# Patient Record
Sex: FEMALE | Race: WHITE | NOT HISPANIC OR LATINO | Employment: FULL TIME | URBAN - METROPOLITAN AREA
[De-identification: names, ages, dates, MRNs, and addresses within clinical notes are randomized per-mention and may not be internally consistent; named-entity substitution may affect disease eponyms.]

---

## 2021-03-22 ENCOUNTER — BULK ORDERING (OUTPATIENT)
Dept: CASE MANAGEMENT | Facility: OTHER | Age: 61
End: 2021-03-22

## 2021-03-22 DIAGNOSIS — Z23 IMMUNIZATION DUE: ICD-10-CM

## 2024-08-19 ENCOUNTER — APPOINTMENT (OUTPATIENT)
Dept: MRI IMAGING | Facility: HOSPITAL | Age: 64
DRG: 025 | End: 2024-08-19
Payer: COMMERCIAL

## 2024-08-19 ENCOUNTER — APPOINTMENT (OUTPATIENT)
Dept: GENERAL RADIOLOGY | Facility: HOSPITAL | Age: 64
DRG: 025 | End: 2024-08-19
Payer: COMMERCIAL

## 2024-08-19 ENCOUNTER — HOSPITAL ENCOUNTER (INPATIENT)
Facility: HOSPITAL | Age: 64
LOS: 11 days | Discharge: HOME | DRG: 025 | End: 2024-08-30
Attending: EMERGENCY MEDICINE | Admitting: INTERNAL MEDICINE
Payer: COMMERCIAL

## 2024-08-19 ENCOUNTER — APPOINTMENT (OUTPATIENT)
Dept: CT IMAGING | Facility: HOSPITAL | Age: 64
DRG: 025 | End: 2024-08-19
Payer: COMMERCIAL

## 2024-08-19 DIAGNOSIS — C79.31 METASTASIS TO BRAIN OF UNKNOWN ORIGIN: Primary | ICD-10-CM

## 2024-08-19 DIAGNOSIS — R51.9 ACUTE INTRACTABLE HEADACHE, UNSPECIFIED HEADACHE TYPE: ICD-10-CM

## 2024-08-19 DIAGNOSIS — R03.0 ELEVATED BP WITHOUT DIAGNOSIS OF HYPERTENSION: ICD-10-CM

## 2024-08-19 DIAGNOSIS — C80.1 METASTASIS TO BRAIN OF UNKNOWN ORIGIN: Primary | ICD-10-CM

## 2024-08-19 DIAGNOSIS — K63.89 COLONIC MASS: ICD-10-CM

## 2024-08-19 LAB
ALBUMIN SERPL-MCNC: 4.4 G/DL (ref 3.5–5.2)
ALBUMIN/GLOB SERPL: 1.5 G/DL
ALP SERPL-CCNC: 83 U/L (ref 39–117)
ALT SERPL W P-5'-P-CCNC: 13 U/L (ref 1–33)
ANION GAP SERPL CALCULATED.3IONS-SCNC: 9 MMOL/L (ref 5–15)
AST SERPL-CCNC: 15 U/L (ref 1–32)
BACTERIA UR QL AUTO: ABNORMAL /HPF
BASOPHILS # BLD AUTO: 0.03 10*3/MM3 (ref 0–0.2)
BASOPHILS # BLD AUTO: 0.03 10*3/MM3 (ref 0–0.2)
BASOPHILS NFR BLD AUTO: 0.5 % (ref 0–1.5)
BASOPHILS NFR BLD AUTO: 0.6 % (ref 0–1.5)
BILIRUB SERPL-MCNC: 0.3 MG/DL (ref 0–1.2)
BILIRUB UR QL STRIP: NEGATIVE
BUN SERPL-MCNC: 13 MG/DL (ref 8–23)
BUN/CREAT SERPL: 18.8 (ref 7–25)
CALCIUM SPEC-SCNC: 9.6 MG/DL (ref 8.6–10.5)
CHLORIDE SERPL-SCNC: 102 MMOL/L (ref 98–107)
CLARITY UR: ABNORMAL
CO2 SERPL-SCNC: 26 MMOL/L (ref 22–29)
COLOR UR: YELLOW
CREAT SERPL-MCNC: 0.69 MG/DL (ref 0.57–1)
DEPRECATED RDW RBC AUTO: 44 FL (ref 37–54)
DEPRECATED RDW RBC AUTO: 45.8 FL (ref 37–54)
EGFRCR SERPLBLD CKD-EPI 2021: 97.1 ML/MIN/1.73
EOSINOPHIL # BLD AUTO: 0.02 10*3/MM3 (ref 0–0.4)
EOSINOPHIL # BLD AUTO: 0.02 10*3/MM3 (ref 0–0.4)
EOSINOPHIL NFR BLD AUTO: 0.3 % (ref 0.3–6.2)
EOSINOPHIL NFR BLD AUTO: 0.4 % (ref 0.3–6.2)
ERYTHROCYTE [DISTWIDTH] IN BLOOD BY AUTOMATED COUNT: 12.3 % (ref 12.3–15.4)
ERYTHROCYTE [DISTWIDTH] IN BLOOD BY AUTOMATED COUNT: 12.4 % (ref 12.3–15.4)
FLUAV RNA RESP QL NAA+PROBE: NOT DETECTED
FLUBV RNA RESP QL NAA+PROBE: NOT DETECTED
GLOBULIN UR ELPH-MCNC: 3 GM/DL
GLUCOSE BLDC GLUCOMTR-MCNC: 91 MG/DL (ref 70–130)
GLUCOSE SERPL-MCNC: 98 MG/DL (ref 65–99)
GLUCOSE UR STRIP-MCNC: NEGATIVE MG/DL
HCT VFR BLD AUTO: 40.5 % (ref 34–46.6)
HCT VFR BLD AUTO: 42.5 % (ref 34–46.6)
HGB BLD-MCNC: 13.7 G/DL (ref 12–15.9)
HGB BLD-MCNC: 14.4 G/DL (ref 12–15.9)
HGB UR QL STRIP.AUTO: NEGATIVE
HOLD SPECIMEN: NORMAL
HOLD SPECIMEN: NORMAL
HYALINE CASTS UR QL AUTO: ABNORMAL /LPF
IMM GRANULOCYTES # BLD AUTO: 0.01 10*3/MM3 (ref 0–0.05)
IMM GRANULOCYTES # BLD AUTO: 0.02 10*3/MM3 (ref 0–0.05)
IMM GRANULOCYTES NFR BLD AUTO: 0.2 % (ref 0–0.5)
IMM GRANULOCYTES NFR BLD AUTO: 0.3 % (ref 0–0.5)
KETONES UR QL STRIP: ABNORMAL
LEUKOCYTE ESTERASE UR QL STRIP.AUTO: ABNORMAL
LYMPHOCYTES # BLD AUTO: 0.64 10*3/MM3 (ref 0.7–3.1)
LYMPHOCYTES # BLD AUTO: 1.18 10*3/MM3 (ref 0.7–3.1)
LYMPHOCYTES NFR BLD AUTO: 11 % (ref 19.6–45.3)
LYMPHOCYTES NFR BLD AUTO: 22.1 % (ref 19.6–45.3)
MAGNESIUM SERPL-MCNC: 1.9 MG/DL (ref 1.6–2.4)
MCH RBC QN AUTO: 33.1 PG (ref 26.6–33)
MCH RBC QN AUTO: 33.7 PG (ref 26.6–33)
MCHC RBC AUTO-ENTMCNC: 33.8 G/DL (ref 31.5–35.7)
MCHC RBC AUTO-ENTMCNC: 33.9 G/DL (ref 31.5–35.7)
MCV RBC AUTO: 97.7 FL (ref 79–97)
MCV RBC AUTO: 99.8 FL (ref 79–97)
MONOCYTES # BLD AUTO: 0.24 10*3/MM3 (ref 0.1–0.9)
MONOCYTES # BLD AUTO: 0.46 10*3/MM3 (ref 0.1–0.9)
MONOCYTES NFR BLD AUTO: 4.1 % (ref 5–12)
MONOCYTES NFR BLD AUTO: 8.6 % (ref 5–12)
MUCOUS THREADS URNS QL MICRO: ABNORMAL /HPF
NEUTROPHILS NFR BLD AUTO: 3.64 10*3/MM3 (ref 1.7–7)
NEUTROPHILS NFR BLD AUTO: 4.88 10*3/MM3 (ref 1.7–7)
NEUTROPHILS NFR BLD AUTO: 68.1 % (ref 42.7–76)
NEUTROPHILS NFR BLD AUTO: 83.8 % (ref 42.7–76)
NITRITE UR QL STRIP: NEGATIVE
NRBC BLD AUTO-RTO: 0 /100 WBC (ref 0–0.2)
NRBC BLD AUTO-RTO: 0 /100 WBC (ref 0–0.2)
PH UR STRIP.AUTO: 6 [PH] (ref 5–8)
PLATELET # BLD AUTO: 275 10*3/MM3 (ref 140–450)
PLATELET # BLD AUTO: 355 10*3/MM3 (ref 140–450)
PMV BLD AUTO: 9.2 FL (ref 6–12)
PMV BLD AUTO: 9.5 FL (ref 6–12)
POTASSIUM SERPL-SCNC: 3.6 MMOL/L (ref 3.5–5.2)
PROT SERPL-MCNC: 7.4 G/DL (ref 6–8.5)
PROT UR QL STRIP: ABNORMAL
QT INTERVAL: 442 MS
QTC INTERVAL: 467 MS
RBC # BLD AUTO: 4.06 10*6/MM3 (ref 3.77–5.28)
RBC # BLD AUTO: 4.35 10*6/MM3 (ref 3.77–5.28)
RBC # UR STRIP: ABNORMAL /HPF
REF LAB TEST METHOD: ABNORMAL
RSV RNA RESP QL NAA+PROBE: NOT DETECTED
SARS-COV-2 RNA RESP QL NAA+PROBE: NOT DETECTED
SODIUM SERPL-SCNC: 137 MMOL/L (ref 136–145)
SP GR UR STRIP: 1.02 (ref 1–1.03)
SQUAMOUS #/AREA URNS HPF: ABNORMAL /HPF
TROPONIN T SERPL HS-MCNC: <6 NG/L
UROBILINOGEN UR QL STRIP: ABNORMAL
WBC # UR STRIP: ABNORMAL /HPF
WBC NRBC COR # BLD AUTO: 5.34 10*3/MM3 (ref 3.4–10.8)
WBC NRBC COR # BLD AUTO: 5.83 10*3/MM3 (ref 3.4–10.8)
WHOLE BLOOD HOLD COAG: NORMAL
WHOLE BLOOD HOLD SPECIMEN: NORMAL

## 2024-08-19 PROCEDURE — 25010000002 NICARDIPINE 2.5 MG/ML SOLUTION

## 2024-08-19 PROCEDURE — 25810000003 LACTATED RINGERS PER 1000 ML

## 2024-08-19 PROCEDURE — 84484 ASSAY OF TROPONIN QUANT: CPT

## 2024-08-19 PROCEDURE — 25810000003 LACTATED RINGERS SOLUTION: Performed by: PHYSICIAN ASSISTANT

## 2024-08-19 PROCEDURE — 70553 MRI BRAIN STEM W/O & W/DYE: CPT

## 2024-08-19 PROCEDURE — 99291 CRITICAL CARE FIRST HOUR: CPT

## 2024-08-19 PROCEDURE — 25010000002 HYDRALAZINE PER 20 MG

## 2024-08-19 PROCEDURE — 70450 CT HEAD/BRAIN W/O DYE: CPT

## 2024-08-19 PROCEDURE — 85025 COMPLETE CBC W/AUTO DIFF WBC: CPT

## 2024-08-19 PROCEDURE — 36415 COLL VENOUS BLD VENIPUNCTURE: CPT

## 2024-08-19 PROCEDURE — 25810000003 SODIUM CHLORIDE 0.9 % SOLUTION

## 2024-08-19 PROCEDURE — 80053 COMPREHEN METABOLIC PANEL: CPT

## 2024-08-19 PROCEDURE — 82948 REAGENT STRIP/BLOOD GLUCOSE: CPT

## 2024-08-19 PROCEDURE — 25010000002 HYDROCORTISONE SOD SUC (PF) 250 MG RECONSTITUTED SOLUTION: Performed by: NEUROLOGICAL SURGERY

## 2024-08-19 PROCEDURE — 71045 X-RAY EXAM CHEST 1 VIEW: CPT

## 2024-08-19 PROCEDURE — 81001 URINALYSIS AUTO W/SCOPE: CPT | Performed by: EMERGENCY MEDICINE

## 2024-08-19 PROCEDURE — 93010 ELECTROCARDIOGRAM REPORT: CPT | Performed by: INTERNAL MEDICINE

## 2024-08-19 PROCEDURE — 83735 ASSAY OF MAGNESIUM: CPT

## 2024-08-19 PROCEDURE — 25010000002 LEVETRIRACETAM PER 10 MG: Performed by: INTERNAL MEDICINE

## 2024-08-19 PROCEDURE — 87637 SARSCOV2&INF A&B&RSV AMP PRB: CPT | Performed by: PHYSICIAN ASSISTANT

## 2024-08-19 PROCEDURE — 93005 ELECTROCARDIOGRAM TRACING: CPT | Performed by: EMERGENCY MEDICINE

## 2024-08-19 RX ORDER — BISACODYL 5 MG/1
5 TABLET, DELAYED RELEASE ORAL DAILY PRN
Status: DISCONTINUED | OUTPATIENT
Start: 2024-08-19 | End: 2024-08-21 | Stop reason: SDUPTHER

## 2024-08-19 RX ORDER — AMOXICILLIN 250 MG
2 CAPSULE ORAL 2 TIMES DAILY
Status: DISCONTINUED | OUTPATIENT
Start: 2024-08-19 | End: 2024-08-21 | Stop reason: SDUPTHER

## 2024-08-19 RX ORDER — BUPROPION HYDROCHLORIDE 150 MG/1
2 TABLET ORAL EVERY MORNING
COMMUNITY
Start: 2024-08-19

## 2024-08-19 RX ORDER — NITROGLYCERIN 0.4 MG/1
0.4 TABLET SUBLINGUAL
Status: DISCONTINUED | OUTPATIENT
Start: 2024-08-19 | End: 2024-08-30 | Stop reason: HOSPADM

## 2024-08-19 RX ORDER — HYDRALAZINE HYDROCHLORIDE 20 MG/ML
10 INJECTION INTRAMUSCULAR; INTRAVENOUS ONCE
Status: COMPLETED | OUTPATIENT
Start: 2024-08-19 | End: 2024-08-19

## 2024-08-19 RX ORDER — SODIUM CHLORIDE 0.9 % (FLUSH) 0.9 %
10 SYRINGE (ML) INJECTION AS NEEDED
Status: DISCONTINUED | OUTPATIENT
Start: 2024-08-19 | End: 2024-08-30 | Stop reason: HOSPADM

## 2024-08-19 RX ORDER — ACETAMINOPHEN 650 MG/1
650 SUPPOSITORY RECTAL EVERY 4 HOURS PRN
Status: DISCONTINUED | OUTPATIENT
Start: 2024-08-19 | End: 2024-08-24

## 2024-08-19 RX ORDER — SODIUM CHLORIDE 0.9 % (FLUSH) 0.9 %
10 SYRINGE (ML) INJECTION AS NEEDED
Status: DISCONTINUED | OUTPATIENT
Start: 2024-08-19 | End: 2024-08-24

## 2024-08-19 RX ORDER — SODIUM CHLORIDE, SODIUM LACTATE, POTASSIUM CHLORIDE, CALCIUM CHLORIDE 600; 310; 30; 20 MG/100ML; MG/100ML; MG/100ML; MG/100ML
100 INJECTION, SOLUTION INTRAVENOUS CONTINUOUS
Status: DISCONTINUED | OUTPATIENT
Start: 2024-08-19 | End: 2024-08-20

## 2024-08-19 RX ORDER — ATORVASTATIN CALCIUM 10 MG/1
1 TABLET, FILM COATED ORAL DAILY
COMMUNITY
Start: 2024-08-19

## 2024-08-19 RX ORDER — ACETAMINOPHEN 325 MG/1
650 TABLET ORAL EVERY 4 HOURS PRN
Status: DISCONTINUED | OUTPATIENT
Start: 2024-08-19 | End: 2024-08-24

## 2024-08-19 RX ORDER — LEVETIRACETAM 500 MG/5ML
500 INJECTION, SOLUTION, CONCENTRATE INTRAVENOUS EVERY 12 HOURS SCHEDULED
Status: DISCONTINUED | OUTPATIENT
Start: 2024-08-19 | End: 2024-08-23

## 2024-08-19 RX ORDER — POLYETHYLENE GLYCOL 3350 17 G/17G
17 POWDER, FOR SOLUTION ORAL DAILY PRN
Status: DISCONTINUED | OUTPATIENT
Start: 2024-08-19 | End: 2024-08-21 | Stop reason: SDUPTHER

## 2024-08-19 RX ORDER — FAMOTIDINE 10 MG/ML
20 INJECTION, SOLUTION INTRAVENOUS EVERY 12 HOURS SCHEDULED
Status: DISCONTINUED | OUTPATIENT
Start: 2024-08-19 | End: 2024-08-23

## 2024-08-19 RX ORDER — ONDANSETRON 2 MG/ML
4 INJECTION INTRAMUSCULAR; INTRAVENOUS EVERY 6 HOURS PRN
Status: DISCONTINUED | OUTPATIENT
Start: 2024-08-19 | End: 2024-08-21 | Stop reason: SDUPTHER

## 2024-08-19 RX ORDER — BISACODYL 10 MG
10 SUPPOSITORY, RECTAL RECTAL DAILY PRN
Status: DISCONTINUED | OUTPATIENT
Start: 2024-08-19 | End: 2024-08-21 | Stop reason: SDUPTHER

## 2024-08-19 RX ADMIN — NICARDIPINE HYDROCHLORIDE 5 MG/HR: 25 INJECTION, SOLUTION INTRAVENOUS at 22:28

## 2024-08-19 RX ADMIN — HYDRALAZINE HYDROCHLORIDE 10 MG: 20 INJECTION, SOLUTION INTRAMUSCULAR; INTRAVENOUS at 23:58

## 2024-08-19 RX ADMIN — SODIUM CHLORIDE, POTASSIUM CHLORIDE, SODIUM LACTATE AND CALCIUM CHLORIDE 100 ML/HR: 600; 310; 30; 20 INJECTION, SOLUTION INTRAVENOUS at 22:04

## 2024-08-19 RX ADMIN — HYDROCORTISONE SODIUM SUCCINATE 250 MG: 250 INJECTION, POWDER, FOR SOLUTION INTRAMUSCULAR; INTRAVENOUS at 19:20

## 2024-08-19 RX ADMIN — LEVETIRACETAM 500 MG: 500 INJECTION, SOLUTION INTRAVENOUS at 21:48

## 2024-08-19 RX ADMIN — FAMOTIDINE 20 MG: 10 INJECTION INTRAVENOUS at 20:13

## 2024-08-19 RX ADMIN — SODIUM CHLORIDE, POTASSIUM CHLORIDE, SODIUM LACTATE AND CALCIUM CHLORIDE 1000 ML: 600; 310; 30; 20 INJECTION, SOLUTION INTRAVENOUS at 17:14

## 2024-08-19 NOTE — ED PROVIDER NOTES
EMERGENCY DEPARTMENT ENCOUNTER  Room Number:  05/05  PCP: Provider, No Known  Independent Historians: Patient      HPI:  Chief Complaint: had concerns including Dizziness, Headache, and Hypertension.     A complete HPI/ROS/PMH/PSH/SH/FH are unobtainable due to: None    Chronic or social conditions impacting patient care (Social Determinants of Health): None      Context: Ely Sims is a 64 y.o. female with a medical history of breast mass, anxiety, ADHD who presents to the ED c/o acute headache.  Patient reports Wednesday she had gradual onset of frontal headache that she describes as aching.  She reports associated nausea, vomiting, diarrhea since that time.  Reports the headache has waxed and waned over the last 5 days.  Reports her sister who is an RN has been checking her blood pressure and it has been elevated.  She has been unable to keep down sips of water.  She decided to go to urgent care for evaluation today.  When she sat on the exam table at the [facility] she developed room spinning dizziness that only lasted for a few seconds.  Given her constellation of symptoms, she was referred to the emergency department for further workup and evaluation.  Patient has no history of hypertension.  No known sick contacts.  Patient denies photophobia, vision loss, chest pain, dyspnea, cough, abdominal pain, dysuria, or any other systemic complaints.      Review of prior external notes (non-ED) -and- Review of prior external test results outside of this encounter:  Patient seen at urgent care today for intractable headache.  Reviewed assessment and plan.  Patient referred to emergency department.  Reviewed imaging performed on 11/9/2017.  X-ray of left wrist showed arthritis.    Prescription drug monitoring program review:     N/A    PAST MEDICAL HISTORY  Active Ambulatory Problems     Diagnosis Date Noted    No Active Ambulatory Problems     Resolved Ambulatory Problems     Diagnosis Date Noted    No Resolved Ambulatory  Problems     No Additional Past Medical History         PAST SURGICAL HISTORY  No past surgical history on file.      FAMILY HISTORY  No family history on file.      SOCIAL HISTORY  Social History     Socioeconomic History    Marital status: Single   Tobacco Use    Smoking status: Some Days     Types: Cigarettes    Smokeless tobacco: Never   Vaping Use    Vaping status: Never Used   Substance and Sexual Activity    Alcohol use: Yes    Drug use: Defer    Sexual activity: Defer         ALLERGIES  Patient has no known allergies.      REVIEW OF SYSTEMS  Review of Systems   Constitutional:  Negative for chills and fever.   HENT:  Negative for ear pain and sore throat.    Respiratory:  Negative for cough and shortness of breath.    Cardiovascular:  Negative for chest pain and palpitations.   Gastrointestinal:  Positive for diarrhea, nausea and vomiting. Negative for abdominal pain.   Genitourinary:  Negative for dysuria and hematuria.   Musculoskeletal:  Negative for arthralgias and joint swelling.   Skin:  Negative for pallor and rash.   Neurological:  Positive for headaches. Negative for numbness.   Psychiatric/Behavioral:  Negative for confusion and hallucinations.      Included in HPI  All systems reviewed and negative except for those discussed in HPI.      PHYSICAL EXAM    I have reviewed the triage vital signs and nursing notes.    ED Triage Vitals   Temp Heart Rate Resp BP SpO2   08/19/24 1523 08/19/24 1523 08/19/24 1523 08/19/24 1526 08/19/24 1523   98.2 °F (36.8 °C) 85 18 158/96 99 %      Temp src Heart Rate Source Patient Position BP Location FiO2 (%)   08/19/24 1523 -- -- -- --   Tympanic           Physical Exam  Constitutional:       General: She is not in acute distress.     Appearance: She is well-developed.   HENT:      Head: Normocephalic and atraumatic.   Eyes:      Extraocular Movements: Extraocular movements intact.      Conjunctiva/sclera: Conjunctivae normal.      Pupils: Pupils are equal, round, and  reactive to light.   Cardiovascular:      Rate and Rhythm: Normal rate and regular rhythm.      Heart sounds: Normal heart sounds.   Pulmonary:      Effort: Pulmonary effort is normal.      Breath sounds: Normal breath sounds.   Abdominal:      General: There is no distension.   Skin:     General: Skin is warm.   Neurological:      General: No focal deficit present.      Mental Status: She is alert and oriented to person, place, and time.   Psychiatric:         Mood and Affect: Mood normal.           LAB RESULTS  Recent Results (from the past 24 hour(s))   Comprehensive Metabolic Panel    Collection Time: 08/19/24  3:45 PM    Specimen: Arm, Right; Blood   Result Value Ref Range    Glucose 98 65 - 99 mg/dL    BUN 13 8 - 23 mg/dL    Creatinine 0.69 0.57 - 1.00 mg/dL    Sodium 137 136 - 145 mmol/L    Potassium 3.6 3.5 - 5.2 mmol/L    Chloride 102 98 - 107 mmol/L    CO2 26.0 22.0 - 29.0 mmol/L    Calcium 9.6 8.6 - 10.5 mg/dL    Total Protein 7.4 6.0 - 8.5 g/dL    Albumin 4.4 3.5 - 5.2 g/dL    ALT (SGPT) 13 1 - 33 U/L    AST (SGOT) 15 1 - 32 U/L    Alkaline Phosphatase 83 39 - 117 U/L    Total Bilirubin 0.3 0.0 - 1.2 mg/dL    Globulin 3.0 gm/dL    A/G Ratio 1.5 g/dL    BUN/Creatinine Ratio 18.8 7.0 - 25.0    Anion Gap 9.0 5.0 - 15.0 mmol/L    eGFR 97.1 >60.0 mL/min/1.73   Single High Sensitivity Troponin T    Collection Time: 08/19/24  3:45 PM    Specimen: Arm, Right; Blood   Result Value Ref Range    HS Troponin T <6 <14 ng/L   Magnesium    Collection Time: 08/19/24  3:45 PM    Specimen: Arm, Right; Blood   Result Value Ref Range    Magnesium 1.9 1.6 - 2.4 mg/dL   Green Top (Gel)    Collection Time: 08/19/24  3:45 PM   Result Value Ref Range    Extra Tube Hold for add-ons.    Lavender Top    Collection Time: 08/19/24  3:45 PM   Result Value Ref Range    Extra Tube hold for add-on    Gold Top - SST    Collection Time: 08/19/24  3:45 PM   Result Value Ref Range    Extra Tube Hold for add-ons.    Light Blue Top     Collection Time: 08/19/24  3:45 PM   Result Value Ref Range    Extra Tube Hold for add-ons.    CBC Auto Differential    Collection Time: 08/19/24  3:45 PM    Specimen: Arm, Right; Blood   Result Value Ref Range    WBC 5.34 3.40 - 10.80 10*3/mm3    RBC 4.35 3.77 - 5.28 10*6/mm3    Hemoglobin 14.4 12.0 - 15.9 g/dL    Hematocrit 42.5 34.0 - 46.6 %    MCV 97.7 (H) 79.0 - 97.0 fL    MCH 33.1 (H) 26.6 - 33.0 pg    MCHC 33.9 31.5 - 35.7 g/dL    RDW 12.3 12.3 - 15.4 %    RDW-SD 44.0 37.0 - 54.0 fl    MPV 9.2 6.0 - 12.0 fL    Platelets 355 140 - 450 10*3/mm3    Neutrophil % 68.1 42.7 - 76.0 %    Lymphocyte % 22.1 19.6 - 45.3 %    Monocyte % 8.6 5.0 - 12.0 %    Eosinophil % 0.4 0.3 - 6.2 %    Basophil % 0.6 0.0 - 1.5 %    Immature Grans % 0.2 0.0 - 0.5 %    Neutrophils, Absolute 3.64 1.70 - 7.00 10*3/mm3    Lymphocytes, Absolute 1.18 0.70 - 3.10 10*3/mm3    Monocytes, Absolute 0.46 0.10 - 0.90 10*3/mm3    Eosinophils, Absolute 0.02 0.00 - 0.40 10*3/mm3    Basophils, Absolute 0.03 0.00 - 0.20 10*3/mm3    Immature Grans, Absolute 0.01 0.00 - 0.05 10*3/mm3    nRBC 0.0 0.0 - 0.2 /100 WBC   ECG 12 Lead ED Triage Standing Order; Weak / Dizzy / AMS    Collection Time: 08/19/24  4:38 PM   Result Value Ref Range    QT Interval 442 ms    QTC Interval 467 ms         RADIOLOGY  CT Head Without Contrast    Result Date: 8/19/2024  CT HEAD WO CONTRAST-  HISTORY:  headache,vomiting  COMPARISON: None  FINDINGS: There is an area of increased attenuation involving the right cerebellar hemisphere posteriorly measuring approximately 2.1 cm in size. Decreased attenuation consistent with vasogenic edema is appreciated involving the right cerebellar hemisphere. Beam hardening artifact limits evaluation of the posterior fossa but there may be decreased attenuation also involving the shravan laterally to the right. Decreased attenuation involving the inferior aspect the right temporal lobe posteriorly is appreciated measuring approximately 6 mm in size  with a small area of adjacent edema. There is mild prominence of the lateral and third ventricles secondary to mass effect upon the inferior aspect of the fourth ventricle by the right cerebellar mass and associated edema. There is no evidence of uncal herniation or midline shift.      Areas of increased attenuation are appreciated over the right cerebral hemisphere posteriorly (2.1 cm) and left temporal lobe inferiorly and posteriorly (6 mm) most consistent with that of metastatic disease with associated edema. Edema is most prominent involving the right cerebellar hemisphere. There is mass effect upon the fourth ventricle and mild prominence of the lateral and third ventricles. Further evaluation with MRI examination of the brain with and without contrast is recommended.  The above information was called to and discussed with Dr. Epps.     Radiation dose reduction techniques were utilized, including automated exposure modulation based on body size.  This report was finalized on 8/19/2024 6:40 PM by Dr. Cesar Levine M.D on Workstation: [id number]      XR Chest 1 View    Result Date: 8/19/2024  XR CHEST 1 VW-8/19/2024  HISTORY: Weakness, dizziness.  Heart size is within normal limits. Lungs appear free of acute infiltrates. There is mild thoracic scoliosis. Bones and soft tissues are otherwise unremarkable.      1. No acute process   This report was finalized on 8/19/2024 4:32 PM by Dr. Veto Escobar M.D on Workstation: [id number]         MEDICATIONS GIVEN IN ER  Medications   sodium chloride 0.9 % flush 10 mL (has no administration in time range)   sodium chloride 0.9 % flush 10 mL (has no administration in time range)   lactated ringers bolus 1,000 mL (has no administration in time range)         ORDERS PLACED DURING THIS VISIT:  Orders Placed This Encounter   Procedures    COVID-19, FLU A/B, RSV PCR 1 HR TAT - Swab, Nasopharynx    XR Chest 1 View    [place] Draw    Comprehensive Metabolic Panel     Single High Sensitivity Troponin T    Magnesium    Urinalysis With Microscopic If Indicated (No Culture) - Urine, Clean Catch    CBC Auto Differential    NPO Diet NPO Type: Strict NPO    Undress & Gown    Continuous Pulse Oximetry    Vital Signs    Orthostatic Blood Pressure    Oxygen Therapy- Nasal Cannula; Titrate 1-6 LPM Per SpO2; 90 - 95%    ECG 12 Lead ED Triage Standing Order; Weak / Dizzy / AMS    Insert Peripheral IV    Fall Precautions    CBC & Differential    Green Top (Gel)    Lavender Top    Gold Top - SST    Light Blue Top         OUTPATIENT MEDICATION MANAGEMENT:  Current Facility-Administered Medications Ordered in Epic   Medication Dose Route Frequency Provider Last Rate Last Admin    lactated ringers bolus 1,000 mL  1,000 mL Intravenous Once Nohemy Cano PA-C        sodium chloride 0.9 % flush 10 mL  10 mL Intravenous PRN Kendrick Epps II, MD        sodium chloride 0.9 % flush 10 mL  10 mL Intravenous PRN Nohemy Cano PA-C         Current Outpatient Medications Ordered in Epic   Medication Sig Dispense Refill    amphetamine-dextroamphetamine (ADDERALL) 20 MG tablet       LORazepam (ATIVAN) 0.5 MG tablet            35 minutes of critical care provided. This time excludes other billable procedures. Time does include preparation of documents, medical consultations, review of old records, and direct bedside care. Patient is at high risk for life-threatening deterioration due to metastatic brain lesions causing edema and mass effect requiring neurosurgery consultation, close neurologic assessment, admission to ICU.           PROGRESS, DATA ANALYSIS, CONSULTS, AND MEDICAL DECISION MAKING  All labs have been independently interpreted by me.  All radiology studies have been reviewed by me. All EKG's have been independently viewed and interpreted by me.  Discussion below represents my analysis of pertinent findings related to patient's condition, differential diagnosis, treatment plan and  final disposition.    Differential diagnosis includes but is not limited to COVID, influenza, new onset hypertension, vertigo.        ED Course as of 08/19/24 2133   Mon Aug 19, 2024   1655 EKG independently interpreted by myself.  Time 4:38 PM.  Sinus rhythm.  Heart rate 67.  Normal intervals and axis.  No acute ST abnormality.  RSR prime in lead V1 and V2 [TD]   1727 Chest x-ray independently interpreted by me as no pneumothorax [MP]   1839 I discussed the CT results with Dr. Levine, radiology.  Patient has evidence of metastatic disease to the brain more prominent to the right cerebellum with associated vasogenic edema. [TD]   1928 I discussed the case with Dr. Doty, neurosurgery.  We will order MRI.  He states that he will add steroids for the patient after reviewing the imaging.  He recommends admission to the ICU. [TD]   1942 I spoke with GULSHAN Almeida with pulmonology.  Discussed patient presentation and ED findings.  She agrees to admit patient to an ICU bed to the service of Dr. Louis. [MP]      ED Course User Index  [MP] Nohemy Cano PA-C  [TD] Kendrick Epps II, MD             AS OF 16:58 EDT VITALS:    BP - 165/90  HR - 68  TEMP - 98.2 °F (36.8 °C) (Tympanic)  O2 SATS - 97%    COMPLEXITY OF CARE  The patient requires admission.      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    Note Disclaimer: At [facility], we believe that sharing information builds trust and better relationships. You are receiving this note because you recently visited [facility]. It is possible you will see health information before a provider has talked with you about it. This kind of information can be easy to misunderstand. To help you fully understand what it means for your health, we urge you to discuss this note with your provider.         Nohemy Cano PA-C  08/19/24 8517

## 2024-08-19 NOTE — ED TRIAGE NOTES
Patient to ER via car from [facility] for hypertension  Patient reports headache and dizziness x Wednesday

## 2024-08-19 NOTE — ED NOTES
Nursing report ED to floor  Ely Sims  64 y.o.  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 Diagnoses of Acute intractable headache, unspecified headache type and Elevated BP without diagnosis of hypertension were also pertinent to this visit.    Code status:   Current Code Status       Date Active Code Status Order ID Comments User Context       Not on file            Allergies:   Patient has no known allergies.    Isolation:   No active isolations    Intake and Output    Intake/Output Summary (Last 24 hours) at 8/19/2024 1953  Last data filed at 8/19/2024 1920  Gross per 24 hour   Intake 1000 ml   Output --   Net 1000 ml       Weight:   There were no vitals filed for this visit.    Most recent vitals:   Vitals:    08/19/24 1711 08/19/24 1741 08/19/24 1911 08/19/24 1921   BP: 162/89 173/97 (!) 178/103    Pulse: 66 68 75 80   Resp:       Temp:       TempSrc:       SpO2: 97% 99% 98% 98%       Active LDAs/IV Access:   Lines, Drains & Airways       Active LDAs       Name Placement date Placement time Site Days    Peripheral IV 08/19/24 1706 Anterior;Distal;Right Forearm 08/19/24  1706  Forearm  less than 1                    Labs (abnormal labs have a star):   Labs Reviewed   URINALYSIS W/ MICROSCOPIC IF INDICATED (NO CULTURE) - Abnormal; Notable for the following components:       Result Value    Appearance, UA Cloudy (*)     Ketones, UA 40 mg/dL (2+) (*)     Protein, UA 30 mg/dL (1+) (*)     Leuk Esterase, UA Trace (*)     All other components within normal limits   CBC WITH AUTO DIFFERENTIAL - Abnormal; Notable for the following components:    MCV 97.7 (*)     MCH 33.1 (*)      All other components within normal limits   URINALYSIS, MICROSCOPIC ONLY - Abnormal; Notable for the following components:    WBC, UA 3-5 (*)     Bacteria, UA 1+ (*)     Squamous Epithelial Cells, UA 3-6 (*)     Mucus, UA Moderate/2+ (*)     All other components within normal limits   COVID-19/FLUA&B/RSV, NP SWAB IN TRANSPORT MEDIA 1 HR TAT - Normal    Narrative:     Fact sheet for providers: https://www.fda.gov/media/328785/download    Fact sheet for patients: https://www.fda.gov/media/410260/download    Test performed by PCR.   SINGLE HS TROPONIN T - Normal    Narrative:     High Sensitive Troponin T Reference Range:  <14.0 ng/L- Negative Female for AMI  <22.0 ng/L- Negative Male for AMI  >=14 - Abnormal Female indicating possible myocardial injury.  >=22 - Abnormal Male indicating possible myocardial injury.   Clinicians would have to utilize clinical acumen, EKG, Troponin, and serial changes to determine if it is an Acute Myocardial Infarction or myocardial injury due to an underlying chronic condition.        MAGNESIUM - Normal   RAINBOW DRAW    Narrative:     The following orders were created for panel order [place] Draw.  Procedure                               Abnormality         Status                     ---------                               -----------         ------                     Green Top (Gel)[[id number]]                                  Final result               Lavender Top[[id number]]                                     Final result               Gold Top - SST[[id number]]                                   Final result               Light Blue Top[[id number]]                                   Final result                 Please view results for these tests on the individual orders.   COMPREHENSIVE METABOLIC PANEL    Narrative:     GFR Normal >60  Chronic Kidney Disease <60  Kidney Failure <15     CBC AND DIFFERENTIAL    Narrative:     The following orders were created for panel order CBC &  Differential.  Procedure                               Abnormality         Status                     ---------                               -----------         ------                     CBC Auto Differential[[id number]]        Abnormal            Final result                 Please view results for these tests on the individual orders.   GREEN TOP   LAVENDER TOP   GOLD TOP - SST   LIGHT BLUE TOP       EKG:   ECG 12 Lead ED Triage Standing Order; Weak / Dizzy / AMS   Preliminary Result   HEART RATE=67  bpm   RR Tahxkduq=516  ms   NV Zfzqjkvr=527  ms   P Horizontal Axis=-3  deg   P Front Axis=37  deg   QRSD Interval=98  ms   QT Tvqnqnni=361  ms   DWjF=547  ms   QRS Axis=19  deg   T Wave Axis=70  deg   - BORDERLINE ECG -   Sinus rhythm   Probable left atrial enlargement   RSR' in V1 or V2, right VCD or RVH   Date and Time of Study:2024-08-19 16:38:54          Meds given in ED:   Medications   sodium chloride 0.9 % flush 10 mL (has no administration in time range)   sodium chloride 0.9 % flush 10 mL (has no administration in time range)   Hydrocortisone Sod Suc (PF) (Solu-CORTEF) injection 250 mg (250 mg Intravenous Given 8/19/24 1920)   famotidine (PEPCID) injection 20 mg (has no administration in time range)   lactated ringers bolus 1,000 mL (0 mL Intravenous Stopped 8/19/24 1920)       Imaging results:  CT Head Without Contrast    Result Date: 8/19/2024  Areas of increased attenuation are appreciated over the right cerebral hemisphere posteriorly (2.1 cm) and left temporal lobe inferiorly and posteriorly (6 mm) most consistent with that of metastatic disease with associated edema. Edema is most prominent involving the right cerebellar hemisphere. There is mass effect upon the fourth ventricle and mild prominence of the lateral and third ventricles. Further evaluation with MRI examination of the brain with and without contrast is recommended.  The above information was called to and discussed with Dr. Epps.      Radiation dose reduction techniques were utilized, including automated exposure modulation based on body size.  This report was finalized on 8/19/2024 6:40 PM by Dr. Cesar Levine M.D on Workstation: [id number]      XR Chest 1 View    Result Date: 8/19/2024  1. No acute process   This report was finalized on 8/19/2024 4:32 PM by Dr. Veto Escobar M.D on Workstation: [id number]       Ambulatory status:   - ad kacy     Social issues:   Social History     Socioeconomic History    Marital status: Single   Tobacco Use    Smoking status: Some Days     Types: Cigarettes    Smokeless tobacco: Never   Vaping Use    Vaping status: Never Used   Substance and Sexual Activity    Alcohol use: Yes    Drug use: Defer    Sexual activity: Defer       Peripheral Neurovascular  Peripheral Neurovascular (Adult)  Peripheral Neurovascular WDL: WDL    Neuro Cognitive  Neuro Cognitive (Adult)  Cognitive/Neuro/Behavioral WDL: .WDL except  Additional Documentation: Headache Assessment (Group), Dizziness Assessment (Group)  Headache Assessment  Headache Location: generalized  Severity Rating (0-10): 8  Description/Character: sharp, worst ever  Associated Signs/Symptoms: weakness, dizziness, motor changes, nausea  Dizziness Assessment  Dizziness Reported Symptoms: environment spinning, intermittent, unsteady    Learning  Learning Assessment (Adult)  Learning Readiness and Ability: no barriers identified    Respiratory  Respiratory WDL  Respiratory WDL: WDL    Abdominal Pain       Pain Assessments  Pain (Adult)  Preferred Pain Scale: FACES (Orozco-Baker FACES Pain Rating Scale)  FACES Pain Rating: Rest: 4-->hurts little more    NIH Stroke Scale       Gillian Salgado RN  08/19/24 19:53 EDT

## 2024-08-19 NOTE — ED PROVIDER NOTES
MD ATTESTATION NOTE    SHARED VISIT: This visit was performed by BOTH a physician and an APC. The substantive portion of the medical decision making was performed by this attesting physician who made or approved the management plan and takes responsibility for patient management. All studies documented in the APC note (if performed) were independently interpreted by me.    The GULSHAN and I have discussed this patient's history, physical exam, MDM, and treatment plan.  I have reviewed the documentation and personally had a face to face interaction with the patient. The attached note describes my personal findings.      Ely Sims is a 64 y.o. female who presents to the ED c/o acute headache and vomiting with high blood pressure.  She developed a headache on Wednesday that is frontal.  No history of headaches since she was in college.  She developed nausea and vomiting on Friday.    On exam:  GENERAL: not distressed  HENT: nares patent  EYES: no scleral icterus  CV: regular rhythm, regular rate  RESPIRATORY: normal effort  ABDOMEN: soft  MUSCULOSKELETAL: no deformity  NEURO:   Recent and remote memory functions are normal. The patient is attentive with normal concentration. Language is fluent. Speech is clear. The speech is non-dysarthric. Fund of knowledge is normal.   Symmetric smile with no facial droop.  Eyes close shut strongly bilaterally.  Symmetric eyebrow raise bilaterally.  EOMI, PERRL  CN II-XII grossly normal otherwise.  5/5 strength to extremities.  No pronator drift.  Subtle difficulty with right finger-nose-finger testing but otherwise normal  No meningismus.  SKIN: warm, dry    Labs  Recent Results (from the past 24 hour(s))   Comprehensive Metabolic Panel    Collection Time: 08/19/24  3:45 PM    Specimen: Arm, Right; Blood   Result Value Ref Range    Glucose 98 65 - 99 mg/dL    BUN 13 8 - 23 mg/dL    Creatinine 0.69 0.57 - 1.00 mg/dL    Sodium 137 136 - 145 mmol/L    Potassium 3.6 3.5 - 5.2 mmol/L     Chloride 102 98 - 107 mmol/L    CO2 26.0 22.0 - 29.0 mmol/L    Calcium 9.6 8.6 - 10.5 mg/dL    Total Protein 7.4 6.0 - 8.5 g/dL    Albumin 4.4 3.5 - 5.2 g/dL    ALT (SGPT) 13 1 - 33 U/L    AST (SGOT) 15 1 - 32 U/L    Alkaline Phosphatase 83 39 - 117 U/L    Total Bilirubin 0.3 0.0 - 1.2 mg/dL    Globulin 3.0 gm/dL    A/G Ratio 1.5 g/dL    BUN/Creatinine Ratio 18.8 7.0 - 25.0    Anion Gap 9.0 5.0 - 15.0 mmol/L    eGFR 97.1 >60.0 mL/min/1.73   Single High Sensitivity Troponin T    Collection Time: 08/19/24  3:45 PM    Specimen: Arm, Right; Blood   Result Value Ref Range    HS Troponin T <6 <14 ng/L   Magnesium    Collection Time: 08/19/24  3:45 PM    Specimen: Arm, Right; Blood   Result Value Ref Range    Magnesium 1.9 1.6 - 2.4 mg/dL   Green Top (Gel)    Collection Time: 08/19/24  3:45 PM   Result Value Ref Range    Extra Tube Hold for add-ons.    Lavender Top    Collection Time: 08/19/24  3:45 PM   Result Value Ref Range    Extra Tube hold for add-on    Gold Top - SST    Collection Time: 08/19/24  3:45 PM   Result Value Ref Range    Extra Tube Hold for add-ons.    Light Blue Top    Collection Time: 08/19/24  3:45 PM   Result Value Ref Range    Extra Tube Hold for add-ons.    CBC Auto Differential    Collection Time: 08/19/24  3:45 PM    Specimen: Arm, Right; Blood   Result Value Ref Range    WBC 5.34 3.40 - 10.80 10*3/mm3    RBC 4.35 3.77 - 5.28 10*6/mm3    Hemoglobin 14.4 12.0 - 15.9 g/dL    Hematocrit 42.5 34.0 - 46.6 %    MCV 97.7 (H) 79.0 - 97.0 fL    MCH 33.1 (H) 26.6 - 33.0 pg    MCHC 33.9 31.5 - 35.7 g/dL    RDW 12.3 12.3 - 15.4 %    RDW-SD 44.0 37.0 - 54.0 fl    MPV 9.2 6.0 - 12.0 fL    Platelets 355 140 - 450 10*3/mm3    Neutrophil % 68.1 42.7 - 76.0 %    Lymphocyte % 22.1 19.6 - 45.3 %    Monocyte % 8.6 5.0 - 12.0 %    Eosinophil % 0.4 0.3 - 6.2 %    Basophil % 0.6 0.0 - 1.5 %    Immature Grans % 0.2 0.0 - 0.5 %    Neutrophils, Absolute 3.64 1.70 - 7.00 10*3/mm3    Lymphocytes, Absolute 1.18 0.70 - 3.10  10*3/mm3    Monocytes, Absolute 0.46 0.10 - 0.90 10*3/mm3    Eosinophils, Absolute 0.02 0.00 - 0.40 10*3/mm3    Basophils, Absolute 0.03 0.00 - 0.20 10*3/mm3    Immature Grans, Absolute 0.01 0.00 - 0.05 10*3/mm3    nRBC 0.0 0.0 - 0.2 /100 WBC   ECG 12 Lead ED Triage Standing Order; Weak / Dizzy / AMS    Collection Time: 08/19/24  4:38 PM   Result Value Ref Range    QT Interval 442 ms    QTC Interval 467 ms   Urinalysis With Microscopic If Indicated (No Culture) - Urine, Clean Catch    Collection Time: 08/19/24  5:08 PM    Specimen: Urine, Clean Catch   Result Value Ref Range    Color, UA Yellow Yellow, Straw    Appearance, UA Cloudy (A) Clear    pH, UA 6.0 5.0 - 8.0    Specific Gravity, UA 1.018 1.005 - 1.030    Glucose, UA Negative Negative    Ketones, UA 40 mg/dL (2+) (A) Negative    Bilirubin, UA Negative Negative    Blood, UA Negative Negative    Protein, UA 30 mg/dL (1+) (A) Negative    Leuk Esterase, UA Trace (A) Negative    Nitrite, UA Negative Negative    Urobilinogen, UA 1.0 E.U./dL 0.2 - 1.0 E.U./dL   COVID-19, FLU A/B, RSV PCR 1 HR TAT - Swab, Nasopharynx    Collection Time: 08/19/24  5:08 PM    Specimen: Nasopharynx; Swab   Result Value Ref Range    COVID19 Not Detected Not Detected - Ref. Range    Influenza A PCR Not Detected Not Detected    Influenza B PCR Not Detected Not Detected    RSV, PCR Not Detected Not Detected   Urinalysis, Microscopic Only - Urine, Clean Catch    Collection Time: 08/19/24  5:08 PM    Specimen: Urine, Clean Catch   Result Value Ref Range    RBC, UA None Seen None Seen, 0-2 /HPF    WBC, UA 3-5 (A) None Seen, 0-2 /HPF    Bacteria, UA 1+ (A) None Seen /HPF    Squamous Epithelial Cells, UA 3-6 (A) None Seen, 0-2 /HPF    Hyaline Casts, UA None Seen None Seen /LPF    Mucus, UA Moderate/2+ (A) None Seen, Trace /HPF    Methodology Manual Light Microscopy        Radiology  CT Head Without Contrast    Result Date: 8/19/2024  CT HEAD WO CONTRAST-  HISTORY:  headache,vomiting  COMPARISON:  None  FINDINGS: There is an area of increased attenuation involving the right cerebellar hemisphere posteriorly measuring approximately 2.1 cm in size. Decreased attenuation consistent with vasogenic edema is appreciated involving the right cerebellar hemisphere. Beam hardening artifact limits evaluation of the posterior fossa but there may be decreased attenuation also involving the shravan laterally to the right. Decreased attenuation involving the inferior aspect the right temporal lobe posteriorly is appreciated measuring approximately 6 mm in size with a small area of adjacent edema. There is mild prominence of the lateral and third ventricles secondary to mass effect upon the inferior aspect of the fourth ventricle by the right cerebellar mass and associated edema. There is no evidence of uncal herniation or midline shift.      Areas of increased attenuation are appreciated over the right cerebral hemisphere posteriorly (2.1 cm) and left temporal lobe inferiorly and posteriorly (6 mm) most consistent with that of metastatic disease with associated edema. Edema is most prominent involving the right cerebellar hemisphere. There is mass effect upon the fourth ventricle and mild prominence of the lateral and third ventricles. Further evaluation with MRI examination of the brain with and without contrast is recommended.  The above information was called to and discussed with Dr. Epps.     Radiation dose reduction techniques were utilized, including automated exposure modulation based on body size.  This report was finalized on 8/19/2024 6:40 PM by Dr. Cesar Levine M.D on Workstation: [id number]      XR Chest 1 View    Result Date: 8/19/2024  XR CHEST 1 VW-8/19/2024  HISTORY: Weakness, dizziness.  Heart size is within normal limits. Lungs appear free of acute infiltrates. There is mild thoracic scoliosis. Bones and soft tissues are otherwise unremarkable.      1. No acute process   This report was finalized on  8/19/2024 4:32 PM by Dr. Veto Escobar M.D on Workstation: [id number]       Medications given in the ED:  Medications   sodium chloride 0.9 % flush 10 mL (has no administration in time range)   sodium chloride 0.9 % flush 10 mL (has no administration in time range)   Hydrocortisone Sod Suc (PF) (Solu-CORTEF) injection 250 mg (250 mg Intravenous Given 8/19/24 1920)   famotidine (PEPCID) injection 20 mg (has no administration in time range)   lactated ringers bolus 1,000 mL (0 mL Intravenous Stopped 8/19/24 1920)       Orders placed during this visit:  Orders Placed This Encounter   Procedures    COVID-19, FLU A/B, RSV PCR 1 HR TAT - Swab, Nasopharynx    XR Chest 1 View    CT Head Without Contrast    MRI Brain With & Without Contrast    [facility]man Draw    Comprehensive Metabolic Panel    Single High Sensitivity Troponin T    Magnesium    Urinalysis With Microscopic If Indicated (No Culture) - Urine, Clean Catch    CBC Auto Differential    Urinalysis, Microscopic Only - Urine, Clean Catch    NPO Diet NPO Type: Strict NPO    Undress & Gown    Continuous Pulse Oximetry    Vital Signs    Orthostatic Blood Pressure    Neurosurgery (on-call MD unless specified)    Oxygen Therapy- Nasal Cannula; Titrate 1-6 LPM Per SpO2; 90 - 95%    ECG 12 Lead ED Triage Standing Order; Weak / Dizzy / AMS    Insert Peripheral IV    Fall Precautions    CBC & Differential    Green Top (Gel)    Lavender Top    Gold Top - SST    Light Blue Top       Medical Decision Making:  ED Course as of 08/21/24 2015   Mon Aug 19, 2024   1655 EKG independently interpreted by myself.  Time 4:38 PM.  Sinus rhythm.  Heart rate 67.  Normal intervals and axis.  No acute ST abnormality.  RSR prime in lead V1 and V2 [TD]   1727 Chest x-ray independently interpreted by me as no pneumothorax [MP]   1839 I discussed the CT results with Dr. Levine, radiology.  Patient has evidence of metastatic disease to the brain more prominent to the right cerebellum with  associated vasogenic edema. [TD]   1928 I discussed the case with Dr. Doty, neurosurgery.  We will order MRI.  He states that he will add steroids for the patient after reviewing the imaging.  He recommends admission to the ICU. [TD]   1942 I spoke with GULSAHN Almeida with pulmonology.  Discussed patient presentation and ED findings.  She agrees to admit patient to an ICU bed to the service of Dr. Mathew. 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

## 2024-08-20 ENCOUNTER — APPOINTMENT (OUTPATIENT)
Dept: CT IMAGING | Facility: HOSPITAL | Age: 64
DRG: 025 | End: 2024-08-20
Payer: COMMERCIAL

## 2024-08-20 LAB
ANION GAP SERPL CALCULATED.3IONS-SCNC: 11.8 MMOL/L (ref 5–15)
ANION GAP SERPL CALCULATED.3IONS-SCNC: 13.6 MMOL/L (ref 5–15)
APTT PPP: 28.4 SECONDS (ref 22.7–35.4)
BASOPHILS # BLD AUTO: 0.01 10*3/MM3 (ref 0–0.2)
BASOPHILS NFR BLD AUTO: 0.1 % (ref 0–1.5)
BUN SERPL-MCNC: 10 MG/DL (ref 8–23)
BUN SERPL-MCNC: 10 MG/DL (ref 8–23)
BUN/CREAT SERPL: 18.9 (ref 7–25)
BUN/CREAT SERPL: 19.6 (ref 7–25)
CALCIUM SPEC-SCNC: 8.7 MG/DL (ref 8.6–10.5)
CALCIUM SPEC-SCNC: 9.2 MG/DL (ref 8.6–10.5)
CHLORIDE SERPL-SCNC: 103 MMOL/L (ref 98–107)
CHLORIDE SERPL-SCNC: 104 MMOL/L (ref 98–107)
CO2 SERPL-SCNC: 20.2 MMOL/L (ref 22–29)
CO2 SERPL-SCNC: 21.4 MMOL/L (ref 22–29)
CREAT SERPL-MCNC: 0.51 MG/DL (ref 0.57–1)
CREAT SERPL-MCNC: 0.53 MG/DL (ref 0.57–1)
DEPRECATED RDW RBC AUTO: 43.9 FL (ref 37–54)
EGFRCR SERPLBLD CKD-EPI 2021: 103.4 ML/MIN/1.73
EGFRCR SERPLBLD CKD-EPI 2021: 104.4 ML/MIN/1.73
EOSINOPHIL # BLD AUTO: 0 10*3/MM3 (ref 0–0.4)
EOSINOPHIL NFR BLD AUTO: 0 % (ref 0.3–6.2)
ERYTHROCYTE [DISTWIDTH] IN BLOOD BY AUTOMATED COUNT: 12.2 % (ref 12.3–15.4)
GLUCOSE BLDC GLUCOMTR-MCNC: 154 MG/DL (ref 70–130)
GLUCOSE BLDC GLUCOMTR-MCNC: 171 MG/DL (ref 70–130)
GLUCOSE BLDC GLUCOMTR-MCNC: 220 MG/DL (ref 70–130)
GLUCOSE SERPL-MCNC: 103 MG/DL (ref 65–99)
GLUCOSE SERPL-MCNC: 117 MG/DL (ref 65–99)
HCT VFR BLD AUTO: 40.2 % (ref 34–46.6)
HGB BLD-MCNC: 13.7 G/DL (ref 12–15.9)
IMM GRANULOCYTES # BLD AUTO: 0.02 10*3/MM3 (ref 0–0.05)
IMM GRANULOCYTES NFR BLD AUTO: 0.3 % (ref 0–0.5)
INR PPP: 1.09 (ref 0.9–1.1)
LYMPHOCYTES # BLD AUTO: 0.55 10*3/MM3 (ref 0.7–3.1)
LYMPHOCYTES NFR BLD AUTO: 7.9 % (ref 19.6–45.3)
MCH RBC QN AUTO: 33.3 PG (ref 26.6–33)
MCHC RBC AUTO-ENTMCNC: 34.1 G/DL (ref 31.5–35.7)
MCV RBC AUTO: 97.8 FL (ref 79–97)
MONOCYTES # BLD AUTO: 0.14 10*3/MM3 (ref 0.1–0.9)
MONOCYTES NFR BLD AUTO: 2 % (ref 5–12)
NEUTROPHILS NFR BLD AUTO: 6.25 10*3/MM3 (ref 1.7–7)
NEUTROPHILS NFR BLD AUTO: 89.7 % (ref 42.7–76)
NRBC BLD AUTO-RTO: 0 /100 WBC (ref 0–0.2)
PLATELET # BLD AUTO: 330 10*3/MM3 (ref 140–450)
PMV BLD AUTO: 10 FL (ref 6–12)
POTASSIUM SERPL-SCNC: 3.5 MMOL/L (ref 3.5–5.2)
POTASSIUM SERPL-SCNC: 3.7 MMOL/L (ref 3.5–5.2)
PROTHROMBIN TIME: 14.3 SECONDS (ref 11.7–14.2)
RBC # BLD AUTO: 4.11 10*6/MM3 (ref 3.77–5.28)
SODIUM SERPL-SCNC: 136 MMOL/L (ref 136–145)
SODIUM SERPL-SCNC: 138 MMOL/L (ref 136–145)
WBC NRBC COR # BLD AUTO: 6.97 10*3/MM3 (ref 3.4–10.8)

## 2024-08-20 PROCEDURE — A9577 INJ MULTIHANCE: HCPCS | Performed by: INTERNAL MEDICINE

## 2024-08-20 PROCEDURE — 25510000001 IOPAMIDOL 61 % SOLUTION: Performed by: INTERNAL MEDICINE

## 2024-08-20 PROCEDURE — 25010000002 LEVETRIRACETAM PER 10 MG: Performed by: INTERNAL MEDICINE

## 2024-08-20 PROCEDURE — 25010000002 HYDROCORTISONE SOD SUC (PF) 250 MG RECONSTITUTED SOLUTION: Performed by: NEUROLOGICAL SURGERY

## 2024-08-20 PROCEDURE — 85025 COMPLETE CBC W/AUTO DIFF WBC: CPT

## 2024-08-20 PROCEDURE — 63710000001 INSULIN LISPRO (HUMAN) PER 5 UNITS: Performed by: INTERNAL MEDICINE

## 2024-08-20 PROCEDURE — 85730 THROMBOPLASTIN TIME PARTIAL: CPT

## 2024-08-20 PROCEDURE — 74176 CT ABD & PELVIS W/O CONTRAST: CPT

## 2024-08-20 PROCEDURE — 0 GADOBENATE DIMEGLUMINE 529 MG/ML SOLUTION: Performed by: INTERNAL MEDICINE

## 2024-08-20 PROCEDURE — 99223 1ST HOSP IP/OBS HIGH 75: CPT | Performed by: NURSE PRACTITIONER

## 2024-08-20 PROCEDURE — 85610 PROTHROMBIN TIME: CPT

## 2024-08-20 PROCEDURE — 70470 CT HEAD/BRAIN W/O & W/DYE: CPT

## 2024-08-20 PROCEDURE — 82948 REAGENT STRIP/BLOOD GLUCOSE: CPT

## 2024-08-20 PROCEDURE — 80048 BASIC METABOLIC PNL TOTAL CA: CPT

## 2024-08-20 PROCEDURE — 71250 CT THORAX DX C-: CPT

## 2024-08-20 RX ORDER — DEXTROSE MONOHYDRATE 25 G/50ML
25 INJECTION, SOLUTION INTRAVENOUS
Status: DISCONTINUED | OUTPATIENT
Start: 2024-08-20 | End: 2024-08-25

## 2024-08-20 RX ORDER — IBUPROFEN 600 MG/1
1 TABLET ORAL
Status: DISCONTINUED | OUTPATIENT
Start: 2024-08-20 | End: 2024-08-25

## 2024-08-20 RX ORDER — NICOTINE POLACRILEX 4 MG
15 LOZENGE BUCCAL
Status: DISCONTINUED | OUTPATIENT
Start: 2024-08-20 | End: 2024-08-25

## 2024-08-20 RX ORDER — ATORVASTATIN CALCIUM 20 MG/1
10 TABLET, FILM COATED ORAL DAILY
Status: DISCONTINUED | OUTPATIENT
Start: 2024-08-20 | End: 2024-08-30 | Stop reason: HOSPADM

## 2024-08-20 RX ORDER — INSULIN LISPRO 100 [IU]/ML
2-9 INJECTION, SOLUTION INTRAVENOUS; SUBCUTANEOUS
Status: DISCONTINUED | OUTPATIENT
Start: 2024-08-20 | End: 2024-08-25

## 2024-08-20 RX ORDER — HYDRALAZINE HYDROCHLORIDE 20 MG/ML
10 INJECTION INTRAMUSCULAR; INTRAVENOUS EVERY 4 HOURS PRN
Status: DISCONTINUED | OUTPATIENT
Start: 2024-08-20 | End: 2024-08-24

## 2024-08-20 RX ORDER — IOPAMIDOL 612 MG/ML
100 INJECTION, SOLUTION INTRAVASCULAR
Status: COMPLETED | OUTPATIENT
Start: 2024-08-20 | End: 2024-08-20

## 2024-08-20 RX ORDER — LABETALOL HYDROCHLORIDE 5 MG/ML
20 INJECTION, SOLUTION INTRAVENOUS
Status: DISCONTINUED | OUTPATIENT
Start: 2024-08-20 | End: 2024-08-30 | Stop reason: HOSPADM

## 2024-08-20 RX ADMIN — HYDROCORTISONE SODIUM SUCCINATE 250 MG: 250 INJECTION, POWDER, FOR SOLUTION INTRAMUSCULAR; INTRAVENOUS at 15:19

## 2024-08-20 RX ADMIN — LEVETIRACETAM 500 MG: 500 INJECTION, SOLUTION INTRAVENOUS at 10:01

## 2024-08-20 RX ADMIN — HYDROCORTISONE SODIUM SUCCINATE 250 MG: 250 INJECTION, POWDER, FOR SOLUTION INTRAMUSCULAR; INTRAVENOUS at 02:39

## 2024-08-20 RX ADMIN — LEVETIRACETAM 500 MG: 500 INJECTION, SOLUTION INTRAVENOUS at 21:10

## 2024-08-20 RX ADMIN — INSULIN LISPRO 4 UNITS: 100 INJECTION, SOLUTION INTRAVENOUS; SUBCUTANEOUS at 18:28

## 2024-08-20 RX ADMIN — ATORVASTATIN CALCIUM 10 MG: 20 TABLET, FILM COATED ORAL at 10:01

## 2024-08-20 RX ADMIN — IOPAMIDOL 95 ML: 612 INJECTION, SOLUTION INTRAVENOUS at 18:57

## 2024-08-20 RX ADMIN — SENNOSIDES AND DOCUSATE SODIUM 2 TABLET: 50; 8.6 TABLET ORAL at 10:14

## 2024-08-20 RX ADMIN — GADOBENATE DIMEGLUMINE 14 ML: 529 INJECTION, SOLUTION INTRAVENOUS at 01:06

## 2024-08-20 RX ADMIN — FAMOTIDINE 20 MG: 10 INJECTION INTRAVENOUS at 10:01

## 2024-08-20 RX ADMIN — INSULIN LISPRO 2 UNITS: 100 INJECTION, SOLUTION INTRAVENOUS; SUBCUTANEOUS at 23:20

## 2024-08-20 RX ADMIN — ACETAMINOPHEN 325MG 650 MG: 325 TABLET ORAL at 04:08

## 2024-08-20 RX ADMIN — SENNOSIDES AND DOCUSATE SODIUM 2 TABLET: 50; 8.6 TABLET ORAL at 21:10

## 2024-08-20 RX ADMIN — HYDROCORTISONE SODIUM SUCCINATE 250 MG: 250 INJECTION, POWDER, FOR SOLUTION INTRAMUSCULAR; INTRAVENOUS at 10:02

## 2024-08-20 RX ADMIN — FAMOTIDINE 20 MG: 10 INJECTION INTRAVENOUS at 21:10

## 2024-08-20 RX ADMIN — HYDROCORTISONE SODIUM SUCCINATE 250 MG: 250 INJECTION, POWDER, FOR SOLUTION INTRAMUSCULAR; INTRAVENOUS at 18:29

## 2024-08-20 NOTE — PROGRESS NOTES
I have reviewed the history, physical exam and plan as obtained by APRN and confirm the findings except as noted below. I have independently examined the patient and I personally performed >50% of the management in this split shared patient. My separate and additional contributions to history, exam, physical findings and the plan are as noted below.    64-year-old female who presents with nausea and vomiting as well as frontal headache for approximately 1 week.  No prior history of same.  CT head suggestive of suspicious masses.  Neurosurgery consulted and ordered IV hydrocortisone every 6 hours.  Stat MRI is pending.  After pain meds patient feels better and denies further headache.  Now in ICU for monitoring.  Fairly benign medical history and on meds for ADD, hyperlipidemia, anxiety.  She is a smoker on and off since age 18.  1-2 alcoholic beverages a day.  Colonoscopy at age 50.  Mammogram recently was [facility].  Denies weight loss.  No seizure-like activities.  Denies shortness of breath wheezing or cough.  Recent diarrhea for the last week only.  No blood in the stool.  BP (!) 178/103   Pulse 80   Temp 98.2 °F (36.8 °C) (Tympanic)   Resp 18   Wt 65.1 kg (143 lb 8.3 oz)   SpO2 98%   BMI 22.48 kg/m²    Lungs reveal bilateral air entry clear to auscultation.  Extremities no edema.  Heart S1-S2 present rhythm regular.  Neuroexam is Nad for me.  Labs and imaging data reviewed.    Suspicious brain masses, pending MRI  Headache, nausea, vomiting on presentation  Current light smoker  Uncontrolled hypertension    Appreciate neurosurgery input.  Continue IV hydrocortisone as ordered by them.  MRI pending and will review.  I added Keppra for seizure prophylaxis for now.  Patient be counseled to quit smoking completely.  CT chest abdomen pelvis pending for cancer surveillance.  SCDs for now for DVT prophylaxis.    Electronically signed by Isai Mathew MD, 08/19/24, 10:26 PM EDT.

## 2024-08-20 NOTE — PLAN OF CARE
Pt admitted to ICU for high blood pressue, headache and nausea. Started Cardene to maintain SBP less than 150. Stat MRI completed. CT of abdomen and pelvis done. Cardene weaned off at 6am. Managed headache with Tylenol. A&Ox4. No acute neuro changes within this shift.

## 2024-08-20 NOTE — CONSULTS
[facility] NEUROSURGERY CONSULT NOTE    Patient name: Ely Sims  Referring Provider: Nohemy Cano PA-C  Reason for Consultation: Brain Mets    Patient Care Team:  Provider, No Known as PCP - General    Chief complaint: Headache    Subjective .     History of present illness:    Patient is a 64 y.o.  female with a past medical history of hyperlipidemia, ADHD, anxiety, tobacco use.  Patient reports that on Wednesday of last week she had what she describes as a splitting headache to the top of her head that came out of nowhere.  She states typically does not get headaches.  On Friday of last week she started having nausea, vomiting and diarrhea.  She presented to the urgent care and was found to be hypertensive and became dizzy, therefore sent to the emergency room.  She denies recent weight loss, fever, chills, cough, weakness, balance issues.  She is a smoker and reports social alcohol use.  She has no personal diagnosis of cancer.  She has a sister that had colon cancer and her father passed away from bone cancer.  Her last colonoscopy was at age 50 and was unremarkable at that time.      History  PAST MEDICAL HISTORY  History reviewed. No pertinent past medical history.    PAST SURGICAL HISTORY  History reviewed. No pertinent surgical history.    FAMILY HISTORY  History reviewed. No pertinent family history.    SOCIAL HISTORY  Social History     Tobacco Use    Smoking status: Some Days     Types: Cigarettes    Smokeless tobacco: Never   Vaping Use    Vaping status: Never Used   Substance Use Topics    Alcohol use: Yes    Drug use: Defer       full time job     Allergies:  Patient has no known allergies.    MEDICATIONS:  Medications Prior to Admission   Medication Sig Dispense Refill Last Dose    amphetamine-dextroamphetamine (ADDERALL) 20 MG tablet Take 1 tablet by mouth 2 (Two) Times a Day.   8/18/2024    atorvastatin (LIPITOR) 10 MG tablet Take 1 tablet by mouth Daily.   8/18/2024    buPROPion XL  (WELLBUTRIN XL) 150 MG 24 hr tablet Take 2 tablets by mouth Every Morning.   8/18/2024    LORazepam (ATIVAN) 0.5 MG tablet Take 1 tablet by mouth Every 6 (Six) Hours As Needed for Anxiety.   8/18/2024       Current Facility-Administered Medications:     acetaminophen (TYLENOL) tablet 650 mg, 650 mg, Oral, Q4H PRN, 650 mg at 08/20/24 0408 **OR** acetaminophen (TYLENOL) suppository 650 mg, 650 mg, Rectal, Q4H PRN, Lexie Walls APRN    atorvastatin (LIPITOR) tablet 10 mg, 10 mg, Oral, Daily, Lexie Walls APRN, 10 mg at 08/20/24 1001    sennosides-docusate (PERICOLACE) 8.6-50 MG per tablet 2 tablet, 2 tablet, Oral, BID **AND** polyethylene glycol (MIRALAX) packet 17 g, 17 g, Oral, Daily PRN **AND** bisacodyl (DULCOLAX) EC tablet 5 mg, 5 mg, Oral, Daily PRN **AND** bisacodyl (DULCOLAX) suppository 10 mg, 10 mg, Rectal, Daily PRN, Lexie Walls APRN    famotidine (PEPCID) injection 20 mg, 20 mg, Intravenous, Q12H, Bull Doty MD, 20 mg at 08/20/24 1001    Hydrocortisone Sod Suc (PF) (Solu-CORTEF) injection 250 mg, 250 mg, Intravenous, Q6H, Bull Doty MD, 250 mg at 08/20/24 1002    lactated ringers infusion, 100 mL/hr, Intravenous, Continuous, Lexie Walls APRN, Last Rate: 100 mL/hr at 08/19/24 2204, 100 mL/hr at 08/19/24 2204    levETIRAcetam (KEPPRA) injection 500 mg, 500 mg, Intravenous, Q12H, Isai Mathew MD, 500 mg at 08/20/24 1001    niCARdipine (CARDENE) 25 mg in 250 mL NS infusion kit, 5-15 mg/hr, Intravenous, Titrated, Lexie Walls APRN, Stopped at 08/20/24 0558    nitroglycerin (NITROSTAT) SL tablet 0.4 mg, 0.4 mg, Sublingual, Q5 Min PRN, Lexie Walls APRN    ondansetron (ZOFRAN) injection 4 mg, 4 mg, Intravenous, Q6H PRN, Lexie Walls APRN    sodium chloride 0.9 % flush 10 mL, 10 mL, Intravenous, PRN, Kendrick Epps II, MD    [CANCELED] Insert Peripheral IV, , , Once **AND** sodium chloride 0.9 % flush 10 mL, 10 mL, Intravenous, PRN, Nohemy Cano,  PA-C    COMORBID CONDITIONS:  Hyperlipidemia      Review of Systems  Review of Systems   Constitutional:  Negative for chills, fever and unexpected weight change.   Eyes:  Negative for visual disturbance.   Gastrointestinal:  Positive for diarrhea, nausea and vomiting.   Neurological:  Positive for dizziness (resolved) and headaches. Negative for weakness.   Psychiatric/Behavioral:  The patient is not nervous/anxious.      Objective     Physical Exam  Physical Exam  Vitals reviewed.   Constitutional:       Appearance: Normal appearance.   Pulmonary:      Effort: Pulmonary effort is normal.   Skin:     General: Skin is warm and dry.   Neurological:      Mental Status: She is alert and oriented to person, place, and time.      Cranial Nerves: Cranial nerves 2-12 are intact.      Motor: Motor strength is normal.     Coordination: Finger-Nose-Finger Test and Heel to Shin Test normal.   Psychiatric:         Speech: Speech normal.       Neurologic Exam     Mental Status   Oriented to person, place, and time.   Speech: speech is normal   Level of consciousness: alert  Knowledge: good.     Cranial Nerves   Cranial nerves II through XII intact.     Motor Exam   Muscle bulk: normal  Overall muscle tone: normal    Strength   Strength 5/5 throughout.     Sensory Exam   Light touch normal.     Gait, Coordination, and Reflexes     Coordination   Finger to nose coordination: normal  Heel to shin coordination: normal        Results Review:    LABS:    Admission on 08/19/2024   Component Date Value Ref Range Status    QT Interval 08/19/2024 442  ms Final    QTC Interval 08/19/2024 467  ms Final    Glucose 08/19/2024 98  65 - 99 mg/dL Final    BUN 08/19/2024 13  8 - 23 mg/dL Final    Creatinine 08/19/2024 0.69  0.57 - 1.00 mg/dL Final    Sodium 08/19/2024 137  136 - 145 mmol/L Final    Potassium 08/19/2024 3.6  3.5 - 5.2 mmol/L Final    Chloride 08/19/2024 102  98 - 107 mmol/L Final    CO2 08/19/2024 26.0  22.0 - 29.0 mmol/L Final     Calcium 08/19/2024 9.6  8.6 - 10.5 mg/dL Final    Total Protein 08/19/2024 7.4  6.0 - 8.5 g/dL Final    Albumin 08/19/2024 4.4  3.5 - 5.2 g/dL Final    ALT (SGPT) 08/19/2024 13  1 - 33 U/L Final    AST (SGOT) 08/19/2024 15  1 - 32 U/L Final    Alkaline Phosphatase 08/19/2024 83  39 - 117 U/L Final    Total Bilirubin 08/19/2024 0.3  0.0 - 1.2 mg/dL Final    Globulin 08/19/2024 3.0  gm/dL Final    A/G Ratio 08/19/2024 1.5  g/dL Final    BUN/Creatinine Ratio 08/19/2024 18.8  7.0 - 25.0 Final    Anion Gap 08/19/2024 9.0  5.0 - 15.0 mmol/L Final    eGFR 08/19/2024 97.1  >60.0 mL/min/1.73 Final    HS Troponin T 08/19/2024 <6  <14 ng/L Final    Magnesium 08/19/2024 1.9  1.6 - 2.4 mg/dL Final    Color, UA 08/19/2024 Yellow  Yellow, Straw Final    Appearance, UA 08/19/2024 Cloudy (A)  Clear Final    pH, UA 08/19/2024 6.0  5.0 - 8.0 Final    Specific Gravity, UA 08/19/2024 1.018  1.005 - 1.030 Final    Glucose, UA 08/19/2024 Negative  Negative Final    Ketones, UA 08/19/2024 40 mg/dL (2+) (A)  Negative Final    Bilirubin, UA 08/19/2024 Negative  Negative Final    Blood, UA 08/19/2024 Negative  Negative Final    Protein, UA 08/19/2024 30 mg/dL (1+) (A)  Negative Final    Leuk Esterase, UA 08/19/2024 Trace (A)  Negative Final    Nitrite, UA 08/19/2024 Negative  Negative Final    Urobilinogen, UA 08/19/2024 1.0 E.U./dL  0.2 - 1.0 E.U./dL Final    Extra Tube 08/19/2024 Hold for add-ons.   Final    Auto resulted.    Extra Tube 08/19/2024 hold for add-on   Final    Auto resulted    Extra Tube 08/19/2024 Hold for add-ons.   Final    Auto resulted.    Extra Tube 08/19/2024 Hold for add-ons.   Final    Auto resulted    WBC 08/19/2024 5.34  3.40 - 10.80 10*3/mm3 Final    RBC 08/19/2024 4.35  3.77 - 5.28 10*6/mm3 Final    Hemoglobin 08/19/2024 14.4  12.0 - 15.9 g/dL Final    Hematocrit 08/19/2024 42.5  34.0 - 46.6 % Final    MCV 08/19/2024 97.7 (H)  79.0 - 97.0 fL Final    MCH 08/19/2024 33.1 (H)  26.6 - 33.0 pg Final    MCHC 08/19/2024  33.9  31.5 - 35.7 g/dL Final    RDW 08/19/2024 12.3  12.3 - 15.4 % Final    RDW-SD 08/19/2024 44.0  37.0 - 54.0 fl Final    MPV 08/19/2024 9.2  6.0 - 12.0 fL Final    Platelets 08/19/2024 355  140 - 450 10*3/mm3 Final    Neutrophil % 08/19/2024 68.1  42.7 - 76.0 % Final    Lymphocyte % 08/19/2024 22.1  19.6 - 45.3 % Final    Monocyte % 08/19/2024 8.6  5.0 - 12.0 % Final    Eosinophil % 08/19/2024 0.4  0.3 - 6.2 % Final    Basophil % 08/19/2024 0.6  0.0 - 1.5 % Final    Immature Grans % 08/19/2024 0.2  0.0 - 0.5 % Final    Neutrophils, Absolute 08/19/2024 3.64  1.70 - 7.00 10*3/mm3 Final    Lymphocytes, Absolute 08/19/2024 1.18  0.70 - 3.10 10*3/mm3 Final    Monocytes, Absolute 08/19/2024 0.46  0.10 - 0.90 10*3/mm3 Final    Eosinophils, Absolute 08/19/2024 0.02  0.00 - 0.40 10*3/mm3 Final    Basophils, Absolute 08/19/2024 0.03  0.00 - 0.20 10*3/mm3 Final    Immature Grans, Absolute 08/19/2024 0.01  0.00 - 0.05 10*3/mm3 Final    nRBC 08/19/2024 0.0  0.0 - 0.2 /100 WBC Final    COVID19 08/19/2024 Not Detected  Not Detected - Ref. Range Final    Influenza A PCR 08/19/2024 Not Detected  Not Detected Final    Influenza B PCR 08/19/2024 Not Detected  Not Detected Final    RSV, PCR 08/19/2024 Not Detected  Not Detected Final    RBC, UA 08/19/2024 None Seen  None Seen, 0-2 /HPF Final    WBC, UA 08/19/2024 3-5 (A)  None Seen, 0-2 /HPF Final    Bacteria, UA 08/19/2024 1+ (A)  None Seen /HPF Final    Squamous Epithelial Cells, UA 08/19/2024 3-6 (A)  None Seen, 0-2 /HPF Final    Hyaline Casts, UA 08/19/2024 None Seen  None Seen /LPF Final    Mucus, UA 08/19/2024 Moderate/2+ (A)  None Seen, Trace /HPF Final    Methodology 08/19/2024 Manual Light Microscopy   Final    Glucose 08/19/2024 103 (H)  65 - 99 mg/dL Final    BUN 08/19/2024 10  8 - 23 mg/dL Final    Creatinine 08/19/2024 0.51 (L)  0.57 - 1.00 mg/dL Final    Sodium 08/19/2024 138  136 - 145 mmol/L Final    Potassium 08/19/2024 3.5  3.5 - 5.2 mmol/L Final    Chloride  08/19/2024 103  98 - 107 mmol/L Final    CO2 08/19/2024 21.4 (L)  22.0 - 29.0 mmol/L Final    Calcium 08/19/2024 9.2  8.6 - 10.5 mg/dL Final    BUN/Creatinine Ratio 08/19/2024 19.6  7.0 - 25.0 Final    Anion Gap 08/19/2024 13.6  5.0 - 15.0 mmol/L Final    eGFR 08/19/2024 104.4  >60.0 mL/min/1.73 Final    WBC 08/19/2024 5.83  3.40 - 10.80 10*3/mm3 Final    RBC 08/19/2024 4.06  3.77 - 5.28 10*6/mm3 Final    Hemoglobin 08/19/2024 13.7  12.0 - 15.9 g/dL Final    Hematocrit 08/19/2024 40.5  34.0 - 46.6 % Final    MCV 08/19/2024 99.8 (H)  79.0 - 97.0 fL Final    MCH 08/19/2024 33.7 (H)  26.6 - 33.0 pg Final    MCHC 08/19/2024 33.8  31.5 - 35.7 g/dL Final    RDW 08/19/2024 12.4  12.3 - 15.4 % Final    RDW-SD 08/19/2024 45.8  37.0 - 54.0 fl Final    MPV 08/19/2024 9.5  6.0 - 12.0 fL Final    Platelets 08/19/2024 275  140 - 450 10*3/mm3 Final    Neutrophil % 08/19/2024 83.8 (H)  42.7 - 76.0 % Final    Lymphocyte % 08/19/2024 11.0 (L)  19.6 - 45.3 % Final    Monocyte % 08/19/2024 4.1 (L)  5.0 - 12.0 % Final    Eosinophil % 08/19/2024 0.3  0.3 - 6.2 % Final    Basophil % 08/19/2024 0.5  0.0 - 1.5 % Final    Immature Grans % 08/19/2024 0.3  0.0 - 0.5 % Final    Neutrophils, Absolute 08/19/2024 4.88  1.70 - 7.00 10*3/mm3 Final    Lymphocytes, Absolute 08/19/2024 0.64 (L)  0.70 - 3.10 10*3/mm3 Final    Monocytes, Absolute 08/19/2024 0.24  0.10 - 0.90 10*3/mm3 Final    Eosinophils, Absolute 08/19/2024 0.02  0.00 - 0.40 10*3/mm3 Final    Basophils, Absolute 08/19/2024 0.03  0.00 - 0.20 10*3/mm3 Final    Immature Grans, Absolute 08/19/2024 0.02  0.00 - 0.05 10*3/mm3 Final    nRBC 08/19/2024 0.0  0.0 - 0.2 /100 WBC Final    Glucose 08/19/2024 91  70 - 130 mg/dL Final    Glucose 08/20/2024 117 (H)  65 - 99 mg/dL Final    BUN 08/20/2024 10  8 - 23 mg/dL Final    Creatinine 08/20/2024 0.53 (L)  0.57 - 1.00 mg/dL Final    Sodium 08/20/2024 136  136 - 145 mmol/L Final    Potassium 08/20/2024 3.7  3.5 - 5.2 mmol/L Final    Slight hemolysis  detected by analyzer. Result may be falsely elevated.    Chloride 08/20/2024 104  98 - 107 mmol/L Final    CO2 08/20/2024 20.2 (L)  22.0 - 29.0 mmol/L Final    Calcium 08/20/2024 8.7  8.6 - 10.5 mg/dL Final    BUN/Creatinine Ratio 08/20/2024 18.9  7.0 - 25.0 Final    Anion Gap 08/20/2024 11.8  5.0 - 15.0 mmol/L Final    eGFR 08/20/2024 103.4  >60.0 mL/min/1.73 Final    Protime 08/20/2024 14.3 (H)  11.7 - 14.2 Seconds Final    INR 08/20/2024 1.09  0.90 - 1.10 Final    PTT 08/20/2024 28.4  22.7 - 35.4 seconds Final    WBC 08/20/2024 6.97  3.40 - 10.80 10*3/mm3 Final    RBC 08/20/2024 4.11  3.77 - 5.28 10*6/mm3 Final    Hemoglobin 08/20/2024 13.7  12.0 - 15.9 g/dL Final    Hematocrit 08/20/2024 40.2  34.0 - 46.6 % Final    MCV 08/20/2024 97.8 (H)  79.0 - 97.0 fL Final    MCH 08/20/2024 33.3 (H)  26.6 - 33.0 pg Final    MCHC 08/20/2024 34.1  31.5 - 35.7 g/dL Final    RDW 08/20/2024 12.2 (L)  12.3 - 15.4 % Final    RDW-SD 08/20/2024 43.9  37.0 - 54.0 fl Final    MPV 08/20/2024 10.0  6.0 - 12.0 fL Final    Platelets 08/20/2024 330  140 - 450 10*3/mm3 Final    Neutrophil % 08/20/2024 89.7 (H)  42.7 - 76.0 % Final    Lymphocyte % 08/20/2024 7.9 (L)  19.6 - 45.3 % Final    Monocyte % 08/20/2024 2.0 (L)  5.0 - 12.0 % Final    Eosinophil % 08/20/2024 0.0 (L)  0.3 - 6.2 % Final    Basophil % 08/20/2024 0.1  0.0 - 1.5 % Final    Immature Grans % 08/20/2024 0.3  0.0 - 0.5 % Final    Neutrophils, Absolute 08/20/2024 6.25  1.70 - 7.00 10*3/mm3 Final    Lymphocytes, Absolute 08/20/2024 0.55 (L)  0.70 - 3.10 10*3/mm3 Final    Monocytes, Absolute 08/20/2024 0.14  0.10 - 0.90 10*3/mm3 Final    Eosinophils, Absolute 08/20/2024 0.00  0.00 - 0.40 10*3/mm3 Final    Basophils, Absolute 08/20/2024 0.01  0.00 - 0.20 10*3/mm3 Final    Immature Grans, Absolute 08/20/2024 0.02  0.00 - 0.05 10*3/mm3 Final    nRBC 08/20/2024 0.0  0.0 - 0.2 /100 WBC Final       DIAGNOSTICS:  MRI Brain w/wo: Lesions noted within the left temporal lobe, right  cerebellar hemisphere and cerebellar vermis.  There is mass effect upon the fourth ventricle.    CT chest/abdomen/pelvis:    IMPRESSION:     1. No evidence of metastatic disease within the thorax.  2. Asymmetric wall thickening involving the distal sigmoid colon, with  some surrounding mesenteric soft tissue nodularity. Appearance is very  worrisome for primary colonic malignancy. Correlation with colonoscopy  is recommended. MRI could allow for further assessment.  3. Sclerotic lesion within the spinous process of T1. I would favor that  this is a benign lesion such as a bone island. Consider short-term  follow-up or bone scan.       Results Review:   I reviewed the patient's new clinical results.  I personally viewed  the patient's MRI brain, it was also discussed with and reviewed by Dr. 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 Dr Doty has reviewed MRI brain and recommends removal of the mass on the posterior fossa d/t the mass effect on the 4th ventricle.  He will plan to speak to patient and family later today.    PLAN:   -Medical management per intensivist  -Continue IV steroids  -Dr Doty to speak with patient and family later today    I discussed the patient's findings and my recommendations with patient, family, nursing staff,  "primary care team, and Dr Doty    During patient visit, I utilized appropriate personal protective equipment including gloves and mask.  Mask used was standard procedure mask. Appropriate PPE was worn during the entire visit.  Hand hygiene was completed before and after.     This part is from Dr. Doty: I had a long discussion with the patient and her  about the situation.  I explained that these are almost certainly metastatic deposits although there is a small chance they are infectious.  I explained that the abnormality in the right cerebellar hemisphere is immediately life-threatening.  Consequently I see no option but to remove it.  This will also give us some idea of that source.  I explained that the biggest risk of surgery will be leaving her with some type of discoordination problem on the right side.  There is always a risk of infection, bleeding, CSF leak, anesthesia paralysis, coma and death but those risk are very low in this particular situation.  We discussed the postoperative hospital and home course.  She does wish to proceed.    Eide Sierra, APRN  08/20/24  09:32 EDT    \"Dictated utilizing Dragon dictation\".      "

## 2024-08-20 NOTE — PAYOR COMM NOTE
"Ely Sims (64 y.o. Female)                           ATTENTION; INITIAL CLINICALS AUTH PENDING [id number]                        REPLY TO UR DEPT  [phone] OR CALL   LAURO WEBBER LPLEATHA           Date of Birth   1960    Social Security Number       Address   [address] [address]    Home Phone   [phone]    MRN   [id number]       Zoroastrianism   Mandaeism    Marital Status   Single                            Admission Date   8/19/24    Admission Type   Emergency    Admitting Provider   Isai Mathew MD    Attending Provider   Isai Mathew MD    Department, Room/Bed   [facility] INTENSIVE CARE, I376/1       Discharge Date       Discharge Disposition       Discharge Destination                                 Attending Provider: Isai Mathew MD    Allergies: No Known Allergies    Isolation: None   Infection: None   Code Status: CPR    Ht: 170.2 cm (67.01\")   Wt: 65.1 kg (143 lb 8.3 oz)    Admission Cmt: None   Principal Problem: 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,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.1]                   Active Insurance as of 8/19/2024       Primary Coverage       Payor Plan Insurance Group Employer/Plan Group    [facility] [id number]       Payor Plan Address Payor Plan Phone Number Payor Plan Fax Number Effective Dates    [address] [address]   1/1/2024 - None Entered    [facility] [id number]         Subscriber Name Subscriber Birth Date Member ID       ELY SIMS 1960 [id number]                     Emergency Contacts        (Rel.) Home Phone Work Phone Mobile Phone    DON OBRIEN (Sister) -- -- [phone]    Elia CadenaA -- -- [phone]    SurinderTiffany (Daughter) -- -- [phone]    Ubaldo Case (Partner) -- -- [phone]                 History & Physical        Bills, GRETCHEN Caro at 08/19/24 2018       Attestation signed by Isai Mathew MD at 08/20/24 7700    I have reviewed the history, physical exam and plan " as obtained by APRN and confirm the findings except as noted below. I have independently examined the patient and I personally performed >50% of the management in this split shared patient. My separate and additional contributions to history, exam, physical findings and the plan are as noted below.     64-year-old female who presents with nausea and vomiting as well as frontal headache for approximately 1 week.  No prior history of same.  CT head suggestive of suspicious masses.  Neurosurgery consulted and ordered IV hydrocortisone every 6 hours.  Stat MRI is pending.  After pain meds patient feels better and denies further headache.  Now in ICU for monitoring.  Fairly benign medical history and on meds for ADD, hyperlipidemia, anxiety.  She is a smoker on and off since age 18.  1-2 alcoholic beverages a day.  Colonoscopy at age 50.  Mammogram recently was DAVIS.  Denies weight loss.  No seizure-like activities.  Denies shortness of breath wheezing or cough.  Recent diarrhea for the last week only.  No blood in the stool.  BP (!) 178/103   Pulse 80   Temp 98.2 °F (36.8 °C) (Tympanic)   Resp 18   Wt 65.1 kg (143 lb 8.3 oz)   SpO2 98%   BMI 22.48 kg/m²    Lungs reveal bilateral air entry clear to auscultation.  Extremities no edema.  Heart S1-S2 present rhythm regular.  Neuroexam is Nad for me.  Labs and imaging data reviewed.     Suspicious brain masses, pending MRI  Headache, nausea, vomiting on presentation  Current light smoker  Uncontrolled hypertension     Appreciate neurosurgery input.  Continue IV hydrocortisone as ordered by them.  MRI pending and will review.  I added Keppra for seizure prophylaxis for now.  Patient be counseled to quit smoking completely.  CT chest abdomen pelvis pending for cancer surveillance.  SCDs for now for DVT prophylaxis.     Electronically signed by Isai Mathew MD, 08/19/24, 10:26 PM EDT.                      Group: [facility]         History and  Physical    Patient Identification:  Ely Sims  64 y.o.  female  1960  [id number]            Requesting physician: Dr. Doty    Reason for Consultation: ICU admission     CC: Brain mass    History of Present Illness:  Charu Hernandez is a 64-year-old female with past medical history HLD, ADHD, anxiety and depression, tobacco use who presented to urgent care today with complaints of frontal headache since last Wednesday, nausea vomiting and diarrhea which started on Friday.  She has been unable to keep anything down since then.  She has had 2 loose stools today, vomited twice today.  She was found to be hypertensive and dizzy at urgent care.  She was advised per urgent care to go to the ED.  CT head without contrast revealed areas of increased attenuation over the right cerebral hemisphere (2.1 cm) and left temporal lobe inferiorly and posteriorly (6mm) most consistent with metastatic disease and associated edema.  There is mass effect upon the fourth ventricle and mild prominence of the lateral and third ventricles.  She has been hypertensive in the ED with SBP from 170-190 with no prior history of hypertension.  She was given 1L IVF bolus in the ED. chest x-ray was negative for acute process.  EKG NSR with probable left atrial enlargement.  Dr Doty was consulted from the ED who ordered stat MRI, Solu-Cortef and requested admission to ICU.    She and her significant other started taking Metamucil about 3 weeks ago, she developed loose stool and noticed some bright red blood for about 5 days.  She stopped the Metamucil and has not had any noticeable bleeding since.  She denies abdominal pain.  Has had 2 loose stools today, vomited twice today.  Denies recent unintended weight loss, night sweats.  She reports history of breast lump which was found to be benign.  She is a cigarette smoker, she states some days she smokes at least 1 pack but other days she does not smoke at all.  She has been smoking for the last 2  years.  She reports social alcohol use; 1 large bottle of Chardonnay per week.  She denies any vision changes, balance issues, or extremity weakness.  Denies any pulmonary or cardiac history.  She works in cyber security.  Her sister who is a retired nurse accompanied her at bedside as well as her daughter.  She states her headache is entirely gone at this time as well as her nausea.    Review of Systems  CONSTITUTIONAL: Positive for high blood pressure  EYE:  No new vision changes  EAR:  No change in hearing  CARDIAC:  No chest pain  PULMONARY:  No productive cough or shortness of breath  GI: Positive for nausea, vomiting, diarrhea, blood in stool  RENAL:  No dysuria or urinary frequency  MUSCULOSKELETAL:  No musculoskeletal complaints  ENDOCRINE:  No heat or cold intolerance  INTEGUMENTARY: No skin rashes  NEUROLOGICAL: Positive for persistent headache, dizziness  PSYCHIATRIC:  No new anxiety or depression  12 system review of systems performed and all else negative     Past Medical History:  No past medical history on file.    Past Surgical History:  No past surgical history on file.     Home Meds:  (Not in a hospital admission)      Allergies:  No Known Allergies    Social History:   Social History     Socioeconomic History    Marital status: Single   Tobacco Use    Smoking status: Some Days     Types: Cigarettes    Smokeless tobacco: Never   Vaping Use    Vaping status: Never Used   Substance and Sexual Activity    Alcohol use: Yes    Drug use: Defer    Sexual activity: Defer       Family History:  No family history on file.    Physical Exam:  BP (!) 178/103   Pulse 80   Temp 98.2 °F (36.8 °C) (Tympanic)   Resp 18   SpO2 98%  There is no height or weight on file to calculate BMI. 98%      Physical Exam  Constitutional: Middle-age female sitting up in bed, in no acute distress, on room air  Head: NCAT  Eyes: No pallor, Anicteric conjunctiva, EOMI. PERRLA.  ENMT:  No oral thrush.  Moist MM.   NECK: Trachea  "midline, no thyromegaly, no JVD  Heart: RRR, no pedal edema  Lungs: ANURAG +, clear to auscultation throughout, no wheezing or crackles   Abdomen: Soft, nondistended.  Denies abdominal pain.  No tenderness, guarding or rigidity. No palpable masses.  Extremities: Extremities warm and well perfused. No cyanosis/ clubbing.  All pulses palpable.  Neuro: Alert and oriented x 4, strong in all 4 extremities, denies vision changes.  No gross focal neuro deficits observed  Psych: Mood and affect appropriate    PPE recommended per [facility] infectious disease Isolation protocol for the current clinical scenario(as mentioned below) was followed.    LABS:  COVID19   Date Value Ref Range Status   08/19/2024 Not Detected Not Detected - Ref. Range Final       Lab Results   Component Value Date    CALCIUM 9.6 08/19/2024     Results from last 7 days   Lab Units 08/19/24  1545   MAGNESIUM mg/dL 1.9   SODIUM mmol/L 137   POTASSIUM mmol/L 3.6   CHLORIDE mmol/L 102   CO2 mmol/L 26.0   BUN mg/dL 13   CREATININE mg/dL 0.69   GLUCOSE mg/dL 98   CALCIUM mg/dL 9.6   WBC 10*3/mm3 5.34   HEMOGLOBIN g/dL 14.4   PLATELETS 10*3/mm3 355   ALT (SGPT) U/L 13   AST (SGOT) U/L 15     Lab Results   Component Value Date    TROPONINT <6 08/19/2024     Results from last 7 days   Lab Units 08/19/24  1545   HSTROP T ng/L <6                 Results from last 7 days   Lab Units 08/19/24  1708   INFLUENZA B PCR  Not Detected   RSV, PCR  Not Detected             No results found for: \"TSH\"  Estimated Creatinine Clearance: 85.6 mL/min (by C-G formula based on SCr of 0.69 mg/dL).  Results from last 7 days   Lab Units 08/19/24  1708   NITRITE UA  Negative   WBC UA /HPF 3-5*   BACTERIA UA /HPF 1+*   SQUAM EPITHEL UA /HPF 3-6*        Imaging: I personally visualized the images of scans/x-rays performed within last 3 days.      Assessment:  Brain mass  Headache  Nausea vomiting  Blood in stool  Hypertensive urgency  ADHD  Anxiety and " depression  HLD    Recommendations:  1.  Brain mass with vasogenic edema  -Neurosurgery managing.  Mass reportedly metastatic in nature  -Neurochecks hourly  -Patient denies headache currently, will add as needed Tylenol for now  -Stat MRI ordered per neurosurgery, started on IV Solu-Cortef  -Strict NPO    2.  Hypertensive urgency  -Neurosurgery requested SBP<150, will start nicardipine    3.  Nausea, vomiting, blood in stool  -Denies nausea currently.  Will add as needed Zofran  -Start continuous IVF as likely dehydrated  -Will add hemoccult stool.  Hemoglobin 14, likely hemoconcentrated.  Recheck a.m. labs  -Get CT chest abdomen and pelvis without contrast to further eval for any primary malignancy.  Consider GI consult    4.  ADHD  -Hold Adderall    5.  Anxiety and depression  -Hold Wellbutrin, restart when okay with neurosurgery    6.  HLD  -Continue atorvastatin    GI prophy: Pepcid  SCDs  Full code  Strict NPO    Lexieestela Walls, APRN  8/19/2024  20:19 EDT      Much of this encounter note is an electronic transcription/translation of spoken language to printed text using Dragon Software.      Electronically signed by Isai Mathew MD at 08/20/24 0503          Emergency Department Notes        Gillian Salgado, RN at 08/19/24 1953          Nursing report ED to floor  Ely Sims  64 y.o.  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 Diagnoses of Acute intractable headache, unspecified headache type and Elevated BP without diagnosis of hypertension were also pertinent to this visit.    Code status:   Current Code Status       Date Active Code Status Order ID Comments User  Context       Not on file            Allergies:   Patient has no known allergies.    Isolation:   No active isolations    Intake and Output    Intake/Output Summary (Last 24 hours) at 8/19/2024 1953  Last data filed at 8/19/2024 1920  Gross per 24 hour   Intake 1000 ml   Output --   Net 1000 ml       Weight:   There were no vitals filed for this visit.    Most recent vitals:   Vitals:    08/19/24 1711 08/19/24 1741 08/19/24 1911 08/19/24 1921   BP: 162/89 173/97 (!) 178/103    Pulse: 66 68 75 80   Resp:       Temp:       TempSrc:       SpO2: 97% 99% 98% 98%       Active LDAs/IV Access:   Lines, Drains & Airways       Active LDAs       Name Placement date Placement time Site Days    Peripheral IV 08/19/24 1706 Anterior;Distal;Right Forearm 08/19/24 1706  Forearm  less than 1                    Labs (abnormal labs have a star):   Labs Reviewed   URINALYSIS W/ MICROSCOPIC IF INDICATED (NO CULTURE) - Abnormal; Notable for the following components:       Result Value    Appearance, UA Cloudy (*)     Ketones, UA 40 mg/dL (2+) (*)     Protein, UA 30 mg/dL (1+) (*)     Leuk Esterase, UA Trace (*)     All other components within normal limits   CBC WITH AUTO DIFFERENTIAL - Abnormal; Notable for the following components:    MCV 97.7 (*)     MCH 33.1 (*)     All other components within normal limits   URINALYSIS, MICROSCOPIC ONLY - Abnormal; Notable for the following components:    WBC, UA 3-5 (*)     Bacteria, UA 1+ (*)     Squamous Epithelial Cells, UA 3-6 (*)     Mucus, UA Moderate/2+ (*)     All other components within normal limits   COVID-19/FLUA&B/RSV, NP SWAB IN TRANSPORT MEDIA 1 HR TAT - Normal    Narrative:     Fact sheet for providers: https://www.fda.gov/media/250238/download    Fact sheet for patients: https://www.fda.gov/media/137782/download    Test performed by PCR.   SINGLE HS TROPONIN T - Normal    Narrative:     High Sensitive Troponin T Reference Range:  <14.0 ng/L- Negative Female for AMI  <22.0 ng/L-  Negative Male for AMI  >=14 - Abnormal Female indicating possible myocardial injury.  >=22 - Abnormal Male indicating possible myocardial injury.   Clinicians would have to utilize clinical acumen, EKG, Troponin, and serial changes to determine if it is an Acute Myocardial Infarction or myocardial injury due to an underlying chronic condition.        MAGNESIUM - Normal   RAINBOW DRAW    Narrative:     The following orders were created for panel order [place] Draw.  Procedure                               Abnormality         Status                     ---------                               -----------         ------                     Green Top (Gel)[[id number]]                                  Final result               Lavender Top[[id number]]                                     Final result               Gold Top - SST[[id number]]                                   Final result               Light Blue Top[[id number]]                                   Final result                 Please view results for these tests on the individual orders.   COMPREHENSIVE METABOLIC PANEL    Narrative:     GFR Normal >60  Chronic Kidney Disease <60  Kidney Failure <15     CBC AND DIFFERENTIAL    Narrative:     The following orders were created for panel order CBC & Differential.  Procedure                               Abnormality         Status                     ---------                               -----------         ------                     CBC Auto Differential[[id number]]        Abnormal            Final result                 Please view results for these tests on the individual orders.   GREEN TOP   LAVENDER TOP   GOLD TOP - SST   LIGHT BLUE TOP       EKG:   ECG 12 Lead ED Triage Standing Order; Weak / Dizzy / AMS   Preliminary Result   HEART RATE=67  bpm   RR Skvahtbr=098  ms   IN Sddxsfzd=654  ms   P Horizontal Axis=-3  deg   P Front Axis=37  deg   QRSD Interval=98  ms   QT Cdwdloiu=693  ms   MXaI=284  ms   QRS Axis=19   deg   T Wave Axis=70  deg   - BORDERLINE ECG -   Sinus rhythm   Probable left atrial enlargement   RSR' in V1 or V2, right VCD or RVH   Date and Time of Study:2024-08-19 16:38:54          Meds given in ED:   Medications   sodium chloride 0.9 % flush 10 mL (has no administration in time range)   sodium chloride 0.9 % flush 10 mL (has no administration in time range)   Hydrocortisone Sod Suc (PF) (Solu-CORTEF) injection 250 mg (250 mg Intravenous Given 8/19/24 1920)   famotidine (PEPCID) injection 20 mg (has no administration in time range)   lactated ringers bolus 1,000 mL (0 mL Intravenous Stopped 8/19/24 1920)       Imaging results:  CT Head Without Contrast    Result Date: 8/19/2024  Areas of increased attenuation are appreciated over the right cerebral hemisphere posteriorly (2.1 cm) and left temporal lobe inferiorly and posteriorly (6 mm) most consistent with that of metastatic disease with associated edema. Edema is most prominent involving the right cerebellar hemisphere. There is mass effect upon the fourth ventricle and mild prominence of the lateral and third ventricles. Further evaluation with MRI examination of the brain with and without contrast is recommended.  The above information was called to and discussed with Dr. Epps.     Radiation dose reduction techniques were utilized, including automated exposure modulation based on body size.  This report was finalized on 8/19/2024 6:40 PM by Dr. Cesar Levine M.D on Workstation: [id number]      XR Chest 1 View    Result Date: 8/19/2024  1. No acute process   This report was finalized on 8/19/2024 4:32 PM by Dr. Veto Escobar M.D on Workstation: [id number]       Ambulatory status:   - ad kacy     Social issues:   Social History     Socioeconomic History    Marital status: Single   Tobacco Use    Smoking status: Some Days     Types: Cigarettes    Smokeless tobacco: Never   Vaping Use    Vaping status: Never Used   Substance and Sexual Activity     Alcohol use: Yes    Drug use: Defer    Sexual activity: Defer       Peripheral Neurovascular  Peripheral Neurovascular (Adult)  Peripheral Neurovascular WDL: WDL    Neuro Cognitive  Neuro Cognitive (Adult)  Cognitive/Neuro/Behavioral WDL: .WDL except  Additional Documentation: Headache Assessment (Group), Dizziness Assessment (Group)  Headache Assessment  Headache Location: generalized  Severity Rating (0-10): 8  Description/Character: sharp, worst ever  Associated Signs/Symptoms: weakness, dizziness, motor changes, nausea  Dizziness Assessment  Dizziness Reported Symptoms: environment spinning, intermittent, unsteady    Learning  Learning Assessment (Adult)  Learning Readiness and Ability: no barriers identified    Respiratory  Respiratory WDL  Respiratory WDL: WDL    Abdominal Pain       Pain Assessments  Pain (Adult)  Preferred Pain Scale: FACES (Orozco-Baker FACES Pain Rating Scale)  FACES Pain Rating: Rest: 4-->hurts little more    NIH Stroke Scale       Gillian Salgado RN  08/19/24 19:53 EDT      Electronically signed by Gillian Salgado RN at 08/19/24 1953       Nohemy Cano PA-C at 08/19/24 1642           EMERGENCY DEPARTMENT ENCOUNTER  Room Number:  05/05  PCP: Provider, No Known  Independent Historians: Patient      HPI:  Chief Complaint: had concerns including Dizziness, Headache, and Hypertension.     A complete HPI/ROS/PMH/PSH/SH/FH are unobtainable due to: None    Chronic or social conditions impacting patient care (Social Determinants of Health): None      Context: Ely Sims is a 64 y.o. female with a medical history of breast mass, anxiety, ADHD who presents to the ED c/o acute headache.  Patient reports Wednesday she had gradual onset of frontal headache that she describes as aching.  She reports associated nausea, vomiting, diarrhea since that time.  Reports the headache has waxed and waned over the last 5 days.  Reports her sister who is an RN has been checking her blood pressure and it  has been elevated.  She has been unable to keep down sips of water.  She decided to go to urgent care for evaluation today.  When she sat on the exam table at the [facility] she developed room spinning dizziness that only lasted for a few seconds.  Given her constellation of symptoms, she was referred to the emergency department for further workup and evaluation.  Patient has no history of hypertension.  No known sick contacts.  Patient denies photophobia, vision loss, chest pain, dyspnea, cough, abdominal pain, dysuria, or any other systemic complaints.      Review of prior external notes (non-ED) -and- Review of prior external test results outside of this encounter:  Patient seen at urgent care today for intractable headache.  Reviewed assessment and plan.  Patient referred to emergency department.  Reviewed imaging performed on 11/9/2017.  X-ray of left wrist showed arthritis.    Prescription drug monitoring program review:     N/A    PAST MEDICAL HISTORY  Active Ambulatory Problems     Diagnosis Date Noted    No Active Ambulatory Problems     Resolved Ambulatory Problems     Diagnosis Date Noted    No Resolved Ambulatory Problems     No Additional Past Medical History         PAST SURGICAL HISTORY  No past surgical history on file.      FAMILY HISTORY  No family history on file.      SOCIAL HISTORY  Social History     Socioeconomic History    Marital status: Single   Tobacco Use    Smoking status: Some Days     Types: Cigarettes    Smokeless tobacco: Never   Vaping Use    Vaping status: Never Used   Substance and Sexual Activity    Alcohol use: Yes    Drug use: Defer    Sexual activity: Defer         ALLERGIES  Patient has no known allergies.      REVIEW OF SYSTEMS  Review of Systems   Constitutional:  Negative for chills and fever.   HENT:  Negative for ear pain and sore throat.    Respiratory:  Negative for cough and shortness of breath.    Cardiovascular:  Negative for chest pain and palpitations.    Gastrointestinal:  Positive for diarrhea, nausea and vomiting. Negative for abdominal pain.   Genitourinary:  Negative for dysuria and hematuria.   Musculoskeletal:  Negative for arthralgias and joint swelling.   Skin:  Negative for pallor and rash.   Neurological:  Positive for headaches. Negative for numbness.   Psychiatric/Behavioral:  Negative for confusion and hallucinations.      Included in HPI  All systems reviewed and negative except for those discussed in HPI.      PHYSICAL EXAM    I have reviewed the triage vital signs and nursing notes.    ED Triage Vitals   Temp Heart Rate Resp BP SpO2   08/19/24 1523 08/19/24 1523 08/19/24 1523 08/19/24 1526 08/19/24 1523   98.2 °F (36.8 °C) 85 18 158/96 99 %      Temp src Heart Rate Source Patient Position BP Location FiO2 (%)   08/19/24 1523 -- -- -- --   Tympanic           Physical Exam  Constitutional:       General: She is not in acute distress.     Appearance: She is well-developed.   HENT:      Head: Normocephalic and atraumatic.   Eyes:      Extraocular Movements: Extraocular movements intact.      Conjunctiva/sclera: Conjunctivae normal.      Pupils: Pupils are equal, round, and reactive to light.   Cardiovascular:      Rate and Rhythm: Normal rate and regular rhythm.      Heart sounds: Normal heart sounds.   Pulmonary:      Effort: Pulmonary effort is normal.      Breath sounds: Normal breath sounds.   Abdominal:      General: There is no distension.   Skin:     General: Skin is warm.   Neurological:      General: No focal deficit present.      Mental Status: She is alert and oriented to person, place, and time.   Psychiatric:         Mood and Affect: Mood normal.           LAB RESULTS  Recent Results (from the past 24 hour(s))   Comprehensive Metabolic Panel    Collection Time: 08/19/24  3:45 PM    Specimen: Arm, Right; Blood   Result Value Ref Range    Glucose 98 65 - 99 mg/dL    BUN 13 8 - 23 mg/dL    Creatinine 0.69 0.57 - 1.00 mg/dL    Sodium 137 136 -  145 mmol/L    Potassium 3.6 3.5 - 5.2 mmol/L    Chloride 102 98 - 107 mmol/L    CO2 26.0 22.0 - 29.0 mmol/L    Calcium 9.6 8.6 - 10.5 mg/dL    Total Protein 7.4 6.0 - 8.5 g/dL    Albumin 4.4 3.5 - 5.2 g/dL    ALT (SGPT) 13 1 - 33 U/L    AST (SGOT) 15 1 - 32 U/L    Alkaline Phosphatase 83 39 - 117 U/L    Total Bilirubin 0.3 0.0 - 1.2 mg/dL    Globulin 3.0 gm/dL    A/G Ratio 1.5 g/dL    BUN/Creatinine Ratio 18.8 7.0 - 25.0    Anion Gap 9.0 5.0 - 15.0 mmol/L    eGFR 97.1 >60.0 mL/min/1.73   Single High Sensitivity Troponin T    Collection Time: 08/19/24  3:45 PM    Specimen: Arm, Right; Blood   Result Value Ref Range    HS Troponin T <6 <14 ng/L   Magnesium    Collection Time: 08/19/24  3:45 PM    Specimen: Arm, Right; Blood   Result Value Ref Range    Magnesium 1.9 1.6 - 2.4 mg/dL   Green Top (Gel)    Collection Time: 08/19/24  3:45 PM   Result Value Ref Range    Extra Tube Hold for add-ons.    Lavender Top    Collection Time: 08/19/24  3:45 PM   Result Value Ref Range    Extra Tube hold for add-on    Gold Top - SST    Collection Time: 08/19/24  3:45 PM   Result Value Ref Range    Extra Tube Hold for add-ons.    Light Blue Top    Collection Time: 08/19/24  3:45 PM   Result Value Ref Range    Extra Tube Hold for add-ons.    CBC Auto Differential    Collection Time: 08/19/24  3:45 PM    Specimen: Arm, Right; Blood   Result Value Ref Range    WBC 5.34 3.40 - 10.80 10*3/mm3    RBC 4.35 3.77 - 5.28 10*6/mm3    Hemoglobin 14.4 12.0 - 15.9 g/dL    Hematocrit 42.5 34.0 - 46.6 %    MCV 97.7 (H) 79.0 - 97.0 fL    MCH 33.1 (H) 26.6 - 33.0 pg    MCHC 33.9 31.5 - 35.7 g/dL    RDW 12.3 12.3 - 15.4 %    RDW-SD 44.0 37.0 - 54.0 fl    MPV 9.2 6.0 - 12.0 fL    Platelets 355 140 - 450 10*3/mm3    Neutrophil % 68.1 42.7 - 76.0 %    Lymphocyte % 22.1 19.6 - 45.3 %    Monocyte % 8.6 5.0 - 12.0 %    Eosinophil % 0.4 0.3 - 6.2 %    Basophil % 0.6 0.0 - 1.5 %    Immature Grans % 0.2 0.0 - 0.5 %    Neutrophils, Absolute 3.64 1.70 - 7.00  10*3/mm3    Lymphocytes, Absolute 1.18 0.70 - 3.10 10*3/mm3    Monocytes, Absolute 0.46 0.10 - 0.90 10*3/mm3    Eosinophils, Absolute 0.02 0.00 - 0.40 10*3/mm3    Basophils, Absolute 0.03 0.00 - 0.20 10*3/mm3    Immature Grans, Absolute 0.01 0.00 - 0.05 10*3/mm3    nRBC 0.0 0.0 - 0.2 /100 WBC   ECG 12 Lead ED Triage Standing Order; Weak / Dizzy / AMS    Collection Time: 08/19/24  4:38 PM   Result Value Ref Range    QT Interval 442 ms    QTC Interval 467 ms         RADIOLOGY  CT Head Without Contrast    Result Date: 8/19/2024  CT HEAD WO CONTRAST-  HISTORY:  headache,vomiting  COMPARISON: None  FINDINGS: There is an area of increased attenuation involving the right cerebellar hemisphere posteriorly measuring approximately 2.1 cm in size. Decreased attenuation consistent with vasogenic edema is appreciated involving the right cerebellar hemisphere. Beam hardening artifact limits evaluation of the posterior fossa but there may be decreased attenuation also involving the shravan laterally to the right. Decreased attenuation involving the inferior aspect the right temporal lobe posteriorly is appreciated measuring approximately 6 mm in size with a small area of adjacent edema. There is mild prominence of the lateral and third ventricles secondary to mass effect upon the inferior aspect of the fourth ventricle by the right cerebellar mass and associated edema. There is no evidence of uncal herniation or midline shift.      Areas of increased attenuation are appreciated over the right cerebral hemisphere posteriorly (2.1 cm) and left temporal lobe inferiorly and posteriorly (6 mm) most consistent with that of metastatic disease with associated edema. Edema is most prominent involving the right cerebellar hemisphere. There is mass effect upon the fourth ventricle and mild prominence of the lateral and third ventricles. Further evaluation with MRI examination of the brain with and without contrast is recommended.  The above  information was called to and discussed with Dr. Epps.     Radiation dose reduction techniques were utilized, including automated exposure modulation based on body size.  This report was finalized on 8/19/2024 6:40 PM by Dr. Cesar Levine M.D on Workstation: [id number]      XR Chest 1 View    Result Date: 8/19/2024  XR CHEST 1 VW-8/19/2024  HISTORY: Weakness, dizziness.  Heart size is within normal limits. Lungs appear free of acute infiltrates. There is mild thoracic scoliosis. Bones and soft tissues are otherwise unremarkable.      1. No acute process   This report was finalized on 8/19/2024 4:32 PM by Dr. Veto Escobar M.D on Workstation: [id number]         MEDICATIONS GIVEN IN ER  Medications   sodium chloride 0.9 % flush 10 mL (has no administration in time range)   sodium chloride 0.9 % flush 10 mL (has no administration in time range)   lactated ringers bolus 1,000 mL (has no administration in time range)         ORDERS PLACED DURING THIS VISIT:  Orders Placed This Encounter   Procedures    COVID-19, FLU A/B, RSV PCR 1 HR TAT - Swab, Nasopharynx    XR Chest 1 View    [place] Draw    Comprehensive Metabolic Panel    Single High Sensitivity Troponin T    Magnesium    Urinalysis With Microscopic If Indicated (No Culture) - Urine, Clean Catch    CBC Auto Differential    NPO Diet NPO Type: Strict NPO    Undress & Gown    Continuous Pulse Oximetry    Vital Signs    Orthostatic Blood Pressure    Oxygen Therapy- Nasal Cannula; Titrate 1-6 LPM Per SpO2; 90 - 95%    ECG 12 Lead ED Triage Standing Order; Weak / Dizzy / AMS    Insert Peripheral IV    Fall Precautions    CBC & Differential    Green Top (Gel)    Lavender Top    Gold Top - SST    Light Blue Top         OUTPATIENT MEDICATION MANAGEMENT:  Current Facility-Administered Medications Ordered in Epic   Medication Dose Route Frequency Provider Last Rate Last Admin    lactated ringers bolus 1,000 mL  1,000 mL Intravenous Once Nohemy Cano PA-C         sodium chloride 0.9 % flush 10 mL  10 mL Intravenous PRN Kendrick Epps II, MD        sodium chloride 0.9 % flush 10 mL  10 mL Intravenous PRN Nohemy Cano PA-C         Current Outpatient Medications Ordered in Epic   Medication Sig Dispense Refill    amphetamine-dextroamphetamine (ADDERALL) 20 MG tablet       LORazepam (ATIVAN) 0.5 MG tablet            35 minutes of critical care provided. This time excludes other billable procedures. Time does include preparation of documents, medical consultations, review of old records, and direct bedside care. Patient is at high risk for life-threatening deterioration due to metastatic brain lesions causing edema and mass effect requiring neurosurgery consultation, close neurologic assessment, admission to ICU.           PROGRESS, DATA ANALYSIS, CONSULTS, AND MEDICAL DECISION MAKING  All labs have been independently interpreted by me.  All radiology studies have been reviewed by me. All EKG's have been independently viewed and interpreted by me.  Discussion below represents my analysis of pertinent findings related to patient's condition, differential diagnosis, treatment plan and final disposition.    Differential diagnosis includes but is not limited to COVID, influenza, new onset hypertension, vertigo.        ED Course as of 08/19/24 2133   Mon Aug 19, 2024   1655 EKG independently interpreted by myself.  Time 4:38 PM.  Sinus rhythm.  Heart rate 67.  Normal intervals and axis.  No acute ST abnormality.  RSR prime in lead V1 and V2 [TD]   1727 Chest x-ray independently interpreted by me as no pneumothorax [MP]   1839 I discussed the CT results with Dr. Levine, radiology.  Patient has evidence of metastatic disease to the brain more prominent to the right cerebellum with associated vasogenic edema. [TD]   1928 I discussed the case with Dr. Doty, neurosurgery.  We will order MRI.  He states that he will add steroids for the patient after reviewing the imaging.   He recommends admission to the ICU. [TD]   1942 I spoke with GULSHAN Almeida with pulmonology.  Discussed patient presentation and ED findings.  She agrees to admit patient to an ICU bed to the service of Dr. Louis. [MP]      ED Course User Index  [MP] Nohemy Cano PA-C  [TD] Kendrick Epps II, MD             AS OF 16:58 EDT VITALS:    BP - 165/90  HR - 68  TEMP - 98.2 °F (36.8 °C) (Tympanic)  O2 SATS - 97%    COMPLEXITY OF CARE  The patient requires admission.      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    Note Disclaimer: At [facility], we believe that sharing information builds trust and better relationships. You are receiving this note because you recently visited [facility]. It is possible you will see health information before a provider has talked with you about it. This kind of information can be easy to misunderstand. To help you fully understand what it means for your health, we urge you to discuss this note with your provider.         Nohemy Cano PA-C  08/19/24 2134      Electronically signed by Nohemy Cano PA-C at 08/19/24 2134       Farrah Pemberton, RN at 08/19/24 1524          Patient to ER via car from [facility] for hypertension  Patient reports headache and dizziness x Wednesday      Electronically signed by Farrah Pemberton, RN at 08/19/24 3644        Vital Signs (last 2 days)       Date/Time Temp Temp src Pulse Resp BP Patient Position SpO2    08/20/24 1600 -- -- 86 18 126/78 Lying 96    08/20/24 1515 97.7 (36.5) Oral -- -- -- -- --    08/20/24 1500 -- -- 86 16 125/75 -- 95    08/20/24 1400 -- -- 82 18 119/77 Lying 95    08/20/24 1300 -- -- 82 16 119/77 -- --    08/20/24 1200 -- -- 97 18 125/86 Lying 96    08/20/24 1130 97.8 (36.6) Oral -- -- -- -- --    08/20/24 1100 -- -- 98 16 138/80 -- 95    08/20/24 1000 -- -- 85 18 126/75 Lying 95    08/20/24 0900 -- -- 113 16 137/88 -- 97    08/20/24 0800 -- -- 87 18 119/85 Lying 95    08/20/24 0720 97.4 (36.3) Oral -- -- -- -- --    08/20/24 0715 -- -- 82 -- 129/76 -- 96    08/20/24 0700 -- -- 84 -- 128/70 -- 96    08/20/24 0648 97.8 (36.6) Oral 74 16 180/96 -- 96    08/20/24 0645 -- -- 97 -- 121/77 -- 94    08/20/24 0630 -- -- 86 -- 112/70 -- 95    08/20/24 0615 -- -- 89 -- 113/74 -- 95    08/20/24 0600 -- -- 91 -- 119/72 -- 94    08/20/24 0545 -- -- 110 -- 107/77 -- 95    08/20/24 0530 -- -- 97 -- 122/69 -- 96    08/20/24 0515 -- -- 103 -- 127/69 -- 96    08/20/24 0500 -- -- 96 -- 119/69 -- 95    08/20/24 0445 -- -- 100 -- 125/68 -- 95    08/20/24 0430 -- -- 98 -- 129/76 -- 95    08/20/24 0415 -- -- 97 -- 131/75 -- 96    08/20/24 0400 -- -- 98 -- 122/76 -- 96    08/20/24 0330 -- -- 102 -- 138/78 -- 96    08/20/24 0318 -- -- 101 -- 113/64 -- --    08/20/24 0315 -- -- 101 -- 113/64 -- 95    08/20/24 0300 -- -- 97 -- -- -- 96    08/20/24 0245 97.8 (36.6) Oral 93 -- -- -- 97    08/20/24 0235 -- -- 91 -- 161/92 -- --    08/20/24 0230 -- -- 90 -- -- -- 97    08/20/24 0215 -- -- 92 -- -- -- 96    08/20/24 0200 -- -- 86 -- -- -- 97    08/20/24 0000 -- -- 88 -- 129/75 -- 94    08/19/24 2358 -- -- -- -- 156/83 -- --    08/19/24 2300 -- -- 86 -- 139/84 -- 95    08/19/24 2245 -- -- 81 -- -- -- 96    08/19/24 2230 -- -- 80 -- 166/91 -- 96    08/19/24 2228 -- -- 75 -- 175/118  -- --    BP: Cardene Started at 08/19/24 2228     08/19/24 2215 -- -- 75 -- 167/137 -- 97    08/19/24 2200 -- -- 74 -- 163/92 -- 96    08/19/24 2145 -- -- 74 -- -- -- 97    08/19/24 2130 -- -- 74 -- 180/96 -- 96    08/19/24 2115 -- -- 77 -- -- -- 97    08/19/24 2030 -- -- 77 -- -- -- 97    08/19/24 2015 -- -- 79 -- -- -- 99    08/19/24 2000 -- -- 91 -- -- -- 96    08/19/24 1921 -- -- 80 -- -- -- 98    08/19/24 1911 -- -- 75 -- 178/103 -- 98    08/19/24 1741 -- -- 68 -- 173/97 -- 99    08/19/24 1711 -- -- 66 -- 162/89 -- 97    08/19/24 1611 -- -- 68 -- 165/90 -- 97    08/19/24 1606 -- -- 76 -- 166/97 -- 97    08/19/24 1526 -- -- -- -- 158/96 -- --    08/19/24 1523 98.2 (36.8) Tympanic 85 18 -- -- 99          Oxygen Therapy (last 2 days)       Date/Time SpO2 Device (Oxygen Therapy) Flow (L/min) Oxygen Concentration (%) ETCO2 (mmHg)    08/20/24 1600 96 -- -- -- --    08/20/24 1500 95 -- -- -- --    08/20/24 1400 95 -- -- -- --    08/20/24 1200 96 -- -- -- --    08/20/24 1100 95 room air -- -- --    08/20/24 1000 95 -- -- -- --    08/20/24 0900 97 -- -- -- --    08/20/24 0800 95 room air -- -- --    08/20/24 0715 96 -- -- -- --    08/20/24 0700 96 -- -- -- --    08/20/24 0648 96 room air -- -- --    08/20/24 0645 94 -- -- -- --    08/20/24 0630 95 -- -- -- --    08/20/24 0615 95 -- -- -- --    08/20/24 0600 94 -- -- -- --    08/20/24 0545 95 -- -- -- --    08/20/24 0530 96 -- -- -- --    08/20/24 0515 96 -- -- -- --    08/20/24 0500 95 -- -- -- --    08/20/24 0445 95 -- -- -- --    08/20/24 0430 95 -- -- -- --    08/20/24 0415 96 -- -- -- --    08/20/24 0400 96 room air -- -- --    08/20/24 0330 96 -- -- -- --    08/20/24 0315 95 -- -- -- --    08/20/24 0300 96 -- -- -- --    08/20/24 0245 97 -- -- -- --    08/20/24 0230 97 -- -- -- --    08/20/24 0215 96 -- -- -- --    08/20/24 0200 97 -- -- -- --    08/20/24 0000 94 room air -- -- --    08/19/24 2300 95 -- -- -- --    08/19/24 2245 96 -- -- -- --    08/19/24 2230 96 -- -- -- --    08/19/24 2215 97 -- -- -- --     08/19/24 2200 96 -- -- -- --    08/19/24 2145 97 -- -- -- --    08/19/24 2130 96 -- -- -- --    08/19/24 2115 97 -- -- -- --    08/19/24 2030 97 -- -- -- --    08/19/24 2016 -- room air -- -- --    08/19/24 2015 99 -- -- -- --    08/19/24 2000 96 -- -- -- --    08/19/24 1921 98 -- -- -- --    08/19/24 1911 98 -- -- -- --    08/19/24 1741 99 -- -- -- --    08/19/24 1711 97 -- -- -- --    08/19/24 1611 97 -- -- -- --    08/19/24 1606 97 -- -- -- --    08/19/24 1523 99 room air -- -- --          Lines, Drains & Airways       Active LDAs       Name Placement date Placement time Site Days    Peripheral IV 08/19/24 1706 Anterior;Distal;Right Forearm 08/19/24  1706  Forearm  1              Inactive LDAs       None                  Facility-Administered Medications as of 8/20/2024   Medication Dose Route Frequency Provider Last Rate Last Admin    acetaminophen (TYLENOL) tablet 650 mg  650 mg Oral Q4H PRN Lexie Walls APRN   650 mg at 08/20/24 0408    Or    acetaminophen (TYLENOL) suppository 650 mg  650 mg Rectal Q4H PRN Lexie Walls APRN        atorvastatin (LIPITOR) tablet 10 mg  10 mg Oral Daily Lexie Walls APRN   10 mg at 08/20/24 1001    sennosides-docusate (PERICOLACE) 8.6-50 MG per tablet 2 tablet  2 tablet Oral BID Lexie Walls APRN   2 tablet at 08/20/24 1014    And    polyethylene glycol (MIRALAX) packet 17 g  17 g Oral Daily PRN Lexie Walls APRN        And    bisacodyl (DULCOLAX) EC tablet 5 mg  5 mg Oral Daily PRN Lexie Walls APRN        And    bisacodyl (DULCOLAX) suppository 10 mg  10 mg Rectal Daily PRN Lexie Walls APRN        dextrose (D50W) (25 g/50 mL) IV injection 25 g  25 g Intravenous Q15 Min PRN Sarika Wilburn MD        dextrose (GLUTOSE) oral gel 15 g  15 g Oral Q15 Min PRN Sarika Wilburn MD        famotidine (PEPCID) injection 20 mg  20 mg Intravenous Q12H Bull Doty MD   20 mg at 08/20/24 1001    [COMPLETED] gadobenate dimeglumine (MULTIHANCE) injection  14 mL  14 mL Intravenous Once in imaging Isai Mathew MD   14 mL at 08/20/24 0106    glucagon (GLUCAGEN) injection 1 mg  1 mg Intramuscular Q15 Min PRN Sarika Wilburn MD        [COMPLETED] hydrALAZINE (APRESOLINE) injection 10 mg  10 mg Intravenous Once Lexie Walls APRN   10 mg at 08/19/24 2358    hydrALAZINE (APRESOLINE) injection 10 mg  10 mg Intravenous Q4H PRN Sarika Wilburn MD        Hydrocortisone Sod Suc (PF) (Solu-CORTEF) injection 250 mg  250 mg Intravenous Q6H Bull Doty MD   250 mg at 08/20/24 1519    insulin lispro (HUMALOG/ADMELOG) injection 2-9 Units  2-9 Units Subcutaneous 4x Daily AC & at Bedtime Sarika Wilburn MD        labetalol (NORMODYNE,TRANDATE) injection 20 mg  20 mg Intravenous Q2H PRN Sarika Wilburn MD        [COMPLETED] lactated ringers bolus 1,000 mL  1,000 mL Intravenous Once Nohemy Cano PA-C   Stopped at 08/19/24 1920    levETIRAcetam (KEPPRA) injection 500 mg  500 mg Intravenous Q12H Isai Mathew MD   500 mg at 08/20/24 1001    niCARdipine (CARDENE) 25 mg in 250 mL NS infusion kit  5-15 mg/hr Intravenous Titrated Lexie Walls APRN   Stopped at 08/20/24 0558    nitroglycerin (NITROSTAT) SL tablet 0.4 mg  0.4 mg Sublingual Q5 Min PRN Lexie Walls APRN        ondansetron (ZOFRAN) injection 4 mg  4 mg Intravenous Q6H PRN Lexie Walls APRN        sodium chloride 0.9 % flush 10 mL  10 mL Intravenous PRN Kendrick Epps II, MD        sodium chloride 0.9 % flush 10 mL  10 mL Intravenous PRN Nohemy Cano PA-C         Orders (all)        Start     Ordered    08/21/24 0600  Protime-INR  Morning Draw         08/20/24 1504    08/21/24 0001  NPO Diet NPO Type: Sips with Meds  Diet Effective Midnight         08/20/24 1039    08/20/24 1730  insulin lispro (HUMALOG/ADMELOG) injection 2-9 Units  4 Times Daily Before Meals & Nightly         08/20/24 1502    08/20/24 1700  POC Glucose 4x Daily Before Meals & at Bedtime  4 Times Daily Before Meals & at  Bedtime      Comments: Complete no more than 45 minutes prior to patient eating      08/20/24 1502    08/20/24 1503  hydrALAZINE (APRESOLINE) injection 10 mg  Every 4 Hours PRN         08/20/24 1503    08/20/24 1503  labetalol (NORMODYNE,TRANDATE) injection 20 mg  Every 2 Hours PRN         08/20/24 1503    08/20/24 1502  dextrose (GLUTOSE) oral gel 15 g  Every 15 Minutes PRN         08/20/24 1502    08/20/24 1502  dextrose (D50W) (25 g/50 mL) IV injection 25 g  Every 15 Minutes PRN         08/20/24 1502    08/20/24 1502  glucagon (GLUCAGEN) injection 1 mg  Every 15 Minutes PRN         08/20/24 1502    08/20/24 1046  POC Glucose Once  PROCEDURE ONCE        Comments: Complete no more than 45 minutes prior to patient eating      08/20/24 1044    08/20/24 1016  Diet: Regular/House; Fluid Consistency: Thin (IDDSI 0)  Diet Effective Now         08/20/24 1015    08/20/24 1003  Case Request  Once         08/20/24 1002    08/20/24 0900  atorvastatin (LIPITOR) tablet 10 mg  Daily         08/20/24 0050    08/20/24 0600  Protime-INR  Morning Draw         08/19/24 2016 08/20/24 0600  aPTT  Morning Draw         08/19/24 2017 08/20/24 0600  CBC Auto Differential  PROCEDURE ONCE         08/19/24 2202 08/20/24 0348  NPO Diet NPO Type: Sips with Meds  Diet Effective Now,   Status:  Canceled         08/20/24 0347    08/20/24 0348  Code Status and Medical Interventions: CPR (Attempt to Resuscitate); Full Support  Continuous         08/20/24 0348    08/20/24 0200  gadobenate dimeglumine (MULTIHANCE) injection 14 mL  Once in Imaging         08/20/24 0106    08/19/24 2345  hydrALAZINE (APRESOLINE) injection 10 mg  Once         08/19/24 2257    08/19/24 2245  niCARdipine (CARDENE) 25 mg in 250 mL NS infusion kit  Titrated         08/19/24 2145    08/19/24 2200  Incentive Spirometry  Every 2 Hours While Awake       08/19/24 2016 08/19/24 2200  lactated ringers infusion  Continuous,   Status:  Discontinued         08/19/24 8625  "   08/19/24 2149  Occult Blood X 1, Stool - Stool, Per Rectum  Daily       08/19/24 2148 08/19/24 2100  famotidine (PEPCID) injection 20 mg  Every 12 Hours Scheduled         08/19/24 1852 08/19/24 2100  Vital Signs Every Hour and Per Hospital Policy Based on Patient Condition  Every Hour       08/19/24 2016 08/19/24 2100  Intake & Output  Every Hour       08/19/24 2016 08/19/24 2100  sennosides-docusate (PERICOLACE) 8.6-50 MG per tablet 2 tablet  2 Times Daily        Placed in \"And\" Linked Group    08/19/24 2016 08/19/24 2100  levETIRAcetam (KEPPRA) injection 500 mg  Every 12 Hours Scheduled         08/19/24 2028 08/19/24 2053  POC Glucose Once  PROCEDURE ONCE        Comments: Complete no more than 45 minutes prior to patient eating      08/19/24 2044 08/19/24 2022  CT Chest Without Contrast Diagnostic  1 Time Imaging         08/19/24 2021 08/19/24 2022  CT Abdomen Pelvis Without Contrast  1 Time Imaging         08/19/24 2021 08/19/24 2017  Daily Weights  Daily       08/19/24 2016 08/19/24 2017  Basic Metabolic Panel  Daily       08/19/24 2016 08/19/24 2017  CBC & Differential  Daily       08/19/24 2016 08/19/24 2017  CBC Auto Differential  PROCEDURE ONCE         08/19/24 2016 08/19/24 2016  Continuous Cardiac Monitoring  Continuous        Comments: Follow Standing Orders As Outlined in Process Instructions (Open Order Report to View Full Instructions)    08/19/24 2016 08/19/24 2016  Maintain IV Access  Continuous,   Status:  Canceled         08/19/24 2016 08/19/24 2016  Telemetry - Place Orders & Notify Provider of Results When Patient Experiences Acute Chest Pain, Dysrhythmia or Respiratory Distress  Continuous        Comments: Open Order Report to View Parameters Requiring Provider Notification    08/19/24 2016 08/19/24 2016  Continuous Pulse Oximetry  Continuous         08/19/24 2016 08/19/24 2016  Height & Weight  Once         08/19/24 2016 08/19/24 2016  " "Oral Care - Patient Not on NPPV & Not Intubated  Every Shift       08/19/24 2016 08/19/24 2016  Target Arousal Level RASS 0 to -2  Continuous         08/19/24 2016 08/19/24 2016  Use Mobility Guidelines for Advancement of Activity  Continuous         08/19/24 2016 08/19/24 2016  Insert Peripheral IV  Once         08/19/24 2016 08/19/24 2016  Saline Lock & Maintain IV Access  Continuous         08/19/24 2016 08/19/24 2016  Place Sequential Compression Device  Once         08/19/24 2016 08/19/24 2016  Maintain Sequential Compression Device  Continuous         08/19/24 2016 08/19/24 2016  POC Glucose Once  Once        Comments: On Arrival to Unit. If Greater Than 140, Call Admitting Provider for Orders      08/19/24 2016 08/19/24 2015  ondansetron (ZOFRAN) injection 4 mg  Every 6 Hours PRN         08/19/24 2016 08/19/24 2015  acetaminophen (TYLENOL) tablet 650 mg  Every 4 Hours PRN        Placed in \"Or\" Linked Group    08/19/24 2016 08/19/24 2015  acetaminophen (TYLENOL) suppository 650 mg  Every 4 Hours PRN        Placed in \"Or\" Linked Group    08/19/24 2016 08/19/24 2015  nitroglycerin (NITROSTAT) SL tablet 0.4 mg  Every 5 Minutes PRN         08/19/24 2016 08/19/24 2015  polyethylene glycol (MIRALAX) packet 17 g  Daily PRN        Placed in \"And\" Linked Group    08/19/24 2016 08/19/24 2015  bisacodyl (DULCOLAX) EC tablet 5 mg  Daily PRN        Placed in \"And\" Linked Group    08/19/24 2016 08/19/24 2015  bisacodyl (DULCOLAX) suppository 10 mg  Daily PRN        Placed in \"And\" Linked Group    08/19/24 2016 08/19/24 1945  Inpatient Admission  Once         08/19/24 1944 08/19/24 1929  Pulmonology (on-call MD unless specified)  Once        Specialty:  Pulmonary Disease  Provider:  (Not yet assigned)    08/19/24 1928 08/19/24 1927  MRI Brain With & Without Contrast  1 Time Imaging         08/19/24 1927 08/19/24 1908  Hydrocortisone Sod Suc (PF) (Solu-CORTEF) injection " "250 mg  Every 6 Hours         08/19/24 1852    08/19/24 1842  Neurosurgery (on-call MD unless specified)  Once        Specialty:  Neurosurgery  Provider:  Bull Doty MD    08/19/24 1841    08/19/24 1759  CT Head Without Contrast  1 Time Imaging         08/19/24 1759    08/19/24 1727  Urinalysis, Microscopic Only - Urine, Clean Catch  Once         08/19/24 1726    08/19/24 1711  lactated ringers bolus 1,000 mL  Once         08/19/24 1655    08/19/24 1656  Insert Peripheral IV  Once,   Status:  Canceled        Placed in \"And\" Linked Group    08/19/24 1655    08/19/24 1656  COVID-19, FLU A/B, RSV PCR 1 HR TAT - Swab, Nasopharynx  Once         08/19/24 1655    08/19/24 1655  sodium chloride 0.9 % flush 10 mL  As Needed        Placed in \"And\" Linked Group    08/19/24 1655    08/19/24 1527  NPO Diet NPO Type: Strict NPO  Diet Effective Now,   Status:  Canceled         08/19/24 1526    08/19/24 1527  Undress & Gown  Once         08/19/24 1526    08/19/24 1527  Cardiac Monitoring  Continuous,   Status:  Canceled        Comments: Follow Standing Orders As Outlined in Process Instructions (Open Order Report to View Full Instructions)    08/19/24 1526    08/19/24 1527  Continuous Pulse Oximetry  Continuous,   Status:  Canceled         08/19/24 1526    08/19/24 1527  Vital Signs  Per Hospital Policy         08/19/24 1526    08/19/24 1527  Orthostatic Blood Pressure  Once         08/19/24 1526    08/19/24 1527  Fall Precautions  Continuous         08/19/24 1526    08/19/24 1527  XR Chest 1 View  1 Time Imaging         08/19/24 1526    08/19/24 1527  ECG 12 Lead ED Triage Standing Order; Weak / Dizzy / AMS  Once         08/19/24 1526    08/19/24 1527  POC Glucose Once  Once,   Status:  Canceled        Comments: Complete no more than 45 minutes prior to patient eating      08/19/24 1526    08/19/24 1527  Insert Peripheral IV  Once         08/19/24 1526    08/19/24 1527  [place] Draw  Once         08/19/24 1526    08/19/24 " 1527  CBC & Differential  Once         24 1526    24 1527  Comprehensive Metabolic Panel  Once         24 1526    24 1527  Single High Sensitivity Troponin T  Once         24 1526    24 1527  Magnesium  Once         24 1526    24 1527  Urinalysis With Microscopic If Indicated (No Culture) - Urine, Clean Catch  Once         24 1526    24 1527  Green Top (Gel)  PROCEDURE ONCE         24 1526    24 1527  Lavender Top  PROCEDURE ONCE         24 1526    24 1527  Gold Top - SST  PROCEDURE ONCE         24 1526    24 1527  Light Blue Top  PROCEDURE ONCE         24 1526    24 1527  CBC Auto Differential  PROCEDURE ONCE         24 1526    24 1526  sodium chloride 0.9 % flush 10 mL  As Needed         24 1526    Unscheduled  Oxygen Therapy- Nasal Cannula; Titrate 1-6 LPM Per SpO2; 90 - 95%  Continuous PRN       24 1526    Unscheduled  Follow Hypoglycemia Standing Orders For Blood Glucose <70 & Notify Provider of Treatment  As Needed      Comments: Follow Hypoglycemia Orders As Outlined in Process Instructions (Open Order Report to View Full Instructions)  Notify Provider Any Time Hypoglycemia Treatment is Administered    24 1502    --  atorvastatin (LIPITOR) 10 MG tablet  Daily         24    --  buPROPion XL (WELLBUTRIN XL) 150 MG 24 hr tablet  Every Morning         24 215                  Operative/Procedure Notes (all)    No notes of this type exist for this encounter.          Physician Progress Notes (all)        Sarika Wilburn MD at 24 1459            PROGRESS NOTE  Patient Name: Ely Sims  Age/Sex: 64 y.o. female  : 1960  MRN: [id number]    Date of Admission: 2024  Date of Encounter Visit: 24   LOS: 1 day   Patient Care Team:  Provider, No Known as PCP - General    Chief Complaint: Came in with nausea vomiting and headache    Hospital course:  "Multiple brain lesion on the CT scan, with evidence of: Mass in the rectosigmoid area.  Patient is doing better she is on high-dose steroid  Neurosurgery on board      REVIEW OF SYSTEMS:   CONSTITUTIONAL: no fever or chills  CARDIOVASCULAR: No chest pain, chest pressure or chest discomfort. No palpitations or edema.   RESPIRATORY: No shortness of breath, cough or sputum.   GASTROINTESTINAL: No anorexia, nausea, vomiting or diarrhea. No abdominal pain or blood.   HEMATOLOGIC: No bleeding or bruising.     Ventilator/Non-Invasive Ventilation Settings (From admission, onward)      None              Vital Signs  Temp:  [97.4 °F (36.3 °C)-98.2 °F (36.8 °C)] 97.8 °F (36.6 °C)  Heart Rate:  [] 98  Resp:  [16-18] 16  BP: (107-180)/() 138/80  SpO2:  [94 %-99 %] 95 %  on    Device (Oxygen Therapy): room air    Intake/Output Summary (Last 24 hours) at 8/20/2024 1459  Last data filed at 8/20/2024 0000  Gross per 24 hour   Intake 1000 ml   Output 300 ml   Net 700 ml     Flowsheet Rows      Flowsheet Row First Filed Value   Admission Height 170.2 cm (67.01\") Documented at 08/20/2024 0648   Admission Weight 65.1 kg (143 lb 8.3 oz) Documented at 08/19/2024 2017          Body mass index is 22.47 kg/m².      08/19/24 2017 08/20/24  0505 08/20/24  0648   Weight: 65.1 kg (143 lb 8.3 oz) 65.1 kg (143 lb 8.3 oz) 65.1 kg (143 lb 8.3 oz)       Physical Exam:  GEN:  No acute distress, alert, cooperative, well developed   EYES:   Sclerae clear. No icterus. PERRL. Normal EOM  ENT:   External ears/nose normal, no oral lesions, no thrush, mucous membranes moist  NECK:  Supple, midline trachea, no JVD  LUNGS: Normal chest on inspection, CTAB, no wheezes. No rhonchi. No crackles. Respirations regular, even and unlabored.   CV:  Regular rhythm and rate. Normal S1/S2. No murmurs, gallops, or rubs noted.  ABD:  Soft, nontender and nondistended. Normal bowel sounds. No guarding  EXT:  Moves all extremities well. No cyanosis. No redness. " "No edema.   Skin: Dry, intact, no bleeding    Results Review:        Results from last 7 days   Lab Units 08/20/24  0504 08/19/24  2318 08/19/24  1545   SODIUM mmol/L 136 138 137   POTASSIUM mmol/L 3.7 3.5 3.6   CHLORIDE mmol/L 104 103 102   CO2 mmol/L 20.2* 21.4* 26.0   BUN mg/dL 10 10 13   CREATININE mg/dL 0.53* 0.51* 0.69   CALCIUM mg/dL 8.7 9.2 9.6   AST (SGOT) U/L  --   --  15   ALT (SGPT) U/L  --   --  13   ANION GAP mmol/L 11.8 13.6 9.0   ALBUMIN g/dL  --   --  4.4     Results from last 7 days   Lab Units 08/19/24  1545   HSTROP T ng/L <6             Results from last 7 days   Lab Units 08/20/24  0504 08/19/24  2132 08/19/24  1545   WBC 10*3/mm3 6.97 5.83 5.34   HEMOGLOBIN g/dL 13.7 13.7 14.4   HEMATOCRIT % 40.2 40.5 42.5   PLATELETS 10*3/mm3 330 275 355   MCV fL 97.8* 99.8* 97.7*   NEUTROPHIL % % 89.7* 83.8* 68.1   LYMPHOCYTE % % 7.9* 11.0* 22.1   MONOCYTES % % 2.0* 4.1* 8.6   EOSINOPHIL % % 0.0* 0.3 0.4   BASOPHIL % % 0.1 0.5 0.6   IMM GRAN % % 0.3 0.3 0.2     Results from last 7 days   Lab Units 08/20/24  0504   INR  1.09   APTT seconds 28.4     Results from last 7 days   Lab Units 08/19/24  1545   MAGNESIUM mg/dL 1.9           Invalid input(s): \"LDLCALC\"          Glucose   Date/Time Value Ref Range Status   08/20/2024 1044 171 (H) 70 - 130 mg/dL Final   08/19/2024 2044 91 70 - 130 mg/dL Final             Results from last 7 days   Lab Units 08/19/24  1708   NITRITE UA  Negative   WBC UA /HPF 3-5*   BACTERIA UA /HPF 1+*   SQUAM EPITHEL UA /HPF 3-6*     Results from last 7 days   Lab Units 08/19/24  1708   COVID19  Not Detected   INFLUENZA B PCR  Not Detected   RSV, PCR  Not Detected               Imaging:   Imaging Results (All)       Procedure Component Value Units Date/Time    CT Chest Without Contrast Diagnostic [[id number]] Collected: 08/20/24 0338     Updated: 08/20/24 0357    Narrative:      CT OF THE CHEST WITHOUT CONTRAST; CT OF THE ABDOMEN PELVIS WITHOUT  CONTRAST     HISTORY: Brain metastases   "   COMPARISON: None available.     TECHNIQUE: Axial CT imaging was obtained from the thoracic inlet through  the symphysis pubis. No IV contrast was administered.     FINDINGS:  CT OF THE CHEST: No suspicious pulmonary nodules or masses are seen.  There is minimal biapical scarring. There is some dependent atelectasis.  Thyroid gland, trachea, and esophagus appear unremarkable. No definite  coronary artery calcifications are seen. Mediastinal lymph nodes do not  appear pathologically enlarged. There are bilateral breast implants.  Thoracic aorta is normal in caliber. The patient has thoracolumbar  scoliosis. There is some associated endplate sclerosis noted at L3-L4  and and T8-T9. The patient is noted to have a sclerotic lesion within  the spinous process of T1. Exact etiology is uncertain. It may simply  reflect a bone island. It measures 6 mm in size. Short-term follow-up is  recommended.     CT OF THE ABDOMEN PELVIS: No suspicious hepatic lesions are seen. The  spleen, adrenal glands, stomach, duodenum, gallbladder, and pancreas  appear normal. The patient does have some excreted contrast material  from gadolinium from earlier MRI of the brain. No hydronephrosis is  seen. Further excreted gadolinium is noted within the urinary bladder.  Uterus appears normal. No adnexal masses are seen. The patient's distal  sigmoid colon appears thick walled. This raises the possibility of an  underlying mass lesion. Correlation with colonoscopy is recommended.  There is no evidence of bowel obstruction. The appendix is normal. The  patient does have some nodularity within the left pericolonic fat. The  largest area measures up to 2.7 x 1.5 cm. Neoplastic implant is not  excluded. There also appears to be some surrounding shotty perirectal  lymph nodes. MRI could allow for further staging.       Impression:         1. No evidence of metastatic disease within the thorax.  2. Asymmetric wall thickening involving the distal sigmoid  colon, with  some surrounding mesenteric soft tissue nodularity. Appearance is very  worrisome for primary colonic malignancy. Correlation with colonoscopy  is recommended. MRI could allow for further assessment.  3. Sclerotic lesion within the spinous process of T1. I would favor that  this is a benign lesion such as a bone island. Consider short-term  follow-up or bone scan.     Radiation dose reduction techniques were utilized, including automated  exposure control and exposure modulation based on body size.        This report was finalized on 8/20/2024 3:54 AM by Dr. Viridiana Altamirano M.D on Workstation: [id number]       CT Abdomen Pelvis Without Contrast [[id number]] Collected: 08/20/24 0338     Updated: 08/20/24 0357    Narrative:      CT OF THE CHEST WITHOUT CONTRAST; CT OF THE ABDOMEN PELVIS WITHOUT  CONTRAST     HISTORY: Brain metastases     COMPARISON: None available.     TECHNIQUE: Axial CT imaging was obtained from the thoracic inlet through  the symphysis pubis. No IV contrast was administered.     FINDINGS:  CT OF THE CHEST: No suspicious pulmonary nodules or masses are seen.  There is minimal biapical scarring. There is some dependent atelectasis.  Thyroid gland, trachea, and esophagus appear unremarkable. No definite  coronary artery calcifications are seen. Mediastinal lymph nodes do not  appear pathologically enlarged. There are bilateral breast implants.  Thoracic aorta is normal in caliber. The patient has thoracolumbar  scoliosis. There is some associated endplate sclerosis noted at L3-L4  and and T8-T9. The patient is noted to have a sclerotic lesion within  the spinous process of T1. Exact etiology is uncertain. It may simply  reflect a bone island. It measures 6 mm in size. Short-term follow-up is  recommended.     CT OF THE ABDOMEN PELVIS: No suspicious hepatic lesions are seen. The  spleen, adrenal glands, stomach, duodenum, gallbladder, and pancreas  appear normal. The patient does have  some excreted contrast material  from gadolinium from earlier MRI of the brain. No hydronephrosis is  seen. Further excreted gadolinium is noted within the urinary bladder.  Uterus appears normal. No adnexal masses are seen. The patient's distal  sigmoid colon appears thick walled. This raises the possibility of an  underlying mass lesion. Correlation with colonoscopy is recommended.  There is no evidence of bowel obstruction. The appendix is normal. The  patient does have some nodularity within the left pericolonic fat. The  largest area measures up to 2.7 x 1.5 cm. Neoplastic implant is not  excluded. There also appears to be some surrounding shotty perirectal  lymph nodes. MRI could allow for further staging.       Impression:         1. No evidence of metastatic disease within the thorax.  2. Asymmetric wall thickening involving the distal sigmoid colon, with  some surrounding mesenteric soft tissue nodularity. Appearance is very  worrisome for primary colonic malignancy. Correlation with colonoscopy  is recommended. MRI could allow for further assessment.  3. Sclerotic lesion within the spinous process of T1. I would favor that  this is a benign lesion such as a bone island. Consider short-term  follow-up or bone scan.     Radiation dose reduction techniques were utilized, including automated  exposure control and exposure modulation based on body size.        This report was finalized on 8/20/2024 3:54 AM by Dr. 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     FINDINGS:  Multiplanar  images of the head were obtained without and with  gadolinium. No areas of restricted diffusion are seen to suggest acute  infarct. Prior head CT demonstrated lesions within the left temporal  lobe and right cerebellar hemisphere. The current study confirms those  lesions. The lesion within the right cerebellar hemisphere measures up  to 2.1 x 2.2 cm. The lesion within the left temporal lobe measures 9 x 7  mm. There is a third lesion which is noted within the left side of the  cerebellar vermis. This measures 1.0 x 0.8 cm. No definite additional  lesions are seen. All of the lesions have associated surrounding edema.  There is some mass effect upon the fourth ventricle. This appears  similar to the prior study. There is mild prominence of the lateral and  third ventricles. There is periventricular and deep white matter  microangiopathic change. Tiny focus of susceptibility artifact is noted  within the lesion within the right cerebellar hemisphere. Otherwise,  there is no susceptibility artifact. Intracranial flow voids appear  intact. No large volume hemorrhage is seen. There is no evidence of  venous occlusion. There is some mucosal thickening within the ethmoid  and maxillary sinuses.       Impression:      1. Study confirms the presence of lesions within the left temporal lobe,  as well as the right cerebellar hemisphere. A third lesion is noted  within the cerebellar vermis. There is mass effect upon the fourth  ventricle. There is some mild dilatation of the lateral and third  ventricles. Continued follow-up is recommended.        This report was finalized on 8/20/2024 2:41 AM by Dr. Viridiana Altamirano M.D on Workstation: [id number]       CT Head Without Contrast [[id number]] Collected: 08/19/24 1836     Updated: 08/19/24 1843    Narrative:      CT HEAD WO CONTRAST-     HISTORY:  headache,vomiting     COMPARISON: None     FINDINGS: There is an area of increased attenuation involving the right  cerebellar  hemisphere posteriorly measuring approximately 2.1 cm in  size. Decreased attenuation consistent with vasogenic edema is  appreciated involving the right cerebellar hemisphere. Beam hardening  artifact limits evaluation of the posterior fossa but there may be  decreased attenuation also involving the shravan laterally to the right.  Decreased attenuation involving the inferior aspect the right temporal  lobe posteriorly is appreciated measuring approximately 6 mm in size  with a small area of adjacent edema. There is mild prominence of the  lateral and third ventricles secondary to mass effect upon the inferior  aspect of the fourth ventricle by the right cerebellar mass and  associated edema. There is no evidence of uncal herniation or midline  shift.       Impression:      Areas of increased attenuation are appreciated over the  right cerebral hemisphere posteriorly (2.1 cm) and left temporal lobe  inferiorly and posteriorly (6 mm) most consistent with that of  metastatic disease with associated edema. Edema is most prominent  involving the right cerebellar hemisphere. There is mass effect upon the  fourth ventricle and mild prominence of the lateral and third  ventricles. Further evaluation with MRI examination of the brain with  and without contrast is recommended.     The above information was called to and discussed with Dr. Epps.              Radiation dose reduction techniques were utilized, including automated  exposure modulation based on body size.     This report was finalized on 8/19/2024 6:40 PM by Dr. Cesar Levine M.D  on Workstation: [id number]       XR Chest 1 View [[id number]] Collected: 08/19/24 1631     Updated: 08/19/24 1635    Narrative:      XR CHEST 1 VW-8/19/2024     HISTORY: Weakness, dizziness.     Heart size is within normal limits. Lungs appear free of acute  infiltrates. There is mild thoracic scoliosis. Bones and soft tissues  are otherwise unremarkable.       Impression:      1. No  acute process        This report was finalized on 8/19/2024 4:32 PM by Dr. Veto Escobar M.D on Workstation: [id number]               I reviewed the patient's new clinical results.  I personally viewed and interpreted the patient's imaging results:        Medication Review:   atorvastatin, 10 mg, Oral, Daily  famotidine, 20 mg, Intravenous, Q12H  hydrocortisone sodium succinate, 250 mg, Intravenous, Q6H  levETIRAcetam, 500 mg, Intravenous, Q12H  senna-docusate sodium, 2 tablet, Oral, BID        lactated ringers, 100 mL/hr, Last Rate: 100 mL/hr (08/19/24 2206)  niCARdipine, 5-15 mg/hr, Last Rate: Stopped (08/20/24 0545)        ASSESSMENT:   Several brain lesions with edema and brain tissue compression  Headache and nausea and vomiting as a result of the above  Light smoker  Colon mass with family of colon cancer  Uncontrolled hypertension    PLAN:  Patient is doing better, headache is much better controlled  Systemic steroids for the intracranial edema due to underlying malignancy  Possible surgery tomorrow by neurosurgery  The colon mass need to be evaluated, will consult GI, to address after her brain mass edema is managed.  Control the blood pressure  Seizure prophylaxis with Keppra  Stress ulcer prophylaxis while on the high-dose steroids  Sliding scale of insulin with frequent Accu-Cheks while on the high-dose steroid for anticipated steroid-induced hyperglycemia    Discussed with the neurosurgery team  Discussed with patient and family  Discussed with the nursing staff and the ICU rounding team  Labs/Notes/films were independently reviewed and pertinent results are summarized above  The copied texts in this note were reviewed and they are accurate as of 08/20/24    Disposition: Continue to monitor in the ICU    Sarika Wilburn MD  08/20/24  14:59 EDT        Dictated utilizing Dragon dictation    Electronically signed by Sarika Wilburn MD at 08/20/24 7287       Isai Mathew MD at 08/19/24 7486          I  have reviewed the history, physical exam and plan as obtained by APRN and confirm the findings except as noted below. I have independently examined the patient and I personally performed >50% of the management in this split shared patient. My separate and additional contributions to history, exam, physical findings and the plan are as noted below.    64-year-old female who presents with nausea and vomiting as well as frontal headache for approximately 1 week.  No prior history of same.  CT head suggestive of suspicious masses.  Neurosurgery consulted and ordered IV hydrocortisone every 6 hours.  Stat MRI is pending.  After pain meds patient feels better and denies further headache.  Now in ICU for monitoring.  Fairly benign medical history and on meds for ADD, hyperlipidemia, anxiety.  She is a smoker on and off since age 18.  1-2 alcoholic beverages a day.  Colonoscopy at age 50.  Mammogram recently was [facility].  Denies weight loss.  No seizure-like activities.  Denies shortness of breath wheezing or cough.  Recent diarrhea for the last week only.  No blood in the stool.  BP (!) 178/103   Pulse 80   Temp 98.2 °F (36.8 °C) (Tympanic)   Resp 18   Wt 65.1 kg (143 lb 8.3 oz)   SpO2 98%   BMI 22.48 kg/m²    Lungs reveal bilateral air entry clear to auscultation.  Extremities no edema.  Heart S1-S2 present rhythm regular.  Neuroexam is Nad for me.  Labs and imaging data reviewed.    Suspicious brain masses, pending MRI  Headache, nausea, vomiting on presentation  Current light smoker  Uncontrolled hypertension    Appreciate neurosurgery input.  Continue IV hydrocortisone as ordered by them.  MRI pending and will review.  I added Keppra for seizure prophylaxis for now.  Patient be counseled to quit smoking completely.  CT chest abdomen pelvis pending for cancer surveillance.  SCDs for now for DVT prophylaxis.    Electronically signed by Isai Mathew MD, 08/19/24, 10:26 PM EDT.       Electronically signed by Isai Mathew,  MD at 08/19/24 2232          Consult Notes (all)        Edie Sierra APRN at 08/20/24 0932        Consult Orders    1. Neurosurgery (on-call MD unless specified) [[id number]] ordered by Kendrick Epps II, MD at 08/19/24 1841                 [facility] NEUROSURGERY CONSULT NOTE    Patient name: Ely Sims  Referring Provider: Nohemy Cano PA-C  Reason for Consultation: Brain Mets    Patient Care Team:  Provider, No Known as PCP - General    Chief complaint: Headache    Subjective .     History of present illness:    Patient is a 64 y.o.  female with a past medical history of hyperlipidemia, ADHD, anxiety, tobacco use.  Patient reports that on Wednesday of last week she had what she describes as a splitting headache to the top of her head that came out of nowhere.  She states typically does not get headaches.  On Friday of last week she started having nausea, vomiting and diarrhea.  She presented to the urgent care and was found to be hypertensive and became dizzy, therefore sent to the emergency room.  She denies recent weight loss, fever, chills, cough, weakness, balance issues.  She is a smoker and reports social alcohol use.  She has no personal diagnosis of cancer.  She has a sister that had colon cancer and her father passed away from bone cancer.  Her last colonoscopy was at age 50 and was unremarkable at that time.      History  PAST MEDICAL HISTORY  History reviewed. No pertinent past medical history.    PAST SURGICAL HISTORY  History reviewed. No pertinent surgical history.    FAMILY HISTORY  History reviewed. No pertinent family history.    SOCIAL HISTORY  Social History     Tobacco Use    Smoking status: Some Days     Types: Cigarettes    Smokeless tobacco: Never   Vaping Use    Vaping status: Never Used   Substance Use Topics    Alcohol use: Yes    Drug use: Defer       full time job     Allergies:  Patient has no known allergies.    MEDICATIONS:  Medications Prior to Admission    Medication Sig Dispense Refill Last Dose    amphetamine-dextroamphetamine (ADDERALL) 20 MG tablet Take 1 tablet by mouth 2 (Two) Times a Day.   8/18/2024    atorvastatin (LIPITOR) 10 MG tablet Take 1 tablet by mouth Daily.   8/18/2024    buPROPion XL (WELLBUTRIN XL) 150 MG 24 hr tablet Take 2 tablets by mouth Every Morning.   8/18/2024    LORazepam (ATIVAN) 0.5 MG tablet Take 1 tablet by mouth Every 6 (Six) Hours As Needed for Anxiety.   8/18/2024       Current Facility-Administered Medications:     acetaminophen (TYLENOL) tablet 650 mg, 650 mg, Oral, Q4H PRN, 650 mg at 08/20/24 0408 **OR** acetaminophen (TYLENOL) suppository 650 mg, 650 mg, Rectal, Q4H PRN, Lexie Walls APRN    atorvastatin (LIPITOR) tablet 10 mg, 10 mg, Oral, Daily, Lexie Walls APRN, 10 mg at 08/20/24 1001    sennosides-docusate (PERICOLACE) 8.6-50 MG per tablet 2 tablet, 2 tablet, Oral, BID **AND** polyethylene glycol (MIRALAX) packet 17 g, 17 g, Oral, Daily PRN **AND** bisacodyl (DULCOLAX) EC tablet 5 mg, 5 mg, Oral, Daily PRN **AND** bisacodyl (DULCOLAX) suppository 10 mg, 10 mg, Rectal, Daily PRN, Lexie Walls APRN    famotidine (PEPCID) injection 20 mg, 20 mg, Intravenous, Q12H, Bull Doty MD, 20 mg at 08/20/24 1001    Hydrocortisone Sod Suc (PF) (Solu-CORTEF) injection 250 mg, 250 mg, Intravenous, Q6H, Bull Doty MD, 250 mg at 08/20/24 1002    lactated ringers infusion, 100 mL/hr, Intravenous, Continuous, Lexie Walls APRN, Last Rate: 100 mL/hr at 08/19/24 2204, 100 mL/hr at 08/19/24 2204    levETIRAcetam (KEPPRA) injection 500 mg, 500 mg, Intravenous, Q12H, Isai Mathew MD, 500 mg at 08/20/24 1001    niCARdipine (CARDENE) 25 mg in 250 mL NS infusion kit, 5-15 mg/hr, Intravenous, Titrated, Lexie Walls APRN, Stopped at 08/20/24 0558    nitroglycerin (NITROSTAT) SL tablet 0.4 mg, 0.4 mg, Sublingual, Q5 Min PRN, Lexie Walls APRN    ondansetron (ZOFRAN) injection 4 mg, 4 mg, Intravenous, Q6H PRN,  Lexie Walls, APRN    sodium chloride 0.9 % flush 10 mL, 10 mL, Intravenous, PRN, Kendrick Epps II, MD    [CANCELED] Insert Peripheral IV, , , Once **AND** sodium chloride 0.9 % flush 10 mL, 10 mL, Intravenous, PRN, Nohemy Cano, PA-C    COMORBID CONDITIONS:  Hyperlipidemia      Review of Systems  Review of Systems   Constitutional:  Negative for chills, fever and unexpected weight change.   Eyes:  Negative for visual disturbance.   Gastrointestinal:  Positive for diarrhea, nausea and vomiting.   Neurological:  Positive for dizziness (resolved) and headaches. Negative for weakness.   Psychiatric/Behavioral:  The patient is not nervous/anxious.      Objective     Physical Exam  Physical Exam  Vitals reviewed.   Constitutional:       Appearance: Normal appearance.   Pulmonary:      Effort: Pulmonary effort is normal.   Skin:     General: Skin is warm and dry.   Neurological:      Mental Status: She is alert and oriented to person, place, and time.      Cranial Nerves: Cranial nerves 2-12 are intact.      Motor: Motor strength is normal.     Coordination: Finger-Nose-Finger Test and Heel to Shin Test normal.   Psychiatric:         Speech: Speech normal.       Neurologic Exam     Mental Status   Oriented to person, place, and time.   Speech: speech is normal   Level of consciousness: alert  Knowledge: good.     Cranial Nerves   Cranial nerves II through XII intact.     Motor Exam   Muscle bulk: normal  Overall muscle tone: normal    Strength   Strength 5/5 throughout.     Sensory Exam   Light touch normal.     Gait, Coordination, and Reflexes     Coordination   Finger to nose coordination: normal  Heel to shin coordination: normal        Results Review:    LABS:    Admission on 08/19/2024   Component Date Value Ref Range Status    QT Interval 08/19/2024 442  ms Final    QTC Interval 08/19/2024 467  ms Final    Glucose 08/19/2024 98  65 - 99 mg/dL Final    BUN 08/19/2024 13  8 - 23 mg/dL Final     Creatinine 08/19/2024 0.69  0.57 - 1.00 mg/dL Final    Sodium 08/19/2024 137  136 - 145 mmol/L Final    Potassium 08/19/2024 3.6  3.5 - 5.2 mmol/L Final    Chloride 08/19/2024 102  98 - 107 mmol/L Final    CO2 08/19/2024 26.0  22.0 - 29.0 mmol/L Final    Calcium 08/19/2024 9.6  8.6 - 10.5 mg/dL Final    Total Protein 08/19/2024 7.4  6.0 - 8.5 g/dL Final    Albumin 08/19/2024 4.4  3.5 - 5.2 g/dL Final    ALT (SGPT) 08/19/2024 13  1 - 33 U/L Final    AST (SGOT) 08/19/2024 15  1 - 32 U/L Final    Alkaline Phosphatase 08/19/2024 83  39 - 117 U/L Final    Total Bilirubin 08/19/2024 0.3  0.0 - 1.2 mg/dL Final    Globulin 08/19/2024 3.0  gm/dL Final    A/G Ratio 08/19/2024 1.5  g/dL Final    BUN/Creatinine Ratio 08/19/2024 18.8  7.0 - 25.0 Final    Anion Gap 08/19/2024 9.0  5.0 - 15.0 mmol/L Final    eGFR 08/19/2024 97.1  >60.0 mL/min/1.73 Final    HS Troponin T 08/19/2024 <6  <14 ng/L Final    Magnesium 08/19/2024 1.9  1.6 - 2.4 mg/dL Final    Color, UA 08/19/2024 Yellow  Yellow, Straw Final    Appearance, UA 08/19/2024 Cloudy (A)  Clear Final    pH, UA 08/19/2024 6.0  5.0 - 8.0 Final    Specific Gravity, UA 08/19/2024 1.018  1.005 - 1.030 Final    Glucose, UA 08/19/2024 Negative  Negative Final    Ketones, UA 08/19/2024 40 mg/dL (2+) (A)  Negative Final    Bilirubin, UA 08/19/2024 Negative  Negative Final    Blood, UA 08/19/2024 Negative  Negative Final    Protein, UA 08/19/2024 30 mg/dL (1+) (A)  Negative Final    Leuk Esterase, UA 08/19/2024 Trace (A)  Negative Final    Nitrite, UA 08/19/2024 Negative  Negative Final    Urobilinogen, UA 08/19/2024 1.0 E.U./dL  0.2 - 1.0 E.U./dL Final    Extra Tube 08/19/2024 Hold for add-ons.   Final    Auto resulted.    Extra Tube 08/19/2024 hold for add-on   Final    Auto resulted    Extra Tube 08/19/2024 Hold for add-ons.   Final    Auto resulted.    Extra Tube 08/19/2024 Hold for add-ons.   Final    Auto resulted    WBC 08/19/2024 5.34  3.40 - 10.80 10*3/mm3 Final    RBC 08/19/2024  4.35  3.77 - 5.28 10*6/mm3 Final    Hemoglobin 08/19/2024 14.4  12.0 - 15.9 g/dL Final    Hematocrit 08/19/2024 42.5  34.0 - 46.6 % Final    MCV 08/19/2024 97.7 (H)  79.0 - 97.0 fL Final    MCH 08/19/2024 33.1 (H)  26.6 - 33.0 pg Final    MCHC 08/19/2024 33.9  31.5 - 35.7 g/dL Final    RDW 08/19/2024 12.3  12.3 - 15.4 % Final    RDW-SD 08/19/2024 44.0  37.0 - 54.0 fl Final    MPV 08/19/2024 9.2  6.0 - 12.0 fL Final    Platelets 08/19/2024 355  140 - 450 10*3/mm3 Final    Neutrophil % 08/19/2024 68.1  42.7 - 76.0 % Final    Lymphocyte % 08/19/2024 22.1  19.6 - 45.3 % Final    Monocyte % 08/19/2024 8.6  5.0 - 12.0 % Final    Eosinophil % 08/19/2024 0.4  0.3 - 6.2 % Final    Basophil % 08/19/2024 0.6  0.0 - 1.5 % Final    Immature Grans % 08/19/2024 0.2  0.0 - 0.5 % Final    Neutrophils, Absolute 08/19/2024 3.64  1.70 - 7.00 10*3/mm3 Final    Lymphocytes, Absolute 08/19/2024 1.18  0.70 - 3.10 10*3/mm3 Final    Monocytes, Absolute 08/19/2024 0.46  0.10 - 0.90 10*3/mm3 Final    Eosinophils, Absolute 08/19/2024 0.02  0.00 - 0.40 10*3/mm3 Final    Basophils, Absolute 08/19/2024 0.03  0.00 - 0.20 10*3/mm3 Final    Immature Grans, Absolute 08/19/2024 0.01  0.00 - 0.05 10*3/mm3 Final    nRBC 08/19/2024 0.0  0.0 - 0.2 /100 WBC Final    COVID19 08/19/2024 Not Detected  Not Detected - Ref. Range Final    Influenza A PCR 08/19/2024 Not Detected  Not Detected Final    Influenza B PCR 08/19/2024 Not Detected  Not Detected Final    RSV, PCR 08/19/2024 Not Detected  Not Detected Final    RBC, UA 08/19/2024 None Seen  None Seen, 0-2 /HPF Final    WBC, UA 08/19/2024 3-5 (A)  None Seen, 0-2 /HPF Final    Bacteria, UA 08/19/2024 1+ (A)  None Seen /HPF Final    Squamous Epithelial Cells, UA 08/19/2024 3-6 (A)  None Seen, 0-2 /HPF Final    Hyaline Casts, UA 08/19/2024 None Seen  None Seen /LPF Final    Mucus, UA 08/19/2024 Moderate/2+ (A)  None Seen, Trace /HPF Final    Methodology 08/19/2024 Manual Light Microscopy   Final    Glucose  08/19/2024 103 (H)  65 - 99 mg/dL Final    BUN 08/19/2024 10  8 - 23 mg/dL Final    Creatinine 08/19/2024 0.51 (L)  0.57 - 1.00 mg/dL Final    Sodium 08/19/2024 138  136 - 145 mmol/L Final    Potassium 08/19/2024 3.5  3.5 - 5.2 mmol/L Final    Chloride 08/19/2024 103  98 - 107 mmol/L Final    CO2 08/19/2024 21.4 (L)  22.0 - 29.0 mmol/L Final    Calcium 08/19/2024 9.2  8.6 - 10.5 mg/dL Final    BUN/Creatinine Ratio 08/19/2024 19.6  7.0 - 25.0 Final    Anion Gap 08/19/2024 13.6  5.0 - 15.0 mmol/L Final    eGFR 08/19/2024 104.4  >60.0 mL/min/1.73 Final    WBC 08/19/2024 5.83  3.40 - 10.80 10*3/mm3 Final    RBC 08/19/2024 4.06  3.77 - 5.28 10*6/mm3 Final    Hemoglobin 08/19/2024 13.7  12.0 - 15.9 g/dL Final    Hematocrit 08/19/2024 40.5  34.0 - 46.6 % Final    MCV 08/19/2024 99.8 (H)  79.0 - 97.0 fL Final    MCH 08/19/2024 33.7 (H)  26.6 - 33.0 pg Final    MCHC 08/19/2024 33.8  31.5 - 35.7 g/dL Final    RDW 08/19/2024 12.4  12.3 - 15.4 % Final    RDW-SD 08/19/2024 45.8  37.0 - 54.0 fl Final    MPV 08/19/2024 9.5  6.0 - 12.0 fL Final    Platelets 08/19/2024 275  140 - 450 10*3/mm3 Final    Neutrophil % 08/19/2024 83.8 (H)  42.7 - 76.0 % Final    Lymphocyte % 08/19/2024 11.0 (L)  19.6 - 45.3 % Final    Monocyte % 08/19/2024 4.1 (L)  5.0 - 12.0 % Final    Eosinophil % 08/19/2024 0.3  0.3 - 6.2 % Final    Basophil % 08/19/2024 0.5  0.0 - 1.5 % Final    Immature Grans % 08/19/2024 0.3  0.0 - 0.5 % Final    Neutrophils, Absolute 08/19/2024 4.88  1.70 - 7.00 10*3/mm3 Final    Lymphocytes, Absolute 08/19/2024 0.64 (L)  0.70 - 3.10 10*3/mm3 Final    Monocytes, Absolute 08/19/2024 0.24  0.10 - 0.90 10*3/mm3 Final    Eosinophils, Absolute 08/19/2024 0.02  0.00 - 0.40 10*3/mm3 Final    Basophils, Absolute 08/19/2024 0.03  0.00 - 0.20 10*3/mm3 Final    Immature Grans, Absolute 08/19/2024 0.02  0.00 - 0.05 10*3/mm3 Final    nRBC 08/19/2024 0.0  0.0 - 0.2 /100 WBC Final    Glucose 08/19/2024 91  70 - 130 mg/dL Final    Glucose  08/20/2024 117 (H)  65 - 99 mg/dL Final    BUN 08/20/2024 10  8 - 23 mg/dL Final    Creatinine 08/20/2024 0.53 (L)  0.57 - 1.00 mg/dL Final    Sodium 08/20/2024 136  136 - 145 mmol/L Final    Potassium 08/20/2024 3.7  3.5 - 5.2 mmol/L Final    Slight hemolysis detected by analyzer. Result may be falsely elevated.    Chloride 08/20/2024 104  98 - 107 mmol/L Final    CO2 08/20/2024 20.2 (L)  22.0 - 29.0 mmol/L Final    Calcium 08/20/2024 8.7  8.6 - 10.5 mg/dL Final    BUN/Creatinine Ratio 08/20/2024 18.9  7.0 - 25.0 Final    Anion Gap 08/20/2024 11.8  5.0 - 15.0 mmol/L Final    eGFR 08/20/2024 103.4  >60.0 mL/min/1.73 Final    Protime 08/20/2024 14.3 (H)  11.7 - 14.2 Seconds Final    INR 08/20/2024 1.09  0.90 - 1.10 Final    PTT 08/20/2024 28.4  22.7 - 35.4 seconds Final    WBC 08/20/2024 6.97  3.40 - 10.80 10*3/mm3 Final    RBC 08/20/2024 4.11  3.77 - 5.28 10*6/mm3 Final    Hemoglobin 08/20/2024 13.7  12.0 - 15.9 g/dL Final    Hematocrit 08/20/2024 40.2  34.0 - 46.6 % Final    MCV 08/20/2024 97.8 (H)  79.0 - 97.0 fL Final    MCH 08/20/2024 33.3 (H)  26.6 - 33.0 pg Final    MCHC 08/20/2024 34.1  31.5 - 35.7 g/dL Final    RDW 08/20/2024 12.2 (L)  12.3 - 15.4 % Final    RDW-SD 08/20/2024 43.9  37.0 - 54.0 fl Final    MPV 08/20/2024 10.0  6.0 - 12.0 fL Final    Platelets 08/20/2024 330  140 - 450 10*3/mm3 Final    Neutrophil % 08/20/2024 89.7 (H)  42.7 - 76.0 % Final    Lymphocyte % 08/20/2024 7.9 (L)  19.6 - 45.3 % Final    Monocyte % 08/20/2024 2.0 (L)  5.0 - 12.0 % Final    Eosinophil % 08/20/2024 0.0 (L)  0.3 - 6.2 % Final    Basophil % 08/20/2024 0.1  0.0 - 1.5 % Final    Immature Grans % 08/20/2024 0.3  0.0 - 0.5 % Final    Neutrophils, Absolute 08/20/2024 6.25  1.70 - 7.00 10*3/mm3 Final    Lymphocytes, Absolute 08/20/2024 0.55 (L)  0.70 - 3.10 10*3/mm3 Final    Monocytes, Absolute 08/20/2024 0.14  0.10 - 0.90 10*3/mm3 Final    Eosinophils, Absolute 08/20/2024 0.00  0.00 - 0.40 10*3/mm3 Final    Basophils,  Absolute 08/20/2024 0.01  0.00 - 0.20 10*3/mm3 Final    Immature Grans, Absolute 08/20/2024 0.02  0.00 - 0.05 10*3/mm3 Final    nRBC 08/20/2024 0.0  0.0 - 0.2 /100 WBC Final       DIAGNOSTICS:  MRI Brain w/wo: Lesions noted within the left temporal lobe, right cerebellar hemisphere and cerebellar vermis.  There is mass effect upon the fourth ventricle.    CT chest/abdomen/pelvis:    IMPRESSION:     1. No evidence of metastatic disease within the thorax.  2. Asymmetric wall thickening involving the distal sigmoid colon, with  some surrounding mesenteric soft tissue nodularity. Appearance is very  worrisome for primary colonic malignancy. Correlation with colonoscopy  is recommended. MRI could allow for further assessment.  3. Sclerotic lesion within the spinous process of T1. I would favor that  this is a benign lesion such as a bone island. Consider short-term  follow-up or bone scan.       Results Review:   I reviewed the patient's new clinical results.  I personally viewed  the patient's MRI brain, it was also discussed with and reviewed by Dr. 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 Dr Doty has reviewed MRI brain and recommends removal of the mass on the posterior fossa d/t the mass effect on the 4th ventricle.  He will plan to  "speak to patient and family later today.    PLAN:   -Medical management per intensivist  -Continue IV steroids  -Dr Doty to speak with patient and family later today    I discussed the patient's findings and my recommendations with patient, family, nursing staff, primary care team, and Dr Doty    During patient visit, I utilized appropriate personal protective equipment including gloves and mask.  Mask used was standard procedure mask. Appropriate PPE was worn during the entire visit.  Hand hygiene was completed before and after.     GRETCHEN Crum  08/20/24  09:32 EDT    \"Dictated utilizing Dragon dictation\".        Electronically signed by Edie Sierra APRN at 08/20/24 1037       "

## 2024-08-20 NOTE — CASE MANAGEMENT/SOCIAL WORK
Discharge Planning Assessment  [facility]     Patient Name: Ely Sims  MRN: [id number]  Today's Date: 8/20/2024    Admit Date: 8/19/2024    Plan: Home with S.O.   Discharge Needs Assessment       Row Name 08/20/24 1026       Living Environment    People in Home significant other    Current Living Arrangements home    Primary Care Provided by self    Provides Primary Care For no one    Family Caregiver if Needed sibling(s);other (see comments)    Able to Return to Prior Arrangements yes       Transition Planning    Patient/Family Anticipates Transition to home with family    Patient/Family Anticipated Services at Transition none    Transportation Anticipated family or friend will provide       Discharge Needs Assessment    Readmission Within the Last 30 Days no previous admission in last 30 days    Equipment Currently Used at Home bp cuff    Concerns to be Addressed discharge planning                   Discharge Plan       Row Name 08/20/24 1027       Plan    Plan Home with S.O.    Patient/Family in Agreement with Plan yes    Plan Comments Met with patient and S.O. at bedside, introduced self and explained CCP role. Verified facesheet and PCP- Dr. An with [facility]. Patient lives at home with S.LUCRETIA. [place]. Home has 4-5 steps to enter. Patient was ind with ADLS and driving prior to admission. Patient denies h/o HH/SNF and has a BP cuff. Patient uses [email or web address] and reports no difficulty affording medications. Family will transport. CCP to follow. Bharat HUDSON RN                  Continued Care and Services - Admitted Since 8/19/2024    No active coordination exists for this encounter.          Demographic Summary       Row Name 08/20/24 1025       General Information    Admission Type inpatient    Referral Source admission list    Reason for Consult discharge planning    Preferred Language English                   Functional Status       Row Name 08/20/24 1026       Functional Status    Usual  Activity Tolerance good    Current Activity Tolerance good       Functional Status, IADL    Medications independent    Meal Preparation independent    Housekeeping independent    Laundry independent    Shopping independent       Mental Status    General Appearance WDL WDL                   Psychosocial    No documentation.                  Abuse/Neglect    No documentation.                  Legal    No documentation.                  Substance Abuse    No documentation.                  Patient Forms    No documentation.                     Bharat Velez RN

## 2024-08-20 NOTE — H&P
Group: [facility]         History and Physical    Patient Identification:  Ely Sims  64 y.o.  female  1960  [id number]            Requesting physician: Dr. Doty    Reason for Consultation: ICU admission     CC: Brain mass    History of Present Illness:  Charu Hernandez is a 64-year-old female with past medical history HLD, ADHD, anxiety and depression, tobacco use who presented to urgent care today with complaints of frontal headache since last Wednesday, nausea vomiting and diarrhea which started on Friday.  She has been unable to keep anything down since then.  She has had 2 loose stools today, vomited twice today.  She was found to be hypertensive and dizzy at urgent care.  She was advised per urgent care to go to the ED.  CT head without contrast revealed areas of increased attenuation over the right cerebral hemisphere (2.1 cm) and left temporal lobe inferiorly and posteriorly (6mm) most consistent with metastatic disease and associated edema.  There is mass effect upon the fourth ventricle and mild prominence of the lateral and third ventricles.  She has been hypertensive in the ED with SBP from 170-190 with no prior history of hypertension.  She was given 1L IVF bolus in the ED. chest x-ray was negative for acute process.  EKG NSR with probable left atrial enlargement.  Dr Doty was consulted from the ED who ordered stat MRI, Solu-Cortef and requested admission to ICU.    She and her significant other started taking Metamucil about 3 weeks ago, she developed loose stool and noticed some bright red blood for about 5 days.  She stopped the Metamucil and has not had any noticeable bleeding since.  She denies abdominal pain.  Has had 2 loose stools today, vomited twice today.  Denies recent unintended weight loss, night sweats.  She reports history of breast lump which was found to be benign.  She is a cigarette smoker, she states some days she smokes at least 1 pack but other days she does not  smoke at all.  She has been smoking for the last 2 years.  She reports social alcohol use; 1 large bottle of Chardonnay per week.  She denies any vision changes, balance issues, or extremity weakness.  Denies any pulmonary or cardiac history.  She works in cyber security.  Her sister who is a retired nurse accompanied her at bedside as well as her daughter.  She states her headache is entirely gone at this time as well as her nausea.    Review of Systems  CONSTITUTIONAL: Positive for high blood pressure  EYE:  No new vision changes  EAR:  No change in hearing  CARDIAC:  No chest pain  PULMONARY:  No productive cough or shortness of breath  GI: Positive for nausea, vomiting, diarrhea, blood in stool  RENAL:  No dysuria or urinary frequency  MUSCULOSKELETAL:  No musculoskeletal complaints  ENDOCRINE:  No heat or cold intolerance  INTEGUMENTARY: No skin rashes  NEUROLOGICAL: Positive for persistent headache, dizziness  PSYCHIATRIC:  No new anxiety or depression  12 system review of systems performed and all else negative     Past Medical History:  No past medical history on file.    Past Surgical History:  No past surgical history on file.     Home Meds:  (Not in a hospital admission)      Allergies:  No Known Allergies    Social History:   Social History     Socioeconomic History    Marital status: Single   Tobacco Use    Smoking status: Some Days     Types: Cigarettes    Smokeless tobacco: Never   Vaping Use    Vaping status: Never Used   Substance and Sexual Activity    Alcohol use: Yes    Drug use: Defer    Sexual activity: Defer       Family History:  No family history on file.    Physical Exam:  BP (!) 178/103   Pulse 80   Temp 98.2 °F (36.8 °C) (Tympanic)   Resp 18   SpO2 98%  There is no height or weight on file to calculate BMI. 98%      Physical Exam  Constitutional: Middle-age female sitting up in bed, in no acute distress, on room air  Head: NCAT  Eyes: No pallor, Anicteric conjunctiva, EOMI.  "PERRLA.  ENMT:  No oral thrush.  Moist MM.   NECK: Trachea midline, no thyromegaly, no JVD  Heart: RRR, no pedal edema  Lungs: ANURAG +, clear to auscultation throughout, no wheezing or crackles   Abdomen: Soft, nondistended.  Denies abdominal pain.  No tenderness, guarding or rigidity. No palpable masses.  Extremities: Extremities warm and well perfused. No cyanosis/ clubbing.  All pulses palpable.  Neuro: Alert and oriented x 4, strong in all 4 extremities, denies vision changes.  No gross focal neuro deficits observed  Psych: Mood and affect appropriate    PPE recommended per [facility] infectious disease Isolation protocol for the current clinical scenario(as mentioned below) was followed.    LABS:  COVID19   Date Value Ref Range Status   08/19/2024 Not Detected Not Detected - Ref. Range Final       Lab Results   Component Value Date    CALCIUM 9.6 08/19/2024     Results from last 7 days   Lab Units 08/19/24  1545   MAGNESIUM mg/dL 1.9   SODIUM mmol/L 137   POTASSIUM mmol/L 3.6   CHLORIDE mmol/L 102   CO2 mmol/L 26.0   BUN mg/dL 13   CREATININE mg/dL 0.69   GLUCOSE mg/dL 98   CALCIUM mg/dL 9.6   WBC 10*3/mm3 5.34   HEMOGLOBIN g/dL 14.4   PLATELETS 10*3/mm3 355   ALT (SGPT) U/L 13   AST (SGOT) U/L 15     Lab Results   Component Value Date    TROPONINT <6 08/19/2024     Results from last 7 days   Lab Units 08/19/24  1545   HSTROP T ng/L <6                 Results from last 7 days   Lab Units 08/19/24  1708   INFLUENZA B PCR  Not Detected   RSV, PCR  Not Detected             No results found for: \"TSH\"  Estimated Creatinine Clearance: 85.6 mL/min (by C-G formula based on SCr of 0.69 mg/dL).  Results from last 7 days   Lab Units 08/19/24  1708   NITRITE UA  Negative   WBC UA /HPF 3-5*   BACTERIA UA /HPF 1+*   SQUAM EPITHEL UA /HPF 3-6*        Imaging: I personally visualized the images of scans/x-rays performed within last 3 days.      Assessment:  Brain mass  Headache  Nausea vomiting  Blood in " stool  Hypertensive urgency  ADHD  Anxiety and depression  HLD    Recommendations:  1.  Brain mass with vasogenic edema  -Neurosurgery managing.  Mass reportedly metastatic in nature  -Neurochecks hourly  -Patient denies headache currently, will add as needed Tylenol for now  -Stat MRI ordered per neurosurgery, started on IV Solu-Cortef  -Strict NPO    2.  Hypertensive urgency  -Neurosurgery requested SBP<150, will start nicardipine    3.  Nausea, vomiting, blood in stool  -Denies nausea currently.  Will add as needed Zofran  -Start continuous IVF as likely dehydrated  -Will add hemoccult stool.  Hemoglobin 14, likely hemoconcentrated.  Recheck a.m. labs  -Get CT chest abdomen and pelvis without contrast to further eval for any primary malignancy.  Consider GI consult    4.  ADHD  -Hold Adderall    5.  Anxiety and depression  -Hold Wellbutrin, restart when okay with neurosurgery    6.  HLD  -Continue atorvastatin    GI prophy: Pepcid  SCDs  Full code  Strict NPO    Lexie Walls, APRN  8/19/2024  20:19 EDT      Much of this encounter note is an electronic transcription/translation of spoken language to printed text using Dragon Software.

## 2024-08-20 NOTE — PROGRESS NOTES
"  PROGRESS NOTE  Patient Name: Ely Sims  Age/Sex: 64 y.o. female  : 1960  MRN: [id number]    Date of Admission: 2024  Date of Encounter Visit: 24   LOS: 1 day   Patient Care Team:  Provider, No Known as PCP - General    Chief Complaint: Came in with nausea vomiting and headache    Hospital course: Multiple brain lesion on the CT scan, with evidence of: Mass in the rectosigmoid area.  Patient is doing better she is on high-dose steroid  Neurosurgery on board      REVIEW OF SYSTEMS:   CONSTITUTIONAL: no fever or chills  CARDIOVASCULAR: No chest pain, chest pressure or chest discomfort. No palpitations or edema.   RESPIRATORY: No shortness of breath, cough or sputum.   GASTROINTESTINAL: No anorexia, nausea, vomiting or diarrhea. No abdominal pain or blood.   HEMATOLOGIC: No bleeding or bruising.     Ventilator/Non-Invasive Ventilation Settings (From admission, onward)      None              Vital Signs  Temp:  [97.4 °F (36.3 °C)-98.2 °F (36.8 °C)] 97.8 °F (36.6 °C)  Heart Rate:  [] 98  Resp:  [16-18] 16  BP: (107-180)/() 138/80  SpO2:  [94 %-99 %] 95 %  on    Device (Oxygen Therapy): room air    Intake/Output Summary (Last 24 hours) at 2024 1459  Last data filed at 2024 0000  Gross per 24 hour   Intake 1000 ml   Output 300 ml   Net 700 ml     Flowsheet Rows      Flowsheet Row First Filed Value   Admission Height 170.2 cm (67.01\") Documented at 2024 0648   Admission Weight 65.1 kg (143 lb 8.3 oz) Documented at 2024          Body mass index is 22.47 kg/m².      24  0505 24  0648   Weight: 65.1 kg (143 lb 8.3 oz) 65.1 kg (143 lb 8.3 oz) 65.1 kg (143 lb 8.3 oz)       Physical Exam:  GEN:  No acute distress, alert, cooperative, well developed   EYES:   Sclerae clear. No icterus. PERRL. Normal EOM  ENT:   External ears/nose normal, no oral lesions, no thrush, mucous membranes moist  NECK:  Supple, midline trachea, no JVD  LUNGS: Normal " "chest on inspection, CTAB, no wheezes. No rhonchi. No crackles. Respirations regular, even and unlabored.   CV:  Regular rhythm and rate. Normal S1/S2. No murmurs, gallops, or rubs noted.  ABD:  Soft, nontender and nondistended. Normal bowel sounds. No guarding  EXT:  Moves all extremities well. No cyanosis. No redness. No edema.   Skin: Dry, intact, no bleeding    Results Review:        Results from last 7 days   Lab Units 08/20/24  0504 08/19/24  2318 08/19/24  1545   SODIUM mmol/L 136 138 137   POTASSIUM mmol/L 3.7 3.5 3.6   CHLORIDE mmol/L 104 103 102   CO2 mmol/L 20.2* 21.4* 26.0   BUN mg/dL 10 10 13   CREATININE mg/dL 0.53* 0.51* 0.69   CALCIUM mg/dL 8.7 9.2 9.6   AST (SGOT) U/L  --   --  15   ALT (SGPT) U/L  --   --  13   ANION GAP mmol/L 11.8 13.6 9.0   ALBUMIN g/dL  --   --  4.4     Results from last 7 days   Lab Units 08/19/24  1545   HSTROP T ng/L <6             Results from last 7 days   Lab Units 08/20/24  0504 08/19/24  2132 08/19/24  1545   WBC 10*3/mm3 6.97 5.83 5.34   HEMOGLOBIN g/dL 13.7 13.7 14.4   HEMATOCRIT % 40.2 40.5 42.5   PLATELETS 10*3/mm3 330 275 355   MCV fL 97.8* 99.8* 97.7*   NEUTROPHIL % % 89.7* 83.8* 68.1   LYMPHOCYTE % % 7.9* 11.0* 22.1   MONOCYTES % % 2.0* 4.1* 8.6   EOSINOPHIL % % 0.0* 0.3 0.4   BASOPHIL % % 0.1 0.5 0.6   IMM GRAN % % 0.3 0.3 0.2     Results from last 7 days   Lab Units 08/20/24  0504   INR  1.09   APTT seconds 28.4     Results from last 7 days   Lab Units 08/19/24  1545   MAGNESIUM mg/dL 1.9           Invalid input(s): \"LDLCALC\"          Glucose   Date/Time Value Ref Range Status   08/20/2024 1044 171 (H) 70 - 130 mg/dL Final   08/19/2024 2044 91 70 - 130 mg/dL Final             Results from last 7 days   Lab Units 08/19/24  1708   NITRITE UA  Negative   WBC UA /HPF 3-5*   BACTERIA UA /HPF 1+*   SQUAM EPITHEL UA /HPF 3-6*     Results from last 7 days   Lab Units 08/19/24  1708   COVID19  Not Detected   INFLUENZA B PCR  Not Detected   RSV, PCR  Not Detected       "         Imaging:   Imaging Results (All)       Procedure Component Value Units Date/Time    CT Chest Without Contrast Diagnostic [[id number]] Collected: 08/20/24 0338     Updated: 08/20/24 0357    Narrative:      CT OF THE CHEST WITHOUT CONTRAST; CT OF THE ABDOMEN PELVIS WITHOUT  CONTRAST     HISTORY: Brain metastases     COMPARISON: None available.     TECHNIQUE: Axial CT imaging was obtained from the thoracic inlet through  the symphysis pubis. No IV contrast was administered.     FINDINGS:  CT OF THE CHEST: No suspicious pulmonary nodules or masses are seen.  There is minimal biapical scarring. There is some dependent atelectasis.  Thyroid gland, trachea, and esophagus appear unremarkable. No definite  coronary artery calcifications are seen. Mediastinal lymph nodes do not  appear pathologically enlarged. There are bilateral breast implants.  Thoracic aorta is normal in caliber. The patient has thoracolumbar  scoliosis. There is some associated endplate sclerosis noted at L3-L4  and and T8-T9. The patient is noted to have a sclerotic lesion within  the spinous process of T1. Exact etiology is uncertain. It may simply  reflect a bone island. It measures 6 mm in size. Short-term follow-up is  recommended.     CT OF THE ABDOMEN PELVIS: No suspicious hepatic lesions are seen. The  spleen, adrenal glands, stomach, duodenum, gallbladder, and pancreas  appear normal. The patient does have some excreted contrast material  from gadolinium from earlier MRI of the brain. No hydronephrosis is  seen. Further excreted gadolinium is noted within the urinary bladder.  Uterus appears normal. No adnexal masses are seen. The patient's distal  sigmoid colon appears thick walled. This raises the possibility of an  underlying mass lesion. Correlation with colonoscopy is recommended.  There is no evidence of bowel obstruction. The appendix is normal. The  patient does have some nodularity within the left pericolonic fat. The  largest  area measures up to 2.7 x 1.5 cm. Neoplastic implant is not  excluded. There also appears to be some surrounding shotty perirectal  lymph nodes. MRI could allow for further staging.       Impression:         1. No evidence of metastatic disease within the thorax.  2. Asymmetric wall thickening involving the distal sigmoid colon, with  some surrounding mesenteric soft tissue nodularity. Appearance is very  worrisome for primary colonic malignancy. Correlation with colonoscopy  is recommended. MRI could allow for further assessment.  3. Sclerotic lesion within the spinous process of T1. I would favor that  this is a benign lesion such as a bone island. Consider short-term  follow-up or bone scan.     Radiation dose reduction techniques were utilized, including automated  exposure control and exposure modulation based on body size.        This report was finalized on 8/20/2024 3:54 AM by Dr. Viridiana Altamirano M.D on Workstation: [id number]       CT Abdomen Pelvis Without Contrast [[id number]] Collected: 08/20/24 0338     Updated: 08/20/24 0357    Narrative:      CT OF THE CHEST WITHOUT CONTRAST; CT OF THE ABDOMEN PELVIS WITHOUT  CONTRAST     HISTORY: Brain metastases     COMPARISON: None available.     TECHNIQUE: Axial CT imaging was obtained from the thoracic inlet through  the symphysis pubis. No IV contrast was administered.     FINDINGS:  CT OF THE CHEST: No suspicious pulmonary nodules or masses are seen.  There is minimal biapical scarring. There is some dependent atelectasis.  Thyroid gland, trachea, and esophagus appear unremarkable. No definite  coronary artery calcifications are seen. Mediastinal lymph nodes do not  appear pathologically enlarged. There are bilateral breast implants.  Thoracic aorta is normal in caliber. The patient has thoracolumbar  scoliosis. There is some associated endplate sclerosis noted at L3-L4  and and T8-T9. The patient is noted to have a sclerotic lesion within  the spinous  process of T1. Exact etiology is uncertain. It may simply  reflect a bone island. It measures 6 mm in size. Short-term follow-up is  recommended.     CT OF THE ABDOMEN PELVIS: No suspicious hepatic lesions are seen. The  spleen, adrenal glands, stomach, duodenum, gallbladder, and pancreas  appear normal. The patient does have some excreted contrast material  from gadolinium from earlier MRI of the brain. No hydronephrosis is  seen. Further excreted gadolinium is noted within the urinary bladder.  Uterus appears normal. No adnexal masses are seen. The patient's distal  sigmoid colon appears thick walled. This raises the possibility of an  underlying mass lesion. Correlation with colonoscopy is recommended.  There is no evidence of bowel obstruction. The appendix is normal. The  patient does have some nodularity within the left pericolonic fat. The  largest area measures up to 2.7 x 1.5 cm. Neoplastic implant is not  excluded. There also appears to be some surrounding shotty perirectal  lymph nodes. MRI could allow for further staging.       Impression:         1. No evidence of metastatic disease within the thorax.  2. Asymmetric wall thickening involving the distal sigmoid colon, with  some surrounding mesenteric soft tissue nodularity. Appearance is very  worrisome for primary colonic malignancy. Correlation with colonoscopy  is recommended. MRI could allow for further assessment.  3. Sclerotic lesion within the spinous process of T1. I would favor that  this is a benign lesion such as a bone island. Consider short-term  follow-up or bone scan.     Radiation dose reduction techniques were utilized, including automated  exposure control and exposure modulation based on body size.        This report was finalized on 8/20/2024 3:54 AM by Dr. Viridiana Altamirano M.D on Workstation: [id number]       MRI Brain With & Without Contrast [[id number]] Collected: 08/20/24 0225     Updated: 08/20/24 0244    Narrative:       BRAIN MRI WITH AND WITHOUT CONTRAST     HISTORY: abnormal head ct; C79.31-Secondary malignant neoplasm of brain;  C80.1-Malignant (primary) neoplasm, unspecified; R51.9-Headache,  unspecified; R03.0-Elevated blood-pressure reading, without diagnosis of  hypertension     COMPARISON: August 19, 2024.     FINDINGS:  Multiplanar images of the head were obtained without and with  gadolinium. No areas of restricted diffusion are seen to suggest acute  infarct. Prior head CT demonstrated lesions within the left temporal  lobe and right cerebellar hemisphere. The current study confirms those  lesions. The lesion within the right cerebellar hemisphere measures up  to 2.1 x 2.2 cm. The lesion within the left temporal lobe measures 9 x 7  mm. There is a third lesion which is noted within the left side of the  cerebellar vermis. This measures 1.0 x 0.8 cm. No definite additional  lesions are seen. All of the lesions have associated surrounding edema.  There is some mass effect upon the fourth ventricle. This appears  similar to the prior study. There is mild prominence of the lateral and  third ventricles. There is periventricular and deep white matter  microangiopathic change. Tiny focus of susceptibility artifact is noted  within the lesion within the right cerebellar hemisphere. Otherwise,  there is no susceptibility artifact. Intracranial flow voids appear  intact. No large volume hemorrhage is seen. There is no evidence of  venous occlusion. There is some mucosal thickening within the ethmoid  and maxillary sinuses.       Impression:      1. Study confirms the presence of lesions within the left temporal lobe,  as well as the right cerebellar hemisphere. A third lesion is noted  within the cerebellar vermis. There is mass effect upon the fourth  ventricle. There is some mild dilatation of the lateral and third  ventricles. Continued follow-up is recommended.        This report was finalized on 8/20/2024 2:41 AM by Dr. Kemp  GLENN Altamirano on Workstation: [id number]       CT Head Without Contrast [[id number]] Collected: 08/19/24 1836     Updated: 08/19/24 1843    Narrative:      CT HEAD WO CONTRAST-     HISTORY:  headache,vomiting     COMPARISON: None     FINDINGS: There is an area of increased attenuation involving the right  cerebellar hemisphere posteriorly measuring approximately 2.1 cm in  size. Decreased attenuation consistent with vasogenic edema is  appreciated involving the right cerebellar hemisphere. Beam hardening  artifact limits evaluation of the posterior fossa but there may be  decreased attenuation also involving the shravan laterally to the right.  Decreased attenuation involving the inferior aspect the right temporal  lobe posteriorly is appreciated measuring approximately 6 mm in size  with a small area of adjacent edema. There is mild prominence of the  lateral and third ventricles secondary to mass effect upon the inferior  aspect of the fourth ventricle by the right cerebellar mass and  associated edema. There is no evidence of uncal herniation or midline  shift.       Impression:      Areas of increased attenuation are appreciated over the  right cerebral hemisphere posteriorly (2.1 cm) and left temporal lobe  inferiorly and posteriorly (6 mm) most consistent with that of  metastatic disease with associated edema. Edema is most prominent  involving the right cerebellar hemisphere. There is mass effect upon the  fourth ventricle and mild prominence of the lateral and third  ventricles. Further evaluation with MRI examination of the brain with  and without contrast is recommended.     The above information was called to and discussed with Dr. Epps.              Radiation dose reduction techniques were utilized, including automated  exposure modulation based on body size.     This report was finalized on 8/19/2024 6:40 PM by Dr. Cesar Levine M.D  on Workstation: [id number]       XR Chest 1 View [[id number]]  Collected: 08/19/24 1631     Updated: 08/19/24 1635    Narrative:      XR CHEST 1 VW-8/19/2024     HISTORY: Weakness, dizziness.     Heart size is within normal limits. Lungs appear free of acute  infiltrates. There is mild thoracic scoliosis. Bones and soft tissues  are otherwise unremarkable.       Impression:      1. No acute process        This report was finalized on 8/19/2024 4:32 PM by Dr. Veto Escobar M.D on Workstation: [id number]               I reviewed the patient's new clinical results.  I personally viewed and interpreted the patient's imaging results:        Medication Review:   atorvastatin, 10 mg, Oral, Daily  famotidine, 20 mg, Intravenous, Q12H  hydrocortisone sodium succinate, 250 mg, Intravenous, Q6H  levETIRAcetam, 500 mg, Intravenous, Q12H  senna-docusate sodium, 2 tablet, Oral, BID        lactated ringers, 100 mL/hr, Last Rate: 100 mL/hr (08/19/24 2204)  niCARdipine, 5-15 mg/hr, Last Rate: Stopped (08/20/24 0558)        ASSESSMENT:   Several brain lesions with edema and brain tissue compression  Headache and nausea and vomiting as a result of the above  Light smoker  Colon mass with family of colon cancer  Uncontrolled hypertension    PLAN:  Patient is doing better, headache is much better controlled  Systemic steroids for the intracranial edema due to underlying malignancy  Possible surgery tomorrow by neurosurgery  The colon mass need to be evaluated, will consult GI, to address after her brain mass edema is managed.  Control the blood pressure  Seizure prophylaxis with Keppra  Stress ulcer prophylaxis while on the high-dose steroids  Sliding scale of insulin with frequent Accu-Cheks while on the high-dose steroid for anticipated steroid-induced hyperglycemia    Discussed with the neurosurgery team  Discussed with patient and family  Discussed with the nursing staff and the ICU rounding team  Labs/Notes/films were independently reviewed and pertinent results are summarized  above  The copied texts in this note were reviewed and they are accurate as of 08/20/24    Disposition: Continue to monitor in the ICU    Sarika Wilburn MD  08/20/24  14:59 EDT        Dictated utilizing Dragon dictation

## 2024-08-21 ENCOUNTER — ANESTHESIA EVENT (OUTPATIENT)
Dept: PERIOP | Facility: HOSPITAL | Age: 64
End: 2024-08-21
Payer: COMMERCIAL

## 2024-08-21 ENCOUNTER — APPOINTMENT (OUTPATIENT)
Dept: CT IMAGING | Facility: HOSPITAL | Age: 64
DRG: 025 | End: 2024-08-21
Payer: COMMERCIAL

## 2024-08-21 ENCOUNTER — ANESTHESIA (OUTPATIENT)
Dept: PERIOP | Facility: HOSPITAL | Age: 64
End: 2024-08-21
Payer: COMMERCIAL

## 2024-08-21 LAB
ABO GROUP BLD: NORMAL
ANION GAP SERPL CALCULATED.3IONS-SCNC: 10.5 MMOL/L (ref 5–15)
ANION GAP SERPL CALCULATED.3IONS-SCNC: 8.4 MMOL/L (ref 5–15)
BASOPHILS # BLD AUTO: 0.01 10*3/MM3 (ref 0–0.2)
BASOPHILS # BLD AUTO: 0.02 10*3/MM3 (ref 0–0.2)
BASOPHILS NFR BLD AUTO: 0.1 % (ref 0–1.5)
BASOPHILS NFR BLD AUTO: 0.1 % (ref 0–1.5)
BLD GP AB SCN SERPL QL: NEGATIVE
BUN SERPL-MCNC: 13 MG/DL (ref 8–23)
BUN SERPL-MCNC: 16 MG/DL (ref 8–23)
BUN/CREAT SERPL: 20.3 (ref 7–25)
BUN/CREAT SERPL: 20.8 (ref 7–25)
CALCIUM SPEC-SCNC: 8.7 MG/DL (ref 8.6–10.5)
CALCIUM SPEC-SCNC: 9.2 MG/DL (ref 8.6–10.5)
CHLORIDE SERPL-SCNC: 107 MMOL/L (ref 98–107)
CHLORIDE SERPL-SCNC: 109 MMOL/L (ref 98–107)
CO2 SERPL-SCNC: 24.5 MMOL/L (ref 22–29)
CO2 SERPL-SCNC: 24.6 MMOL/L (ref 22–29)
CREAT SERPL-MCNC: 0.64 MG/DL (ref 0.57–1)
CREAT SERPL-MCNC: 0.77 MG/DL (ref 0.57–1)
DEPRECATED RDW RBC AUTO: 41.5 FL (ref 37–54)
DEPRECATED RDW RBC AUTO: 42.7 FL (ref 37–54)
EGFRCR SERPLBLD CKD-EPI 2021: 86.3 ML/MIN/1.73
EGFRCR SERPLBLD CKD-EPI 2021: 98.8 ML/MIN/1.73
EOSINOPHIL # BLD AUTO: 0 10*3/MM3 (ref 0–0.4)
EOSINOPHIL # BLD AUTO: 0 10*3/MM3 (ref 0–0.4)
EOSINOPHIL NFR BLD AUTO: 0 % (ref 0.3–6.2)
EOSINOPHIL NFR BLD AUTO: 0 % (ref 0.3–6.2)
ERYTHROCYTE [DISTWIDTH] IN BLOOD BY AUTOMATED COUNT: 12 % (ref 12.3–15.4)
ERYTHROCYTE [DISTWIDTH] IN BLOOD BY AUTOMATED COUNT: 12.3 % (ref 12.3–15.4)
GLUCOSE BLDC GLUCOMTR-MCNC: 151 MG/DL (ref 70–130)
GLUCOSE BLDC GLUCOMTR-MCNC: 161 MG/DL (ref 70–130)
GLUCOSE BLDC GLUCOMTR-MCNC: 192 MG/DL (ref 70–130)
GLUCOSE SERPL-MCNC: 147 MG/DL (ref 65–99)
GLUCOSE SERPL-MCNC: 157 MG/DL (ref 65–99)
HCT VFR BLD AUTO: 35.5 % (ref 34–46.6)
HCT VFR BLD AUTO: 37 % (ref 34–46.6)
HGB BLD-MCNC: 12.4 G/DL (ref 12–15.9)
HGB BLD-MCNC: 13 G/DL (ref 12–15.9)
IMM GRANULOCYTES # BLD AUTO: 0.04 10*3/MM3 (ref 0–0.05)
IMM GRANULOCYTES # BLD AUTO: 0.07 10*3/MM3 (ref 0–0.05)
IMM GRANULOCYTES NFR BLD AUTO: 0.3 % (ref 0–0.5)
IMM GRANULOCYTES NFR BLD AUTO: 0.4 % (ref 0–0.5)
INR PPP: 1.06 (ref 0.9–1.1)
LYMPHOCYTES # BLD AUTO: 0.67 10*3/MM3 (ref 0.7–3.1)
LYMPHOCYTES # BLD AUTO: 1.13 10*3/MM3 (ref 0.7–3.1)
LYMPHOCYTES NFR BLD AUTO: 5.2 % (ref 19.6–45.3)
LYMPHOCYTES NFR BLD AUTO: 6.9 % (ref 19.6–45.3)
MCH RBC QN AUTO: 33.5 PG (ref 26.6–33)
MCH RBC QN AUTO: 33.8 PG (ref 26.6–33)
MCHC RBC AUTO-ENTMCNC: 34.9 G/DL (ref 31.5–35.7)
MCHC RBC AUTO-ENTMCNC: 35.1 G/DL (ref 31.5–35.7)
MCV RBC AUTO: 95.9 FL (ref 79–97)
MCV RBC AUTO: 96.1 FL (ref 79–97)
MONOCYTES # BLD AUTO: 0.46 10*3/MM3 (ref 0.1–0.9)
MONOCYTES # BLD AUTO: 0.68 10*3/MM3 (ref 0.1–0.9)
MONOCYTES NFR BLD AUTO: 3.6 % (ref 5–12)
MONOCYTES NFR BLD AUTO: 4.2 % (ref 5–12)
NEUTROPHILS NFR BLD AUTO: 11.63 10*3/MM3 (ref 1.7–7)
NEUTROPHILS NFR BLD AUTO: 14.46 10*3/MM3 (ref 1.7–7)
NEUTROPHILS NFR BLD AUTO: 88.4 % (ref 42.7–76)
NEUTROPHILS NFR BLD AUTO: 90.8 % (ref 42.7–76)
NRBC BLD AUTO-RTO: 0 /100 WBC (ref 0–0.2)
NRBC BLD AUTO-RTO: 0 /100 WBC (ref 0–0.2)
PLATELET # BLD AUTO: 311 10*3/MM3 (ref 140–450)
PLATELET # BLD AUTO: 350 10*3/MM3 (ref 140–450)
PMV BLD AUTO: 9.3 FL (ref 6–12)
PMV BLD AUTO: 9.6 FL (ref 6–12)
POTASSIUM SERPL-SCNC: 3.3 MMOL/L (ref 3.5–5.2)
POTASSIUM SERPL-SCNC: 3.3 MMOL/L (ref 3.5–5.2)
PROTHROMBIN TIME: 14.1 SECONDS (ref 11.7–14.2)
RBC # BLD AUTO: 3.7 10*6/MM3 (ref 3.77–5.28)
RBC # BLD AUTO: 3.85 10*6/MM3 (ref 3.77–5.28)
RH BLD: NEGATIVE
SODIUM SERPL-SCNC: 140 MMOL/L (ref 136–145)
SODIUM SERPL-SCNC: 144 MMOL/L (ref 136–145)
T&S EXPIRATION DATE: NORMAL
WBC NRBC COR # BLD AUTO: 12.81 10*3/MM3 (ref 3.4–10.8)
WBC NRBC COR # BLD AUTO: 16.36 10*3/MM3 (ref 3.4–10.8)

## 2024-08-21 PROCEDURE — 25010000002 LABETALOL 5 MG/ML SOLUTION: Performed by: NEUROLOGICAL SURGERY

## 2024-08-21 PROCEDURE — 25010000002 NICARDIPINE 2.5 MG/ML SOLUTION 10 ML VIAL: Performed by: NEUROLOGICAL SURGERY

## 2024-08-21 PROCEDURE — C1889 IMPLANT/INSERT DEVICE, NOC: HCPCS | Performed by: NEUROLOGICAL SURGERY

## 2024-08-21 PROCEDURE — 25010000002 HYDROMORPHONE PER 4 MG

## 2024-08-21 PROCEDURE — 85025 COMPLETE CBC W/AUTO DIFF WBC: CPT | Performed by: NEUROLOGICAL SURGERY

## 2024-08-21 PROCEDURE — 25010000002 ONDANSETRON PER 1 MG

## 2024-08-21 PROCEDURE — 25010000002 HYDRALAZINE PER 20 MG: Performed by: NEUROLOGICAL SURGERY

## 2024-08-21 PROCEDURE — 25810000003 SODIUM CHLORIDE 0.9 % SOLUTION 250 ML FLEX CONT: Performed by: NEUROLOGICAL SURGERY

## 2024-08-21 PROCEDURE — 8E09XBZ COMPUTER ASSISTED PROCEDURE OF HEAD AND NECK REGION: ICD-10-PCS | Performed by: NEUROLOGICAL SURGERY

## 2024-08-21 PROCEDURE — 25010000002 CEFAZOLIN PER 500 MG: Performed by: NEUROLOGICAL SURGERY

## 2024-08-21 PROCEDURE — 88331 PATH CONSLTJ SURG 1 BLK 1SPC: CPT | Performed by: NEUROLOGICAL SURGERY

## 2024-08-21 PROCEDURE — C1713 ANCHOR/SCREW BN/BN,TIS/BN: HCPCS | Performed by: NEUROLOGICAL SURGERY

## 2024-08-21 PROCEDURE — 25010000002 BUPIVACAINE (PF) 0.25 % SOLUTION 30 ML VIAL: Performed by: NEUROLOGICAL SURGERY

## 2024-08-21 PROCEDURE — 25010000002 DEXAMETHASONE SODIUM PHOSPHATE 20 MG/5ML SOLUTION

## 2024-08-21 PROCEDURE — 82948 REAGENT STRIP/BLOOD GLUCOSE: CPT

## 2024-08-21 PROCEDURE — 61781 SCAN PROC CRANIAL INTRA: CPT | Performed by: NEUROLOGICAL SURGERY

## 2024-08-21 PROCEDURE — 25010000002 PROPOFOL 200 MG/20ML EMULSION

## 2024-08-21 PROCEDURE — 25010000002 LEVETRIRACETAM PER 10 MG: Performed by: NEUROLOGICAL SURGERY

## 2024-08-21 PROCEDURE — 25010000002 MIDAZOLAM PER 1 MG: Performed by: ANESTHESIOLOGY

## 2024-08-21 PROCEDURE — 25010000002 MORPHINE PER 10 MG: Performed by: NEUROLOGICAL SURGERY

## 2024-08-21 PROCEDURE — 88307 TISSUE EXAM BY PATHOLOGIST: CPT | Performed by: NEUROLOGICAL SURGERY

## 2024-08-21 PROCEDURE — C9248 INJ, CLEVIDIPINE BUTYRATE: HCPCS | Performed by: INTERNAL MEDICINE

## 2024-08-21 PROCEDURE — 25010000002 MAGNESIUM SULFATE PER 500 MG OF MAGNESIUM

## 2024-08-21 PROCEDURE — 25010000002 NICARDIPINE 2.5 MG/ML SOLUTION 10 ML VIAL

## 2024-08-21 PROCEDURE — 70450 CT HEAD/BRAIN W/O DYE: CPT

## 2024-08-21 PROCEDURE — 25010000002 LEVETRIRACETAM PER 10 MG: Performed by: INTERNAL MEDICINE

## 2024-08-21 PROCEDURE — 00DC0ZZ EXTRACTION OF CEREBELLUM, OPEN APPROACH: ICD-10-PCS | Performed by: NEUROLOGICAL SURGERY

## 2024-08-21 PROCEDURE — 63710000001 INSULIN LISPRO (HUMAN) PER 5 UNITS: Performed by: NEUROLOGICAL SURGERY

## 2024-08-21 PROCEDURE — 80048 BASIC METABOLIC PNL TOTAL CA: CPT | Performed by: NEUROLOGICAL SURGERY

## 2024-08-21 PROCEDURE — 25010000002 FENTANYL CITRATE (PF) 50 MCG/ML SOLUTION: Performed by: ANESTHESIOLOGY

## 2024-08-21 PROCEDURE — 61518 REMOVAL OF BRAIN LESION: CPT | Performed by: NEUROLOGICAL SURGERY

## 2024-08-21 PROCEDURE — 25010000002 SUGAMMADEX 200 MG/2ML SOLUTION

## 2024-08-21 PROCEDURE — 61518 REMOVAL OF BRAIN LESION: CPT | Performed by: SPECIALIST/TECHNOLOGIST, OTHER

## 2024-08-21 PROCEDURE — 88342 IMHCHEM/IMCYTCHM 1ST ANTB: CPT | Performed by: NEUROLOGICAL SURGERY

## 2024-08-21 PROCEDURE — 25010000002 HYDROMORPHONE 1 MG/ML SOLUTION

## 2024-08-21 PROCEDURE — 25810000003 LACTATED RINGERS PER 1000 ML

## 2024-08-21 PROCEDURE — 86850 RBC ANTIBODY SCREEN: CPT | Performed by: ANESTHESIOLOGY

## 2024-08-21 PROCEDURE — 85610 PROTHROMBIN TIME: CPT | Performed by: INTERNAL MEDICINE

## 2024-08-21 PROCEDURE — 25010000002 SODIUM CHLORIDE 0.9 % WITH KCL 20 MEQ 20-0.9 MEQ/L-% SOLUTION: Performed by: NEUROLOGICAL SURGERY

## 2024-08-21 PROCEDURE — 25010000002 PHENYLEPHRINE 10 MG/ML SOLUTION 5 ML VIAL

## 2024-08-21 PROCEDURE — 86900 BLOOD TYPING SEROLOGIC ABO: CPT | Performed by: ANESTHESIOLOGY

## 2024-08-21 PROCEDURE — 25810000003 SODIUM CHLORIDE 0.9 % SOLUTION 250 ML FLEX CONT

## 2024-08-21 PROCEDURE — 88341 IMHCHEM/IMCYTCHM EA ADD ANTB: CPT | Performed by: NEUROLOGICAL SURGERY

## 2024-08-21 PROCEDURE — 25010000002 FENTANYL CITRATE (PF) 50 MCG/ML SOLUTION

## 2024-08-21 PROCEDURE — 25010000002 HYDROCORTISONE SOD SUC (PF) 250 MG RECONSTITUTED SOLUTION: Performed by: NEUROLOGICAL SURGERY

## 2024-08-21 PROCEDURE — 85025 COMPLETE CBC W/AUTO DIFF WBC: CPT

## 2024-08-21 PROCEDURE — 80048 BASIC METABOLIC PNL TOTAL CA: CPT

## 2024-08-21 PROCEDURE — 88360 TUMOR IMMUNOHISTOCHEM/MANUAL: CPT | Performed by: NEUROLOGICAL SURGERY

## 2024-08-21 PROCEDURE — 86901 BLOOD TYPING SEROLOGIC RH(D): CPT | Performed by: ANESTHESIOLOGY

## 2024-08-21 PROCEDURE — 25010000002 VANCOMYCIN 1 G RECONSTITUTED SOLUTION 1 EACH VIAL: Performed by: NEUROLOGICAL SURGERY

## 2024-08-21 PROCEDURE — 25810000003 SODIUM CHLORIDE PER 500 ML: Performed by: NEUROLOGICAL SURGERY

## 2024-08-21 PROCEDURE — 25010000002 CLEVIDIPINE BUTYRATE PER 1 MG: Performed by: INTERNAL MEDICINE

## 2024-08-21 DEVICE — DURAGEN® PLUS DURAL REGENERATION MATRIX, 2 IN X 2 IN (5 CM X 5 CM)
Type: IMPLANTABLE DEVICE | Site: CRANIAL | Status: FUNCTIONAL
Brand: DURAGEN® PLUS

## 2024-08-21 DEVICE — HEMOST ABS SURGIFOAM SZ100 8X12 10MM: Type: IMPLANTABLE DEVICE | Site: CRANIAL | Status: FUNCTIONAL

## 2024-08-21 DEVICE — FLOSEAL WITH RECOTHROM - 5ML
Type: IMPLANTABLE DEVICE | Site: CRANIAL | Status: FUNCTIONAL
Brand: FLOSEAL HEMOSTATIC MATRIX

## 2024-08-21 DEVICE — ADHERUS AUTOSPRAY DURAL SEALANT IS A STERILE, SINGLE-USE, ELECTROMECHANICAL, BATTERY OPERATED, DEVICE WITH INTERNAL SYSTEM COMPONENTS THAT PROVIDE AIR FLOW TO AID IN THE DELIVERY OF A SYNTHETIC, ABSORBABLE, TWO-COMPONENT HYDROGEL SEALANT SYSTEM AND ALLOW DELIVERY TO BE INTERRUPTED WITHOUT CLOGGING.
Type: IMPLANTABLE DEVICE | Site: CRANIAL | Status: FUNCTIONAL
Brand: ADHERUS AUTOSPRAY DURAL SEALANT

## 2024-08-21 DEVICE — ABSORBABLE HEMOSTAT (OXIDIZED REGENERATED CELLULOSE, U.S.P.)
Type: IMPLANTABLE DEVICE | Site: CRANIAL | Status: FUNCTIONAL
Brand: SURGICEL FIBRILLAR

## 2024-08-21 DEVICE — SSC BONE WAX
Type: IMPLANTABLE DEVICE | Site: CRANIAL | Status: FUNCTIONAL
Brand: SSC BONE WAX

## 2024-08-21 RX ORDER — POTASSIUM CHLORIDE 7.45 MG/ML
10 INJECTION INTRAVENOUS
Status: DISCONTINUED | OUTPATIENT
Start: 2024-08-21 | End: 2024-08-21

## 2024-08-21 RX ORDER — HYDROMORPHONE HYDROCHLORIDE 1 MG/ML
0.5 INJECTION, SOLUTION INTRAMUSCULAR; INTRAVENOUS; SUBCUTANEOUS
Status: DISCONTINUED | OUTPATIENT
Start: 2024-08-21 | End: 2024-08-21 | Stop reason: HOSPADM

## 2024-08-21 RX ORDER — MORPHINE SULFATE 2 MG/ML
1 INJECTION, SOLUTION INTRAMUSCULAR; INTRAVENOUS
Status: DISCONTINUED | OUTPATIENT
Start: 2024-08-21 | End: 2024-08-24

## 2024-08-21 RX ORDER — EPHEDRINE SULFATE 50 MG/ML
5 INJECTION, SOLUTION INTRAVENOUS ONCE AS NEEDED
Status: DISCONTINUED | OUTPATIENT
Start: 2024-08-21 | End: 2024-08-21 | Stop reason: HOSPADM

## 2024-08-21 RX ORDER — MAGNESIUM SULFATE HEPTAHYDRATE 500 MG/ML
INJECTION, SOLUTION INTRAMUSCULAR; INTRAVENOUS AS NEEDED
Status: DISCONTINUED | OUTPATIENT
Start: 2024-08-21 | End: 2024-08-21 | Stop reason: SURG

## 2024-08-21 RX ORDER — PROPOFOL 10 MG/ML
INJECTION, EMULSION INTRAVENOUS AS NEEDED
Status: DISCONTINUED | OUTPATIENT
Start: 2024-08-21 | End: 2024-08-21 | Stop reason: SURG

## 2024-08-21 RX ORDER — FENTANYL CITRATE 50 UG/ML
INJECTION, SOLUTION INTRAMUSCULAR; INTRAVENOUS AS NEEDED
Status: DISCONTINUED | OUTPATIENT
Start: 2024-08-21 | End: 2024-08-21 | Stop reason: SURG

## 2024-08-21 RX ORDER — BISACODYL 10 MG
10 SUPPOSITORY, RECTAL RECTAL DAILY PRN
Status: DISCONTINUED | OUTPATIENT
Start: 2024-08-21 | End: 2024-08-23

## 2024-08-21 RX ORDER — IPRATROPIUM BROMIDE AND ALBUTEROL SULFATE 2.5; .5 MG/3ML; MG/3ML
3 SOLUTION RESPIRATORY (INHALATION) ONCE AS NEEDED
Status: DISCONTINUED | OUTPATIENT
Start: 2024-08-21 | End: 2024-08-21 | Stop reason: HOSPADM

## 2024-08-21 RX ORDER — POLYETHYLENE GLYCOL 3350 17 G/17G
17 POWDER, FOR SOLUTION ORAL DAILY PRN
Status: DISCONTINUED | OUTPATIENT
Start: 2024-08-21 | End: 2024-08-23

## 2024-08-21 RX ORDER — DROPERIDOL 2.5 MG/ML
0.62 INJECTION, SOLUTION INTRAMUSCULAR; INTRAVENOUS
Status: DISCONTINUED | OUTPATIENT
Start: 2024-08-21 | End: 2024-08-21 | Stop reason: HOSPADM

## 2024-08-21 RX ORDER — NALOXONE HCL 0.4 MG/ML
0.4 VIAL (ML) INJECTION
Status: DISCONTINUED | OUTPATIENT
Start: 2024-08-21 | End: 2024-08-24

## 2024-08-21 RX ORDER — NALOXONE HCL 0.4 MG/ML
0.2 VIAL (ML) INJECTION AS NEEDED
Status: DISCONTINUED | OUTPATIENT
Start: 2024-08-21 | End: 2024-08-21 | Stop reason: HOSPADM

## 2024-08-21 RX ORDER — ROCURONIUM BROMIDE 10 MG/ML
INJECTION, SOLUTION INTRAVENOUS AS NEEDED
Status: DISCONTINUED | OUTPATIENT
Start: 2024-08-21 | End: 2024-08-21 | Stop reason: SURG

## 2024-08-21 RX ORDER — FLUMAZENIL 0.1 MG/ML
0.2 INJECTION INTRAVENOUS AS NEEDED
Status: DISCONTINUED | OUTPATIENT
Start: 2024-08-21 | End: 2024-08-21 | Stop reason: HOSPADM

## 2024-08-21 RX ORDER — FENTANYL CITRATE 50 UG/ML
INJECTION, SOLUTION INTRAMUSCULAR; INTRAVENOUS
Status: DISCONTINUED | OUTPATIENT
Start: 2024-08-21 | End: 2024-08-30 | Stop reason: HOSPADM

## 2024-08-21 RX ORDER — ACETAMINOPHEN 160 MG/5ML
650 SOLUTION ORAL EVERY 4 HOURS PRN
Status: DISCONTINUED | OUTPATIENT
Start: 2024-08-21 | End: 2024-08-30 | Stop reason: HOSPADM

## 2024-08-21 RX ORDER — OXYCODONE AND ACETAMINOPHEN 7.5; 325 MG/1; MG/1
1 TABLET ORAL EVERY 4 HOURS PRN
Status: DISCONTINUED | OUTPATIENT
Start: 2024-08-21 | End: 2024-08-21 | Stop reason: HOSPADM

## 2024-08-21 RX ORDER — DIPHENHYDRAMINE HYDROCHLORIDE 50 MG/ML
12.5 INJECTION INTRAMUSCULAR; INTRAVENOUS
Status: DISCONTINUED | OUTPATIENT
Start: 2024-08-21 | End: 2024-08-21 | Stop reason: HOSPADM

## 2024-08-21 RX ORDER — ACETAMINOPHEN 650 MG/1
650 SUPPOSITORY RECTAL EVERY 4 HOURS PRN
Status: DISCONTINUED | OUTPATIENT
Start: 2024-08-21 | End: 2024-08-24

## 2024-08-21 RX ORDER — SODIUM CHLORIDE AND POTASSIUM CHLORIDE 150; 900 MG/100ML; MG/100ML
100 INJECTION, SOLUTION INTRAVENOUS CONTINUOUS
Status: DISCONTINUED | OUTPATIENT
Start: 2024-08-21 | End: 2024-08-23

## 2024-08-21 RX ORDER — PROMETHAZINE HYDROCHLORIDE 25 MG/1
25 SUPPOSITORY RECTAL ONCE AS NEEDED
Status: DISCONTINUED | OUTPATIENT
Start: 2024-08-21 | End: 2024-08-21 | Stop reason: HOSPADM

## 2024-08-21 RX ORDER — LIDOCAINE HYDROCHLORIDE 20 MG/ML
INJECTION, SOLUTION INFILTRATION; PERINEURAL AS NEEDED
Status: DISCONTINUED | OUTPATIENT
Start: 2024-08-21 | End: 2024-08-21 | Stop reason: SURG

## 2024-08-21 RX ORDER — HYDROCODONE BITARTRATE AND ACETAMINOPHEN 5; 325 MG/1; MG/1
1 TABLET ORAL ONCE AS NEEDED
Status: DISCONTINUED | OUTPATIENT
Start: 2024-08-21 | End: 2024-08-21 | Stop reason: HOSPADM

## 2024-08-21 RX ORDER — MAGNESIUM HYDROXIDE 1200 MG/15ML
LIQUID ORAL AS NEEDED
Status: DISCONTINUED | OUTPATIENT
Start: 2024-08-21 | End: 2024-08-21 | Stop reason: HOSPADM

## 2024-08-21 RX ORDER — ACETAMINOPHEN 325 MG/1
650 TABLET ORAL EVERY 4 HOURS PRN
Status: DISCONTINUED | OUTPATIENT
Start: 2024-08-21 | End: 2024-08-24

## 2024-08-21 RX ORDER — LABETALOL HYDROCHLORIDE 5 MG/ML
5 INJECTION, SOLUTION INTRAVENOUS
Status: DISCONTINUED | OUTPATIENT
Start: 2024-08-21 | End: 2024-08-21 | Stop reason: HOSPADM

## 2024-08-21 RX ORDER — HYDROCODONE BITARTRATE AND ACETAMINOPHEN 5; 325 MG/1; MG/1
1 TABLET ORAL EVERY 4 HOURS PRN
Status: DISPENSED | OUTPATIENT
Start: 2024-08-21 | End: 2024-08-26

## 2024-08-21 RX ORDER — ONDANSETRON 2 MG/ML
4 INJECTION INTRAMUSCULAR; INTRAVENOUS ONCE AS NEEDED
Status: DISCONTINUED | OUTPATIENT
Start: 2024-08-21 | End: 2024-08-21 | Stop reason: HOSPADM

## 2024-08-21 RX ORDER — MIDAZOLAM HYDROCHLORIDE 1 MG/ML
INJECTION INTRAMUSCULAR; INTRAVENOUS
Status: DISCONTINUED | OUTPATIENT
Start: 2024-08-21 | End: 2024-08-30 | Stop reason: HOSPADM

## 2024-08-21 RX ORDER — FENTANYL CITRATE 50 UG/ML
50 INJECTION, SOLUTION INTRAMUSCULAR; INTRAVENOUS
Status: DISCONTINUED | OUTPATIENT
Start: 2024-08-21 | End: 2024-08-21 | Stop reason: HOSPADM

## 2024-08-21 RX ORDER — ONDANSETRON 2 MG/ML
INJECTION INTRAMUSCULAR; INTRAVENOUS AS NEEDED
Status: DISCONTINUED | OUTPATIENT
Start: 2024-08-21 | End: 2024-08-21 | Stop reason: SURG

## 2024-08-21 RX ORDER — SODIUM CHLORIDE, SODIUM ACETATE ANHYDROUS, SODIUM GLUCONATE, POTASSIUM CHLORIDE, AND MAGNESIUM CHLORIDE 526; 222; 502; 37; 30 MG/100ML; MG/100ML; MG/100ML; MG/100ML; MG/100ML
IRRIGANT IRRIGATION AS NEEDED
Status: DISCONTINUED | OUTPATIENT
Start: 2024-08-21 | End: 2024-08-21 | Stop reason: HOSPADM

## 2024-08-21 RX ORDER — SODIUM CHLORIDE, SODIUM LACTATE, POTASSIUM CHLORIDE, CALCIUM CHLORIDE 600; 310; 30; 20 MG/100ML; MG/100ML; MG/100ML; MG/100ML
INJECTION, SOLUTION INTRAVENOUS CONTINUOUS PRN
Status: DISCONTINUED | OUTPATIENT
Start: 2024-08-21 | End: 2024-08-21 | Stop reason: SURG

## 2024-08-21 RX ORDER — ONDANSETRON 4 MG/1
4 TABLET, ORALLY DISINTEGRATING ORAL EVERY 6 HOURS PRN
Status: DISCONTINUED | OUTPATIENT
Start: 2024-08-21 | End: 2024-08-30 | Stop reason: HOSPADM

## 2024-08-21 RX ORDER — ALBUTEROL SULFATE 90 UG/1
AEROSOL, METERED RESPIRATORY (INHALATION) AS NEEDED
Status: DISCONTINUED | OUTPATIENT
Start: 2024-08-21 | End: 2024-08-21 | Stop reason: SURG

## 2024-08-21 RX ORDER — ONDANSETRON 2 MG/ML
4 INJECTION INTRAMUSCULAR; INTRAVENOUS EVERY 6 HOURS PRN
Status: DISCONTINUED | OUTPATIENT
Start: 2024-08-21 | End: 2024-08-30 | Stop reason: HOSPADM

## 2024-08-21 RX ORDER — POTASSIUM CHLORIDE 750 MG/1
40 TABLET, FILM COATED, EXTENDED RELEASE ORAL EVERY 4 HOURS
Status: DISPENSED | OUTPATIENT
Start: 2024-08-21 | End: 2024-08-21

## 2024-08-21 RX ORDER — SODIUM CHLORIDE 9 MG/ML
INJECTION, SOLUTION INTRAVENOUS AS NEEDED
Status: DISCONTINUED | OUTPATIENT
Start: 2024-08-21 | End: 2024-08-21 | Stop reason: HOSPADM

## 2024-08-21 RX ORDER — PROMETHAZINE HYDROCHLORIDE 25 MG/1
25 TABLET ORAL ONCE AS NEEDED
Status: DISCONTINUED | OUTPATIENT
Start: 2024-08-21 | End: 2024-08-21 | Stop reason: HOSPADM

## 2024-08-21 RX ORDER — BISACODYL 5 MG/1
5 TABLET, DELAYED RELEASE ORAL DAILY PRN
Status: DISCONTINUED | OUTPATIENT
Start: 2024-08-21 | End: 2024-08-23

## 2024-08-21 RX ORDER — HYDRALAZINE HYDROCHLORIDE 20 MG/ML
5 INJECTION INTRAMUSCULAR; INTRAVENOUS
Status: DISCONTINUED | OUTPATIENT
Start: 2024-08-21 | End: 2024-08-21 | Stop reason: HOSPADM

## 2024-08-21 RX ORDER — DEXAMETHASONE SODIUM PHOSPHATE 4 MG/ML
INJECTION, SOLUTION INTRA-ARTICULAR; INTRALESIONAL; INTRAMUSCULAR; INTRAVENOUS; SOFT TISSUE AS NEEDED
Status: DISCONTINUED | OUTPATIENT
Start: 2024-08-21 | End: 2024-08-21 | Stop reason: SURG

## 2024-08-21 RX ORDER — AMOXICILLIN 250 MG
2 CAPSULE ORAL 2 TIMES DAILY PRN
Status: DISCONTINUED | OUTPATIENT
Start: 2024-08-21 | End: 2024-08-23

## 2024-08-21 RX ORDER — LIDOCAINE HYDROCHLORIDE 40 MG/ML
SOLUTION TOPICAL AS NEEDED
Status: DISCONTINUED | OUTPATIENT
Start: 2024-08-21 | End: 2024-08-21 | Stop reason: SURG

## 2024-08-21 RX ADMIN — LIDOCAINE HYDROCHLORIDE 15 MG: 20 INJECTION, SOLUTION INFILTRATION; PERINEURAL at 15:38

## 2024-08-21 RX ADMIN — FENTANYL CITRATE 50 MCG: 50 INJECTION, SOLUTION INTRAMUSCULAR; INTRAVENOUS at 13:07

## 2024-08-21 RX ADMIN — ROCURONIUM BROMIDE 50 MG: 10 INJECTION, SOLUTION INTRAVENOUS at 13:31

## 2024-08-21 RX ADMIN — PROPOFOL 50 MG: 10 INJECTION, EMULSION INTRAVENOUS at 13:31

## 2024-08-21 RX ADMIN — FAMOTIDINE 20 MG: 10 INJECTION INTRAVENOUS at 09:08

## 2024-08-21 RX ADMIN — SENNOSIDES AND DOCUSATE SODIUM 2 TABLET: 50; 8.6 TABLET ORAL at 09:08

## 2024-08-21 RX ADMIN — INSULIN LISPRO 2 UNITS: 100 INJECTION, SOLUTION INTRAVENOUS; SUBCUTANEOUS at 21:26

## 2024-08-21 RX ADMIN — ROCURONIUM BROMIDE 10 MG: 10 INJECTION, SOLUTION INTRAVENOUS at 13:52

## 2024-08-21 RX ADMIN — LABETALOL HYDROCHLORIDE 20 MG: 5 INJECTION, SOLUTION INTRAVENOUS at 22:37

## 2024-08-21 RX ADMIN — LEVETIRACETAM 500 MG: 500 INJECTION, SOLUTION INTRAVENOUS at 21:27

## 2024-08-21 RX ADMIN — HYDROMORPHONE HYDROCHLORIDE 1 MG: 1 INJECTION, SOLUTION INTRAMUSCULAR; INTRAVENOUS; SUBCUTANEOUS at 13:48

## 2024-08-21 RX ADMIN — LIDOCAINE HYDROCHLORIDE 40 MG: 20 INJECTION, SOLUTION INFILTRATION; PERINEURAL at 13:10

## 2024-08-21 RX ADMIN — HYDRALAZINE HYDROCHLORIDE 10 MG: 20 INJECTION INTRAMUSCULAR; INTRAVENOUS at 21:32

## 2024-08-21 RX ADMIN — CLEVIPIDINE 2 MG/HR: 0.5 EMULSION INTRAVENOUS at 17:15

## 2024-08-21 RX ADMIN — SODIUM CHLORIDE 15 MG/HR: 9 INJECTION, SOLUTION INTRAVENOUS at 16:30

## 2024-08-21 RX ADMIN — CLEVIPIDINE 11 MG/HR: 0.5 EMULSION INTRAVENOUS at 21:31

## 2024-08-21 RX ADMIN — PHENYLEPHRINE HYDROCHLORIDE 0.5 MCG/KG/MIN: 10 INJECTION, SOLUTION INTRAVENOUS at 14:48

## 2024-08-21 RX ADMIN — POTASSIUM CHLORIDE 40 MEQ: 750 TABLET, EXTENDED RELEASE ORAL at 09:08

## 2024-08-21 RX ADMIN — LEVETIRACETAM 500 MG: 500 INJECTION, SOLUTION INTRAVENOUS at 09:08

## 2024-08-21 RX ADMIN — SODIUM CHLORIDE 2000 MG: 900 INJECTION INTRAVENOUS at 12:58

## 2024-08-21 RX ADMIN — FAMOTIDINE 20 MG: 10 INJECTION INTRAVENOUS at 21:27

## 2024-08-21 RX ADMIN — SODIUM CHLORIDE, POTASSIUM CHLORIDE, SODIUM LACTATE AND CALCIUM CHLORIDE: 600; 310; 30; 20 INJECTION, SOLUTION INTRAVENOUS at 13:10

## 2024-08-21 RX ADMIN — ATORVASTATIN CALCIUM 10 MG: 20 TABLET, FILM COATED ORAL at 09:08

## 2024-08-21 RX ADMIN — HYDROCORTISONE SODIUM SUCCINATE 250 MG: 250 INJECTION, POWDER, FOR SOLUTION INTRAMUSCULAR; INTRAVENOUS at 01:22

## 2024-08-21 RX ADMIN — SUGAMMADEX 200 MG: 100 INJECTION, SOLUTION INTRAVENOUS at 15:44

## 2024-08-21 RX ADMIN — MIDAZOLAM 1 MG: 1 INJECTION INTRAMUSCULAR; INTRAVENOUS at 11:57

## 2024-08-21 RX ADMIN — LIDOCAINE HYDROCHLORIDE 1 EACH: 40 SOLUTION TOPICAL at 13:13

## 2024-08-21 RX ADMIN — POTASSIUM CHLORIDE AND SODIUM CHLORIDE 100 ML/HR: 900; 150 INJECTION, SOLUTION INTRAVENOUS at 23:03

## 2024-08-21 RX ADMIN — ROCURONIUM BROMIDE 10 MG: 10 INJECTION, SOLUTION INTRAVENOUS at 14:33

## 2024-08-21 RX ADMIN — ALBUTEROL SULFATE 4 PUFF: 90 AEROSOL, METERED RESPIRATORY (INHALATION) at 15:46

## 2024-08-21 RX ADMIN — PROPOFOL 150 MG: 10 INJECTION, EMULSION INTRAVENOUS at 13:10

## 2024-08-21 RX ADMIN — NICARDIPINE HYDROCHLORIDE 5 MG/HR: 25 INJECTION, SOLUTION INTRAVENOUS at 15:12

## 2024-08-21 RX ADMIN — DEXAMETHASONE SODIUM PHOSPHATE 8 MG: 4 INJECTION, SOLUTION INTRAMUSCULAR; INTRAVENOUS at 13:37

## 2024-08-21 RX ADMIN — MORPHINE SULFATE 1 MG: 2 INJECTION, SOLUTION INTRAMUSCULAR; INTRAVENOUS at 22:37

## 2024-08-21 RX ADMIN — SODIUM CHLORIDE 2000 MG: 900 INJECTION INTRAVENOUS at 22:01

## 2024-08-21 RX ADMIN — HYDROCORTISONE SODIUM SUCCINATE 250 MG: 250 INJECTION, POWDER, FOR SOLUTION INTRAMUSCULAR; INTRAVENOUS at 10:43

## 2024-08-21 RX ADMIN — CLEVIPIDINE 11 MG/HR: 0.5 EMULSION INTRAVENOUS at 18:53

## 2024-08-21 RX ADMIN — HYDROMORPHONE HYDROCHLORIDE 0.5 MG: 1 INJECTION, SOLUTION INTRAMUSCULAR; INTRAVENOUS; SUBCUTANEOUS at 16:23

## 2024-08-21 RX ADMIN — ACETAMINOPHEN 325MG 650 MG: 325 TABLET ORAL at 01:24

## 2024-08-21 RX ADMIN — HYDROCORTISONE SODIUM SUCCINATE 250 MG: 250 INJECTION, POWDER, FOR SOLUTION INTRAMUSCULAR; INTRAVENOUS at 22:02

## 2024-08-21 RX ADMIN — MAGNESIUM SULFATE HEPTAHYDRATE 2 G: 500 INJECTION, SOLUTION INTRAMUSCULAR; INTRAVENOUS at 13:58

## 2024-08-21 RX ADMIN — FENTANYL CITRATE 50 MCG: 50 INJECTION, SOLUTION INTRAMUSCULAR; INTRAVENOUS at 11:58

## 2024-08-21 RX ADMIN — ONDANSETRON 4 MG: 2 INJECTION INTRAMUSCULAR; INTRAVENOUS at 15:35

## 2024-08-21 RX ADMIN — HYDROMORPHONE HYDROCHLORIDE 0.5 MG: 1 INJECTION, SOLUTION INTRAMUSCULAR; INTRAVENOUS; SUBCUTANEOUS at 17:36

## 2024-08-21 NOTE — ANESTHESIA PROCEDURE NOTES
Arterial Line      Patient reassessed immediately prior to procedure    Patient location during procedure: holding area  Start time: 8/21/2024 11:55 AM  Stop Time:8/21/2024 12:10 PM       Line placed for hemodynamic monitoring, ABGs/Labs/ISTAT and MD/Surgeon request.  Performed By   Anesthesiologist: Malvin Merritt MD   Preanesthetic Checklist  Completed: patient identified, IV checked, site marked, risks and benefits discussed, surgical consent, monitors and equipment checked, pre-op evaluation and timeout performed  Arterial Line Prep    Sterile Tech: mask, cap and gloves  Prep: ChloraPrep  Patient monitoring: blood pressure monitoring, continuous pulse oximetry and EKG  Arterial Line Procedure   Laterality:right  Location:  radial artery  Catheter size: 20 G   Guidance: landmark technique  Number of attempts: 2  Successful placement: yes   Post Assessment   Dressing Type: occlusive dressing applied, secured with tape and wrist guard applied.   Complications no  Circ/Move/Sens Assessment: unchanged.   Patient Tolerance: patient tolerated the procedure well with no apparent complications

## 2024-08-21 NOTE — BRIEF OP NOTE
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    A Brain Tissue TISSUE PATHOLOGY EXAM   Bull Doty MD 8/21/24 1423 Yes    Description: Right Cerebelum Mass - results to 7599    B Brain Tissue TISSUE PATHOLOGY EXAM   Bull Doty MD 8/21/24 1440 No    Description: Right Cerebelum Mass                  Drains:   Urethral Catheter Non-latex;Silicone 16 Fr. (Active)       Findings: Tumor        Complications: None          Bull Doty MD     Date: 8/21/2024  Time: 15:14 EDT

## 2024-08-21 NOTE — ANESTHESIA PREPROCEDURE EVALUATION
Anesthesia Evaluation     Patient summary reviewed and Nursing notes reviewed   NPO Solid Status: > 8 hours  NPO Liquid Status: > 2 hours           Airway   Mallampati: II  Neck ROM: full  No difficulty expected  Dental      Comment: veneers    Pulmonary     breath sounds clear to auscultation  (+) a smoker Current,  Cardiovascular     ECG reviewed  Rhythm: regular        Neuro/Psych    ROS Comment: Brain mets of unknown origin  GI/Hepatic/Renal/Endo      Musculoskeletal     Abdominal    Substance History      OB/GYN          Other      history of cancer      Other Comment: Brain mets of unknown origin                    Anesthesia Plan    ASA 3     general     (Art line)  intravenous induction     Anesthetic plan, risks, benefits, and alternatives have been provided, discussed and informed consent has been obtained with: patient.      CODE STATUS:    Level Of Support Discussed With: Patient  Code Status (Patient has no pulse and is not breathing): CPR (Attempt to Resuscitate)  Medical Interventions (Patient has pulse or is breathing): Full Support

## 2024-08-21 NOTE — OP NOTE
Preoperative diagnosis: Metastatic tumor to the brain    Postoperative diagnosis: Same    Procedure performed: Posterior fossa craniectomy with gross total removal of a right cerebellar hemisphere metastasis using microsurgical technique microsurgical instrumentation    Surgeon: Bull Doty M.D.    Asst.: Ligia Lozada CFA who was instrumental in helping with hemostasis, visualization of neural structures and retraction of neural structures.  Her skilled assistance was necessary for the success of this case    Estimated blood loss, crystalloid, colloid, blood: Please see anesthesia record    Material to lab:   All resected tumor was sent for either permanent or frozen section    Drains: None    Complications: None    Indications for the procedure: This is a patient who presented with a scan showing masses in her brain.  The mass in the right cerebellar hemisphere was causing distortion of the fourth ventricle.  We are concerned about the development of hydrocephalus and so we elected to proceed with surgical resection.  She does not have a known primary at this point.    Operative summary:  The patient was brought into the operating room and placed under general endotracheal anesthesia using intravenous and inhalational agents.  The patient was then positioned on the operating table in the prone position.  All pressure points were padded including peripheral points of entrapment.  The patient was secured in the Lopez headrest and then reference to the Stealth navigation system which was used to plan out the incision.  An incision extending from the transverse sinus down to the posterior fossa was then outlined a little in the right paramedian location.  This was carefully prepped free of hair and then with ChloraPrep.  Once this was done it was allowed to dry and then she was draped with Ioban, towels, half sheets and a cranial drape.  An incision was made in the right paramedian region.  This carried down to  the outer table of the skull.  Using the Stealth navigation system we were able to determine exactly where to place the craniectomy site which was off to the right side of the midline.  This was carefully drilled out with the Plixas Vitaliy drill and then elevated and set aside.    The dura was opened in a cruciate fashion and then held back with self-retaining suture.  Following this the cortex of the cerebellum was coagulated and divided.  We are able to use the ultrasonic aspirator to dissect down to the tumor itself which was biopsied.  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  Following this the remainder of the tumor was completely evacuated from that region.  Once the tumor was out we are able to coagulate bleeding vessels and the entire bed of the tumor was completely dry at the conclusion of the procedure.  It was outlined with Surgicel fibrillar and then the systolic blood pressure was brought up to 260 to be sure there was no evidence of breakthrough bleeding.  There being none the dura was tacked back together.  Elected not to replace the bone flap.  This was overlaid with DuraGen and then sealed with Adherus.  Once this was done the scalp was closed in layers.  The patient was taken to the recovery room in stable condition.  There were no apparent complications and the sponge, instrument and needle counts were correct at the end of the procedure.

## 2024-08-21 NOTE — PROGRESS NOTES
"  PROGRESS NOTE  Patient Name: Ely Sims  Age/Sex: 64 y.o. female  : 1960  MRN: [id number]    Date of Admission: 2024  Date of Encounter Visit: 24   LOS: 2 days   Patient Care Team:  Provider, No Known as PCP - General    Chief Complaint: Came in with nausea vomiting and headache    Hospital course: Multiple brain lesion on the CT scan, with evidence of: Mass in the rectosigmoid area.  Patient is doing better she is on high-dose steroid  She is supposed to go for her surgery later this morning  She is afebrile  No GI or  complaints  Headache is much better        REVIEW OF SYSTEMS:   CONSTITUTIONAL: no fever or chills  CARDIOVASCULAR: No chest pain, chest pressure or chest discomfort. No palpitations or edema.   RESPIRATORY: No shortness of breath, cough or sputum.   GASTROINTESTINAL: No anorexia, nausea, vomiting or diarrhea. No abdominal pain or blood.   HEMATOLOGIC: No bleeding or bruising.     Ventilator/Non-Invasive Ventilation Settings (From admission, onward)      None              Vital Signs  Temp:  [97.4 °F (36.3 °C)-97.8 °F (36.6 °C)] 97.6 °F (36.4 °C)  Heart Rate:  [] 76  Resp:  [16-18] 18  BP: (117-163)/(69-96) 129/71  SpO2:  [94 %-97 %] 94 %  on    Device (Oxygen Therapy): room air  No intake or output data in the 24 hours ending 24 0706    Flowsheet Rows      Flowsheet Row First Filed Value   Admission Height 170.2 cm (67.01\") Documented at 2024 0648   Admission Weight 65.1 kg (143 lb 8.3 oz) Documented at 2024 2017          Body mass index is 22.54 kg/m².      24  0648 24  0200 24  0513   Weight: 65.1 kg (143 lb 8.3 oz) 65.3 kg (143 lb 15.4 oz) 65.3 kg (143 lb 15.4 oz)       Physical Exam:  GEN:  No acute distress, alert, cooperative, well developed   EYES:   Sclerae clear. No icterus. PERRL. Normal EOM  ENT:   External ears/nose normal, no oral lesions, no thrush, mucous membranes moist  NECK:  Supple, midline trachea, no JVD  LUNGS: " "Normal chest on inspection, CTAB, no wheezes. No rhonchi. No crackles. Respirations regular, even and unlabored.   CV:  Regular rhythm and rate. Normal S1/S2. No murmurs, gallops, or rubs noted.  ABD:  Soft, nontender and nondistended. Normal bowel sounds. No guarding  EXT:  Moves all extremities well. No cyanosis. No redness. No edema.   Skin: Dry, intact, no bleeding    Results Review:        Results from last 7 days   Lab Units 08/21/24  0512 08/20/24  0504 08/19/24  2318 08/19/24  1545   SODIUM mmol/L 140 136 138 137   POTASSIUM mmol/L 3.3* 3.7 3.5 3.6   CHLORIDE mmol/L 107 104 103 102   CO2 mmol/L 24.6 20.2* 21.4* 26.0   BUN mg/dL 13 10 10 13   CREATININE mg/dL 0.64 0.53* 0.51* 0.69   CALCIUM mg/dL 9.2 8.7 9.2 9.6   AST (SGOT) U/L  --   --   --  15   ALT (SGPT) U/L  --   --   --  13   ANION GAP mmol/L 8.4 11.8 13.6 9.0   ALBUMIN g/dL  --   --   --  4.4     Results from last 7 days   Lab Units 08/19/24  1545   HSTROP T ng/L <6             Results from last 7 days   Lab Units 08/21/24  0512 08/20/24  0504 08/19/24  2132 08/19/24  1545   WBC 10*3/mm3 12.81* 6.97 5.83 5.34   HEMOGLOBIN g/dL 12.4 13.7 13.7 14.4   HEMATOCRIT % 35.5 40.2 40.5 42.5   PLATELETS 10*3/mm3 311 330 275 355   MCV fL 95.9 97.8* 99.8* 97.7*   NEUTROPHIL % % 90.8* 89.7* 83.8* 68.1   LYMPHOCYTE % % 5.2* 7.9* 11.0* 22.1   MONOCYTES % % 3.6* 2.0* 4.1* 8.6   EOSINOPHIL % % 0.0* 0.0* 0.3 0.4   BASOPHIL % % 0.1 0.1 0.5 0.6   IMM GRAN % % 0.3 0.3 0.3 0.2     Results from last 7 days   Lab Units 08/21/24  0512 08/20/24  0504   INR  1.06 1.09   APTT seconds  --  28.4     Results from last 7 days   Lab Units 08/19/24  1545   MAGNESIUM mg/dL 1.9           Invalid input(s): \"LDLCALC\"          Glucose   Date/Time Value Ref Range Status   08/21/2024 0615 151 (H) 70 - 130 mg/dL Final   08/20/2024 2233 154 (H) 70 - 130 mg/dL Final   08/20/2024 1757 220 (H) 70 - 130 mg/dL Final   08/20/2024 1044 171 (H) 70 - 130 mg/dL Final   08/19/2024 2044 91 70 - 130 mg/dL " Final             Results from last 7 days   Lab Units 08/19/24  1708   NITRITE UA  Negative   WBC UA /HPF 3-5*   BACTERIA UA /HPF 1+*   SQUAM EPITHEL UA /HPF 3-6*     Results from last 7 days   Lab Units 08/19/24  1708   COVID19  Not Detected   INFLUENZA B PCR  Not Detected   RSV, PCR  Not Detected               Imaging:   Imaging Results (All)       Procedure Component Value Units Date/Time     I reviewed the patient's new clinical results.  I personally viewed and interpreted the patient's imaging results:        Medication Review:   atorvastatin, 10 mg, Oral, Daily  famotidine, 20 mg, Intravenous, Q12H  hydrocortisone sodium succinate, 250 mg, Intravenous, Q6H  insulin lispro, 2-9 Units, Subcutaneous, 4x Daily AC & at Bedtime  levETIRAcetam, 500 mg, Intravenous, Q12H  senna-docusate sodium, 2 tablet, Oral, BID        niCARdipine, 5-15 mg/hr, Last Rate: Stopped (08/20/24 0558)        ASSESSMENT:   Several brain lesions with edema and brain tissue compression  Headache and nausea and vomiting as a result of the above  Light smoker  Colon mass with family of colon cancer  Uncontrolled hypertension    PLAN:  Patient is doing better, headache is much better controlled  Blood pressure is within acceptable range, she is off the Cardene drip currently on as needed medication  She is on Keppra for seizure prophylaxis  Patient is supposed to be evaluated by GI on this admission as well we will go ahead consult  Will follow-up postoperatively on any postcraniotomy complication and for postcraniotomy care      Discussed with patient and family  Discussed with the nursing staff and the ICU rounding team  Labs/Notes/films were independently reviewed and pertinent results are summarized above  The copied texts in this note were reviewed and they are accurate as of 08/21/24    Disposition: Continue to monitor in the ICU    Sarika Wilburn MD  08/21/24  07:06 EDT        Dictated utilizing Dragon dictation

## 2024-08-21 NOTE — ANESTHESIA PROCEDURE NOTES
Airway  Urgency: elective    Date/Time: 8/21/2024 1:13 PM  Airway not difficult    General Information and Staff    Patient location during procedure: OR  Anesthesiologist: Leonardo Moran DO  CRNA/CAA: Paradise Reddy CRNA    Indications and Patient Condition  Indications for airway management: airway protection    Preoxygenated: yes  Mask difficulty assessment: 1 - vent by mask    Final Airway Details  Final airway type: endotracheal airway      Successful airway: ETT  Cuffed: yes   Successful intubation technique: direct laryngoscopy  Facilitating devices/methods: intubating stylet and anterior pressure/BURP  Endotracheal tube insertion site: oral  Blade: Kayce  Blade size: 3  ETT size (mm): 7.0  Cormack-Lehane Classification: grade IIa - partial view of glottis  Placement verified by: chest auscultation and capnometry   Cuff volume (mL): 6  Measured from: lips  ETT/EBT  to lips (cm): 22  Number of attempts at approach: 1  Assessment: lips, teeth, and gum same as pre-op and atraumatic intubation

## 2024-08-21 NOTE — NURSING NOTE
Pt in OR majority of shift. See charting. Pt currently axo with minimal pain. Cleviprex to maintain -120.

## 2024-08-21 NOTE — ANESTHESIA POSTPROCEDURE EVALUATION
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        Post Anesthesia Care and Evaluation    Patient location during evaluation: bedside  Patient participation: complete - patient participated  Level of consciousness: awake  Pain management: adequate    Airway patency: patent  Anesthetic complications: No anesthetic complications    Cardiovascular status: acceptable  Respiratory status: acceptable  Hydration status: acceptable    Comments: /56   Pulse 82   Temp 36.6 °C (97.8 °F) (Oral)   Resp 16   Ht 170.2 cm (67.01\")   Wt 65.3 kg (143 lb 15.4 oz)   SpO2 100%   BMI 22.54 kg/m²     "

## 2024-08-22 ENCOUNTER — APPOINTMENT (OUTPATIENT)
Dept: MRI IMAGING | Facility: HOSPITAL | Age: 64
DRG: 025 | End: 2024-08-22
Payer: COMMERCIAL

## 2024-08-22 PROBLEM — G93.5 COMPRESSION OF BRAIN: Status: ACTIVE | Noted: 2024-08-22

## 2024-08-22 LAB
ANION GAP SERPL CALCULATED.3IONS-SCNC: 7.9 MMOL/L (ref 5–15)
APTT PPP: 24.5 SECONDS (ref 22.7–35.4)
BASOPHILS # BLD AUTO: 0.01 10*3/MM3 (ref 0–0.2)
BASOPHILS NFR BLD AUTO: 0.1 % (ref 0–1.5)
BUN SERPL-MCNC: 14 MG/DL (ref 8–23)
BUN/CREAT SERPL: 19.4 (ref 7–25)
CALCIUM SPEC-SCNC: 8.2 MG/DL (ref 8.6–10.5)
CHLORIDE SERPL-SCNC: 107 MMOL/L (ref 98–107)
CO2 SERPL-SCNC: 25.1 MMOL/L (ref 22–29)
CREAT SERPL-MCNC: 0.72 MG/DL (ref 0.57–1)
DEPRECATED RDW RBC AUTO: 42.1 FL (ref 37–54)
EGFRCR SERPLBLD CKD-EPI 2021: 93.5 ML/MIN/1.73
EOSINOPHIL # BLD AUTO: 0 10*3/MM3 (ref 0–0.4)
EOSINOPHIL NFR BLD AUTO: 0 % (ref 0.3–6.2)
ERYTHROCYTE [DISTWIDTH] IN BLOOD BY AUTOMATED COUNT: 12.2 % (ref 12.3–15.4)
GLUCOSE BLDC GLUCOMTR-MCNC: 126 MG/DL (ref 70–130)
GLUCOSE BLDC GLUCOMTR-MCNC: 136 MG/DL (ref 70–130)
GLUCOSE BLDC GLUCOMTR-MCNC: 141 MG/DL (ref 70–130)
GLUCOSE BLDC GLUCOMTR-MCNC: 179 MG/DL (ref 70–130)
GLUCOSE BLDC GLUCOMTR-MCNC: 186 MG/DL (ref 70–130)
GLUCOSE SERPL-MCNC: 131 MG/DL (ref 65–99)
HCT VFR BLD AUTO: 32.6 % (ref 34–46.6)
HGB BLD-MCNC: 11.3 G/DL (ref 12–15.9)
IMM GRANULOCYTES # BLD AUTO: 0.07 10*3/MM3 (ref 0–0.05)
IMM GRANULOCYTES NFR BLD AUTO: 0.5 % (ref 0–0.5)
INR PPP: 0.99 (ref 0.9–1.1)
LYMPHOCYTES # BLD AUTO: 0.49 10*3/MM3 (ref 0.7–3.1)
LYMPHOCYTES NFR BLD AUTO: 3.8 % (ref 19.6–45.3)
MAGNESIUM SERPL-MCNC: 2.2 MG/DL (ref 1.6–2.4)
MCH RBC QN AUTO: 33.1 PG (ref 26.6–33)
MCHC RBC AUTO-ENTMCNC: 34.7 G/DL (ref 31.5–35.7)
MCV RBC AUTO: 95.6 FL (ref 79–97)
MONOCYTES # BLD AUTO: 0.65 10*3/MM3 (ref 0.1–0.9)
MONOCYTES NFR BLD AUTO: 5 % (ref 5–12)
NEUTROPHILS NFR BLD AUTO: 11.7 10*3/MM3 (ref 1.7–7)
NEUTROPHILS NFR BLD AUTO: 90.6 % (ref 42.7–76)
NRBC BLD AUTO-RTO: 0 /100 WBC (ref 0–0.2)
PLATELET # BLD AUTO: 284 10*3/MM3 (ref 140–450)
PMV BLD AUTO: 9.3 FL (ref 6–12)
POTASSIUM SERPL-SCNC: 3.7 MMOL/L (ref 3.5–5.2)
PROTHROMBIN TIME: 13.3 SECONDS (ref 11.7–14.2)
RBC # BLD AUTO: 3.41 10*6/MM3 (ref 3.77–5.28)
SODIUM SERPL-SCNC: 140 MMOL/L (ref 136–145)
WBC NRBC COR # BLD AUTO: 12.92 10*3/MM3 (ref 3.4–10.8)

## 2024-08-22 PROCEDURE — 25010000002 HYDROCORTISONE SOD SUC (PF) 250 MG RECONSTITUTED SOLUTION: Performed by: NEUROLOGICAL SURGERY

## 2024-08-22 PROCEDURE — 25010000002 HYDRALAZINE PER 20 MG: Performed by: NEUROLOGICAL SURGERY

## 2024-08-22 PROCEDURE — 25010000002 CEFAZOLIN PER 500 MG: Performed by: NEUROLOGICAL SURGERY

## 2024-08-22 PROCEDURE — A9577 INJ MULTIHANCE: HCPCS | Performed by: INTERNAL MEDICINE

## 2024-08-22 PROCEDURE — 85610 PROTHROMBIN TIME: CPT | Performed by: NEUROLOGICAL SURGERY

## 2024-08-22 PROCEDURE — 80048 BASIC METABOLIC PNL TOTAL CA: CPT | Performed by: NEUROLOGICAL SURGERY

## 2024-08-22 PROCEDURE — 85025 COMPLETE CBC W/AUTO DIFF WBC: CPT | Performed by: NEUROLOGICAL SURGERY

## 2024-08-22 PROCEDURE — 25010000002 LEVETRIRACETAM PER 10 MG: Performed by: NEUROLOGICAL SURGERY

## 2024-08-22 PROCEDURE — 99024 POSTOP FOLLOW-UP VISIT: CPT | Performed by: NURSE PRACTITIONER

## 2024-08-22 PROCEDURE — 25010000002 CLEVIDIPINE BUTYRATE PER 1 MG: Performed by: INTERNAL MEDICINE

## 2024-08-22 PROCEDURE — 0 GADOBENATE DIMEGLUMINE 529 MG/ML SOLUTION: Performed by: INTERNAL MEDICINE

## 2024-08-22 PROCEDURE — 63710000001 INSULIN LISPRO (HUMAN) PER 5 UNITS: Performed by: NEUROLOGICAL SURGERY

## 2024-08-22 PROCEDURE — 92610 EVALUATE SWALLOWING FUNCTION: CPT | Performed by: SPEECH-LANGUAGE PATHOLOGIST

## 2024-08-22 PROCEDURE — 25010000002 LABETALOL 5 MG/ML SOLUTION: Performed by: NEUROLOGICAL SURGERY

## 2024-08-22 PROCEDURE — 25010000002 SODIUM CHLORIDE 0.9 % WITH KCL 20 MEQ 20-0.9 MEQ/L-% SOLUTION: Performed by: NEUROLOGICAL SURGERY

## 2024-08-22 PROCEDURE — 99222 1ST HOSP IP/OBS MODERATE 55: CPT | Performed by: INTERNAL MEDICINE

## 2024-08-22 PROCEDURE — 85730 THROMBOPLASTIN TIME PARTIAL: CPT | Performed by: NEUROLOGICAL SURGERY

## 2024-08-22 PROCEDURE — C9248 INJ, CLEVIDIPINE BUTYRATE: HCPCS | Performed by: INTERNAL MEDICINE

## 2024-08-22 PROCEDURE — 82948 REAGENT STRIP/BLOOD GLUCOSE: CPT

## 2024-08-22 PROCEDURE — 83735 ASSAY OF MAGNESIUM: CPT | Performed by: NEUROLOGICAL SURGERY

## 2024-08-22 PROCEDURE — 70553 MRI BRAIN STEM W/O & W/DYE: CPT

## 2024-08-22 RX ORDER — ENALAPRILAT 1.25 MG/ML
0.62 INJECTION INTRAVENOUS EVERY 6 HOURS PRN
Status: DISCONTINUED | OUTPATIENT
Start: 2024-08-22 | End: 2024-08-30 | Stop reason: HOSPADM

## 2024-08-22 RX ORDER — AMLODIPINE BESYLATE 5 MG/1
5 TABLET ORAL 2 TIMES DAILY
Status: DISCONTINUED | OUTPATIENT
Start: 2024-08-22 | End: 2024-08-30 | Stop reason: HOSPADM

## 2024-08-22 RX ADMIN — LABETALOL HYDROCHLORIDE 20 MG: 5 INJECTION, SOLUTION INTRAVENOUS at 14:21

## 2024-08-22 RX ADMIN — HYDROCODONE BITARTRATE AND ACETAMINOPHEN 1 TABLET: 5; 325 TABLET ORAL at 08:38

## 2024-08-22 RX ADMIN — CLEVIPIDINE 7 MG/HR: 0.5 EMULSION INTRAVENOUS at 00:11

## 2024-08-22 RX ADMIN — LEVETIRACETAM 500 MG: 500 INJECTION, SOLUTION INTRAVENOUS at 08:38

## 2024-08-22 RX ADMIN — GADOBENATE DIMEGLUMINE 15 ML: 529 INJECTION, SOLUTION INTRAVENOUS at 23:13

## 2024-08-22 RX ADMIN — SODIUM CHLORIDE 2000 MG: 900 INJECTION INTRAVENOUS at 16:47

## 2024-08-22 RX ADMIN — HYDROCODONE BITARTRATE AND ACETAMINOPHEN 1 TABLET: 5; 325 TABLET ORAL at 16:47

## 2024-08-22 RX ADMIN — FAMOTIDINE 20 MG: 10 INJECTION INTRAVENOUS at 08:38

## 2024-08-22 RX ADMIN — HYDROCODONE BITARTRATE AND ACETAMINOPHEN 1 TABLET: 5; 325 TABLET ORAL at 23:40

## 2024-08-22 RX ADMIN — HYDROCORTISONE SODIUM SUCCINATE 250 MG: 250 INJECTION, POWDER, FOR SOLUTION INTRAMUSCULAR; INTRAVENOUS at 22:18

## 2024-08-22 RX ADMIN — LABETALOL HYDROCHLORIDE 20 MG: 5 INJECTION, SOLUTION INTRAVENOUS at 03:43

## 2024-08-22 RX ADMIN — POTASSIUM CHLORIDE AND SODIUM CHLORIDE 100 ML/HR: 900; 150 INJECTION, SOLUTION INTRAVENOUS at 09:47

## 2024-08-22 RX ADMIN — SODIUM CHLORIDE 2000 MG: 900 INJECTION INTRAVENOUS at 23:52

## 2024-08-22 RX ADMIN — INSULIN LISPRO 2 UNITS: 100 INJECTION, SOLUTION INTRAVENOUS; SUBCUTANEOUS at 16:46

## 2024-08-22 RX ADMIN — CLEVIPIDINE 2 MG/HR: 0.5 EMULSION INTRAVENOUS at 09:16

## 2024-08-22 RX ADMIN — SODIUM CHLORIDE 2000 MG: 900 INJECTION INTRAVENOUS at 05:35

## 2024-08-22 RX ADMIN — HYDROCORTISONE SODIUM SUCCINATE 250 MG: 250 INJECTION, POWDER, FOR SOLUTION INTRAMUSCULAR; INTRAVENOUS at 11:41

## 2024-08-22 RX ADMIN — LABETALOL HYDROCHLORIDE 20 MG: 5 INJECTION, SOLUTION INTRAVENOUS at 06:36

## 2024-08-22 RX ADMIN — LEVETIRACETAM 500 MG: 500 INJECTION, SOLUTION INTRAVENOUS at 20:32

## 2024-08-22 RX ADMIN — AMLODIPINE BESYLATE 5 MG: 5 TABLET ORAL at 20:32

## 2024-08-22 RX ADMIN — LABETALOL HYDROCHLORIDE 20 MG: 5 INJECTION, SOLUTION INTRAVENOUS at 11:41

## 2024-08-22 RX ADMIN — LABETALOL HYDROCHLORIDE 20 MG: 5 INJECTION, SOLUTION INTRAVENOUS at 16:46

## 2024-08-22 RX ADMIN — HYDRALAZINE HYDROCHLORIDE 10 MG: 20 INJECTION INTRAMUSCULAR; INTRAVENOUS at 14:06

## 2024-08-22 RX ADMIN — HYDRALAZINE HYDROCHLORIDE 10 MG: 20 INJECTION INTRAMUSCULAR; INTRAVENOUS at 20:32

## 2024-08-22 RX ADMIN — INSULIN LISPRO 2 UNITS: 100 INJECTION, SOLUTION INTRAVENOUS; SUBCUTANEOUS at 12:38

## 2024-08-22 RX ADMIN — ATORVASTATIN CALCIUM 10 MG: 20 TABLET, FILM COATED ORAL at 08:37

## 2024-08-22 RX ADMIN — FAMOTIDINE 20 MG: 10 INJECTION INTRAVENOUS at 20:32

## 2024-08-22 RX ADMIN — HYDROCORTISONE SODIUM SUCCINATE 250 MG: 250 INJECTION, POWDER, FOR SOLUTION INTRAMUSCULAR; INTRAVENOUS at 16:51

## 2024-08-22 RX ADMIN — HYDROCORTISONE SODIUM SUCCINATE 250 MG: 250 INJECTION, POWDER, FOR SOLUTION INTRAMUSCULAR; INTRAVENOUS at 05:35

## 2024-08-22 NOTE — PLAN OF CARE
Goal Outcome Evaluation:  Plan of Care Reviewed With: patient           Outcome Evaluation: Orders received, chart reviewed.  Discussed with RN and ok'd for swallow eval.  Pt with inconsistent baseline throat clear which continued intermittently throughout eval, though not in direct relation to swallow.  Pt demonstrated no overt s/s aspiration with any consistency provided.  Adequate mastication and oral clearance. RN requested to hold upgrade at this time.  Recommend regular diet/thin liquids when appropriate for upgrade.

## 2024-08-22 NOTE — NURSING NOTE
Pt remained intact the entire shift. Uop adequate. X1 to bedside commode. Pain managed per MAR awaiting MRI . Screening sheet re-faxed.

## 2024-08-22 NOTE — PROGRESS NOTES
[facility] NEUROSURGERY INTRACRANIAL PROGRESS NOTE    PATIENT IDENTIFICATION:   Name:  Ely Sims      MRN:  [id number]     64 y.o.  female               Cc: POD # 1 s/p fossa craniotomy with gross total removal of right cerebellar hemisphere metastasis      Subjective     Interval History: No significant overnight events.  Patient has no complaints this morning other than a mild headache.  No nausea, vomiting.      Objective     Vital signs in last 24 hours:  Temp:  [97.6 °F (36.4 °C)-98.4 °F (36.9 °C)] 98.4 °F (36.9 °C)  Heart Rate:  [] 82  Resp:  [12-26] 16  BP: (104-144)/(46-92) 115/65  Arterial Line BP: (115-162)/(47-74) 137/54      Intake/Output this shift:  No intake/output data recorded.      Intake/Output last 3 shifts:  I/O last 3 completed shifts:  In: 1480 [P.O.:480; I.V.:1000]  Out: 1100 [Urine:1000; Blood:100]    LABS:  Results from last 7 days   Lab Units 08/22/24  0401 08/21/24  1615 08/21/24  0512   WBC 10*3/mm3 12.92* 16.36* 12.81*   HEMOGLOBIN g/dL 11.3* 13.0 12.4   HEMATOCRIT % 32.6* 37.0 35.5   PLATELETS 10*3/mm3 284 350 311     Results from last 7 days   Lab Units 08/22/24  0401 08/21/24  1615 08/21/24  0512 08/19/24  2318 08/19/24  1545   SODIUM mmol/L 140 144 140   < > 137   POTASSIUM mmol/L 3.7 3.3* 3.3*   < > 3.6   CHLORIDE mmol/L 107 109* 107   < > 102   CO2 mmol/L 25.1 24.5 24.6   < > 26.0   BUN mg/dL 14 16 13   < > 13   CREATININE mg/dL 0.72 0.77 0.64   < > 0.69   CALCIUM mg/dL 8.2* 8.7 9.2   < > 9.6   BILIRUBIN mg/dL  --   --   --   --  0.3   ALK PHOS U/L  --   --   --   --  83   ALT (SGPT) U/L  --   --   --   --  13   AST (SGOT) U/L  --   --   --   --  15   GLUCOSE mg/dL 131* 157* 147*   < > 98    < > = values in this interval not displayed.     8/21-Tissue Pathology pending     IMAGING STUDIES:  CT head 8/21-status post right suboccipital craniotomy for resection of right cerebellar enhancing mass.  Decreased attenuation involving the left temporal lobe posteriorly and inferiorly  consistent with vasogenic edema.  This corresponds to 9 mm enhancing lesion present on MRI from 8/20/2024. The enhancing lesion involving the superior cerebellar  vermis to the left is occult as is the area of dural enhancement  overlying the left frontal lobe superolaterally.    I personally viewed the patient's CT head, it was also reviewed by and discussed with Dr Soren Sherwood reviewed/changed: Yes  Norvasc 5 mg p.o. twice daily  Lipitor 10 mg p.o. daily  Cefazolin 2 g IV every 8 hours  Pepcid 20 mg IV every 12 hours  Solu-Cortef 250 mg IV every 6 hours  Humalog 2-9 units SQ 4 times daily  Keppra 500 mg IV every 12 hours  Hydralazine 10 mg IV every 4 hours x 2 doses   Hydrocodone 5 mg 1 p.o. every 4 hours as needed  Labetalol 20 mg IV every 2 hours as needed x 4 doses   Morphine 1 mg IV every 2 hours as needed      Physical Exam:    General:  Awake, alert & oriented x 3.  Speech clear with no aphasia  CN II:     Visual fileds full to confrontation  CN III, IV, VI:  EOM's intact,  PERRL.  CN VII:  Facial movements are symmetric, no weakness  Motor:  Normal muscle strength, bulk and tone in bilateral upper and lower extremities.  No fasciculations, rigidity, spasticity, or abnormal movements  Sensation:  Normal to light touch bilaterally   Station & Gait: Bedrest  Coordination:  Finger to nose intact bilaterally.  Heel to shin intact in bilateral lower extremities  Extremities:  Wearing SCD's  Skin: Posterior right sided craniotomy incision is well-appearing, well-approximated.  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  Patient is doing very well this morning, only complains of a mild headache.  Otherwise reports she is feeling great.  We will plan for MRI of the brain with and without contrast today.  Keep in ICU today.  "    PLAN:   -MRI brain w/wo today  -DC FC  -Up to chair   -PT/OT/Speech therapy to evaluate  -SBP<120 can increase parameter to <140 tomorrow  -Advance diet when cleared by speech therapy  -Medical management per intensivist    I discussed the patient's findings and my recommendations with patient, family, nursing staff, primary care team, and Dr Doty    During patient visit, I utilized appropriate personal protective equipment including  gloves.  Appropriate PPE was worn during the entire visit.  Hand hygiene was completed before and after.      LOS: 3 days       Edie Sierra, APRN  8/22/2024  10:56 EDT    \"Dictated utilizing Dragon dictation\".      "

## 2024-08-22 NOTE — PROGRESS NOTES
"  PROGRESS NOTE  Patient Name: Ely Sims  Age/Sex: 64 y.o. female  : 1960  MRN: [id number]    Date of Admission: 2024  Date of Encounter Visit: 24   LOS: 3 days   Patient Care Team:  Provider, No Known as PCP - General    Chief Complaint: Came in with nausea vomiting and headache    Hospital course: Successful craniotomy yesterday with good results, pain is well-controlled  She had problem with the blood pressure postop and she was maxed out on the Cardene and the as needed medication and he had could not keep within 1 to the range so she was switched to Cleviprex  She is down on minimal dose at this point and she is on scheduled medication and hopefully will be able to get her off the drip  She has no complaints, good appetite, pain is minimal, no visual changes, no personality changes, overall recovering pretty well        REVIEW OF SYSTEMS:   CONSTITUTIONAL: no fever or chills  CARDIOVASCULAR: No chest pain, chest pressure or chest discomfort. No palpitations or edema.   RESPIRATORY: No shortness of breath, cough or sputum.   GASTROINTESTINAL: No anorexia, nausea, vomiting or diarrhea. No abdominal pain or blood.   HEMATOLOGIC: No bleeding or bruising.     Ventilator/Non-Invasive Ventilation Settings (From admission, onward)      None              Vital Signs  Temp:  [97.6 °F (36.4 °C)-98.6 °F (37 °C)] 98.6 °F (37 °C)  Heart Rate:  [] 71  Resp:  [12-20] 16  BP: (104-139)/(46-92) 113/64  Arterial Line BP: (115-151)/(47-74) 137/54  SpO2:  [93 %-100 %] 95 %  on  Flow (L/min):  [2-4] 2 Device (Oxygen Therapy): nasal cannula    Intake/Output Summary (Last 24 hours) at 2024 1218  Last data filed at 2024 0535  Gross per 24 hour   Intake 1480 ml   Output 1100 ml   Net 380 ml       Flowsheet Rows      Flowsheet Row First Filed Value   Admission Height 170.2 cm (67.01\") Documented at 2024 0648   Admission Weight 65.1 kg (143 lb 8.3 oz) Documented at 2024 2017          Body " mass index is 22.54 kg/m².      08/20/24  0648 08/21/24  0200 08/21/24  0513   Weight: 65.1 kg (143 lb 8.3 oz) 65.3 kg (143 lb 15.4 oz) 65.3 kg (143 lb 15.4 oz)       Physical Exam:  GEN:  No acute distress, alert, cooperative, well developed   EYES:   Sclerae clear. No icterus. PERRL. Normal EOM  ENT:   External ears/nose normal, no oral lesions, no thrush, mucous membranes moist  NECK:  Supple, midline trachea, no JVD  LUNGS: Normal chest on inspection, CTAB, no wheezes. No rhonchi. No crackles. Respirations regular, even and unlabored.   CV:  Regular rhythm and rate. Normal S1/S2. No murmurs, gallops, or rubs noted.  ABD:  Soft, nontender and nondistended. Normal bowel sounds. No guarding  EXT:  Moves all extremities well. No cyanosis. No redness. No edema.   Skin: Dry, intact, no bleeding    Results Review:        Results from last 7 days   Lab Units 08/22/24  0401 08/21/24  1615 08/21/24  0512 08/20/24  0504 08/19/24  2318 08/19/24  1545   SODIUM mmol/L 140 144 140 136 138 137   POTASSIUM mmol/L 3.7 3.3* 3.3* 3.7 3.5 3.6   CHLORIDE mmol/L 107 109* 107 104 103 102   CO2 mmol/L 25.1 24.5 24.6 20.2* 21.4* 26.0   BUN mg/dL 14 16 13 10 10 13   CREATININE mg/dL 0.72 0.77 0.64 0.53* 0.51* 0.69   CALCIUM mg/dL 8.2* 8.7 9.2 8.7 9.2 9.6   AST (SGOT) U/L  --   --   --   --   --  15   ALT (SGPT) U/L  --   --   --   --   --  13   ANION GAP mmol/L 7.9 10.5 8.4 11.8 13.6 9.0   ALBUMIN g/dL  --   --   --   --   --  4.4     Results from last 7 days   Lab Units 08/19/24  1545   HSTROP T ng/L <6             Results from last 7 days   Lab Units 08/22/24  0401 08/21/24  1615 08/21/24  0512 08/20/24  0504 08/19/24  2132 08/19/24  1545   WBC 10*3/mm3 12.92* 16.36* 12.81* 6.97 5.83 5.34   HEMOGLOBIN g/dL 11.3* 13.0 12.4 13.7 13.7 14.4   HEMATOCRIT % 32.6* 37.0 35.5 40.2 40.5 42.5   PLATELETS 10*3/mm3 284 350 311 330 275 355   MCV fL 95.6 96.1 95.9 97.8* 99.8* 97.7*   NEUTROPHIL % % 90.6* 88.4* 90.8* 89.7* 83.8* 68.1   LYMPHOCYTE % %  "3.8* 6.9* 5.2* 7.9* 11.0* 22.1   MONOCYTES % % 5.0 4.2* 3.6* 2.0* 4.1* 8.6   EOSINOPHIL % % 0.0* 0.0* 0.0* 0.0* 0.3 0.4   BASOPHIL % % 0.1 0.1 0.1 0.1 0.5 0.6   IMM GRAN % % 0.5 0.4 0.3 0.3 0.3 0.2     Results from last 7 days   Lab Units 08/22/24  0401 08/21/24  0512 08/20/24  0504   INR  0.99 1.06 1.09   APTT seconds 24.5  --  28.4     Results from last 7 days   Lab Units 08/22/24  0401 08/19/24  1545   MAGNESIUM mg/dL 2.2 1.9           Invalid input(s): \"LDLCALC\"          Glucose   Date/Time Value Ref Range Status   08/22/2024 1132 179 (H) 70 - 130 mg/dL Final   08/22/2024 0719 141 (H) 70 - 130 mg/dL Final   08/22/2024 0521 136 (H) 70 - 130 mg/dL Final   08/21/2024 2344 161 (H) 70 - 130 mg/dL Final   08/21/2024 1952 192 (H) 70 - 130 mg/dL Final   08/21/2024 0615 151 (H) 70 - 130 mg/dL Final   08/20/2024 2233 154 (H) 70 - 130 mg/dL Final   08/20/2024 1757 220 (H) 70 - 130 mg/dL Final             Results from last 7 days   Lab Units 08/19/24  1708   NITRITE UA  Negative   WBC UA /HPF 3-5*   BACTERIA UA /HPF 1+*   SQUAM EPITHEL UA /HPF 3-6*     Results from last 7 days   Lab Units 08/19/24  1708   COVID19  Not Detected   INFLUENZA B PCR  Not Detected   RSV, PCR  Not Detected               Imaging:   Imaging Results (All)       Procedure Component Value Units Date/Time     I reviewed the patient's new clinical results.  I personally viewed and interpreted the patient's imaging results:        Medication Review:   atorvastatin, 10 mg, Oral, Daily  ceFAZolin, 2,000 mg, Intravenous, Q8H  famotidine, 20 mg, Intravenous, Q12H  hydrocortisone sodium succinate, 250 mg, Intravenous, Q6H  insulin lispro, 2-9 Units, Subcutaneous, 4x Daily AC & at Bedtime  levETIRAcetam, 500 mg, Intravenous, Q12H        clevidipine, 2-21 mg/hr, Last Rate: Stopped (08/22/24 1101)  sodium chloride 0.9 % with KCl 20 mEq, 100 mL/hr, Last Rate: 100 mL/hr (08/22/24 0947)        ASSESSMENT:   Several brain lesions with edema and brain tissue " compression  Headache and nausea and vomiting as a result of the above, now under control  Light smoker  Colon mass with family of colon cancer  Uncontrolled hypertension  Steroid-induced hyperglycemia expected    PLAN:  Patient is doing better, headache is much better controlled  Blood pressure is within acceptable range, she is on the Cleviprex drip which need to be transitioned to scheduled and as needed medication and off the continuous drip.  On Keppra for seizure prophylaxis  On the steroid with some steroid-induced hypertension and hyperglycemia which are expected currently on 1 g of hydrocortisone per day  Will defer weaning to the neurosurgical team  Colon mass to be evaluated by the GI later on this admission with possible colonoscopy  Will follow-up on the brain biopsy results  Discontinue Fonseca catheter  Okay to transfer out of the ICU once cleared by neurosurgery      Discussed with patient and family  Discussed with the nursing staff and the ICU rounding team  Labs/Notes/films were independently reviewed and pertinent results are summarized above  The copied texts in this note were reviewed and they are accurate as of 08/22/24    Disposition: Continue to monitor in the ICU    Sarika Wilburn MD  08/22/24  12:18 EDT        Dictated utilizing Dragon dictation

## 2024-08-22 NOTE — THERAPY EVALUATION
Acute Care - Speech Language Pathology   Swallow Initial Evaluation [facility]     Patient Name: Ely Sims  : 1960  MRN: [id number]  Today's Date: 2024               Admit Date: 2024    Visit Dx:     ICD-10-CM ICD-9-CM   1. 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 Acute intractable headache, unspecified headache type  R51.9 784.0   3. 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 No pertinent past medical history.  Past Surgical History:   Procedure Laterality Date    CRANIOTOMY N/A 2024    Procedure: Posterior fossa craniotomy for tumor using stereotactic guidance;  Surgeon: Bull Doty MD;  Location: [facility];  Service: Neurosurgery;  Laterality: N/A;       SLP Recommendation and Plan  SLP Swallowing Diagnosis: functional oral phase, functional pharyngeal phase (24)  SLP Diet Recommendation: regular textures, thin liquids (24)  Recommended Precautions and Strategies: upright posture during/after eating, small bites of food and sips of liquid (24)  SLP Rec. for Method of Medication Administration: as tolerated (24)     Monitor for Signs of Aspiration: notify SLP if any concerns (24)     Swallow Criteria for Skilled Therapeutic Interventions Met: demonstrates skilled criteria (24)     Rehab Potential/Prognosis, Swallowing: good, to achieve stated therapy goals (24)  Therapy Frequency (Swallow): evaluation only (24)  Predicted Duration Therapy Intervention (Days): until discharge (24)  Oral Care Recommendations: Oral Care BID/PRN (24)                                        Plan of Care Reviewed With: patient  Outcome Evaluation: Orders received, chart reviewed.  Discussed with RN and ok'd for swallow eval.  Pt with  inconsistent baseline throat clear which continued intermittently throughout eval, though not in direct relation to swallow.  Pt demonstrated no overt s/s aspiration with any consistency provided.  Adequate mastication and oral clearance. RN requested to hold upgrade at this time.  Recommend regular diet/thin liquids when appropriate for upgrade.      SWALLOW EVALUATION (Last 72 Hours)       SLP Adult Swallow Evaluation       Row Name 08/22/24 1400                   General Information    Current Method of Nutrition clear liquids  -SA        Prior Level of Function-Communication WFL  -SA        Prior Level of Function-Swallowing no diet consistency restrictions  -SA        Plans/Goals Discussed with patient;agreed upon  -        Barriers to Rehab none identified  -SA        Patient's Goals for Discharge return to regular diet  -SA           Pain    Additional Documentation Pain Scale: Numbers Pre/Post-Treatment (Group)  -SA           Pain Scale: Numbers Pre/Post-Treatment    Pretreatment Pain Rating 0/10 - no pain  -SA        Posttreatment Pain Rating 0/10 - no pain  -SA           Clinical Swallow Eval    Oral Prep Phase WFL  -SA        Oral Transit WFL  -SA        Oral Residue WFL  -SA        Pharyngeal Phase no overt signs/symptoms of pharyngeal impairment  -SA        Esophageal Phase unremarkable  -SA           SLP Evaluation Clinical Impression    SLP Swallowing Diagnosis functional oral phase;functional pharyngeal phase  -SA        Functional Impact risk of aspiration/pneumonia  -SA        Rehab Potential/Prognosis, Swallowing good, to achieve stated therapy goals  -SA        Swallow Criteria for Skilled Therapeutic Interventions Met demonstrates skilled criteria  -SA           Recommendations    Therapy Frequency (Swallow) evaluation only  -SA        Predicted Duration Therapy Intervention (Days) until discharge  -SA        SLP Diet Recommendation regular textures;thin liquids  -SA        Recommended  Precautions and Strategies upright posture during/after eating;small bites of food and sips of liquid  -        Oral Care Recommendations Oral Care BID/PRN  -SA        SLP Rec. for Method of Medication Administration as tolerated  -        Monitor for Signs of Aspiration notify SLP if any concerns  -                  User Key  (r) = Recorded By, (t) = Taken By, (c) = Cosigned By      Initials Name Effective Dates    Elda Baker SLP 01/11/24 -                     EDUCATION  The patient has been educated in the following areas:   Dysphagia (Swallowing Impairment) Oral Care/Hydration.                Time Calculation:    Time Calculation- SLP       Row Name 08/22/24 1442             Time Calculation- SLP    SLP Start Time 1345  -      SLP Received On 08/22/24  -                User Key  (r) = Recorded By, (t) = Taken By, (c) = Cosigned By      Initials Name Provider Type    Elda Baker SLP Speech and Language Pathologist                    Therapy Charges for Today       Code Description Service Date Service Provider Modifiers Qty    [id number] HC ST EVAL ORAL PHARYNG SWALLOW 4 8/22/2024 Elda Zambrano SLP GN 1                 HARRIETT Salcedo  8/22/2024

## 2024-08-22 NOTE — CONSULTS
[facility]  Initial Inpatient Consult Note    Referring Provider:  Dr Mathew    Reason for Consultation: Brain mets    Subjective     History of present illness:    64 y.o. female s/p craniotomy for brain lesions, felt to be secondary to metastatic disease.  CT A/P shows thick walled area in simgoid colon cannot r/o neoplastic process.  Last colonoscopy 14yrs ago.  Reports some bleeding a few weeks ago after taking metamucil but none since.      Past Medical History:  History reviewed. No pertinent past medical history.  Past Surgical History:  Past Surgical History:   Procedure Laterality Date    CRANIOTOMY N/A 8/21/2024    Procedure: Posterior fossa craniotomy for tumor using stereotactic guidance;  Surgeon: Bull Doty MD;  Location: [facility];  Service: Neurosurgery;  Laterality: N/A;      Social History:   Social History     Tobacco Use    Smoking status: Some Days     Types: Cigarettes    Smokeless tobacco: Never   Substance Use Topics    Alcohol use: Yes      Family History:  History reviewed. No pertinent family history.    Home Meds:  Medications Prior to Admission   Medication Sig Dispense Refill Last Dose    amphetamine-dextroamphetamine (ADDERALL) 20 MG tablet Take 1 tablet by mouth 2 (Two) Times a Day.   8/18/2024    atorvastatin (LIPITOR) 10 MG tablet Take 1 tablet by mouth Daily.   8/18/2024    buPROPion XL (WELLBUTRIN XL) 150 MG 24 hr tablet Take 2 tablets by mouth Every Morning.   8/18/2024    LORazepam (ATIVAN) 0.5 MG tablet Take 1 tablet by mouth Every 6 (Six) Hours As Needed for Anxiety.   8/18/2024     Current Meds:   amLODIPine, 5 mg, Oral, BID  atorvastatin, 10 mg, Oral, Daily  ceFAZolin, 2,000 mg, Intravenous, Q8H  famotidine, 20 mg, Intravenous, Q12H  hydrocortisone sodium succinate, 250 mg, Intravenous, Q6H  insulin lispro, 2-9 Units, Subcutaneous, 4x Daily AC & at Bedtime  levETIRAcetam, 500 mg, Intravenous, Q12H      Allergies:  No Known  "Allergies    Objective     Vital Signs  Temp:  [97.6 °F (36.4 °C)-98.6 °F (37 °C)] 98.6 °F (37 °C)  Heart Rate:  [] 85  Resp:  [12-20] 16  BP: (104-139)/(46-92) 122/68  Arterial Line BP: (115-137)/(47-60) 137/54  Physical Exam:  General Appearance:     Alert, cooperative, in no acute distress   Abdomen:     Normal bowel sounds, no masses, no organomegaly, soft     nontender, nondistended, no guarding, no rebound                 tenderness   Rectal:     Deferred       Results Review:   I reviewed the patient's new clinical results.  I reviewed the patient's new imaging results and agree with the interpretation.    Results from last 7 days   Lab Units 08/22/24 0401 08/21/24 1615 08/21/24  0512   WBC 10*3/mm3 12.92* 16.36* 12.81*   HEMOGLOBIN g/dL 11.3* 13.0 12.4   HEMATOCRIT % 32.6* 37.0 35.5   PLATELETS 10*3/mm3 284 350 311     Results from last 7 days   Lab Units 08/22/24  0401 08/21/24  1615 08/21/24  0512 08/19/24  2318 08/19/24  1545   SODIUM mmol/L 140 144 140   < > 137   POTASSIUM mmol/L 3.7 3.3* 3.3*   < > 3.6   CHLORIDE mmol/L 107 109* 107   < > 102   CO2 mmol/L 25.1 24.5 24.6   < > 26.0   BUN mg/dL 14 16 13   < > 13   CREATININE mg/dL 0.72 0.77 0.64   < > 0.69   CALCIUM mg/dL 8.2* 8.7 9.2   < > 9.6   BILIRUBIN mg/dL  --   --   --   --  0.3   ALK PHOS U/L  --   --   --   --  83   ALT (SGPT) U/L  --   --   --   --  13   AST (SGOT) U/L  --   --   --   --  15   GLUCOSE mg/dL 131* 157* 147*   < > 98    < > = values in this interval not displayed.     Results from last 7 days   Lab Units 08/22/24  0401 08/21/24  0512 08/20/24  0504   INR  0.99 1.06 1.09     No results found for: \"LIPASE\"    Radiology:  CT Head Without Contrast   Final Result   1.  The patient is status post resection of a right cerebellar avidly   enhancing mass. Expected postoperative changes are noted. No   complicating feature is identified.   2.  Decreased attenuation is appreciated involving the left temporal   lobe posteriorly and " inferiorly consistent with vasogenic edema. This   corresponds to the 9 mm enhancing lesion present on the MRI examination   of 8/20/2024. The enhancing lesion involving the superior cerebellar   vermis to the left is occult as is the area of dural enhancement   overlying the left frontal lobe superolaterally.       Radiation dose reduction techniques were utilized, including automated   exposure modulation based on body size.       This report was finalized on 8/21/2024 6:45 PM by Dr. Cesar Levine M.D   on Workstation: [id number]          CT Head With & Without Contrast   Final Result   1. There has been no change when compared to the MRI of the brain with   and without contrast earlier this morning on 08/20/2024 at 12:22 a.m.       2. This patient has 3 enhancing lesions in the brain and findings are   most consistent with metastatic disease to the brain. The enhancing   brain lesions which are most likely brain mets includes a 7 x 6 x 6 mm   enhancing lesion in the posterior inferior left temporal lobe that has   2.5 x 2.3 cm of surrounding vasogenic edema. There is a 10 x 9 x 10 mm   enhancing lesion in the superior midline of the vermis of the cerebellum   that has mild surrounding edema. The largest enhancing lesion is in the   posterior inferior right cerebellum that measures 2.4 x 2 x 2 cm in size   and has a large amount of surrounding vasogenic edema tracking up to 4.5   x 4.5 cm with mild mass effect on the fourth ventricle. There is mild   dilatation of the lateral and third ventricles compatible with mild   hydrocephalus, unchanged. This patient has abnormal circumferential wall   thickening within the distal sigmoid colon seen on chest, abdomen and   pelvic CT earlier this morning on 08/20/2024 at 1:42 a.m. suggesting   there could be a sigmoid colon cancer and correlation with colonoscopy   is suggested.       Radiation dose reduction techniques were utilized, including automated   exposure  control and exposure modulation based on body size.           This report was finalized on 8/21/2024 6:09 AM by Dr. Gilbert Hassan M.D   on Workstation: [id number]          CT Chest Without Contrast Diagnostic   Final Result       1. No evidence of metastatic disease within the thorax.   2. Asymmetric wall thickening involving the distal sigmoid colon, with   some surrounding mesenteric soft tissue nodularity. Appearance is very   worrisome for primary colonic malignancy. Correlation with colonoscopy   is recommended. MRI could allow for further assessment.   3. Sclerotic lesion within the spinous process of T1. I would favor that   this is a benign lesion such as a bone island. Consider short-term   follow-up or bone scan.       Radiation dose reduction techniques were utilized, including automated   exposure control and exposure modulation based on body size.           This report was finalized on 8/20/2024 3:54 AM by Dr. Viridiana Altamirano M.D on Workstation: [id number]          CT Abdomen Pelvis Without Contrast   Final Result       1. No evidence of metastatic disease within the thorax.   2. Asymmetric wall thickening involving the distal sigmoid colon, with   some surrounding mesenteric soft tissue nodularity. Appearance is very   worrisome for primary colonic malignancy. Correlation with colonoscopy   is recommended. MRI could allow for further assessment.   3. Sclerotic lesion within the spinous process of T1. I would favor that   this is a benign lesion such as a bone island. Consider short-term   follow-up or bone scan.       Radiation dose reduction techniques were utilized, including automated   exposure control and exposure modulation based on body size.           This report was finalized on 8/20/2024 3:54 AM by Dr. Viridiana Altamirano M.D on Workstation: [id number]          MRI Brain With & Without Contrast   Final Result   1. Study confirms the presence of lesions within the left temporal lobe,    as well as the right cerebellar hemisphere. A third lesion is noted   within the cerebellar vermis. There is mass effect upon the fourth   ventricle. There is some mild dilatation of the lateral and third   ventricles. Continued follow-up is recommended.           This report was finalized on 8/20/2024 2:41 AM by Dr. Viridiana Altamirano M.D on Workstation: [id number]          CT Head Without Contrast   Final Result   Areas of increased attenuation are appreciated over the   right cerebral hemisphere posteriorly (2.1 cm) and left temporal lobe   inferiorly and posteriorly (6 mm) most consistent with that of   metastatic disease with associated edema. Edema is most prominent   involving the right cerebellar hemisphere. There is mass effect upon the   fourth ventricle and mild prominence of the lateral and third   ventricles. Further evaluation with MRI examination of the brain with   and without contrast is recommended.       The above information was called to and discussed with Dr. Epps.                   Radiation dose reduction techniques were utilized, including automated   exposure modulation based on body size.       This report was finalized on 8/19/2024 6:40 PM by Dr. Cesar Levine M.D   on Workstation: [id number]          XR Chest 1 View   Final Result   1. No acute process           This report was finalized on 8/19/2024 4:32 PM by Dr. Veto Escboar M.D on Workstation: [id number]          MRI Brain With & Without Contrast    (Results Pending)       Assessment & Plan   Assessment:    Brain mets   Abnormal CT scan colon    Plan:   Await path from recent craniotomy.  If path c/w primary GI malignancy will likely need colonoscopy when able to tolerate    I discussed the patients findings and my recommendations with patient.         Shadi Loja M.D.  [facility]  [address] [place] [address]  Office: [phone]

## 2024-08-23 PROBLEM — R73.9 HYPERGLYCEMIA: Status: ACTIVE | Noted: 2024-08-23

## 2024-08-23 PROBLEM — I10 HYPERTENSION: Status: ACTIVE | Noted: 2024-08-23

## 2024-08-23 PROBLEM — G93.5 COMPRESSION OF BRAIN: Status: RESOLVED | Noted: 2024-08-22 | Resolved: 2024-08-23

## 2024-08-23 PROBLEM — D64.9 ANEMIA: Status: ACTIVE | Noted: 2024-08-23

## 2024-08-23 PROBLEM — K63.89 COLONIC MASS: Status: ACTIVE | Noted: 2024-08-23

## 2024-08-23 LAB
ANION GAP SERPL CALCULATED.3IONS-SCNC: 7 MMOL/L (ref 5–15)
BASOPHILS # BLD AUTO: 0 10*3/MM3 (ref 0–0.2)
BASOPHILS NFR BLD AUTO: 0 % (ref 0–1.5)
BUN SERPL-MCNC: 11 MG/DL (ref 8–23)
BUN/CREAT SERPL: 19 (ref 7–25)
CALCIUM SPEC-SCNC: 7.9 MG/DL (ref 8.6–10.5)
CHLORIDE SERPL-SCNC: 109 MMOL/L (ref 98–107)
CO2 SERPL-SCNC: 23 MMOL/L (ref 22–29)
CREAT SERPL-MCNC: 0.58 MG/DL (ref 0.57–1)
DEPRECATED RDW RBC AUTO: 42.2 FL (ref 37–54)
EGFRCR SERPLBLD CKD-EPI 2021: 101.2 ML/MIN/1.73
EOSINOPHIL # BLD AUTO: 0 10*3/MM3 (ref 0–0.4)
EOSINOPHIL NFR BLD AUTO: 0 % (ref 0.3–6.2)
ERYTHROCYTE [DISTWIDTH] IN BLOOD BY AUTOMATED COUNT: 12 % (ref 12.3–15.4)
GLUCOSE BLDC GLUCOMTR-MCNC: 140 MG/DL (ref 70–130)
GLUCOSE BLDC GLUCOMTR-MCNC: 154 MG/DL (ref 70–130)
GLUCOSE BLDC GLUCOMTR-MCNC: 171 MG/DL (ref 70–130)
GLUCOSE SERPL-MCNC: 145 MG/DL (ref 65–99)
HCT VFR BLD AUTO: 30.9 % (ref 34–46.6)
HGB BLD-MCNC: 10.5 G/DL (ref 12–15.9)
IMM GRANULOCYTES # BLD AUTO: 0.07 10*3/MM3 (ref 0–0.05)
IMM GRANULOCYTES NFR BLD AUTO: 0.8 % (ref 0–0.5)
LYMPHOCYTES # BLD AUTO: 0.45 10*3/MM3 (ref 0.7–3.1)
LYMPHOCYTES NFR BLD AUTO: 5.3 % (ref 19.6–45.3)
MAGNESIUM SERPL-MCNC: 2.5 MG/DL (ref 1.6–2.4)
MCH RBC QN AUTO: 32.9 PG (ref 26.6–33)
MCHC RBC AUTO-ENTMCNC: 34 G/DL (ref 31.5–35.7)
MCV RBC AUTO: 96.9 FL (ref 79–97)
MONOCYTES # BLD AUTO: 0.47 10*3/MM3 (ref 0.1–0.9)
MONOCYTES NFR BLD AUTO: 5.5 % (ref 5–12)
NEUTROPHILS NFR BLD AUTO: 7.58 10*3/MM3 (ref 1.7–7)
NEUTROPHILS NFR BLD AUTO: 88.4 % (ref 42.7–76)
NRBC BLD AUTO-RTO: 0 /100 WBC (ref 0–0.2)
PHOSPHATE SERPL-MCNC: 2.4 MG/DL (ref 2.5–4.5)
PLATELET # BLD AUTO: 269 10*3/MM3 (ref 140–450)
PMV BLD AUTO: 9.9 FL (ref 6–12)
POTASSIUM SERPL-SCNC: 3.5 MMOL/L (ref 3.5–5.2)
POTASSIUM SERPL-SCNC: 4 MMOL/L (ref 3.5–5.2)
RBC # BLD AUTO: 3.19 10*6/MM3 (ref 3.77–5.28)
SODIUM SERPL-SCNC: 139 MMOL/L (ref 136–145)
WBC NRBC COR # BLD AUTO: 8.57 10*3/MM3 (ref 3.4–10.8)

## 2024-08-23 PROCEDURE — 97530 THERAPEUTIC ACTIVITIES: CPT

## 2024-08-23 PROCEDURE — 84100 ASSAY OF PHOSPHORUS: CPT

## 2024-08-23 PROCEDURE — 85025 COMPLETE CBC W/AUTO DIFF WBC: CPT | Performed by: NEUROLOGICAL SURGERY

## 2024-08-23 PROCEDURE — 97166 OT EVAL MOD COMPLEX 45 MIN: CPT

## 2024-08-23 PROCEDURE — 84132 ASSAY OF SERUM POTASSIUM: CPT | Performed by: INTERNAL MEDICINE

## 2024-08-23 PROCEDURE — 97162 PT EVAL MOD COMPLEX 30 MIN: CPT

## 2024-08-23 PROCEDURE — 25010000002 HYDRALAZINE PER 20 MG: Performed by: NEUROLOGICAL SURGERY

## 2024-08-23 PROCEDURE — 99024 POSTOP FOLLOW-UP VISIT: CPT | Performed by: NURSE PRACTITIONER

## 2024-08-23 PROCEDURE — 83735 ASSAY OF MAGNESIUM: CPT

## 2024-08-23 PROCEDURE — 25010000002 CEFAZOLIN PER 500 MG: Performed by: INTERNAL MEDICINE

## 2024-08-23 PROCEDURE — 63710000001 INSULIN LISPRO (HUMAN) PER 5 UNITS: Performed by: INTERNAL MEDICINE

## 2024-08-23 PROCEDURE — 63710000001 INSULIN LISPRO (HUMAN) PER 5 UNITS: Performed by: NEUROLOGICAL SURGERY

## 2024-08-23 PROCEDURE — 25010000002 HYDROCORTISONE SOD SUC (PF) 250 MG RECONSTITUTED SOLUTION: Performed by: NEUROLOGICAL SURGERY

## 2024-08-23 PROCEDURE — 80048 BASIC METABOLIC PNL TOTAL CA: CPT | Performed by: NEUROLOGICAL SURGERY

## 2024-08-23 PROCEDURE — 82948 REAGENT STRIP/BLOOD GLUCOSE: CPT

## 2024-08-23 PROCEDURE — 25010000002 HYDRALAZINE PER 20 MG: Performed by: INTERNAL MEDICINE

## 2024-08-23 PROCEDURE — 63710000001 METHYLPREDNISOLONE 4 MG TABLET THERAPY PACK 21 EACH DISP PACK: Performed by: INTERNAL MEDICINE

## 2024-08-23 PROCEDURE — 25010000002 CEFAZOLIN PER 500 MG: Performed by: NEUROLOGICAL SURGERY

## 2024-08-23 PROCEDURE — 99232 SBSQ HOSP IP/OBS MODERATE 35: CPT | Performed by: INTERNAL MEDICINE

## 2024-08-23 PROCEDURE — 25010000002 LEVETRIRACETAM PER 10 MG: Performed by: NEUROLOGICAL SURGERY

## 2024-08-23 PROCEDURE — 97535 SELF CARE MNGMENT TRAINING: CPT

## 2024-08-23 PROCEDURE — 25010000002 LABETALOL 5 MG/ML SOLUTION: Performed by: NEUROLOGICAL SURGERY

## 2024-08-23 RX ORDER — METHYLPREDNISOLONE 4 MG
4 TABLET, DOSE PACK ORAL
Status: COMPLETED | OUTPATIENT
Start: 2024-08-23 | End: 2024-08-23

## 2024-08-23 RX ORDER — METHYLPREDNISOLONE 4 MG
4 TABLET, DOSE PACK ORAL
Status: COMPLETED | OUTPATIENT
Start: 2024-08-26 | End: 2024-08-26

## 2024-08-23 RX ORDER — METHYLPREDNISOLONE 4 MG
4 TABLET, DOSE PACK ORAL
Status: COMPLETED | OUTPATIENT
Start: 2024-08-27 | End: 2024-08-27

## 2024-08-23 RX ORDER — METHOCARBAMOL 500 MG/1
500 TABLET, FILM COATED ORAL EVERY 6 HOURS PRN
Status: DISCONTINUED | OUTPATIENT
Start: 2024-08-23 | End: 2024-08-24

## 2024-08-23 RX ORDER — AMOXICILLIN 250 MG
1 CAPSULE ORAL NIGHTLY
Status: DISCONTINUED | OUTPATIENT
Start: 2024-08-23 | End: 2024-08-30 | Stop reason: HOSPADM

## 2024-08-23 RX ORDER — BISACODYL 5 MG/1
5 TABLET, DELAYED RELEASE ORAL DAILY PRN
Status: DISCONTINUED | OUTPATIENT
Start: 2024-08-23 | End: 2024-08-30 | Stop reason: HOSPADM

## 2024-08-23 RX ORDER — FAMOTIDINE 20 MG/1
20 TABLET, FILM COATED ORAL
Status: DISCONTINUED | OUTPATIENT
Start: 2024-08-23 | End: 2024-08-30 | Stop reason: HOSPADM

## 2024-08-23 RX ORDER — METHYLPREDNISOLONE 4 MG
4 TABLET, DOSE PACK ORAL
Status: COMPLETED | OUTPATIENT
Start: 2024-08-24 | End: 2024-08-24

## 2024-08-23 RX ORDER — POLYETHYLENE GLYCOL 3350 17 G/17G
17 POWDER, FOR SOLUTION ORAL DAILY PRN
Status: DISCONTINUED | OUTPATIENT
Start: 2024-08-23 | End: 2024-08-30 | Stop reason: HOSPADM

## 2024-08-23 RX ORDER — LEVETIRACETAM 500 MG/1
500 TABLET ORAL 2 TIMES DAILY
Status: DISCONTINUED | OUTPATIENT
Start: 2024-08-23 | End: 2024-08-30 | Stop reason: HOSPADM

## 2024-08-23 RX ORDER — BISACODYL 10 MG
10 SUPPOSITORY, RECTAL RECTAL DAILY PRN
Status: DISCONTINUED | OUTPATIENT
Start: 2024-08-23 | End: 2024-08-30 | Stop reason: HOSPADM

## 2024-08-23 RX ORDER — METHYLPREDNISOLONE 4 MG
8 TABLET, DOSE PACK ORAL
Status: COMPLETED | OUTPATIENT
Start: 2024-08-24 | End: 2024-08-24

## 2024-08-23 RX ORDER — METHYLPREDNISOLONE 4 MG
8 TABLET, DOSE PACK ORAL
Status: COMPLETED | OUTPATIENT
Start: 2024-08-23 | End: 2024-08-23

## 2024-08-23 RX ORDER — POTASSIUM CHLORIDE 750 MG/1
40 TABLET, FILM COATED, EXTENDED RELEASE ORAL EVERY 4 HOURS
Status: COMPLETED | OUTPATIENT
Start: 2024-08-23 | End: 2024-08-23

## 2024-08-23 RX ORDER — DOCUSATE SODIUM 100 MG/1
100 CAPSULE, LIQUID FILLED ORAL DAILY
Status: DISCONTINUED | OUTPATIENT
Start: 2024-08-23 | End: 2024-08-30 | Stop reason: HOSPADM

## 2024-08-23 RX ORDER — METHYLPREDNISOLONE 4 MG
4 TABLET, DOSE PACK ORAL
Status: COMPLETED | OUTPATIENT
Start: 2024-08-28 | End: 2024-08-28

## 2024-08-23 RX ORDER — METHYLPREDNISOLONE 4 MG
4 TABLET, DOSE PACK ORAL
Status: COMPLETED | OUTPATIENT
Start: 2024-08-25 | End: 2024-08-25

## 2024-08-23 RX ADMIN — SODIUM CHLORIDE 2000 MG: 900 INJECTION INTRAVENOUS at 14:18

## 2024-08-23 RX ADMIN — INSULIN LISPRO 2 UNITS: 100 INJECTION, SOLUTION INTRAVENOUS; SUBCUTANEOUS at 08:44

## 2024-08-23 RX ADMIN — METHYLPREDNISOLONE 4 MG: 4 TABLET ORAL at 17:30

## 2024-08-23 RX ADMIN — ATORVASTATIN CALCIUM 10 MG: 20 TABLET, FILM COATED ORAL at 08:43

## 2024-08-23 RX ADMIN — LEVETIRACETAM 500 MG: 500 TABLET, FILM COATED ORAL at 20:49

## 2024-08-23 RX ADMIN — SENNOSIDES AND DOCUSATE SODIUM 1 TABLET: 50; 8.6 TABLET ORAL at 20:50

## 2024-08-23 RX ADMIN — HYDROCODONE BITARTRATE AND ACETAMINOPHEN 1 TABLET: 5; 325 TABLET ORAL at 19:36

## 2024-08-23 RX ADMIN — HYDRALAZINE HYDROCHLORIDE 10 MG: 20 INJECTION INTRAMUSCULAR; INTRAVENOUS at 19:36

## 2024-08-23 RX ADMIN — METHYLPREDNISOLONE 8 MG: 4 TABLET ORAL at 16:39

## 2024-08-23 RX ADMIN — HYDROCORTISONE SODIUM SUCCINATE 250 MG: 250 INJECTION, POWDER, FOR SOLUTION INTRAMUSCULAR; INTRAVENOUS at 05:16

## 2024-08-23 RX ADMIN — HYDROCODONE BITARTRATE AND ACETAMINOPHEN 1 TABLET: 5; 325 TABLET ORAL at 08:43

## 2024-08-23 RX ADMIN — HYDRALAZINE HYDROCHLORIDE 10 MG: 20 INJECTION INTRAMUSCULAR; INTRAVENOUS at 05:16

## 2024-08-23 RX ADMIN — POTASSIUM CHLORIDE 40 MEQ: 750 TABLET, EXTENDED RELEASE ORAL at 08:44

## 2024-08-23 RX ADMIN — AMLODIPINE BESYLATE 5 MG: 5 TABLET ORAL at 20:49

## 2024-08-23 RX ADMIN — HYDRALAZINE HYDROCHLORIDE 10 MG: 20 INJECTION INTRAMUSCULAR; INTRAVENOUS at 01:11

## 2024-08-23 RX ADMIN — AMLODIPINE BESYLATE 5 MG: 5 TABLET ORAL at 08:44

## 2024-08-23 RX ADMIN — FAMOTIDINE 20 MG: 20 TABLET, FILM COATED ORAL at 18:14

## 2024-08-23 RX ADMIN — SODIUM CHLORIDE 2000 MG: 900 INJECTION INTRAVENOUS at 05:17

## 2024-08-23 RX ADMIN — HYDROCODONE BITARTRATE AND ACETAMINOPHEN 1 TABLET: 5; 325 TABLET ORAL at 04:07

## 2024-08-23 RX ADMIN — FAMOTIDINE 20 MG: 10 INJECTION INTRAVENOUS at 08:44

## 2024-08-23 RX ADMIN — METHYLPREDNISOLONE 8 MG: 4 TABLET ORAL at 22:16

## 2024-08-23 RX ADMIN — LEVETIRACETAM 500 MG: 500 INJECTION, SOLUTION INTRAVENOUS at 08:44

## 2024-08-23 RX ADMIN — INSULIN LISPRO 2 UNITS: 100 INJECTION, SOLUTION INTRAVENOUS; SUBCUTANEOUS at 20:50

## 2024-08-23 RX ADMIN — POTASSIUM CHLORIDE 40 MEQ: 750 TABLET, EXTENDED RELEASE ORAL at 05:17

## 2024-08-23 RX ADMIN — LABETALOL HYDROCHLORIDE 20 MG: 5 INJECTION, SOLUTION INTRAVENOUS at 03:10

## 2024-08-23 RX ADMIN — METHYLPREDNISOLONE 4 MG: 4 TABLET ORAL at 22:16

## 2024-08-23 NOTE — PAYOR COMM NOTE
"Ely iSms (64 y.o. Female)                      ATTENTION CONTINUED STAY CLINICALS [id number] STARTING 8/21                    REPLY TO [facility] DEPT  [phone] OR CALL             Date of Birth   1960    Social Security Number       Address   [address] [place] [address]    Home Phone   [phone]    MRN   [id number]       Worship   [employer]    Marital Status   Single                            Admission Date   8/19/24    Admission Type   Emergency    Admitting Provider   Isai Mathew MD    Attending Provider   Isai Mathew MD    Department, Room/Bed   [address], P590/1       Discharge Date       Discharge Disposition       Discharge Destination                                 Attending Provider: Isai Mathew MD    Allergies: No Known Allergies    Isolation: None   Infection: None   Code Status: CPR    Ht: 170.2 cm (67.01\")   Wt: 65.3 kg (143 lb 15.4 oz)    Admission Cmt: None   Principal Problem: 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,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]                   Active Insurance as of 8/19/2024       Primary Coverage       Payor Plan Insurance Group Employer/Plan Group    [facility] [id number]       Payor Plan Address Payor Plan Phone Number Payor Plan Fax Number Effective Dates    [address] [address]   1/1/2024 - None Entered    [facility] [id number]         Subscriber Name Subscriber Birth Date Member ID       ELY SIMS 1960 [id number]                     Emergency Contacts        (Rel.) Home Phone Work Phone Mobile Phone    Elia Cadena -- -- [phone]    DON OBRIEN (Sister) -- -- [phone]    SurinderTiffany (Daughter) -- -- [phone]    Ubaldo Case (Partner) -- -- [phone]                Orders (last 72 hrs)        Start     Ordered    08/28/24 0730  methylPREDNISolone (MEDROL) dose pack 4 mg  Before Breakfast         08/23/24 1018    08/27/24 2100  methylPREDNISolone (MEDROL) dose pack 4 " "mg  Nightly - One Time         08/23/24 1018    08/27/24 0730  methylPREDNISolone (MEDROL) dose pack 4 mg  Before Breakfast         08/23/24 1018    08/26/24 0730  methylPREDNISolone (MEDROL) dose pack 4 mg  3 Times Daily Around Food         08/23/24 1018    08/25/24 0730  methylPREDNISolone (MEDROL) dose pack 4 mg  4 Times Daily Tapering         08/23/24 1018    08/24/24 2100  methylPREDNISolone (MEDROL) dose pack 8 mg  Nightly - One Time         08/23/24 1018    08/24/24 0730  methylPREDNISolone (MEDROL) dose pack 4 mg  3 Times Daily Around Food         08/23/24 1018    08/23/24 2100  methylPREDNISolone (MEDROL) dose pack 8 mg  Nightly - One Time         08/23/24 1018    08/23/24 2100  sennosides-docusate (PERICOLACE) 8.6-50 MG per tablet 1 tablet  Nightly        Placed in \"And\" Linked Group    08/23/24 1039    08/23/24 1900  methylPREDNISolone (MEDROL) dose pack 4 mg  After Dinner         08/23/24 1018    08/23/24 1730  famotidine (PEPCID) tablet 20 mg  2 Times Daily Before Meals         08/23/24 1038    08/23/24 1328  Potassium  Timed         08/23/24 0427    08/23/24 1300  methylPREDNISolone (MEDROL) dose pack 4 mg  After Lunch         08/23/24 1018    08/23/24 1300  Up In Chair  3 Times Daily        Comments: All meals    08/23/24 1038    08/23/24 1214  Inpatient Internal Medicine Consult  Once        Specialty:  Internal Medicine  Provider:  Jerry Valdez MD    08/23/24 1214    08/23/24 1200  Vital Signs and Neurochecks  Every 4 Hours       08/23/24 1038    08/23/24 1152  POC Glucose Once  PROCEDURE ONCE        Comments: Complete no more than 45 minutes prior to patient eating      08/23/24 1149    08/23/24 1130  levETIRAcetam (KEPPRA) tablet 500 mg  2 Times Daily        Note to Pharmacy: For tube route administration disperse crushed tablets in 10 mL of water, shake for 5 minutes to dissolve, and administer immediately via enteral feeding tube.    08/23/24 1018    08/23/24 1130  methocarbamol (ROBAXIN) " "tablet 500 mg  Every 6 Hours PRN         08/23/24 1018    08/23/24 1130  docusate sodium (COLACE) capsule 100 mg  Daily         08/23/24 1039    08/23/24 1115  methylPREDNISolone (MEDROL) dose pack 8 mg  Before Breakfast         08/23/24 1018    08/23/24 1103  Transfer Patient  Once         08/23/24 1103    08/23/24 1038  bisacodyl (DULCOLAX) suppository 10 mg  Daily PRN        Placed in \"And\" Linked Group    08/23/24 1039    08/23/24 1038  polyethylene glycol (MIRALAX) packet 17 g  Daily PRN        Placed in \"And\" Linked Group    08/23/24 1039    08/23/24 1038  bisacodyl (DULCOLAX) EC tablet 5 mg  Daily PRN        Placed in \"And\" Linked Group    08/23/24 1039    08/23/24 0706  POC Glucose Once  PROCEDURE ONCE        Comments: Complete no more than 45 minutes prior to patient eating      08/23/24 0703    08/23/24 0600  CBC Auto Differential  PROCEDURE ONCE         08/22/24 2201    08/23/24 0515  potassium chloride (K-DUR,KLOR-CON) ER tablet 40 mEq  Every 4 Hours         08/23/24 0427    08/23/24 0304  Magnesium  Once         08/23/24 0303    08/23/24 0304  Phosphorus  Once         08/23/24 0303    08/22/24 2345  gadobenate dimeglumine (MULTIHANCE) injection 15 mL  Once in Imaging         08/22/24 2256    08/22/24 2108  POC Glucose Once  PROCEDURE ONCE        Comments: Complete no more than 45 minutes prior to patient eating      08/22/24 2105    08/22/24 2100  amLODIPine (NORVASC) tablet 5 mg  2 Times Daily         08/22/24 1253    08/22/24 1531  POC Glucose Once  PROCEDURE ONCE        Comments: Complete no more than 45 minutes prior to patient eating      08/22/24 1527    08/22/24 1418  Discontinue Indwelling Urinary Catheter  Once         08/22/24 1417    08/22/24 1418  Notify Provider if Bladder Distention Continues  Continuous        Comments: Open Order Report to View Parameters Requiring Provider Notification    08/22/24 1417    08/22/24 1418  Consult Pharmacist For Review of Medications That May Cause Urinary " Retention - RN To Place Order for Consult it Needed  Continuous         08/22/24 1417    08/22/24 1253  enalaprilat (VASOTEC) injection 0.625 mg  Every 6 Hours PRN         08/22/24 1254    08/22/24 1147  POC Glucose Once  PROCEDURE ONCE        Comments: Complete no more than 45 minutes prior to patient eating      08/22/24 1132    08/22/24 1100  Ambulate Patient  Every Shift       08/22/24 1059    08/22/24 1059  Up In Chair  As Needed,   Status:  Canceled       08/22/24 1059    08/22/24 1059  PT Consult: Eval & Treat Post Surgery Care  Once         08/22/24 1059    08/22/24 1059  OT Consult: Eval & Treat Post-Surgery Care  Once         08/22/24 1059    08/22/24 1059  SLP Consult: Eval & Treat Other; post-op crani for met removal  Once         08/22/24 1059    08/22/24 1058  MRI Brain With & Without Contrast  1 Time Imaging         08/22/24 1059    08/22/24 0727  POC Glucose Once  PROCEDURE ONCE        Comments: Complete no more than 45 minutes prior to patient eating      08/22/24 0719    08/22/24 0600  Basic Metabolic Panel  Morning Draw,   Status:  Canceled         08/21/24 0709    08/22/24 0600  Magnesium  Morning Draw         08/21/24 0709    08/22/24 0600  Protime-INR  Morning Draw         08/21/24 2107 08/22/24 0600  aPTT  Morning Draw         08/21/24 2107 08/22/24 0600  CBC Auto Differential  PROCEDURE ONCE         08/21/24 2202 08/22/24 0523  POC Glucose Once  PROCEDURE ONCE        Comments: Complete no more than 45 minutes prior to patient eating      08/22/24 0521    08/21/24 2346  POC Glucose Once  PROCEDURE ONCE        Comments: Complete no more than 45 minutes prior to patient eating      08/21/24 2344    08/21/24 2200  Vital Signs and Neurochecks  Every Hour,   Status:  Canceled       08/21/24 2107 08/21/24 2200  Incentive Spirometry  Every 2 Hours While Awake       08/21/24 2107 08/21/24 2200  sodium chloride 0.9 % with KCl 20 mEq/L infusion  Continuous,   Status:  Discontinued          "08/21/24 2107 08/21/24 2200  ceFAZolin 2000 mg IVPB in 100 mL NS (MBP)  Every 8 Hours         08/21/24 2107 08/21/24 2108  Code Status and Medical Interventions: CPR (Attempt to Resuscitate); Full Support  Continuous         08/21/24 2107 08/21/24 2108  Arterial line monitoring for blood pressure management.  Every Shift,   Status:  Canceled       08/21/24 2107 08/21/24 2108  Head of bed  Until Discontinued         08/21/24 2107 08/21/24 2108  Oxygen Therapy- Nasal Cannula; Titrate 1-6 LPM Per SpO2; 90 - 95%  Continuous         08/21/24 2107 08/21/24 2108  Continuous Pulse Oximetry  Continuous         08/21/24 2107 08/21/24 2108  Diet: Liquid; Clear Liquid; Fluid Consistency: Thin (IDDSI 0)  Diet Effective Now         08/21/24 2107 08/21/24 2107  ondansetron ODT (ZOFRAN-ODT) disintegrating tablet 4 mg  Every 6 Hours PRN        Placed in \"Or\" Linked Group    08/21/24 2107 08/21/24 2107  ondansetron (ZOFRAN) injection 4 mg  Every 6 Hours PRN        Placed in \"Or\" Linked Group    08/21/24 2107 08/21/24 2107  sennosides-docusate (PERICOLACE) 8.6-50 MG per tablet 2 tablet  2 Times Daily PRN,   Status:  Discontinued        Placed in \"And\" Linked Group    08/21/24 2107 08/21/24 2107  polyethylene glycol (MIRALAX) packet 17 g  Daily PRN,   Status:  Discontinued        Placed in \"And\" Linked Group    08/21/24 2107 08/21/24 2107  bisacodyl (DULCOLAX) EC tablet 5 mg  Daily PRN,   Status:  Discontinued        Placed in \"And\" Linked Group    08/21/24 2107 08/21/24 2107  bisacodyl (DULCOLAX) suppository 10 mg  Daily PRN,   Status:  Discontinued        Placed in \"And\" Linked Group    08/21/24 2107 08/21/24 2107  acetaminophen (TYLENOL) tablet 650 mg  Every 4 Hours PRN        Placed in \"Or\" Linked Group    08/21/24 2107 08/21/24 2107  acetaminophen (TYLENOL) 160 MG/5ML oral solution 650 mg  Every 4 Hours PRN        Placed in \"Or\" Linked Group    08/21/24 2107 08/21/24 2107  " "acetaminophen (TYLENOL) suppository 650 mg  Every 4 Hours PRN        Placed in \"Or\" Linked Group    08/21/24 2107 08/21/24 2107  HYDROcodone-acetaminophen (NORCO) 5-325 MG per tablet 1 tablet  Every 4 Hours PRN         08/21/24 2107 08/21/24 2107  morphine injection 1 mg  Every 2 Hours PRN        Placed in \"And\" Linked Group    08/21/24 2107 08/21/24 2107  naloxone (NARCAN) injection 0.4 mg  Every 5 Minutes PRN        Placed in \"And\" Linked Group    08/21/24 2107 08/21/24 1954  POC Glucose Once  PROCEDURE ONCE        Comments: Complete no more than 45 minutes prior to patient eating      08/21/24 1952 08/21/24 1800  CT Head Without Contrast  1 Time Imaging         08/21/24 1546    08/21/24 1745  Potassium  Timed,   Status:  Canceled         08/21/24 0744    08/21/24 1743  Potassium  Timed,   Status:  Canceled         08/21/24 0842    08/21/24 1710  clevidipine (CLEVIPREX) infusion 0.5 mg/mL  Titrated,   Status:  Discontinued         08/21/24 1708    08/21/24 1606  Inpatient Gastroenterology Consult  Once        Specialty:  Gastroenterology  Provider:  Shadi Loja MD    08/21/24 1605    08/21/24 1557  POC Glucose Once  Once,   Status:  Canceled        Comments: Post op glucose check on all diabetic patients...Notify Anesthesia if blood sugar is less than 80 mg/dL or greater than 220 mg/dL.      08/21/24 1559    08/21/24 1557  Vital signs every 5 minutes for 15 minutes, every 15 minutes thereafter.  Once,   Status:  Canceled         08/21/24 1559    08/21/24 1557  Call Anesthesiologist for additional IV Fluid bolus for Hypotension/Tachycardia  Until Discontinued,   Status:  Canceled         08/21/24 1559    08/21/24 1557  Notify Anesthesia of Any Acute Changes in Patient Condition  Until Discontinued,   Status:  Canceled         08/21/24 1559    08/21/24 1557  Notify Anesthesia for Unrelieved Pain  Until Discontinued,   Status:  Canceled         08/21/24 1559    08/21/24 1557  Once DC " "criteria to floor met, follow surgeon's orders.  Until Discontinued,   Status:  Canceled         08/21/24 1559    08/21/24 1557  Discharge patient from PACU when discharge criteria is met.  Until Discontinued,   Status:  Canceled         08/21/24 1559    08/21/24 1556  HYDROcodone-acetaminophen (NORCO) 5-325 MG per tablet 1 tablet  Once As Needed,   Status:  Discontinued         08/21/24 1559    08/21/24 1556  HYDROmorphone (DILAUDID) injection 0.5 mg  Every 5 Minutes PRN,   Status:  Discontinued         08/21/24 1559    08/21/24 1556  oxyCODONE-acetaminophen (PERCOCET) 7.5-325 MG per tablet 1 tablet  Every 4 Hours PRN,   Status:  Discontinued         08/21/24 1559    08/21/24 1556  fentaNYL citrate (PF) (SUBLIMAZE) injection 50 mcg  Every 5 Minutes PRN,   Status:  Discontinued         08/21/24 1559    08/21/24 1556  naloxone (NARCAN) injection 0.2 mg  As Needed,   Status:  Discontinued         08/21/24 1559    08/21/24 1556  flumazenil (ROMAZICON) injection 0.2 mg  As Needed,   Status:  Discontinued         08/21/24 1559    08/21/24 1556  ondansetron (ZOFRAN) injection 4 mg  Once As Needed,   Status:  Discontinued         08/21/24 1559    08/21/24 1556  droperidol (INAPSINE) injection 0.625 mg  Every 20 Minutes PRN,   Status:  Discontinued        Placed in \"Or\" Linked Group    08/21/24 1559    08/21/24 1556  droperidol (INAPSINE) injection 0.625 mg  Every 20 Minutes PRN,   Status:  Discontinued        Placed in \"Or\" Linked Group    08/21/24 1559    08/21/24 1556  promethazine (PHENERGAN) suppository 25 mg  Once As Needed,   Status:  Discontinued        Placed in \"Or\" Linked Group    08/21/24 1559    08/21/24 1556  promethazine (PHENERGAN) tablet 25 mg  Once As Needed,   Status:  Discontinued        Placed in \"Or\" Linked Group    08/21/24 1559    08/21/24 1556  labetalol (NORMODYNE,TRANDATE) injection 5 mg  Every 5 Minutes PRN,   Status:  Discontinued         08/21/24 1559    08/21/24 1556  hydrALAZINE (APRESOLINE) " injection 5 mg  Every 10 Minutes PRN,   Status:  Discontinued         08/21/24 1559    08/21/24 1556  ePHEDrine injection 5 mg  Once As Needed,   Status:  Discontinued         08/21/24 1559    08/21/24 1556  diphenhydrAMINE (BENADRYL) injection 12.5 mg  Every 15 Minutes PRN,   Status:  Discontinued         08/21/24 1559    08/21/24 1556  ipratropium-albuterol (DUO-NEB) nebulizer solution 3 mL  Once As Needed,   Status:  Discontinued         08/21/24 1559    08/21/24 1548  niCARdipine (CARDENE) 50 mg in sodium chloride 0.9 % 250 mL IVPB  Titrated,   Status:  Discontinued         08/21/24 1546    08/21/24 1547  CBC & Differential  Daily,   Status:  Canceled       08/21/24 1546    08/21/24 1547  Basic Metabolic Panel  Daily       08/21/24 1546    08/21/24 1547  CBC Auto Differential  PROCEDURE ONCE         08/21/24 1546    08/21/24 1545  Assess Need for Indwelling Urinary Catheter - Follow Removal Protocol  Continuous,   Status:  Canceled        Comments: Follow Protocol As Outlined in Process Instructions (Open Order Report to View Full Instructions)    08/21/24 1544    08/21/24 1545  Urinary Catheter Care  Every Shift,   Status:  Canceled       08/21/24 1544    08/21/24 1545  Continuous Pulse Oximetry  Continuous,   Status:  Canceled         08/21/24 1544    08/21/24 1544  Oxygen Therapy- Nasal Cannula; Titrate 1-6 LPM Per SpO2; 90 - 95%  Continuous PRN,   Status:  Canceled       08/21/24 1544    08/21/24 1424  Tissue Pathology Exam  RELEASE UPON ORDERING         08/21/24 1424    08/21/24 1403  Insert Indwelling Urinary Catheter  Once,   Status:  Canceled        Comments: Follow Protocol As Outlined in Process Instructions (Open Order Report to View Full Instructions)    08/21/24 1403    08/21/24 1402  Physiosol Irrigation solution  As Needed,   Status:  Discontinued         08/21/24 1402    08/21/24 1256  ceFAZolin 2000 mg IVPB in 100 mL NS (MBP)  Once         08/21/24 1254    08/21/24 1214  Type & Screen  Once          08/21/24 1214    08/21/24 1158  fentaNYL citrate (PF) (SUBLIMAZE) injection  Code / Trauma / Sedation Medication         08/21/24 1158    08/21/24 1157  midazolam (VERSED) injection  Code / Trauma / Sedation Medication         08/21/24 1158    08/21/24 1135  sodium chloride 1,000 mL with vancomycin 1,000 mg irrigation  As Needed,   Status:  Discontinued         08/21/24 1135    08/21/24 1135  sodium chloride (NS) irrigation solution  As Needed,   Status:  Discontinued         08/21/24 1135    08/21/24 1135  thrombin (THROMBIN-JMI) spray kit  As Needed,   Status:  Discontinued         08/21/24 1136    08/21/24 1134  sodium chloride 30 mL, bupivacaine (PF) 0.25 % 30 mL mixture  As Needed,   Status:  Discontinued         08/21/24 1135    08/21/24 1134  sodium chloride 0.9 % solution  As Needed,   Status:  Discontinued         08/21/24 1135    08/21/24 0930  potassium chloride (K-DUR,KLOR-CON) ER tablet 40 mEq  Every 4 Hours         08/21/24 0842    08/21/24 0846  Obtain Informed Consent  Once         08/21/24 0846    08/21/24 0830  potassium chloride 10 mEq in 100 mL IVPB  Every 1 Hour,   Status:  Discontinued         08/21/24 0744    08/21/24 0708  Potassium Replacement - Follow Nurse / BPA Driven Protocol  As Needed         08/21/24 0709    08/21/24 0708  Magnesium Standard Dose Replacement - Follow Nurse / BPA Driven Protocol  As Needed         08/21/24 0709    08/21/24 0708  Phosphorus Replacement - Follow Nurse / BPA Driven Protocol  As Needed         08/21/24 0709    08/21/24 0708  Calcium Replacement - Follow Nurse / BPA Driven Protocol  As Needed         08/21/24 0709    08/21/24 0618  POC Glucose Once  PROCEDURE ONCE        Comments: Complete no more than 45 minutes prior to patient eating      08/21/24 0615    08/21/24 0600  Protime-INR  Morning Draw         08/20/24 1504    08/21/24 0600  CBC Auto Differential  PROCEDURE ONCE         08/20/24 2201    08/21/24 0001  NPO Diet NPO Type: Sips with Meds  Diet  Effective Midnight,   Status:  Canceled         08/20/24 1039    08/20/24 2238  POC Glucose Once  PROCEDURE ONCE        Comments: Complete no more than 45 minutes prior to patient eating      08/20/24 2233    08/20/24 1945  iopamidol (ISOVUE-300) 61 % injection 100 mL  Once in Imaging         08/20/24 1856    08/20/24 1759  POC Glucose Once  PROCEDURE ONCE        Comments: Complete no more than 45 minutes prior to patient eating      08/20/24 1757    08/20/24 1730  insulin lispro (HUMALOG/ADMELOG) injection 2-9 Units  4 Times Daily Before Meals & Nightly         08/20/24 1502    08/20/24 1728  CT Head With & Without Contrast  1 Time Imaging         08/20/24 1727    08/20/24 1700  POC Glucose 4x Daily Before Meals & at Bedtime  4 Times Daily Before Meals & at Bedtime      Comments: Complete no more than 45 minutes prior to patient eating      08/20/24 1502    08/20/24 1503  hydrALAZINE (APRESOLINE) injection 10 mg  Every 4 Hours PRN         08/20/24 1503    08/20/24 1503  labetalol (NORMODYNE,TRANDATE) injection 20 mg  Every 2 Hours PRN         08/20/24 1503    08/20/24 1502  dextrose (GLUTOSE) oral gel 15 g  Every 15 Minutes PRN         08/20/24 1502    08/20/24 1502  dextrose (D50W) (25 g/50 mL) IV injection 25 g  Every 15 Minutes PRN         08/20/24 1502    08/20/24 1502  glucagon (GLUCAGEN) injection 1 mg  Every 15 Minutes PRN         08/20/24 1502    08/20/24 0900  atorvastatin (LIPITOR) tablet 10 mg  Daily         08/20/24 0050    08/19/24 2245  niCARdipine (CARDENE) 25 mg in 250 mL NS infusion kit  Titrated,   Status:  Discontinued         08/19/24 2145 08/19/24 2200  Incentive Spirometry  Every 2 Hours While Awake       08/19/24 2016 08/19/24 2200  lactated ringers infusion  Continuous,   Status:  Discontinued         08/19/24 2108 08/19/24 2149  Occult Blood X 1, Stool - Stool, Per Rectum  Daily       08/19/24 2148 08/19/24 2100  famotidine (PEPCID) injection 20 mg  Every 12 Hours Scheduled,    "Status:  Discontinued         08/19/24 1852 08/19/24 2100  Vital Signs Every Hour and Per Hospital Policy Based on Patient Condition  Every Hour,   Status:  Canceled       08/19/24 2016 08/19/24 2100  Intake & Output  Every Hour       08/19/24 2016 08/19/24 2100  sennosides-docusate (PERICOLACE) 8.6-50 MG per tablet 2 tablet  2 Times Daily,   Status:  Discontinued        Placed in \"And\" Linked Group    08/19/24 2016 08/19/24 2100  levETIRAcetam (KEPPRA) injection 500 mg  Every 12 Hours Scheduled,   Status:  Discontinued         08/19/24 2028 08/19/24 2017  Daily Weights  Daily       08/19/24 2016 08/19/24 2017  Basic Metabolic Panel  Daily       08/19/24 2016 08/19/24 2017  CBC & Differential  Daily       08/19/24 2016 08/19/24 2016  Oral Care - Patient Not on NPPV & Not Intubated  Every Shift       08/19/24 2016 08/19/24 2015  ondansetron (ZOFRAN) injection 4 mg  Every 6 Hours PRN,   Status:  Discontinued         08/19/24 2016 08/19/24 2015  acetaminophen (TYLENOL) tablet 650 mg  Every 4 Hours PRN        Placed in \"Or\" Linked Group    08/19/24 2016 08/19/24 2015  acetaminophen (TYLENOL) suppository 650 mg  Every 4 Hours PRN        Placed in \"Or\" Linked Group    08/19/24 2016 08/19/24 2015  nitroglycerin (NITROSTAT) SL tablet 0.4 mg  Every 5 Minutes PRN         08/19/24 2016 08/19/24 2015  polyethylene glycol (MIRALAX) packet 17 g  Daily PRN,   Status:  Discontinued        Placed in \"And\" Linked Group    08/19/24 2016 08/19/24 2015  bisacodyl (DULCOLAX) EC tablet 5 mg  Daily PRN,   Status:  Discontinued        Placed in \"And\" Linked Group    08/19/24 2016 08/19/24 2015  bisacodyl (DULCOLAX) suppository 10 mg  Daily PRN,   Status:  Discontinued        Placed in \"And\" Linked Group    08/19/24 2016 08/19/24 1908  Hydrocortisone Sod Suc (PF) (Solu-CORTEF) injection 250 mg  Every 6 Hours,   Status:  Discontinued         08/19/24 1852    08/19/24 1655  sodium chloride 0.9 % " "flush 10 mL  As Needed        Placed in \"And\" Linked Group    08/19/24 1655    08/19/24 1526  Oxygen Therapy- Nasal Cannula; Titrate 1-6 LPM Per SpO2; 90 - 95%  Continuous PRN,   Status:  Canceled       08/19/24 1526    08/19/24 1526  sodium chloride 0.9 % flush 10 mL  As Needed         08/19/24 1526    Unscheduled  Follow Hypoglycemia Standing Orders For Blood Glucose <70 & Notify Provider of Treatment  As Needed      Comments: Follow Hypoglycemia Orders As Outlined in Process Instructions (Open Order Report to View Full Instructions)  Notify Provider Any Time Hypoglycemia Treatment is Administered    08/20/24 1502    Unscheduled  Bladder Scan if Patient Unable to Void 4-6 Hours After Catheter Removal  As Needed         08/22/24 1417    Unscheduled  If Bladder Scan Volume is Less Than 500mL & Patient is Without Symptoms of Bladder Discomfort / Distention Monitor Every 1-2 Hours for Spontaneous Void  As Needed       08/22/24 1417    Unscheduled  Straight Cath Every 4-6 Hours As Needed If Patient is Unable to Void After 4-6 Hours, Bladder Scan Volume is Greater Than 500mL & Patient Has Symptoms of Bladder Discomfort / Distention  As Needed       08/22/24 1417    Unscheduled  Schedule / Prompt Voiding For Patients With Urinary Incontinence  As Needed       08/22/24 1417    Unscheduled  Apply Ice To Affected Area  As Needed       08/23/24 1018    --  atorvastatin (LIPITOR) 10 MG tablet  Daily         08/19/24 2156    --  buPROPion XL (WELLBUTRIN XL) 150 MG 24 hr tablet  Every Morning         08/19/24 2156    Pending  Inpatient Neurosurgery Consult  Once        Specialty:  Neurosurgery  Provider:  (Not yet assigned)    Pending                     Operative/Procedure Notes (all)        Bull Doty MD at 08/21/24 1355  Version 1 of 1         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]       Post-Op Diagnosis Codes:     " * 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    A Brain Tissue TISSUE PATHOLOGY EXAM   Bull Doty MD 8/21/24 1423 Yes    Description: Right Cerebelum Mass - results to 7599    B Brain Tissue TISSUE PATHOLOGY EXAM   Bull oDty MD 8/21/24 1440 No    Description: Right Cerebelum Mass                  Drains:   Urethral Catheter Non-latex;Silicone 16 Fr. 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    Asst.: Ligia Lozada CFA who was instrumental in helping with hemostasis, visualization of neural structures and retraction of neural structures.  Her skilled assistance was necessary for the success of this case    Estimated blood loss, crystalloid, colloid, blood: Please see anesthesia record    Material to lab:   All resected tumor was sent for either permanent or frozen section    Drains: None    Complications: None    Indications for the procedure: This is a  patient who presented with a scan showing masses in her brain.  The mass in the right cerebellar hemisphere was causing distortion of the fourth ventricle.  We are concerned about the development of hydrocephalus and so we elected to proceed with surgical resection.  She does not have a known primary at this point.    Operative summary:  The patient was brought into the operating room and placed under general endotracheal anesthesia using intravenous and inhalational agents.  The patient was then positioned on the operating table in the prone position.  All pressure points were padded including peripheral points of entrapment.  The patient was secured in the Lopez headrest and then reference to the Stealth navigation system which was used to plan out the incision.  An incision extending from the transverse sinus down to the posterior fossa was then outlined a little in the right paramedian location.  This was carefully prepped free of hair and then with ChloraPrep.  Once this was done it was allowed to dry and then she was draped with Ioban, towels, half sheets and a cranial drape.  An incision was made in the right paramedian region.  This carried down to the outer table of the skull.  Using the Stealth navigation system we were able to determine exactly where to place the craniectomy site which was off to the right side of the midline.  This was carefully drilled out with the Midas Vitaliy drill and then elevated and set aside.    The dura was opened in a cruciate fashion and then held back with self-retaining suture.  Following this the cortex of the cerebellum was coagulated and divided.  We are able to use the ultrasonic aspirator to dissect down to the tumor itself which was biopsied.  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  Following this the remainder of the tumor was completely evacuated from that region.  Once the tumor was out we are able to coagulate bleeding vessels  and the entire bed of the tumor was completely dry at the conclusion of the procedure.  It was outlined with Surgicel fibrillar and then the systolic blood pressure was brought up to 260 to be sure there was no evidence of breakthrough bleeding.  There being none the dura was tacked back together.  Elected not to replace the bone flap.  This was overlaid with DuraGen and then sealed with Adherus.  Once this was done the scalp was closed in layers.  The patient was taken to the recovery room in stable condition.  There were no apparent complications and the sponge, instrument and needle counts were correct at the end of the procedure.    Electronically signed by Bull Doty MD at 24 1520          Physician Progress Notes (last 72 hours)        Sarika Wilburn MD at 24 1206            PROGRESS NOTE  Patient Name: Ely Sims  Age/Sex: 64 y.o. female  : 1960  MRN: [id number]    Date of Admission: 2024  Date of Encounter Visit: 24   LOS: 4 days   Patient Care Team:  Provider, No Known as PCP - General    Chief Complaint: Came in with nausea vomiting and headache    Hospital course: Successful craniotomy 2024 with good results, pain is well-controlled  Currently she is hemodynamically stable, ambulating with physical therapy with no ataxia  No respiratory issues  Blood pressure is doing better she is no longer on IV drips  Patient is supposed to have colonoscopy by GI probably by Monday  Patient has very good baseline status she is a runner and healthy and physically active      REVIEW OF SYSTEMS:   CONSTITUTIONAL: no fever or chills  CARDIOVASCULAR: No chest pain, chest pressure or chest discomfort. No palpitations or edema.   RESPIRATORY: No shortness of breath, cough or sputum.   GASTROINTESTINAL: No anorexia, nausea, vomiting or diarrhea. No abdominal pain or blood.   HEMATOLOGIC: No bleeding or bruising.     Ventilator/Non-Invasive Ventilation Settings (From admission, onward)  "     None              Vital Signs  Temp:  [97.5 °F (36.4 °C)-97.9 °F (36.6 °C)] 97.7 °F (36.5 °C)  Heart Rate:  [67-94] 89  Resp:  [14-16] 16  BP: (104-143)/() 143/79  SpO2:  [94 %-98 %] 97 %  on    Device (Oxygen Therapy): room air    Intake/Output Summary (Last 24 hours) at 8/23/2024 1206  Last data filed at 8/23/2024 0400  Gross per 24 hour   Intake 960 ml   Output 600 ml   Net 360 ml       Flowsheet Rows      Flowsheet Row First Filed Value   Admission Height 170.2 cm (67.01\") Documented at 08/20/2024 0648   Admission Weight 65.1 kg (143 lb 8.3 oz) Documented at 08/19/2024 2017          Body mass index is 22.54 kg/m².      08/20/24  0648 08/21/24  0200 08/21/24  0513   Weight: 65.1 kg (143 lb 8.3 oz) 65.3 kg (143 lb 15.4 oz) 65.3 kg (143 lb 15.4 oz)       Physical Exam:  GEN:  No acute distress, alert, cooperative, well developed   EYES:   Sclerae clear. No icterus. PERRL. Normal EOM  ENT:   External ears/nose normal, no oral lesions, no thrush, mucous membranes moist  NECK:  Supple, midline trachea, no JVD  LUNGS: Normal chest on inspection, CTAB, no wheezes. No rhonchi. No crackles. Respirations regular, even and unlabored.   CV:  Regular rhythm and rate. Normal S1/S2. No murmurs, gallops, or rubs noted.  ABD:  Soft, nontender and nondistended. Normal bowel sounds. No guarding  EXT:  Moves all extremities well. No cyanosis. No redness. No edema.   Skin: Dry, intact, no bleeding    Results Review:        Results from last 7 days   Lab Units 08/23/24  0318 08/22/24  0401 08/21/24  1615 08/21/24  0512 08/20/24  0504 08/19/24  2318 08/19/24  1545   SODIUM mmol/L 139 140 144 140 136 138 137   POTASSIUM mmol/L 3.5 3.7 3.3* 3.3* 3.7 3.5 3.6   CHLORIDE mmol/L 109* 107 109* 107 104 103 102   CO2 mmol/L 23.0 25.1 24.5 24.6 20.2* 21.4* 26.0   BUN mg/dL 11 14 16 13 10 10 13   CREATININE mg/dL 0.58 0.72 0.77 0.64 0.53* 0.51* 0.69   CALCIUM mg/dL 7.9* 8.2* 8.7 9.2 8.7 9.2 9.6   AST (SGOT) U/L  --   --   --   --   --  " " --  15   ALT (SGPT) U/L  --   --   --   --   --   --  13   ANION GAP mmol/L 7.0 7.9 10.5 8.4 11.8 13.6 9.0   ALBUMIN g/dL  --   --   --   --   --   --  4.4     Results from last 7 days   Lab Units 08/19/24  1545   HSTROP T ng/L <6             Results from last 7 days   Lab Units 08/23/24 0318 08/22/24  0401 08/21/24  1615 08/21/24  0512 08/20/24  0504 08/19/24  2132 08/19/24  1545   WBC 10*3/mm3 8.57 12.92* 16.36* 12.81* 6.97 5.83 5.34   HEMOGLOBIN g/dL 10.5* 11.3* 13.0 12.4 13.7 13.7 14.4   HEMATOCRIT % 30.9* 32.6* 37.0 35.5 40.2 40.5 42.5   PLATELETS 10*3/mm3 269 284 350 311 330 275 355   MCV fL 96.9 95.6 96.1 95.9 97.8* 99.8* 97.7*   NEUTROPHIL % % 88.4* 90.6* 88.4* 90.8* 89.7* 83.8* 68.1   LYMPHOCYTE % % 5.3* 3.8* 6.9* 5.2* 7.9* 11.0* 22.1   MONOCYTES % % 5.5 5.0 4.2* 3.6* 2.0* 4.1* 8.6   EOSINOPHIL % % 0.0* 0.0* 0.0* 0.0* 0.0* 0.3 0.4   BASOPHIL % % 0.0 0.1 0.1 0.1 0.1 0.5 0.6   IMM GRAN % % 0.8* 0.5 0.4 0.3 0.3 0.3 0.2     Results from last 7 days   Lab Units 08/22/24 0401 08/21/24  0512 08/20/24  0504   INR  0.99 1.06 1.09   APTT seconds 24.5  --  28.4     Results from last 7 days   Lab Units 08/23/24 0318 08/22/24  0401 08/19/24  1545   MAGNESIUM mg/dL 2.5* 2.2 1.9           Invalid input(s): \"LDLCALC\"          Glucose   Date/Time Value Ref Range Status   08/23/2024 1149 140 (H) 70 - 130 mg/dL Final   08/23/2024 0703 154 (H) 70 - 130 mg/dL Final   08/22/2024 2105 126 70 - 130 mg/dL Final   08/22/2024 1527 186 (H) 70 - 130 mg/dL Final   08/22/2024 1132 179 (H) 70 - 130 mg/dL Final   08/22/2024 0719 141 (H) 70 - 130 mg/dL Final   08/22/2024 0521 136 (H) 70 - 130 mg/dL Final   08/21/2024 2344 161 (H) 70 - 130 mg/dL Final             Results from last 7 days   Lab Units 08/19/24  1708   NITRITE UA  Negative   WBC UA /HPF 3-5*   BACTERIA UA /HPF 1+*   SQUAM EPITHEL UA /HPF 3-6*     Results from last 7 days   Lab Units 08/19/24  1708   COVID19  Not Detected   INFLUENZA B PCR  Not Detected   RSV, PCR  Not " Detected               Imaging:   Imaging Results (All)       Procedure Component Value Units Date/Time     I reviewed the patient's new clinical results.  I personally viewed and interpreted the patient's imaging results:        Medication Review:   amLODIPine, 5 mg, Oral, BID  atorvastatin, 10 mg, Oral, Daily  ceFAZolin, 2,000 mg, Intravenous, Q8H  docusate sodium, 100 mg, Oral, Daily  famotidine, 20 mg, Oral, BID AC  insulin lispro, 2-9 Units, Subcutaneous, 4x Daily AC & at Bedtime  levETIRAcetam, 500 mg, Oral, BID  methylPREDNISolone, 4 mg, Oral, After Lunch  methylPREDNISolone, 4 mg, Oral, After Dinner  [START ON 8/24/2024] methylPREDNISolone, 4 mg, Oral, TID Around Food  [START ON 8/25/2024] methylPREDNISolone, 4 mg, Oral, 4x Daily Taper  [START ON 8/26/2024] methylPREDNISolone, 4 mg, Oral, TID Around Food  [START ON 8/27/2024] methylPREDNISolone, 4 mg, Oral, Before Breakfast  [START ON 8/27/2024] methylPREDNISolone, 4 mg, Oral, Tonight  [START ON 8/28/2024] methylPREDNISolone, 4 mg, Oral, Before Breakfast  methylPREDNISolone, 8 mg, Oral, Before Breakfast  methylPREDNISolone, 8 mg, Oral, Tonight  [START ON 8/24/2024] methylPREDNISolone, 8 mg, Oral, Tonight  senna-docusate sodium, 1 tablet, Oral, Nightly        clevidipine, 2-21 mg/hr, Last Rate: Stopped (08/22/24 1101)        ASSESSMENT:   Several brain lesions with edema and brain tissue compression  Headache and nausea and vomiting as a result of the above, now under control  Light smoker  Colon mass with family of colon cancer  Uncontrolled hypertension  Steroid-induced hyperglycemia expected    PLAN:  Patient is on Keppra for seizure prophylaxis, patient is on steroids for the brain tumor related vasogenic edema with clinical improvement  She is off the Cleviprex and on oral regimen seems to be well-controlled she is on as needed medication as well  She was transition to oral steroid and is on the taper dose  She is cleared to transfer out of the intensive  care unit by neurosurgery  GI to follow and decide on the timing of the colonoscopy  Will defer to the internal medicine team for further evaluation and management of her underlying possible colon mass, and management of the steroid-induced hypertension and hyperglycemia  Discussed with patient and family  Discussed with the nursing staff and the ICU rounding team  Labs/Notes/films were independently reviewed and pertinent results are summarized above  The copied texts in this note were reviewed and they are accurate as of 08/23/24    Disposition: Stepdown unit    Sarika Wilburn MD  08/23/24  12:06 EDT        Dictated utilizing Dragon dictation    Electronically signed by Sarika Wilburn MD at 08/23/24 3037       Francesca Anaya MD at 08/23/24 3383          [facility]  Inpatient Progress Note    Reason for Follow Up:  brain mets, abnormal sigmoid colon imaging    Subjective     Interval History:   She has no complaints at all-specifically she denies abdominal pain nausea difficulty swallowing.  She has had bowel movements.  She is tolerating clears    Current Facility-Administered Medications:     acetaminophen (TYLENOL) tablet 650 mg, 650 mg, Oral, Q4H PRN **OR** acetaminophen (TYLENOL) 160 MG/5ML oral solution 650 mg, 650 mg, Oral, Q4H PRN **OR** acetaminophen (TYLENOL) suppository 650 mg, 650 mg, Rectal, Q4H PRN, Bull Doty MD    acetaminophen (TYLENOL) tablet 650 mg, 650 mg, Oral, Q4H PRN, 650 mg at 08/21/24 0124 **OR** acetaminophen (TYLENOL) suppository 650 mg, 650 mg, Rectal, Q4H PRN, Bull Doty MD    amLODIPine (NORVASC) tablet 5 mg, 5 mg, Oral, BID, Sarika Wilburn MD, 5 mg at 08/23/24 0844    atorvastatin (LIPITOR) tablet 10 mg, 10 mg, Oral, Daily, Bull Doty MD, 10 mg at 08/23/24 0843    sennosides-docusate (PERICOLACE) 8.6-50 MG per tablet 2 tablet, 2 tablet, Oral, BID PRN **AND** polyethylene glycol (MIRALAX) packet 17 g, 17 g, Oral, Daily PRN **AND**  bisacodyl (DULCOLAX) EC tablet 5 mg, 5 mg, Oral, Daily PRN **AND** bisacodyl (DULCOLAX) suppository 10 mg, 10 mg, Rectal, Daily PRN, Bull Doty MD    Calcium Replacement - Follow Nurse / BPA Driven Protocol, , Does not apply, PRN, Bull Doty MD    ceFAZolin 2000 mg IVPB in 100 mL NS (MBP), 2,000 mg, Intravenous, Q8H, Bull Doty MD, 2,000 mg at 08/23/24 0517    clevidipine (CLEVIPREX) infusion 0.5 mg/mL, 2-21 mg/hr, Intravenous, Titrated, Sarika Wilburn MD, Stopped at 08/22/24 1101    dextrose (D50W) (25 g/50 mL) IV injection 25 g, 25 g, Intravenous, Q15 Min PRN, Bull Doty MD    dextrose (GLUTOSE) oral gel 15 g, 15 g, Oral, Q15 Min PRN, Bull Doty MD    enalaprilat (VASOTEC) injection 0.625 mg, 0.625 mg, Intravenous, Q6H PRN, Sarika Wilburn MD    famotidine (PEPCID) injection 20 mg, 20 mg, Intravenous, Q12H, Bull Doty MD, 20 mg at 08/23/24 0844    fentaNYL citrate (PF) (SUBLIMAZE) injection, , Intravenous, Code / Trauma / Sedation Medication, Malvin Merritt MD, 50 mcg at 08/21/24 1158    glucagon (GLUCAGEN) injection 1 mg, 1 mg, Intramuscular, Q15 Min PRN, Bull Doty MD    hydrALAZINE (APRESOLINE) injection 10 mg, 10 mg, Intravenous, Q4H PRN, Bull Doty MD, 10 mg at 08/23/24 0516    HYDROcodone-acetaminophen (NORCO) 5-325 MG per tablet 1 tablet, 1 tablet, Oral, Q4H PRN, Bull Doty MD, 1 tablet at 08/23/24 0843    Hydrocortisone Sod Suc (PF) (Solu-CORTEF) injection 250 mg, 250 mg, Intravenous, Q6H, Bull Doty MD, 250 mg at 08/23/24 0516    insulin lispro (HUMALOG/ADMELOG) injection 2-9 Units, 2-9 Units, Subcutaneous, 4x Daily AC & at Bedtime, Bull Doty MD, 2 Units at 08/23/24 0844    labetalol (NORMODYNE,TRANDATE) injection 20 mg, 20 mg, Intravenous, Q2H PRN, Bull Doty MD, 20 mg at 08/23/24 0310    levETIRAcetam (KEPPRA) injection 500 mg, 500 mg, Intravenous, Q12H, Bull Doty MD, 500 mg at 08/23/24 0844    Magnesium Standard  Dose Replacement - Follow Nurse / BPA Driven Protocol, , Does not apply, PRNSoren Steven J, MD    midazolam (VERSED) injection, , Intravenous, Code / Trauma / Sedation Medication, Malvin Merritt MD, 1 mg at 08/21/24 1157    morphine injection 1 mg, 1 mg, Intravenous, Q2H PRN, 1 mg at 08/21/24 2237 **AND** naloxone (NARCAN) injection 0.4 mg, 0.4 mg, Intravenous, Q5 Min PRN, Bull Doty MD    nitroglycerin (NITROSTAT) SL tablet 0.4 mg, 0.4 mg, Sublingual, Q5 Min PRN, Bull Doty MD    ondansetron ODT (ZOFRAN-ODT) disintegrating tablet 4 mg, 4 mg, Oral, Q6H PRN **OR** ondansetron (ZOFRAN) injection 4 mg, 4 mg, Intravenous, Q6H PRN, Bull Doty MD    Phosphorus Replacement - Follow Nurse / BPA Driven Protocol, , Does not apply, Soren ROWE Steven J, MD    Potassium Replacement - Follow Nurse / BPA Driven Protocol, , Does not apply, Soren ROWE Steven J, MD    sodium chloride 0.9 % flush 10 mL, 10 mL, Intravenous, PRSoren ZHANG Steven J, MD    [CANCELED] Insert Peripheral IV, , , Once **AND** sodium chloride 0.9 % flush 10 mL, 10 mL, Intravenous, Soren ROWE Steven J, MD  Review of Systems:    The following systems were reviewed and negative;  constitution and gastrointestinal    Objective     Vital Signs  Temp:  [97.5 °F (36.4 °C)-98.6 °F (37 °C)] 97.7 °F (36.5 °C)  Heart Rate:  [67-94] 89  Resp:  [14-16] 16  BP: (104-143)/() 143/79  Body mass index is 22.54 kg/m².    Intake/Output Summary (Last 24 hours) at 8/23/2024 1014  Last data filed at 8/23/2024 0400  Gross per 24 hour   Intake 960 ml   Output 600 ml   Net 360 ml     No intake/output data recorded.     Physical Exam:   General: patient awake, alert and cooperative   Eyes: Normal lids and lashes, no scleral icterus   Neck: supple, normal ROM   Skin: warm and dry, not jaundiced   Pulm:   regular and unlabored   Abdomen: soft, nontender, nondistended    Psychiatric: Normal mood and behavior; memory intact     Results Review:     I  "reviewed the patient's new clinical results.    Results from last 7 days   Lab Units 08/23/24  0318 08/22/24  0401 08/21/24  1615   WBC 10*3/mm3 8.57 12.92* 16.36*   HEMOGLOBIN g/dL 10.5* 11.3* 13.0   HEMATOCRIT % 30.9* 32.6* 37.0   PLATELETS 10*3/mm3 269 284 350     Results from last 7 days   Lab Units 08/23/24  0318 08/22/24  0401 08/21/24  1615 08/19/24  2318 08/19/24  1545   SODIUM mmol/L 139 140 144   < > 137   POTASSIUM mmol/L 3.5 3.7 3.3*   < > 3.6   CHLORIDE mmol/L 109* 107 109*   < > 102   CO2 mmol/L 23.0 25.1 24.5   < > 26.0   BUN mg/dL 11 14 16   < > 13   CREATININE mg/dL 0.58 0.72 0.77   < > 0.69   CALCIUM mg/dL 7.9* 8.2* 8.7   < > 9.6   BILIRUBIN mg/dL  --   --   --   --  0.3   ALK PHOS U/L  --   --   --   --  83   ALT (SGPT) U/L  --   --   --   --  13   AST (SGOT) U/L  --   --   --   --  15   GLUCOSE mg/dL 145* 131* 157*   < > 98    < > = values in this interval not displayed.     Results from last 7 days   Lab Units 08/22/24  0401 08/21/24  0512 08/20/24  0504   INR  0.99 1.06 1.09     No results found for: \"LIPASE\"    Radiology:  MRI Brain With & Without Contrast   Final Result   1. Postoperative findings, as noted above.  Continued follow-up is   recommended.            This report was finalized on 8/23/2024 5:28 AM by Dr. Viridiana Altamirano M.D on Workstation: [id number]          CT Head Without Contrast   Final Result   1.  The patient is status post resection of a right cerebellar avidly   enhancing mass. Expected postoperative changes are noted. No   complicating feature is identified.   2.  Decreased attenuation is appreciated involving the left temporal   lobe posteriorly and inferiorly consistent with vasogenic edema. This   corresponds to the 9 mm enhancing lesion present on the MRI examination   of 8/20/2024. The enhancing lesion involving the superior cerebellar   vermis to the left is occult as is the area of dural enhancement   overlying the left frontal lobe superolaterally.     "   Radiation dose reduction techniques were utilized, including automated   exposure modulation based on body size.       This report was finalized on 8/21/2024 6:45 PM by Dr. Cesar Levine M.D   on Workstation: [id number]          CT Head With & Without Contrast   Final Result   1. There has been no change when compared to the MRI of the brain with   and without contrast earlier this morning on 08/20/2024 at 12:22 a.m.       2. This patient has 3 enhancing lesions in the brain and findings are   most consistent with metastatic disease to the brain. The enhancing   brain lesions which are most likely brain mets includes a 7 x 6 x 6 mm   enhancing lesion in the posterior inferior left temporal lobe that has   2.5 x 2.3 cm of surrounding vasogenic edema. There is a 10 x 9 x 10 mm   enhancing lesion in the superior midline of the vermis of the cerebellum   that has mild surrounding edema. The largest enhancing lesion is in the   posterior inferior right cerebellum that measures 2.4 x 2 x 2 cm in size   and has a large amount of surrounding vasogenic edema tracking up to 4.5   x 4.5 cm with mild mass effect on the fourth ventricle. There is mild   dilatation of the lateral and third ventricles compatible with mild   hydrocephalus, unchanged. This patient has abnormal circumferential wall   thickening within the distal sigmoid colon seen on chest, abdomen and   pelvic CT earlier this morning on 08/20/2024 at 1:42 a.m. suggesting   there could be a sigmoid colon cancer and correlation with colonoscopy   is suggested.       Radiation dose reduction techniques were utilized, including automated   exposure control and exposure modulation based on body size.           This report was finalized on 8/21/2024 6:09 AM by Dr. Gilbert Hassan M.D   on Workstation: [id number]          CT Chest Without Contrast Diagnostic   Final Result       1. No evidence of metastatic disease within the thorax.   2. Asymmetric wall thickening  involving the distal sigmoid colon, with   some surrounding mesenteric soft tissue nodularity. Appearance is very   worrisome for primary colonic malignancy. Correlation with colonoscopy   is recommended. MRI could allow for further assessment.   3. Sclerotic lesion within the spinous process of T1. I would favor that   this is a benign lesion such as a bone island. Consider short-term   follow-up or bone scan.       Radiation dose reduction techniques were utilized, including automated   exposure control and exposure modulation based on body size.           This report was finalized on 8/20/2024 3:54 AM by Dr. Viridiana Altamirano M.D on Workstation: [id number]          CT Abdomen Pelvis Without Contrast   Final Result       1. No evidence of metastatic disease within the thorax.   2. Asymmetric wall thickening involving the distal sigmoid colon, with   some surrounding mesenteric soft tissue nodularity. Appearance is very   worrisome for primary colonic malignancy. Correlation with colonoscopy   is recommended. MRI could allow for further assessment.   3. Sclerotic lesion within the spinous process of T1. I would favor that   this is a benign lesion such as a bone island. Consider short-term   follow-up or bone scan.       Radiation dose reduction techniques were utilized, including automated   exposure control and exposure modulation based on body size.           This report was finalized on 8/20/2024 3:54 AM by Dr. Viridiana Altamirano M.D on Workstation: [id number]          MRI Brain With & Without Contrast   Final Result   1. Study confirms the presence of lesions within the left temporal lobe,   as well as the right cerebellar hemisphere. A third lesion is noted   within the cerebellar vermis. There is mass effect upon the fourth   ventricle. There is some mild dilatation of the lateral and third   ventricles. Continued follow-up is recommended.           This report was finalized on 8/20/2024 2:41 AM by  Dr. Viridiana Altamirano M.D on Workstation: [id number]          CT Head Without Contrast   Final Result   Areas of increased attenuation are appreciated over the   right cerebral hemisphere posteriorly (2.1 cm) and left temporal lobe   inferiorly and posteriorly (6 mm) most consistent with that of   metastatic disease with associated edema. Edema is most prominent   involving the right cerebellar hemisphere. There is mass effect upon the   fourth ventricle and mild prominence of the lateral and third   ventricles. Further evaluation with MRI examination of the brain with   and without contrast is recommended.       The above information was called to and discussed with Dr. Epps.                   Radiation dose reduction techniques were utilized, including automated   exposure modulation based on body size.       This report was finalized on 2024 6:40 PM by Dr. Cesar Levine M.D   on Workstation: [id number]          XR Chest 1 View   Final Result   1. No acute process           This report was finalized on 2024 4:32 PM by Dr. Veto Escobar M.D on Workstation: [id number]              Assessment & Plan     Active Hospital Problems    Diagnosis     **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     Compression of brain      All problems are new to me  Assessment:  Brain mets  Abnormal CT scan sigmoid colon      Plan:  F/u path from recent craniotomy- if c/w GI primary, will need colonoscopy when able to tolerate    I discussed the patients findings and my recommendations with patient and family.            Francesca Anaya M.D.  [facility]  [phone]              Electronically signed by Francesca Anaya MD at 24 1014       Sarika Wilburn MD at 24 1218            PROGRESS NOTE  Patient Name: Ely Sims  Age/Sex: 64 y.o. female  : 1960  MRN: [id number]    Date of Admission: 2024  Date of Encounter Visit: 24   LOS: 3 days   Patient Care  "Team:  Provider, No Known as PCP - General    Chief Complaint: Came in with nausea vomiting and headache    Hospital course: Successful craniotomy yesterday with good results, pain is well-controlled  She had problem with the blood pressure postop and she was maxed out on the Cardene and the as needed medication and he had could not keep within 1 to the range so she was switched to Cleviprex  She is down on minimal dose at this point and she is on scheduled medication and hopefully will be able to get her off the drip  She has no complaints, good appetite, pain is minimal, no visual changes, no personality changes, overall recovering pretty well        REVIEW OF SYSTEMS:   CONSTITUTIONAL: no fever or chills  CARDIOVASCULAR: No chest pain, chest pressure or chest discomfort. No palpitations or edema.   RESPIRATORY: No shortness of breath, cough or sputum.   GASTROINTESTINAL: No anorexia, nausea, vomiting or diarrhea. No abdominal pain or blood.   HEMATOLOGIC: No bleeding or bruising.     Ventilator/Non-Invasive Ventilation Settings (From admission, onward)      None              Vital Signs  Temp:  [97.6 °F (36.4 °C)-98.6 °F (37 °C)] 98.6 °F (37 °C)  Heart Rate:  [] 71  Resp:  [12-20] 16  BP: (104-139)/(46-92) 113/64  Arterial Line BP: (115-151)/(47-74) 137/54  SpO2:  [93 %-100 %] 95 %  on  Flow (L/min):  [2-4] 2 Device (Oxygen Therapy): nasal cannula    Intake/Output Summary (Last 24 hours) at 8/22/2024 1218  Last data filed at 8/22/2024 0535  Gross per 24 hour   Intake 1480 ml   Output 1100 ml   Net 380 ml       Flowsheet Rows      Flowsheet Row First Filed Value   Admission Height 170.2 cm (67.01\") Documented at 08/20/2024 0648   Admission Weight 65.1 kg (143 lb 8.3 oz) Documented at 08/19/2024 2017          Body mass index is 22.54 kg/m².      08/20/24  0648 08/21/24  0200 08/21/24  0513   Weight: 65.1 kg (143 lb 8.3 oz) 65.3 kg (143 lb 15.4 oz) 65.3 kg (143 lb 15.4 oz)       Physical Exam:  GEN:  No acute " distress, alert, cooperative, well developed   EYES:   Sclerae clear. No icterus. PERRL. Normal EOM  ENT:   External ears/nose normal, no oral lesions, no thrush, mucous membranes moist  NECK:  Supple, midline trachea, no JVD  LUNGS: Normal chest on inspection, CTAB, no wheezes. No rhonchi. No crackles. Respirations regular, even and unlabored.   CV:  Regular rhythm and rate. Normal S1/S2. No murmurs, gallops, or rubs noted.  ABD:  Soft, nontender and nondistended. Normal bowel sounds. No guarding  EXT:  Moves all extremities well. No cyanosis. No redness. No edema.   Skin: Dry, intact, no bleeding    Results Review:        Results from last 7 days   Lab Units 08/22/24  0401 08/21/24  1615 08/21/24  0512 08/20/24  0504 08/19/24  2318 08/19/24  1545   SODIUM mmol/L 140 144 140 136 138 137   POTASSIUM mmol/L 3.7 3.3* 3.3* 3.7 3.5 3.6   CHLORIDE mmol/L 107 109* 107 104 103 102   CO2 mmol/L 25.1 24.5 24.6 20.2* 21.4* 26.0   BUN mg/dL 14 16 13 10 10 13   CREATININE mg/dL 0.72 0.77 0.64 0.53* 0.51* 0.69   CALCIUM mg/dL 8.2* 8.7 9.2 8.7 9.2 9.6   AST (SGOT) U/L  --   --   --   --   --  15   ALT (SGPT) U/L  --   --   --   --   --  13   ANION GAP mmol/L 7.9 10.5 8.4 11.8 13.6 9.0   ALBUMIN g/dL  --   --   --   --   --  4.4     Results from last 7 days   Lab Units 08/19/24  1545   HSTROP T ng/L <6             Results from last 7 days   Lab Units 08/22/24  0401 08/21/24  1615 08/21/24  0512 08/20/24  0504 08/19/24  2132 08/19/24  1545   WBC 10*3/mm3 12.92* 16.36* 12.81* 6.97 5.83 5.34   HEMOGLOBIN g/dL 11.3* 13.0 12.4 13.7 13.7 14.4   HEMATOCRIT % 32.6* 37.0 35.5 40.2 40.5 42.5   PLATELETS 10*3/mm3 284 350 311 330 275 355   MCV fL 95.6 96.1 95.9 97.8* 99.8* 97.7*   NEUTROPHIL % % 90.6* 88.4* 90.8* 89.7* 83.8* 68.1   LYMPHOCYTE % % 3.8* 6.9* 5.2* 7.9* 11.0* 22.1   MONOCYTES % % 5.0 4.2* 3.6* 2.0* 4.1* 8.6   EOSINOPHIL % % 0.0* 0.0* 0.0* 0.0* 0.3 0.4   BASOPHIL % % 0.1 0.1 0.1 0.1 0.5 0.6   IMM GRAN % % 0.5 0.4 0.3 0.3 0.3 0.2  "    Results from last 7 days   Lab Units 08/22/24  0401 08/21/24  0512 08/20/24  0504   INR  0.99 1.06 1.09   APTT seconds 24.5  --  28.4     Results from last 7 days   Lab Units 08/22/24  0401 08/19/24  1545   MAGNESIUM mg/dL 2.2 1.9           Invalid input(s): \"LDLCALC\"          Glucose   Date/Time Value Ref Range Status   08/22/2024 1132 179 (H) 70 - 130 mg/dL Final   08/22/2024 0719 141 (H) 70 - 130 mg/dL Final   08/22/2024 0521 136 (H) 70 - 130 mg/dL Final   08/21/2024 2344 161 (H) 70 - 130 mg/dL Final   08/21/2024 1952 192 (H) 70 - 130 mg/dL Final   08/21/2024 0615 151 (H) 70 - 130 mg/dL Final   08/20/2024 2233 154 (H) 70 - 130 mg/dL Final   08/20/2024 1757 220 (H) 70 - 130 mg/dL Final             Results from last 7 days   Lab Units 08/19/24  1708   NITRITE UA  Negative   WBC UA /HPF 3-5*   BACTERIA UA /HPF 1+*   SQUAM EPITHEL UA /HPF 3-6*     Results from last 7 days   Lab Units 08/19/24  1708   COVID19  Not Detected   INFLUENZA B PCR  Not Detected   RSV, PCR  Not Detected               Imaging:   Imaging Results (All)       Procedure Component Value Units Date/Time     I reviewed the patient's new clinical results.  I personally viewed and interpreted the patient's imaging results:        Medication Review:   atorvastatin, 10 mg, Oral, Daily  ceFAZolin, 2,000 mg, Intravenous, Q8H  famotidine, 20 mg, Intravenous, Q12H  hydrocortisone sodium succinate, 250 mg, Intravenous, Q6H  insulin lispro, 2-9 Units, Subcutaneous, 4x Daily AC & at Bedtime  levETIRAcetam, 500 mg, Intravenous, Q12H        clevidipine, 2-21 mg/hr, Last Rate: Stopped (08/22/24 1101)  sodium chloride 0.9 % with KCl 20 mEq, 100 mL/hr, Last Rate: 100 mL/hr (08/22/24 0947)        ASSESSMENT:   Several brain lesions with edema and brain tissue compression  Headache and nausea and vomiting as a result of the above, now under control  Light smoker  Colon mass with family of colon cancer  Uncontrolled hypertension  Steroid-induced hyperglycemia " expected    PLAN:  Patient is doing better, headache is much better controlled  Blood pressure is within acceptable range, she is on the Cleviprex drip which need to be transitioned to scheduled and as needed medication and off the continuous drip.  On Keppra for seizure prophylaxis  On the steroid with some steroid-induced hypertension and hyperglycemia which are expected currently on 1 g of hydrocortisone per day  Will defer weaning to the neurosurgical team  Colon mass to be evaluated by the GI later on this admission with possible colonoscopy  Will follow-up on the brain biopsy results  Discontinue Fonseca catheter  Okay to transfer out of the ICU once cleared by neurosurgery      Discussed with patient and family  Discussed with the nursing staff and the ICU rounding team  Labs/Notes/films were independently reviewed and pertinent results are summarized above  The copied texts in this note were reviewed and they are accurate as of 08/22/24    Disposition: Continue to monitor in the ICU    Sarika Wilburn MD  08/22/24  12:18 EDT        Dictated utilizing Dragon dictation    Electronically signed by Sarika Wilburn MD at 08/22/24 1252       Edie Sierra APRN at 08/22/24 1056       Attestation signed by Bull Doty MD at 08/22/24 1602    I have reviewed this documentation and agree.                    [facility] NEUROSURGERY INTRACRANIAL PROGRESS NOTE    PATIENT IDENTIFICATION:   Name:  Ely Sims      MRN:  [id number]     64 y.o.  female               Cc: POD # 1 s/p fossa craniotomy with gross total removal of right cerebellar hemisphere metastasis      Subjective     Interval History: No significant overnight events.  Patient has no complaints this morning other than a mild headache.  No nausea, vomiting.      Objective     Vital signs in last 24 hours:  Temp:  [97.6 °F (36.4 °C)-98.4 °F (36.9 °C)] 98.4 °F (36.9 °C)  Heart Rate:  [] 82  Resp:  [12-26] 16  BP: (104-144)/(46-92) 115/65  Arterial  Line BP: (115-162)/(47-74) 137/54      Intake/Output this shift:  No intake/output data recorded.      Intake/Output last 3 shifts:  I/O last 3 completed shifts:  In: 1480 [P.O.:480; I.V.:1000]  Out: 1100 [Urine:1000; Blood:100]    LABS:  Results from last 7 days   Lab Units 08/22/24  0401 08/21/24  1615 08/21/24  0512   WBC 10*3/mm3 12.92* 16.36* 12.81*   HEMOGLOBIN g/dL 11.3* 13.0 12.4   HEMATOCRIT % 32.6* 37.0 35.5   PLATELETS 10*3/mm3 284 350 311     Results from last 7 days   Lab Units 08/22/24  0401 08/21/24  1615 08/21/24  0512 08/19/24  2318 08/19/24  1545   SODIUM mmol/L 140 144 140   < > 137   POTASSIUM mmol/L 3.7 3.3* 3.3*   < > 3.6   CHLORIDE mmol/L 107 109* 107   < > 102   CO2 mmol/L 25.1 24.5 24.6   < > 26.0   BUN mg/dL 14 16 13   < > 13   CREATININE mg/dL 0.72 0.77 0.64   < > 0.69   CALCIUM mg/dL 8.2* 8.7 9.2   < > 9.6   BILIRUBIN mg/dL  --   --   --   --  0.3   ALK PHOS U/L  --   --   --   --  83   ALT (SGPT) U/L  --   --   --   --  13   AST (SGOT) U/L  --   --   --   --  15   GLUCOSE mg/dL 131* 157* 147*   < > 98    < > = values in this interval not displayed.     8/21-Tissue Pathology pending     IMAGING STUDIES:  CT head 8/21-status post right suboccipital craniotomy for resection of right cerebellar enhancing mass.  Decreased attenuation involving the left temporal lobe posteriorly and inferiorly consistent with vasogenic edema.  This corresponds to 9 mm enhancing lesion present on MRI from 8/20/2024. The enhancing lesion involving the superior cerebellar  vermis to the left is occult as is the area of dural enhancement  overlying the left frontal lobe superolaterally.    I personally viewed the patient's CT head, it was also reviewed by and discussed with Dr Doty    Meds reviewed/changed: Yes  Norvasc 5 mg p.o. twice daily  Lipitor 10 mg p.o. daily  Cefazolin 2 g IV every 8 hours  Pepcid 20 mg IV every 12 hours  Solu-Cortef 250 mg IV every 6 hours  Humalog 2-9 units SQ 4 times daily  Keppra 500  mg IV every 12 hours  Hydralazine 10 mg IV every 4 hours x 2 doses   Hydrocodone 5 mg 1 p.o. every 4 hours as needed  Labetalol 20 mg IV every 2 hours as needed x 4 doses   Morphine 1 mg IV every 2 hours as needed      Physical Exam:    General:  Awake, alert & oriented x 3.  Speech clear with no aphasia  CN II:     Visual fileds full to confrontation  CN III, IV, VI:  EOM's intact,  PERRL.  CN VII:  Facial movements are symmetric, no weakness  Motor:  Normal muscle strength, bulk and tone in bilateral upper and lower extremities.  No fasciculations, rigidity, spasticity, or abnormal movements  Sensation:  Normal to light touch bilaterally   Station & Gait: Bedrest  Coordination:  Finger to nose intact bilaterally.  Heel to shin intact in bilateral lower extremities  Extremities:  Wearing SCD's  Skin: Posterior right sided craniotomy incision is well-appearing, well-approximated.  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  Patient is doing very well this morning, only complains of a mild headache.  Otherwise reports she is feeling great.  We will plan for MRI of the brain with and without contrast today.  Keep in ICU today.     PLAN:   -MRI brain w/wo today  -DC FC  -Up to chair   -PT/OT/Speech therapy to evaluate  -SBP<120 can increase parameter to <140 tomorrow  -Advance diet when cleared by speech therapy  -Medical management per intensivist    I discussed the patient's findings and my recommendations with patient, family, nursing staff, primary care team, and Dr Doty    During patient visit, I utilized appropriate personal protective equipment including  gloves.  Appropriate PPE was worn during the entire visit.  Hand hygiene was completed before and after.      LOS: 3 days       Edie Sierra,  "APRN  2024  10:56 EDT    \"Dictated utilizing Dragon dictation\".        Electronically signed by Bull Doty MD at 24 1602       Sarika Wilburn MD at 24 0706            PROGRESS NOTE  Patient Name: Ely Sims  Age/Sex: 64 y.o. female  : 1960  MRN: [id number]    Date of Admission: 2024  Date of Encounter Visit: 24   LOS: 2 days   Patient Care Team:  Provider, No Known as PCP - General    Chief Complaint: Came in with nausea vomiting and headache    Hospital course: Multiple brain lesion on the CT scan, with evidence of: Mass in the rectosigmoid area.  Patient is doing better she is on high-dose steroid  She is supposed to go for her surgery later this morning  She is afebrile  No GI or  complaints  Headache is much better        REVIEW OF SYSTEMS:   CONSTITUTIONAL: no fever or chills  CARDIOVASCULAR: No chest pain, chest pressure or chest discomfort. No palpitations or edema.   RESPIRATORY: No shortness of breath, cough or sputum.   GASTROINTESTINAL: No anorexia, nausea, vomiting or diarrhea. No abdominal pain or blood.   HEMATOLOGIC: No bleeding or bruising.     Ventilator/Non-Invasive Ventilation Settings (From admission, onward)      None              Vital Signs  Temp:  [97.4 °F (36.3 °C)-97.8 °F (36.6 °C)] 97.6 °F (36.4 °C)  Heart Rate:  [] 76  Resp:  [16-18] 18  BP: (117-163)/(69-96) 129/71  SpO2:  [94 %-97 %] 94 %  on    Device (Oxygen Therapy): room air  No intake or output data in the 24 hours ending 24 0706    Flowsheet Rows      Flowsheet Row First Filed Value   Admission Height 170.2 cm (67.01\") Documented at 2024 0648   Admission Weight 65.1 kg (143 lb 8.3 oz) Documented at 2024 2017          Body mass index is 22.54 kg/m².      24  0648 24  0200 24  0513   Weight: 65.1 kg (143 lb 8.3 oz) 65.3 kg (143 lb 15.4 oz) 65.3 kg (143 lb 15.4 oz)       Physical Exam:  GEN:  No acute distress, alert, cooperative, well developed " "  EYES:   Sclerae clear. No icterus. PERRL. Normal EOM  ENT:   External ears/nose normal, no oral lesions, no thrush, mucous membranes moist  NECK:  Supple, midline trachea, no JVD  LUNGS: Normal chest on inspection, CTAB, no wheezes. No rhonchi. No crackles. Respirations regular, even and unlabored.   CV:  Regular rhythm and rate. Normal S1/S2. No murmurs, gallops, or rubs noted.  ABD:  Soft, nontender and nondistended. Normal bowel sounds. No guarding  EXT:  Moves all extremities well. No cyanosis. No redness. No edema.   Skin: Dry, intact, no bleeding    Results Review:        Results from last 7 days   Lab Units 08/21/24  0512 08/20/24  0504 08/19/24  2318 08/19/24  1545   SODIUM mmol/L 140 136 138 137   POTASSIUM mmol/L 3.3* 3.7 3.5 3.6   CHLORIDE mmol/L 107 104 103 102   CO2 mmol/L 24.6 20.2* 21.4* 26.0   BUN mg/dL 13 10 10 13   CREATININE mg/dL 0.64 0.53* 0.51* 0.69   CALCIUM mg/dL 9.2 8.7 9.2 9.6   AST (SGOT) U/L  --   --   --  15   ALT (SGPT) U/L  --   --   --  13   ANION GAP mmol/L 8.4 11.8 13.6 9.0   ALBUMIN g/dL  --   --   --  4.4     Results from last 7 days   Lab Units 08/19/24  1545   HSTROP T ng/L <6             Results from last 7 days   Lab Units 08/21/24  0512 08/20/24  0504 08/19/24  2132 08/19/24  1545   WBC 10*3/mm3 12.81* 6.97 5.83 5.34   HEMOGLOBIN g/dL 12.4 13.7 13.7 14.4   HEMATOCRIT % 35.5 40.2 40.5 42.5   PLATELETS 10*3/mm3 311 330 275 355   MCV fL 95.9 97.8* 99.8* 97.7*   NEUTROPHIL % % 90.8* 89.7* 83.8* 68.1   LYMPHOCYTE % % 5.2* 7.9* 11.0* 22.1   MONOCYTES % % 3.6* 2.0* 4.1* 8.6   EOSINOPHIL % % 0.0* 0.0* 0.3 0.4   BASOPHIL % % 0.1 0.1 0.5 0.6   IMM GRAN % % 0.3 0.3 0.3 0.2     Results from last 7 days   Lab Units 08/21/24  0512 08/20/24  0504   INR  1.06 1.09   APTT seconds  --  28.4     Results from last 7 days   Lab Units 08/19/24  1545   MAGNESIUM mg/dL 1.9           Invalid input(s): \"LDLCALC\"          Glucose   Date/Time Value Ref Range Status   08/21/2024 0615 151 (H) 70 - 130 " mg/dL Final   08/20/2024 2233 154 (H) 70 - 130 mg/dL Final   08/20/2024 1757 220 (H) 70 - 130 mg/dL Final   08/20/2024 1044 171 (H) 70 - 130 mg/dL Final   08/19/2024 2044 91 70 - 130 mg/dL Final             Results from last 7 days   Lab Units 08/19/24  1708   NITRITE UA  Negative   WBC UA /HPF 3-5*   BACTERIA UA /HPF 1+*   SQUAM EPITHEL UA /HPF 3-6*     Results from last 7 days   Lab Units 08/19/24  1708   COVID19  Not Detected   INFLUENZA B PCR  Not Detected   RSV, PCR  Not Detected               Imaging:   Imaging Results (All)       Procedure Component Value Units Date/Time     I reviewed the patient's new clinical results.  I personally viewed and interpreted the patient's imaging results:        Medication Review:   atorvastatin, 10 mg, Oral, Daily  famotidine, 20 mg, Intravenous, Q12H  hydrocortisone sodium succinate, 250 mg, Intravenous, Q6H  insulin lispro, 2-9 Units, Subcutaneous, 4x Daily AC & at Bedtime  levETIRAcetam, 500 mg, Intravenous, Q12H  senna-docusate sodium, 2 tablet, Oral, BID        niCARdipine, 5-15 mg/hr, Last Rate: Stopped (08/20/24 0558)        ASSESSMENT:   Several brain lesions with edema and brain tissue compression  Headache and nausea and vomiting as a result of the above  Light smoker  Colon mass with family of colon cancer  Uncontrolled hypertension    PLAN:  Patient is doing better, headache is much better controlled  Blood pressure is within acceptable range, she is off the Cardene drip currently on as needed medication  She is on Keppra for seizure prophylaxis  Patient is supposed to be evaluated by GI on this admission as well we will go ahead consult  Will follow-up postoperatively on any postcraniotomy complication and for postcraniotomy care      Discussed with patient and family  Discussed with the nursing staff and the ICU rounding team  Labs/Notes/films were independently reviewed and pertinent results are summarized above  The copied texts in this note were reviewed and  they are accurate as of 08/21/24    Disposition: Continue to monitor in the ICU    Sarika Wilburn MD  08/21/24  07:06 EDT        Dictated utilizing Dragon dictation    Electronically signed by Sarika Wilburn MD at 08/21/24 1606          Consult Notes (last 72 hours)        Shadi Loja MD at 08/22/24 1424        Consult Orders    1. Inpatient Gastroenterology Consult [[id number]] ordered by Sarika Wilburn MD at 08/21/24 1605                 [facility]  Initial Inpatient Consult Note    Referring Provider:  Dr Mathew    Reason for Consultation: Brain mets    Subjective     History of present illness:    64 y.o. female s/p craniotomy for brain lesions, felt to be secondary to metastatic disease.  CT A/P shows thick walled area in simgoid colon cannot r/o neoplastic process.  Last colonoscopy 14yrs ago.  Reports some bleeding a few weeks ago after taking metamucil but none since.      Past Medical History:  History reviewed. No pertinent past medical history.  Past Surgical History:  Past Surgical History:   Procedure Laterality Date    CRANIOTOMY N/A 8/21/2024    Procedure: Posterior fossa craniotomy for tumor using stereotactic guidance;  Surgeon: Bull Doty MD;  Location: [facility];  Service: Neurosurgery;  Laterality: N/A;      Social History:   Social History     Tobacco Use    Smoking status: Some Days     Types: Cigarettes    Smokeless tobacco: Never   Substance Use Topics    Alcohol use: Yes      Family History:  History reviewed. No pertinent family history.    Home Meds:  Medications Prior to Admission   Medication Sig Dispense Refill Last Dose    amphetamine-dextroamphetamine (ADDERALL) 20 MG tablet Take 1 tablet by mouth 2 (Two) Times a Day.   8/18/2024    atorvastatin (LIPITOR) 10 MG tablet Take 1 tablet by mouth Daily.   8/18/2024    buPROPion XL (WELLBUTRIN XL) 150 MG 24 hr tablet Take 2 tablets by mouth Every Morning.   8/18/2024    LORazepam (ATIVAN) 0.5 MG  tablet Take 1 tablet by mouth Every 6 (Six) Hours As Needed for Anxiety.   8/18/2024     Current Meds:   amLODIPine, 5 mg, Oral, BID  atorvastatin, 10 mg, Oral, Daily  ceFAZolin, 2,000 mg, Intravenous, Q8H  famotidine, 20 mg, Intravenous, Q12H  hydrocortisone sodium succinate, 250 mg, Intravenous, Q6H  insulin lispro, 2-9 Units, Subcutaneous, 4x Daily AC & at Bedtime  levETIRAcetam, 500 mg, Intravenous, Q12H      Allergies:  No Known Allergies    Objective     Vital Signs  Temp:  [97.6 °F (36.4 °C)-98.6 °F (37 °C)] 98.6 °F (37 °C)  Heart Rate:  [] 85  Resp:  [12-20] 16  BP: (104-139)/(46-92) 122/68  Arterial Line BP: (115-137)/(47-60) 137/54  Physical Exam:  General Appearance:     Alert, cooperative, in no acute distress   Abdomen:     Normal bowel sounds, no masses, no organomegaly, soft     nontender, nondistended, no guarding, no rebound                 tenderness   Rectal:     Deferred       Results Review:   I reviewed the patient's new clinical results.  I reviewed the patient's new imaging results and agree with the interpretation.    Results from last 7 days   Lab Units 08/22/24  0401 08/21/24  1615 08/21/24  0512   WBC 10*3/mm3 12.92* 16.36* 12.81*   HEMOGLOBIN g/dL 11.3* 13.0 12.4   HEMATOCRIT % 32.6* 37.0 35.5   PLATELETS 10*3/mm3 284 350 311     Results from last 7 days   Lab Units 08/22/24  0401 08/21/24  1615 08/21/24  0512 08/19/24  2318 08/19/24  1545   SODIUM mmol/L 140 144 140   < > 137   POTASSIUM mmol/L 3.7 3.3* 3.3*   < > 3.6   CHLORIDE mmol/L 107 109* 107   < > 102   CO2 mmol/L 25.1 24.5 24.6   < > 26.0   BUN mg/dL 14 16 13   < > 13   CREATININE mg/dL 0.72 0.77 0.64   < > 0.69   CALCIUM mg/dL 8.2* 8.7 9.2   < > 9.6   BILIRUBIN mg/dL  --   --   --   --  0.3   ALK PHOS U/L  --   --   --   --  83   ALT (SGPT) U/L  --   --   --   --  13   AST (SGOT) U/L  --   --   --   --  15   GLUCOSE mg/dL 131* 157* 147*   < > 98    < > = values in this interval not displayed.     Results from last 7 days  "  Lab Units 08/22/24  0401 08/21/24  0512 08/20/24  0504   INR  0.99 1.06 1.09     No results found for: \"LIPASE\"    Radiology:  CT Head Without Contrast   Final Result   1.  The patient is status post resection of a right cerebellar avidly   enhancing mass. Expected postoperative changes are noted. No   complicating feature is identified.   2.  Decreased attenuation is appreciated involving the left temporal   lobe posteriorly and inferiorly consistent with vasogenic edema. This   corresponds to the 9 mm enhancing lesion present on the MRI examination   of 8/20/2024. The enhancing lesion involving the superior cerebellar   vermis to the left is occult as is the area of dural enhancement   overlying the left frontal lobe superolaterally.       Radiation dose reduction techniques were utilized, including automated   exposure modulation based on body size.       This report was finalized on 8/21/2024 6:45 PM by Dr. Cesar Levine M.D   on Workstation: [id number]          CT Head With & Without Contrast   Final Result   1. There has been no change when compared to the MRI of the brain with   and without contrast earlier this morning on 08/20/2024 at 12:22 a.m.       2. This patient has 3 enhancing lesions in the brain and findings are   most consistent with metastatic disease to the brain. The enhancing   brain lesions which are most likely brain mets includes a 7 x 6 x 6 mm   enhancing lesion in the posterior inferior left temporal lobe that has   2.5 x 2.3 cm of surrounding vasogenic edema. There is a 10 x 9 x 10 mm   enhancing lesion in the superior midline of the vermis of the cerebellum   that has mild surrounding edema. The largest enhancing lesion is in the   posterior inferior right cerebellum that measures 2.4 x 2 x 2 cm in size   and has a large amount of surrounding vasogenic edema tracking up to 4.5   x 4.5 cm with mild mass effect on the fourth ventricle. There is mild   dilatation of the lateral and " third ventricles compatible with mild   hydrocephalus, unchanged. This patient has abnormal circumferential wall   thickening within the distal sigmoid colon seen on chest, abdomen and   pelvic CT earlier this morning on 08/20/2024 at 1:42 a.m. suggesting   there could be a sigmoid colon cancer and correlation with colonoscopy   is suggested.       Radiation dose reduction techniques were utilized, including automated   exposure control and exposure modulation based on body size.           This report was finalized on 8/21/2024 6:09 AM by Dr. Gilbert Hassan M.D   on Workstation: [id number]          CT Chest Without Contrast Diagnostic   Final Result       1. No evidence of metastatic disease within the thorax.   2. Asymmetric wall thickening involving the distal sigmoid colon, with   some surrounding mesenteric soft tissue nodularity. Appearance is very   worrisome for primary colonic malignancy. Correlation with colonoscopy   is recommended. MRI could allow for further assessment.   3. Sclerotic lesion within the spinous process of T1. I would favor that   this is a benign lesion such as a bone island. Consider short-term   follow-up or bone scan.       Radiation dose reduction techniques were utilized, including automated   exposure control and exposure modulation based on body size.           This report was finalized on 8/20/2024 3:54 AM by Dr. Viridiana Altamirano M.D on Workstation: [id number]          CT Abdomen Pelvis Without Contrast   Final Result       1. No evidence of metastatic disease within the thorax.   2. Asymmetric wall thickening involving the distal sigmoid colon, with   some surrounding mesenteric soft tissue nodularity. Appearance is very   worrisome for primary colonic malignancy. Correlation with colonoscopy   is recommended. MRI could allow for further assessment.   3. Sclerotic lesion within the spinous process of T1. I would favor that   this is a benign lesion such as a bone island.  Consider short-term   follow-up or bone scan.       Radiation dose reduction techniques were utilized, including automated   exposure control and exposure modulation based on body size.           This report was finalized on 8/20/2024 3:54 AM by Dr. Viridiana Altamirano M.D on Workstation: [id number]          MRI Brain With & Without Contrast   Final Result   1. Study confirms the presence of lesions within the left temporal lobe,   as well as the right cerebellar hemisphere. A third lesion is noted   within the cerebellar vermis. There is mass effect upon the fourth   ventricle. There is some mild dilatation of the lateral and third   ventricles. Continued follow-up is recommended.           This report was finalized on 8/20/2024 2:41 AM by Dr. Viridiana Altamirano M.D on Workstation: [id number]          CT Head Without Contrast   Final Result   Areas of increased attenuation are appreciated over the   right cerebral hemisphere posteriorly (2.1 cm) and left temporal lobe   inferiorly and posteriorly (6 mm) most consistent with that of   metastatic disease with associated edema. Edema is most prominent   involving the right cerebellar hemisphere. There is mass effect upon the   fourth ventricle and mild prominence of the lateral and third   ventricles. Further evaluation with MRI examination of the brain with   and without contrast is recommended.       The above information was called to and discussed with Dr. Epps.                   Radiation dose reduction techniques were utilized, including automated   exposure modulation based on body size.       This report was finalized on 8/19/2024 6:40 PM by Dr. Cesar Levine M.D   on Workstation: [id number]          XR Chest 1 View   Final Result   1. No acute process           This report was finalized on 8/19/2024 4:32 PM by Dr. Veto Escobar M.D on Workstation: [id number]          MRI Brain With & Without Contrast    (Results Pending)       Assessment &  Plan   Assessment:    Brain mets   Abnormal CT scan colon    Plan:   Await path from recent craniotomy.  If path c/w primary GI malignancy will likely need colonoscopy when able to tolerate    I discussed the patients findings and my recommendations with patient.         Shadi Loja M.D.  [facility]  [address]  [place] [address]  Office: [phone]       Electronically signed by Shadi Loja MD at 08/22/24 2811        All medication doses during the admission are shown, including meds that are no longer on order.  Scheduled Meds Sorted by Name  for Ely Sims as of 8/21/24 through 8/23/24    1 Day 3 Days 7 Days 10 Days < Today >   Legend:       Medications 08/21/24 08/22/24 08/23/24   amLODIPine (NORVASC) tablet 5 mg  Dose: 5 mg  Freq: 2 Times Daily Route: PO  Start: 08/22/24 2100   Admin Instructions:        2032 0844   2100         atorvastatin (LIPITOR) tablet 10 mg  Dose: 10 mg  Freq: Daily Route: PO  Start: 08/20/24 0900   Admin Instructions:       0908   1113   1942      0837        0843          ceFAZolin 2000 mg IVPB in 100 mL NS (MBP)  Dose: 2,000 mg  Freq: Every 8 Hours Route: IV  Indications of Use: PERIOPERATIVE PHARMACOPROPHYLAXIS  Start: 08/21/24 2200 End: 08/23/24 1448   Admin Instructions:       2201        0535   1647   2352      0517   1418         ceFAZolin 2000 mg IVPB in 100 mL NS (MBP)  Dose: 2,000 mg  Freq: Once Route: IV  Indications of Use: PERIOPERATIVE PHARMACOPROPHYLAXIS  Start: 08/21/24 1256 End: 08/21/24 1328   Admin Instructions:       1258            docusate sodium (COLACE) capsule 100 mg  Dose: 100 mg  Freq: Daily Route: PO  Start: 08/23/24 1130   Admin Instructions:         (1124)          famotidine (PEPCID) injection 20 mg  Dose: 20 mg  Freq: Every 12 Hours Scheduled Route: IV  Start: 08/19/24 2100 End: 08/23/24 1038   Admin Instructions:       0908   2127       0838   2032 0844   1038-D/C'd        famotidine (PEPCID) tablet 20 mg  Dose: 20 mg  Freq: 2 Times Daily Before Meals Route: PO  Start: 08/23/24 1730      1730          gadobenate dimeglumine (MULTIHANCE) injection 14 mL  Dose: 14 mL  Freq: Once in Imaging Route: IV  Start: 08/20/24 0200 End: 08/20/24 0106   Admin Instructions:            gadobenate dimeglumine (MULTIHANCE) injection 15 mL  Dose: 15 mL  Freq: Once in Imaging Route: IV  Start: 08/22/24 2345 End: 08/22/24 2313   Admin Instructions:        2313 [C]           hydrALAZINE (APRESOLINE) injection 10 mg  Dose: 10 mg  Freq: Once Route: IV  Start: 08/19/24 2345 End: 08/19/24 2358   Admin Instructions:            Hydrocortisone Sod Suc (PF) (Solu-CORTEF) injection 250 mg  Dose: 250 mg  Freq: Every 6 Hours Route: IV  Start: 08/19/24 1908 End: 08/23/24 1018   Admin Instructions:       0122   1043   1113     1308   (1908) [C]   1942     2202        0535   1141   1651     2218        0516   1018-D/C'd  (1037)         insulin lispro (HUMALOG/ADMELOG) injection 2-9 Units  Dose: 2-9 Units  Freq: 4 Times Daily Before Meals & Nightly Route: SC  Start: 08/20/24 1730   Admin Instructions:       (7628)   1115   1130     1730   1942   2126      (3720   1239   1646     2119) [C]        0844   (2810)   1730     2100          iopamidol (ISOVUE-300) 61 % injection 100 mL  Dose: 100 mL  Freq: Once in Imaging Route: IV  Start: 08/20/24 1945 End: 08/20/24 1857         lactated ringers bolus 1,000 mL  Dose: 1,000 mL  Freq: Once Route: IV  Last Dose: Stopped (08/19/24 1920)  Start: 08/19/24 1711 End: 08/19/24 1920         levETIRAcetam (KEPPRA) injection 500 mg  Dose: 500 mg  Freq: Every 12 Hours Scheduled Route: IV  Start: 08/19/24 2100 End: 08/23/24 1018   Admin Instructions:       0908   2127       0838   2032       0844   1018-D/C'd       levETIRAcetam (KEPPRA) tablet 500 mg  Dose: 500 mg  Freq: 2 Times Daily Route: PO  Start: 08/23/24 1130   Admin Instructions:         (1128) [C]   2100          methylPREDNISolone (MEDROL) dose pack 4 mg  Dose: 4 mg  Freq: Before Breakfast Route: PO  Start: 08/28/24 0730 End: 08/29/24 0729   Admin Instructions:            methylPREDNISolone (MEDROL) dose pack 4 mg  Dose: 4 mg  Freq: Nightly - One Time Route: PO  Start: 08/27/24 2100 End: 08/28/24 2059   Admin Instructions:            methylPREDNISolone (MEDROL) dose pack 4 mg  Dose: 4 mg  Freq: Before Breakfast Route: PO  Start: 08/27/24 0730 End: 08/28/24 0729   Admin Instructions:            methylPREDNISolone (MEDROL) dose pack 4 mg  Dose: 4 mg  Freq: 3 Times Daily Around Food Route: PO  Start: 08/26/24 0730 End: 08/27/24 0729   Admin Instructions:            methylPREDNISolone (MEDROL) dose pack 4 mg  Dose: 4 mg  Freq: 4 Times Daily Tapering Route: PO  Start: 08/25/24 0730 End: 08/26/24 0729   Admin Instructions:            methylPREDNISolone (MEDROL) dose pack 4 mg  Dose: 4 mg  Freq: 3 Times Daily Around Food Route: PO  Start: 08/24/24 0730 End: 08/25/24 0729   Admin Instructions:            methylPREDNISolone (MEDROL) dose pack 4 mg  Dose: 4 mg  Freq: After Dinner Route: PO  Start: 08/23/24 1900 End: 08/24/24 1859   Admin Instructions:         1900          methylPREDNISolone (MEDROL) dose pack 4 mg  Dose: 4 mg  Freq: After Lunch Route: PO  Start: 08/23/24 1300 End: 08/24/24 1259   Admin Instructions:         1300          methylPREDNISolone (MEDROL) dose pack 8 mg  Dose: 8 mg  Freq: Nightly - One Time Route: PO  Start: 08/24/24 2100 End: 08/25/24 2059   Admin Instructions:            methylPREDNISolone (MEDROL) dose pack 8 mg  Dose: 8 mg  Freq: Nightly - One Time Route: PO  Start: 08/23/24 2100 End: 08/24/24 2059   Admin Instructions:         2100          methylPREDNISolone (MEDROL) dose pack 8 mg  Dose: 8 mg  Freq: Before Breakfast Route: PO  Start: 08/23/24 1115 End: 08/24/24 0729   Admin Instructions:         1115          potassium chloride (K-DUR,KLOR-CON) ER tablet 40 mEq  Dose: 40 mEq  Freq: Every 4 Hours  Route: PO  Start: 08/23/24 0515 End: 08/23/24 0844   Admin Instructions:         0517   0844         potassium chloride (K-DUR,KLOR-CON) ER tablet 40 mEq  Dose: 40 mEq  Freq: Every 4 Hours Route: PO  Start: 08/21/24 0930 End: 08/21/24 1729   Admin Instructions:       0908   1330   1729-D/C'd        potassium chloride 10 mEq in 100 mL IVPB  Dose: 10 mEq  Freq: Every 1 Hour Route: IV  Start: 08/21/24 0830 End: 08/21/24 0842   Admin Instructions:       0842-D/C'd  (0843)             sennosides-docusate (PERICOLACE) 8.6-50 MG per tablet 1 tablet  Dose: 1 tablet  Freq: Nightly Route: PO  Start: 08/23/24 2100   Admin Instructions:         2100          And   polyethylene glycol (MIRALAX) packet 17 g  Dose: 17 g  Freq: Daily PRN Route: PO  PRN Reason: Constipation  PRN Comment: Use if senna-docusate is ineffective  Start: 08/23/24 1038   Admin Instructions:            And   bisacodyl (DULCOLAX) EC tablet 5 mg  Dose: 5 mg  Freq: Daily PRN Route: PO  PRN Reason: Constipation  PRN Comment: Use if polyethylene glycol is ineffective  Start: 08/23/24 1038   Admin Instructions:            And   bisacodyl (DULCOLAX) suppository 10 mg  Dose: 10 mg  Freq: Daily PRN Route: RE  PRN Reason: Constipation  PRN Comment: Use if bisacodyl oral is ineffective  Start: 08/23/24 1038   Admin Instructions:             sennosides-docusate (PERICOLACE) 8.6-50 MG per tablet 2 tablet  Dose: 2 tablet  Freq: 2 Times Daily Route: PO  Start: 08/19/24 2100 End: 08/21/24 2116   Admin Instructions:       0908   1113   1942     2116-D/C'd  (2120) [C]            And   polyethylene glycol (MIRALAX) packet 17 g  Dose: 17 g  Freq: Daily PRN Route: PO  PRN Reason: Constipation  PRN Comment: Use if senna-docusate is ineffective  Start: 08/19/24 2015 End: 08/21/24 2116   Admin Instructions:       1113   1942 2116-D/C'd        And   bisacodyl (DULCOLAX) EC tablet 5 mg  Dose: 5 mg  Freq: Daily PRN Route: PO  PRN Reason: Constipation  PRN Comment: Use if  polyethylene glycol is ineffective  Start: 08/19/24 2015 End: 08/21/24 2116   Admin Instructions:       1113   1942   2116-D/C'd        And   bisacodyl (DULCOLAX) suppository 10 mg  Dose: 10 mg  Freq: Daily PRN Route: RE  PRN Reason: Constipation  PRN Comment: Use if bisacodyl oral is ineffective  Start: 08/19/24 2015 End: 08/21/24 2116   Admin Instructions:       1113   1942   2116-D/C'd                    Continuous Meds Sorted by Name  for Ely Sims as of 8/21/24 through 8/23/24  Legend:       Medications 08/21/24 08/22/24 08/23/24   clevidipine (CLEVIPREX) infusion 0.5 mg/mL  Rate: 4-42 mL/hr Dose: 2-21 mg/hr  Freq: Titrated Route: IV  Last Dose: Stopped (08/22/24 1101)  Start: 08/21/24 1710 End: 08/23/24 1214   Admin Instructions:       1715   1720   1725     1730   1745   1805     1810   1850   1853     2131   2152   2221     2243   2257   2332      0011   0034   0126     0143   0157   0313     0916   1101       1214-D/C'd        lactated ringers infusion  Rate: 100 mL/hr Dose: 100 mL/hr  Freq: Continuous Route: IV  Last Dose: 100 mL/hr (08/19/24 2204)  Start: 08/19/24 2200 End: 08/20/24 1503         niCARdipine (CARDENE) 25 mg in 250 mL NS infusion kit  Rate:  mL/hr Dose: 5-15 mg/hr  Freq: Titrated Route: IV  Last Dose: Stopped (08/21/24 1626)  Start: 08/19/24 2245 End: 08/21/24 1942   Admin Instructions:       1557   1626 [C]   1942-D/C'd        niCARdipine (CARDENE) 50 mg in sodium chloride 0.9 % 250 mL IVPB  Rate: 25-75 mL/hr Dose: 5-15 mg/hr  Freq: Titrated Route: IV  Last Dose: Stopped (08/21/24 1714)  Start: 08/21/24 1548 End: 08/22/24 1155   Admin Instructions:       1630   1714       1155-D/C'd         sodium chloride 0.9 % with KCl 20 mEq/L infusion  Rate: 100 mL/hr Dose: 100 mL/hr  Freq: Continuous Route: IV  Last Dose: 100 mL/hr (08/22/24 0947)  Start: 08/21/24 2200 End: 08/23/24 0656    2303        0947        0656-D/C'd                    PRN Meds Sorted by Name  for Ely Sims as of  8/21/24 through 8/23/24  Legend:       Medications 08/21/24 08/22/24 08/23/24    acetaminophen (TYLENOL) tablet 650 mg  Dose: 650 mg  Freq: Every 4 Hours PRN Route: PO  PRN Reason: Mild Pain  Start: 08/21/24 2107   Admin Instructions:            Or   acetaminophen (TYLENOL) 160 MG/5ML oral solution 650 mg  Dose: 650 mg  Freq: Every 4 Hours PRN Route: PO  PRN Reason: Mild Pain  Start: 08/21/24 2107   Admin Instructions:            Or   acetaminophen (TYLENOL) suppository 650 mg  Dose: 650 mg  Freq: Every 4 Hours PRN Route: RE  PRN Reason: Mild Pain  Start: 08/21/24 2107   Admin Instructions:             acetaminophen (TYLENOL) tablet 650 mg  Dose: 650 mg  Freq: Every 4 Hours PRN Route: PO  PRN Reason: Fever  PRN Comment: fever greater than 100.4 °F or headache  Start: 08/19/24 2015   Admin Instructions:       0124   1113   1942          Or   acetaminophen (TYLENOL) suppository 650 mg  Dose: 650 mg  Freq: Every 4 Hours PRN Route: RE  PRN Reasons: Mild Pain,Fever  PRN Comment: temperature greater than 100.4 °F  Start: 08/19/24 2015   Admin Instructions:          1113   1942           sennosides-docusate (PERICOLACE) 8.6-50 MG per tablet 1 tablet  Dose: 1 tablet  Freq: Nightly Route: PO  Start: 08/23/24 2100   Admin Instructions:         2100          And   polyethylene glycol (MIRALAX) packet 17 g  Dose: 17 g  Freq: Daily PRN Route: PO  PRN Reason: Constipation  PRN Comment: Use if senna-docusate is ineffective  Start: 08/23/24 1038   Admin Instructions:            And   bisacodyl (DULCOLAX) EC tablet 5 mg  Dose: 5 mg  Freq: Daily PRN Route: PO  PRN Reason: Constipation  PRN Comment: Use if polyethylene glycol is ineffective  Start: 08/23/24 1038   Admin Instructions:            And   bisacodyl (DULCOLAX) suppository 10 mg  Dose: 10 mg  Freq: Daily PRN Route: RE  PRN Reason: Constipation  PRN Comment: Use if bisacodyl oral is ineffective  Start: 08/23/24 1038   Admin Instructions:             sennosides-docusate  (PERICOLACE) 8.6-50 MG per tablet 2 tablet  Dose: 2 tablet  Freq: 2 Times Daily PRN Route: PO  PRN Reason: Constipation  Start: 08/21/24 2107 End: 08/23/24 1039   Admin Instructions:         1039-D/C'd        And   polyethylene glycol (MIRALAX) packet 17 g  Dose: 17 g  Freq: Daily PRN Route: PO  PRN Reason: Constipation  PRN Comment: Use if senna-docusate is ineffective  Start: 08/21/24 2107 End: 08/23/24 1039   Admin Instructions:         1039-D/C'd        And   bisacodyl (DULCOLAX) EC tablet 5 mg  Dose: 5 mg  Freq: Daily PRN Route: PO  PRN Reason: Constipation  PRN Comment: Use if polyethylene glycol is ineffective  Start: 08/21/24 2107 End: 08/23/24 1039   Admin Instructions:         1039-D/C'd        And   bisacodyl (DULCOLAX) suppository 10 mg  Dose: 10 mg  Freq: Daily PRN Route: RE  PRN Reason: Constipation  PRN Comment: Use if bisacodyl oral is ineffective  Start: 08/21/24 2107 End: 08/23/24 1039   Admin Instructions:         1039-D/C'd         sennosides-docusate (PERICOLACE) 8.6-50 MG per tablet 2 tablet  Dose: 2 tablet  Freq: 2 Times Daily Route: PO  Start: 08/19/24 2100 End: 08/21/24 2116   Admin Instructions:       0908   1113   1942     2116-D/C'd  (2120) [C]            And   polyethylene glycol (MIRALAX) packet 17 g  Dose: 17 g  Freq: Daily PRN Route: PO  PRN Reason: Constipation  PRN Comment: Use if senna-docusate is ineffective  Start: 08/19/24 2015 End: 08/21/24 2116   Admin Instructions:       1113   1942   2116-D/C'd        And   bisacodyl (DULCOLAX) EC tablet 5 mg  Dose: 5 mg  Freq: Daily PRN Route: PO  PRN Reason: Constipation  PRN Comment: Use if polyethylene glycol is ineffective  Start: 08/19/24 2015 End: 08/21/24 2116   Admin Instructions:       1113   1942   2116-D/C'd        And   bisacodyl (DULCOLAX) suppository 10 mg  Dose: 10 mg  Freq: Daily PRN Route: RE  PRN Reason: Constipation  PRN Comment: Use if bisacodyl oral is ineffective  Start: 08/19/24 2015 End: 08/21/24 2116   Admin  Instructions:       1113   1942   2116-D/C'd        Calcium Replacement - Follow Nurse / BPA Driven Protocol  Freq: As Needed Route: XX  PRN Reason: Other  Start: 08/21/24 0708   Admin Instructions:       1113   1942           dextrose (D50W) (25 g/50 mL) IV injection 25 g  Dose: 25 g  Freq: Every 15 Minutes PRN Route: IV  PRN Reason: Low Blood Sugar  PRN Comment: Blood Sugar Less Than 70  Start: 08/20/24 1502   Admin Instructions:       1113   1942           dextrose (GLUTOSE) oral gel 15 g  Dose: 15 g  Freq: Every 15 Minutes PRN Route: PO  PRN Reason: Low Blood Sugar  PRN Comment: Blood sugar less than 70  Start: 08/20/24 1502   Admin Instructions:       1113   1942           diphenhydrAMINE (BENADRYL) injection 12.5 mg  Dose: 12.5 mg  Freq: Every 15 Minutes PRN Route: IV  PRN Reason: Itching  PRN Comment: May repeat x 1  Start: 08/21/24 1556 End: 08/21/24 1832   Admin Instructions:       1832-D/C'd           droperidol (INAPSINE) injection 0.625 mg  Dose: 0.625 mg  Freq: Every 20 Minutes PRN Route: IV  PRN Reasons: Nausea,Vomiting  Indications of Use: NAUSEA AND VOMITING  Start: 08/21/24 1556 End: 08/21/24 1832   Admin Instructions:       1832-D/C'd          Or   droperidol (INAPSINE) injection 0.625 mg  Dose: 0.625 mg  Freq: Every 20 Minutes PRN Route: IM  PRN Reasons: Nausea,Vomiting  Start: 08/21/24 1556 End: 08/21/24 1832   Admin Instructions:       1832-D/C'd          enalaprilat (VASOTEC) injection 0.625 mg  Dose: 0.625 mg  Freq: Every 6 Hours PRN Route: IV  PRN Reason: High Blood Pressure  PRN Comment: For systolic blood pressure above 130  Start: 08/22/24 1253   Admin Instructions:            ePHEDrine injection 5 mg  Dose: 5 mg  Freq: Once As Needed Route: IV  PRN Comment: symptomatic hypotension - Notify attending anesthesiologist if this needs to be given  Start: 08/21/24 1556 End: 08/21/24 1832   Admin Instructions:       1832-D/C'd          fentaNYL citrate (PF) (SUBLIMAZE) injection  Freq: Code /  Trauma / Sedation Medication Route: IV  Start: 08/21/24 1158    1158            fentaNYL citrate (PF) (SUBLIMAZE) injection 50 mcg  Dose: 50 mcg  Freq: Every 5 Minutes PRN Route: IV  PRN Reason: Severe Pain  Start: 08/21/24 1556 End: 08/21/24 1832   Admin Instructions:       1832-D/C'd          flumazenil (ROMAZICON) injection 0.2 mg  Dose: 0.2 mg  Freq: As Needed Route: IV  PRN Comment: for benzodiazepine induced unresponsiveness or sedation  Indications of Use: BENZODIAZEPINE-INDUCED SEDATION  Start: 08/21/24 1556 End: 08/21/24 1832   Admin Instructions:       1832-D/C'd          glucagon (GLUCAGEN) injection 1 mg  Dose: 1 mg  Freq: Every 15 Minutes PRN Route: IM  PRN Reason: Low Blood Sugar  PRN Comment: Blood Glucose Less Than 70  Start: 08/20/24 1502   Admin Instructions:       1113   1942           hydrALAZINE (APRESOLINE) injection 10 mg  Dose: 10 mg  Freq: Every 4 Hours PRN Route: IV  PRN Reason: High Blood Pressure  PRN Comment: Keep systolic blood pressure less than 150  Start: 08/20/24 1503   Admin Instructions:       2132        1406   2032       0111   0516         hydrALAZINE (APRESOLINE) injection 5 mg  Dose: 5 mg  Freq: Every 10 Minutes PRN Route: IV  PRN Reason: High Blood Pressure  PRN Comment: for systolic blood pressure greater than 180 mmHg or diastolic blood pressure greater than 105 mmHg  Start: 08/21/24 1556 End: 08/21/24 1832   Admin Instructions:       1832-D/C'd          HYDROcodone-acetaminophen (NORCO) 5-325 MG per tablet 1 tablet  Dose: 1 tablet  Freq: Every 4 Hours PRN Route: PO  PRN Reason: Moderate Pain  Start: 08/21/24 2107 End: 08/26/24 2106   Admin Instructions:        0838   1647   2340      0407   0843         HYDROcodone-acetaminophen (NORCO) 5-325 MG per tablet 1 tablet  Dose: 1 tablet  Freq: Once As Needed Route: PO  PRN Reason: Moderate Pain  Start: 08/21/24 1556 End: 08/21/24 1832   Admin Instructions:       1832-D/C'd          HYDROmorphone (DILAUDID) injection 0.5  mg  Dose: 0.5 mg  Freq: Every 5 Minutes PRN Route: IV  PRN Reason: Moderate Pain  Start: 08/21/24 1556 End: 08/21/24 1832   Admin Instructions:       1623   1736   1832-D/C'd        ipratropium-albuterol (DUO-NEB) nebulizer solution 3 mL  Dose: 3 mL  Freq: Once As Needed Route: NEBULIZATION  PRN Reasons: Wheezing,Shortness of Air  PRN Comment: bronchospasm  Start: 08/21/24 1556 End: 08/21/24 1832   Admin Instructions:       1832-D/C'd          labetalol (NORMODYNE,TRANDATE) injection 20 mg  Dose: 20 mg  Freq: Every 2 Hours PRN Route: IV  PRN Reason: High Blood Pressure  PRN Comment: For hypertension systolic above 150  Start: 08/20/24 1503   Admin Instructions:       2237        0343   0636   1141     1421   1646       0310          labetalol (NORMODYNE,TRANDATE) injection 5 mg  Dose: 5 mg  Freq: Every 5 Minutes PRN Route: IV  PRN Reason: High Blood Pressure  PRN Comment: for systolic blood pressure greater than 180 mmHg or diastolic blood pressure greater than 105 mmHg  Start: 08/21/24 1556 End: 08/21/24 1832   Admin Instructions:       1832-D/C'd          Magnesium Standard Dose Replacement - Follow Nurse / BPA Driven Protocol  Freq: As Needed Route: XX  PRN Reason: Other  Start: 08/21/24 0708   Admin Instructions:       1113   1942           methocarbamol (ROBAXIN) tablet 500 mg  Dose: 500 mg  Freq: Every 6 Hours PRN Route: PO  PRN Reason: Muscle Spasms  PRN Comment: neck pain  Start: 08/23/24 1130         midazolam (VERSED) injection  Freq: Code / Trauma / Sedation Medication Route: IV  Start: 08/21/24 1157    1157             morphine injection 1 mg  Dose: 1 mg  Freq: Every 2 Hours PRN Route: IV  PRN Reason: Severe Pain  Start: 08/21/24 2107 End: 08/26/24 2106   Admin Instructions:       2237            And   naloxone (NARCAN) injection 0.4 mg  Dose: 0.4 mg  Freq: Every 5 Minutes PRN Route: IV  PRN Reason: Respiratory Depression  Start: 08/21/24 2107   Admin Instructions:            naloxone (NARCAN) injection  0.2 mg  Dose: 0.2 mg  Freq: As Needed Route: IV  PRN Reasons: Opioid Reversal,Respiratory Depression  PRN Comment: unresponsiveness, decrease oxygen saturation  Indications of Use: ACUTE RESPIRATORY FAILURE,OPIOID-INDUCED RESPIRATORY DEPRESSION  Start: 08/21/24 1556 End: 08/21/24 1832   Admin Instructions:       1832-D/C'd          nitroglycerin (NITROSTAT) SL tablet 0.4 mg  Dose: 0.4 mg  Freq: Every 5 Minutes PRN Route: SL  PRN Reason: Chest Pain  PRN Comment: Only if SBP Greater Than 100  Start: 08/19/24 2015   Admin Instructions:       1113   1942            ondansetron ODT (ZOFRAN-ODT) disintegrating tablet 4 mg  Dose: 4 mg  Freq: Every 6 Hours PRN Route: PO  PRN Reasons: Nausea,Vomiting  Start: 08/21/24 2107   Admin Instructions:            Or   ondansetron (ZOFRAN) injection 4 mg  Dose: 4 mg  Freq: Every 6 Hours PRN Route: IV  PRN Reasons: Nausea,Vomiting  Start: 08/21/24 2107   Admin Instructions:            ondansetron (ZOFRAN) injection 4 mg  Dose: 4 mg  Freq: Once As Needed Route: IV  PRN Reasons: Nausea,Vomiting  Indications of Use: POSTOPERATIVE NAUSEA AND VOMITING  Start: 08/21/24 1556 End: 08/21/24 1832   Admin Instructions:       1832-D/C'd          ondansetron (ZOFRAN) injection 4 mg  Dose: 4 mg  Freq: Every 6 Hours PRN Route: IV  PRN Reasons: Nausea,Vomiting  Start: 08/19/24 2015 End: 08/21/24 2115   Admin Instructions:       1113   1942   2115-D/C'd        oxyCODONE-acetaminophen (PERCOCET) 7.5-325 MG per tablet 1 tablet  Dose: 1 tablet  Freq: Every 4 Hours PRN Route: PO  PRN Reason: Severe Pain  Start: 08/21/24 1556 End: 08/21/24 1832   Admin Instructions:       1832-D/C'd          Phosphorus Replacement - Follow Nurse / BPA Driven Protocol  Freq: As Needed Route: XX  PRN Reason: Other  Start: 08/21/24 0708   Admin Instructions:       1113   1942           Physiosol Irrigation solution  Freq: As Needed  Start: 08/21/24 1402 End: 08/21/24 1555    1402   1555-D/C'd         Potassium Replacement -  Follow Nurse / BPA Driven Protocol  Freq: As Needed Route: XX  PRN Reason: Other  Start: 08/21/24 0708   Admin Instructions:             promethazine (PHENERGAN) suppository 25 mg  Dose: 25 mg  Freq: Once As Needed Route: RE  PRN Reasons: Nausea,Vomiting  Start: 08/21/24 1556 End: 08/21/24 1832   Admin Instructions:       1832-D/C'd          Or   promethazine (PHENERGAN) tablet 25 mg  Dose: 25 mg  Freq: Once As Needed Route: PO  PRN Reasons: Nausea,Vomiting  Start: 08/21/24 1556 End: 08/21/24 1832   Admin Instructions:       1832-D/C'd          sodium chloride (NS) irrigation solution  Freq: As Needed  Start: 08/21/24 1135 End: 08/21/24 1555    1135 [C]   1555-D/C'd          sodium chloride 0.9 % flush 10 mL  Dose: 10 mL  Freq: As Needed Route: IV  PRN Reason: Line Care  Start: 08/19/24 1655    1113   1942           sodium chloride 0.9 % flush 10 mL  Dose: 10 mL  Freq: As Needed Route: IV  PRN Reason: Line Care  Start: 08/19/24 1526    1113   1942           sodium chloride 0.9 % solution  Freq: As Needed  Start: 08/21/24 1134 End: 08/21/24 1555    1134 [C]   1555-D/C'd         sodium chloride 1,000 mL with vancomycin 1,000 mg irrigation  Freq: As Needed  Start: 08/21/24 1135 End: 08/21/24 1555    1135 [C]   1555-D/C'd         sodium chloride 30 mL, bupivacaine (PF) 0.25 % 30 mL mixture  Freq: As Needed  Start: 08/21/24 1134 End: 08/21/24 1555    1134 [C]   1555-D/C'd         thrombin (THROMBIN-JMI) spray kit  Freq: As Needed  Start: 08/21/24 1135 End: 08/21/24 1555    1135 [C]   1555-D/C'd

## 2024-08-23 NOTE — PLAN OF CARE
Goal Outcome Evaluation:  Plan of Care Reviewed With: patient, son           Outcome Evaluation: Pt is a 65 yo F admitted from home with frontal HA, N/V/D. 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 Pt lives with her significant other and reports independence at BL with no AD - very active running mini marathons. Pt presents to PT with minimal functional deficits, appearing to be close to her BL. Pt transferred to EOB, stood, and ambulated 400ft independently with no AD. Pt mildly unsteady initially, but states she has been OOB the last couple days. Pt returned to room and left sitting EOB with OT present. Pt with no further acute PT needs, safe to be up ad kacy with her family throughout the day. Anticipate DC home with family assist, PT will sign-off.      Anticipated Discharge Disposition (PT): home with assist

## 2024-08-23 NOTE — THERAPY EVALUATION
Patient Name: Ely Sims  : 1960    MRN: [id number]                              Today's Date: 2024       Admit Date: 2024    Visit Dx:     ICD-10-CM ICD-9-CM   1. 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 Acute intractable headache, unspecified headache type  R51.9 784.0   3. 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 No pertinent past medical history.  Past Surgical History:   Procedure Laterality Date    CRANIOTOMY N/A 2024    Procedure: Posterior fossa craniotomy for tumor using stereotactic guidance;  Surgeon: Bull Doty MD;  Location: [facility];  Service: Neurosurgery;  Laterality: N/A;      General Information       Row Name 24 1326          OT Time and Intention    Document Type evaluation;therapy note (daily note)  -LE     Mode of Treatment individual therapy;occupational therapy  -       Row Name 24 1326          General Information    Patient Profile Reviewed yes  -LE     Prior Level of Function independent:;transfer;ADL's;driving;work  works from home in .  -LE     Existing Precautions/Restrictions fall  -LE     Barriers to Rehab none identified  -       Row Name 24 1326          Living Environment    People in Home significant other  -       Row Name 24 1326          Cognition    Orientation Status (Cognition) oriented x 4  mild mumbling discussed with pt and her son  -       Row Name 24 1326          Safety Issues, Functional Mobility    Safety Issues Affecting Function (Mobility) --  pt reports feeling foggy, increase time to process than baseline.  -LE     Impairments Affecting Function (Mobility) balance  -LE     Comment, Safety Issues/Impairments (Mobility) non skid socks and gait belt worn.  -LE               User Key  (r) = Recorded By, (t) =  Taken By, (c) = Cosigned By      Initials Name Provider Type    Ania David OTR Occupational Therapist                     Mobility/ADL's       Row Name 08/23/24 1328          Bed Mobility    Bed Mobility supine-sit;sit-supine;scooting/bridging  -LE     Sit-Supine [place] (Bed Mobility) independent  -LE     Assistive Device (Bed Mobility) head of bed elevated  -LE       Row Name 08/23/24 1328          Transfers    Transfers sit-stand transfer;stand-sit transfer;bed-chair transfer;toilet transfer  -LE       Row Name 08/23/24 1328          Sit-Stand Transfer    Sit-Stand [place] (Transfers) independent  -LE       Row Name 08/23/24 1328          Stand-Sit Transfer    Stand-Sit [place] (Transfers) independent  -LE       Row Name 08/23/24 1328          Toilet Transfer    Type (Toilet Transfer) stand pivot/stand step  -LE     [place] Level (Toilet Transfer) independent  -LE       Row Name 08/23/24 1328          Functional Mobility    Functional Mobility- Ind. Level independent;standby assist  -LE     Functional Mobility- Comment bed to bathroom and back to bed.  observed pt walking hallway with PT without assist prior to OT.  -LE       Row Name 08/23/24 1328          Activities of Daily Living    BADL Assessment/Intervention toileting;feeding;grooming;lower body dressing;upper body dressing;bathing  -LE       Row Name 08/23/24 1328          Lower Body Dressing Assessment/Training    [place] Level (Lower Body Dressing) don;doff;socks;independent  -LE     Position (Lower Body Dressing) edge of bed sitting  -LE       Row Name 08/23/24 1328          Self-Feeding Assessment/Training    [place] Level (Feeding) independent  -LE     Comment, (Feeding) denies difficulty.  -LE       Row Name 08/23/24 1328          Toileting Assessment/Training    Comment, (Toileting) did not need to void but do not anticipate needing assist.  -LE               User Key  (r) = Recorded By, (t) = Taken By, (c) =  Cosigned By      Initials Name Provider Type    Ania David OTR Occupational Therapist                   Obj/Interventions       [facility] Name 08/23/24 1334          Sensory Assessment (Somatosensory)    Sensory Assessment (Somatosensory) UE sensation intact  -[facility] Name 08/23/24 1334          Vision Assessment/Intervention    Vision Assessment Comment denies changes  -[facility] Name 08/23/24 1334          Range of Motion Comprehensive    General Range of Motion bilateral upper extremity ROM WFL  -[facility] Name 08/23/24 1334          Strength Comprehensive (MMT)    Comment, General Manual Muscle Testing (MMT) Assessment B UE 4+/5  -LE       [facility] Name 08/23/24 1334          Balance    Balance Assessment sitting static balance;standing static balance;standing dynamic balance  -LE     Static Sitting Balance independent  -LE     Dynamic Sitting Balance independent  -LE     Static Standing Balance standby assist  -LE     Comment, Balance one wobble with static standing when talking to OT in room.  pt reports has been in bed several days and hopes balance will improve as she gets up more.  -LE               User Key  (r) = Recorded By, (t) = Taken By, (c) = Cosigned By      Initials Name Provider Type    Ania David OTR Occupational Therapist                   Goals/Plan       Row Name 08/23/24 1338          Transfer Goal 1 (OT)    Activity/Assistive Device (Transfer Goal 1, OT) sit-to-stand/stand-to-sit;bed-to-chair/chair-to-bed;toilet  -LE     [place] Level/Cues Needed (Transfer Goal 1, OT) independent  consistently and without LOB  -LE     Time Frame (Transfer Goal 1, OT) 2 weeks  -LE     Progress/Outcome (Transfer Goal 1, OT) goal ongoing  -LE       Row Name 08/23/24 1338          Dressing Goal 1 (OT)    Activity/Device (Dressing Goal 1, OT) upper body dressing;lower body dressing;dressing skills, all  -LE     [place]/Cues Needed (Dressing Goal 1, OT) independent  consistently and without  "LOB  -LE     Time Frame (Dressing Goal 1, OT) 2 weeks  -LE     Progress/Outcome (Dressing Goal 1, OT) goal ongoing  -LE       Row Name 08/23/24 1003          Problem Specific Goal 1 (OT)    Problem Specific Goal 1 (OT) Ind. with  ambulation to/from bathroom consistently and without LOB.  -LE     Time Frame (Problem Specific Goal 1, OT) 2 weeks  -LE     Progress/Outcome (Problem Specific Goal 1, OT) goal ongoing  -LE       Row Name 08/23/24 7706          Therapy Assessment/Plan (OT)    Planned Therapy Interventions (OT) functional balance retraining;patient/caregiver education/training;transfer/mobility retraining  -LE               User Key  (r) = Recorded By, (t) = Taken By, (c) = Cosigned By      Initials Name Provider Type    Ania David OTR Occupational Therapist                   Clinical Impression       Row Name 08/23/24 2504          Pain Assessment    Pretreatment Pain Rating 0/10 - no pain  -LE       Row Name 08/23/24 2482          Plan of Care Review    Plan of Care Reviewed With patient;son  -LE     Outcome Evaluation Pt admit from urgent care with headache.  CT shows brain mets and pt s/p craniotomy on 8/21.  Pt lives at home with SO and is active and independent at baseline. OT observes pt walking hallway with PT prior to session. Pt demo functional B UE, denies pain, visual/coordination or sensation changes in UE.  Pt is alert and oriented but reports feeling mildly foggy stating \"I couldn't drive or do my work right now\".   Pt has one posterior wobble with static standing and OT ed on using caution when turning or with distractions while walking. Will follow up after weekend for OT for continued mobility and activity tolerance.  -LE       Row Name 08/23/24 1743          Therapy Assessment/Plan (OT)    Patient/Family Therapy Goal Statement (OT) Return to prior level of function.  -LE     Rehab Potential (OT) good, to achieve stated therapy goals  -LE     Criteria for Skilled Therapeutic " Interventions Met (OT) meets criteria;yes  -LE     Therapy Frequency (OT) 5 times/wk  -LE       Row Name 08/23/24 1335          Therapy Plan Review/Discharge Plan (OT)    Anticipated Discharge Disposition (OT) home  -ANMOL       Row Name 08/23/24 1335          Vital Signs    Intra Treatment Diastolic BP --  last BP taken 147/82.  PT reports RN stated OK with -150.  -LE     Post Systolic BP Rehab 161  -LE     Post Treatment Diastolic BP 85  BP taken once pt back to bed. notify RN.  -LE     O2 Delivery Pre Treatment room air  -LE     O2 Delivery Intra Treatment room air  -LE     O2 Delivery Post Treatment room air  -LE     Pre Patient Position Sitting  -LE     Intra Patient Position Standing  -LE     Post Patient Position Supine  -LE       Row Name 08/23/24 1335          Positioning and Restraints    Pre-Treatment Position --  sit EOB with PT. son present.  -LE     Post Treatment Position bed  -LE     In Bed notified nsg;fowlers;call light within reach;encouraged to call for assist;exit alarm on;with family/caregiver  -LE               User Key  (r) = Recorded By, (t) = Taken By, (c) = Cosigned By      Initials Name Provider Type    Ania David OTR Occupational Therapist                   Outcome Measures       Row Name 08/23/24 1339          How much help from another is currently needed...    Putting on and taking off regular lower body clothing? 4  -LE     Bathing (including washing, rinsing, and drying) 3  -LE     Toileting (which includes using toilet bed pan or urinal) 4  -LE     Putting on and taking off regular upper body clothing 4  -LE     Taking care of personal grooming (such as brushing teeth) 4  -LE     Eating meals 4  -LE     AM-PAC 6 Clicks Score (OT) 23  -LE       Row Name 08/23/24 1122          How much help from another person do you currently need...    Turning from your back to your side while in flat bed without using bedrails? 4  -BH     Moving from lying on back to sitting on the side  of a flat bed without bedrails? 4  -BH     Moving to and from a bed to a chair (including a wheelchair)? 4  -BH     Standing up from a chair using your arms (e.g., wheelchair, bedside chair)? 4  -BH     Climbing 3-5 steps with a railing? 4  -BH     To walk in hospital room? 4  -BH     AM-PAC 6 Clicks Score (PT) 24  -     Highest Level of Mobility Goal 8 --> Walked 250 feet or more  -       Row Name 08/23/24 1339          Modified [place] Scale    Modified [place] Scale 2 - Slight disability.  Unable to carry out all previous activities but able to look after own affairs without assistance.  -       Row Name 08/23/24 1339 08/23/24 1122       Functional Assessment    Outcome Measure Options AM-PAC 6 Clicks Daily Activity (OT);Modified [place]  -LE AM-PAC 6 Clicks Basic Mobility (PT)  -              User Key  (r) = Recorded By, (t) = Taken By, (c) = Cosigned By      Initials Name Provider Type    Ania David OTR Occupational Therapist     Kendra Saunders, PT Physical Therapist                    Occupational Therapy Education       Title: PT OT SLP Therapies (In Progress)       Topic: Occupational Therapy (In Progress)       Point: ADL training (Done)       Description:   Instruct learner(s) on proper safety adaptation and remediation techniques during self care or transfers.   Instruct in proper use of assistive devices.                  Learning Progress Summary             Patient Acceptance, E, Bed IU by ANMOL at 8/23/2024 1339    Comment: role of OT, plan of care, ed fall risk and to be aware, take time on turns or during task with distractions   Family Acceptance, E, Bed IU by ANMOL at 8/23/2024 1339    Comment: role of OT, plan of care, ed fall risk and to be aware, take time on turns or during task with distractions                         Point: Home exercise program (Not Started)       Description:   Instruct learner(s) on appropriate technique for monitoring, assisting and/or progressing therapeutic  "exercises/activities.                  Learner Progress:  Not documented in this visit.              Point: Precautions (Not Started)       Description:   Instruct learner(s) on prescribed precautions during self-care and functional transfers.                  Learner Progress:  Not documented in this visit.              Point: Body mechanics (Not Started)       Description:   Instruct learner(s) on proper positioning and spine alignment during self-care, functional mobility activities and/or exercises.                  Learner Progress:  Not documented in this visit.                              User Key       Initials Effective Dates Name Provider Type Discipline     06/16/21 -  Ania Alvarenga OTR Occupational Therapist OT                  OT Recommendation and Plan  Planned Therapy Interventions (OT): functional balance retraining, patient/caregiver education/training, transfer/mobility retraining  Therapy Frequency (OT): 5 times/wk  Plan of Care Review  Plan of Care Reviewed With: patient, son  Outcome Evaluation: Pt admit from urgent care with headache.  CT shows brain mets and pt s/p craniotomy on 8/21.  Pt lives at home with SO and is active and independent at baseline. OT observes pt walking hallway with PT prior to session. Pt demo functional B UE, denies pain, visual/coordination or sensation changes in UE.  Pt is alert and oriented but reports feeling mildly foggy stating \"I couldn't drive or do my work right now\".   Pt has one posterior wobble with static standing and OT ed on using caution when turning or with distractions while walking. Will follow up after weekend for OT for continued mobility and activity tolerance.     Time Calculation:   Evaluation Complexity (OT)  Review Occupational Profile/Medical/Therapy History Complexity: brief/low complexity  Assessment, Occupational Performance/Identification of Deficit Complexity: 1-3 performance deficits  Clinical Decision Making Complexity (OT): problem " focused assessment/low complexity  Overall Complexity of Evaluation (OT): low complexity     Time Calculation- OT       Row Name 08/23/24 1340 08/23/24 1122          Time Calculation- OT    OT Start Time 1107  -LE --     OT Stop Time 1123  -LE --     OT Time Calculation (min) 16 min  -LE --     Total Timed Code Minutes- OT 8 minute(s)  -LE --     OT Received On 08/23/24  -LE --     OT - Next Appointment 08/26/24  -LE --     OT Goal Re-Cert Due Date 09/06/24  -LE --        Timed Charges    07544 - Gait Training Minutes  -- 7  -BH     98911 - OT Self Care/Mgmt Minutes 8  -LE --        Total Minutes    Timed Charges Total Minutes 8  -LE 7  -BH      Total Minutes 8  -LE 7  -BH               User Key  (r) = Recorded By, (t) = Taken By, (c) = Cosigned By      Initials Name Provider Type    Ania David OTR Occupational Therapist     Kendra Saunders, PT Physical Therapist                  Therapy Charges for Today       Code Description Service Date Service Provider Modifiers Qty    94227993866 HC OT SELF CARE/MGMT/TRAIN EA 15 MIN 8/23/2024 Ania Alvarenga OTR GO 1    [id number] HC OT EVAL MOD COMPLEXITY 2 8/23/2024 Ania Alvarenga OTR GO 1                 YAIR Mata  8/23/2024

## 2024-08-23 NOTE — CASE MANAGEMENT/SOCIAL WORK
Continued Stay Note  [facility]     Patient Name: Ely Sims  MRN: [id number]  Today's Date: 8/23/2024    Admit Date: 8/19/2024    Plan: Plan discharge home with significant other.   Discharge Plan       Row Name 08/23/24 7174       Plan    Plan Plan discharge home with significant other.    Patient/Family in Agreement with Plan yes    Plan Comments Discharge plans unchanged. Plan discharge home with significant other. No needs noted at this time. Family will transport. Continue to follow.....[facility]                   Discharge Codes    No documentation.                 Expected Discharge Date and Time       Expected Discharge Date Expected Discharge Time    Aug 26, 2024               Vilma Perez RN

## 2024-08-23 NOTE — THERAPY EVALUATION
Patient Name: Ely Sims  : 1960    MRN: [id number]                              Today's Date: 2024       Admit Date: 2024    Visit Dx:     ICD-10-CM ICD-9-CM   1. 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 Acute intractable headache, unspecified headache type  R51.9 784.0   3. 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 No pertinent past medical history.  Past Surgical History:   Procedure Laterality Date    CRANIOTOMY N/A 2024    Procedure: Posterior fossa craniotomy for tumor using stereotactic guidance;  Surgeon: Bull Doty MD;  Location: [facility];  Service: Neurosurgery;  Laterality: N/A;      General Information       [facility] Name 24 [facility]          Physical Therapy Time and Intention    Document Type evaluation;discharge evaluation/summary  -     Mode of Treatment individual therapy;physical therapy  -       Row Name 24 [facility]          General Information    Patient Profile Reviewed yes  -     Prior Level of Function independent:;gait;transfer;bed mobility  -     Existing Precautions/Restrictions no known precautions/restrictions  -     Barriers to Rehab none identified  -       Row Name 24 1117          Living Environment    People in Home significant other  -[facility] Name 24 [facility]          Home Main Entrance    Number of Stairs, Main Entrance five  -[facility] Name 24 1117          Stairs Within Home, Primary    Stairs, Within Home, Primary Basement laundry  -[facility] Name 24 1117          Cognition    Orientation Status (Cognition) oriented x 4  -[facility] Name 24 1117          Safety Issues, Functional Mobility    Impairments Affecting Function (Mobility) balance  -               User Key  (r) = Recorded By, (t) = Taken By, (c) = Cosigned By       Initials Name Provider Type     Kendra Saunders, PT Physical Therapist                   Mobility       Row Name 08/23/24 1118          Bed Mobility    Bed Mobility supine-sit  -     Supine-Sit [place] (Bed Mobility) independent  -     Assistive Device (Bed Mobility) head of bed elevated  -BH       Row Name 08/23/24 1118          Sit-Stand Transfer    Sit-Stand [place] (Transfers) independent  -BH       Row Name 08/23/24 1118          Gait/Stairs (Locomotion)    [place] Level (Gait) independent  -     Distance in Feet (Gait) 400  -               User Key  (r) = Recorded By, (t) = Taken By, (c) = Cosigned By      Initials Name Provider Type     Kendra Saunders PT Physical Therapist                   Obj/Interventions       Row Name 08/23/24 1118          Range of Motion Comprehensive    General Range of Motion bilateral lower extremity ROM WFL  -BH       Row Name 08/23/24 1118          Strength Comprehensive (MMT)    General Manual Muscle Testing (MMT) Assessment no strength deficits identified  -     Comment, General Manual Muscle Testing (MMT) Assessment BLE WFL  -BH       Row Name 08/23/24 1118          Balance    Balance Assessment sitting static balance;sitting dynamic balance;standing static balance;standing dynamic balance  -     Static Sitting Balance independent  -     Dynamic Sitting Balance independent  -     Position, Sitting Balance unsupported  -     Static Standing Balance independent  -     Dynamic Standing Balance independent  -     Position/Device Used, Standing Balance unsupported  -     Balance Interventions sitting;standing;sit to stand;static;dynamic  -BH       Row Name 08/23/24 1118          Sensory Assessment (Somatosensory)    Sensory Assessment (Somatosensory) LE sensation intact  -               User Key  (r) = Recorded By, (t) = Taken By, (c) = Cosigned By      Initials Name Provider Type     Kendra Saunders PT Physical Therapist                    Goals/Plan    No documentation.                  Clinical Impression       Row Name 08/23/24 1118          Pain    Pretreatment Pain Rating 0/10 - no pain  -     Posttreatment Pain Rating 0/10 - no pain  -[facility] Name 08/23/24 1118          Plan of Care Review    Plan of Care Reviewed With patient;son  -     Outcome Evaluation Pt is a 63 yo F admitted from home with frontal HA, N/V/D. 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 Pt lives with her significant other and reports independence at BL with no AD - very active running mini marathons. Pt presents to PT with minimal functional deficits, appearing to be close to her BL. Pt transferred to EOB, stood, and ambulated 400ft independently with no AD. Pt mildly unsteady initially, but states she has been OOB the last couple days. Pt returned to room and left sitting EOB with OT present. Pt with no further acute PT needs, safe to be up ad kacy with her family throughout the day. Anticipate DC home with family assist, PT will sign-off.  -[facility] Name 08/23/24 1118          Therapy Assessment/Plan (PT)    Criteria for Skilled Interventions Met (PT) no;no problems identified which require skilled intervention  -     Therapy Frequency (PT) evaluation only  -[facility] Name 08/23/24 1118          Vital Signs    O2 Delivery Pre Treatment room air  -     O2 Delivery Intra Treatment room air  -     O2 Delivery Post Treatment room air  -[facility] 08/23/24 1118          Positioning and Restraints    Pre-Treatment Position in bed  -     Post Treatment Position bed  -     In Bed sitting EOB;with OT  -               User Key  (r) = Recorded By, (t) = Taken By, (c) = Cosigned By      Initials Name Provider Type     Kendra Saunders, PT Physical Therapist                   Outcome Measures       Row Name 08/23/24 1122          How much help from another person do you currently need...    Turning  from your back to your side while in flat bed without using bedrails? 4  -BH     Moving from lying on back to sitting on the side of a flat bed without bedrails? 4  -BH     Moving to and from a bed to a chair (including a wheelchair)? 4  -BH     Standing up from a chair using your arms (e.g., wheelchair, bedside chair)? 4  -BH     Climbing 3-5 steps with a railing? 4  -BH     To walk in hospital room? 4  -     AM-PAC 6 Clicks Score (PT) 24  -     Highest Level of Mobility Goal 8 --> Walked 250 feet or more  -       Row Name 08/23/24 1122          Functional Assessment    Outcome Measure Options AM-PAC 6 Clicks Basic Mobility (PT)  -               User Key  (r) = Recorded By, (t) = Taken By, (c) = Cosigned By      Initials Name Provider Type     Kendra Saunders PT Physical Therapist                                 Physical Therapy Education       Title: PT OT SLP Therapies (Done)       Topic: Physical Therapy (Done)       Point: Mobility training (Done)       Learning Progress Summary             Patient Acceptance, E,TB,D, VU by  at 8/23/2024 1122                         Point: Body mechanics (Done)       Learning Progress Summary             Patient Acceptance, E,TB,D, VU by  at 8/23/2024 1122                         Point: Precautions (Done)       Learning Progress Summary             Patient Acceptance, E,TB,D, VU by  at 8/23/2024 1122                                         User Key       Initials Effective Dates Name Provider Type Discipline     04/08/22 -  Kendra Saunders PT Physical Therapist PT                  PT Recommendation and Plan     Plan of Care Reviewed With: patient, son  Outcome Evaluation: Pt is a 65 yo F admitted from home with frontal HA, N/V/D. 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 Pt lives with her significant other and reports independence at BL with no AD - very active running mini marathons. Pt presents to PT with minimal  functional deficits, appearing to be close to her BL. Pt transferred to EOB, stood, and ambulated 400ft independently with no AD. Pt mildly unsteady initially, but states she has been OOB the last couple days. Pt returned to room and left sitting EOB with OT present. Pt with no further acute PT needs, safe to be up ad kacy with her family throughout the day. Anticipate DC home with family assist, PT will sign-off.     Time Calculation:   PT Evaluation Complexity  History, PT Evaluation Complexity: 1-2 personal factors and/or comorbidities  Examination of Body Systems (PT Eval Complexity): total of 3 or more elements  Clinical Presentation (PT Evaluation Complexity): evolving  Clinical Decision Making (PT Evaluation Complexity): moderate complexity  Overall Complexity (PT Evaluation Complexity): moderate complexity     PT Charges       Row Name 08/23/24 1122             Time Calculation    Start Time 1054  -      Stop Time 1112  -      Time Calculation (min) 18 min  -      PT Received On 08/23/24  -         Time Calculation- PT    Total Timed Code Minutes- PT 15 minute(s)  -         Timed Charges    99430 - Gait Training Minutes  7  -      60905 - PT Therapeutic Activity Minutes 8  -         Total Minutes    Timed Charges Total Minutes 15  -       Total Minutes 15  -                User Key  (r) = Recorded By, (t) = Taken By, (c) = Cosigned By      Initials Name Provider Type     Kendra Saunders, PT Physical Therapist                  Therapy Charges for Today       Code Description Service Date Service Provider Modifiers Qty    93929640947  PT THERAPEUTIC ACT EA 15 MIN 8/23/2024 Kendra Saunders, PT GP 1    [id number]  PT EVAL MOD COMPLEXITY 2 8/23/2024 Kendra Saunders, PT GP 1            PT G-Codes  Outcome Measure Options: AM-PAC 6 Clicks Basic Mobility (PT)  AM-PAC 6 Clicks Score (PT): 24  PT Discharge Summary  Anticipated Discharge Disposition (PT): home with assist    Kendra Saunders  PT  8/23/2024

## 2024-08-23 NOTE — PROGRESS NOTES
[facility] NEUROSURGERY INTRACRANIAL POSTOP note    PATIENT IDENTIFICATION:   Name:  Ely Sims      MRN:  [id number]     64 y.o.  female               CC:POD 2 right occipital crani for tumor      Subjective     Interval History: Reports neck discomfort and mild headache.  No visual changes or nausea.  Tolerating clear liquid diet.    Objective     Vital signs in last 24 hours:  Temp:  [97.5 °F (36.4 °C)-98.6 °F (37 °C)] 97.7 °F (36.5 °C)  Heart Rate:  [67-94] 89  Resp:  [14-16] 16  BP: (104-143)/() 143/79      Intake/Output this shift:  No intake/output data recorded.    Intake/Output last 3 shifts:  I/O last 3 completed shifts:  In: 1440 [P.O.:1440]  Out: 1250 [Urine:1250]    LABS:  Results from last 7 days   Lab Units 08/23/24  0318 08/22/24  0401 08/21/24  1615   WBC 10*3/mm3 8.57 12.92* 16.36*   HEMOGLOBIN g/dL 10.5* 11.3* 13.0   HEMATOCRIT % 30.9* 32.6* 37.0   PLATELETS 10*3/mm3 269 284 350     Results from last 7 days   Lab Units 08/23/24  0318 08/22/24  0401 08/21/24  1615 08/19/24  2318 08/19/24  1545   SODIUM mmol/L 139 140 144   < > 137   POTASSIUM mmol/L 3.5 3.7 3.3*   < > 3.6   CHLORIDE mmol/L 109* 107 109*   < > 102   CO2 mmol/L 23.0 25.1 24.5   < > 26.0   BUN mg/dL 11 14 16   < > 13   CREATININE mg/dL 0.58 0.72 0.77   < > 0.69   CALCIUM mg/dL 7.9* 8.2* 8.7   < > 9.6   BILIRUBIN mg/dL  --   --   --   --  0.3   ALK PHOS U/L  --   --   --   --  83   ALT (SGPT) U/L  --   --   --   --  13   AST (SGOT) U/L  --   --   --   --  15   GLUCOSE mg/dL 145* 131* 157*   < > 98    < > = values in this interval not displayed.     Pathology-pending     IMAGING STUDIES:  MRI brain-postoperative changes with no evidence of residual tumor in the right cerebellum.  There is residual edema in the right cerebellar hemisphere extending across midline to the left cerebral hemisphere.  Mass effect on fourth ventricle improved.  Stable known lesion in the left temporal lobe and left cerebellar vermis with surrounding  edema.    I personally viewed and interpreted the patient's brain.  Also reviewed by and discussed with Dr. Doty.    Meds reviewed/changed: Yes  Solu-Cortef to 50 mg IV every 6 hours Keppra 500 mg IV every 12 hours Cleviprex off  Norco 5 mg p.o. every 4 as needed-4 doses  Labetalol 20 mg IV every 2 hours as needed for SBP greater than 150-4 doses   hydralazine 10 mg IV every 4 as needed for SBP greater than 150-3 doses    Physical Exam:    General:   Awake, alert, oriented x3. Speech clear with no aphasia  HEENT:    Right occipital incision well-approximated with no redness drainage or swelling.  No significant muscle spasm or tenderness.  Neck:    supple  CN III IV VI:   Extraocular movements are full , PERRL 3 mm brisk.  No nystagmus  CN VII:   Facial movements are symmetric. No weakness.  Motor:    Moving all extremities.  No drift   Station and Gait:  Up to bedside commode with minimal assist per nursing staff.  Awaiting PT eval  Coordination:  Finger-to-nose test shows no dysmetria.    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  Pathology still pending.  She is feeling well and there are no obvious neurologic deficits on exam.  She has typical neck discomfort and some mild headache.  She is taking Norco which helps her headache but does not seem to relieve the neck discomfort.  I will order ice and muscle relaxant to see if this gives her relief.  She remains on a clear liquid diet at this point and has not been advanced by GI but they are awaiting pathology to determine whether she needs colonoscopy.  She is cleared for transfer to floor from neurosurgical standpoint as well as further therapies and out of bed activity.  She will remain on her Keppra but we will transition her to p.o. as well as to p.o. steroid taper.    PLAN:   Add  "muscle relaxant as needed  Add ice as needed  Out of bed activity and therapies  Okay for transfer to floor  Keep SBP<150  Continue Keppra  DC IV steroid, initiate MDP  Await pathology  Advance diet if okay with GI-nursing advised to ask    I discussed the patient's findings and my recommendations with patient, family, nursing staff, and Dr. Doty    During patient visit, I utilized appropriate personal protective equipment including gloves. Appropriate PPE was worn during the entire visit.  Hand hygiene was completed before and after.      LOS: 4 days       Waleska Alanis, APRN  8/23/2024  10:19 EDT    \"Dictated utilizing Dragon dictation\".      "

## 2024-08-23 NOTE — PROGRESS NOTES
[facility]  Inpatient Progress Note    Reason for Follow Up:  brain mets, abnormal sigmoid colon imaging    Subjective     Interval History:   She has no complaints at all-specifically she denies abdominal pain nausea difficulty swallowing.  She has had bowel movements.  She is tolerating clears    Current Facility-Administered Medications:     acetaminophen (TYLENOL) tablet 650 mg, 650 mg, Oral, Q4H PRN **OR** acetaminophen (TYLENOL) 160 MG/5ML oral solution 650 mg, 650 mg, Oral, Q4H PRN **OR** acetaminophen (TYLENOL) suppository 650 mg, 650 mg, Rectal, Q4H PRN, Bull Doty MD    acetaminophen (TYLENOL) tablet 650 mg, 650 mg, Oral, Q4H PRN, 650 mg at 08/21/24 0124 **OR** acetaminophen (TYLENOL) suppository 650 mg, 650 mg, Rectal, Q4H PRN, Bull Doty MD    amLODIPine (NORVASC) tablet 5 mg, 5 mg, Oral, BID, Sarika Wilburn MD, 5 mg at 08/23/24 0844    atorvastatin (LIPITOR) tablet 10 mg, 10 mg, Oral, Daily, Bull Doty MD, 10 mg at 08/23/24 0843    sennosides-docusate (PERICOLACE) 8.6-50 MG per tablet 2 tablet, 2 tablet, Oral, BID PRN **AND** polyethylene glycol (MIRALAX) packet 17 g, 17 g, Oral, Daily PRN **AND** bisacodyl (DULCOLAX) EC tablet 5 mg, 5 mg, Oral, Daily PRN **AND** bisacodyl (DULCOLAX) suppository 10 mg, 10 mg, Rectal, Daily PRN, Bull Doty MD    Calcium Replacement - Follow Nurse / BPA Driven Protocol, , Does not apply, PRN, Bull Doty MD    ceFAZolin 2000 mg IVPB in 100 mL NS (MBP), 2,000 mg, Intravenous, Q8H, Bull Doty MD, 2,000 mg at 08/23/24 0517    clevidipine (CLEVIPREX) infusion 0.5 mg/mL, 2-21 mg/hr, Intravenous, Titrated, Sarika Wiblurn MD, Stopped at 08/22/24 1101    dextrose (D50W) (25 g/50 mL) IV injection 25 g, 25 g, Intravenous, Q15 Min PRN, Bull Doty MD    dextrose (GLUTOSE) oral gel 15 g, 15 g, Oral, Q15 Min PRN, Bull Doty MD    enalaprilat (VASOTEC) injection 0.625 mg, 0.625 mg, Intravenous, Q6H PRN, Cosmo,  Sarika HUDSON MD    famotidine (PEPCID) injection 20 mg, 20 mg, Intravenous, Q12H, Bull Doty MD, 20 mg at 08/23/24 0844    fentaNYL citrate (PF) (SUBLIMAZE) injection, , Intravenous, Code / Trauma / Sedation Medication, Malvin Merritt MD, 50 mcg at 08/21/24 1158    glucagon (GLUCAGEN) injection 1 mg, 1 mg, Intramuscular, Q15 Min PRN, Bull Doty MD    hydrALAZINE (APRESOLINE) injection 10 mg, 10 mg, Intravenous, Q4H PRN, Bull Doty MD, 10 mg at 08/23/24 0516    HYDROcodone-acetaminophen (NORCO) 5-325 MG per tablet 1 tablet, 1 tablet, Oral, Q4H PRN, Bull Doty MD, 1 tablet at 08/23/24 0843    Hydrocortisone Sod Suc (PF) (Solu-CORTEF) injection 250 mg, 250 mg, Intravenous, Q6H, Bull Doty MD, 250 mg at 08/23/24 0516    insulin lispro (HUMALOG/ADMELOG) injection 2-9 Units, 2-9 Units, Subcutaneous, 4x Daily AC & at Bedtime, Bull Doty MD, 2 Units at 08/23/24 0844    labetalol (NORMODYNE,TRANDATE) injection 20 mg, 20 mg, Intravenous, Q2H PRN, Bull Doty MD, 20 mg at 08/23/24 0310    levETIRAcetam (KEPPRA) injection 500 mg, 500 mg, Intravenous, Q12H, Bull Doty MD, 500 mg at 08/23/24 0844    Magnesium Standard Dose Replacement - Follow Nurse / BPA Driven Protocol, , Does not apply, PRN, Bull Doty MD    midazolam (VERSED) injection, , Intravenous, Code / Trauma / Sedation Medication, Malvin Merritt MD, 1 mg at 08/21/24 1157    morphine injection 1 mg, 1 mg, Intravenous, Q2H PRN, 1 mg at 08/21/24 7287 **AND** naloxone (NARCAN) injection 0.4 mg, 0.4 mg, Intravenous, Q5 Min PRN, Bull Doty MD    nitroglycerin (NITROSTAT) SL tablet 0.4 mg, 0.4 mg, Sublingual, Q5 Min PRN, Bull Doty MD    ondansetron ODT (ZOFRAN-ODT) disintegrating tablet 4 mg, 4 mg, Oral, Q6H PRN **OR** ondansetron (ZOFRAN) injection 4 mg, 4 mg, Intravenous, Q6H PRN, Bull Doty MD    Phosphorus Replacement - Follow Nurse / BPA Driven Protocol, , Does not apply, PRN, Soren,  Bull BARBOSA MD    Potassium Replacement - Follow Nurse / BPA Driven Protocol, , Does not apply, Soren ROWE Steven J, MD    sodium chloride 0.9 % flush 10 mL, 10 mL, Intravenous, Soren ROWE Steven J, MD    [CANCELED] Insert Peripheral IV, , , Once **AND** sodium chloride 0.9 % flush 10 mL, 10 mL, Intravenous, Soren ROWE Steven J, MD  Review of Systems:    The following systems were reviewed and negative;  constitution and gastrointestinal    Objective     Vital Signs  Temp:  [97.5 °F (36.4 °C)-98.6 °F (37 °C)] 97.7 °F (36.5 °C)  Heart Rate:  [67-94] 89  Resp:  [14-16] 16  BP: (104-143)/() 143/79  Body mass index is 22.54 kg/m².    Intake/Output Summary (Last 24 hours) at 8/23/2024 1014  Last data filed at 8/23/2024 0400  Gross per 24 hour   Intake 960 ml   Output 600 ml   Net 360 ml     No intake/output data recorded.     Physical Exam:   General: patient awake, alert and cooperative   Eyes: Normal lids and lashes, no scleral icterus   Neck: supple, normal ROM   Skin: warm and dry, not jaundiced   Pulm:   regular and unlabored   Abdomen: soft, nontender, nondistended    Psychiatric: Normal mood and behavior; memory intact     Results Review:     I reviewed the patient's new clinical results.    Results from last 7 days   Lab Units 08/23/24 0318 08/22/24  0401 08/21/24  1615   WBC 10*3/mm3 8.57 12.92* 16.36*   HEMOGLOBIN g/dL 10.5* 11.3* 13.0   HEMATOCRIT % 30.9* 32.6* 37.0   PLATELETS 10*3/mm3 269 284 350     Results from last 7 days   Lab Units 08/23/24 0318 08/22/24  0401 08/21/24  1615 08/19/24  2318 08/19/24  1545   SODIUM mmol/L 139 140 144   < > 137   POTASSIUM mmol/L 3.5 3.7 3.3*   < > 3.6   CHLORIDE mmol/L 109* 107 109*   < > 102   CO2 mmol/L 23.0 25.1 24.5   < > 26.0   BUN mg/dL 11 14 16   < > 13   CREATININE mg/dL 0.58 0.72 0.77   < > 0.69   CALCIUM mg/dL 7.9* 8.2* 8.7   < > 9.6   BILIRUBIN mg/dL  --   --   --   --  0.3   ALK PHOS U/L  --   --   --   --  83   ALT (SGPT) U/L  --   --   --   --  13  "  AST (SGOT) U/L  --   --   --   --  15   GLUCOSE mg/dL 145* 131* 157*   < > 98    < > = values in this interval not displayed.     Results from last 7 days   Lab Units 08/22/24  0401 08/21/24  0512 08/20/24  0504   INR  0.99 1.06 1.09     No results found for: \"LIPASE\"    Radiology:  MRI Brain With & Without Contrast   Final Result   1. Postoperative findings, as noted above.  Continued follow-up is   recommended.            This report was finalized on 8/23/2024 5:28 AM by Dr. Viridiana Altamirano M.D on Workstation: [id number]          CT Head Without Contrast   Final Result   1.  The patient is status post resection of a right cerebellar avidly   enhancing mass. Expected postoperative changes are noted. No   complicating feature is identified.   2.  Decreased attenuation is appreciated involving the left temporal   lobe posteriorly and inferiorly consistent with vasogenic edema. This   corresponds to the 9 mm enhancing lesion present on the MRI examination   of 8/20/2024. The enhancing lesion involving the superior cerebellar   vermis to the left is occult as is the area of dural enhancement   overlying the left frontal lobe superolaterally.       Radiation dose reduction techniques were utilized, including automated   exposure modulation based on body size.       This report was finalized on 8/21/2024 6:45 PM by Dr. Cesar Levine M.D   on Workstation: [id number]          CT Head With & Without Contrast   Final Result   1. There has been no change when compared to the MRI of the brain with   and without contrast earlier this morning on 08/20/2024 at 12:22 a.m.       2. This patient has 3 enhancing lesions in the brain and findings are   most consistent with metastatic disease to the brain. The enhancing   brain lesions which are most likely brain mets includes a 7 x 6 x 6 mm   enhancing lesion in the posterior inferior left temporal lobe that has   2.5 x 2.3 cm of surrounding vasogenic edema. There is a 10 x " 9 x 10 mm   enhancing lesion in the superior midline of the vermis of the cerebellum   that has mild surrounding edema. The largest enhancing lesion is in the   posterior inferior right cerebellum that measures 2.4 x 2 x 2 cm in size   and has a large amount of surrounding vasogenic edema tracking up to 4.5   x 4.5 cm with mild mass effect on the fourth ventricle. There is mild   dilatation of the lateral and third ventricles compatible with mild   hydrocephalus, unchanged. This patient has abnormal circumferential wall   thickening within the distal sigmoid colon seen on chest, abdomen and   pelvic CT earlier this morning on 08/20/2024 at 1:42 a.m. suggesting   there could be a sigmoid colon cancer and correlation with colonoscopy   is suggested.       Radiation dose reduction techniques were utilized, including automated   exposure control and exposure modulation based on body size.           This report was finalized on 8/21/2024 6:09 AM by Dr. Gilbert Hassan M.D   on Workstation: [id number]          CT Chest Without Contrast Diagnostic   Final Result       1. No evidence of metastatic disease within the thorax.   2. Asymmetric wall thickening involving the distal sigmoid colon, with   some surrounding mesenteric soft tissue nodularity. Appearance is very   worrisome for primary colonic malignancy. Correlation with colonoscopy   is recommended. MRI could allow for further assessment.   3. Sclerotic lesion within the spinous process of T1. I would favor that   this is a benign lesion such as a bone island. Consider short-term   follow-up or bone scan.       Radiation dose reduction techniques were utilized, including automated   exposure control and exposure modulation based on body size.           This report was finalized on 8/20/2024 3:54 AM by Dr. Viridiana Altamirano M.D on Workstation: [id number]          CT Abdomen Pelvis Without Contrast   Final Result       1. No evidence of metastatic disease within  the thorax.   2. Asymmetric wall thickening involving the distal sigmoid colon, with   some surrounding mesenteric soft tissue nodularity. Appearance is very   worrisome for primary colonic malignancy. Correlation with colonoscopy   is recommended. MRI could allow for further assessment.   3. Sclerotic lesion within the spinous process of T1. I would favor that   this is a benign lesion such as a bone island. Consider short-term   follow-up or bone scan.       Radiation dose reduction techniques were utilized, including automated   exposure control and exposure modulation based on body size.           This report was finalized on 8/20/2024 3:54 AM by Dr. Viridiana Altamirano M.D on Workstation: [id number]          MRI Brain With & Without Contrast   Final Result   1. Study confirms the presence of lesions within the left temporal lobe,   as well as the right cerebellar hemisphere. A third lesion is noted   within the cerebellar vermis. There is mass effect upon the fourth   ventricle. There is some mild dilatation of the lateral and third   ventricles. Continued follow-up is recommended.           This report was finalized on 8/20/2024 2:41 AM by Dr. Viridiana Altamirano M.D on Workstation: [id number]          CT Head Without Contrast   Final Result   Areas of increased attenuation are appreciated over the   right cerebral hemisphere posteriorly (2.1 cm) and left temporal lobe   inferiorly and posteriorly (6 mm) most consistent with that of   metastatic disease with associated edema. Edema is most prominent   involving the right cerebellar hemisphere. There is mass effect upon the   fourth ventricle and mild prominence of the lateral and third   ventricles. Further evaluation with MRI examination of the brain with   and without contrast is recommended.       The above information was called to and discussed with Dr. Epps.                   Radiation dose reduction techniques were utilized, including automated    exposure modulation based on body size.       This report was finalized on 8/19/2024 6:40 PM by Dr. Cesar Levine M.D   on Workstation: [id number]          XR Chest 1 View   Final Result   1. No acute process           This report was finalized on 8/19/2024 4:32 PM by Dr. 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            Francesca Anaya M.D.  [facility]  [phone]

## 2024-08-23 NOTE — PROGRESS NOTES
"  PROGRESS NOTE  Patient Name: Ely Sims  Age/Sex: 64 y.o. female  : 1960  MRN: [id number]    Date of Admission: 2024  Date of Encounter Visit: 24   LOS: 4 days   Patient Care Team:  Provider, No Known as PCP - General    Chief Complaint: Came in with nausea vomiting and headache    Hospital course: Successful craniotomy 2024 with good results, pain is well-controlled  Currently she is hemodynamically stable, ambulating with physical therapy with no ataxia  No respiratory issues  Blood pressure is doing better she is no longer on IV drips  Patient is supposed to have colonoscopy by GI probably by Monday  Patient has very good baseline status she is a runner and healthy and physically active      REVIEW OF SYSTEMS:   CONSTITUTIONAL: no fever or chills  CARDIOVASCULAR: No chest pain, chest pressure or chest discomfort. No palpitations or edema.   RESPIRATORY: No shortness of breath, cough or sputum.   GASTROINTESTINAL: No anorexia, nausea, vomiting or diarrhea. No abdominal pain or blood.   HEMATOLOGIC: No bleeding or bruising.     Ventilator/Non-Invasive Ventilation Settings (From admission, onward)      None              Vital Signs  Temp:  [97.5 °F (36.4 °C)-97.9 °F (36.6 °C)] 97.7 °F (36.5 °C)  Heart Rate:  [67-94] 89  Resp:  [14-16] 16  BP: (104-143)/() 143/79  SpO2:  [94 %-98 %] 97 %  on    Device (Oxygen Therapy): room air    Intake/Output Summary (Last 24 hours) at 2024 1206  Last data filed at 2024 0400  Gross per 24 hour   Intake 960 ml   Output 600 ml   Net 360 ml       Flowsheet Rows      Flowsheet Row First Filed Value   Admission Height 170.2 cm (67.01\") Documented at 2024 0648   Admission Weight 65.1 kg (143 lb 8.3 oz) Documented at 2024 2017          Body mass index is 22.54 kg/m².      24  0648 24  0200 24  0513   Weight: 65.1 kg (143 lb 8.3 oz) 65.3 kg (143 lb 15.4 oz) 65.3 kg (143 lb 15.4 oz)       Physical Exam:  GEN:  No acute " distress, alert, cooperative, well developed   EYES:   Sclerae clear. No icterus. PERRL. Normal EOM  ENT:   External ears/nose normal, no oral lesions, no thrush, mucous membranes moist  NECK:  Supple, midline trachea, no JVD  LUNGS: Normal chest on inspection, CTAB, no wheezes. No rhonchi. No crackles. Respirations regular, even and unlabored.   CV:  Regular rhythm and rate. Normal S1/S2. No murmurs, gallops, or rubs noted.  ABD:  Soft, nontender and nondistended. Normal bowel sounds. No guarding  EXT:  Moves all extremities well. No cyanosis. No redness. No edema.   Skin: Dry, intact, no bleeding    Results Review:        Results from last 7 days   Lab Units 08/23/24 0318 08/22/24 0401 08/21/24  1615 08/21/24  0512 08/20/24  0504 08/19/24  2318 08/19/24  1545   SODIUM mmol/L 139 140 144 140 136 138 137   POTASSIUM mmol/L 3.5 3.7 3.3* 3.3* 3.7 3.5 3.6   CHLORIDE mmol/L 109* 107 109* 107 104 103 102   CO2 mmol/L 23.0 25.1 24.5 24.6 20.2* 21.4* 26.0   BUN mg/dL 11 14 16 13 10 10 13   CREATININE mg/dL 0.58 0.72 0.77 0.64 0.53* 0.51* 0.69   CALCIUM mg/dL 7.9* 8.2* 8.7 9.2 8.7 9.2 9.6   AST (SGOT) U/L  --   --   --   --   --   --  15   ALT (SGPT) U/L  --   --   --   --   --   --  13   ANION GAP mmol/L 7.0 7.9 10.5 8.4 11.8 13.6 9.0   ALBUMIN g/dL  --   --   --   --   --   --  4.4     Results from last 7 days   Lab Units 08/19/24  1545   HSTROP T ng/L <6             Results from last 7 days   Lab Units 08/23/24 0318 08/22/24 0401 08/21/24  1615 08/21/24  0512 08/20/24  0504 08/19/24  2132 08/19/24  1545   WBC 10*3/mm3 8.57 12.92* 16.36* 12.81* 6.97 5.83 5.34   HEMOGLOBIN g/dL 10.5* 11.3* 13.0 12.4 13.7 13.7 14.4   HEMATOCRIT % 30.9* 32.6* 37.0 35.5 40.2 40.5 42.5   PLATELETS 10*3/mm3 269 284 350 311 330 275 355   MCV fL 96.9 95.6 96.1 95.9 97.8* 99.8* 97.7*   NEUTROPHIL % % 88.4* 90.6* 88.4* 90.8* 89.7* 83.8* 68.1   LYMPHOCYTE % % 5.3* 3.8* 6.9* 5.2* 7.9* 11.0* 22.1   MONOCYTES % % 5.5 5.0 4.2* 3.6* 2.0* 4.1* 8.6  "  EOSINOPHIL % % 0.0* 0.0* 0.0* 0.0* 0.0* 0.3 0.4   BASOPHIL % % 0.0 0.1 0.1 0.1 0.1 0.5 0.6   IMM GRAN % % 0.8* 0.5 0.4 0.3 0.3 0.3 0.2     Results from last 7 days   Lab Units 08/22/24  0401 08/21/24  0512 08/20/24  0504   INR  0.99 1.06 1.09   APTT seconds 24.5  --  28.4     Results from last 7 days   Lab Units 08/23/24  0318 08/22/24  0401 08/19/24  1545   MAGNESIUM mg/dL 2.5* 2.2 1.9           Invalid input(s): \"LDLCALC\"          Glucose   Date/Time Value Ref Range Status   08/23/2024 1149 140 (H) 70 - 130 mg/dL Final   08/23/2024 0703 154 (H) 70 - 130 mg/dL Final   08/22/2024 2105 126 70 - 130 mg/dL Final   08/22/2024 1527 186 (H) 70 - 130 mg/dL Final   08/22/2024 1132 179 (H) 70 - 130 mg/dL Final   08/22/2024 0719 141 (H) 70 - 130 mg/dL Final   08/22/2024 0521 136 (H) 70 - 130 mg/dL Final   08/21/2024 2344 161 (H) 70 - 130 mg/dL Final             Results from last 7 days   Lab Units 08/19/24  1708   NITRITE UA  Negative   WBC UA /HPF 3-5*   BACTERIA UA /HPF 1+*   SQUAM EPITHEL UA /HPF 3-6*     Results from last 7 days   Lab Units 08/19/24  1708   COVID19  Not Detected   INFLUENZA B PCR  Not Detected   RSV, PCR  Not Detected               Imaging:   Imaging Results (All)       Procedure Component Value Units Date/Time     I reviewed the patient's new clinical results.  I personally viewed and interpreted the patient's imaging results:        Medication Review:   amLODIPine, 5 mg, Oral, BID  atorvastatin, 10 mg, Oral, Daily  ceFAZolin, 2,000 mg, Intravenous, Q8H  docusate sodium, 100 mg, Oral, Daily  famotidine, 20 mg, Oral, BID AC  insulin lispro, 2-9 Units, Subcutaneous, 4x Daily AC & at Bedtime  levETIRAcetam, 500 mg, Oral, BID  methylPREDNISolone, 4 mg, Oral, After Lunch  methylPREDNISolone, 4 mg, Oral, After Dinner  [START ON 8/24/2024] methylPREDNISolone, 4 mg, Oral, TID Around Food  [START ON 8/25/2024] methylPREDNISolone, 4 mg, Oral, 4x Daily Taper  [START ON 8/26/2024] methylPREDNISolone, 4 mg, Oral, " TID Around Food  [START ON 8/27/2024] methylPREDNISolone, 4 mg, Oral, Before Breakfast  [START ON 8/27/2024] methylPREDNISolone, 4 mg, Oral, Tonight  [START ON 8/28/2024] methylPREDNISolone, 4 mg, Oral, Before Breakfast  methylPREDNISolone, 8 mg, Oral, Before Breakfast  methylPREDNISolone, 8 mg, Oral, Tonight  [START ON 8/24/2024] methylPREDNISolone, 8 mg, Oral, Tonight  senna-docusate sodium, 1 tablet, Oral, Nightly        clevidipine, 2-21 mg/hr, Last Rate: Stopped (08/22/24 1101)        ASSESSMENT:   Several brain lesions with edema and brain tissue compression  Headache and nausea and vomiting as a result of the above, now under control  Light smoker  Colon mass with family of colon cancer  Uncontrolled hypertension  Steroid-induced hyperglycemia expected    PLAN:  Patient is on Keppra for seizure prophylaxis, patient is on steroids for the brain tumor related vasogenic edema with clinical improvement  She is off the Cleviprex and on oral regimen seems to be well-controlled she is on as needed medication as well  She was transition to oral steroid and is on the taper dose  She is cleared to transfer out of the intensive care unit by neurosurgery  GI to follow and decide on the timing of the colonoscopy  Will defer to the internal medicine team for further evaluation and management of her underlying possible colon mass, and management of the steroid-induced hypertension and hyperglycemia  Discussed with patient and family  Discussed with the nursing staff and the ICU rounding team  Labs/Notes/films were independently reviewed and pertinent results are summarized above  The copied texts in this note were reviewed and they are accurate as of 08/23/24    Disposition: Stepdown unit    Sarika Wilburn MD  08/23/24  12:06 EDT        Dictated utilizing Dragon dictation

## 2024-08-23 NOTE — CONSULTS
Name: Ely Sims ADMIT: 2024   : 1960  PCP: Provider, No Known    MRN: [id number] LOS: 4 days   AGE/SEX: 64 y.o. female  ROOM: [facility]     Subjective   Subjective   Up ad kacy. Two visitors at bedside (one is sister who is retired nurse). Patient denies any chest pain, dyspnea, cough, fever, chills. Mild HA at times and reporting some neck stiffness. No nausea, vomiting, abdominal pain.  She states that she has had a bowel movement and got to take a shower today so she is feeling much better.     Objective   Objective   Vital Signs  Temp:  [97.5 °F (36.4 °C)-97.9 °F (36.6 °C)] 97.5 °F (36.4 °C)  Heart Rate:  [67-94] 79  Resp:  [14-20] 20  BP: (104-149)/() 149/85  SpO2:  [94 %-98 %] 98 %  on   ;   Device (Oxygen Therapy): room air  Body mass index is 22.54 kg/m².    Physical Exam  Vitals and nursing note reviewed.   Constitutional:       Appearance: She is not toxic-appearing.   Cardiovascular:      Rate and Rhythm: Normal rate and regular rhythm.      Pulses: Normal pulses.   Pulmonary:      Effort: Pulmonary effort is normal. No respiratory distress.   Abdominal:      General: Bowel sounds are normal. There is no distension.      Palpations: Abdomen is soft.      Tenderness: There is no abdominal tenderness.   Musculoskeletal:         General: No swelling. Normal range of motion.      Cervical back: Normal range of motion and neck supple.   Skin:     General: Skin is warm and dry.      Findings: No bruising.   Neurological:      Mental Status: She is alert and oriented to person, place, and time.      Sensory: No sensory deficit.      Coordination: Coordination normal.   Psychiatric:         Mood and Affect: Mood normal.         Behavior: Behavior normal.       Results Review:       I reviewed the patient's new clinical results.  Results from last 7 days   Lab Units 24  0318 24  0401 24  1615 24  0512   WBC 10*3/mm3 8.57 12.92* 16.36* 12.81*   HEMOGLOBIN g/dL 10.5* 11.3*  13.0 12.4   PLATELETS 10*3/mm3 269 284 350 311     Results from last 7 days   Lab Units 08/23/24  1325 08/23/24  0318 08/22/24  0401 08/21/24  1615 08/21/24  0512   SODIUM mmol/L  --  139 140 144 140   POTASSIUM mmol/L 4.0 3.5 3.7 3.3* 3.3*   CHLORIDE mmol/L  --  109* 107 109* 107   CO2 mmol/L  --  23.0 25.1 24.5 24.6   BUN mg/dL  --  11 14 16 13   CREATININE mg/dL  --  0.58 0.72 0.77 0.64   GLUCOSE mg/dL  --  145* 131* 157* 147*   Estimated Creatinine Clearance: 101 mL/min (by C-G formula based on SCr of 0.58 mg/dL).  Results from last 7 days   Lab Units 08/19/24  1545   ALBUMIN g/dL 4.4   BILIRUBIN mg/dL 0.3   ALK PHOS U/L 83   AST (SGOT) U/L 15   ALT (SGPT) U/L 13     Results from last 7 days   Lab Units 08/23/24  0318 08/22/24  0401 08/21/24  1615 08/21/24  0512 08/19/24  2318 08/19/24  1545   CALCIUM mg/dL 7.9* 8.2* 8.7 9.2   < > 9.6   ALBUMIN g/dL  --   --   --   --   --  4.4   MAGNESIUM mg/dL 2.5* 2.2  --   --   --  1.9   PHOSPHORUS mg/dL 2.4*  --   --   --   --   --     < > = values in this interval not displayed.       Glucose   Date/Time Value Ref Range Status   08/23/2024 1149 140 (H) 70 - 130 mg/dL Final   08/23/2024 0703 154 (H) 70 - 130 mg/dL Final   08/22/2024 2105 126 70 - 130 mg/dL Final   08/22/2024 1527 186 (H) 70 - 130 mg/dL Final   08/22/2024 1132 179 (H) 70 - 130 mg/dL Final   08/22/2024 0719 141 (H) 70 - 130 mg/dL Final   08/22/2024 0521 136 (H) 70 - 130 mg/dL Final       amLODIPine, 5 mg, Oral, BID  atorvastatin, 10 mg, Oral, Daily  docusate sodium, 100 mg, Oral, Daily  famotidine, 20 mg, Oral, BID AC  insulin lispro, 2-9 Units, Subcutaneous, 4x Daily AC & at Bedtime  levETIRAcetam, 500 mg, Oral, BID  methylPREDNISolone, 4 mg, Oral, After Lunch  methylPREDNISolone, 4 mg, Oral, After Dinner  [START ON 8/24/2024] methylPREDNISolone, 4 mg, Oral, TID Around Food  [START ON 8/25/2024] methylPREDNISolone, 4 mg, Oral, 4x Daily Taper  [START ON 8/26/2024] methylPREDNISolone, 4 mg, Oral, TID Around  Food  [START ON 8/27/2024] methylPREDNISolone, 4 mg, Oral, Before Breakfast  [START ON 8/27/2024] methylPREDNISolone, 4 mg, Oral, Tonight  [START ON 8/28/2024] methylPREDNISolone, 4 mg, Oral, Before Breakfast  methylPREDNISolone, 8 mg, Oral, Before Breakfast  methylPREDNISolone, 8 mg, Oral, Tonight  [START ON 8/24/2024] methylPREDNISolone, 8 mg, Oral, Tonight  senna-docusate sodium, 1 tablet, Oral, Nightly       Diet: Regular/House; Fluid Consistency: Thin (IDDSI 0)       Assessment/Plan     Active Hospital Problems    Diagnosis  POA    **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, C80.1]  Yes    Colonic mass [K63.89]  Yes     Class: Acute    Anemia [D64.9]  Yes    Hyperglycemia [R73.9]  Yes    Hypertension [I10]  Yes      Resolved Hospital Problems    Diagnosis Date Resolved POA    Compression of brain [G93.5] 08/23/2024 Yes     Ms. Sims is a 64 y.o. female that initially presented to the hospital with a gradual onset of frontal headache with associated nausea and vomiting.  She initially presented to an [facility] where she had spinning sensation and dizziness.  She was referred to the emergency department where CT head revealed lesions in the left temporal lobe, right cerebellar hemisphere and cerebellar vermis with mass effect on the fourth ventricle.  She was admitted initially to the ICU for closer monitoring.  Neurosurgery was consulted and recommended removal of the mass on the posterior fossa due to the mass effect on the fourth ventricle.  She was placed on IV steroids and taken to the OR for craniotomy on 8/21 with Dr. Doty.  CT A/P/chest were obtained to rule out evidence of malignancy.  She was found to have asymmetric wall thickening involving the distal sigmoid colon with some surrounding mesenteric soft tissue nodularity with some concerns for primary colonic malignancy.  There was a sclerotic lesion within the spinous process of T1 felt to be likely benign, but short-term follow-up recommended.   Gastroenterology was consulted for the abnormal imaging.  She was cleared to move out of the ICU and [facility] was consulted 8/23 to assume care.    Metastasis to brain  -Status post right occipital craniotomy for suspected metastatic tumor on 8/21 with Dr. Doty.  -Pathology currently pending.  -Continue Norco for pain.  Muscle relaxant and ice added for neck discomfort per [facility]ORI.  -Neurosurgery managing.  Cleared to move out of the ICU 8/23.  -Currently on Medrol Dosepak for steroid taper.  -Plans to continue Keppra for seizure prophylaxis.  -Awaiting pathology.  -Monitor neurochecks.    Colonic mass  -Gastroenterology following.  -Awaiting pathology from recent craniotomy.  -If pathology consistent with GI primary, plans to proceed with colonoscopy.    Hypertension  -Now on Norvasc and tolerating.    Hyperglycemia  -Check A1c. Likely steroid induced.    Anemia  -Hemoglobin 14.4 on arrival and down to 10.5 today.  -Likely multifactorial d/t ABLA from surgery, possibly malignancy, etc.  -Will trend for now.    Discussed with patient, family and Dr. Wilson.    VTE Prophylaxis - SCDs  Code Status - Full code  Disposition - Anticipate discharge TBD.      GRETCHEN Perez  [facility]  08/23/24  15:31 EDT

## 2024-08-23 NOTE — PLAN OF CARE
"Goal Outcome Evaluation:  Plan of Care Reviewed With: patient, son           Outcome Evaluation: Pt admit from urgent care with headache.  CT shows brain mets and pt s/p craniotomy on 8/21.  Pt lives at home with SO and is active and independent at baseline. OT observes pt walking hallway with PT prior to session. Pt demo functional B UE, denies pain, visual/coordination or sensation changes in UE.  Pt is alert and oriented but reports feeling mildly foggy stating \"I couldn't drive or do my work right now\".   Pt has one posterior wobble with static standing and OT ed on using caution when turning or with distractions while walking. Will follow up after weekend for OT for continued mobility and activity tolerance.      Anticipated Discharge Disposition (OT): home                        "

## 2024-08-24 ENCOUNTER — ANESTHESIA EVENT (OUTPATIENT)
Dept: GASTROENTEROLOGY | Facility: HOSPITAL | Age: 64
End: 2024-08-24
Payer: COMMERCIAL

## 2024-08-24 LAB
ANION GAP SERPL CALCULATED.3IONS-SCNC: 11 MMOL/L (ref 5–15)
BUN SERPL-MCNC: 12 MG/DL (ref 8–23)
BUN/CREAT SERPL: 22.6 (ref 7–25)
CALCIUM SPEC-SCNC: 8.7 MG/DL (ref 8.6–10.5)
CHLORIDE SERPL-SCNC: 104 MMOL/L (ref 98–107)
CO2 SERPL-SCNC: 25 MMOL/L (ref 22–29)
CREAT SERPL-MCNC: 0.53 MG/DL (ref 0.57–1)
DEPRECATED RDW RBC AUTO: 43.6 FL (ref 37–54)
EGFRCR SERPLBLD CKD-EPI 2021: 103.4 ML/MIN/1.73
ERYTHROCYTE [DISTWIDTH] IN BLOOD BY AUTOMATED COUNT: 12.2 % (ref 12.3–15.4)
GLUCOSE BLDC GLUCOMTR-MCNC: 106 MG/DL (ref 70–130)
GLUCOSE BLDC GLUCOMTR-MCNC: 130 MG/DL (ref 70–130)
GLUCOSE BLDC GLUCOMTR-MCNC: 145 MG/DL (ref 70–130)
GLUCOSE BLDC GLUCOMTR-MCNC: 150 MG/DL (ref 70–130)
GLUCOSE SERPL-MCNC: 120 MG/DL (ref 65–99)
HBA1C MFR BLD: 5.5 % (ref 4.8–5.6)
HCT VFR BLD AUTO: 34.9 % (ref 34–46.6)
HGB BLD-MCNC: 11.9 G/DL (ref 12–15.9)
MCH RBC QN AUTO: 33.3 PG (ref 26.6–33)
MCHC RBC AUTO-ENTMCNC: 34.1 G/DL (ref 31.5–35.7)
MCV RBC AUTO: 97.8 FL (ref 79–97)
PLATELET # BLD AUTO: 312 10*3/MM3 (ref 140–450)
PMV BLD AUTO: 10.2 FL (ref 6–12)
POTASSIUM SERPL-SCNC: 4.1 MMOL/L (ref 3.5–5.2)
RBC # BLD AUTO: 3.57 10*6/MM3 (ref 3.77–5.28)
SODIUM SERPL-SCNC: 140 MMOL/L (ref 136–145)
WBC NRBC COR # BLD AUTO: 8.21 10*3/MM3 (ref 3.4–10.8)

## 2024-08-24 PROCEDURE — 99024 POSTOP FOLLOW-UP VISIT: CPT | Performed by: NEUROLOGICAL SURGERY

## 2024-08-24 PROCEDURE — 80048 BASIC METABOLIC PNL TOTAL CA: CPT | Performed by: INTERNAL MEDICINE

## 2024-08-24 PROCEDURE — 99232 SBSQ HOSP IP/OBS MODERATE 35: CPT | Performed by: PHYSICIAN ASSISTANT

## 2024-08-24 PROCEDURE — 85027 COMPLETE CBC AUTOMATED: CPT | Performed by: NURSE PRACTITIONER

## 2024-08-24 PROCEDURE — 25010000002 HYDRALAZINE PER 20 MG: Performed by: INTERNAL MEDICINE

## 2024-08-24 PROCEDURE — 63710000001 METHYLPREDNISOLONE 4 MG TABLET THERAPY PACK 21 EACH DISP PACK: Performed by: INTERNAL MEDICINE

## 2024-08-24 PROCEDURE — 83036 HEMOGLOBIN GLYCOSYLATED A1C: CPT | Performed by: NURSE PRACTITIONER

## 2024-08-24 PROCEDURE — 82948 REAGENT STRIP/BLOOD GLUCOSE: CPT

## 2024-08-24 PROCEDURE — 25010000002 LABETALOL 5 MG/ML SOLUTION: Performed by: INTERNAL MEDICINE

## 2024-08-24 RX ORDER — METHOCARBAMOL 500 MG/1
750 TABLET, FILM COATED ORAL EVERY 6 HOURS PRN
Status: DISCONTINUED | OUTPATIENT
Start: 2024-08-24 | End: 2024-08-30 | Stop reason: HOSPADM

## 2024-08-24 RX ORDER — POLYETHYLENE GLYCOL 3350 17 G/17G
0.5 POWDER, FOR SOLUTION ORAL EVERY 12 HOURS
Status: COMPLETED | OUTPATIENT
Start: 2024-08-24 | End: 2024-08-25

## 2024-08-24 RX ORDER — METHOCARBAMOL 500 MG/1
500 TABLET, FILM COATED ORAL ONCE
Status: COMPLETED | OUTPATIENT
Start: 2024-08-24 | End: 2024-08-24

## 2024-08-24 RX ADMIN — HYDROCODONE BITARTRATE AND ACETAMINOPHEN 1 TABLET: 5; 325 TABLET ORAL at 12:20

## 2024-08-24 RX ADMIN — AMLODIPINE BESYLATE 5 MG: 5 TABLET ORAL at 08:37

## 2024-08-24 RX ADMIN — HYDROCODONE BITARTRATE AND ACETAMINOPHEN 1 TABLET: 5; 325 TABLET ORAL at 16:33

## 2024-08-24 RX ADMIN — FAMOTIDINE 20 MG: 20 TABLET, FILM COATED ORAL at 16:33

## 2024-08-24 RX ADMIN — ACETAMINOPHEN 325MG 650 MG: 325 TABLET ORAL at 00:14

## 2024-08-24 RX ADMIN — DOCUSATE SODIUM 100 MG: 100 CAPSULE, LIQUID FILLED ORAL at 08:37

## 2024-08-24 RX ADMIN — HYDROCODONE BITARTRATE AND ACETAMINOPHEN 1 TABLET: 5; 325 TABLET ORAL at 08:37

## 2024-08-24 RX ADMIN — POLYETHYLENE GLYCOL 3350 0.5 BOTTLE: 17 POWDER, FOR SOLUTION ORAL at 16:34

## 2024-08-24 RX ADMIN — HYDROCODONE BITARTRATE AND ACETAMINOPHEN 1 TABLET: 5; 325 TABLET ORAL at 21:09

## 2024-08-24 RX ADMIN — SENNOSIDES AND DOCUSATE SODIUM 1 TABLET: 50; 8.6 TABLET ORAL at 21:10

## 2024-08-24 RX ADMIN — METHYLPREDNISOLONE 4 MG: 4 TABLET ORAL at 17:59

## 2024-08-24 RX ADMIN — AMLODIPINE BESYLATE 5 MG: 5 TABLET ORAL at 21:09

## 2024-08-24 RX ADMIN — METHYLPREDNISOLONE 4 MG: 4 TABLET ORAL at 12:23

## 2024-08-24 RX ADMIN — METHOCARBAMOL TABLETS 750 MG: 500 TABLET, COATED ORAL at 14:43

## 2024-08-24 RX ADMIN — LEVETIRACETAM 500 MG: 500 TABLET, FILM COATED ORAL at 21:09

## 2024-08-24 RX ADMIN — METHOCARBAMOL TABLETS 500 MG: 500 TABLET, COATED ORAL at 08:38

## 2024-08-24 RX ADMIN — METHYLPREDNISOLONE 4 MG: 4 TABLET ORAL at 08:38

## 2024-08-24 RX ADMIN — LEVETIRACETAM 500 MG: 500 TABLET, FILM COATED ORAL at 08:37

## 2024-08-24 RX ADMIN — LABETALOL HYDROCHLORIDE 20 MG: 5 INJECTION, SOLUTION INTRAVENOUS at 18:00

## 2024-08-24 RX ADMIN — HYDROCODONE BITARTRATE AND ACETAMINOPHEN 1 TABLET: 5; 325 TABLET ORAL at 04:33

## 2024-08-24 RX ADMIN — METHOCARBAMOL 500 MG: 500 TABLET ORAL at 13:00

## 2024-08-24 RX ADMIN — ATORVASTATIN CALCIUM 10 MG: 20 TABLET, FILM COATED ORAL at 08:37

## 2024-08-24 RX ADMIN — HYDRALAZINE HYDROCHLORIDE 10 MG: 20 INJECTION INTRAMUSCULAR; INTRAVENOUS at 00:10

## 2024-08-24 RX ADMIN — METHYLPREDNISOLONE 8 MG: 4 TABLET ORAL at 21:09

## 2024-08-24 NOTE — PLAN OF CARE
Goal Outcome Evaluation:         Pt A&Ox4, R/A, NSR, up ad kacy, h/a at shift change which pt received PRN medication for - improved.  B/P treated with PRN medication to try to remain within parameters.  Stable with ambulation.  Steroids given as ordered.  Keppra given preventative.    Problem: Adult Inpatient Plan of Care  Goal: Plan of Care Review  Outcome: Ongoing, Progressing  Goal: Optimal Comfort and Wellbeing  Outcome: Ongoing, Progressing  Intervention: Provide Person-Centered Care  Description: Use a family-focused approach to care.  Develop trust and rapport by proactively providing information, encouraging questions, addressing concerns and offering reassurance.  Acknowledge emotional response to hospitalization.  Recognize and utilize personal coping strategies.  Honor spiritual and cultural preferences.  Recent Flowsheet Documentation  Taken 8/23/2024 2006 by Marisela Sarmiento RN  Trust Relationship/Rapport:   care explained   choices provided   reassurance provided   thoughts/feelings acknowledged   questions encouraged   questions answered   emotional support provided  Goal: Readiness for Transition of Care  Outcome: Ongoing, Progressing     Problem: Skin Injury Risk Increased  Goal: Skin Health and Integrity  Outcome: Ongoing, Progressing  Intervention: Promote and Optimize Oral Intake  Description: Perform a nutrition assessment; include a nutrition-focused physical exam.  Determine calorie, protein, vitamin, mineral and fluid requirements.  Assess for micronutrient deficiencies; supplement if depleted.  Assess need and assist with meal set-up and feeding.  Adjust diet or meal schedule based on preferences and tolerance.  Offer oral supplemental food or drinks to enhance calorie and protein intake.  Establish bowel elimination program to increase comfort and appetite.  Minimize unnecessary dietary restrictions to increase oral intake.  Provide and encourage oral hygiene to enhance desire to  eat.  Consider enteral nutrition support if oral intake remains inadequate; provide parenteral nutrition if enteral is contraindicated.  Recent Flowsheet Documentation  Taken 8/23/2024 2006 by Marisela Sarmiento RN  Oral Nutrition Promotion:   rest periods promoted   safe use of adaptive equipment encouraged  Intervention: Optimize Skin Protection  Description: Perform a full pressure injury risk assessment, as indicated by screening, upon admission to care unit.  Reassess skin (injury risk, full inspection) frequently (e.g., scheduled interval, with change in condition) to provide optimal early detection and prevention.  Maintain adequate tissue perfusion (e.g., encourage fluid balance; avoid crossing legs, constrictive clothing or devices) to promote tissue oxygenation.  Maintain head of bed at lowest degree of elevation tolerated, considering medical condition and other restrictions.  Avoid positioning onto an area that remains reddened.  Minimize incontinence and moisture (e.g., toileting schedule; moisture-wicking pad, diaper or incontinence collection device; skin moisture barrier).  Cleanse skin promptly and gently when soiled utilizing a pH-balanced cleanser.  Relieve and redistribute pressure (e.g., scheduled position changes, weight shifts, use of support surface, medical device repositioning, protective dressing application, use of positioning device, microclimate control, use of pressure-injury-monitor  Encourage increased activity, such as sitting in a chair at the bedside or early mobilization, when able to tolerate.  Recent Flowsheet Documentation  Taken 8/24/2024 0200 by Marisela Sarmiento, RN  Head of Bed (HOB) Positioning: HOB elevated  Taken 8/24/2024 0000 by Marisela Sarmiento, RN  Head of Bed (HOB) Positioning: HOB elevated  Taken 8/23/2024 2200 by Marisela Sarmiento, RN  Head of Bed (HOB) Positioning: HOB elevated  Taken 8/23/2024 2006 by Marisela Sarmiento, RN  Pressure Reduction Techniques: frequent  weight shift encouraged  Head of Bed (HOB) Positioning: HOB elevated  Pressure Reduction Devices:   pressure-redistributing mattress utilized   positioning supports utilized  Skin Protection:   adhesive use limited   tubing/devices free from skin contact   transparent dressing maintained     Problem: Fall Injury Risk  Goal: Absence of Fall and Fall-Related Injury  Outcome: Ongoing, Progressing  Intervention: Identify and Manage Contributors  Description: Develop a fall prevention plan with the patient and caregiver/family.  Provide reorientation, appropriate sensory stimulation and routines with changes in mental status to decrease risk of fall.  Promote use of personal vision and auditory aids.  Assess assistance level required for safe and effective self-care; provide support as needed, such as toileting, mobilization. For age 65 and older, implement timed toileting with assistance.  Encourage physical activity, such as performance of mobility and self-care at highest level of patient ability, multicomponent exercise program and provision of appropriate assistive devices.  If fall occurs, assess the severity of injury; implement fall injury protocol. Determine the cause and revise fall injury prevention plan.  Regularly review medication contribution to fall risk; adjust medication administration times to minimize risk of falling.  Consider risk related to polypharmacy and age.  Balance adequate pain management with potential for oversedation.  Recent Flowsheet Documentation  Taken 8/24/2024 0200 by Marisela Sarmiento, RN  Medication Review/Management:   medications reviewed   high-risk medications identified  Taken 8/24/2024 0000 by Marisela Sarmiento, RN  Medication Review/Management:   medications reviewed   high-risk medications identified  Taken 8/23/2024 2200 by Marisela Sarmiento, RN  Medication Review/Management:   high-risk medications identified   medications reviewed  Taken 8/23/2024 2006 by Marisela Sarmiento  RN  Medication Review/Management:   medications reviewed   high-risk medications identified  Self-Care Promotion:   independence encouraged   BADL personal objects within reach   safe use of adaptive equipment encouraged  Intervention: Promote Injury-Free Environment  Description: Provide a safe, barrier-free environment that encourages independent activity.  Keep care area uncluttered and well-lighted.  Determine need for increased observation or monitoring.  Avoid use of devices that minimize mobility, such as restraints or indwelling urinary catheter.  Recent Flowsheet Documentation  Taken 8/24/2024 0200 by Marisela Sarmiento, RN  Safety Promotion/Fall Prevention: safety round/check completed  Taken 8/24/2024 0000 by Marisela Sarmiento, RN  Safety Promotion/Fall Prevention: safety round/check completed  Taken 8/23/2024 2200 by Marisela Sarmiento, RN  Safety Promotion/Fall Prevention: safety round/check completed  Taken 8/23/2024 2006 by Marisela Sarmiento, RN  Safety Promotion/Fall Prevention: safety round/check completed

## 2024-08-24 NOTE — PROGRESS NOTES
[facility]  Inpatient Progress Note    Reason for Followup: Abnormal CT of the sigmoid colon    Subjective     Interval History:   Patient doing well this morning.  No acute issues.  She is moving her bowels appropriately.  No nausea, vomiting, diarrhea, constipation.  Tolerating a diet.    Current Facility-Administered Medications:     acetaminophen (TYLENOL) tablet 650 mg, 650 mg, Oral, Q4H PRN **OR** acetaminophen (TYLENOL) 160 MG/5ML oral solution 650 mg, 650 mg, Oral, Q4H PRN **OR** acetaminophen (TYLENOL) suppository 650 mg, 650 mg, Rectal, Q4H PRN, Sarika Wilburn MD    acetaminophen (TYLENOL) tablet 650 mg, 650 mg, Oral, Q4H PRN, 650 mg at 08/24/24 0014 **OR** acetaminophen (TYLENOL) suppository 650 mg, 650 mg, Rectal, Q4H PRN, Sarika Wilburn MD    amLODIPine (NORVASC) tablet 5 mg, 5 mg, Oral, BID, Sarika Wilburn MD, 5 mg at 08/24/24 0837    atorvastatin (LIPITOR) tablet 10 mg, 10 mg, Oral, Daily, Sarika Wilburn MD, 10 mg at 08/24/24 0837    sennosides-docusate (PERICOLACE) 8.6-50 MG per tablet 1 tablet, 1 tablet, Oral, Nightly, 1 tablet at 08/23/24 2050 **AND** polyethylene glycol (MIRALAX) packet 17 g, 17 g, Oral, Daily PRN **AND** bisacodyl (DULCOLAX) EC tablet 5 mg, 5 mg, Oral, Daily PRN **AND** bisacodyl (DULCOLAX) suppository 10 mg, 10 mg, Rectal, Daily PRN, Sarika Wilburn MD    Calcium Replacement - Follow Nurse / BPA Driven Protocol, , Does not apply, Cosmo ROWE Lebnan S, MD    dextrose (D50W) (25 g/50 mL) IV injection 25 g, 25 g, Intravenous, Q15 Min PRNCosmo Lebnan S, MD    dextrose (GLUTOSE) oral gel 15 g, 15 g, Oral, Q15 Min PRN, Sarika Wilburn MD    docusate sodium (COLACE) capsule 100 mg, 100 mg, Oral, Daily, Sarika Wilburn MD, 100 mg at 08/24/24 0837    enalaprilat (VASOTEC) injection 0.625 mg, 0.625 mg, Intravenous, Q6H PRN, Sarika Wilburn MD    famotidine (PEPCID) tablet 20 mg, 20 mg, Oral, BID AC, Sarika Wilburn MD, 20 mg at 08/23/24 1814    fentaNYL  citrate (PF) (SUBLIMAZE) injection, , Intravenous, Code / Trauma / Sedation Medication, Malvin Merritt MD, 50 mcg at 08/21/24 1158    glucagon (GLUCAGEN) injection 1 mg, 1 mg, Intramuscular, Q15 Min PRN, Sarika Wilburn MD    hydrALAZINE (APRESOLINE) injection 10 mg, 10 mg, Intravenous, Q4H PRN, Sarika Wilburn MD, 10 mg at 08/24/24 0010    HYDROcodone-acetaminophen (NORCO) 5-325 MG per tablet 1 tablet, 1 tablet, Oral, Q4H PRN, Sarika Wilburn MD, 1 tablet at 08/24/24 0837    insulin lispro (HUMALOG/ADMELOG) injection 2-9 Units, 2-9 Units, Subcutaneous, 4x Daily AC & at Bedtime, Sarika Wilburn MD, 2 Units at 08/23/24 2050    labetalol (NORMODYNE,TRANDATE) injection 20 mg, 20 mg, Intravenous, Q2H PRN, Sarika Wilburn MD, 20 mg at 08/23/24 0310    levETIRAcetam (KEPPRA) tablet 500 mg, 500 mg, Oral, BID, Sarika Wilburn MD, 500 mg at 08/24/24 0837    Magnesium Standard Dose Replacement - Follow Nurse / BPA Driven Protocol, , Does not apply, PRN, Sarika Wilburn MD    methocarbamol (ROBAXIN) tablet 500 mg, 500 mg, Oral, Q6H PRN, Sarika Wilburn MD, 500 mg at 08/24/24 0838    methylPREDNISolone (MEDROL) dose pack 4 mg, 4 mg, Oral, TID Around Food, Sarika Wilburn MD, 4 mg at 08/24/24 0838    [START ON 8/25/2024] methylPREDNISolone (MEDROL) dose pack 4 mg, 4 mg, Oral, 4x Daily Taper, Sarika Wilburn MD    [START ON 8/26/2024] methylPREDNISolone (MEDROL) dose pack 4 mg, 4 mg, Oral, TID Around Food, Sarika Wilburn MD    [START ON 8/27/2024] methylPREDNISolone (MEDROL) dose pack 4 mg, 4 mg, Oral, Before Breakfast, Sarika Wilburn MD    [START ON 8/27/2024] methylPREDNISolone (MEDROL) dose pack 4 mg, 4 mg, Oral, Cosmo Mcknight Lebnan S, MD    [START ON 8/28/2024] methylPREDNISolone (MEDROL) dose pack 4 mg, 4 mg, Oral, Before Breakfast, Sarika Wilburn MD    methylPREDNISolone (MEDROL) dose pack 8 mg, 8 mg, Oral, Cosmo Mcknight Lebnan S, MD    midazolam (VERSED) injection, , Intravenous, Code / Trauma / Sedation  Medication, Malvin Merritt MD, 1 mg at 08/21/24 1157    morphine injection 1 mg, 1 mg, Intravenous, Q2H PRN, 1 mg at 08/21/24 2237 **AND** naloxone (NARCAN) injection 0.4 mg, 0.4 mg, Intravenous, Q5 Min PRN, Sarika Wilburn MD    nitroglycerin (NITROSTAT) SL tablet 0.4 mg, 0.4 mg, Sublingual, Q5 Min PRN, Sarika Wilburn MD    ondansetron ODT (ZOFRAN-ODT) disintegrating tablet 4 mg, 4 mg, Oral, Q6H PRN **OR** ondansetron (ZOFRAN) injection 4 mg, 4 mg, Intravenous, Q6H PRN, Sarika Wilburn MD    Phosphorus Replacement - Follow Nurse / BPA Driven Protocol, , Does not apply, Cosmo ROWE Lebnan S, MD    Potassium Replacement - Follow Nurse / BPA Driven Protocol, , Does not apply, Cosmo ROWE Lebnan S, MD    sodium chloride 0.9 % flush 10 mL, 10 mL, Intravenous, Cosmo ROWE Lebnan S, MD    [CANCELED] Insert Peripheral IV, , , Once **AND** sodium chloride 0.9 % flush 10 mL, 10 mL, Intravenous, Cosmo ROWE Lebnan S, MD    Objective     Vital Signs  Temp:  [97.5 °F (36.4 °C)-99 °F (37.2 °C)] 97.7 °F (36.5 °C)  Heart Rate:  [75-92] 76  Resp:  [18-20] 20  BP: (124-156)/(60-89) 142/86  Body mass index is 22.54 kg/m².    Intake/Output Summary (Last 24 hours) at 8/24/2024 0902  Last data filed at 8/23/2024 2006  Gross per 24 hour   Intake 800 ml   Output --   Net 800 ml     No intake/output data recorded.     Physical Exam:   General: patient awake, alert and cooperative   Abdomen: soft, nontender, nondistended; normal bowel sounds     Results Review:     I reviewed the patient's new clinical results.    Results from last 7 days   Lab Units 08/24/24  0528 08/23/24  0318 08/22/24  0401   WBC 10*3/mm3 8.21 8.57 12.92*   HEMOGLOBIN g/dL 11.9* 10.5* 11.3*   HEMATOCRIT % 34.9 30.9* 32.6*   PLATELETS 10*3/mm3 312 269 284     Results from last 7 days   Lab Units 08/24/24  0528 08/23/24  1325 08/23/24  0318 08/22/24  0401 08/19/24  2318 08/19/24  1545   SODIUM mmol/L 140  --  139 140   < > 137   POTASSIUM mmol/L 4.1 4.0 3.5 3.7   < >  "3.6   CHLORIDE mmol/L 104  --  109* 107   < > 102   CO2 mmol/L 25.0  --  23.0 25.1   < > 26.0   BUN mg/dL 12  --  11 14   < > 13   CREATININE mg/dL 0.53*  --  0.58 0.72   < > 0.69   CALCIUM mg/dL 8.7  --  7.9* 8.2*   < > 9.6   BILIRUBIN mg/dL  --   --   --   --   --  0.3   ALK PHOS U/L  --   --   --   --   --  83   ALT (SGPT) U/L  --   --   --   --   --  13   AST (SGOT) U/L  --   --   --   --   --  15   GLUCOSE mg/dL 120*  --  145* 131*   < > 98    < > = values in this interval not displayed.     Results from last 7 days   Lab Units 08/22/24  0401 08/21/24  0512 08/20/24  0504   INR  0.99 1.06 1.09     No results found for: \"LIPASE\"    Radiology:  MRI Brain With & Without Contrast   Final Result   1. Postoperative findings, as noted above.  Continued follow-up is   recommended.            This report was finalized on 8/23/2024 5:28 AM by Dr. Viridiana Altamirano M.D on Workstation: [id number]          CT Head Without Contrast   Final Result   1.  The patient is status post resection of a right cerebellar avidly   enhancing mass. Expected postoperative changes are noted. No   complicating feature is identified.   2.  Decreased attenuation is appreciated involving the left temporal   lobe posteriorly and inferiorly consistent with vasogenic edema. This   corresponds to the 9 mm enhancing lesion present on the MRI examination   of 8/20/2024. The enhancing lesion involving the superior cerebellar   vermis to the left is occult as is the area of dural enhancement   overlying the left frontal lobe superolaterally.       Radiation dose reduction techniques were utilized, including automated   exposure modulation based on body size.       This report was finalized on 8/21/2024 6:45 PM by Dr. Cesar Levine M.D   on Workstation: [id number]          CT Head With & Without Contrast   Final Result   1. There has been no change when compared to the MRI of the brain with   and without contrast earlier this morning on " 08/20/2024 at 12:22 a.m.       2. This patient has 3 enhancing lesions in the brain and findings are   most consistent with metastatic disease to the brain. The enhancing   brain lesions which are most likely brain mets includes a 7 x 6 x 6 mm   enhancing lesion in the posterior inferior left temporal lobe that has   2.5 x 2.3 cm of surrounding vasogenic edema. There is a 10 x 9 x 10 mm   enhancing lesion in the superior midline of the vermis of the cerebellum   that has mild surrounding edema. The largest enhancing lesion is in the   posterior inferior right cerebellum that measures 2.4 x 2 x 2 cm in size   and has a large amount of surrounding vasogenic edema tracking up to 4.5   x 4.5 cm with mild mass effect on the fourth ventricle. There is mild   dilatation of the lateral and third ventricles compatible with mild   hydrocephalus, unchanged. This patient has abnormal circumferential wall   thickening within the distal sigmoid colon seen on chest, abdomen and   pelvic CT earlier this morning on 08/20/2024 at 1:42 a.m. suggesting   there could be a sigmoid colon cancer and correlation with colonoscopy   is suggested.       Radiation dose reduction techniques were utilized, including automated   exposure control and exposure modulation based on body size.           This report was finalized on 8/21/2024 6:09 AM by Dr. Gilbert Hassan M.D   on Workstation: [id number]          CT Chest Without Contrast Diagnostic   Final Result       1. No evidence of metastatic disease within the thorax.   2. Asymmetric wall thickening involving the distal sigmoid colon, with   some surrounding mesenteric soft tissue nodularity. Appearance is very   worrisome for primary colonic malignancy. Correlation with colonoscopy   is recommended. MRI could allow for further assessment.   3. Sclerotic lesion within the spinous process of T1. I would favor that   this is a benign lesion such as a bone island. Consider short-term   follow-up or  bone scan.       Radiation dose reduction techniques were utilized, including automated   exposure control and exposure modulation based on body size.           This report was finalized on 8/20/2024 3:54 AM by Dr. Viridiana Altamirano M.D on Workstation: [id number]          CT Abdomen Pelvis Without Contrast   Final Result       1. No evidence of metastatic disease within the thorax.   2. Asymmetric wall thickening involving the distal sigmoid colon, with   some surrounding mesenteric soft tissue nodularity. Appearance is very   worrisome for primary colonic malignancy. Correlation with colonoscopy   is recommended. MRI could allow for further assessment.   3. Sclerotic lesion within the spinous process of T1. I would favor that   this is a benign lesion such as a bone island. Consider short-term   follow-up or bone scan.       Radiation dose reduction techniques were utilized, including automated   exposure control and exposure modulation based on body size.           This report was finalized on 8/20/2024 3:54 AM by Dr. Viridiana Altamirano M.D on Workstation: [id number]          MRI Brain With & Without Contrast   Final Result   1. Study confirms the presence of lesions within the left temporal lobe,   as well as the right cerebellar hemisphere. A third lesion is noted   within the cerebellar vermis. There is mass effect upon the fourth   ventricle. There is some mild dilatation of the lateral and third   ventricles. Continued follow-up is recommended.           This report was finalized on 8/20/2024 2:41 AM by Dr. Viridiana Altamirano M.D on Workstation: [id number]          CT Head Without Contrast   Final Result   Areas of increased attenuation are appreciated over the   right cerebral hemisphere posteriorly (2.1 cm) and left temporal lobe   inferiorly and posteriorly (6 mm) most consistent with that of   metastatic disease with associated edema. Edema is most prominent   involving the right cerebellar  hemisphere. There is mass effect upon the   fourth ventricle and mild prominence of the lateral and third   ventricles. Further evaluation with MRI examination of the brain with   and without contrast is recommended.       The above information was called to and discussed with Dr. Epps.                   Radiation dose reduction techniques were utilized, including automated   exposure modulation based on body size.       This report was finalized on 8/19/2024 6:40 PM by Dr. Cesar Levine M.D   on Workstation: [id number]          XR Chest 1 View   Final Result   1. No acute process           This report was finalized on 8/19/2024 4:32 PM by Dr. Veto Escobar M.D on Workstation: [id number]                Assessment & Plan   Assessment:   Brain mets s/p right occipital craniotomy  Abnormal CT scan sigmoid colon    Plan:   Path from brain mets is still pending  Patient is ultimately appropriate for discharge but is awaiting colonoscopy plans.  Will proceed with colonoscopy tomorrow with Dr. Kaminski.  Clear liquid diet today  N.p.o. at midnight    I discussed the patient's findings and my recommendations with patient and family.    Dictated utilizing Dragon dictation.        Stephanie Rees PA-C   [facility]  [address] [place] [address]  Office: [phone]

## 2024-08-24 NOTE — PROGRESS NOTES
LOS: 5 days   Patient Care Team:  Provider, No Known as PCP - General    Chief Complaint: Postop craniotomy    Subjective     The patient feels pretty good overall.  She is walking without any problem.  She has no discoordination no weakness.    Interval History:     History taken from: patient chart family    Objective      She has good movement in all 4 extremities.  Her incision looks great.    Vital Signs  Temp:  [97.5 °F (36.4 °C)-99 °F (37.2 °C)] 97.7 °F (36.5 °C)  Heart Rate:  [75-92] 76  Resp:  [18-20] 20  BP: (130-156)/(60-89) 142/86       Results Review:     I reviewed the patient's new clinical results.  She is afebrile with a white count of 8.2.  There is not yet a path report.  Her MRI shows complete resection of the tumor in the right cerebellar hemisphere.      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  They are planning a colonoscopy on Monday and she certainly should be able to go home after that.  She did have an abnormality in T1 on the CT of the chest and so we will check a total body bone scan today.      Bull Doty MD  08/24/24  11:07 EDT

## 2024-08-24 NOTE — PROGRESS NOTES
Name: Ely Sims ADMIT: 2024   : 1960  PCP: Provider, No Known    MRN: [id number] LOS: 5 days   AGE/SEX: 64 y.o. female  ROOM: [facility]     Subjective   Subjective   No new complaints.         Objective   Objective   Vital Signs  Temp:  [97.7 °F (36.5 °C)-99 °F (37.2 °C)] 97.7 °F (36.5 °C)  Heart Rate:  [76-92] 83  Resp:  [18-20] 20  BP: (129-156)/(60-89) 129/75  SpO2:  [96 %-99 %] 96 %  on   ;   Device (Oxygen Therapy): room air  Body mass index is 22.54 kg/m².  Physical Exam  Vitals and nursing note reviewed.   Constitutional:       General: She is not in acute distress.  Cardiovascular:      Rate and Rhythm: Normal rate and regular rhythm.   Pulmonary:      Effort: Pulmonary effort is normal.      Breath sounds: Normal breath sounds.   Abdominal:      General: Bowel sounds are normal.      Palpations: Abdomen is soft.      Tenderness: There is no abdominal tenderness.   Musculoskeletal:         General: No swelling.   Skin:     General: Skin is warm and dry.   Neurological:      Mental Status: She is alert. Mental status is at baseline.       Results Review     I reviewed the patient's new clinical results.  Results from last 7 days   Lab Units 24  0528 24  0318 24  0401 24  1615   WBC 10*3/mm3 8.21 8.57 12.92* 16.36*   HEMOGLOBIN g/dL 11.9* 10.5* 11.3* 13.0   PLATELETS 10*3/mm3 312 269 284 350     Results from last 7 days   Lab Units 24  0528 24  1325 24  0318 24  0401 24  1615   SODIUM mmol/L 140  --  139 140 144   POTASSIUM mmol/L 4.1 4.0 3.5 3.7 3.3*   CHLORIDE mmol/L 104  --  109* 107 109*   CO2 mmol/L 25.0  --  23.0 25.1 24.5   BUN mg/dL 12  --  11 14 16   CREATININE mg/dL 0.53*  --  0.58 0.72 0.77   GLUCOSE mg/dL 120*  --  145* 131* 157*   EGFR mL/min/1.73 103.4  --  101.2 93.5 86.3     Results from last 7 days   Lab Units 24  1545   ALBUMIN g/dL 4.4   BILIRUBIN mg/dL 0.3   ALK PHOS U/L 83   AST (SGOT) U/L 15   ALT (SGPT) U/L 13      Results from last 7 days   Lab Units 08/24/24  0528 08/23/24  0318 08/22/24  0401 08/21/24  1615 08/19/24  2318 08/19/24  1545   CALCIUM mg/dL 8.7 7.9* 8.2* 8.7   < > 9.6   ALBUMIN g/dL  --   --   --   --   --  4.4   MAGNESIUM mg/dL  --  2.5* 2.2  --   --  1.9   PHOSPHORUS mg/dL  --  2.4*  --   --   --   --     < > = values in this interval not displayed.       Hemoglobin A1C   Date/Time Value Ref Range Status   08/24/2024 0528 5.50 4.80 - 5.60 % Final     Glucose   Date/Time Value Ref Range Status   08/24/2024 1122 145 (H) 70 - 130 mg/dL Final   08/24/2024 0811 106 70 - 130 mg/dL Final   08/23/2024 1919 171 (H) 70 - 130 mg/dL Final   08/23/2024 1149 140 (H) 70 - 130 mg/dL Final   08/23/2024 0703 154 (H) 70 - 130 mg/dL Final   08/22/2024 2105 126 70 - 130 mg/dL Final   08/22/2024 1527 186 (H) 70 - 130 mg/dL Final       MRI Brain With & Without Contrast    Result Date: 8/23/2024  1. Postoperative findings, as noted above.  Continued follow-up is recommended.   This report was finalized on 8/23/2024 5:28 AM by Dr. Viridiana Altamirano M.D on Workstation: [id number]       I have personally reviewed all medications:  Scheduled Medications  amLODIPine, 5 mg, Oral, BID  atorvastatin, 10 mg, Oral, Daily  docusate sodium, 100 mg, Oral, Daily  famotidine, 20 mg, Oral, BID AC  insulin lispro, 2-9 Units, Subcutaneous, 4x Daily AC & at Bedtime  levETIRAcetam, 500 mg, Oral, BID  methocarbamol, 500 mg, Oral, Once  methylPREDNISolone, 4 mg, Oral, TID Around Food  [START ON 8/25/2024] methylPREDNISolone, 4 mg, Oral, 4x Daily Taper  [START ON 8/26/2024] methylPREDNISolone, 4 mg, Oral, TID Around Food  [START ON 8/27/2024] methylPREDNISolone, 4 mg, Oral, Before Breakfast  [START ON 8/27/2024] methylPREDNISolone, 4 mg, Oral, Tonight  [START ON 8/28/2024] methylPREDNISolone, 4 mg, Oral, Before Breakfast  methylPREDNISolone, 8 mg, Oral, Tonight  polyethylene glycol, 0.5 bottle, Oral, Q12H  senna-docusate sodium, 1 tablet, Oral,  Nightly    Infusions   Diet  Diet: Liquid; Clear Liquid; Fluid Consistency: Thin (IDDSI 0)  NPO Diet NPO Type: Strict NPO    I have personally reviewed:  [x]  Laboratory   [x]  Microbiology   [x]  Radiology   [x]  EKG/Telemetry  [x]  Cardiology/Vascular   []  Pathology    []  Records       Assessment/Plan     Active Hospital Problems    Diagnosis  POA    **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 Sims is a 64 y.o. female that initially presented with a gradual onset of frontal headache with associated nausea and vomiting.  CT head revealed lesions in the left temporal lobe, right cerebellar hemisphere and cerebellar vermis with mass effect on the fourth ventricle.  She was admitted initially to the ICU for closer monitoring.  Neurosurgery was consulted and recommended removal of the mass on the posterior fossa due to the mass effect on the fourth ventricle.  She was placed on IV steroids and taken to the OR for craniotomy on 8/21 with Dr. Doty.  CT A/P/chest were obtained to rule out evidence of malignancy.  She was found to have asymmetric wall thickening involving the distal sigmoid colon with some surrounding mesenteric soft tissue nodularity with some concerns for primary colonic malignancy.  There was a sclerotic lesion within the spinous process of T1 felt to be likely benign, but short-term follow-up recommended.  Gastroenterology was consulted for the abnormal imaging and planning colonoscopy 8/25.     Note plans for colonoscopy tomorrow.  Suspicion for colon primary with metastatic deposits of brain.  Postoperatively she is doing well and has been cleared for discharge by Dr. Doty.  Pathology still pending.  Would likely be best if patient  stayed until pathology reports were finalized which could be Tuesday or after.  Will see how she feels after the colonoscopy tomorrow.     Bone scan ordered      Metastasis to brain  -R occipital craniotomy for suspected metastatic tumor on 8/21 with Dr. Doty.  -Continue Norco for pain.  Muscle relaxant and ice added for neck discomfort per TORI.  -Neurosurgery managing.    -Currently on Medrol Dosepak for steroid taper.  -Plans to continue Keppra for seizure prophylaxis.  -Awaiting pathology.  -Monitor neurochecks.     Colonic mass  -Gastroenterology following.  -Plan for colonoscopy tomorrow     Hypertension  -Now on Norvasc and tolerating.     Hyperglycemia, steroid-induced  -A1C normal  -Continue SSI     Anemia  -Hemoglobin 14.4 on arrival and down to 10.5 today.  -Likely multifactorial d/t ABLA from surgery, possibly malignancy, etc.  -Will trend for now.       SCDs for DVT prophylaxis.  Full code.  Discussed with patient and family.  Anticipate discharge home with family in 2-3 days.  Expected Discharge Date: 8/26/2024; Expected Discharge Time:       Jose R Butler MD  [facility]  08/24/24  13:11 EDT

## 2024-08-25 ENCOUNTER — APPOINTMENT (OUTPATIENT)
Dept: NUCLEAR MEDICINE | Facility: HOSPITAL | Age: 64
DRG: 025 | End: 2024-08-25
Payer: COMMERCIAL

## 2024-08-25 ENCOUNTER — ANESTHESIA (OUTPATIENT)
Dept: GASTROENTEROLOGY | Facility: HOSPITAL | Age: 64
End: 2024-08-25
Payer: COMMERCIAL

## 2024-08-25 LAB
ANION GAP SERPL CALCULATED.3IONS-SCNC: 8 MMOL/L (ref 5–15)
BASOPHILS # BLD AUTO: 0.01 10*3/MM3 (ref 0–0.2)
BASOPHILS NFR BLD AUTO: 0.2 % (ref 0–1.5)
BUN SERPL-MCNC: 10 MG/DL (ref 8–23)
BUN/CREAT SERPL: 18.2 (ref 7–25)
CALCIUM SPEC-SCNC: 8.8 MG/DL (ref 8.6–10.5)
CEA SERPL-MCNC: 2.1 NG/ML
CHLORIDE SERPL-SCNC: 103 MMOL/L (ref 98–107)
CO2 SERPL-SCNC: 29 MMOL/L (ref 22–29)
CREAT SERPL-MCNC: 0.55 MG/DL (ref 0.57–1)
DEPRECATED RDW RBC AUTO: 42.5 FL (ref 37–54)
EGFRCR SERPLBLD CKD-EPI 2021: 102.5 ML/MIN/1.73
EOSINOPHIL # BLD AUTO: 0 10*3/MM3 (ref 0–0.4)
EOSINOPHIL NFR BLD AUTO: 0 % (ref 0.3–6.2)
ERYTHROCYTE [DISTWIDTH] IN BLOOD BY AUTOMATED COUNT: 12 % (ref 12.3–15.4)
GLUCOSE BLDC GLUCOMTR-MCNC: 161 MG/DL (ref 70–130)
GLUCOSE BLDC GLUCOMTR-MCNC: 86 MG/DL (ref 70–130)
GLUCOSE SERPL-MCNC: 103 MG/DL (ref 65–99)
HCT VFR BLD AUTO: 34 % (ref 34–46.6)
HGB BLD-MCNC: 11.6 G/DL (ref 12–15.9)
IMM GRANULOCYTES # BLD AUTO: 0.04 10*3/MM3 (ref 0–0.05)
IMM GRANULOCYTES NFR BLD AUTO: 0.7 % (ref 0–0.5)
LYMPHOCYTES # BLD AUTO: 0.96 10*3/MM3 (ref 0.7–3.1)
LYMPHOCYTES NFR BLD AUTO: 16.4 % (ref 19.6–45.3)
MCH RBC QN AUTO: 33 PG (ref 26.6–33)
MCHC RBC AUTO-ENTMCNC: 34.1 G/DL (ref 31.5–35.7)
MCV RBC AUTO: 96.9 FL (ref 79–97)
MONOCYTES # BLD AUTO: 0.62 10*3/MM3 (ref 0.1–0.9)
MONOCYTES NFR BLD AUTO: 10.6 % (ref 5–12)
NEUTROPHILS NFR BLD AUTO: 4.24 10*3/MM3 (ref 1.7–7)
NEUTROPHILS NFR BLD AUTO: 72.1 % (ref 42.7–76)
NRBC BLD AUTO-RTO: 0 /100 WBC (ref 0–0.2)
PLATELET # BLD AUTO: 300 10*3/MM3 (ref 140–450)
PMV BLD AUTO: 10.1 FL (ref 6–12)
POTASSIUM SERPL-SCNC: 4 MMOL/L (ref 3.5–5.2)
RBC # BLD AUTO: 3.51 10*6/MM3 (ref 3.77–5.28)
SODIUM SERPL-SCNC: 140 MMOL/L (ref 136–145)
WBC NRBC COR # BLD AUTO: 5.87 10*3/MM3 (ref 3.4–10.8)

## 2024-08-25 PROCEDURE — 0 TECHNETIUM MEDRONATE KIT: Performed by: HOSPITALIST

## 2024-08-25 PROCEDURE — 82948 REAGENT STRIP/BLOOD GLUCOSE: CPT

## 2024-08-25 PROCEDURE — 88342 IMHCHEM/IMCYTCHM 1ST ANTB: CPT | Performed by: INTERNAL MEDICINE

## 2024-08-25 PROCEDURE — 63710000001 METHYLPREDNISOLONE 4 MG TABLET THERAPY PACK 21 EACH DISP PACK: Performed by: INTERNAL MEDICINE

## 2024-08-25 PROCEDURE — A9503 TC99M MEDRONATE: HCPCS | Performed by: HOSPITALIST

## 2024-08-25 PROCEDURE — 88305 TISSUE EXAM BY PATHOLOGIST: CPT | Performed by: INTERNAL MEDICINE

## 2024-08-25 PROCEDURE — 25010000002 PROPOFOL 200 MG/20ML EMULSION: Performed by: ANESTHESIOLOGY

## 2024-08-25 PROCEDURE — 85025 COMPLETE CBC W/AUTO DIFF WBC: CPT | Performed by: NEUROLOGICAL SURGERY

## 2024-08-25 PROCEDURE — 80048 BASIC METABOLIC PNL TOTAL CA: CPT | Performed by: INTERNAL MEDICINE

## 2024-08-25 PROCEDURE — 88341 IMHCHEM/IMCYTCHM EA ADD ANTB: CPT | Performed by: INTERNAL MEDICINE

## 2024-08-25 PROCEDURE — 82378 CARCINOEMBRYONIC ANTIGEN: CPT | Performed by: HOSPITALIST

## 2024-08-25 PROCEDURE — 25810000003 SODIUM CHLORIDE 0.9 % SOLUTION: Performed by: INTERNAL MEDICINE

## 2024-08-25 PROCEDURE — 78306 BONE IMAGING WHOLE BODY: CPT

## 2024-08-25 PROCEDURE — 88313 SPECIAL STAINS GROUP 2: CPT | Performed by: INTERNAL MEDICINE

## 2024-08-25 PROCEDURE — 99024 POSTOP FOLLOW-UP VISIT: CPT | Performed by: NEUROLOGICAL SURGERY

## 2024-08-25 PROCEDURE — 45335 SIGMOIDOSCOPY W/SUBMUC INJ: CPT | Performed by: INTERNAL MEDICINE

## 2024-08-25 PROCEDURE — 0DBP8ZX EXCISION OF RECTUM, VIA NATURAL OR ARTIFICIAL OPENING ENDOSCOPIC, DIAGNOSTIC: ICD-10-PCS | Performed by: INTERNAL MEDICINE

## 2024-08-25 PROCEDURE — 45331 SIGMOIDOSCOPY AND BIOPSY: CPT | Performed by: INTERNAL MEDICINE

## 2024-08-25 PROCEDURE — 25810000003 LACTATED RINGERS PER 1000 ML: Performed by: ANESTHESIOLOGY

## 2024-08-25 PROCEDURE — 25010000002 PROPOFOL 1000 MG/100ML EMULSION: Performed by: ANESTHESIOLOGY

## 2024-08-25 RX ORDER — PROPOFOL 10 MG/ML
INJECTION, EMULSION INTRAVENOUS CONTINUOUS PRN
Status: DISCONTINUED | OUTPATIENT
Start: 2024-08-25 | End: 2024-08-25 | Stop reason: SURG

## 2024-08-25 RX ORDER — SODIUM CHLORIDE, SODIUM LACTATE, POTASSIUM CHLORIDE, CALCIUM CHLORIDE 600; 310; 30; 20 MG/100ML; MG/100ML; MG/100ML; MG/100ML
INJECTION, SOLUTION INTRAVENOUS CONTINUOUS PRN
Status: DISCONTINUED | OUTPATIENT
Start: 2024-08-25 | End: 2024-08-25 | Stop reason: SURG

## 2024-08-25 RX ORDER — TC 99M MEDRONATE 20 MG/10ML
22.6 INJECTION, POWDER, LYOPHILIZED, FOR SOLUTION INTRAVENOUS
Status: COMPLETED | OUTPATIENT
Start: 2024-08-25 | End: 2024-08-25

## 2024-08-25 RX ORDER — SODIUM CHLORIDE 9 MG/ML
1000 INJECTION, SOLUTION INTRAVENOUS CONTINUOUS
Status: DISCONTINUED | OUTPATIENT
Start: 2024-08-25 | End: 2024-08-27

## 2024-08-25 RX ORDER — LIDOCAINE HYDROCHLORIDE 20 MG/ML
INJECTION, SOLUTION INFILTRATION; PERINEURAL AS NEEDED
Status: DISCONTINUED | OUTPATIENT
Start: 2024-08-25 | End: 2024-08-25 | Stop reason: SURG

## 2024-08-25 RX ORDER — PROPOFOL 10 MG/ML
INJECTION, EMULSION INTRAVENOUS AS NEEDED
Status: DISCONTINUED | OUTPATIENT
Start: 2024-08-25 | End: 2024-08-25 | Stop reason: SURG

## 2024-08-25 RX ADMIN — Medication 22.6 MILLICURIE: at 06:50

## 2024-08-25 RX ADMIN — FAMOTIDINE 20 MG: 20 TABLET, FILM COATED ORAL at 17:58

## 2024-08-25 RX ADMIN — METHOCARBAMOL TABLETS 750 MG: 500 TABLET, COATED ORAL at 20:39

## 2024-08-25 RX ADMIN — HYDROCODONE BITARTRATE AND ACETAMINOPHEN 1 TABLET: 5; 325 TABLET ORAL at 23:21

## 2024-08-25 RX ADMIN — PROPOFOL 140 MCG/KG/MIN: 10 INJECTION, EMULSION INTRAVENOUS at 12:39

## 2024-08-25 RX ADMIN — METHYLPREDNISOLONE 4 MG: 4 TABLET ORAL at 14:03

## 2024-08-25 RX ADMIN — HYDROCODONE BITARTRATE AND ACETAMINOPHEN 1 TABLET: 5; 325 TABLET ORAL at 02:28

## 2024-08-25 RX ADMIN — HYDROCODONE BITARTRATE AND ACETAMINOPHEN 1 TABLET: 5; 325 TABLET ORAL at 14:04

## 2024-08-25 RX ADMIN — POLYETHYLENE GLYCOL 3350 0.5 BOTTLE: 17 POWDER, FOR SOLUTION ORAL at 03:50

## 2024-08-25 RX ADMIN — AMLODIPINE BESYLATE 5 MG: 5 TABLET ORAL at 14:03

## 2024-08-25 RX ADMIN — LEVETIRACETAM 500 MG: 500 TABLET, FILM COATED ORAL at 20:39

## 2024-08-25 RX ADMIN — PROPOFOL INJECTABLE EMULSION 100 MG: 10 INJECTION, EMULSION INTRAVENOUS at 12:39

## 2024-08-25 RX ADMIN — SODIUM CHLORIDE 1000 ML: 9 INJECTION, SOLUTION INTRAVENOUS at 10:55

## 2024-08-25 RX ADMIN — LEVETIRACETAM 500 MG: 500 TABLET, FILM COATED ORAL at 14:04

## 2024-08-25 RX ADMIN — SODIUM CHLORIDE, POTASSIUM CHLORIDE, SODIUM LACTATE AND CALCIUM CHLORIDE: 600; 310; 30; 20 INJECTION, SOLUTION INTRAVENOUS at 12:32

## 2024-08-25 RX ADMIN — METHYLPREDNISOLONE 4 MG: 4 TABLET ORAL at 17:58

## 2024-08-25 RX ADMIN — METHYLPREDNISOLONE 4 MG: 4 TABLET ORAL at 14:04

## 2024-08-25 RX ADMIN — METHOCARBAMOL TABLETS 750 MG: 500 TABLET, COATED ORAL at 02:32

## 2024-08-25 RX ADMIN — METHOCARBAMOL TABLETS 750 MG: 500 TABLET, COATED ORAL at 14:04

## 2024-08-25 RX ADMIN — METHYLPREDNISOLONE 4 MG: 4 TABLET ORAL at 20:40

## 2024-08-25 RX ADMIN — AMLODIPINE BESYLATE 5 MG: 5 TABLET ORAL at 20:40

## 2024-08-25 RX ADMIN — LIDOCAINE HYDROCHLORIDE 60 MG: 20 INJECTION, SOLUTION INFILTRATION; PERINEURAL at 12:38

## 2024-08-25 RX ADMIN — HYDROCODONE BITARTRATE AND ACETAMINOPHEN 1 TABLET: 5; 325 TABLET ORAL at 17:59

## 2024-08-25 NOTE — PROGRESS NOTES
LOS: 6 days   Patient Care Team:  Provider, No Known as PCP - General    Chief Complaint: Postop craniotomy    Subjective     The patient feels okay overall.    Interval History:     History taken from: chart    Objective      No change in neurologic exam    Vital Signs  Temp:  [97.3 °F (36.3 °C)-98.1 °F (36.7 °C)] 98.1 °F (36.7 °C)  Heart Rate:  [69-91] 69  Resp:  [20] 20  BP: (129-158)/(75-87) 144/76       Results Review:     I reviewed the patient's new clinical results.  I reviewed her bone scan which was just done.  It is not yet been read but does appear to show some increased uptake on the right side at L3.  There is a little increased uptake in the cervical and the thoracic spine visible only on the posterior films which is likely degenerative.  L3 is probably degenerative as well but I think we better check an MRI.      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  We will await the formal radiology report to decide about cervical and thoracic spine.      Bull Doty MD  08/25/24  10:29 EDT

## 2024-08-25 NOTE — PLAN OF CARE
Goal Outcome Evaluation:  Plan of Care Reviewed With: patient        Progress: no change   Patient had a calm rest all through the shift. She was carried along with her plan of care. Vital signs stable, she is on room air. Bowel preparation commenced. She is ambulates frequently in her room. No complain made this moment.

## 2024-08-25 NOTE — ANESTHESIA POSTPROCEDURE EVALUATION
"Patient: Ely Sims    Procedure Summary       Date: 08/25/24 Room / Location:  [facility]    Anesthesia Start: 1235 Anesthesia Stop: 1253    Procedure: COLONOSCOPY to 20cm with cold biopsies and needle tattoo Diagnosis:       Colonic mass      (Colonic mass [K63.89])    Surgeons: Shadi Kaminski MD Provider: Tyshawn Mcfarland MD    Anesthesia Type: MAC ASA Status: 3            Anesthesia Type: MAC    Vitals  Vitals Value Taken Time   /87 08/25/24 1315   Temp     Pulse 71 08/25/24 1316   Resp 16 08/25/24 1314   SpO2 98 % 08/25/24 1316   Vitals shown include unfiled device data.        Post Anesthesia Care and Evaluation    Level of consciousness: awake and alert  Pain management: adequate    Airway patency: patent  Anesthetic complications: No anesthetic complications  PONV Status: none  Cardiovascular status: acceptable  Respiratory status: acceptable  Hydration status: acceptable    Comments: /87 (BP Location: Left arm, Patient Position: Sitting)   Pulse 69   Temp 36.7 °C (98.1 °F) (Oral)   Resp 16   Ht 170.2 cm (67.01\")   Wt 72 kg (158 lb 11.7 oz) Comment: Simultaneous filing. User may be unaware of other data.  SpO2 97%   BMI 24.85 kg/m²       "

## 2024-08-25 NOTE — ANESTHESIA PREPROCEDURE EVALUATION
Anesthesia Evaluation     Patient summary reviewed and Nursing notes reviewed   no history of anesthetic complications:   NPO Solid Status: > 8 hours  NPO Liquid Status: > 8 hours           Airway   Mallampati: II  TM distance: >3 FB  Neck ROM: full  No difficulty expected  Dental - normal exam     Pulmonary    (-) asthma, sleep apnea, rhonchi, decreased breath sounds, wheezes, not a smoker  Cardiovascular   Exercise tolerance: good (4-7 METS)    Rhythm: regular  Rate: normal    (+) hypertension  (-) CAD, dysrhythmias, angina, FOOTE, murmur      Neuro/Psych  (-) seizures, CVA  GI/Hepatic/Renal/Endo    (-) liver disease, no renal disease, diabetes, no thyroid disorder    Musculoskeletal     Abdominal     Abdomen: soft.   Substance History      OB/GYN          Other      history of cancer (Brain metastases possible colon primary) active                Anesthesia Plan    ASA 3     MAC   total IV anesthesia  intravenous induction     Anesthetic plan, risks, benefits, and alternatives have been provided, discussed and informed consent has been obtained with: patient.    CODE STATUS:    Code Status (Patient has no pulse and is not breathing): CPR (Attempt to Resuscitate)  Medical Interventions (Patient has pulse or is breathing): Full Support

## 2024-08-25 NOTE — PROGRESS NOTES
Name: Ely Sims ADMIT: 2024   : 1960  PCP: Provider, No Known    MRN: [id number] LOS: 6 days   AGE/SEX: 64 y.o. female  ROOM: [facility] [place]     Subjective   Subjective   Tolerated bowel prep.  No new specific complaints.       Objective   Objective   Vital Signs  Temp:  [97.3 °F (36.3 °C)-98.1 °F (36.7 °C)] 98.1 °F (36.7 °C)  Heart Rate:  [69-91] 69  Resp:  [20] 20  BP: (129-158)/(75-87) 144/76  SpO2:  [96 %-99 %] 97 %  on   ;   Device (Oxygen Therapy): room air  Body mass index is 24.85 kg/m².  Physical Exam  Vitals and nursing note reviewed.   Constitutional:       General: She is not in acute distress.  Cardiovascular:      Rate and Rhythm: Normal rate and regular rhythm.   Pulmonary:      Effort: Pulmonary effort is normal.      Breath sounds: Normal breath sounds.   Abdominal:      General: Bowel sounds are normal.      Palpations: Abdomen is soft.      Tenderness: There is no abdominal tenderness.   Musculoskeletal:         General: No swelling.   Skin:     General: Skin is warm and dry.   Neurological:      Mental Status: She is alert. Mental status is at baseline.       Results Review     I reviewed the patient's new clinical results.  Results from last 7 days   Lab Units 24  04324  0528 24  0318 24  0401   WBC 10*3/mm3 5.87 8.21 8.57 12.92*   HEMOGLOBIN g/dL 11.6* 11.9* 10.5* 11.3*   PLATELETS 10*3/mm3 300 312 269 284     Results from last 7 days   Lab Units 24  0439 24  0528 24  1325 24  0318 24  0401   SODIUM mmol/L 140 140  --  139 140   POTASSIUM mmol/L 4.0 4.1 4.0 3.5 3.7   CHLORIDE mmol/L 103 104  --  109* 107   CO2 mmol/L 29.0 25.0  --  23.0 25.1   BUN mg/dL 10 12  --  11 14   CREATININE mg/dL 0.55* 0.53*  --  0.58 0.72   GLUCOSE mg/dL 103* 120*  --  145* 131*   EGFR mL/min/1.73 102.5 103.4  --  101.2 93.5     Results from last 7 days   Lab Units 24  1545   ALBUMIN g/dL 4.4   BILIRUBIN mg/dL 0.3   ALK PHOS U/L 83   AST (SGOT) U/L  15   ALT (SGPT) U/L 13     Results from last 7 days   Lab Units 08/25/24  0439 08/24/24  0528 08/23/24  0318 08/22/24  0401 08/19/24  2318 08/19/24  1545   CALCIUM mg/dL 8.8 8.7 7.9* 8.2*   < > 9.6   ALBUMIN g/dL  --   --   --   --   --  4.4   MAGNESIUM mg/dL  --   --  2.5* 2.2  --  1.9   PHOSPHORUS mg/dL  --   --  2.4*  --   --   --     < > = values in this interval not displayed.       Hemoglobin A1C   Date/Time Value Ref Range Status   08/24/2024 0528 5.50 4.80 - 5.60 % Final     Glucose   Date/Time Value Ref Range Status   08/25/2024 0755 86 70 - 130 mg/dL Final   08/24/2024 2237 130 70 - 130 mg/dL Final   08/24/2024 1710 150 (H) 70 - 130 mg/dL Final   08/24/2024 1122 145 (H) 70 - 130 mg/dL Final   08/24/2024 0811 106 70 - 130 mg/dL Final   08/23/2024 1919 171 (H) 70 - 130 mg/dL Final   08/23/2024 1149 140 (H) 70 - 130 mg/dL Final       No radiology results for the last day    I have personally reviewed all medications:  Scheduled Medications  amLODIPine, 5 mg, Oral, BID  atorvastatin, 10 mg, Oral, Daily  docusate sodium, 100 mg, Oral, Daily  famotidine, 20 mg, Oral, BID AC  insulin lispro, 2-9 Units, Subcutaneous, 4x Daily AC & at Bedtime  levETIRAcetam, 500 mg, Oral, BID  methylPREDNISolone, 4 mg, Oral, 4x Daily Taper  [START ON 8/26/2024] methylPREDNISolone, 4 mg, Oral, TID Around Food  [START ON 8/27/2024] methylPREDNISolone, 4 mg, Oral, Before Breakfast  [START ON 8/27/2024] methylPREDNISolone, 4 mg, Oral, Tonight  [START ON 8/28/2024] methylPREDNISolone, 4 mg, Oral, Before Breakfast  senna-docusate sodium, 1 tablet, Oral, Nightly    Infusions   Diet  NPO Diet NPO Type: Strict NPO    I have personally reviewed:  [x]  Laboratory   []  Microbiology   []  Radiology   [x]  EKG/Telemetry  []  Cardiology/Vascular   []  Pathology    []  Records       Assessment/Plan     Active Hospital Problems    Diagnosis  POA    **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, C80.1]  Yes    Colonic mass [K63.89]  Yes      Class: Acute    Anemia [D64.9]  Yes    Hyperglycemia [R73.9]  Yes    Hypertension [I10]  Yes      Resolved Hospital Problems    Diagnosis Date Resolved POA    Compression of brain [G93.5] 08/23/2024 Yes       Ms. Sims is a 64 y.o. female that initially presented with a gradual onset of frontal headache with associated nausea and vomiting.  CT head revealed lesions in the left temporal lobe, right cerebellar hemisphere and cerebellar vermis with mass effect on the fourth ventricle.  She was admitted initially to the ICU for closer monitoring.  Neurosurgery was consulted and recommended removal of the mass on the posterior fossa due to the mass effect on the fourth ventricle.  She was placed on IV steroids and taken to the OR for craniotomy on 8/21 with Dr. Doty.  CT A/P/chest were obtained to rule out evidence of malignancy.  She was found to have asymmetric wall thickening involving the distal sigmoid colon with some surrounding mesenteric soft tissue nodularity with some concerns for primary colonic malignancy.  There was a sclerotic lesion within the spinous process of T1 felt to be likely benign, but short-term follow-up recommended.  Gastroenterology was consulted for the abnormal imaging and planning colonoscopy 8/25.     Suspicion for colon primary with metastatic deposits of brain.  Postoperatively she is doing well and has been cleared for discharge by Dr. Doty.  Pathology still pending.  Scheduled to undergo colonoscopy this morning along with a whole-body bone scan.  Will follow-up pathology results and bone scan.       Metastasis to brain  -R occipital craniotomy for suspected metastatic tumor on 8/21 with Dr. Doty.  -Continue Norco for pain.  Muscle relaxant and ice added for neck discomfort per TORI.  -Currently on Medrol Dosepak for steroid taper.  -Continue Keppra for seizure prophylaxis.  -Awaiting pathology.     Colonic mass  -Gastroenterology following.  -Plan for colonoscopy today      Hypertension  -Now on Norvasc  -/76     Hyperglycemia, steroid-induced  -A1C normal  -will dc SSI     Anemia  -Hemoglobin 14.4 on arrival and now trending back up  -Likely multifactorial d/t ABLA from surgery, possibly malignancy, etc.       SCDs for DVT prophylaxis.  Full code.  Discussed with patient.  Anticipate discharge home with family in 1-2 days.  Expected Discharge Date: 8/26/2024; Expected Discharge Time:       Jose R Butler MD  [facility]  08/25/24  09:20 EDT

## 2024-08-26 ENCOUNTER — APPOINTMENT (OUTPATIENT)
Dept: MRI IMAGING | Facility: HOSPITAL | Age: 64
DRG: 025 | End: 2024-08-26
Payer: COMMERCIAL

## 2024-08-26 ENCOUNTER — APPOINTMENT (OUTPATIENT)
Dept: GENERAL RADIOLOGY | Facility: HOSPITAL | Age: 64
DRG: 025 | End: 2024-08-26
Payer: COMMERCIAL

## 2024-08-26 PROCEDURE — 99232 SBSQ HOSP IP/OBS MODERATE 35: CPT | Performed by: PHYSICIAN ASSISTANT

## 2024-08-26 PROCEDURE — A9577 INJ MULTIHANCE: HCPCS | Performed by: HOSPITALIST

## 2024-08-26 PROCEDURE — 63710000001 METHYLPREDNISOLONE 4 MG TABLET THERAPY PACK 21 EACH DISP PACK: Performed by: INTERNAL MEDICINE

## 2024-08-26 PROCEDURE — 72158 MRI LUMBAR SPINE W/O & W/DYE: CPT

## 2024-08-26 PROCEDURE — 0 DIATRIZOATE MEGLUMINE & SODIUM PER 1 ML: Performed by: HOSPITALIST

## 2024-08-26 PROCEDURE — 99024 POSTOP FOLLOW-UP VISIT: CPT | Performed by: NURSE PRACTITIONER

## 2024-08-26 PROCEDURE — 99221 1ST HOSP IP/OBS SF/LOW 40: CPT | Performed by: SURGERY

## 2024-08-26 PROCEDURE — 99223 1ST HOSP IP/OBS HIGH 75: CPT | Performed by: INTERNAL MEDICINE

## 2024-08-26 PROCEDURE — 74270 X-RAY XM COLON 1CNTRST STD: CPT

## 2024-08-26 PROCEDURE — 0 GADOBENATE DIMEGLUMINE 529 MG/ML SOLUTION: Performed by: HOSPITALIST

## 2024-08-26 RX ADMIN — METHOCARBAMOL TABLETS 750 MG: 500 TABLET, COATED ORAL at 13:00

## 2024-08-26 RX ADMIN — DIATRIZOATE MEGLUMINE AND DIATRIZOATE SODIUM 720 ML: 660; 100 LIQUID ORAL; RECTAL at 14:57

## 2024-08-26 RX ADMIN — GADOBENATE DIMEGLUMINE 14 ML: 529 INJECTION, SOLUTION INTRAVENOUS at 07:56

## 2024-08-26 RX ADMIN — METHOCARBAMOL TABLETS 750 MG: 500 TABLET, COATED ORAL at 02:50

## 2024-08-26 RX ADMIN — HYDROCODONE BITARTRATE AND ACETAMINOPHEN 1 TABLET: 5; 325 TABLET ORAL at 18:38

## 2024-08-26 RX ADMIN — LEVETIRACETAM 500 MG: 500 TABLET, FILM COATED ORAL at 20:13

## 2024-08-26 RX ADMIN — HYDROCODONE BITARTRATE AND ACETAMINOPHEN 1 TABLET: 5; 325 TABLET ORAL at 13:00

## 2024-08-26 RX ADMIN — AMLODIPINE BESYLATE 5 MG: 5 TABLET ORAL at 08:55

## 2024-08-26 RX ADMIN — HYDROCODONE BITARTRATE AND ACETAMINOPHEN 1 TABLET: 5; 325 TABLET ORAL at 08:56

## 2024-08-26 RX ADMIN — DOCUSATE SODIUM 100 MG: 100 CAPSULE, LIQUID FILLED ORAL at 08:57

## 2024-08-26 RX ADMIN — AMLODIPINE BESYLATE 5 MG: 5 TABLET ORAL at 20:13

## 2024-08-26 RX ADMIN — ATORVASTATIN CALCIUM 10 MG: 20 TABLET, FILM COATED ORAL at 08:57

## 2024-08-26 RX ADMIN — METHOCARBAMOL TABLETS 750 MG: 500 TABLET, COATED ORAL at 20:12

## 2024-08-26 RX ADMIN — FAMOTIDINE 20 MG: 20 TABLET, FILM COATED ORAL at 18:38

## 2024-08-26 RX ADMIN — METHYLPREDNISOLONE 4 MG: 4 TABLET ORAL at 13:40

## 2024-08-26 RX ADMIN — FAMOTIDINE 20 MG: 20 TABLET, FILM COATED ORAL at 08:56

## 2024-08-26 RX ADMIN — METHYLPREDNISOLONE 4 MG: 4 TABLET ORAL at 18:38

## 2024-08-26 RX ADMIN — LEVETIRACETAM 500 MG: 500 TABLET, FILM COATED ORAL at 08:56

## 2024-08-26 RX ADMIN — METHYLPREDNISOLONE 4 MG: 4 TABLET ORAL at 08:57

## 2024-08-26 NOTE — PLAN OF CARE
Goal Outcome Evaluation:  Plan of Care Reviewed With: patient        Progress: no change  Outcome Evaluation: Patient is alert x4.  Room air.  Up ad kacy.  Complain of HA 5/10.  Medication given according to order.  Patient in stable condition at this time.  VSS.  Will continue to monitor.

## 2024-08-26 NOTE — PLAN OF CARE
Goal Outcome Evaluation:  Plan of Care Reviewed With: patient        Progress: no change     Patient had a calm rest all through the night shift however she complained of headache intermittently. She was carried along with her plan of care. Vital signs was stable, she is on room air. No new complain made this moment.

## 2024-08-26 NOTE — PROGRESS NOTES
[facility], [place], operated by [facility] note    PATIENT IDENTIFICATION:   Name:  Ely Sims      MRN:  [id number]     64 y.o.  female               CC:POD 5 right occipital crani for tumor      Subjective     Interval History: Mild headache over the top of the head.  Neck pain much improved with muscle relaxant.  Headache 6/10 at worst.  Otherwise doing okay.  Had colonoscopy yesterday.  Awaiting on final plans for oncologic treatment versus surgery for colon mass    Objective     Vital signs in last 24 hours:  Temp:  [97.7 °F (36.5 °C)-98.2 °F (36.8 °C)] 98 °F (36.7 °C)  Heart Rate:  [67-87] 87  Resp:  [16-20] 20  BP: (118-150)/(67-93) 148/72      Intake/Output this shift:  No intake/output data recorded.    Intake/Output last 3 shifts:  I/O last 3 completed shifts:  In: 1420 [P.O.:1120; I.V.:300]  Out: -     LABS:  Results from last 7 days   Lab Units 08/25/24  0439 08/24/24  0528 08/23/24  0318   WBC 10*3/mm3 5.87 8.21 8.57   HEMOGLOBIN g/dL 11.6* 11.9* 10.5*   HEMATOCRIT % 34.0 34.9 30.9*   PLATELETS 10*3/mm3 300 312 269     Results from last 7 days   Lab Units 08/25/24  0439 08/24/24  0528 08/23/24  1325 08/23/24  0318 08/19/24  2318 08/19/24  1545   SODIUM mmol/L 140 140  --  139   < > 137   POTASSIUM mmol/L 4.0 4.1 4.0 3.5   < > 3.6   CHLORIDE mmol/L 103 104  --  109*   < > 102   CO2 mmol/L 29.0 25.0  --  23.0   < > 26.0   BUN mg/dL 10 12  --  11   < > 13   CREATININE mg/dL 0.55* 0.53*  --  0.58   < > 0.69   CALCIUM mg/dL 8.8 8.7  --  7.9*   < > 9.6   BILIRUBIN mg/dL  --   --   --   --   --  0.3   ALK PHOS U/L  --   --   --   --   --  83   ALT (SGPT) U/L  --   --   --   --   --  13   AST (SGOT) U/L  --   --   --   --   --  15   GLUCOSE mg/dL 103* 120*  --  145*   < > 98    < > = values in this interval not displayed.     Pathology-8/21, brain tissue-poorly differentiated metastatic adenocarcinoma, favor colorectal origin    IMAGING STUDIES:  MRI lumbar spine-no evidence of osseous metastatic disease.   There is a enhancing lesion within the lower thoracic cord at T12.  There is degenerative changes throughout most predominant at L3/4 with DDD and degenerative endplate changes as well as disc bulging and ligamentum flavum hypertrophy resulting in right canal and foraminal narrowing.  There are more minor degenerative changes at L2/3.    Whole-body bone scan-  Delayed anterior and posterior whole body projections were obtained, as  well as spot views at the level of the neck, thorax.     Typical appearing degenerative changes are seen at the shoulders,  elbows, hands, wrists, sacroiliac joints, knees, ankles, lower cervical  spine (on the left, just above the level of the prior chest CT, cervical  spine CT correlation could be considered if indicated), and mid lumbar  spine (on the right, corresponding to previous CT demonstrated  degenerative change).     Small extravasation of activity is suggested at the injection site at  the right forearm. Focal activity at the level of the distal right  radial shaft could be a separate focus overlying soft tissue activity  related to an injection attempt, or could represent an osseous process  such as old fracture or lesion, correlate clinically, x-ray of the right  forearm is recommended for further evaluation.     No localized activity is seen at the level of the previous CT  demonstrated T1 spinous process sclerotic focus, but the size of the  lesion may be below the resolution of bone scan, CT follow-up advised to  characterize change.    I personally viewed and interpreted the patient's MRI lumbar    Meds reviewed/changed: Yes  Medrol Dosepak  Keppra 500 mg PO every 12 hours   Norco 5 mg p.o. every 4 as needed-4 doses  Robaxin-750 milligram p.o. every 6 hours as needed-3 doses    Physical Exam:    General:   Awake, alert, oriented x3. Speech clear with no aphasia  HEENT:    Right occipital incision well-approximated with no redness drainage or swelling.  No significant  muscle spasm or tenderness.  Erythema of palate and tongue with white patchy areas.  Neck:    supple  CN III IV VI:   Extraocular movements are full , PERRL 3 mm brisk.  No nystagmus  CN VII:   Facial movements are symmetric. No weakness.  Motor:    Moving all extremities.  No drift   Station and Gait:  Up ad kacy.   coordination:   Finger-to-nose test shows no dysmetria.    Extremities:   Wearing SCD    Assessment & Plan     ASSESSMENT:      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    Colonic mass    Anemia    Hyperglycemia    Hypertension    Patient now 5 days status post right occipital craniotomy for tumor.  Has 2 other intracranial lesions as well as a colorectal lesion.  She had colonoscopy yesterday and pathology is pending, but brain pathology does show poorly differentiated metastatic colorectal adenocarcinoma.  She is overall feeling well with only a mild headache.  Neck discomfort much improved with muscle relaxant.  No focal neurologic deficits on exam.  Bone scan suggestive of possible abnormality at L3.  MRI lumbar spine shows this to likely be degenerative changes or disc herniation although she is not symptomatic.  However, there is a enhancing lesion within the cord at T12, possible drop mets.  Will obtain MRI of full spine for further evaluation.  General surgery consult pending for possible surgery regarding rectal mass.  Will involve medical and radiation oncology services.    PLAN:   MRI cervical and thoracic spine  Consult medical and radiation oncology services  As needed ice and muscle relaxant  Keep SBP<150  Continue Keppra  Complete MDP    I discussed the patient's findings and my recommendations with patient, family, nursing staff, and Dr. Doty    During patient visit, I utilized appropriate personal protective equipment including gloves. Appropriate PPE was worn during the entire visit.  Hand hygiene was completed before and after.      LOS: 7 days       Waleska Alanis  "APRN  8/26/2024  11:06 EDT    \"Dictated utilizing Dragon dictation\".      "

## 2024-08-26 NOTE — CONSULTS
[facility] INITIAL INPATIENT CONSULTATION NOTE    REASON FOR CONSULTATION:    Metastatic colon cancer    HISTORY OF PRESENT ILLNESS:  Ely Sims is a 64 y.o. female who we are asked to see today in consultation for metastatic colon cancer.     The patient has a past medical history of hyperlipidemia, ADHD, anxiety, depression, tobacco use    The patient presented on 8/19/2022 initially to urgent care with headache, nausea, vomiting, diarrhea.  She was advised to go to the ED from urgent care.  CT head 8/19/2024 showed some areas of increased attenuation over the right cerebral hemisphere and left temporal lobe concerning for metastatic disease with edema with some mass effect upon the fourth ventricle.    MRI brain 8/20/2024 with a lesion at the right cerebellar hemisphere measuring 2.2 cm, lesion within the left temporal lobe measuring 9 mm, 1.0 cm lesion at the left cerebellar vermis, all with surrounding edema.  There was some mass effect upon the fourth ventricle with mild prominence of the lateral and third ventricles.    8/20/2024: CT chest abdomen and pelvis with no evidence of pulmonary metastatic disease.  There was some asymmetric wall thickening in the distal sigmoid colon with surrounding mesenteric soft tissue nodularity concerning for primary colon cancer.  A sclerotic lesion of the spinous process of T1 was said to be consistent with a bone island or other benign lesion.    8/21/2024: Posterior fossa craniectomy with gross total removal of a right cerebellar hemisphere metastasis Dr. Bull Doty.  Pathology consistent with metastatic poorly differentiated adenocarcinoma favoring colorectal origin.  MSI testing is pending.    8/25/2024: Bone scan with no obvious metastatic disease.    8/25/2024: Colonoscopy by Dr. Kaminski shows an infiltrative completely obstructing large mass found at the proximal rectum.  Pathology is pending.  CEA 2.10.    8/26/2024: MRI lumbar spine with no obvious  metastatic disease in the lumbar spine.  However, there is a 3 mm enhancing lesion in the visualized lower thoracic spinal cord that could represent a spinal cord metastasis.  Cervical and thoracic spine MRI requested..    She has left lower quadrant fullness but denies any obstructive symptoms otherwise.  She is having bowel movements.  She did have some diarrhea but states this has improved.      She states that she had a colonoscopy at age 50 with no polyps.  Sister with a history of colorectal cancer.    History reviewed. No pertinent past medical history.    Past Surgical History:   Procedure Laterality Date    COLONOSCOPY N/A 8/25/2024    Procedure: COLONOSCOPY to 20cm with cold biopsies and needle tattoo;  Surgeon: Shadi Kaminski MD;  Location: [facility] ENDOSCOPY;  Service: Gastroenterology;  Laterality: N/A;  pre: abnormal imaging  post: poor prep, obstructing colon mass at 20cm, hemorrhoids    CRANIOTOMY N/A 8/21/2024    Procedure: Posterior fossa craniotomy for tumor using stereotactic guidance;  Surgeon: Bull Doty MD;  Location: [facility] MAIN OR;  Service: Neurosurgery;  Laterality: N/A;       SOCIAL HISTORY:   reports that she has been smoking cigarettes. She has never used smokeless tobacco. She reports current alcohol use. Drug use questions deferred to the physician.    FAMILY HISTORY:  family history is not on file.    ALLERGIES:  No Known Allergies    MEDICATIONS:  As listed in the electronic medical record.    Review of Systems   Gastrointestinal:         Left upper quadrant fullness   Neurological:  Positive for headaches.   All other systems reviewed and are negative.      Vitals:    08/26/24 0343 08/26/24 0855 08/26/24 0904 08/26/24 1221   BP: 121/67 148/82 148/72 119/63   BP Location: Left arm  Left arm Left arm   Patient Position: Lying  Lying Lying   Pulse: 72 76 87 71   Resp: 18  20 20   Temp: 97.9 °F (36.6 °C)  98 °F (36.7 °C) 98.9 °F (37.2 °C)   TempSrc: Oral  Oral Oral   SpO2: 97%    98%   Weight:       Height:           Physical Exam  Vitals reviewed.   Constitutional:       Appearance: She is well-developed.   HENT:      Head: Normocephalic and atraumatic.      Nose: Nose normal.   Eyes:      Conjunctiva/sclera: Conjunctivae normal.      Pupils: Pupils are equal, round, and reactive to light.   Cardiovascular:      Rate and Rhythm: Normal rate and regular rhythm.      Heart sounds: Normal heart sounds, S1 normal and S2 normal. No murmur heard.     No friction rub. No gallop.   Pulmonary:      Effort: Pulmonary effort is normal. No respiratory distress.      Breath sounds: Normal breath sounds. No stridor. No wheezing, rhonchi or rales.   Chest:      Chest wall: No tenderness.   Abdominal:      General: Bowel sounds are normal. There is no distension.      Palpations: Abdomen is soft. There is no mass.      Tenderness: There is no abdominal tenderness. There is no guarding or rebound.   Musculoskeletal:         General: Normal range of motion.      Cervical back: Neck supple.   Lymphadenopathy:      Cervical: No cervical adenopathy.      Upper Body:      Right upper body: No supraclavicular adenopathy.      Left upper body: No supraclavicular adenopathy.   Skin:     General: Skin is warm and dry.      Findings: No erythema or rash.   Neurological:      Mental Status: She is alert and oriented to person, place, and time.      Cranial Nerves: No cranial nerve deficit.      Sensory: No sensory deficit.   Psychiatric:         Behavior: Behavior normal.         Thought Content: Thought content normal.         Judgment: Judgment normal.         DIAGNOSTIC DATA:  Results from last 7 days   Lab Units 08/25/24  0439   WBC 10*3/mm3 5.87   HEMOGLOBIN g/dL 11.6*   HEMATOCRIT % 34.0   PLATELETS 10*3/mm3 300     Lab Results   Component Value Date    NEUTROABS 4.24 08/25/2024     Results from last 7 days   Lab Units 08/25/24  0439   SODIUM mmol/L 140   POTASSIUM mmol/L 4.0   CHLORIDE mmol/L 103   CO2 mmol/L  29.0   BUN mg/dL 10   CREATININE mg/dL 0.55*   GLUCOSE mg/dL 103*   CALCIUM mg/dL 8.8     Results from last 7 days   Lab Units 08/22/24  0401 08/21/24  0512 08/20/24  0504   INR  0.99 1.06 1.09   APTT seconds 24.5  --  28.4     Results from last 7 days   Lab Units 08/23/24  0318   MAGNESIUM mg/dL 2.5*       IMAGING:      CT Chest Without Contrast Diagnostic (08/20/2024 01:44)  CT Abdomen Pelvis Without Contrast (08/20/2024 01:44)  MRI Brain With & Without Contrast (08/20/2024 01:06)    CT and MRI images personally reviewed. Rectal mass with brain metastases as outlined in the HPI    ASSESSMENT:  This is a 64 y.o. female with:    *Metastatic colon cancer  The patient presented on 8/19/2022 initially to urgent care with headache, nausea, vomiting, diarrhea.  She was advised to go to the ED from urgent care.  CT head 8/19/2024 showed some areas of increased attenuation over the right cerebral hemisphere and left temporal lobe concerning for metastatic disease with edema with some mass effect upon the fourth ventricle.  MRI brain 8/20/2024 with a lesion at the right cerebellar hemisphere measuring 2.2 cm, lesion within the left temporal lobe measuring 9 mm, 1.0 cm lesion at the left cerebellar vermis, all with surrounding edema.  There was some mass effect upon the fourth ventricle with mild prominence of the lateral and third ventricles.  8/20/2024: CT chest abdomen and pelvis with no evidence of pulmonary metastatic disease.  There was some asymmetric wall thickening in the distal sigmoid colon with surrounding mesenteric soft tissue nodularity concerning for primary colon cancer.  A sclerotic lesion of the spinous process of T1 was said to be consistent with a bone island or other benign lesion.  8/21/2024: Posterior fossa craniectomy with gross total removal of a right cerebellar hemisphere metastasis Dr. Bull Doty.  Pathology consistent with metastatic poorly differentiated adenocarcinoma favoring colorectal  origin.  MSI testing is pending.  8/25/2024: Bone scan with no obvious metastatic disease.  8/25/2024: Colonoscopy by Dr. Kaminski shows an infiltrative completely obstructing large mass found at the proximal rectum.  Pathology is pending.    CEA 2.10.  8/26/2024: MRI lumbar spine with no obvious metastatic disease in the lumbar spine.  However, there is a 3 mm enhancing lesion in the visualized lower thoracic spinal cord that could represent a spinal cord metastasis.  Cervical and thoracic spine MRI requested..    *Mild normocytic anemia  Hemoglobin 11.6      RECOMMENDATIONS/PLAN:   Will send brain tissue for NGS  Follow-up rectal biopsy result  Agree with general surgery consult.  Will need diversion if completely obstructed or nearly completely obstructed.  Dr. Chester requested a barium enema, performed, result pending.  Agree with radiation oncology consult.  Will need postoperative radiation to the brain, radiation to spinal cord lesion prior to considering palliative chemotherapy.  Follow-up MRI cervical and cervical spine  Currently on a ECO-SAFE Dosepak  Outpatient PET scan when possible  Discussed the incurable nature of this malignancy with the patient, her , and her children  We will follow    Elieser Saunders MD

## 2024-08-26 NOTE — PROGRESS NOTES
[facility]  Inpatient Progress Note    Reason for Follow-up: Rectal mass    Subjective     Interval History:   Patient doing well today and asked to advance diet.  No complaints.    8/25 C/S: Obstructing mass at the rectum 20 cm from the anus, biopsies obtained tattoo placed 1 cm distal to the lesion.  Pending path.  Surgery consulted.  8/26: CEA 2.1    Current Facility-Administered Medications:     [DISCONTINUED] acetaminophen (TYLENOL) tablet 650 mg, 650 mg, Oral, Q4H PRN **OR** acetaminophen (TYLENOL) 160 MG/5ML oral solution 650 mg, 650 mg, Oral, Q4H PRN **OR** [DISCONTINUED] acetaminophen (TYLENOL) suppository 650 mg, 650 mg, Rectal, Q4H PRN, Sarika Wilburn MD    amLODIPine (NORVASC) tablet 5 mg, 5 mg, Oral, BID, Shadi Kaminski MD, 5 mg at 08/26/24 0855    atorvastatin (LIPITOR) tablet 10 mg, 10 mg, Oral, Daily, Shadi Kaminski MD, 10 mg at 08/26/24 0857    sennosides-docusate (PERICOLACE) 8.6-50 MG per tablet 1 tablet, 1 tablet, Oral, Nightly, 1 tablet at 08/24/24 2110 **AND** polyethylene glycol (MIRALAX) packet 17 g, 17 g, Oral, Daily PRN **AND** bisacodyl (DULCOLAX) EC tablet 5 mg, 5 mg, Oral, Daily PRN **AND** bisacodyl (DULCOLAX) suppository 10 mg, 10 mg, Rectal, Daily PRN, Shadi Kaminski MD    Calcium Replacement - Follow Nurse / BPA Driven Protocol, , Does not apply, PRN, hSadi Kaminski MD    docusate sodium (COLACE) capsule 100 mg, 100 mg, Oral, Daily, Shadi Kaminski MD, 100 mg at 08/26/24 0857    enalaprilat (VASOTEC) injection 0.625 mg, 0.625 mg, Intravenous, Q6H PRN, Shadi Kaminski MD    famotidine (PEPCID) tablet 20 mg, 20 mg, Oral, BID AC, Shadi Kaminski MD, 20 mg at 08/26/24 0856    fentaNYL citrate (PF) (SUBLIMAZE) injection, , Intravenous, Code / Trauma / Sedation Medication, Malvin Merritt MD, 50 mcg at 08/21/24 1158    HYDROcodone-acetaminophen (NORCO) 5-325 MG per tablet 1 tablet, 1 tablet, Oral, Q4H PRN, Sarika Wilburn MD, 1 tablet at 08/26/24  0856    labetalol (NORMODYNE,TRANDATE) injection 20 mg, 20 mg, Intravenous, Q2H PRN, Shadi Kaminski MD, 20 mg at 08/24/24 1800    levETIRAcetam (KEPPRA) tablet 500 mg, 500 mg, Oral, BID, Shadi Kaminski MD, 500 mg at 08/26/24 0856    Magnesium Standard Dose Replacement - Follow Nurse / BPA Driven Protocol, , Does not apply, PRN, Shadi Kaminski MD    methocarbamol (ROBAXIN) tablet 750 mg, 750 mg, Oral, Q6H PRN, Shadi Kaminski MD, 750 mg at 08/26/24 0250    methylPREDNISolone (MEDROL) dose pack 4 mg, 4 mg, Oral, TID Around Food, CosmoSarika MD, 4 mg at 08/26/24 0857    [START ON 8/27/2024] methylPREDNISolone (MEDROL) dose pack 4 mg, 4 mg, Oral, Before Breakfast, Shadi Kaminski MD    [START ON 8/27/2024] methylPREDNISolone (MEDROL) dose pack 4 mg, 4 mg, Oral, Tonight, Shadi Kaminski MD    [START ON 8/28/2024] methylPREDNISolone (MEDROL) dose pack 4 mg, 4 mg, Oral, Before Breakfast, Shadi Kaminski MD    midazolam (VERSED) injection, , Intravenous, Code / Trauma / Sedation Medication, Malvin Merritt MD, 1 mg at 08/21/24 1157    nitroglycerin (NITROSTAT) SL tablet 0.4 mg, 0.4 mg, Sublingual, Q5 Min PRN, Shadi Kaminski MD    ondansetron ODT (ZOFRAN-ODT) disintegrating tablet 4 mg, 4 mg, Oral, Q6H PRN **OR** ondansetron (ZOFRAN) injection 4 mg, 4 mg, Intravenous, Q6H PRN, Shadi Kaminski MD    Phosphorus Replacement - Follow Nurse / BPA Driven Protocol, , Does not apply, PRGordy ZHANG Brian M, MD    Potassium Replacement - Follow Nurse / BPA Driven Protocol, , Does not apply, Gordy ORWE Brian M, MD    [CANCELED] Insert Peripheral IV, , , Once **AND** sodium chloride 0.9 % flush 10 mL, 10 mL, Intravenous, PRN, Shadi Kaminski MD    sodium chloride 0.9 % infusion 1,000 mL, 1,000 mL, Intravenous, Continuous, Shadi Kaminski MD, Last Rate: 25 mL/hr at 08/25/24 1055, 1,000 mL at 08/25/24 1055    Objective     Vital Signs  Temp:  [97.7 °F (36.5 °C)-98.2 °F (36.8 °C)] 98 °F (36.7 °C)  Heart Rate:   "[67-87] 87  Resp:  [16-20] 20  BP: (118-162)/(67-93) 148/72  Body mass index is 24.85 kg/m².    Intake/Output Summary (Last 24 hours) at 8/26/2024 1038  Last data filed at 8/26/2024 0009  Gross per 24 hour   Intake 1300 ml   Output --   Net 1300 ml     No intake/output data recorded.     Physical Exam:    General: patient awake, alert and cooperative   Skin: warm and dry, not jaundiced   Pulm: regular and unlabored   Psychiatric: Normal mood and behavior; memory intact     Results Review:     I reviewed the patient's new clinical results.    Results from last 7 days   Lab Units 08/25/24  0439 08/24/24  0528 08/23/24  0318   WBC 10*3/mm3 5.87 8.21 8.57   HEMOGLOBIN g/dL 11.6* 11.9* 10.5*   HEMATOCRIT % 34.0 34.9 30.9*   PLATELETS 10*3/mm3 300 312 269     Results from last 7 days   Lab Units 08/25/24  0439 08/24/24  0528 08/23/24  1325 08/23/24  0318 08/19/24  2318 08/19/24  1545   SODIUM mmol/L 140 140  --  139   < > 137   POTASSIUM mmol/L 4.0 4.1 4.0 3.5   < > 3.6   CHLORIDE mmol/L 103 104  --  109*   < > 102   CO2 mmol/L 29.0 25.0  --  23.0   < > 26.0   BUN mg/dL 10 12  --  11   < > 13   CREATININE mg/dL 0.55* 0.53*  --  0.58   < > 0.69   CALCIUM mg/dL 8.8 8.7  --  7.9*   < > 9.6   BILIRUBIN mg/dL  --   --   --   --   --  0.3   ALK PHOS U/L  --   --   --   --   --  83   ALT (SGPT) U/L  --   --   --   --   --  13   AST (SGOT) U/L  --   --   --   --   --  15   GLUCOSE mg/dL 103* 120*  --  145*   < > 98    < > = values in this interval not displayed.     Results from last 7 days   Lab Units 08/22/24  0401 08/21/24  0512 08/20/24  0504   INR  0.99 1.06 1.09     No results found for: \"LIPASE\"    Radiology:  MRI Lumbar Spine With & Without Contrast   Preliminary Result   1. 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 I recommend a " dedicated cervical and thoracic   spine MRI with and without contrast to determine if there are any   additional spinal cord METS.       2. I see no osseous metastatic disease to the lumbar spine.       3. This patient has a levoscoliotic curvature of the lumbar spine with   its apex at the L3-4 lumbar level and there is severe disc space   narrowing and degenerative endplate changes with degenerative marrow   sclerosis and degenerative marrow edema in the central to right side of   the endplates at L3-4 along the inner margin of the levoscoliotic curve.   There is lumbar spondylosis as described above. I see no canal or   foraminal narrowing from T11 to L2 and at L2-3 I see mild canal and   bilateral foraminal narrowing. At L3-4, there is mild narrowing of the   right side of the canal, right lateral recess and there is eccentric   spurring into the right neural foramen and to the far right laterally   that moderately narrows the right neural foramen and abuts the   undersurface of the exiting right L4 nerve root. There is mild left   foraminal narrowing at L4-5 and L5-S1.       The results of this study were communicated to Dr. Cesar Iglesias by   telephone on 08/26/2024 at 8:40 AM.          NM Bone Scan Whole Body   Final Result       As described.               This report was finalized on 8/25/2024 11:13 AM by Dr. Ravi Jane M.D on Workstation: [id number]          MRI Brain With & Without Contrast   Final Result   1. Postoperative findings, as noted above.  Continued follow-up is   recommended.            This report was finalized on 8/23/2024 5:28 AM by Dr. Viridiana Altamirano M.D on Workstation: [id number]          CT Head Without Contrast   Final Result   1.  The patient is status post resection of a right cerebellar avidly   enhancing mass. Expected postoperative changes are noted. No   complicating feature is identified.   2.  Decreased attenuation is appreciated involving the left temporal    lobe posteriorly and inferiorly consistent with vasogenic edema. This   corresponds to the 9 mm enhancing lesion present on the MRI examination   of 8/20/2024. The enhancing lesion involving the superior cerebellar   vermis to the left is occult as is the area of dural enhancement   overlying the left frontal lobe superolaterally.       Radiation dose reduction techniques were utilized, including automated   exposure modulation based on body size.       This report was finalized on 8/21/2024 6:45 PM by Dr. Cesar Levine M.D   on Workstation: [id number]          CT Head With & Without Contrast   Final Result   1. There has been no change when compared to the MRI of the brain with   and without contrast earlier this morning on 08/20/2024 at 12:22 a.m.       2. This patient has 3 enhancing lesions in the brain and findings are   most consistent with metastatic disease to the brain. The enhancing   brain lesions which are most likely brain mets includes a 7 x 6 x 6 mm   enhancing lesion in the posterior inferior left temporal lobe that has   2.5 x 2.3 cm of surrounding vasogenic edema. There is a 10 x 9 x 10 mm   enhancing lesion in the superior midline of the vermis of the cerebellum   that has mild surrounding edema. The largest enhancing lesion is in the   posterior inferior right cerebellum that measures 2.4 x 2 x 2 cm in size   and has a large amount of surrounding vasogenic edema tracking up to 4.5   x 4.5 cm with mild mass effect on the fourth ventricle. There is mild   dilatation of the lateral and third ventricles compatible with mild   hydrocephalus, unchanged. This patient has abnormal circumferential wall   thickening within the distal sigmoid colon seen on chest, abdomen and   pelvic CT earlier this morning on 08/20/2024 at 1:42 a.m. suggesting   there could be a sigmoid colon cancer and correlation with colonoscopy   is suggested.       Radiation dose reduction techniques were utilized, including  automated   exposure control and exposure modulation based on body size.           This report was finalized on 8/21/2024 6:09 AM by Dr. Gilbert Hassan M.D   on Workstation: [id number]          CT Chest Without Contrast Diagnostic   Final Result       1. No evidence of metastatic disease within the thorax.   2. Asymmetric wall thickening involving the distal sigmoid colon, with   some surrounding mesenteric soft tissue nodularity. Appearance is very   worrisome for primary colonic malignancy. Correlation with colonoscopy   is recommended. MRI could allow for further assessment.   3. Sclerotic lesion within the spinous process of T1. I would favor that   this is a benign lesion such as a bone island. Consider short-term   follow-up or bone scan.       Radiation dose reduction techniques were utilized, including automated   exposure control and exposure modulation based on body size.           This report was finalized on 8/20/2024 3:54 AM by Dr. Viridiana Altamirano M.D on Workstation: [id number]          CT Abdomen Pelvis Without Contrast   Final Result       1. No evidence of metastatic disease within the thorax.   2. Asymmetric wall thickening involving the distal sigmoid colon, with   some surrounding mesenteric soft tissue nodularity. Appearance is very   worrisome for primary colonic malignancy. Correlation with colonoscopy   is recommended. MRI could allow for further assessment.   3. Sclerotic lesion within the spinous process of T1. I would favor that   this is a benign lesion such as a bone island. Consider short-term   follow-up or bone scan.       Radiation dose reduction techniques were utilized, including automated   exposure control and exposure modulation based on body size.           This report was finalized on 8/20/2024 3:54 AM by Dr. Viridiana Altamirano M.D on Workstation: [id number]          MRI Brain With & Without Contrast   Final Result   1. Study confirms the presence of lesions within  the left temporal lobe,   as well as the right cerebellar hemisphere. A third lesion is noted   within the cerebellar vermis. There is mass effect upon the fourth   ventricle. There is some mild dilatation of the lateral and third   ventricles. Continued follow-up is recommended.           This report was finalized on 8/20/2024 2:41 AM by Dr. Viridiana Altamirano M.D on Workstation: [id number]          CT Head Without Contrast   Final Result   Areas of increased attenuation are appreciated over the   right cerebral hemisphere posteriorly (2.1 cm) and left temporal lobe   inferiorly and posteriorly (6 mm) most consistent with that of   metastatic disease with associated edema. Edema is most prominent   involving the right cerebellar hemisphere. There is mass effect upon the   fourth ventricle and mild prominence of the lateral and third   ventricles. Further evaluation with MRI examination of the brain with   and without contrast is recommended.       The above information was called to and discussed with Dr. Epps.                   Radiation dose reduction techniques were utilized, including automated   exposure modulation based on body size.       This report was finalized on 8/19/2024 6:40 PM by Dr. Cesar Levine M.D   on Workstation: [id number]          XR Chest 1 View   Final Result   1. No acute process           This report was finalized on 8/19/2024 4:32 PM by Dr. 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  8/26: CEA 2.1  Consulted surgery for further  recommendations.  Would continue liquid diet for now given obstructive rectal mass.  Will defer further recommendations for diet to surgery.  GI will stay on service until pathology results return.    I discussed the patients findings and my recommendations with patient.    Dragon dictation used throughout this note.            Tiffany Pinzon PA-C  [facility]  [address] [place] [address]  Office: [phone]

## 2024-08-26 NOTE — CONSULTS
Consultation note    Referring physician: Kendrick Epps II*    Consulting Physician: Saturnino Chester MD    Reason for consultation: Colorectal cancer    Chief Complaint   Patient presents with    Dizziness    Headache    Hypertension       HPI:   The patient is a very pleasant 64 y.o. years old female that presented to the hospital with headaches.  She was found to have several metastatic lesions in the brain.  She underwent posterior fossa craniectomy with removal of right cerebral hemisphere metastatic lesion.  Pathology report showed evidence of adenocarcinoma with concerns of colonic origin.  She underwent CT scan abdomen pelvis that showed thickening of the colorectal area.  She underwent colonoscopy and she was found to have obstructing proximal rectal colon mass.  Biopsies were obtained.  Colonoscopy was not able to be advanced through the lesion.  The patient reports having abdominal bloating but denies any abdominal pain nausea or vomiting.  She has been having regular bowel function.  She tolerated bowel preparation without any problem.  Denies any rectal bleeding or recent change of bowel habits.  Mother with colon cancer at 65 years old.  Patient with history of colonoscopy in her 50s.      History reviewed. No pertinent past medical history.    Past Surgical History:   Procedure Laterality Date    COLONOSCOPY N/A 8/25/2024    Procedure: COLONOSCOPY to 20cm with cold biopsies and needle tattoo;  Surgeon: Shadi Kaminski MD;  Location: [facility] ENDOSCOPY;  Service: Gastroenterology;  Laterality: N/A;  pre: abnormal imaging  post: poor prep, obstructing colon mass at 20cm, hemorrhoids    CRANIOTOMY N/A 8/21/2024    Procedure: Posterior fossa craniotomy for tumor using stereotactic guidance;  Surgeon: Bull Doty MD;  Location: [facility] MAIN OR;  Service: Neurosurgery;  Laterality: N/A;         Current Facility-Administered Medications:     [DISCONTINUED] acetaminophen (TYLENOL) tablet 650 mg,  650 mg, Oral, Q4H PRN **OR** acetaminophen (TYLENOL) 160 MG/5ML oral solution 650 mg, 650 mg, Oral, Q4H PRN **OR** [DISCONTINUED] acetaminophen (TYLENOL) suppository 650 mg, 650 mg, Rectal, Q4H PRN, Sarika Wilburn MD    amLODIPine (NORVASC) tablet 5 mg, 5 mg, Oral, BID, Shadi Kaminski MD, 5 mg at 08/26/24 0855    atorvastatin (LIPITOR) tablet 10 mg, 10 mg, Oral, Daily, Shadi Kaminski MD, 10 mg at 08/26/24 0857    sennosides-docusate (PERICOLACE) 8.6-50 MG per tablet 1 tablet, 1 tablet, Oral, Nightly, 1 tablet at 08/24/24 2110 **AND** polyethylene glycol (MIRALAX) packet 17 g, 17 g, Oral, Daily PRN **AND** bisacodyl (DULCOLAX) EC tablet 5 mg, 5 mg, Oral, Daily PRN **AND** bisacodyl (DULCOLAX) suppository 10 mg, 10 mg, Rectal, Daily PRN, Shadi Kaminski MD    Calcium Replacement - Follow Nurse / BPA Driven Protocol, , Does not apply, PRN, Shadi Kaminski MD    docusate sodium (COLACE) capsule 100 mg, 100 mg, Oral, Daily, Shadi Kaminski MD, 100 mg at 08/26/24 0857    enalaprilat (VASOTEC) injection 0.625 mg, 0.625 mg, Intravenous, Q6H PRN, Shadi Kaminski MD    famotidine (PEPCID) tablet 20 mg, 20 mg, Oral, BID AC, Shadi Kaminski MD, 20 mg at 08/26/24 0856    fentaNYL citrate (PF) (SUBLIMAZE) injection, , Intravenous, Code / Trauma / Sedation Medication, Malvin Merritt MD, 50 mcg at 08/21/24 1158    HYDROcodone-acetaminophen (NORCO) 5-325 MG per tablet 1 tablet, 1 tablet, Oral, Q4H PRN, Sarika Wilburn MD, 1 tablet at 08/26/24 0856    labetalol (NORMODYNE,TRANDATE) injection 20 mg, 20 mg, Intravenous, Q2H PRN, Shadi Kaminski MD, 20 mg at 08/24/24 1800    levETIRAcetam (KEPPRA) tablet 500 mg, 500 mg, Oral, BID, Shadi Kaminski MD, 500 mg at 08/26/24 0856    Magnesium Standard Dose Replacement - Follow Nurse / BPA Driven Protocol, , Does not apply, PRN, Shadi Kaminski MD    methocarbamol (ROBAXIN) tablet 750 mg, 750 mg, Oral, Q6H PRN, Shadi Kaminski MD, 750 mg at 08/26/24 0250     methylPREDNISolone (MEDROL) dose pack 4 mg, 4 mg, Oral, TID Around Food, CosmoSarika MD, 4 mg at 08/26/24 0857    [START ON 8/27/2024] methylPREDNISolone (MEDROL) dose pack 4 mg, 4 mg, Oral, Before Breakfast, Shadi Kaminski MD    [START ON 8/27/2024] methylPREDNISolone (MEDROL) dose pack 4 mg, 4 mg, Oral, Tonight, Shadi Kaminski MD    [START ON 8/28/2024] methylPREDNISolone (MEDROL) dose pack 4 mg, 4 mg, Oral, Before Breakfast, Shadi Kaminski MD    midazolam (VERSED) injection, , Intravenous, Code / Trauma / Sedation Medication, Malvin Merritt MD, 1 mg at 08/21/24 1157    nitroglycerin (NITROSTAT) SL tablet 0.4 mg, 0.4 mg, Sublingual, Q5 Min PRN, Shadi Kaminski MD    ondansetron ODT (ZOFRAN-ODT) disintegrating tablet 4 mg, 4 mg, Oral, Q6H PRN **OR** ondansetron (ZOFRAN) injection 4 mg, 4 mg, Intravenous, Q6H PRN, Shadi Kaminski MD    Phosphorus Replacement - Follow Nurse / BPA Driven Protocol, , Does not apply, PRNGordy Brian M, MD    Potassium Replacement - Follow Nurse / BPA Driven Protocol, , Does not apply, Gordy ROWE Brian M, MD    [CANCELED] Insert Peripheral IV, , , Once **AND** sodium chloride 0.9 % flush 10 mL, 10 mL, Intravenous, PRN, Shadi Kaminski MD    sodium chloride 0.9 % infusion 1,000 mL, 1,000 mL, Intravenous, Continuous, Shadi Kaminski MD, Last Rate: 25 mL/hr at 08/25/24 1055, 1,000 mL at 08/25/24 1055    No Known Allergies    Social History     Socioeconomic History    Marital status: Single   Tobacco Use    Smoking status: Some Days     Types: Cigarettes    Smokeless tobacco: Never   Vaping Use    Vaping status: Never Used   Substance and Sexual Activity    Alcohol use: Yes    Drug use: Defer    Sexual activity: Defer       History reviewed. No pertinent family history.    ROS:   Denies any recent weight loss, complaints of intermittent episode of headaches    Physical Exam:   Vitals:    08/26/24 0904   BP: 148/72   Pulse: 87   Resp: 20   Temp: 98 °F (36.7 °C)    SpO2:      GENERAL:oriented to person, place, and time, alert, no acute distress  HEENT: normochephalic, atraumatic, no scleral icterus moist mucous membranes.  NECK: Supple  CHEST: Breathing comfortable  CARDIAC: regular rate and rhythm    ABDOMEN: Abdomen soft, nontender nondistended but feels bloated.  No rebound guarding or peritoneal signs  EXTREMITIES: no cyanosis, clubbing or edema    NEURO: alert and oriented, normal speech, cranial nerves 2-12 grossly intact, no focal deficits   SKIN: Moist, warm, no rashes.    Diagnostic workup:   CT scan of the abdomen and pelvis showing thickening of the wall of the wall of the proximal and mid rectum all the way proximally to the distal descending colon.  There there are no indirect signs of colonic obstruction.    White blood cell count 5.8, hemoglobin 11.6, platelets 300  CEA levels 2.1    Colonoscopy report from yesterday was reviewed.  There is no mention about the distance of the mass from the anal verge I suspect patient likely has upper and mid rectum colon cancer.    Assessment and plan:   The patient is a very pleasant 64 y.o. 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  Colonoscopy was not able to pass through the mass in the rectum.  Patient does not have obstructed symptoms.  I discussed with the patient about further step.  First, we will need to establish how much of the lumen of the rectum is still open to decide whether she will need to have diverting colostomy or not.  We will continue to follow final pathology report from rectal mass.  I will place a consult for oncology to see the patient.  She will need to have port placed for chemotherapy.  I discussed with the patient that at this time and because the fact that she has metastatic disease that she will not be candidate for curative resection.    I will order a Gastrografin enema today   We will continue to follow    Case was discussed with  patient and family    Risk and benefits of the current recommended treatment were explained to the patient that had time to ask questions that where answered, verbalized understanding and agreed with the plan.     Saturnino Chester MD  General, Minimally Invasive and Endoscopic Surgery  [facility]    [address], [address]  [place], [place], [address]  P: [phone]  F: [phone]

## 2024-08-26 NOTE — CONSULTS
DIAGNOSIS and REASON FOR CONSULTATION: brain metastases  -  for advice and recommendations for this diagnosis    Referring Provider:  Kendrick Epps II,*    HISTORY OF PRESENT ILLNESS:  The patient is a 64 y.o. year old female who presented to the ER on 8/19/24 with a 3-4 day hx of severe headaches followed by nausea, vomiting and diarrhea.      She had a brain mri on 8/20/24 which showed 3 separate lesions including a lesion in the right cerebellar hemisphere measuring 2.1 x 2.2 cm. The lesion within the left temporal lobe measures 9 x 7mm. There is a third lesion which is noted within the left side of the cerebellar vermis. This measures 1.0 x 0.8 cm.    She had a CT chest abdomen and pelvis on 8/20/24 which showed no evidence of metastatic disease within the thorax. Asymmetric wall thickening involving the distal sigmoid colon, with some surrounding mesenteric soft tissue nodularity. Appearance is very worrisome for primary colonic malignancy. Correlation with colonoscopy is recommended. MRI could allow for further assessment. Sclerotic lesion within the spinous process of T1 favor that this is a benign lesion such as a bone island.     She underwent resection of the right cerebellar lesion on 8/21/24 with pathology revealing metastatic poorly differentiated adenocarcinoma.     She had a CT head on 8/21/24 which showed status post resection of a right cerebellar avidly enhancing mass. Expected postoperative changes are noted.  Decreased attenuation is appreciated involving the left temporallobe posteriorly and inferiorly consistent with vasogenic edema. Thiscorresponds to the 9 mm enhancing lesion present on the MRI examinationof 8/20/2024. The enhancing lesion involving the superior cerebellarvermis to the left is occult as is the area of dural enhancement  overlying the left frontal lobe superolaterally.    She underwent a colonoscopy yesterday with Dr. Shadi Kaminski with findings of a completely  obstructing mass in the proximal rectum  Pathology is still pending.     She had a bone scan yesterday which showed Focal activity at the level of the distal right radial shaft could be a separate focus overlying soft tissue activity related to an injection attempt, or could represent an osseous process such as old fracture or lesion, correlate clinically, x-ray of the right forearm is recommended for further evaluation.    She had a mri of the lumbar spine earlier today which showed a 3 mm enhancing lesion in the visualized lower thoracic spinal cord at the horizontal level of the junction of middle and superior thirds of the T12 thoracic level that most probably represents a spinal cord metastasis. I recommend a dedicated cervical and thoracic spine MRI with and without contrast to     No localized activity is seen at the level of the previous CT  demonstrated T1 spinous process sclerotic focus, but the size of the  lesion may be below the resolution of bone scan, CT follow-up advised to  characterize change.    Her family hx includes a sister with colon cancer and father  from ?bone cancer.      I was asked to see the patient at the request of the referring provider noted above for advice and recommendations regarding this diagnosis.    Objective     Past Medical History: she  has no past medical history on file.    Past Surgical History:  she has a past surgical history that includes Craniotomy (N/A, 2024) and Colonoscopy (N/A, 2024).    Meds:    Current Facility-Administered Medications:     [DISCONTINUED] acetaminophen (TYLENOL) tablet 650 mg, 650 mg, Oral, Q4H PRN **OR** acetaminophen (TYLENOL) 160 MG/5ML oral solution 650 mg, 650 mg, Oral, Q4H PRN **OR** [DISCONTINUED] acetaminophen (TYLENOL) suppository 650 mg, 650 mg, Rectal, Q4H PRN, Sarika Wilburn MD    amLODIPine (NORVASC) tablet 5 mg, 5 mg, Oral, BID, Shadi Kaminski MD, 5 mg at 24 0855    atorvastatin (LIPITOR) tablet 10 mg,  10 mg, Oral, Daily, Shadi Kaminski MD, 10 mg at 08/26/24 0857    sennosides-docusate (PERICOLACE) 8.6-50 MG per tablet 1 tablet, 1 tablet, Oral, Nightly, 1 tablet at 08/24/24 2110 **AND** polyethylene glycol (MIRALAX) packet 17 g, 17 g, Oral, Daily PRN **AND** bisacodyl (DULCOLAX) EC tablet 5 mg, 5 mg, Oral, Daily PRN **AND** bisacodyl (DULCOLAX) suppository 10 mg, 10 mg, Rectal, Daily PRN, Shadi Kaminski MD    Calcium Replacement - Follow Nurse / BPA Driven Protocol, , Does not apply, PRN, Shadi Kaminski MD    docusate sodium (COLACE) capsule 100 mg, 100 mg, Oral, Daily, Shadi Kaminski MD, 100 mg at 08/26/24 0857    enalaprilat (VASOTEC) injection 0.625 mg, 0.625 mg, Intravenous, Q6H PRN, Shadi Kaminski MD    famotidine (PEPCID) tablet 20 mg, 20 mg, Oral, BID AC, Shadi Kaminski MD, 20 mg at 08/26/24 0856    fentaNYL citrate (PF) (SUBLIMAZE) injection, , Intravenous, Code / Trauma / Sedation Medication, Malvin Merritt MD, 50 mcg at 08/21/24 1158    HYDROcodone-acetaminophen (NORCO) 5-325 MG per tablet 1 tablet, 1 tablet, Oral, Q4H PRN, Sarika Wilburn MD, 1 tablet at 08/26/24 1300    labetalol (NORMODYNE,TRANDATE) injection 20 mg, 20 mg, Intravenous, Q2H PRN, Shadi Kaminski MD, 20 mg at 08/24/24 1800    levETIRAcetam (KEPPRA) tablet 500 mg, 500 mg, Oral, BID, Shadi Kaminski MD, 500 mg at 08/26/24 0856    Magnesium Standard Dose Replacement - Follow Nurse / BPA Driven Protocol, , Does not apply, Gordy ROWE Brian M, MD    methocarbamol (ROBAXIN) tablet 750 mg, 750 mg, Oral, Q6H PRN, Shadi Kaminski MD, 750 mg at 08/26/24 1300    methylPREDNISolone (MEDROL) dose pack 4 mg, 4 mg, Oral, TID Around Food, Cosmo, Sarika HUDSON MD, 4 mg at 08/26/24 1340    [START ON 8/27/2024] methylPREDNISolone (MEDROL) dose pack 4 mg, 4 mg, Oral, Before Breakfast, Shadi Kaminski MD    [START ON 8/27/2024] methylPREDNISolone (MEDROL) dose pack 4 mg, 4 mg, Oral, Tonight, Shadi Kaminski MD    [START ON 8/28/2024]  methylPREDNISolone (MEDROL) dose pack 4 mg, 4 mg, Oral, Before Breakfast, Shadi Kaminski MD    midazolam (VERSED) injection, , Intravenous, Code / Trauma / Sedation Medication, Malvin Merritt MD, 1 mg at 08/21/24 1157    nitroglycerin (NITROSTAT) SL tablet 0.4 mg, 0.4 mg, Sublingual, Q5 Min PRN, Shadi Kaminski MD    ondansetron ODT (ZOFRAN-ODT) disintegrating tablet 4 mg, 4 mg, Oral, Q6H PRN **OR** ondansetron (ZOFRAN) injection 4 mg, 4 mg, Intravenous, Q6H PRN, Shadi Kaminski MD    Phosphorus Replacement - Follow Nurse / BPA Driven Protocol, , Does not apply, SOLEDAD, Shadi Kaminski MD    Potassium Replacement - Follow Nurse / BPA Driven Protocol, , Does not apply, PRGordy ZHANG Brian M, MD    [CANCELED] Insert Peripheral IV, , , Once **AND** sodium chloride 0.9 % flush 10 mL, 10 mL, Intravenous, PRN, Shadi Kaminski MD    sodium chloride 0.9 % infusion 1,000 mL, 1,000 mL, Intravenous, Continuous, Shadi Kaminski MD, Last Rate: 25 mL/hr at 08/25/24 1055, 1,000 mL at 08/25/24 1055    Allergies:  No Known Allergies    Family History:  her family history is not on file.    Social History:  she  reports that she has been smoking cigarettes. She has never used smokeless tobacco. She reports current alcohol use. Drug use questions deferred to the physician.    Pertinent Findings on   Review of Systems   Constitutional:  Positive for appetite change and fatigue.   Respiratory: Negative.     Cardiovascular: Negative.    Gastrointestinal:  Positive for diarrhea, nausea and vomiting.   Genitourinary: Negative.     Musculoskeletal: Negative.    Skin: Negative.    Neurological:  Positive for headaches.   Psychiatric/Behavioral: Negative.     :  Subjective     Vitals:    08/26/24 0343 08/26/24 0855 08/26/24 0904 08/26/24 1221   BP: 121/67 148/82 148/72 119/63   BP Location: Left arm  Left arm Left arm   Patient Position: Lying  Lying Lying   Pulse: 72 76 87 71   Resp: 18  20 20   Temp: 97.9 °F (36.6 °C)  98 °F  (36.7 °C) 98.9 °F (37.2 °C)   TempSrc: Oral  Oral Oral   SpO2: 97%   98%   Weight:       Height:           Performance Status: (1) Restricted in physically strenuous activity, ambulatory and able to do work of light nature    Pertinent Findings on  Physical Exam  Constitutional:       Appearance: Normal appearance.   HENT:      Head: Atraumatic.        Comments: Incision clean, dry intact  Eyes:      Extraocular Movements: Extraocular movements intact.   Pulmonary:      Effort: Pulmonary effort is normal.   Musculoskeletal:         General: Normal range of motion.   Neurological:      General: No focal deficit present.      Mental Status: She is alert and oriented to person, place, and time.      Cranial Nerves: No cranial nerve deficit.      Motor: No weakness.   Psychiatric:         Mood and Affect: Mood normal.         Behavior: Behavior normal.     :       Assessment: 64 year old female with newly diagnosed rectal cancer with brain metastases, pod # 5 s/p resection of the right cerebellar metastases.     Plan:  I have personally reviewed her imaging including mri of the brain and CT scans.  I discussed with her post operative stereotactic radiation to the resected cerebellar met as well as SRS to the other 2 brain metastases.  I discussed possible acute side effects of fatigue, hair loss, headache, nausea, vomiting, bleeding, edema or seizures.  I discussed possible late effects of radionecrosis, late hearing loss or neurocognitive changes.  She voiced understanding. She is discussing possibly proceeding with a diverting colostomy in the next 1-2 days.  We will need to complete her staging workup.  I have scheduled her to return to see me on Tuesday, September 3 for re-evaluation and CT simulation.  We will make decisions regarding possibly treating the rectal primary as well.     Objective   I spent greater than 70 mins (26416) on the unit and of that time 40 minutes  in counseling and coordination of care,  including my review of imaging and pathology; indications, goals, logistics, alternatives and risks - both common and rare - for my recommendations as well as surveillance and potential outcomes. NCCN Guidelines were consulted, discussed with the patient and used to make the above recommendations.

## 2024-08-26 NOTE — PROGRESS NOTES
[facility]    [phone]   LOS: 7 days     Name: Ely Sims  Age/Sex: 64 y.o. female  :  1960        PCP: Provider, No Known  Chief Complaint   Patient presents with    Dizziness    Headache    Hypertension      Subjective   She feels good today.  Denies new issues or complaints.  General: No Fever or Chills, Cardiac: No Chest Pain or Palpitations, Resp: No Cough or SOA, GI: No Nausea, Vomiting, or Diarrhea, and Other: No bleeding    amLODIPine, 5 mg, Oral, BID  atorvastatin, 10 mg, Oral, Daily  docusate sodium, 100 mg, Oral, Daily  famotidine, 20 mg, Oral, BID AC  levETIRAcetam, 500 mg, Oral, BID  methylPREDNISolone, 4 mg, Oral, TID Around Food  [START ON 2024] methylPREDNISolone, 4 mg, Oral, Before Breakfast  [START ON 2024] methylPREDNISolone, 4 mg, Oral, Tonight  [START ON 2024] methylPREDNISolone, 4 mg, Oral, Before Breakfast  senna-docusate sodium, 1 tablet, Oral, Nightly      sodium chloride, 1,000 mL, Last Rate: 1,000 mL (24 1055)        Objective   Vital Signs  Temp:  [97.7 °F (36.5 °C)-98.9 °F (37.2 °C)] 98.9 °F (37.2 °C)  Heart Rate:  [71-87] 71  Resp:  [16-20] 20  BP: (119-150)/(63-93) 119/63  Body mass index is 24.85 kg/m².    Intake/Output Summary (Last 24 hours) at 2024 1407  Last data filed at 2024 1000  Gross per 24 hour   Intake 1840 ml   Output --   Net 1840 ml       Physical Exam  Vitals and nursing note reviewed.   Constitutional:       Appearance: She is ill-appearing.   Cardiovascular:      Rate and Rhythm: Normal rate and regular rhythm.   Pulmonary:      Effort: No respiratory distress.      Breath sounds: Normal breath sounds.   Neurological:      General: No focal deficit present.      Mental Status: She is alert and oriented to person, place, and time. Mental status is at baseline.           Results Review:       I reviewed the patient's new clinical results.  Results from last 7 days   Lab Units 24  0439 24  0528  08/23/24  0318 08/22/24  0401 08/21/24  1615 08/21/24  0512 08/20/24  0504   WBC 10*3/mm3 5.87 8.21 8.57 12.92* 16.36* 12.81* 6.97   HEMOGLOBIN g/dL 11.6* 11.9* 10.5* 11.3* 13.0 12.4 13.7   PLATELETS 10*3/mm3 300 312 269 284 350 311 330     Results from last 7 days   Lab Units 08/25/24  0439 08/24/24  0528 08/23/24  1325 08/23/24  0318 08/22/24  0401 08/21/24  1615 08/21/24  0512 08/20/24  0504 08/19/24  2318 08/19/24  1545   SODIUM mmol/L 140 140  --  139 140 144 140 136   < > 137   POTASSIUM mmol/L 4.0 4.1 4.0 3.5 3.7 3.3* 3.3* 3.7   < > 3.6   CHLORIDE mmol/L 103 104  --  109* 107 109* 107 104   < > 102   CO2 mmol/L 29.0 25.0  --  23.0 25.1 24.5 24.6 20.2*   < > 26.0   BUN mg/dL 10 12  --  11 14 16 13 10   < > 13   CREATININE mg/dL 0.55* 0.53*  --  0.58 0.72 0.77 0.64 0.53*   < > 0.69   CALCIUM mg/dL 8.8 8.7  --  7.9* 8.2* 8.7 9.2 8.7   < > 9.6   MAGNESIUM mg/dL  --   --   --  2.5* 2.2  --   --   --   --  1.9   PHOSPHORUS mg/dL  --   --   --  2.4*  --   --   --   --   --   --     < > = values in this interval not displayed.   Estimated Creatinine Clearance: 117.5 mL/min (A) (by C-G formula based on SCr of 0.55 mg/dL (L)).      Assessment & Plan   Active Hospital Problems    Diagnosis  POA    **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, C80.1]  Yes    Colonic mass [K63.89]  Yes     Class: Acute    Anemia [D64.9]  Yes    Hyperglycemia [R73.9]  Yes    Hypertension [I10]  Yes      Resolved Hospital Problems    Diagnosis Date Resolved POA    Compression of brain [G93.5] 08/23/2024 Yes       PLAN  This is a 64-year-old lady with a history of hypertension and relative good health who presents to the hospital with headaches and abdominal discomfort and was found to have suspicious masses on CT scan.  Neurosurgery was consulted and an MRI was ordered and she was started on IV steroids.  She was initially mated to the ICU and transferred to our care once stabilized.  -It looks as though she has got metastatic rectal  cancer.  Pathology from her colonoscopy is still pending.  -Appreciate general surgery's evaluation.  Planning for barium enema today.  If mass is obstructive would need operative intervention however if not obstructed the plan would be chemotherapy and treatment before surgical intervention.  -Remains on prednisone and antiepileptic medications  -Discussed with neuroradiology today.  Unfortunately the MRI of her lumbar spine did not show any osseous metastatic disease but there is a enhancing lesion within the lower thoracic cord at around T12.  Will plan for additional MRIs of the cervical and thoracic spine to evaluate for other potential metastatic foci.  -Steroid induced hyperglycemia is expected with IV and oral steroids.  Continue to follow no need for insulin or intervention  -Medical and radiation oncology services consulted today  -Mechanical DVT prophylaxis  -Full code    Disposition  Expected Discharge Date: 8/26/2024; Expected Discharge Time:        Cesar Iglesias MD  [facility]  08/26/24  14:07 EDT

## 2024-08-27 ENCOUNTER — APPOINTMENT (OUTPATIENT)
Dept: GENERAL RADIOLOGY | Facility: HOSPITAL | Age: 64
DRG: 025 | End: 2024-08-27
Payer: COMMERCIAL

## 2024-08-27 ENCOUNTER — ANESTHESIA (OUTPATIENT)
Dept: PERIOP | Facility: HOSPITAL | Age: 64
End: 2024-08-27
Payer: COMMERCIAL

## 2024-08-27 ENCOUNTER — ANESTHESIA EVENT (OUTPATIENT)
Dept: PERIOP | Facility: HOSPITAL | Age: 64
End: 2024-08-27
Payer: COMMERCIAL

## 2024-08-27 LAB
BASOPHILS # BLD AUTO: 0.01 10*3/MM3 (ref 0–0.2)
BASOPHILS NFR BLD AUTO: 0.1 % (ref 0–1.5)
DEPRECATED RDW RBC AUTO: 44.7 FL (ref 37–54)
EOSINOPHIL # BLD AUTO: 0.11 10*3/MM3 (ref 0–0.4)
EOSINOPHIL NFR BLD AUTO: 1 % (ref 0.3–6.2)
ERYTHROCYTE [DISTWIDTH] IN BLOOD BY AUTOMATED COUNT: 12.4 % (ref 12.3–15.4)
HCT VFR BLD AUTO: 38.1 % (ref 34–46.6)
HGB BLD-MCNC: 12.8 G/DL (ref 12–15.9)
IMM GRANULOCYTES # BLD AUTO: 0.1 10*3/MM3 (ref 0–0.05)
IMM GRANULOCYTES NFR BLD AUTO: 0.9 % (ref 0–0.5)
LYMPHOCYTES # BLD AUTO: 1.65 10*3/MM3 (ref 0.7–3.1)
LYMPHOCYTES NFR BLD AUTO: 14.4 % (ref 19.6–45.3)
MCH RBC QN AUTO: 33 PG (ref 26.6–33)
MCHC RBC AUTO-ENTMCNC: 33.6 G/DL (ref 31.5–35.7)
MCV RBC AUTO: 98.2 FL (ref 79–97)
MONOCYTES # BLD AUTO: 1.42 10*3/MM3 (ref 0.1–0.9)
MONOCYTES NFR BLD AUTO: 12.4 % (ref 5–12)
NEUTROPHILS NFR BLD AUTO: 71.2 % (ref 42.7–76)
NEUTROPHILS NFR BLD AUTO: 8.2 10*3/MM3 (ref 1.7–7)
NRBC BLD AUTO-RTO: 0 /100 WBC (ref 0–0.2)
PLATELET # BLD AUTO: 375 10*3/MM3 (ref 140–450)
PMV BLD AUTO: 9 FL (ref 6–12)
RBC # BLD AUTO: 3.88 10*6/MM3 (ref 3.77–5.28)
WBC NRBC COR # BLD AUTO: 11.49 10*3/MM3 (ref 3.4–10.8)

## 2024-08-27 PROCEDURE — 94799 UNLISTED PULMONARY SVC/PX: CPT

## 2024-08-27 PROCEDURE — 25010000002 ONDANSETRON PER 1 MG: Performed by: REGISTERED NURSE

## 2024-08-27 PROCEDURE — 85025 COMPLETE CBC W/AUTO DIFF WBC: CPT | Performed by: SURGERY

## 2024-08-27 PROCEDURE — 25010000002 HEPARIN (PORCINE) PER 1000 UNITS: Performed by: SURGERY

## 2024-08-27 PROCEDURE — 99232 SBSQ HOSP IP/OBS MODERATE 35: CPT | Performed by: SURGERY

## 2024-08-27 PROCEDURE — 25010000002 HYDROMORPHONE PER 4 MG: Performed by: REGISTERED NURSE

## 2024-08-27 PROCEDURE — 63710000001 METHYLPREDNISOLONE 4 MG TABLET THERAPY PACK 21 EACH DISP PACK: Performed by: SURGERY

## 2024-08-27 PROCEDURE — 25010000002 SUGAMMADEX 200 MG/2ML SOLUTION: Performed by: REGISTERED NURSE

## 2024-08-27 PROCEDURE — 0D1N4Z4 BYPASS SIGMOID COLON TO CUTANEOUS, PERCUTANEOUS ENDOSCOPIC APPROACH: ICD-10-PCS | Performed by: SURGERY

## 2024-08-27 PROCEDURE — 25810000003 LACTATED RINGERS PER 1000 ML: Performed by: ANESTHESIOLOGY

## 2024-08-27 PROCEDURE — 02HV33Z INSERTION OF INFUSION DEVICE INTO SUPERIOR VENA CAVA, PERCUTANEOUS APPROACH: ICD-10-PCS | Performed by: SURGERY

## 2024-08-27 PROCEDURE — 25010000002 CEFOXITIN PER 1 G: Performed by: SURGERY

## 2024-08-27 PROCEDURE — 44188 LAP COLOSTOMY: CPT | Performed by: STUDENT IN AN ORGANIZED HEALTH CARE EDUCATION/TRAINING PROGRAM

## 2024-08-27 PROCEDURE — 25010000002 HYDROMORPHONE PER 4 MG: Performed by: SURGERY

## 2024-08-27 PROCEDURE — 25010000002 PROPOFOL 200 MG/20ML EMULSION: Performed by: REGISTERED NURSE

## 2024-08-27 PROCEDURE — C1788 PORT, INDWELLING, IMP: HCPCS | Performed by: SURGERY

## 2024-08-27 PROCEDURE — 36561 INSERT TUNNELED CV CATH: CPT | Performed by: SURGERY

## 2024-08-27 PROCEDURE — 25010000002 FENTANYL CITRATE (PF) 50 MCG/ML SOLUTION: Performed by: REGISTERED NURSE

## 2024-08-27 PROCEDURE — 25010000002 DROPERIDOL PER 5 MG: Performed by: REGISTERED NURSE

## 2024-08-27 PROCEDURE — 0JH60WZ INSERTION OF TOTALLY IMPLANTABLE VASCULAR ACCESS DEVICE INTO CHEST SUBCUTANEOUS TISSUE AND FASCIA, OPEN APPROACH: ICD-10-PCS | Performed by: SURGERY

## 2024-08-27 PROCEDURE — 76000 FLUOROSCOPY <1 HR PHYS/QHP: CPT

## 2024-08-27 PROCEDURE — 99232 SBSQ HOSP IP/OBS MODERATE 35: CPT | Performed by: INTERNAL MEDICINE

## 2024-08-27 PROCEDURE — 77001 FLUOROGUIDE FOR VEIN DEVICE: CPT | Performed by: SURGERY

## 2024-08-27 PROCEDURE — 63710000001 METHYLPREDNISOLONE 4 MG TABLET THERAPY PACK 21 EACH DISP PACK: Performed by: INTERNAL MEDICINE

## 2024-08-27 PROCEDURE — 25010000002 DEXAMETHASONE SODIUM PHOSPHATE 20 MG/5ML SOLUTION: Performed by: REGISTERED NURSE

## 2024-08-27 PROCEDURE — 99024 POSTOP FOLLOW-UP VISIT: CPT | Performed by: NURSE PRACTITIONER

## 2024-08-27 PROCEDURE — 44188 LAP COLOSTOMY: CPT | Performed by: SURGERY

## 2024-08-27 DEVICE — POWERPORT CLEARVUE ISP IMPLANTABLE PORT WITH ATTACHABLE 8F POLYURETHANE OPEN-ENDED SINGLE-LUMEN VENOUS CATHETER PROCEDURAL KIT
Type: IMPLANTABLE DEVICE | Site: VEIN | Status: FUNCTIONAL
Brand: POWERPORT CLEARVUE

## 2024-08-27 RX ORDER — HYDROCODONE BITARTRATE AND ACETAMINOPHEN 5; 325 MG/1; MG/1
1 TABLET ORAL ONCE AS NEEDED
Status: DISCONTINUED | OUTPATIENT
Start: 2024-08-27 | End: 2024-08-27 | Stop reason: HOSPADM

## 2024-08-27 RX ORDER — SODIUM CHLORIDE 0.9 % (FLUSH) 0.9 %
3-10 SYRINGE (ML) INJECTION AS NEEDED
Status: DISCONTINUED | OUTPATIENT
Start: 2024-08-27 | End: 2024-08-27 | Stop reason: HOSPADM

## 2024-08-27 RX ORDER — HYDROMORPHONE HYDROCHLORIDE 1 MG/ML
0.5 INJECTION, SOLUTION INTRAMUSCULAR; INTRAVENOUS; SUBCUTANEOUS
Status: DISCONTINUED | OUTPATIENT
Start: 2024-08-27 | End: 2024-08-29

## 2024-08-27 RX ORDER — SODIUM CHLORIDE, SODIUM LACTATE, POTASSIUM CHLORIDE, CALCIUM CHLORIDE 600; 310; 30; 20 MG/100ML; MG/100ML; MG/100ML; MG/100ML
9 INJECTION, SOLUTION INTRAVENOUS CONTINUOUS
Status: DISCONTINUED | OUTPATIENT
Start: 2024-08-27 | End: 2024-08-27

## 2024-08-27 RX ORDER — PROMETHAZINE HYDROCHLORIDE 25 MG/1
25 TABLET ORAL ONCE AS NEEDED
Status: DISCONTINUED | OUTPATIENT
Start: 2024-08-27 | End: 2024-08-27 | Stop reason: HOSPADM

## 2024-08-27 RX ORDER — IPRATROPIUM BROMIDE AND ALBUTEROL SULFATE 2.5; .5 MG/3ML; MG/3ML
3 SOLUTION RESPIRATORY (INHALATION) ONCE AS NEEDED
Status: DISCONTINUED | OUTPATIENT
Start: 2024-08-27 | End: 2024-08-27 | Stop reason: HOSPADM

## 2024-08-27 RX ORDER — FENTANYL CITRATE 50 UG/ML
50 INJECTION, SOLUTION INTRAMUSCULAR; INTRAVENOUS
Status: DISCONTINUED | OUTPATIENT
Start: 2024-08-27 | End: 2024-08-27 | Stop reason: HOSPADM

## 2024-08-27 RX ORDER — LIDOCAINE HYDROCHLORIDE 10 MG/ML
0.5 INJECTION, SOLUTION INFILTRATION; PERINEURAL ONCE AS NEEDED
Status: DISCONTINUED | OUTPATIENT
Start: 2024-08-27 | End: 2024-08-27 | Stop reason: HOSPADM

## 2024-08-27 RX ORDER — DROPERIDOL 2.5 MG/ML
0.62 INJECTION, SOLUTION INTRAMUSCULAR; INTRAVENOUS
Status: DISCONTINUED | OUTPATIENT
Start: 2024-08-27 | End: 2024-08-27 | Stop reason: HOSPADM

## 2024-08-27 RX ORDER — FENTANYL CITRATE 50 UG/ML
INJECTION, SOLUTION INTRAMUSCULAR; INTRAVENOUS AS NEEDED
Status: DISCONTINUED | OUTPATIENT
Start: 2024-08-27 | End: 2024-08-27 | Stop reason: SURG

## 2024-08-27 RX ORDER — BUPIVACAINE HYDROCHLORIDE AND EPINEPHRINE 5; 5 MG/ML; UG/ML
INJECTION, SOLUTION EPIDURAL; INTRACAUDAL; PERINEURAL AS NEEDED
Status: DISCONTINUED | OUTPATIENT
Start: 2024-08-27 | End: 2024-08-27 | Stop reason: HOSPADM

## 2024-08-27 RX ORDER — DIPHENHYDRAMINE HYDROCHLORIDE 50 MG/ML
12.5 INJECTION INTRAMUSCULAR; INTRAVENOUS
Status: DISCONTINUED | OUTPATIENT
Start: 2024-08-27 | End: 2024-08-27 | Stop reason: HOSPADM

## 2024-08-27 RX ORDER — LIDOCAINE HYDROCHLORIDE 20 MG/ML
INJECTION, SOLUTION INFILTRATION; PERINEURAL AS NEEDED
Status: DISCONTINUED | OUTPATIENT
Start: 2024-08-27 | End: 2024-08-27 | Stop reason: SURG

## 2024-08-27 RX ORDER — FENTANYL CITRATE 50 UG/ML
50 INJECTION, SOLUTION INTRAMUSCULAR; INTRAVENOUS ONCE AS NEEDED
Status: DISCONTINUED | OUTPATIENT
Start: 2024-08-27 | End: 2024-08-27 | Stop reason: HOSPADM

## 2024-08-27 RX ORDER — OXYCODONE AND ACETAMINOPHEN 7.5; 325 MG/1; MG/1
1 TABLET ORAL EVERY 4 HOURS PRN
Status: DISCONTINUED | OUTPATIENT
Start: 2024-08-27 | End: 2024-08-27 | Stop reason: HOSPADM

## 2024-08-27 RX ORDER — ONDANSETRON 2 MG/ML
4 INJECTION INTRAMUSCULAR; INTRAVENOUS ONCE AS NEEDED
Status: COMPLETED | OUTPATIENT
Start: 2024-08-27 | End: 2024-08-27

## 2024-08-27 RX ORDER — EPHEDRINE SULFATE 50 MG/ML
5 INJECTION, SOLUTION INTRAVENOUS ONCE AS NEEDED
Status: DISCONTINUED | OUTPATIENT
Start: 2024-08-27 | End: 2024-08-27 | Stop reason: HOSPADM

## 2024-08-27 RX ORDER — PROPOFOL 10 MG/ML
INJECTION, EMULSION INTRAVENOUS AS NEEDED
Status: DISCONTINUED | OUTPATIENT
Start: 2024-08-27 | End: 2024-08-27 | Stop reason: SURG

## 2024-08-27 RX ORDER — SODIUM CHLORIDE 0.9 % (FLUSH) 0.9 %
3 SYRINGE (ML) INJECTION EVERY 12 HOURS SCHEDULED
Status: DISCONTINUED | OUTPATIENT
Start: 2024-08-27 | End: 2024-08-27 | Stop reason: HOSPADM

## 2024-08-27 RX ORDER — EPHEDRINE SULFATE 50 MG/ML
INJECTION INTRAVENOUS AS NEEDED
Status: DISCONTINUED | OUTPATIENT
Start: 2024-08-27 | End: 2024-08-27 | Stop reason: SURG

## 2024-08-27 RX ORDER — OXYCODONE AND ACETAMINOPHEN 5; 325 MG/1; MG/1
1 TABLET ORAL EVERY 6 HOURS PRN
Status: DISCONTINUED | OUTPATIENT
Start: 2024-08-27 | End: 2024-08-29

## 2024-08-27 RX ORDER — PROMETHAZINE HYDROCHLORIDE 25 MG/1
25 SUPPOSITORY RECTAL ONCE AS NEEDED
Status: DISCONTINUED | OUTPATIENT
Start: 2024-08-27 | End: 2024-08-27 | Stop reason: HOSPADM

## 2024-08-27 RX ORDER — SODIUM CHLORIDE 9 MG/ML
INJECTION, SOLUTION INTRAVENOUS AS NEEDED
Status: DISCONTINUED | OUTPATIENT
Start: 2024-08-27 | End: 2024-08-27 | Stop reason: HOSPADM

## 2024-08-27 RX ORDER — NALOXONE HCL 0.4 MG/ML
0.2 VIAL (ML) INJECTION AS NEEDED
Status: DISCONTINUED | OUTPATIENT
Start: 2024-08-27 | End: 2024-08-27 | Stop reason: HOSPADM

## 2024-08-27 RX ORDER — ROCURONIUM BROMIDE 10 MG/ML
INJECTION, SOLUTION INTRAVENOUS AS NEEDED
Status: DISCONTINUED | OUTPATIENT
Start: 2024-08-27 | End: 2024-08-27 | Stop reason: SURG

## 2024-08-27 RX ORDER — MIDAZOLAM HYDROCHLORIDE 1 MG/ML
1 INJECTION INTRAMUSCULAR; INTRAVENOUS
Status: DISCONTINUED | OUTPATIENT
Start: 2024-08-27 | End: 2024-08-27 | Stop reason: HOSPADM

## 2024-08-27 RX ORDER — HYDROMORPHONE HYDROCHLORIDE 1 MG/ML
0.5 INJECTION, SOLUTION INTRAMUSCULAR; INTRAVENOUS; SUBCUTANEOUS
Status: DISCONTINUED | OUTPATIENT
Start: 2024-08-27 | End: 2024-08-27 | Stop reason: HOSPADM

## 2024-08-27 RX ORDER — LABETALOL HYDROCHLORIDE 5 MG/ML
5 INJECTION, SOLUTION INTRAVENOUS
Status: DISCONTINUED | OUTPATIENT
Start: 2024-08-27 | End: 2024-08-27 | Stop reason: HOSPADM

## 2024-08-27 RX ORDER — HYDROCODONE BITARTRATE AND ACETAMINOPHEN 5; 325 MG/1; MG/1
1 TABLET ORAL EVERY 4 HOURS PRN
Status: DISCONTINUED | OUTPATIENT
Start: 2024-08-27 | End: 2024-08-28

## 2024-08-27 RX ORDER — FLUMAZENIL 0.1 MG/ML
0.2 INJECTION INTRAVENOUS AS NEEDED
Status: DISCONTINUED | OUTPATIENT
Start: 2024-08-27 | End: 2024-08-27 | Stop reason: HOSPADM

## 2024-08-27 RX ORDER — DEXAMETHASONE SODIUM PHOSPHATE 4 MG/ML
INJECTION, SOLUTION INTRA-ARTICULAR; INTRALESIONAL; INTRAMUSCULAR; INTRAVENOUS; SOFT TISSUE AS NEEDED
Status: DISCONTINUED | OUTPATIENT
Start: 2024-08-27 | End: 2024-08-27 | Stop reason: SURG

## 2024-08-27 RX ORDER — DEXTROSE MONOHYDRATE 25 G/50ML
12.5 INJECTION, SOLUTION INTRAVENOUS AS NEEDED
Status: ACTIVE | OUTPATIENT
Start: 2024-08-27 | End: 2024-08-28

## 2024-08-27 RX ORDER — HYDRALAZINE HYDROCHLORIDE 20 MG/ML
5 INJECTION INTRAMUSCULAR; INTRAVENOUS
Status: DISCONTINUED | OUTPATIENT
Start: 2024-08-27 | End: 2024-08-27 | Stop reason: HOSPADM

## 2024-08-27 RX ORDER — ONDANSETRON 2 MG/ML
INJECTION INTRAMUSCULAR; INTRAVENOUS AS NEEDED
Status: DISCONTINUED | OUTPATIENT
Start: 2024-08-27 | End: 2024-08-27 | Stop reason: SURG

## 2024-08-27 RX ADMIN — LEVETIRACETAM 500 MG: 500 TABLET, FILM COATED ORAL at 08:06

## 2024-08-27 RX ADMIN — ONDANSETRON 4 MG: 2 INJECTION, SOLUTION INTRAMUSCULAR; INTRAVENOUS at 17:23

## 2024-08-27 RX ADMIN — EPHEDRINE SULFATE 10 MG: 50 INJECTION INTRAVENOUS at 15:23

## 2024-08-27 RX ADMIN — SUGAMMADEX 300 MG: 100 INJECTION, SOLUTION INTRAVENOUS at 16:12

## 2024-08-27 RX ADMIN — FENTANYL CITRATE 50 MCG: 50 INJECTION, SOLUTION INTRAMUSCULAR; INTRAVENOUS at 17:20

## 2024-08-27 RX ADMIN — HYDROMORPHONE HYDROCHLORIDE 0.5 MG: 1 INJECTION, SOLUTION INTRAMUSCULAR; INTRAVENOUS; SUBCUTANEOUS at 16:36

## 2024-08-27 RX ADMIN — ROCURONIUM BROMIDE 50 MG: 10 INJECTION, SOLUTION INTRAVENOUS at 14:41

## 2024-08-27 RX ADMIN — ATORVASTATIN CALCIUM 10 MG: 20 TABLET, FILM COATED ORAL at 08:06

## 2024-08-27 RX ADMIN — CEFOXITIN SODIUM 2000 MG: 2 POWDER, FOR SOLUTION INTRAVENOUS at 14:29

## 2024-08-27 RX ADMIN — AMLODIPINE BESYLATE 5 MG: 5 TABLET ORAL at 08:06

## 2024-08-27 RX ADMIN — Medication 50 MG: at 14:40

## 2024-08-27 RX ADMIN — AMLODIPINE BESYLATE 5 MG: 5 TABLET ORAL at 21:57

## 2024-08-27 RX ADMIN — SENNOSIDES AND DOCUSATE SODIUM 1 TABLET: 50; 8.6 TABLET ORAL at 21:55

## 2024-08-27 RX ADMIN — DROPERIDOL 0.62 MG: 2.5 INJECTION, SOLUTION INTRAMUSCULAR; INTRAVENOUS at 17:24

## 2024-08-27 RX ADMIN — METHOCARBAMOL TABLETS 750 MG: 500 TABLET, COATED ORAL at 02:56

## 2024-08-27 RX ADMIN — HYDROMORPHONE HYDROCHLORIDE 0.5 MG: 1 INJECTION, SOLUTION INTRAMUSCULAR; INTRAVENOUS; SUBCUTANEOUS at 21:55

## 2024-08-27 RX ADMIN — EPHEDRINE SULFATE 5 MG: 50 INJECTION INTRAVENOUS at 15:00

## 2024-08-27 RX ADMIN — HYDROMORPHONE HYDROCHLORIDE 0.5 MG: 1 INJECTION, SOLUTION INTRAMUSCULAR; INTRAVENOUS; SUBCUTANEOUS at 16:52

## 2024-08-27 RX ADMIN — PROPOFOL 150 MG: 10 INJECTION, EMULSION INTRAVENOUS at 14:40

## 2024-08-27 RX ADMIN — HYDROMORPHONE HYDROCHLORIDE 0.5 MG: 1 INJECTION, SOLUTION INTRAMUSCULAR; INTRAVENOUS; SUBCUTANEOUS at 17:27

## 2024-08-27 RX ADMIN — DOCUSATE SODIUM 100 MG: 100 CAPSULE, LIQUID FILLED ORAL at 08:06

## 2024-08-27 RX ADMIN — ROCURONIUM BROMIDE 10 MG: 10 INJECTION, SOLUTION INTRAVENOUS at 15:42

## 2024-08-27 RX ADMIN — HYDROCODONE BITARTRATE AND ACETAMINOPHEN 1 TABLET: 5; 325 TABLET ORAL at 07:49

## 2024-08-27 RX ADMIN — FENTANYL CITRATE 50 MCG: 50 INJECTION, SOLUTION INTRAMUSCULAR; INTRAVENOUS at 16:14

## 2024-08-27 RX ADMIN — SODIUM CHLORIDE, POTASSIUM CHLORIDE, SODIUM LACTATE AND CALCIUM CHLORIDE: 600; 310; 30; 20 INJECTION, SOLUTION INTRAVENOUS at 14:33

## 2024-08-27 RX ADMIN — METHOCARBAMOL TABLETS 750 MG: 500 TABLET, COATED ORAL at 10:12

## 2024-08-27 RX ADMIN — DEXAMETHASONE SODIUM PHOSPHATE 8 MG: 4 INJECTION, SOLUTION INTRAMUSCULAR; INTRAVENOUS at 14:42

## 2024-08-27 RX ADMIN — LIDOCAINE HYDROCHLORIDE 80 MG: 20 INJECTION, SOLUTION INFILTRATION; PERINEURAL at 14:40

## 2024-08-27 RX ADMIN — LEVETIRACETAM 500 MG: 500 TABLET, FILM COATED ORAL at 21:57

## 2024-08-27 RX ADMIN — METHYLPREDNISOLONE 4 MG: 4 TABLET ORAL at 07:49

## 2024-08-27 RX ADMIN — ONDANSETRON 4 MG: 2 INJECTION INTRAMUSCULAR; INTRAVENOUS at 14:42

## 2024-08-27 RX ADMIN — ROCURONIUM BROMIDE 10 MG: 10 INJECTION, SOLUTION INTRAVENOUS at 15:58

## 2024-08-27 RX ADMIN — FAMOTIDINE 20 MG: 20 TABLET, FILM COATED ORAL at 07:49

## 2024-08-27 RX ADMIN — METHYLPREDNISOLONE 4 MG: 4 TABLET ORAL at 21:56

## 2024-08-27 NOTE — PROGRESS NOTES
[facility] NEUROSURGERY INTRACRANIAL PROGRESS NOTE    PATIENT IDENTIFICATION:   Name:  Ely Sims      MRN:  [id number]     64 y.o.  female               Cc: POD # 6 s/p posterior fossa craniectomy for tumor removal      Subjective     Interval History: No significant overnight events.  Patient denies headache, nausea, vomiting.        Objective     Vital signs in last 24 hours:  Temp:  [98.2 °F (36.8 °C)-99 °F (37.2 °C)] 98.2 °F (36.8 °C)  Heart Rate:  [71-77] 71  Resp:  [18-20] 18  BP: (119-158)/(63-93) 158/79      Intake/Output this shift:  I/O this shift:  In: 840 [P.O.:840]  Out: -       Intake/Output last 3 shifts:  I/O last 3 completed shifts:  In: 1840 [P.O.:1840]  Out: -     LABS:  Results from last 7 days   Lab Units 08/25/24  0439 08/24/24  0528 08/23/24  0318   WBC 10*3/mm3 5.87 8.21 8.57   HEMOGLOBIN g/dL 11.6* 11.9* 10.5*   HEMATOCRIT % 34.0 34.9 30.9*   PLATELETS 10*3/mm3 300 312 269     Results from last 7 days   Lab Units 08/25/24  0439 08/24/24  0528 08/23/24  1325 08/23/24  0318   SODIUM mmol/L 140 140  --  139   POTASSIUM mmol/L 4.0 4.1 4.0 3.5   CHLORIDE mmol/L 103 104  --  109*   CO2 mmol/L 29.0 25.0  --  23.0   BUN mg/dL 10 12  --  11   CREATININE mg/dL 0.55* 0.53*  --  0.58   CALCIUM mg/dL 8.8 8.7  --  7.9*   GLUCOSE mg/dL 103* 120*  --  145*     Tissue Pathology 8/21- Metastatic poorly differentiated adenocarcinoma    IMAGING STUDIES:  MRI Cervical & Thoracic pending    I personally viewed the patient's chart, it was also reviewed by and discussed with Dr Soren Sherwood reviewed/changed: Yes  Norvasc 5 mg p.o. twice daily  Lipitor 10 mg p.o. daily  Pepcid 20 mg p.o. twice daily  Keppra 500 mg p.o. twice daily  Medrol Dosepak  Hydrocodone 5 mg 1 p.o. every 4 hours as needed  Labetalol 20 mg IV every 2 hours as needed for SBP>150  Robaxin-750 milligrams p.o. every 6 hours as needed         Physical Exam:    General:  Awake, alert & oriented x 3.  Speech clear with no aphasia  HEENT: Right  "occipital craniotomy incision is well-appearing, well-approximated, no surrounding erythema, swelling or drainage.  Neck:  Supple  CN III, IV, VI:  EOM's intact, PERRL.  CN VII:  Facial movements are symmetric, no weakness  Motor:  Normal muscle strength, bulk and tone in bilateral upper and lower extremities.  No fasciculations, rigidity, spasticity, or abnormal movements  Station & Gait:  Up ad kacy in room  Coordination:  Finger to nose intact bilaterally.         Assessment & Plan     ASSESSMENT:      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    Colonic mass    Anemia    Hyperglycemia    Hypertension     POD # 6 s/p posterior fossa craniectomy for tumor removal.  Brain tissue pathology does show poorly differentiated metastatic colorectal adenocarcinoma.  Patient is feeling very well today with no complaints of headache, nausea, vomiting.  General surgery following, patient will go for diverting colostomy later today.  MRI of the cervical and thoracic spine have been ordered.  Patient is being followed by medical and radiation oncology.    PLAN:   -MRI cervical and thoracic spine  -Continue Medrol Dosepak  -Keep SBP <150    I discussed the patient's findings and my recommendations with patient, family, and Dr Doty    During patient visit, I utilized appropriate personal protective equipment including  gloves.  Appropriate PPE was worn during the entire visit.  Hand hygiene was completed before and after.      LOS: 8 days       Edie Sierra, APRN  8/27/2024  10:30 EDT    \"Dictated utilizing Dragon dictation\".      "

## 2024-08-27 NOTE — NURSING NOTE
08/27/24 0950   Stoma Site Marking   Procedure Marking For colostomy   Site Marked LLQ: left lower quadrant   Patient Assessment Screen rectus muscle identified;marked within patient's visual field;bony prominences avoided;waistband avoided;creases/scars avoided   How Was Patient Marked? surgical marker   Stoma Marking Comments CWOCN- Marked patient for colostomy. OR today. Provided basic introduction to ostomy care- ADLs, schedule for pouch change, emptying, closed end appliance, supplies, role of the ostomy nurse, role of home health. Patient appreciative and positive about the colostomy. Significant other in room listening as well. Will see patient tomorrow.   Patient Position During Marking sitting;standing

## 2024-08-27 NOTE — PROGRESS NOTES
[facility] INPATIENT PROGRESS NOTE    Length of Stay:  8 days    CHIEF COMPLAINT/REASON FOR VISIT:  Metastatic colon cancer     SUBJECTIVE:  Denies any new issues today. Has been NPO for surgery.     ROS:  14 systems reviewed with pertinent positives and negatives in the HPI. Reviewed today.    OBJECTIVE:  Vitals:    08/27/24 0005 08/27/24 0405 08/27/24 0715 08/27/24 1130   BP: 140/93 149/86 158/79 146/83   BP Location: Right arm Right arm Right arm Right arm   Patient Position: Lying Lying Lying Lying   Pulse: 77 71  76   Resp: 18 18 18 18   Temp: 98.8 °F (37.1 °C) 99 °F (37.2 °C) 98.2 °F (36.8 °C) 97.7 °F (36.5 °C)   TempSrc: Oral Oral  Oral   SpO2: 95% 97% 94%    Weight:       Height:             PHYSICAL EXAMINATION:   General:  No acute distress, awake, alert and oriented  Skin:  Warm and dry, no visible rash  HEENT:  Normocephalic/atraumatic.    Chest:  Normal respiratory effort  Extremities:  No visible clubbing, cyanosis, or edema  Neuro/psych:  Grossly nonfocal.  Normal mood and affect.       DIAGNOSTIC DATA:  Results Review:     I reviewed the patient's new clinical results.    Results from last 7 days   Lab Units 08/27/24  1012   WBC 10*3/mm3 11.49*   HEMOGLOBIN g/dL 12.8   HEMATOCRIT % 38.1   PLATELETS 10*3/mm3 375     Lab Results   Component Value Date    NEUTROABS 8.20 (H) 08/27/2024     Results from last 7 days   Lab Units 08/25/24  0439   SODIUM mmol/L 140   POTASSIUM mmol/L 4.0   CHLORIDE mmol/L 103   CO2 mmol/L 29.0   BUN mg/dL 10   CREATININE mg/dL 0.55*   GLUCOSE mg/dL 103*   CALCIUM mg/dL 8.8     Results from last 7 days   Lab Units 08/22/24  0401 08/21/24  0512   INR  0.99 1.06   APTT seconds 24.5  --      Results from last 7 days   Lab Units 08/23/24  0318   MAGNESIUM mg/dL 2.5*         IMAGING:    None reviewed today    ASSESSMENT:    This is a 64 y.o. female with:     *Metastatic colon cancer  The patient presented on 8/19/2022 initially to urgent care with headache, nausea,  vomiting, diarrhea.  She was advised to go to the ED from urgent care.  CT head 8/19/2024 showed some areas of increased attenuation over the right cerebral hemisphere and left temporal lobe concerning for metastatic disease with edema with some mass effect upon the fourth ventricle.  MRI brain 8/20/2024 with a lesion at the right cerebellar hemisphere measuring 2.2 cm, lesion within the left temporal lobe measuring 9 mm, 1.0 cm lesion at the left cerebellar vermis, all with surrounding edema.  There was some mass effect upon the fourth ventricle with mild prominence of the lateral and third ventricles.  8/20/2024: CT chest abdomen and pelvis with no evidence of pulmonary metastatic disease.  There was some asymmetric wall thickening in the distal sigmoid colon with surrounding mesenteric soft tissue nodularity concerning for primary colon cancer.  A sclerotic lesion of the spinous process of T1 was said to be consistent with a bone island or other benign lesion.  8/21/2024: Posterior fossa craniectomy with gross total removal of a right cerebellar hemisphere metastasis Dr. Bull Doty.  Pathology consistent with metastatic poorly differentiated adenocarcinoma favoring colorectal origin.  MSI testing is pending.  8/25/2024: Bone scan with no obvious metastatic disease.  8/25/2024: Colonoscopy by Dr. Kaminski shows an infiltrative completely obstructing large mass found at the proximal rectum.  Pathology is pending.    CEA 2.10.  8/26/2024: MRI lumbar spine with no obvious metastatic disease in the lumbar spine.  However, there is a 3 mm enhancing lesion in the visualized lower thoracic spinal cord that could represent a spinal cord metastasis.  Cervical and thoracic spine MRI requested..     *Mild normocytic anemia  Hemoglobin 11.6        RECOMMENDATIONS/PLAN:   Will send brain tissue for NGS  Follow-up rectal biopsy result  Dr. Chester plans port placement and lap diverting colostomy today  Will need postoperative  radiation to the brain, radiation to spinal cord lesion prior to considering palliative chemotherapy.  MRI cervical and thoracic spine requested, not yet performed  Currently on a [place]  Outpatient PET scan to be considered when possible  Discussed the incurable nature of this malignancy with the patient, her , and her children on 8/26  We will follow  Discussed with Dr. Chester, the patient, her sister, and her children    Elieser Saunders MD

## 2024-08-27 NOTE — PROGRESS NOTES
CC: Metastatic colorectal cancer to brain and possible spine    S: No events overnight.  Patient continues to feel bloated.  Has been having liquid bowel movements.  Denies any nausea or vomiting.  Denies any abdominal pain  Gastrografin enema yesterday showed high-grade stenosis at the proximal rectum    O:   Vitals:    08/26/24 1930 08/27/24 0005 08/27/24 0405 08/27/24 0715   BP: 141/80 140/93 149/86 158/79   BP Location: Right arm Right arm Right arm Right arm   Patient Position: Lying Lying Lying Lying   Pulse: 76 77 71    Resp: 18 18 18 18   Temp: 98.2 °F (36.8 °C) 98.8 °F (37.1 °C) 99 °F (37.2 °C) 98.2 °F (36.8 °C)   TempSrc: Oral Oral Oral    SpO2: 98% 95% 97% 94%   Weight:       Height:          Alert, no acute distress  Breathing comfortable  Regular rate rhythm  Abdomen soft, nontender nondistended    Gastrografin enema showing tight stenosis at the proximal rectum/rectosigmoid junction.  There is passage of contrast into the proximal colon    No labs today    Assessment and plan    64-year-old female with metastatic colorectal cancer to brain and possible spine.  She has a high-grade stenosis at the proximal rectum/rectosigmoid junction.  Patient is mildly symptomatic.  We discussed with the patient about treatment options that would include port placement and watchful waiting for her colonic stenosis versus proceeding with diverting colostomy so she can go through chemotherapy and radiation without any major colonic complications.  She is interested in undergoing diverting colostomy so to prevent complications during her treatment and I agree.    -Will plan for Mediport placement and laparoscopic diverting colostomy  -Risk and benefits of procedure including bleeding, infection, pneumothorax, hemothorax, perforation, wound complications, colostomy complications discussed in detail with the patient that verbalized understanding and agreed with the plan.  -Continue n.p.o.  -Ostomy nurse for marking  consulted    Saturnino Chester MD, FACS  General, Minimally Invasive and Endoscopic Surgery  [facility]    [address], [address]  [place], [place], [address]  P: [phone]  F: [phone]

## 2024-08-27 NOTE — PROGRESS NOTES
Name: Ely Sims ADMIT: 2024   : 1960  PCP: Provider, No Known    MRN: [id number] LOS: 8 days   AGE/SEX: 64 y.o. female  ROOM: [facility] [place] 2[place]     Subjective   Subjective   No acute events overnight.  Patient did have barium enema done yesterday.  This morning, she has had a bowel movement.  Still having abdominal fullness and pain.  No chest pain or shortness of breath.    Objective   Objective     Vital Signs  Temp:  [98.2 °F (36.8 °C)-99 °F (37.2 °C)] 98.2 °F (36.8 °C)  Heart Rate:  [71-77] 71  Resp:  [18-20] 18  BP: (119-158)/(63-93) 158/79  SpO2:  [94 %-98 %] 94 %  on   ;   Device (Oxygen Therapy): room air  Body mass index is 24.85 kg/m².    Physical Exam  General: Alert, no acute distress.  Sitting up in bed.  Pleasant and conversive.  ENT: No conjunctival injection or scleral icterus. Moist mucous membranes.   Neuro: Eyes open and moving in all directions, strength normal in all extremities, no focal deficits.   Lungs: Clear to auscultation bilaterally. No wheeze or crackles. No distress.   Heart: RRR, no murmurs. No edema.  Abdomen: Soft, mild distention.  Normal bowel sounds.  Mild tenderness to palpation.  Ext: Warm and well-perfused. No edema.   Skin: No rashes or lesions. IV site without swelling or erythema.     Results Review     I reviewed the patient's new clinical results:  Results from last 7 days   Lab Units 24  1012 24  0439 24  0528 24  0318   WBC 10*3/mm3 11.49* 5.87 8.21 8.57   HEMOGLOBIN g/dL 12.8 11.6* 11.9* 10.5*   PLATELETS 10*3/mm3 375 300 312 269     Results from last 7 days   Lab Units 24  0439 24  0528 24  1325 24  0318 24  0401   SODIUM mmol/L 140 140  --  139 140   POTASSIUM mmol/L 4.0 4.1 4.0 3.5 3.7   CHLORIDE mmol/L 103 104  --  109* 107   CO2 mmol/L 29.0 25.0  --  23.0 25.1   BUN mg/dL 10 12  --  11 14   CREATININE mg/dL 0.55* 0.53*  --  0.58 0.72   GLUCOSE mg/dL 103* 120*  --  145* 131*   EGFR mL/min/1.73 102.5 103.4   --  101.2 93.5       Results from last 7 days   Lab Units 08/25/24  0439 08/24/24  0528 08/23/24  0318 08/22/24  0401   CALCIUM mg/dL 8.8 8.7 7.9* 8.2*   MAGNESIUM mg/dL  --   --  2.5* 2.2   PHOSPHORUS mg/dL  --   --  2.4*  --        Glucose   Date/Time Value Ref Range Status   08/25/2024 1656 161 (H) 70 - 130 mg/dL Final   08/25/2024 0755 86 70 - 130 mg/dL Final   08/24/2024 2237 130 70 - 130 mg/dL Final   08/24/2024 1710 150 (H) 70 - 130 mg/dL Final   08/24/2024 1122 145 (H) 70 - 130 mg/dL Final       FL Barium Enema Water Soluble Single Contrast    Result Date: 8/26/2024  TECHNIQUE, FINDINGS AND IMPRESSION: Scattered images were obtained of the abdomen. Prominent and dilated loops of air-filled bowel are present throughout the abdomen and pelvis.  A small caliber rectal tube was then inserted gently into the rectum and minimally insufflated under direct fluoroscopic guidance. Water-soluble contrast was then administered into the rectum via gravity while multiple fluoroscopic images were obtained in the AP, lateral oblique views.  There is a long segment of irregular high-grade stenosis involving the high rectum corresponding with the area of irregular wall thickening seen on recent CT. This measures over a segment of approximately 3.7 cm. The narrowest segment appears to measure approximately 5 to 6 mm in shortest diameter. Contrast passes easily through this segment into the more proximal large bowel.  DAP: 3477.4 ?Gy*m2  This report was finalized on 8/26/2024 5:32 PM by Dr. Calvin Blanco M.D on Workstation: [id number]      MRI Lumbar Spine With & Without Contrast    Result Date: 8/26/2024  1. 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 I recommend a dedicated cervical and thoracic spine MRI with and without contrast to determine  if there are any additional spinal cord metastasis and to more comprehensively assess the spinal cord that at the T12 level.  2. I see no osseous metastatic disease to the lumbar spine.  3. This patient has a levoscoliotic curvature of the lumbar spine with its apex at the L3-4 lumbar level and there is severe disc space narrowing and degenerative endplate changes with degenerative marrow sclerosis and degenerative marrow edema in the central to right side of the endplates at L3-4 along the inner margin of the levoscoliotic curve. There is lumbar spondylosis as described above. I see no canal or foraminal narrowing from T11 to L2 and at L2-3 I see mild canal and bilateral foraminal narrowing. At L3-4, there is mild narrowing of the right side of the canal, right lateral recess and there is eccentric spurring into the right neural foramen and to the far right laterally that moderately narrows the right neural foramen and abuts the undersurface of the exiting right L4 nerve root. There is mild left foraminal narrowing at L4-5 and L5-S1.  The results of this study were communicated to Dr. Cesar Iglesias by telephone on 08/26/2024 at 8:40 AM.  This report was finalized on 8/26/2024 10:48 AM by Dr. Gilbert Hassan M.D on Workstation: [id number]       I have personally reviewed all medications:  Scheduled Medications  amLODIPine, 5 mg, Oral, BID  atorvastatin, 10 mg, Oral, Daily  docusate sodium, 100 mg, Oral, Daily  famotidine, 20 mg, Oral, BID AC  levETIRAcetam, 500 mg, Oral, BID  methylPREDNISolone, 4 mg, Oral, Tonight  [START ON 8/28/2024] methylPREDNISolone, 4 mg, Oral, Before Breakfast  senna-docusate sodium, 1 tablet, Oral, Nightly    Infusions   Diet  NPO Diet NPO Type: Sips with Meds      Intake/Output Summary (Last 24 hours) at 8/27/2024 1041  Last data filed at 8/27/2024 0749  Gross per 24 hour   Intake 840 ml   Output --   Net 840 ml       Assessment/Plan     Active Hospital Problems    Diagnosis  POA     **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    Brain metastasis  Colon mass  -Initial presentation for neurologic symptoms, found to have evidence of brain metastatic disease  -2 Days Post-Op posterior fossa craniectomy for tumor removal  -Brain biopsy results revealed poorly differentiated adenocarcinoma  -Colonoscopy revealed obstructing colonic mass, pathology pending  -MRI lumbar spine with likely evidence of metastatic disease, MRI cervical and thoracic spine pending  -GI following  -General Surgery following, plan for Mediport placement and laparoscopic diverting colostomy  -Continue Medrol Dosepak and Keppra per neurosurgery  -Radiation oncology consulted  -Oncology following  -Remain NPO.  Surgery planned as above.    Leukocytosis  -WBC increased to 11K today  -Steroids likely contributing, no new infectious symptoms and has been afebrile  -Repeat CBC with morning labs    Hypertension  -Blood pressure slightly generous, but trends overall okay  -Continue amlodipine  -Continue to monitor, additional medication titrations as needed based on BP trends    Anemia  -Hgb normal today  -No signs of active bleeding  -Repeat CBC with morning labs, transfuse for Hgb less than 7    SCDs for DVT prophylaxis.  Full code.  Discussed with patient, family, and nursing staff.  Anticipate discharge MARINA Koroma MD  [facility]  08/27/24  10:41 EDT

## 2024-08-27 NOTE — ANESTHESIA PREPROCEDURE EVALUATION
Anesthesia Evaluation     Patient summary reviewed and Nursing notes reviewed   no history of anesthetic complications:   NPO Solid Status: > 8 hours  NPO Liquid Status: > 2 hours           Airway   Mallampati: II  TM distance: >3 FB  Neck ROM: full  No difficulty expected  Dental - normal exam     Pulmonary    (-) asthma, sleep apnea, rhonchi, decreased breath sounds, wheezes, not a smoker  Cardiovascular   Exercise tolerance: good (4-7 METS)    Rhythm: regular  Rate: normal    (+) hypertension  (-) CAD, dysrhythmias, angina, FOOTE, murmur      Neuro/Psych  (-) seizures, CVA  GI/Hepatic/Renal/Endo    (-) liver disease, no renal disease, diabetes, no thyroid disorder    Musculoskeletal     Abdominal     Abdomen: soft.   Substance History      OB/GYN          Other      history of cancer (Brain metastases possible colon primary) active    ROS/Med Hx Other: Brain METS of unknown origin, S/P craniotomy.    Colon mass for colostomy.              Anesthesia Plan    ASA 3     general     intravenous induction     Anesthetic plan, risks, benefits, and alternatives have been provided, discussed and informed consent has been obtained with: patient.    CODE STATUS:    Code Status (Patient has no pulse and is not breathing): CPR (Attempt to Resuscitate)  Medical Interventions (Patient has pulse or is breathing): Full Support

## 2024-08-27 NOTE — OP NOTE
PREOPERATIVE DIAGNOSIS: Colonic mass [K63.89]  POSTOPERATIVE DIAGNOSIS: Same    PROCEDURE:   Laparoscopic diverting loop sigmoid colostomy  left subclavian vein MediPort placement.  Radiological interpretation.    SURGEON/STAFF: CHRIS Chester  Assistant: Dr. De Anda that was in charge of retraction, exposure, holding camera, closing wounds and placing dressings that was essential for success of the case  ANESTHESIA: General.    BLOOD LOSS: Minimal  COUNTS: Needle and sponge count correct.  SPECIMENS: None  FINDINGS: Redundant sigmoid colon, unable to place port on the right subclavian vein so he was placed on the  left side    INDICATIONS FOR OPERATION: Ely Sims is 64 y.o. year old female who was recently diagnosed with colorectal cancer with metastasis to the brain and possible spine.  She has almost completely obstructing colorectal mass.  We discussed with the patient about treatment options that would include Mediport placement for chemotherapy with possible colostomy to prevent obstruction.  She is interested in undergoing both.  Risk and benefits of the procedure including bleeding, infection, wound complications, intra-abdominal injuries, poor malfunction, discussed in detail with the patient that verbalized understanding and agreed with the plan    OPERATION: The patient was brought to the operating room in stable condition. Preoperative antibiotics were given and sequential compression devices were applied. At this time, the patient was laid supine on the operating room table.  General anesthesia was induced.    The patient's chest was prepped and draped in the usual sterile fashion.    With 0.5% Marcaine with epinephrine in the right chest area was infiltrated. With the access needle I attempted to access the right subclavian vein but this was not possible so I accessed the left subclavian vein without any problem.  Guidewire was inserted. Position of the guidewire was confirmed by fluoroscopy guidance and it  was found to be on the right heart. At this moment the needle was removed. Subcutaneous pocket was then created below the access needle site. After half percent Marcaine with epinephrine was infiltrated and incision was performed with scalpel. Dissection was carried onto the subcutaneous tissue. With Bovie electrocautery the subcutaneous pocket was created. The dilator was placed through the guidewire with Seldinger technique. This was performed under fluoroscopy. Guidewire was removed.  The tubing was placed subcutaneously from the subcutaneous pocket to the access site. The tubing was placed through the dilator. The dilator was removed. Fluoroscopy was performed to position the tubing at the SVC atrial junction. The tubing was then connected to the subcutaneous port. The port was placed in the subcutaneous pocket.  The port was sutured in place with 2-0 Prolene x 2.  The port was flushed with heparinized saline was performed. There was no resistance to the flow. Confirmation of the final position of the port was performed by fluoroscopy and it was found to be adequate.  The wound was then closed in 2 layers with 3-0 Vicryl and 4-0 Monocryl.. The wound was then covered with Steri-Strips.This was covered with a 4 x 4 dressing and Tegaderm.   I think continue with the laparoscopic colostomy.  The abdominal wall was then prepped and draped in usual sterile fashion.  I accessed the abdomen in the left upper quadrant with Optiview technique and insertion five 5 mm trocar, pneumoperitoneum was insufflated.  Upon exploration of the abdominal cavity there was no evidence of injury from trocar placement.  There was no evidence of metastatic disease.  I then placed another 5 mm trocar in the right midabdomen and another in the right lower abdomen.  The sigmoid colon was redundant.  I took the attachments from the sigmoid colon to the total fascia with harmonic.  Sigmoid colon was mobilized lateral to medial.  I then  performed a circular incision in the left lower quadrant and area that was prior marked by ostomy nurses.  The dissection was carried down to the abdominal wall fascia.  In a cruciate fashion the anterior rectus sheath was opened and anterior rectus muscle was split bluntly.  Posterior rectus sheath was opened.  The sigmoid colon was brought out through the abdominal wall opening.  Retrocolonic window was created and ostomy keri was placed.  I did continue laparoscopic exploration there was no evidence of injury from the procedure.  I then removed the trocars under direct camera vision.  All the laparoscopic incisions were closed with 4-0 Monocryl and surgical glue.  The ostomy was then matured in a Betsy fashion with interrupted 2-0 Vicryl.  Ostomy appliance were placed.  Instrument sponge count were correct.  The patient was awakened in the operating room and she was taken to the recovery area in stable condition.        Saturnino Chester MD  General, Minimally Invasive and Endoscopic Surgery  [facility]    [address], [address]  [place], [place], [address]  P: [phone]  F: [phone]

## 2024-08-27 NOTE — PLAN OF CARE
Goal Outcome Evaluation:  Plan of Care Reviewed With: patient        Progress: no change  Outcome Evaluation: Patient arrived back to floor from surgery in stable condition.  Patient appears to be resting comfortable but rate her pain 10/10 and nausea.  No meds given at this time.  VSS.  Family at bedside.  Will continue to monitor.

## 2024-08-27 NOTE — ANESTHESIA POSTPROCEDURE EVALUATION
"Patient: Ely Sims    Procedure Summary       Date: 08/27/24 Room / Location: [facility] OR  / [facility] MAIN OR    Anesthesia Start: 1433 Anesthesia Stop: 1639    Procedures:       COLOSTOMY LAPAROSCOPIC (Abdomen)      INSERTION VENOUS ACCESS DEVICE (Right) Diagnosis:       Colonic mass      (Colonic mass [K63.89])    Surgeons: Saturnino Chester MD Provider: Elieser Roman MD    Anesthesia Type: general ASA Status: 3            Anesthesia Type: general    Vitals  Vitals Value Taken Time   /71 08/27/24 1730   Temp 37 °C (98.6 °F) 08/27/24 1630   Pulse 74 08/27/24 1736   Resp 16 08/27/24 1730   SpO2 88 % 08/27/24 1736   Vitals shown include unfiled device data.        Post Anesthesia Care and Evaluation    Patient location during evaluation: bedside  Pain management: adequate    Airway patency: patent  Anesthetic complications: No anesthetic complications    Cardiovascular status: acceptable  Respiratory status: acceptable  Hydration status: acceptable    Comments: */71   Pulse 73   Temp 37 °C (98.6 °F) (Oral)   Resp 16   Ht 170.2 cm (67.01\")   Wt 72 kg (158 lb 11.7 oz) Comment: Simultaneous filing. User may be unaware of other data.  SpO2 94%   BMI 24.85 kg/m²       "

## 2024-08-27 NOTE — PLAN OF CARE
Goal Outcome Evaluation:  Plan of Care Reviewed With: patient        Progress: no change     Patient slept most of the shift, relatives at her bedside were carried along with the plan of care. Vital sign stable. She has no complain made this moment

## 2024-08-27 NOTE — ANESTHESIA PROCEDURE NOTES
Airway  Urgency: elective    Date/Time: 8/27/2024 2:43 PM  Airway not difficult    General Information and Staff    Patient location during procedure: OR  Anesthesiologist: Ania Reeves MD  CRNA/CAA: Ajay Figueroa CRNA    Indications and Patient Condition  Indications for airway management: airway protection    Preoxygenated: yes  MILS not maintained throughout  Mask difficulty assessment: 1 - vent by mask    Final Airway Details  Final airway type: endotracheal airway      Successful airway: ETT  Cuffed: yes   Successful intubation technique: direct laryngoscopy  Endotracheal tube insertion site: oral  Blade: Kayce  Blade size: 3  ETT size (mm): 7.0  Cormack-Lehane Classification: grade I - full view of glottis  Placement verified by: chest auscultation and capnometry   Cuff volume (mL): 10  Measured from: teeth  ETT/EBT  to teeth (cm): 22  Number of attempts at approach: 1  Assessment: lips, teeth, and gum same as pre-op and atraumatic intubation

## 2024-08-28 ENCOUNTER — APPOINTMENT (OUTPATIENT)
Dept: MRI IMAGING | Facility: HOSPITAL | Age: 64
DRG: 025 | End: 2024-08-28
Payer: COMMERCIAL

## 2024-08-28 LAB
ANION GAP SERPL CALCULATED.3IONS-SCNC: 15.1 MMOL/L (ref 5–15)
BUN SERPL-MCNC: 9 MG/DL (ref 8–23)
BUN/CREAT SERPL: 12.3 (ref 7–25)
CALCIUM SPEC-SCNC: 9.2 MG/DL (ref 8.6–10.5)
CHLORIDE SERPL-SCNC: 96 MMOL/L (ref 98–107)
CO2 SERPL-SCNC: 24.9 MMOL/L (ref 22–29)
CREAT SERPL-MCNC: 0.73 MG/DL (ref 0.57–1)
DEPRECATED RDW RBC AUTO: 43.9 FL (ref 37–54)
EGFRCR SERPLBLD CKD-EPI 2021: 92 ML/MIN/1.73
ERYTHROCYTE [DISTWIDTH] IN BLOOD BY AUTOMATED COUNT: 12.2 % (ref 12.3–15.4)
GLUCOSE SERPL-MCNC: 153 MG/DL (ref 65–99)
HCT VFR BLD AUTO: 38.4 % (ref 34–46.6)
HGB BLD-MCNC: 13 G/DL (ref 12–15.9)
MAGNESIUM SERPL-MCNC: 2 MG/DL (ref 1.6–2.4)
MCH RBC QN AUTO: 33.2 PG (ref 26.6–33)
MCHC RBC AUTO-ENTMCNC: 33.9 G/DL (ref 31.5–35.7)
MCV RBC AUTO: 98 FL (ref 79–97)
PLATELET # BLD AUTO: 395 10*3/MM3 (ref 140–450)
PMV BLD AUTO: 9.2 FL (ref 6–12)
POTASSIUM SERPL-SCNC: 3.6 MMOL/L (ref 3.5–5.2)
POTASSIUM SERPL-SCNC: 4.1 MMOL/L (ref 3.5–5.2)
RBC # BLD AUTO: 3.92 10*6/MM3 (ref 3.77–5.28)
SODIUM SERPL-SCNC: 136 MMOL/L (ref 136–145)
WBC NRBC COR # BLD AUTO: 11.19 10*3/MM3 (ref 3.4–10.8)

## 2024-08-28 PROCEDURE — 84132 ASSAY OF SERUM POTASSIUM: CPT | Performed by: STUDENT IN AN ORGANIZED HEALTH CARE EDUCATION/TRAINING PROGRAM

## 2024-08-28 PROCEDURE — 0 GADOBENATE DIMEGLUMINE 529 MG/ML SOLUTION: Performed by: STUDENT IN AN ORGANIZED HEALTH CARE EDUCATION/TRAINING PROGRAM

## 2024-08-28 PROCEDURE — 25010000002 HYDROMORPHONE PER 4 MG: Performed by: SURGERY

## 2024-08-28 PROCEDURE — 99232 SBSQ HOSP IP/OBS MODERATE 35: CPT | Performed by: INTERNAL MEDICINE

## 2024-08-28 PROCEDURE — 94799 UNLISTED PULMONARY SVC/PX: CPT

## 2024-08-28 PROCEDURE — 80048 BASIC METABOLIC PNL TOTAL CA: CPT | Performed by: SURGERY

## 2024-08-28 PROCEDURE — 83735 ASSAY OF MAGNESIUM: CPT | Performed by: STUDENT IN AN ORGANIZED HEALTH CARE EDUCATION/TRAINING PROGRAM

## 2024-08-28 PROCEDURE — A9577 INJ MULTIHANCE: HCPCS | Performed by: STUDENT IN AN ORGANIZED HEALTH CARE EDUCATION/TRAINING PROGRAM

## 2024-08-28 PROCEDURE — 99024 POSTOP FOLLOW-UP VISIT: CPT | Performed by: NURSE PRACTITIONER

## 2024-08-28 PROCEDURE — 63710000001 METHYLPREDNISOLONE 4 MG TABLET THERAPY PACK 21 EACH DISP PACK: Performed by: SURGERY

## 2024-08-28 PROCEDURE — 85027 COMPLETE CBC AUTOMATED: CPT | Performed by: SURGERY

## 2024-08-28 PROCEDURE — 97530 THERAPEUTIC ACTIVITIES: CPT

## 2024-08-28 PROCEDURE — 72156 MRI NECK SPINE W/O & W/DYE: CPT

## 2024-08-28 PROCEDURE — 99024 POSTOP FOLLOW-UP VISIT: CPT

## 2024-08-28 PROCEDURE — 25010000002 ENOXAPARIN PER 10 MG

## 2024-08-28 PROCEDURE — 25010000002 ONDANSETRON PER 1 MG: Performed by: SURGERY

## 2024-08-28 PROCEDURE — 72157 MRI CHEST SPINE W/O & W/DYE: CPT

## 2024-08-28 RX ORDER — ENOXAPARIN SODIUM 100 MG/ML
40 INJECTION SUBCUTANEOUS NIGHTLY
Status: DISCONTINUED | OUTPATIENT
Start: 2024-08-28 | End: 2024-08-30 | Stop reason: HOSPADM

## 2024-08-28 RX ORDER — HYDROCODONE BITARTRATE AND ACETAMINOPHEN 5; 325 MG/1; MG/1
1 TABLET ORAL EVERY 4 HOURS PRN
Status: DISCONTINUED | OUTPATIENT
Start: 2024-08-28 | End: 2024-08-29

## 2024-08-28 RX ORDER — POTASSIUM CHLORIDE 750 MG/1
40 TABLET, FILM COATED, EXTENDED RELEASE ORAL EVERY 4 HOURS
Status: COMPLETED | OUTPATIENT
Start: 2024-08-28 | End: 2024-08-28

## 2024-08-28 RX ADMIN — LEVETIRACETAM 500 MG: 500 TABLET, FILM COATED ORAL at 08:11

## 2024-08-28 RX ADMIN — AMLODIPINE BESYLATE 5 MG: 5 TABLET ORAL at 21:23

## 2024-08-28 RX ADMIN — HYDROMORPHONE HYDROCHLORIDE 0.5 MG: 1 INJECTION, SOLUTION INTRAMUSCULAR; INTRAVENOUS; SUBCUTANEOUS at 21:29

## 2024-08-28 RX ADMIN — FAMOTIDINE 20 MG: 20 TABLET, FILM COATED ORAL at 17:13

## 2024-08-28 RX ADMIN — HYDROMORPHONE HYDROCHLORIDE 0.5 MG: 1 INJECTION, SOLUTION INTRAMUSCULAR; INTRAVENOUS; SUBCUTANEOUS at 14:10

## 2024-08-28 RX ADMIN — AMLODIPINE BESYLATE 5 MG: 5 TABLET ORAL at 08:11

## 2024-08-28 RX ADMIN — HYDROMORPHONE HYDROCHLORIDE 0.5 MG: 1 INJECTION, SOLUTION INTRAMUSCULAR; INTRAVENOUS; SUBCUTANEOUS at 05:23

## 2024-08-28 RX ADMIN — ATORVASTATIN CALCIUM 10 MG: 20 TABLET, FILM COATED ORAL at 08:11

## 2024-08-28 RX ADMIN — METHOCARBAMOL TABLETS 750 MG: 500 TABLET, COATED ORAL at 08:18

## 2024-08-28 RX ADMIN — METHYLPREDNISOLONE 4 MG: 4 TABLET ORAL at 08:11

## 2024-08-28 RX ADMIN — GADOBENATE DIMEGLUMINE 14 ML: 529 INJECTION, SOLUTION INTRAVENOUS at 10:13

## 2024-08-28 RX ADMIN — ENOXAPARIN SODIUM 40 MG: 100 INJECTION SUBCUTANEOUS at 21:28

## 2024-08-28 RX ADMIN — SENNOSIDES AND DOCUSATE SODIUM 1 TABLET: 50; 8.6 TABLET ORAL at 21:28

## 2024-08-28 RX ADMIN — LEVETIRACETAM 500 MG: 500 TABLET, FILM COATED ORAL at 21:23

## 2024-08-28 RX ADMIN — POTASSIUM CHLORIDE 40 MEQ: 750 TABLET, EXTENDED RELEASE ORAL at 08:11

## 2024-08-28 RX ADMIN — FAMOTIDINE 20 MG: 20 TABLET, FILM COATED ORAL at 08:11

## 2024-08-28 RX ADMIN — HYDROCODONE BITARTRATE AND ACETAMINOPHEN 1 TABLET: 5; 325 TABLET ORAL at 12:34

## 2024-08-28 RX ADMIN — POTASSIUM CHLORIDE 40 MEQ: 750 TABLET, EXTENDED RELEASE ORAL at 12:34

## 2024-08-28 RX ADMIN — HYDROCODONE BITARTRATE AND ACETAMINOPHEN 1 TABLET: 5; 325 TABLET ORAL at 17:13

## 2024-08-28 RX ADMIN — HYDROCODONE BITARTRATE AND ACETAMINOPHEN 1 TABLET: 5; 325 TABLET ORAL at 08:11

## 2024-08-28 RX ADMIN — DOCUSATE SODIUM 100 MG: 100 CAPSULE, LIQUID FILLED ORAL at 08:11

## 2024-08-28 RX ADMIN — ONDANSETRON 4 MG: 2 INJECTION INTRAMUSCULAR; INTRAVENOUS at 05:25

## 2024-08-28 NOTE — NURSING NOTE
"CWOCN- follow up for new colostomy. Patient POD 1 with colostomy. Stoma has bridge in place. Stoma is edematous, healthy appearing. Changed appliance for teaching. Patient observed and was attentive. I placed her in a 2 piece yellow flex flat Coloplast pouch. I showed patient how to place the wafer around the bridge. Also used a barrier ring for improved seal. Reviewed emptying. Patient having gas during pouching edu. There was 25 mL ss drainage in the pouch. Additional pouches in room. Will see patient tomorrow. Plan to change again based on discharge plan. Patient willing to be hands on. All of her questions answered. Patient remains positive about pouching and plan of care.     Ostomy education provided to the patient/family:    [x]Pouch changed   [x]Pt observed   []Pt participated    []CG participated   [x]Pouch emptied and cleaned   [x]Pt observed   []Pt participated   []CG participated   [x]Teaching packet/handouts provided/reviewed  [x]Return to ADLs  [x]Peristomal skin care  []Diet  [x]Use of closed end verses a drainable pouch  [x]Supplies at bedside  []Samples ordered       08/28/24 1446   Colostomy LLQ   No Placement date: If unknown, DO NOT use \"Add Comment\" note or Placement time: If unknown, DO NOT use \"Add Comment\" note found.   Location: LLQ   Stomal Appliance 2 piece;Intact;Changed;Drainable   Stoma Appearance round;rosebud appearance;swollen;moist;red;bridge in place;protruding above skin level   Peristomal Assessment Intact   Accessories/Skin Care cleansed with water;skin barrier ring   Stoma Function flatus;serosanguineous   Treatment Bag change       "

## 2024-08-28 NOTE — THERAPY TREATMENT NOTE
Patient Name: Ely Sims  : 1960    MRN: [id number]                              Today's Date: 2024       Admit Date: 2024    Visit Dx:     ICD-10-CM ICD-9-CM   1. 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 Acute intractable headache, unspecified headache type  R51.9 784.0   3. Elevated BP without diagnosis of hypertension  R03.0 796.2   4. 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 No pertinent past medical history.  Past Surgical History:   Procedure Laterality Date    COLONOSCOPY N/A 2024    Procedure: COLONOSCOPY to 20cm with cold biopsies and needle tattoo;  Surgeon: Shadi Kaminski MD;  Location: [facility] ENDOSCOPY;  Service: Gastroenterology;  Laterality: N/A;  pre: abnormal imaging  post: poor prep, obstructing colon mass at 20cm, hemorrhoids    COLOSTOMY N/A 2024    Procedure: COLOSTOMY LAPAROSCOPIC;  Surgeon: Saturnino Chester MD;  Location: [facility] OR;  Service: General;  Laterality: N/A;    CRANIOTOMY N/A 2024    Procedure: Posterior fossa craniotomy for tumor using stereotactic guidance;  Surgeon: Bull Doty MD;  Location: [facility] OR;  Service: Neurosurgery;  Laterality: N/A;    VENOUS ACCESS DEVICE (PORT) INSERTION Right 2024    Procedure: INSERTION VENOUS ACCESS DEVICE;  Surgeon: Saturnino Chester MD;  Location: [facility] OR;  Service: General;  Laterality: Right;      General Information       Row Name 24 1509          OT Time and Intention    Document Type therapy note (daily note)  -JR     Mode of Treatment individual therapy;occupational therapy  -JR       Row Name 24 1505          General Information    Patient Profile Reviewed yes  -JR     Existing Precautions/Restrictions fall  -JR     Barriers to Rehab none identified  -JR       Row Name  08/28/24 1509          Cognition    Orientation Status (Cognition) oriented x 4  -       Row Name 08/28/24 1509          Safety Issues, Functional Mobility    Comment, Safety Issues/Impairments (Mobility) gait belt and house shoes donned  -               User Key  (r) = Recorded By, (t) = Taken By, (c) = Cosigned By      Initials Name Provider Type    Tyshawn Tesfaye OT Occupational Therapist                     Mobility/ADL's       Row Name 08/28/24 1510          Bed Mobility    Bed Mobility bed mobility (all) activities  -     All Activities, [place] (Bed Mobility) independent  -       Row Name 08/28/24 1510          Transfers    Transfers sit-stand transfer;stand-sit transfer;bed-chair transfer  -     Comment, (Transfers) Independent  -       Row Name 08/28/24 1510          Sit-Stand Transfer    Sit-Stand [place] (Transfers) independent  -       Row Name 08/28/24 1510          Stand-Sit Transfer    Stand-Sit [place] (Transfers) independent  -       Row Name 08/28/24 1510          Functional Mobility    Functional Mobility- Ind. Level independent;standby assist  -     Patient was able to Ambulate yes  -               User Key  (r) = Recorded By, (t) = Taken By, (c) = Cosigned By      Initials Name Provider Type    Tyshawn Tesfaye, OT Occupational Therapist                   Obj/Interventions       Row Name 08/28/24 1511          Range of Motion Comprehensive    General Range of Motion bilateral upper extremity ROM WFL  -       Row Name 08/28/24 1511          Strength Comprehensive (MMT)    General Manual Muscle Testing (MMT) Assessment no strength deficits identified  -       Row Name 08/28/24 1511          Balance    Balance Assessment sitting static balance;sitting dynamic balance;sit to stand dynamic balance;standing static balance;standing dynamic balance  -     Dynamic Sitting Balance independent  -     Position, Sitting Balance unsupported  -     Sit to Stand Dynamic  Balance independent  -JR     Static Standing Balance independent  -JR     Dynamic Standing Balance independent  -JR     Position/Device Used, Standing Balance unsupported  -JR     Comment, Balance No LOB noted throughout ambulation with no AD  -JR               User Key  (r) = Recorded By, (t) = Taken By, (c) = Cosigned By      Initials Name Provider Type    JR Tyshawn Ornelas, YESENIA Occupational Therapist                   Goals/Plan    No documentation.                  Clinical Impression       Row Name 08/28/24 1512          Pain Assessment    Pretreatment Pain Rating 2/10  -JR     Posttreatment Pain Rating 2/10  -JR     Pain Location - abdomen  -JR     Pre/Posttreatment Pain Comment some pain post colostomy  -JR     Pain Intervention(s) Medication (See MAR)  -       Row Name 08/28/24 1512          Plan of Care Review    Progress improving  -     Outcome Evaluation Patient standing upon arrival.  Patient agreeable to OT services.  Patient ambulated [facility] nursing unit with [place].  Patient states she is at baseline and hopes to d/c tomorrow.  She is able to perform all her ADLs with [place].  D/C plans are to d/c to home with family support.  OT to sign off.  -       Row Name 08/28/24 1512          Therapy Assessment/Plan (OT)    Rehab Potential (OT) good, to achieve stated therapy goals  -     Criteria for Skilled Therapeutic Interventions Met (OT) meets criteria;yes  -     Therapy Frequency (OT) other (see comments)  OT to sign off  -       Row Name 08/28/24 1512          Therapy Plan Review/Discharge Plan (OT)    Anticipated Discharge Disposition (OT) home with assist  -       Row Name 08/28/24 1512          Vital Signs    O2 Delivery Pre Treatment room air  -JR     O2 Delivery Intra Treatment room air  -JR     Pre Patient Position Standing  -JR     Intra Patient Position Standing  -JR     Post Patient Position Standing  -JR       Row Name 08/28/24 1512          Positioning and Restraints     Pre-Treatment Position standing in room  -JR     Post Treatment Position other  standing  -JR     Other Position return to room independently  -JR               User Key  (r) = Recorded By, (t) = Taken By, (c) = Cosigned By      Initials Name Provider Type    Tyshawn Tesfaye OT Occupational Therapist                   Outcome Measures       Row Name 08/28/24 1515          How much help from another is currently needed...    Putting on and taking off regular lower body clothing? 4  -JR     Bathing (including washing, rinsing, and drying) 4  -JR     Toileting (which includes using toilet bed pan or urinal) 4  -JR     Putting on and taking off regular upper body clothing 4  -JR     Taking care of personal grooming (such as brushing teeth) 4  -JR     Eating meals 4  -JR     AM-PAC 6 Clicks Score (OT) 24  -JR       Row Name 08/28/24 0754          How much help from another person do you currently need...    Turning from your back to your side while in flat bed without using bedrails? 4  -MM     Moving from lying on back to sitting on the side of a flat bed without bedrails? 4  -MM     Moving to and from a bed to a chair (including a wheelchair)? 4  -MM     Standing up from a chair using your arms (e.g., wheelchair, bedside chair)? 4  -MM     Climbing 3-5 steps with a railing? 4  -MM     To walk in hospital room? 4  -MM     AM-PAC 6 Clicks Score (PT) 24  -MM     Highest Level of Mobility Goal 8 --> Walked 250 feet or more  -MM       Row Name 08/28/24 1515          Functional Assessment    Outcome Measure Options AM-PAC 6 Clicks Daily Activity (OT)  -JR               User Key  (r) = Recorded By, (t) = Taken By, (c) = Cosigned By      Initials Name Provider Type    Edie Olivera, RN Registered Nurse    Tyshawn Tesfaye OT Occupational Therapist                    Occupational Therapy Education       Title: PT OT SLP Therapies (In Progress)       Topic: Occupational Therapy (In Progress)       Point: ADL training (Done)        Description:   Instruct learner(s) on proper safety adaptation and remediation techniques during self care or transfers.   Instruct in proper use of assistive devices.                  Learning Progress Summary             Patient Acceptance, E, Bed IU by ANMOL at 8/23/2024 1339    Comment: role of OT, plan of care, ed fall risk and to be aware, take time on turns or during task with distractions   Family Acceptance, E, Bed IU by ANMOL at 8/23/2024 1339    Comment: role of OT, plan of care, ed fall risk and to be aware, take time on turns or during task with distractions                         Point: Home exercise program (Not Started)       Description:   Instruct learner(s) on appropriate technique for monitoring, assisting and/or progressing therapeutic exercises/activities.                  Learner Progress:  Not documented in this visit.              Point: Precautions (Not Started)       Description:   Instruct learner(s) on prescribed precautions during self-care and functional transfers.                  Learner Progress:  Not documented in this visit.              Point: Body mechanics (Not Started)       Description:   Instruct learner(s) on proper positioning and spine alignment during self-care, functional mobility activities and/or exercises.                  Learner Progress:  Not documented in this visit.                              User Key       Initials Effective Dates Name Provider Type Discipline    ANMOL 06/16/21 -  Ania Alvarenga OTR Occupational Therapist OT                  OT Recommendation and Plan  Therapy Frequency (OT): other (see comments) (OT to sign off)  Plan of Care Review  Progress: improving  Outcome Evaluation: Patient standing upon arrival.  Patient agreeable to OT services.  Patient ambulated [facility] nursing unit with [place].  Patient states she is at baseline and hopes to d/c tomorrow.  She is able to perform all her ADLs with [place].  D/C plans are to d/c to home with  family support.  OT to sign off.     Time Calculation:         Time Calculation- OT       Row Name 08/28/24 1516             Time Calculation- OT    OT Start Time 1405  -JR      OT Stop Time 1415  -JR      OT Time Calculation (min) 10 min  -JR      Total Timed Code Minutes- OT 10 minute(s)  -JR      OT Received On 08/28/24  -JR         Timed Charges    93961 - OT Therapeutic Activity Minutes 10  -JR         Total Minutes    Timed Charges Total Minutes 10  -JR       Total Minutes 10  -JR                User Key  (r) = Recorded By, (t) = Taken By, (c) = Cosigned By      Initials Name Provider Type    Tyshawn Tesfaye OT Occupational Therapist                  Therapy Charges for Today       Code Description Service Date Service Provider Modifiers Qty    32177116016 HC OT THERAPEUTIC ACT EA 15 MIN 8/28/2024 Tyshawn Ornelas OT GO 1                 Tyshawn Ornelas OT  8/28/2024

## 2024-08-28 NOTE — PROGRESS NOTES
[facility] NEUROSURGERY INTRACRANIAL PROGRESS NOTE    PATIENT IDENTIFICATION:   Name:  Eyl Sims      MRN:  [id number]     64 y.o.  female               Cc: POD # 7 s/p posterior fossa craniotomy for tumor removal      Subjective     Interval History: No significant overnight events.  Patient had laparoscopic diverting loop sigmoid colostomy yesterday by Dr. Chester.  Otherwise no complaints of headache, nausea, vomiting.    Objective     Vital signs in last 24 hours:  Temp:  [97.7 °F (36.5 °C)-98.6 °F (37 °C)] 98.2 °F (36.8 °C)  Heart Rate:  [67-83] 83  Resp:  [16-18] 18  BP: (110-156)/(65-87) 122/84      Intake/Output this shift:  No intake/output data recorded.      Intake/Output last 3 shifts:  I/O last 3 completed shifts:  In: 1690 [P.O.:840; I.V.:750; IV Piggyback:100]  Out: -     LABS:  Results from last 7 days   Lab Units 08/28/24  0543 08/27/24  1012 08/25/24  0439   WBC 10*3/mm3 11.19* 11.49* 5.87   HEMOGLOBIN g/dL 13.0 12.8 11.6*   HEMATOCRIT % 38.4 38.1 34.0   PLATELETS 10*3/mm3 395 375 300     Results from last 7 days   Lab Units 08/28/24  0543 08/25/24  0439 08/24/24  0528   SODIUM mmol/L 136 140 140   POTASSIUM mmol/L 3.6 4.0 4.1   CHLORIDE mmol/L 96* 103 104   CO2 mmol/L 24.9 29.0 25.0   BUN mg/dL 9 10 12   CREATININE mg/dL 0.73 0.55* 0.53*   CALCIUM mg/dL 9.2 8.8 8.7   GLUCOSE mg/dL 153* 103* 120*     Tissue Pathology 8/21- Metastatic poorly differentiated adenocarcinoma     IMAGING STUDIES:  MRI cervical and thoracic spine pending    I personally viewed the patient's chart, it was also reviewed by and discussed with Dr Soren Sherwood reviewed/changed: Yes  Norvasc 5 mg p.o. twice daily  Lipitor 10 mg p.o. daily  Pepcid 20 mg p.o. twice daily  Keppra 500 mg p.o. twice daily  Tylenol 650 mg p.o. every 4 hours as needed  Hydrocodone 5 mg 1 p.o. every 4 hours as needed  Dilaudid 0.5 mg IV every 2 hours as needed  Robaxin-750 milligrams p.o. every 6 hours as needed      Physical Exam:    General:  Awake,  "alert & oriented x 3.  Speech clear with no aphasia  HEENT: Right occipital craniotomy incision is well-appearing, well-approximated, no surrounding erythema, swelling or drainage.  Neck:  Supple  CN III, IV, VI:  EOM's intact, PERRL.  CN VII:  Facial movements are symmetric, no weakness  Motor:  Normal muscle strength, bulk and tone in bilateral upper and lower extremities.  No fasciculations, rigidity, spasticity, or abnormal movements  Station & Gait:  Up ad kacy   Coordination:  Finger to nose intact bilaterally.      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  Brain tissue pathology showed poorly differentiated metastatic colorectal adenocarcinoma.  Patient underwent laparoscopic diverting loop sigmoid colostomy yesterday and seems to be doing well from that.  Patient was able to get her MRI of her cervical and thoracic spine.  Dr Doty will review these. Patient ok for discharge from [facility] once medically cleared and MRI's have been reviewed.    PLAN:   -Continue MDP      I discussed the patient's findings and my recommendations with patient and family and Dr Doty    During patient visit, I utilized appropriate personal protective equipment including  gloves.  Appropriate PPE was worn during the entire visit.  Hand hygiene was completed before and after.      LOS: 9 days       Edie Sierra, APRN  8/28/2024  10:10 EDT    \"Dictated utilizing Dragon dictation\".      "

## 2024-08-28 NOTE — PLAN OF CARE
Goal Outcome Evaluation:  Plan of Care Reviewed With: patient          Outcome Evaluation: Patient stated pain is not controlled with oral pain meds.  MD notfied see order .

## 2024-08-28 NOTE — PROGRESS NOTES
Name: Ely Sims ADMIT: 2024   : 1960  PCP: Provider, No Known    MRN: [id number] LOS: 9 days   AGE/SEX: 64 y.o. female  ROOM: [facility]     Subjective   Subjective   No acute events overnight.  Patient eating breakfast this morning and states she is feeling much better.  Surgery completed yesterday.  Having some expected discomfort postoperatively.  Denies chest pain or shortness of breath.    Objective   Objective     Vital Signs  Temp:  [97.7 °F (36.5 °C)-98.6 °F (37 °C)] 98.2 °F (36.8 °C)  Heart Rate:  [67-83] 83  Resp:  [16-18] 18  BP: (110-156)/(65-87) 122/84  SpO2:  [94 %-100 %] 99 %  on  Flow (L/min):  [2-3] 2;   Device (Oxygen Therapy): room air  Body mass index is 24.85 kg/m².    Physical Exam  General: Alert, no acute distress.  Sitting up in bed.  Pleasant and conversive.  ENT: No conjunctival injection or scleral icterus. Moist mucous membranes.   Neuro: Eyes open and moving in all directions, strength normal in all extremities, no focal deficits.   Lungs: Clear to auscultation bilaterally. No wheeze or crackles. No distress.   Heart: RRR, no murmurs. No edema.  Abdomen: Soft, mild distention.  Normal bowel sounds.  New ostomy in place, no stool output noted.  Ext: Warm and well-perfused. No edema.   Skin: No rashes or lesions. IV site without swelling or erythema.     Results Review     I reviewed the patient's new clinical results:  Results from last 7 days   Lab Units 24  0543 24  1012 24  0439 24  0528   WBC 10*3/mm3 11.19* 11.49* 5.87 8.21   HEMOGLOBIN g/dL 13.0 12.8 11.6* 11.9*   PLATELETS 10*3/mm3 395 375 300 312     Results from last 7 days   Lab Units 24  0543 24  0439 24  0528 24  1325 24  0318   SODIUM mmol/L 136 140 140  --  139   POTASSIUM mmol/L 3.6 4.0 4.1 4.0 3.5   CHLORIDE mmol/L 96* 103 104  --  109*   CO2 mmol/L 24.9 29.0 25.0  --  23.0   BUN mg/dL 9 10 12  --  11   CREATININE mg/dL 0.73 0.55* 0.53*  --  0.58   GLUCOSE  mg/dL 153* 103* 120*  --  145*   EGFR mL/min/1.73 92.0 102.5 103.4  --  101.2       Results from last 7 days   Lab Units 08/28/24  0543 08/25/24  0439 08/24/24  0528 08/23/24  0318 08/22/24  0401   CALCIUM mg/dL 9.2 8.8 8.7 7.9* 8.2*   MAGNESIUM mg/dL  --   --   --  2.5* 2.2   PHOSPHORUS mg/dL  --   --   --  2.4*  --        Glucose   Date/Time Value Ref Range Status   08/25/2024 1656 161 (H) 70 - 130 mg/dL Final       FL Barium Enema Water Soluble Single Contrast    Result Date: 8/26/2024  TECHNIQUE, FINDINGS AND IMPRESSION: Scattered images were obtained of the abdomen. Prominent and dilated loops of air-filled bowel are present throughout the abdomen and pelvis.  A small caliber rectal tube was then inserted gently into the rectum and minimally insufflated under direct fluoroscopic guidance. Water-soluble contrast was then administered into the rectum via gravity while multiple fluoroscopic images were obtained in the AP, lateral oblique views.  There is a long segment of irregular high-grade stenosis involving the high rectum corresponding with the area of irregular wall thickening seen on recent CT. This measures over a segment of approximately 3.7 cm. The narrowest segment appears to measure approximately 5 to 6 mm in shortest diameter. Contrast passes easily through this segment into the more proximal large bowel.  DAP: 3477.4 ?Gy*m2  This report was finalized on 8/26/2024 5:32 PM by Dr. Calvin Blanco M.D on Workstation: [id number]       I have personally reviewed all medications:  Scheduled Medications  amLODIPine, 5 mg, Oral, BID  atorvastatin, 10 mg, Oral, Daily  docusate sodium, 100 mg, Oral, Daily  famotidine, 20 mg, Oral, BID AC  levETIRAcetam, 500 mg, Oral, BID  potassium chloride ER, 40 mEq, Oral, Q4H  senna-docusate sodium, 1 tablet, Oral, Nightly    Infusions   Diet  Diet: Regular/House, Gastrointestinal; Low Irritant, Fiber-Restricted; Texture: Soft to Chew (NDD 3); Soft to Chew: Ground Meat; Fluid  Consistency: Thin (IDDSI 0)      Intake/Output Summary (Last 24 hours) at 8/28/2024 1058  Last data filed at 8/27/2024 1639  Gross per 24 hour   Intake 850 ml   Output --   Net 850 ml       Assessment/Plan     Active Hospital Problems    Diagnosis  POA    **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    Brain metastasis  Colon mass  -Initial presentation for neurologic symptoms, found to have evidence of brain metastatic disease  -S/p posterior fossa craniectomy for tumor removal  -Brain biopsy results revealed poorly differentiated adenocarcinoma  -Colonoscopy revealed obstructing colonic mass, pathology pending  -MRI lumbar spine with likely evidence of metastatic disease, MRI cervical and thoracic spine pending  -GI following  -General Surgery following, s/p Mediport placement and laparoscopic diverting colostomy on 8/27  -Continue Medrol Dosepak and Keppra per neurosurgery  -Radiation oncology consulted.  Patient has appointment on 9/3 as outpatient.  -Oncology following  -Advancement of diet per general surgery    Leukocytosis  -WBC stable at 11K today  -Steroids likely contributing, no new infectious symptoms and has been afebrile  -Surgery yesterday, likely reactive component as well  -Repeat CBC with morning labs    Hypertension  -Blood pressure trends acceptable  -Continue amlodipine  -Continue to monitor, additional medication titrations as needed based on BP trends    Anemia  -Hgb normal today  -No signs of active bleeding  -Repeat CBC with morning labs, transfuse for Hgb less than 7    SCDs for DVT prophylaxis.  Full code.  Discussed with patient, family, and nursing staff.  Anticipate  discharge in the next 1-2 days    At this point, patient has had Mediport placed and diverting colostomy completed.  She has an appointment scheduled with radiation oncology.  Per oncology, she will need to undergo radiation therapy prior to palliative chemotherapy.  When cleared from general surgery standpoint, patient likely will be appropriate for discharge home.  Anticipate this may be as soon as tomorrow.  Discussed with patient and family at bedside.    Colette Koroma MD  [facility]  08/28/24  10:58 EDT

## 2024-08-28 NOTE — PLAN OF CARE
Goal Outcome Evaluation:           Progress: improving  Outcome Evaluation: Patient standing upon arrival.  Patient agreeable to OT services.  Patient ambulated [facility] nursing unit with [place].  Patient states she is at baseline and hopes to d/c tomorrow.  She is able to perform all her ADLs with [place].  D/C plans are to d/c to home with family support.  OT to sign off.      Anticipated Discharge Disposition (OT): home with assist

## 2024-08-28 NOTE — CASE MANAGEMENT/SOCIAL WORK
Continued Stay Note  [facility]     Patient Name: Ely Sims  MRN: [id number]  Today's Date: 8/28/2024    Admit Date: 8/19/2024    Plan: Plan discharge home with significant other and [facility]   Discharge Plan       Row Name 08/28/24 1719       Plan    Plan Plan discharge home with significant other and [facility]    Patient/Family in Agreement with Plan yes    Plan Comments Spoke with patient at bedside regarding HH selections (new ostomy). Agreeable with referral to [facility]. No additional needs noted.Family to transport per private auto....[facility]                   Discharge Codes    No documentation.                 Expected Discharge Date and Time       Expected Discharge Date Expected Discharge Time    Aug 30, 2024               Vilma Perez RN

## 2024-08-28 NOTE — PLAN OF CARE
Goal Outcome Evaluation:  Plan for home with family.   following.

## 2024-08-28 NOTE — PROGRESS NOTES
Acute Care General Surgery Progress Note    Patient: Ely Sims  YOB: 1960  MRN: [id number]      Assessment  Ely Sims is a 64 y.o. female with a colonic mass who is POD 1 laparoscopic diverting loop sigmoid colostomy and left subclavian Mediport placement with Dr. Chester.  Patient is doing very well this morning, incisions are in good order and pain is controlled with medication.  Ostomy in intact, pink, and functioning.  Mediport site clean and intact with no surrounding erythema.  Labs and vitals are stable.    Plan  Okay to advance diet as tolerated  Encourage ambulation  Wound care for ostomy education  Lovenox for DVT prophylaxis    Subjective  Denies nausea or vomiting.  Tolerating clear liquid diet.  Ambulating well. voiding freely.    Objective    Vitals:    08/28/24 0730   BP: 122/84   Pulse: 83   Resp: 18   Temp: 98.2 °F (36.8 °C)   SpO2: 99%        Physical Exam  Constitutional: Alert, oriented, in no acute distress  Respiratory: Normal work of breathing, Symmetric excursion  Cardiovascular: Well pefused, no jugular venous distention evident   Abdominal: Soft, nondistended, expected postop tenderness, incision sites are clean, dry, intact with no surrounding erythema, colostomyLLQ intact and pink  Skin: Warm, dry      Laboratory Results  Results from last 7 days   Lab Units 08/28/24  0543 08/27/24  1012 08/25/24  0439 08/24/24  0528 08/23/24  0318 08/22/24  0401 08/21/24  1615   WBC 10*3/mm3 11.19* 11.49* 5.87 8.21 8.57 12.92* 16.36*   HEMOGLOBIN g/dL 13.0 12.8 11.6* 11.9* 10.5* 11.3* 13.0   HEMATOCRIT % 38.4 38.1 34.0 34.9 30.9* 32.6* 37.0   PLATELETS 10*3/mm3 395 375 300 312 269 284 350     Results from last 7 days   Lab Units 08/28/24  0543 08/25/24  0439 08/24/24  0528   SODIUM mmol/L 136 140 140   POTASSIUM mmol/L 3.6 4.0 4.1   CHLORIDE mmol/L 96* 103 104   CO2 mmol/L 24.9 29.0 25.0   BUN mg/dL 9 10 12   CREATININE mg/dL 0.73 0.55* 0.53*   CALCIUM mg/dL 9.2 8.8 8.7   GLUCOSE mg/dL 153*  103* 120*     I have personally reviewed 8/28 labs        GRETCHEN Gilliam  Acute Care General Surgery  [facility]    [address], [address]  [place], [place], [address]  P: [phone]  F: [phone]

## 2024-08-28 NOTE — DISCHARGE PLACEMENT REQUEST
"Ely Sims (64 y.o. Female)       Date of Birth   1960    Social Security Number       Address   [address] [place] [address]    Home Phone   [phone]    MRN   [id number]       Gnosticist   Christianity    Marital Status   Single                            Admission Date   8/19/24    Admission Type   Emergency    Admitting Provider   Isai Mathew MD    Attending Provider   Colette Koroma MD    Department, Room/Bed   [address], 90/1       Discharge Date       Discharge Disposition       Discharge Destination                                 Attending Provider: Colette Koroma MD    Allergies: No Known Allergies    Isolation: None   Infection: None   Code Status: CPR    Ht: 170.2 cm (67.01\")   Wt: 72 kg (158 lb 11.7 oz)    Admission Cmt: None   Principal Problem: 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,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]                   Active Insurance as of 8/19/2024       Primary Coverage       Payor Plan Insurance Group Employer/Plan Group    [facility] [id number]       Payor Plan Address Payor Plan Phone Number Payor Plan Fax Number Effective Dates    [address] [address]   1/1/2024 - None Entered    [facility] [id number]         Subscriber Name Subscriber Birth Date Member ID       ELY SIMS 1960 [id number]                     Emergency Contacts        (Rel.) Home Phone Work Phone Mobile Phone    Elia Cadena -- -- [phone]    DON OBRIEN (Sister) -- -- [phone]    SurinderTiffany (Daughter) -- -- [phone]    Ubaldo Case (Partner) -- -- [phone]                "

## 2024-08-28 NOTE — PROGRESS NOTES
[facility] INPATIENT PROGRESS NOTE    Length of Stay:  9 days    CHIEF COMPLAINT/REASON FOR VISIT:  Metastatic colon cancer     SUBJECTIVE:  POD 1 s/p port placement and diverting colostomy.  States she feels much better today with less pain.  She is eating.  MRI C and T-spine performed this morning.    ROS:  14 systems reviewed with pertinent positives and negatives in the HPI. Reviewed today.    OBJECTIVE:  Vitals:    08/28/24 0051 08/28/24 0300 08/28/24 0730 08/28/24 1115   BP: 126/74 134/77 122/84 127/84   BP Location: Right arm Right arm Right arm Left arm   Patient Position: Lying Lying Lying Lying   Pulse: 71 73 83 96   Resp: 18 18 18 18   Temp: 98.3 °F (36.8 °C) 98.5 °F (36.9 °C) 98.2 °F (36.8 °C) 97.9 °F (36.6 °C)   TempSrc: Oral Oral Oral Oral   SpO2: 96% 95% 99%    Weight:       Height:             PHYSICAL EXAMINATION:   General:  No acute distress, awake, alert and oriented.  Sitting up in a chair.  Skin:  Warm and dry, no visible rash  HEENT:  Normocephalic/atraumatic.    Chest:  Normal respiratory effort  Extremities:  No visible clubbing, cyanosis, or edema  Neuro/psych:  Grossly nonfocal.  Normal mood and affect.       DIAGNOSTIC DATA:  Results Review:     I reviewed the patient's new clinical results.    Results from last 7 days   Lab Units 08/28/24  0543   WBC 10*3/mm3 11.19*   HEMOGLOBIN g/dL 13.0   HEMATOCRIT % 38.4   PLATELETS 10*3/mm3 395     Lab Results   Component Value Date    NEUTROABS 8.20 (H) 08/27/2024     Results from last 7 days   Lab Units 08/28/24  0543   SODIUM mmol/L 136   POTASSIUM mmol/L 3.6   CHLORIDE mmol/L 96*   CO2 mmol/L 24.9   BUN mg/dL 9   CREATININE mg/dL 0.73   GLUCOSE mg/dL 153*   CALCIUM mg/dL 9.2     Results from last 7 days   Lab Units 08/22/24  0401   INR  0.99   APTT seconds 24.5     Results from last 7 days   Lab Units 08/28/24  0543   MAGNESIUM mg/dL 2.0         IMAGING:    MRI Cervical Spine With & Without Contrast (08/28/2024 10:30)  MRI Thoracic  Spine With & Without Contrast (08/28/2024 10:30)    ASSESSMENT:    This is a 64 y.o. female with:     *Metastatic colon cancer  The patient presented on 8/19/2022 initially to urgent care with headache, nausea, vomiting, diarrhea.  She was advised to go to the ED from urgent care.  CT head 8/19/2024 showed some areas of increased attenuation over the right cerebral hemisphere and left temporal lobe concerning for metastatic disease with edema with some mass effect upon the fourth ventricle.  MRI brain 8/20/2024 with a lesion at the right cerebellar hemisphere measuring 2.2 cm, lesion within the left temporal lobe measuring 9 mm, 1.0 cm lesion at the left cerebellar vermis, all with surrounding edema.  There was some mass effect upon the fourth ventricle with mild prominence of the lateral and third ventricles.  8/20/2024: CT chest abdomen and pelvis with no evidence of pulmonary metastatic disease.  There was some asymmetric wall thickening in the distal sigmoid colon with surrounding mesenteric soft tissue nodularity concerning for primary colon cancer.  A sclerotic lesion of the spinous process of T1 was said to be consistent with a bone island or other benign lesion.  8/21/2024: Posterior fossa craniectomy with gross total removal of a right cerebellar hemisphere metastasis Dr. Bull Doty.  Pathology consistent with metastatic poorly differentiated adenocarcinoma favoring colorectal origin.  MSI testing is pending.  8/25/2024: Bone scan with no obvious metastatic disease.  8/25/2024: Colonoscopy by Dr. Kaminski shows an infiltrative completely obstructing large mass found at the proximal rectum.  Pathology is pending.    CEA 2.10.  8/26/2024: MRI lumbar spine with no obvious metastatic disease in the lumbar spine.  However, there is a 3 mm enhancing lesion in the visualized lower thoracic spinal cord that could represent a spinal cord metastasis.  Cervical and thoracic spine MRI requested..  8/27/24: diverting  loop sigmoid colostomy and port placement by Dr. Chester  8/28/2024: MRI C and T-spine with no evidence of metastatic disease to the spine or spinal cord.     *Mild normocytic anemia  Hemoglobin normalized        RECOMMENDATIONS/PLAN:   Will send brain tissue for NGS when available  Follow-up rectal biopsy result, remains pending  MRI C and T spine performed today without apparent metastatic disease  Will need postoperative radiation to the brain prior to considering palliative chemotherapy.  Currently on a Medrol Dosepak. Will need ongoing dexamethasone, anticipating radiation  Outpatient PET scan to be considered when possible  Previously discussed the incurable nature of this malignancy with the patient, her , and her children on 8/26  We will follow    Elieser Saunders MD

## 2024-08-28 NOTE — PAYOR COMM NOTE
"Ely Sims (64 y.o. Female)     PLEASE SEE ATTACHED FOR CONTINUED INPT STAY DAYS    REF #   [id number]     PLEASE CALL MIR FRANCOIS RN/ DEPT @ [phone]   OR [phone]    THANK YOU  MIR FRANCOIS RN  [facility]        Date of Birth   1960    Social Security Number       Address   [address] [address]    Home Phone   [phone]    MRN   [id number]       Orthodox   Latter day    Marital Status   Single                            Admission Date   8/19/24    Admission Type   Emergency    Admitting Provider   Isai Mathew MD    Attending Provider   Colette Koroma MD    Department, Room/Bed   [address], 90/1       Discharge Date       Discharge Disposition       Discharge Destination                                 Attending Provider: Colette Koroma MD    Allergies: No Known Allergies    Isolation: None   Infection: None   Code Status: CPR    Ht: 170.2 cm (67.01\")   Wt: 72 kg (158 lb 11.7 oz)    Admission Cmt: None   Principal Problem: 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,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]                   Active Insurance as of 8/19/2024       Primary Coverage       Payor Plan Insurance Group Employer/Plan Group    [facility] [id number]       Payor Plan Address Payor Plan Phone Number Payor Plan Fax Number Effective Dates    [address] [address]   1/1/2024 - None Entered    [facility] [id number]         Subscriber Name Subscriber Birth Date Member ID       ELY SIMS 1960 [id number]                     Emergency Contacts        (Rel.) Home Phone Work Phone Mobile Phone    Elia Cadena -- -- [phone]    DON OBRIEN (Sister) -- -- [phone]    SurinderTiffany (Daughter) -- -- [phone]    Ubaldo Case (Partner) -- -- [phone]              Mobile: NPI [id number]  Tax ID [id number]     Physician Progress Notes (last 72 hours)        Elieser Saunders MD at 08/28/24 [address] [place] [address] " [facility] INPATIENT PROGRESS NOTE    Length of Stay:  9 days    CHIEF COMPLAINT/REASON FOR VISIT:  Metastatic colon cancer     SUBJECTIVE:  POD 1 s/p port placement and diverting colostomy.  States she feels much better today with less pain.  She is eating.  MRI C and T-spine performed this morning.    ROS:  14 systems reviewed with pertinent positives and negatives in the HPI. Reviewed today.    OBJECTIVE:  Vitals:    08/28/24 0051 08/28/24 0300 08/28/24 0730 08/28/24 1115   BP: 126/74 134/77 122/84 127/84   BP Location: Right arm Right arm Right arm Left arm   Patient Position: Lying Lying Lying Lying   Pulse: 71 73 83 96   Resp: 18 18 18 18   Temp: 98.3 °F (36.8 °C) 98.5 °F (36.9 °C) 98.2 °F (36.8 °C) 97.9 °F (36.6 °C)   TempSrc: Oral Oral Oral Oral   SpO2: 96% 95% 99%    Weight:       Height:             PHYSICAL EXAMINATION:   General:  No acute distress, awake, alert and oriented.  Sitting up in a chair.  Skin:  Warm and dry, no visible rash  HEENT:  Normocephalic/atraumatic.    Chest:  Normal respiratory effort  Extremities:  No visible clubbing, cyanosis, or edema  Neuro/psych:  Grossly nonfocal.  Normal mood and affect.       DIAGNOSTIC DATA:  Results Review:     I reviewed the patient's new clinical results.    Results from last 7 days   Lab Units 08/28/24  0543   WBC 10*3/mm3 11.19*   HEMOGLOBIN g/dL 13.0   HEMATOCRIT % 38.4   PLATELETS 10*3/mm3 395     Lab Results   Component Value Date    NEUTROABS 8.20 (H) 08/27/2024     Results from last 7 days   Lab Units 08/28/24  0543   SODIUM mmol/L 136   POTASSIUM mmol/L 3.6   CHLORIDE mmol/L 96*   CO2 mmol/L 24.9   BUN mg/dL 9   CREATININE mg/dL 0.73   GLUCOSE mg/dL 153*   CALCIUM mg/dL 9.2     Results from last 7 days   Lab Units 08/22/24  0401   INR  0.99   APTT seconds 24.5     Results from last 7 days   Lab Units 08/28/24  0543   MAGNESIUM mg/dL 2.0         IMAGING:    MRI Cervical Spine With & Without Contrast (08/28/2024 10:30)  MRI Thoracic Spine With &  Without Contrast (08/28/2024 10:30)    ASSESSMENT:    This is a 64 y.o. female with:     *Metastatic colon cancer  The patient presented on 8/19/2022 initially to urgent care with headache, nausea, vomiting, diarrhea.  She was advised to go to the ED from urgent care.  CT head 8/19/2024 showed some areas of increased attenuation over the right cerebral hemisphere and left temporal lobe concerning for metastatic disease with edema with some mass effect upon the fourth ventricle.  MRI brain 8/20/2024 with a lesion at the right cerebellar hemisphere measuring 2.2 cm, lesion within the left temporal lobe measuring 9 mm, 1.0 cm lesion at the left cerebellar vermis, all with surrounding edema.  There was some mass effect upon the fourth ventricle with mild prominence of the lateral and third ventricles.  8/20/2024: CT chest abdomen and pelvis with no evidence of pulmonary metastatic disease.  There was some asymmetric wall thickening in the distal sigmoid colon with surrounding mesenteric soft tissue nodularity concerning for primary colon cancer.  A sclerotic lesion of the spinous process of T1 was said to be consistent with a bone island or other benign lesion.  8/21/2024: Posterior fossa craniectomy with gross total removal of a right cerebellar hemisphere metastasis Dr. Bull Doty.  Pathology consistent with metastatic poorly differentiated adenocarcinoma favoring colorectal origin.  MSI testing is pending.  8/25/2024: Bone scan with no obvious metastatic disease.  8/25/2024: Colonoscopy by Dr. Kaminski shows an infiltrative completely obstructing large mass found at the proximal rectum.  Pathology is pending.    CEA 2.10.  8/26/2024: MRI lumbar spine with no obvious metastatic disease in the lumbar spine.  However, there is a 3 mm enhancing lesion in the visualized lower thoracic spinal cord that could represent a spinal cord metastasis.  Cervical and thoracic spine MRI requested..  8/27/24: diverting loop sigmoid  colostomy and port placement by Dr. Chester  2024: MRI C and T-spine with no evidence of metastatic disease to the spine or spinal cord.     *Mild normocytic anemia  Hemoglobin normalized        RECOMMENDATIONS/PLAN:   Will send brain tissue for NGS when available  Follow-up rectal biopsy result, remains pending  MRI C and T spine performed today without apparent metastatic disease  Will need postoperative radiation to the brain prior to considering palliative chemotherapy.  Currently on a Medrol Dosepak. Will need ongoing dexamethasone, anticipating radiation  Outpatient PET scan to be considered when possible  Previously discussed the incurable nature of this malignancy with the patient, her , and her children on   We will follow    Elieser Saunders MD     Electronically signed by Elieser Saunders MD at 24 1341       Colette Koroma MD at 24 105              Name: Ely Sims ADMIT: 2024   : 1960  PCP: Provider, No Known    MRN: [id number] LOS: 9 days   AGE/SEX: 64 y.o. female  ROOM: [facility]     Subjective   Subjective   No acute events overnight.  Patient eating breakfast this morning and states she is feeling much better.  Surgery completed yesterday.  Having some expected discomfort postoperatively.  Denies chest pain or shortness of breath.    Objective   Objective     Vital Signs  Temp:  [97.7 °F (36.5 °C)-98.6 °F (37 °C)] 98.2 °F (36.8 °C)  Heart Rate:  [67-83] 83  Resp:  [16-18] 18  BP: (110-156)/(65-87) 122/84  SpO2:  [94 %-100 %] 99 %  on  Flow (L/min):  [2-3] 2;   Device (Oxygen Therapy): room air  Body mass index is 24.85 kg/m².    Physical Exam  General: Alert, no acute distress.  Sitting up in bed.  Pleasant and conversive.  ENT: No conjunctival injection or scleral icterus. Moist mucous membranes.   Neuro: Eyes open and moving in all directions, strength normal in all extremities, no focal deficits.   Lungs: Clear to auscultation bilaterally. No wheeze or crackles. No  distress.   Heart: RRR, no murmurs. No edema.  Abdomen: Soft, mild distention.  Normal bowel sounds.  New ostomy in place, no stool output noted.  Ext: Warm and well-perfused. No edema.   Skin: No rashes or lesions. IV site without swelling or erythema.     Results Review     I reviewed the patient's new clinical results:  Results from last 7 days   Lab Units 08/28/24  0543 08/27/24  1012 08/25/24  0439 08/24/24  0528   WBC 10*3/mm3 11.19* 11.49* 5.87 8.21   HEMOGLOBIN g/dL 13.0 12.8 11.6* 11.9*   PLATELETS 10*3/mm3 395 375 300 312     Results from last 7 days   Lab Units 08/28/24  0543 08/25/24  0439 08/24/24  0528 08/23/24  1325 08/23/24  0318   SODIUM mmol/L 136 140 140  --  139   POTASSIUM mmol/L 3.6 4.0 4.1 4.0 3.5   CHLORIDE mmol/L 96* 103 104  --  109*   CO2 mmol/L 24.9 29.0 25.0  --  23.0   BUN mg/dL 9 10 12  --  11   CREATININE mg/dL 0.73 0.55* 0.53*  --  0.58   GLUCOSE mg/dL 153* 103* 120*  --  145*   EGFR mL/min/1.73 92.0 102.5 103.4  --  101.2       Results from last 7 days   Lab Units 08/28/24  0543 08/25/24  0439 08/24/24  0528 08/23/24  0318 08/22/24  0401   CALCIUM mg/dL 9.2 8.8 8.7 7.9* 8.2*   MAGNESIUM mg/dL  --   --   --  2.5* 2.2   PHOSPHORUS mg/dL  --   --   --  2.4*  --        Glucose   Date/Time Value Ref Range Status   08/25/2024 1656 161 (H) 70 - 130 mg/dL Final       FL Barium Enema Water Soluble Single Contrast    Result Date: 8/26/2024  TECHNIQUE, FINDINGS AND IMPRESSION: Scattered images were obtained of the abdomen. Prominent and dilated loops of air-filled bowel are present throughout the abdomen and pelvis.  A small caliber rectal tube was then inserted gently into the rectum and minimally insufflated under direct fluoroscopic guidance. Water-soluble contrast was then administered into the rectum via gravity while multiple fluoroscopic images were obtained in the AP, lateral oblique views.  There is a long segment of irregular high-grade stenosis involving the high rectum  corresponding with the area of irregular wall thickening seen on recent CT. This measures over a segment of approximately 3.7 cm. The narrowest segment appears to measure approximately 5 to 6 mm in shortest diameter. Contrast passes easily through this segment into the more proximal large bowel.  DAP: 3477.4 ?Gy*m2  This report was finalized on 8/26/2024 5:32 PM by Dr. Calvin Blanco M.D on Workstation: [id number]       I have personally reviewed all medications:  Scheduled Medications  amLODIPine, 5 mg, Oral, BID  atorvastatin, 10 mg, Oral, Daily  docusate sodium, 100 mg, Oral, Daily  famotidine, 20 mg, Oral, BID AC  levETIRAcetam, 500 mg, Oral, BID  potassium chloride ER, 40 mEq, Oral, Q4H  senna-docusate sodium, 1 tablet, Oral, Nightly    Infusions   Diet  Diet: Regular/House, Gastrointestinal; Low Irritant, Fiber-Restricted; Texture: Soft to Chew (NDD 3); Soft to Chew: Ground Meat; Fluid Consistency: Thin (IDDSI 0)      Intake/Output Summary (Last 24 hours) at 8/28/2024 1058  Last data filed at 8/27/2024 1639  Gross per 24 hour   Intake 850 ml   Output --   Net 850 ml       Assessment/Plan     Active Hospital Problems    Diagnosis  POA    **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    Brain metastasis  Colon mass  -Initial presentation for neurologic symptoms, found to have evidence of brain metastatic disease  -S/p posterior fossa craniectomy for tumor removal  -Brain biopsy results revealed poorly differentiated adenocarcinoma  -Colonoscopy revealed obstructing colonic mass, pathology pending  -MRI lumbar spine with likely evidence of metastatic disease, MRI cervical and thoracic spine pending  -GI  following  -General Surgery following, s/p Mediport placement and laparoscopic diverting colostomy on 8/27  -Continue Medrol Dosepak and Keppra per neurosurgery  -Radiation oncology consulted.  Patient has appointment on 9/3 as outpatient.  -Oncology following  -Advancement of diet per general surgery    Leukocytosis  -WBC stable at 11K today  -Steroids likely contributing, no new infectious symptoms and has been afebrile  -Surgery yesterday, likely reactive component as well  -Repeat CBC with morning labs    Hypertension  -Blood pressure trends acceptable  -Continue amlodipine  -Continue to monitor, additional medication titrations as needed based on BP trends    Anemia  -Hgb normal today  -No signs of active bleeding  -Repeat CBC with morning labs, transfuse for Hgb less than 7    SCDs for DVT prophylaxis.  Full code.  Discussed with patient, family, and nursing staff.  Anticipate discharge in the next 1-2 days    At this point, patient has had Mediport placed and diverting colostomy completed.  She has an appointment scheduled with radiation oncology.  Per oncology, she will need to undergo radiation therapy prior to palliative chemotherapy.  When cleared from general surgery standpoint, patient likely will be appropriate for discharge home.  Anticipate this may be as soon as tomorrow.  Discussed with patient and family at bedside.    Colette Koroma MD  [facility]  08/28/24  10:58 EDT      Electronically signed by Colette Koroma MD at 08/28/24 1105       Kendra Naidu APRN at 08/28/24 0900          Acute Care General Surgery Progress Note    Patient: Ely Sims  YOB: 1960  MRN: [id number]      Assessment  Ely Sims is a 64 y.o. female with a colonic mass who is POD 1 laparoscopic diverting loop sigmoid colostomy and left subclavian Mediport placement with Dr. Chester.  Patient is doing very well this morning, incisions are in good order and pain is controlled with  medication.  Ostomy in intact, pink, and functioning.  Mediport site clean and intact with no surrounding erythema.  Labs and vitals are stable.    Plan  Okay to advance diet as tolerated  Encourage ambulation  Wound care for ostomy education  Lovenox for DVT prophylaxis    Subjective  Denies nausea or vomiting.  Tolerating clear liquid diet.  Ambulating well. voiding freely.    Objective    Vitals:    08/28/24 0730   BP: 122/84   Pulse: 83   Resp: 18   Temp: 98.2 °F (36.8 °C)   SpO2: 99%        Physical Exam  Constitutional: Alert, oriented, in no acute distress  Respiratory: Normal work of breathing, Symmetric excursion  Cardiovascular: Well pefused, no jugular venous distention evident   Abdominal: Soft, nondistended, expected postop tenderness, incision sites are clean, dry, intact with no surrounding erythema, colostomyLLQ intact and pink  Skin: Warm, dry      Laboratory Results  Results from last 7 days   Lab Units 08/28/24  0543 08/27/24  1012 08/25/24  0439 08/24/24  0528 08/23/24  0318 08/22/24  0401 08/21/24  1615   WBC 10*3/mm3 11.19* 11.49* 5.87 8.21 8.57 12.92* 16.36*   HEMOGLOBIN g/dL 13.0 12.8 11.6* 11.9* 10.5* 11.3* 13.0   HEMATOCRIT % 38.4 38.1 34.0 34.9 30.9* 32.6* 37.0   PLATELETS 10*3/mm3 395 375 300 312 269 284 350     Results from last 7 days   Lab Units 08/28/24  0543 08/25/24  0439 08/24/24  0528   SODIUM mmol/L 136 140 140   POTASSIUM mmol/L 3.6 4.0 4.1   CHLORIDE mmol/L 96* 103 104   CO2 mmol/L 24.9 29.0 25.0   BUN mg/dL 9 10 12   CREATININE mg/dL 0.73 0.55* 0.53*   CALCIUM mg/dL 9.2 8.8 8.7   GLUCOSE mg/dL 153* 103* 120*     I have personally reviewed 8/28 labs        GRETCHEN Gilliam  Acute [facility] General Surgery  [facility]    [address], [address]  [place], [place], [facility]  P: [phone]  F: [phone]      Electronically signed by Kendra Naidu APRN at 08/28/24 1147       Elieser Saunders MD at 08/27/24 1149          [facility] INPATIENT  PROGRESS NOTE    Length of Stay:  8 days    CHIEF COMPLAINT/REASON FOR VISIT:  Metastatic colon cancer     SUBJECTIVE:  Denies any new issues today. Has been NPO for surgery.     ROS:  14 systems reviewed with pertinent positives and negatives in the HPI. Reviewed today.    OBJECTIVE:  Vitals:    08/27/24 0005 08/27/24 0405 08/27/24 0715 08/27/24 1130   BP: 140/93 149/86 158/79 146/83   BP Location: Right arm Right arm Right arm Right arm   Patient Position: Lying Lying Lying Lying   Pulse: 77 71  76   Resp: 18 18 18 18   Temp: 98.8 °F (37.1 °C) 99 °F (37.2 °C) 98.2 °F (36.8 °C) 97.7 °F (36.5 °C)   TempSrc: Oral Oral  Oral   SpO2: 95% 97% 94%    Weight:       Height:             PHYSICAL EXAMINATION:   General:  No acute distress, awake, alert and oriented  Skin:  Warm and dry, no visible rash  HEENT:  Normocephalic/atraumatic.    Chest:  Normal respiratory effort  Extremities:  No visible clubbing, cyanosis, or edema  Neuro/psych:  Grossly nonfocal.  Normal mood and affect.       DIAGNOSTIC DATA:  Results Review:     I reviewed the patient's new clinical results.    Results from last 7 days   Lab Units 08/27/24  1012   WBC 10*3/mm3 11.49*   HEMOGLOBIN g/dL 12.8   HEMATOCRIT % 38.1   PLATELETS 10*3/mm3 375     Lab Results   Component Value Date    NEUTROABS 8.20 (H) 08/27/2024     Results from last 7 days   Lab Units 08/25/24  0439   SODIUM mmol/L 140   POTASSIUM mmol/L 4.0   CHLORIDE mmol/L 103   CO2 mmol/L 29.0   BUN mg/dL 10   CREATININE mg/dL 0.55*   GLUCOSE mg/dL 103*   CALCIUM mg/dL 8.8     Results from last 7 days   Lab Units 08/22/24  0401 08/21/24  0512   INR  0.99 1.06   APTT seconds 24.5  --      Results from last 7 days   Lab Units 08/23/24  0318   MAGNESIUM mg/dL 2.5*         IMAGING:    None reviewed today    ASSESSMENT:    This is a 64 y.o. female with:     *Metastatic colon cancer  The patient presented on 8/19/2022 initially to urgent care with headache, nausea, vomiting, diarrhea.  She was advised  to go to the ED from urgent care.  CT head 8/19/2024 showed some areas of increased attenuation over the right cerebral hemisphere and left temporal lobe concerning for metastatic disease with edema with some mass effect upon the fourth ventricle.  MRI brain 8/20/2024 with a lesion at the right cerebellar hemisphere measuring 2.2 cm, lesion within the left temporal lobe measuring 9 mm, 1.0 cm lesion at the left cerebellar vermis, all with surrounding edema.  There was some mass effect upon the fourth ventricle with mild prominence of the lateral and third ventricles.  8/20/2024: CT chest abdomen and pelvis with no evidence of pulmonary metastatic disease.  There was some asymmetric wall thickening in the distal sigmoid colon with surrounding mesenteric soft tissue nodularity concerning for primary colon cancer.  A sclerotic lesion of the spinous process of T1 was said to be consistent with a bone island or other benign lesion.  8/21/2024: Posterior fossa craniectomy with gross total removal of a right cerebellar hemisphere metastasis Dr. Bull Doty.  Pathology consistent with metastatic poorly differentiated adenocarcinoma favoring colorectal origin.  MSI testing is pending.  8/25/2024: Bone scan with no obvious metastatic disease.  8/25/2024: Colonoscopy by Dr. Kaminski shows an infiltrative completely obstructing large mass found at the proximal rectum.  Pathology is pending.    CEA 2.10.  8/26/2024: MRI lumbar spine with no obvious metastatic disease in the lumbar spine.  However, there is a 3 mm enhancing lesion in the visualized lower thoracic spinal cord that could represent a spinal cord metastasis.  Cervical and thoracic spine MRI requested..     *Mild normocytic anemia  Hemoglobin 11.6        RECOMMENDATIONS/PLAN:   Will send brain tissue for NGS  Follow-up rectal biopsy result  Dr. Chester plans port placement and lap diverting colostomy today  Will need postoperative radiation to the brain, radiation to  spinal cord lesion prior to considering palliative chemotherapy.  MRI cervical and thoracic spine requested, not yet performed  Currently on a Medrol Dosepak  Outpatient PET scan to be considered when possible  Discussed the incurable nature of this malignancy with the patient, her , and her children on   We will follow  Discussed with Dr. Chester, the patient, her sister, and her children    Elieser Saunders MD     Electronically signed by Elieser Saunders MD at 24 1230       Colette Koroma MD at 24 1040              Name: Ely Sims ADMIT: 2024   : 1960  PCP: Provider, No Known    MRN: [id number] LOS: 8 days   AGE/SEX: 64 y.o. female  ROOM: [facility]     Subjective   Subjective   No acute events overnight.  Patient did have barium enema done yesterday.  This morning, she has had a bowel movement.  Still having abdominal fullness and pain.  No chest pain or shortness of breath.    Objective   Objective     Vital Signs  Temp:  [98.2 °F (36.8 °C)-99 °F (37.2 °C)] 98.2 °F (36.8 °C)  Heart Rate:  [71-77] 71  Resp:  [18-20] 18  BP: (119-158)/(63-93) 158/79  SpO2:  [94 %-98 %] 94 %  on   ;   Device (Oxygen Therapy): room air  Body mass index is 24.85 kg/m².    Physical Exam  General: Alert, no acute distress.  Sitting up in bed.  Pleasant and conversive.  ENT: No conjunctival injection or scleral icterus. Moist mucous membranes.   Neuro: Eyes open and moving in all directions, strength normal in all extremities, no focal deficits.   Lungs: Clear to auscultation bilaterally. No wheeze or crackles. No distress.   Heart: RRR, no murmurs. No edema.  Abdomen: Soft, mild distention.  Normal bowel sounds.  Mild tenderness to palpation.  Ext: Warm and well-perfused. No edema.   Skin: No rashes or lesions. IV site without swelling or erythema.     Results Review     I reviewed the patient's new clinical results:  Results from last 7 days   Lab Units 24  1012 24  0439 24  0543  08/23/24  0318   WBC 10*3/mm3 11.49* 5.87 8.21 8.57   HEMOGLOBIN g/dL 12.8 11.6* 11.9* 10.5*   PLATELETS 10*3/mm3 375 300 312 269     Results from last 7 days   Lab Units 08/25/24  0439 08/24/24  0528 08/23/24  1325 08/23/24  0318 08/22/24  0401   SODIUM mmol/L 140 140  --  139 140   POTASSIUM mmol/L 4.0 4.1 4.0 3.5 3.7   CHLORIDE mmol/L 103 104  --  109* 107   CO2 mmol/L 29.0 25.0  --  23.0 25.1   BUN mg/dL 10 12  --  11 14   CREATININE mg/dL 0.55* 0.53*  --  0.58 0.72   GLUCOSE mg/dL 103* 120*  --  145* 131*   EGFR mL/min/1.73 102.5 103.4  --  101.2 93.5       Results from last 7 days   Lab Units 08/25/24  0439 08/24/24  0528 08/23/24  0318 08/22/24  0401   CALCIUM mg/dL 8.8 8.7 7.9* 8.2*   MAGNESIUM mg/dL  --   --  2.5* 2.2   PHOSPHORUS mg/dL  --   --  2.4*  --        Glucose   Date/Time Value Ref Range Status   08/25/2024 1656 161 (H) 70 - 130 mg/dL Final   08/25/2024 0755 86 70 - 130 mg/dL Final   08/24/2024 2237 130 70 - 130 mg/dL Final   08/24/2024 1710 150 (H) 70 - 130 mg/dL Final   08/24/2024 1122 145 (H) 70 - 130 mg/dL Final       FL Barium Enema Water Soluble Single Contrast    Result Date: 8/26/2024  TECHNIQUE, FINDINGS AND IMPRESSION: Scattered images were obtained of the abdomen. Prominent and dilated loops of air-filled bowel are present throughout the abdomen and pelvis.  A small caliber rectal tube was then inserted gently into the rectum and minimally insufflated under direct fluoroscopic guidance. Water-soluble contrast was then administered into the rectum via gravity while multiple fluoroscopic images were obtained in the AP, lateral oblique views.  There is a long segment of irregular high-grade stenosis involving the high rectum corresponding with the area of irregular wall thickening seen on recent CT. This measures over a segment of approximately 3.7 cm. The narrowest segment appears to measure approximately 5 to 6 mm in shortest diameter. Contrast passes easily through this segment into the  more proximal large bowel.  DAP: 3477.4 ?Gy*m2  This report was finalized on 8/26/2024 5:32 PM by Dr. Calvin Blanco M.D on Workstation: [id number]      MRI Lumbar Spine With & Without Contrast    Result Date: 8/26/2024  1. 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 I recommend a dedicated cervical and thoracic spine MRI with and without contrast to determine if there are any additional spinal cord metastasis and to more comprehensively assess the spinal cord that at the T12 level.  2. I see no osseous metastatic disease to the lumbar spine.  3. This patient has a levoscoliotic curvature of the lumbar spine with its apex at the L3-4 lumbar level and there is severe disc space narrowing and degenerative endplate changes with degenerative marrow sclerosis and degenerative marrow edema in the central to right side of the endplates at L3-4 along the inner margin of the levoscoliotic curve. There is lumbar spondylosis as described above. I see no canal or foraminal narrowing from T11 to L2 and at L2-3 I see mild canal and bilateral foraminal narrowing. At L3-4, there is mild narrowing of the right side of the canal, right lateral recess and there is eccentric spurring into the right neural foramen and to the far right laterally that moderately narrows the right neural foramen and abuts the undersurface of the exiting right L4 nerve root. There is mild left foraminal narrowing at L4-5 and L5-S1.  The results of this study were communicated to Dr. Cesar Iglesias by telephone on 08/26/2024 at 8:40 AM.  This report was finalized on 8/26/2024 10:48 AM by Dr. Gilbert Hassan M.D on Workstation: [id number]       I have personally reviewed all medications:  Scheduled Medications  amLODIPine, 5 mg, Oral, BID  atorvastatin, 10 mg, Oral, Daily  docusate  sodium, 100 mg, Oral, Daily  famotidine, 20 mg, Oral, BID AC  levETIRAcetam, 500 mg, Oral, BID  methylPREDNISolone, 4 mg, Oral, Tonight  [START ON 8/28/2024] methylPREDNISolone, 4 mg, Oral, Before Breakfast  senna-docusate sodium, 1 tablet, Oral, Nightly    Infusions   Diet  NPO Diet NPO Type: Sips with Meds      Intake/Output Summary (Last 24 hours) at 8/27/2024 1041  Last data filed at 8/27/2024 0749  Gross per 24 hour   Intake 840 ml   Output --   Net 840 ml       Assessment/Plan     Active Hospital Problems    Diagnosis  POA    **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    Brain metastasis  Colon mass  -Initial presentation for neurologic symptoms, found to have evidence of brain metastatic disease  -2 Days Post-Op posterior fossa craniectomy for tumor removal  -Brain biopsy results revealed poorly differentiated adenocarcinoma  -Colonoscopy revealed obstructing colonic mass, pathology pending  -MRI lumbar spine with likely evidence of metastatic disease, MRI cervical and thoracic spine pending  -GI following  -General Surgery following, plan for Mediport placement and laparoscopic diverting colostomy  -Continue Medrol Dosepak and Keppra per neurosurgery  -Radiation oncology consulted  -Oncology following  -Remain NPO.  Surgery planned as above.    Leukocytosis  -WBC increased to 11K today  -Steroids likely contributing, no new infectious symptoms and has been afebrile  -Repeat CBC with morning labs    Hypertension  -Blood pressure slightly generous, but trends overall okay  -Continue amlodipine  -Continue to monitor, additional medication titrations as needed based on BP trends    Anemia  -Hgb  normal today  -No signs of active bleeding  -Repeat CBC with morning labs, transfuse for Hgb less than 7    SCDs for DVT prophylaxis.  Full code.  Discussed with patient, family, and nursing staff.  Anticipate discharge TBD    Colette Koroma MD  [facility]  08/27/24  10:41 EDT      Electronically signed by Colette Koroma MD at 08/27/24 1054       Edie Sierra APRN at 08/27/24 0937       Attestation signed by Bull Doty MD at 08/27/24 1422    I have reviewed this documentation and agree.                    [facility] NEUROSURGERY INTRACRANIAL PROGRESS NOTE    PATIENT IDENTIFICATION:   Name:  Ely Sims      MRN:  [id number]     64 y.o.  female               Cc: POD # 6 s/p posterior fossa craniectomy for tumor removal      Subjective     Interval History: No significant overnight events.  Patient denies headache, nausea, vomiting.        Objective     Vital signs in last 24 hours:  Temp:  [98.2 °F (36.8 °C)-99 °F (37.2 °C)] 98.2 °F (36.8 °C)  Heart Rate:  [71-77] 71  Resp:  [18-20] 18  BP: (119-158)/(63-93) 158/79      Intake/Output this shift:  I/O this shift:  In: 840 [P.O.:840]  Out: -       Intake/Output last 3 shifts:  I/O last 3 completed shifts:  In: 1840 [P.O.:1840]  Out: -     LABS:  Results from last 7 days   Lab Units 08/25/24  0439 08/24/24  0528 08/23/24  0318   WBC 10*3/mm3 5.87 8.21 8.57   HEMOGLOBIN g/dL 11.6* 11.9* 10.5*   HEMATOCRIT % 34.0 34.9 30.9*   PLATELETS 10*3/mm3 300 312 269     Results from last 7 days   Lab Units 08/25/24  0439 08/24/24  0528 08/23/24  1325 08/23/24  0318   SODIUM mmol/L 140 140  --  139   POTASSIUM mmol/L 4.0 4.1 4.0 3.5   CHLORIDE mmol/L 103 104  --  109*   CO2 mmol/L 29.0 25.0  --  23.0   BUN mg/dL 10 12  --  11   CREATININE mg/dL 0.55* 0.53*  --  0.58   CALCIUM mg/dL 8.8 8.7  --  7.9*   GLUCOSE mg/dL 103* 120*  --  145*     Tissue Pathology 8/21- Metastatic poorly differentiated adenocarcinoma    IMAGING STUDIES:  MRI Cervical &  Thoracic pending    I personally viewed the patient's chart, it was also reviewed by and discussed with Dr Soren Sherwood reviewed/changed: Yes  Norvasc 5 mg p.o. twice daily  Lipitor 10 mg p.o. daily  Pepcid 20 mg p.o. twice daily  Keppra 500 mg p.o. twice daily  Medrol Dosepak  Hydrocodone 5 mg 1 p.o. every 4 hours as needed  Labetalol 20 mg IV every 2 hours as needed for SBP>150  Robaxin-750 milligrams p.o. every 6 hours as needed         Physical Exam:    General:  Awake, alert & oriented x 3.  Speech clear with no aphasia  HEENT: Right occipital craniotomy incision is well-appearing, well-approximated, no surrounding erythema, swelling or drainage.  Neck:  Supple  CN III, IV, VI:  EOM's intact, PERRL.  CN VII:  Facial movements are symmetric, no weakness  Motor:  Normal muscle strength, bulk and tone in bilateral upper and lower extremities.  No fasciculations, rigidity, spasticity, or abnormal movements  Station & Gait:  Up ad kacy in room  Coordination:  Finger to nose intact bilaterally.         Assessment & Plan     ASSESSMENT:      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    Colonic mass    Anemia    Hyperglycemia    Hypertension     POD # 6 s/p posterior fossa craniectomy for tumor removal.  Brain tissue pathology does show poorly differentiated metastatic colorectal adenocarcinoma.  Patient is feeling very well today with no complaints of headache, nausea, vomiting.  General surgery following, patient will go for diverting colostomy later today.  MRI of the cervical and thoracic spine have been ordered.  Patient is being followed by medical and radiation oncology.    PLAN:   -MRI cervical and thoracic spine  -Continue Medrol Dosepak  -Keep SBP <150    I discussed the patient's findings and my recommendations with patient, family, and Dr Doty    During patient visit, I utilized appropriate personal protective equipment including  gloves.  Appropriate PPE was worn during the entire visit.  Hand hygiene was  "completed before and after.      LOS: 8 days       Edie Sierra, APRN  8/27/2024  10:30 EDT    \"Dictated utilizing Dragon dictation\".        Electronically signed by Bull Doty MD at 08/27/24 1422       Saturnino Chester MD at 08/27/24 0909          CC: Metastatic colorectal cancer to brain and possible spine    S: No events overnight.  Patient continues to feel bloated.  Has been having liquid bowel movements.  Denies any nausea or vomiting.  Denies any abdominal pain  Gastrografin enema yesterday showed high-grade stenosis at the proximal rectum    O:   Vitals:    08/26/24 1930 08/27/24 0005 08/27/24 0405 08/27/24 0715   BP: 141/80 140/93 149/86 158/79   BP Location: Right arm Right arm Right arm Right arm   Patient Position: Lying Lying Lying Lying   Pulse: 76 77 71    Resp: 18 18 18 18   Temp: 98.2 °F (36.8 °C) 98.8 °F (37.1 °C) 99 °F (37.2 °C) 98.2 °F (36.8 °C)   TempSrc: Oral Oral Oral    SpO2: 98% 95% 97% 94%   Weight:       Height:          Alert, no acute distress  Breathing comfortable  Regular rate rhythm  Abdomen soft, nontender nondistended    Gastrografin enema showing tight stenosis at the proximal rectum/rectosigmoid junction.  There is passage of contrast into the proximal colon    No labs today    Assessment and plan    64-year-old female with metastatic colorectal cancer to brain and possible spine.  She has a high-grade stenosis at the proximal rectum/rectosigmoid junction.  Patient is mildly symptomatic.  We discussed with the patient about treatment options that would include port placement and watchful waiting for her colonic stenosis versus proceeding with diverting colostomy so she can go through chemotherapy and radiation without any major colonic complications.  She is interested in undergoing diverting colostomy so to prevent complications during her treatment and I agree.    -Will plan for Mediport placement and laparoscopic diverting colostomy  -Risk and benefits " of procedure including bleeding, infection, pneumothorax, hemothorax, perforation, wound complications, colostomy complications discussed in detail with the patient that verbalized understanding and agreed with the plan.  -Continue n.p.o.  -Ostomy nurse for marking consulted    Saturnino Chester MD, FACS  General, Minimally Invasive and Endoscopic Surgery  [facility]    [address], [address]  [place], [place], [address]  P: [phone]  F: [phone]      Electronically signed by Saturnino Chester MD at 24 0903       Cesar Iglesias MD at 24 8410            [facility]    [phone]   LOS: 7 days     Name: Ely Sims  Age/Sex: 64 y.o. female  :  1960        PCP: Provider, No Known  Chief Complaint   Patient presents with    Dizziness    Headache    Hypertension      Subjective   She feels good today.  Denies new issues or complaints.  General: No Fever or Chills, Cardiac: No Chest Pain or Palpitations, Resp: No Cough or SOA, GI: No Nausea, Vomiting, or Diarrhea, and Other: No bleeding    amLODIPine, 5 mg, Oral, BID  atorvastatin, 10 mg, Oral, Daily  docusate sodium, 100 mg, Oral, Daily  famotidine, 20 mg, Oral, BID AC  levETIRAcetam, 500 mg, Oral, BID  methylPREDNISolone, 4 mg, Oral, TID Around Food  [START ON 2024] methylPREDNISolone, 4 mg, Oral, Before Breakfast  [START ON 2024] methylPREDNISolone, 4 mg, Oral, Tonight  [START ON 2024] methylPREDNISolone, 4 mg, Oral, Before Breakfast  senna-docusate sodium, 1 tablet, Oral, Nightly      sodium chloride, 1,000 mL, Last Rate: 1,000 mL (24 1055)        Objective   Vital Signs  Temp:  [97.7 °F (36.5 °C)-98.9 °F (37.2 °C)] 98.9 °F (37.2 °C)  Heart Rate:  [71-87] 71  Resp:  [16-20] 20  BP: (119-150)/(63-93) 119/63  Body mass index is 24.85 kg/m².    Intake/Output Summary (Last 24 hours) at 2024 1407  Last data filed at 2024 1000  Gross per 24 hour   Intake 1840 ml   Output  --   Net 1840 ml       Physical Exam  Vitals and nursing note reviewed.   Constitutional:       Appearance: She is ill-appearing.   Cardiovascular:      Rate and Rhythm: Normal rate and regular rhythm.   Pulmonary:      Effort: No respiratory distress.      Breath sounds: Normal breath sounds.   Neurological:      General: No focal deficit present.      Mental Status: She is alert and oriented to person, place, and time. Mental status is at baseline.           Results Review:       I reviewed the patient's new clinical results.  Results from last 7 days   Lab Units 08/25/24  0439 08/24/24  0528 08/23/24  0318 08/22/24  0401 08/21/24  1615 08/21/24  0512 08/20/24  0504   WBC 10*3/mm3 5.87 8.21 8.57 12.92* 16.36* 12.81* 6.97   HEMOGLOBIN g/dL 11.6* 11.9* 10.5* 11.3* 13.0 12.4 13.7   PLATELETS 10*3/mm3 300 312 269 284 350 311 330     Results from last 7 days   Lab Units 08/25/24  0439 08/24/24  0528 08/23/24  1325 08/23/24  0318 08/22/24  0401 08/21/24  1615 08/21/24  0512 08/20/24  0504 08/19/24  2318 08/19/24  1545   SODIUM mmol/L 140 140  --  139 140 144 140 136   < > 137   POTASSIUM mmol/L 4.0 4.1 4.0 3.5 3.7 3.3* 3.3* 3.7   < > 3.6   CHLORIDE mmol/L 103 104  --  109* 107 109* 107 104   < > 102   CO2 mmol/L 29.0 25.0  --  23.0 25.1 24.5 24.6 20.2*   < > 26.0   BUN mg/dL 10 12  --  11 14 16 13 10   < > 13   CREATININE mg/dL 0.55* 0.53*  --  0.58 0.72 0.77 0.64 0.53*   < > 0.69   CALCIUM mg/dL 8.8 8.7  --  7.9* 8.2* 8.7 9.2 8.7   < > 9.6   MAGNESIUM mg/dL  --   --   --  2.5* 2.2  --   --   --   --  1.9   PHOSPHORUS mg/dL  --   --   --  2.4*  --   --   --   --   --   --     < > = values in this interval not displayed.   Estimated Creatinine Clearance: 117.5 mL/min (A) (by C-G formula based on SCr of 0.55 mg/dL (L)).      Assessment & Plan   Active Hospital Problems    Diagnosis  POA    **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, C80.1]  Yes    Colonic mass [K63.89]  Yes     Class: Acute    Anemia [D64.9]  Yes     Hyperglycemia [R73.9]  Yes    Hypertension [I10]  Yes      Resolved Hospital Problems    Diagnosis Date Resolved POA    Compression of brain [G93.5] 08/23/2024 Yes       PLAN  This is a 64-year-old lady with a history of hypertension and relative good health who presents to the hospital with headaches and abdominal discomfort and was found to have suspicious masses on CT scan.  Neurosurgery was consulted and an MRI was ordered and she was started on IV steroids.  She was initially mated to the ICU and transferred to our care once stabilized.  -It looks as though she has got metastatic rectal cancer.  Pathology from her colonoscopy is still pending.  -Appreciate general surgery's evaluation.  Planning for barium enema today.  If mass is obstructive would need operative intervention however if not obstructed the plan would be chemotherapy and treatment before surgical intervention.  -Remains on prednisone and antiepileptic medications  -Discussed with neuroradiology today.  Unfortunately the MRI of her lumbar spine did not show any osseous metastatic disease but there is a enhancing lesion within the lower thoracic cord at around T12.  Will plan for additional MRIs of the cervical and thoracic spine to evaluate for other potential metastatic foci.  -Steroid induced hyperglycemia is expected with IV and oral steroids.  Continue to follow no need for insulin or intervention  -Medical and radiation oncology services consulted today  -Mechanical DVT prophylaxis  -Full code    Disposition  Expected Discharge Date: 8/26/2024; Expected Discharge Time:        Cesar Iglesias MD  [facility]  08/26/24  14:07 EDT            Electronically signed by Cesar Iglesias MD at 08/26/24 1411       Waleska Alanis, APRN at 08/26/24 1106       Attestation signed by Bull Doty MD at 08/26/24 1414    I have reviewed this documentation and agree.                    [facility]UROSURGERY INTRACRANIAL  POSTOP note    PATIENT IDENTIFICATION:   Name:  Ely Sims      MRN:  [id number]     64 y.o.  female               CC:POD 5 right occipital crani for tumor      Subjective     Interval History: Mild headache over the top of the head.  Neck pain much improved with muscle relaxant.  Headache 6/10 at worst.  Otherwise doing okay.  Had colonoscopy yesterday.  Awaiting on final plans for oncologic treatment versus surgery for colon mass    Objective     Vital signs in last 24 hours:  Temp:  [97.7 °F (36.5 °C)-98.2 °F (36.8 °C)] 98 °F (36.7 °C)  Heart Rate:  [67-87] 87  Resp:  [16-20] 20  BP: (118-150)/(67-93) 148/72      Intake/Output this shift:  No intake/output data recorded.    Intake/Output last 3 shifts:  I/O last 3 completed shifts:  In: 1420 [P.O.:1120; I.V.:300]  Out: -     LABS:  Results from last 7 days   Lab Units 08/25/24  0439 08/24/24  0528 08/23/24  0318   WBC 10*3/mm3 5.87 8.21 8.57   HEMOGLOBIN g/dL 11.6* 11.9* 10.5*   HEMATOCRIT % 34.0 34.9 30.9*   PLATELETS 10*3/mm3 300 312 269     Results from last 7 days   Lab Units 08/25/24  0439 08/24/24  0528 08/23/24  1325 08/23/24  0318 08/19/24  2318 08/19/24  1545   SODIUM mmol/L 140 140  --  139   < > 137   POTASSIUM mmol/L 4.0 4.1 4.0 3.5   < > 3.6   CHLORIDE mmol/L 103 104  --  109*   < > 102   CO2 mmol/L 29.0 25.0  --  23.0   < > 26.0   BUN mg/dL 10 12  --  11   < > 13   CREATININE mg/dL 0.55* 0.53*  --  0.58   < > 0.69   CALCIUM mg/dL 8.8 8.7  --  7.9*   < > 9.6   BILIRUBIN mg/dL  --   --   --   --   --  0.3   ALK PHOS U/L  --   --   --   --   --  83   ALT (SGPT) U/L  --   --   --   --   --  13   AST (SGOT) U/L  --   --   --   --   --  15   GLUCOSE mg/dL 103* 120*  --  145*   < > 98    < > = values in this interval not displayed.     Pathology-8/21, brain tissue-poorly differentiated metastatic adenocarcinoma, favor colorectal origin    IMAGING STUDIES:  MRI lumbar spine-no evidence of osseous metastatic disease.  There is a enhancing lesion within the  lower thoracic cord at T12.  There is degenerative changes throughout most predominant at L3/4 with DDD and degenerative endplate changes as well as disc bulging and ligamentum flavum hypertrophy resulting in right canal and foraminal narrowing.  There are more minor degenerative changes at L2/3.    Whole-body bone scan-  Delayed anterior and posterior whole body projections were obtained, as  well as spot views at the level of the neck, thorax.     Typical appearing degenerative changes are seen at the shoulders,  elbows, hands, wrists, sacroiliac joints, knees, ankles, lower cervical  spine (on the left, just above the level of the prior chest CT, cervical  spine CT correlation could be considered if indicated), and mid lumbar  spine (on the right, corresponding to previous CT demonstrated  degenerative change).     Small extravasation of activity is suggested at the injection site at  the right forearm. Focal activity at the level of the distal right  radial shaft could be a separate focus overlying soft tissue activity  related to an injection attempt, or could represent an osseous process  such as old fracture or lesion, correlate clinically, x-ray of the right  forearm is recommended for further evaluation.     No localized activity is seen at the level of the previous CT  demonstrated T1 spinous process sclerotic focus, but the size of the  lesion may be below the resolution of bone scan, CT follow-up advised to  characterize change.    I personally viewed and interpreted the patient's MRI lumbar    Meds reviewed/changed: Yes  Medrol Dosepak  Keppra 500 mg PO every 12 hours   Norco 5 mg p.o. every 4 as needed-4 doses  Robaxin-750 milligram p.o. every 6 hours as needed-3 doses    Physical Exam:    General:   Awake, alert, oriented x3. Speech clear with no aphasia  HEENT:    Right occipital incision well-approximated with no redness drainage or swelling.  No significant muscle spasm or tenderness.  Erythema of  "palate and tongue with white patchy areas.  Neck:    supple  CN III IV VI:   Extraocular movements are full , PERRL 3 mm brisk.  No nystagmus  CN VII:   Facial movements are symmetric. No weakness.  Motor:    Moving all extremities.  No drift   Station and Gait:  Up ad kacy.   coordination:   Finger-to-nose test shows no dysmetria.    Extremities:   Wearing SCD    Assessment & Plan     ASSESSMENT:      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    Colonic mass    Anemia    Hyperglycemia    Hypertension    Patient now 5 days status post right occipital craniotomy for tumor.  Has 2 other intracranial lesions as well as a colorectal lesion.  She had colonoscopy yesterday and pathology is pending, but brain pathology does show poorly differentiated metastatic colorectal adenocarcinoma.  She is overall feeling well with only a mild headache.  Neck discomfort much improved with muscle relaxant.  No focal neurologic deficits on exam.  Bone scan suggestive of possible abnormality at L3.  MRI lumbar spine shows this to likely be degenerative changes or disc herniation although she is not symptomatic.  However, there is a enhancing lesion within the cord at T12, possible drop mets.  Will obtain MRI of full spine for further evaluation.  General surgery consult pending for possible surgery regarding rectal mass.  Will involve medical and radiation oncology services.    PLAN:   MRI cervical and thoracic spine  Consult medical and radiation oncology services  As needed ice and muscle relaxant  Keep SBP<150  Continue Keppra  Complete MDP    I discussed the patient's findings and my recommendations with patient, family, nursing staff, and Dr. Doty    During patient visit, I utilized appropriate personal protective equipment including gloves. Appropriate PPE was worn during the entire visit.  Hand hygiene was completed before and after.      LOS: 7 days       Waleska Alanis, APRN  8/26/2024  11:06 EDT    \"Dictated utilizing Dragon " "dictation\".        Electronically signed by Bull Doty MD at 08/26/24 1414       Tiffany Pinzon PA-C at 08/26/24 1038          [facility]  Inpatient Progress Note    Reason for Follow-up: Rectal mass    Subjective     Interval History:   Patient doing well today and asked to advance diet.  No complaints.    8/25 C/S: Obstructing mass at the rectum 20 cm from the anus, biopsies obtained tattoo placed 1 cm distal to the lesion.  Pending path.  Surgery consulted.  8/26: CEA 2.1    Current Facility-Administered Medications:     [DISCONTINUED] acetaminophen (TYLENOL) tablet 650 mg, 650 mg, Oral, Q4H PRN **OR** acetaminophen (TYLENOL) 160 MG/5ML oral solution 650 mg, 650 mg, Oral, Q4H PRN **OR** [DISCONTINUED] acetaminophen (TYLENOL) suppository 650 mg, 650 mg, Rectal, Q4H PRN, Sarika Wilburn MD    amLODIPine (NORVASC) tablet 5 mg, 5 mg, Oral, BID, Shadi Kaminski MD, 5 mg at 08/26/24 0855    atorvastatin (LIPITOR) tablet 10 mg, 10 mg, Oral, Daily, Shadi Kaminski MD, 10 mg at 08/26/24 0857    sennosides-docusate (PERICOLACE) 8.6-50 MG per tablet 1 tablet, 1 tablet, Oral, Nightly, 1 tablet at 08/24/24 2110 **AND** polyethylene glycol (MIRALAX) packet 17 g, 17 g, Oral, Daily PRN **AND** bisacodyl (DULCOLAX) EC tablet 5 mg, 5 mg, Oral, Daily PRN **AND** bisacodyl (DULCOLAX) suppository 10 mg, 10 mg, Rectal, Daily PRN, Shadi Kaminski MD    Calcium Replacement - Follow Nurse / BPA Driven Protocol, , Does not apply, PRN, Shadi Kaminski MD    docusate sodium (COLACE) capsule 100 mg, 100 mg, Oral, Daily, Shadi Kaminski MD, 100 mg at 08/26/24 0857    enalaprilat (VASOTEC) injection 0.625 mg, 0.625 mg, Intravenous, Q6H PRN, Shadi Kaminski MD    famotidine (PEPCID) tablet 20 mg, 20 mg, Oral, BID AC, Shadi Kaminski MD, 20 mg at 08/26/24 0856    fentaNYL citrate (PF) (SUBLIMAZE) injection, , Intravenous, Code / Trauma / Sedation Medication, Malvin Merritt MD, 50 mcg at " 08/21/24 1158    HYDROcodone-acetaminophen (NORCO) 5-325 MG per tablet 1 tablet, 1 tablet, Oral, Q4H PRN, Sarika Wilburn MD, 1 tablet at 08/26/24 0856    labetalol (NORMODYNE,TRANDATE) injection 20 mg, 20 mg, Intravenous, Q2H PRN, Shadi Kaminski MD, 20 mg at 08/24/24 1800    levETIRAcetam (KEPPRA) tablet 500 mg, 500 mg, Oral, BID, Shadi Kaminski MD, 500 mg at 08/26/24 0856    Magnesium Standard Dose Replacement - Follow Nurse / BPA Driven Protocol, , Does not apply, PRN, Shadi Kaminski MD    methocarbamol (ROBAXIN) tablet 750 mg, 750 mg, Oral, Q6H PRN, Shadi Kaminski MD, 750 mg at 08/26/24 0250    methylPREDNISolone (MEDROL) dose pack 4 mg, 4 mg, Oral, TID Around Food, Sarika Wilburn MD, 4 mg at 08/26/24 0857    [START ON 8/27/2024] methylPREDNISolone (MEDROL) dose pack 4 mg, 4 mg, Oral, Before Breakfast, Shadi Kaminski MD    [START ON 8/27/2024] methylPREDNISolone (MEDROL) dose pack 4 mg, 4 mg, Oral, Tonight, Shadi Kaminski MD    [START ON 8/28/2024] methylPREDNISolone (MEDROL) dose pack 4 mg, 4 mg, Oral, Before Breakfast, Shadi Kaminski MD    midazolam (VERSED) injection, , Intravenous, Code / Trauma / Sedation Medication, Malvin Merritt MD, 1 mg at 08/21/24 1157    nitroglycerin (NITROSTAT) SL tablet 0.4 mg, 0.4 mg, Sublingual, Q5 Min PRN, Shadi Kaminski MD    ondansetron ODT (ZOFRAN-ODT) disintegrating tablet 4 mg, 4 mg, Oral, Q6H PRN **OR** ondansetron (ZOFRAN) injection 4 mg, 4 mg, Intravenous, Q6H PRN, Shadi Kaminski MD    Phosphorus Replacement - Follow Nurse / BPA Driven Protocol, , Does not apply, PRGordy ZHANG Brian M, MD    Potassium Replacement - Follow Nurse / BPA Driven Protocol, , Does not apply, Gordy ROWE Brian M, MD    [CANCELED] Insert Peripheral IV, , , Once **AND** sodium chloride 0.9 % flush 10 mL, 10 mL, Intravenous, PRN, Shadi Kaminski MD    sodium chloride 0.9 % infusion 1,000 mL, 1,000 mL, Intravenous, Continuous, Shadi Kaminski MD, Last Rate: 25 mL/hr at  "08/25/24 1055, 1,000 mL at 08/25/24 1055    Objective     Vital Signs  Temp:  [97.7 °F (36.5 °C)-98.2 °F (36.8 °C)] 98 °F (36.7 °C)  Heart Rate:  [67-87] 87  Resp:  [16-20] 20  BP: (118-162)/(67-93) 148/72  Body mass index is 24.85 kg/m².    Intake/Output Summary (Last 24 hours) at 8/26/2024 1038  Last data filed at 8/26/2024 0009  Gross per 24 hour   Intake 1300 ml   Output --   Net 1300 ml     No intake/output data recorded.     Physical Exam:    General: patient awake, alert and cooperative   Skin: warm and dry, not jaundiced   Pulm: regular and unlabored   Psychiatric: Normal mood and behavior; memory intact     Results Review:     I reviewed the patient's new clinical results.    Results from last 7 days   Lab Units 08/25/24  0439 08/24/24  0528 08/23/24  0318   WBC 10*3/mm3 5.87 8.21 8.57   HEMOGLOBIN g/dL 11.6* 11.9* 10.5*   HEMATOCRIT % 34.0 34.9 30.9*   PLATELETS 10*3/mm3 300 312 269     Results from last 7 days   Lab Units 08/25/24  0439 08/24/24  0528 08/23/24  1325 08/23/24  0318 08/19/24  2318 08/19/24  1545   SODIUM mmol/L 140 140  --  139   < > 137   POTASSIUM mmol/L 4.0 4.1 4.0 3.5   < > 3.6   CHLORIDE mmol/L 103 104  --  109*   < > 102   CO2 mmol/L 29.0 25.0  --  23.0   < > 26.0   BUN mg/dL 10 12  --  11   < > 13   CREATININE mg/dL 0.55* 0.53*  --  0.58   < > 0.69   CALCIUM mg/dL 8.8 8.7  --  7.9*   < > 9.6   BILIRUBIN mg/dL  --   --   --   --   --  0.3   ALK PHOS U/L  --   --   --   --   --  83   ALT (SGPT) U/L  --   --   --   --   --  13   AST (SGOT) U/L  --   --   --   --   --  15   GLUCOSE mg/dL 103* 120*  --  145*   < > 98    < > = values in this interval not displayed.     Results from last 7 days   Lab Units 08/22/24  0401 08/21/24  0512 08/20/24  0504   INR  0.99 1.06 1.09     No results found for: \"LIPASE\"    Radiology:  MRI Lumbar Spine With & Without Contrast   Preliminary Result   1. There is a 3 mm enhancing lesion in the visualized lower thoracic   spinal cord at the horizontal level " of the junction of middle and   superior thirds of the T12 thoracic level that most probably represents   a spinal cord metastasis. I recommend a dedicated cervical and thoracic   spine MRI with and without contrast to determine if there are any   additional spinal cord METS.       2. I see no osseous metastatic disease to the lumbar spine.       3. This patient has a levoscoliotic curvature of the lumbar spine with   its apex at the L3-4 lumbar level and there is severe disc space   narrowing and degenerative endplate changes with degenerative marrow   sclerosis and degenerative marrow edema in the central to right side of   the endplates at L3-4 along the inner margin of the levoscoliotic curve.   There is lumbar spondylosis as described above. I see no canal or   foraminal narrowing from T11 to L2 and at L2-3 I see mild canal and   bilateral foraminal narrowing. At L3-4, there is mild narrowing of the   right side of the canal, right lateral recess and there is eccentric   spurring into the right neural foramen and to the far right laterally   that moderately narrows the right neural foramen and abuts the   undersurface of the exiting right L4 nerve root. There is mild left   foraminal narrowing at L4-5 and L5-S1.       The results of this study were communicated to Dr. Cesar Iglesias by   telephone on 08/26/2024 at 8:40 AM.          NM Bone Scan Whole Body   Final Result       As described.               This report was finalized on 8/25/2024 11:13 AM by Dr. Ravi Jane M.D on Workstation: [id number]          MRI Brain With & Without Contrast   Final Result   1. Postoperative findings, as noted above.  Continued follow-up is   recommended.            This report was finalized on 8/23/2024 5:28 AM by Dr. Viridiana Altamirano M.D on Workstation: [id number]          CT Head Without Contrast   Final Result   1.  The patient is status post resection of a right cerebellar avidly   enhancing mass. Expected  postoperative changes are noted. No   complicating feature is identified.   2.  Decreased attenuation is appreciated involving the left temporal   lobe posteriorly and inferiorly consistent with vasogenic edema. This   corresponds to the 9 mm enhancing lesion present on the MRI examination   of 8/20/2024. The enhancing lesion involving the superior cerebellar   vermis to the left is occult as is the area of dural enhancement   overlying the left frontal lobe superolaterally.       Radiation dose reduction techniques were utilized, including automated   exposure modulation based on body size.       This report was finalized on 8/21/2024 6:45 PM by Dr. Cesar Levine M.D   on Workstation: [id number]          CT Head With & Without Contrast   Final Result   1. There has been no change when compared to the MRI of the brain with   and without contrast earlier this morning on 08/20/2024 at 12:22 a.m.       2. This patient has 3 enhancing lesions in the brain and findings are   most consistent with metastatic disease to the brain. The enhancing   brain lesions which are most likely brain mets includes a 7 x 6 x 6 mm   enhancing lesion in the posterior inferior left temporal lobe that has   2.5 x 2.3 cm of surrounding vasogenic edema. There is a 10 x 9 x 10 mm   enhancing lesion in the superior midline of the vermis of the cerebellum   that has mild surrounding edema. The largest enhancing lesion is in the   posterior inferior right cerebellum that measures 2.4 x 2 x 2 cm in size   and has a large amount of surrounding vasogenic edema tracking up to 4.5   x 4.5 cm with mild mass effect on the fourth ventricle. There is mild   dilatation of the lateral and third ventricles compatible with mild   hydrocephalus, unchanged. This patient has abnormal circumferential wall   thickening within the distal sigmoid colon seen on chest, abdomen and   pelvic CT earlier this morning on 08/20/2024 at 1:42 a.m. suggesting   there could  be a sigmoid colon cancer and correlation with colonoscopy   is suggested.       Radiation dose reduction techniques were utilized, including automated   exposure control and exposure modulation based on body size.           This report was finalized on 8/21/2024 6:09 AM by Dr. Gilbert Hassan M.D   on Workstation: [id number]          CT Chest Without Contrast Diagnostic   Final Result       1. No evidence of metastatic disease within the thorax.   2. Asymmetric wall thickening involving the distal sigmoid colon, with   some surrounding mesenteric soft tissue nodularity. Appearance is very   worrisome for primary colonic malignancy. Correlation with colonoscopy   is recommended. MRI could allow for further assessment.   3. Sclerotic lesion within the spinous process of T1. I would favor that   this is a benign lesion such as a bone island. Consider short-term   follow-up or bone scan.       Radiation dose reduction techniques were utilized, including automated   exposure control and exposure modulation based on body size.           This report was finalized on 8/20/2024 3:54 AM by Dr. Viridiana Altamirano M.D on Workstation: [id number]          CT Abdomen Pelvis Without Contrast   Final Result       1. No evidence of metastatic disease within the thorax.   2. Asymmetric wall thickening involving the distal sigmoid colon, with   some surrounding mesenteric soft tissue nodularity. Appearance is very   worrisome for primary colonic malignancy. Correlation with colonoscopy   is recommended. MRI could allow for further assessment.   3. Sclerotic lesion within the spinous process of T1. I would favor that   this is a benign lesion such as a bone island. Consider short-term   follow-up or bone scan.       Radiation dose reduction techniques were utilized, including automated   exposure control and exposure modulation based on body size.           This report was finalized on 8/20/2024 3:54 AM by Dr. Viridiana Altamirano    M.D on Workstation: [id number]          MRI Brain With & Without Contrast   Final Result   1. Study confirms the presence of lesions within the left temporal lobe,   as well as the right cerebellar hemisphere. A third lesion is noted   within the cerebellar vermis. There is mass effect upon the fourth   ventricle. There is some mild dilatation of the lateral and third   ventricles. Continued follow-up is recommended.           This report was finalized on 8/20/2024 2:41 AM by Dr. Viridiana Altamirano M.D on Workstation: [id number]          CT Head Without Contrast   Final Result   Areas of increased attenuation are appreciated over the   right cerebral hemisphere posteriorly (2.1 cm) and left temporal lobe   inferiorly and posteriorly (6 mm) most consistent with that of   metastatic disease with associated edema. Edema is most prominent   involving the right cerebellar hemisphere. There is mass effect upon the   fourth ventricle and mild prominence of the lateral and third   ventricles. Further evaluation with MRI examination of the brain with   and without contrast is recommended.       The above information was called to and discussed with Dr. Epps.                   Radiation dose reduction techniques were utilized, including automated   exposure modulation based on body size.       This report was finalized on 8/19/2024 6:40 PM by Dr. Cesar Levine M.D   on Workstation: [id number]          XR Chest 1 View   Final Result   1. No acute process           This report was finalized on 8/19/2024 4:32 PM by Dr. 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  8/26: CEA 2.1  Consulted surgery for further recommendations.  Would continue liquid diet for now given obstructive rectal mass.  Will defer further recommendations for diet to surgery.  GI will stay on service until pathology results return.    I discussed the patients findings and my recommendations with patient.    Dragon dictation used throughout this note.            Tiffany Pinzon PA-C  [facility]  [facility] [place] [address]  Office: [phone]                Electronically signed by Tiffany Pinzon PA-C at 08/26/24 1042          Consult Notes (last 72 hours)        Elieser Saunders MD at 08/26/24 1502        Consult Orders    1. Hematology & Oncology Inpatient Consult [[id number]] ordered by Saturnino Chester MD at 08/26/24 1208                 [facility] INITIAL INPATIENT CONSULTATION NOTE    REASON FOR CONSULTATION:    Metastatic colon cancer    HISTORY OF PRESENT ILLNESS:  Ely Sims is a 64 y.o. female who we are asked to see today in consultation for metastatic colon cancer.     The patient has a past medical history of hyperlipidemia, ADHD, anxiety, depression, tobacco use    The patient presented on 8/19/2022 initially to urgent care with headache, nausea, vomiting, diarrhea.  She was advised to go to the ED from urgent care.  CT head 8/19/2024 showed some areas of increased attenuation over the right cerebral hemisphere and left temporal lobe concerning for metastatic disease with edema with some mass effect upon the fourth ventricle.    MRI brain 8/20/2024 with a lesion at the right cerebellar hemisphere measuring 2.2 cm, lesion within the left temporal lobe measuring 9 mm, 1.0 cm lesion at the left cerebellar vermis, all with surrounding edema.  There was some mass effect upon the fourth ventricle with mild prominence  of the lateral and third ventricles.    8/20/2024: CT chest abdomen and pelvis with no evidence of pulmonary metastatic disease.  There was some asymmetric wall thickening in the distal sigmoid colon with surrounding mesenteric soft tissue nodularity concerning for primary colon cancer.  A sclerotic lesion of the spinous process of T1 was said to be consistent with a bone island or other benign lesion.    8/21/2024: Posterior fossa craniectomy with gross total removal of a right cerebellar hemisphere metastasis Dr. Bull Doty.  Pathology consistent with metastatic poorly differentiated adenocarcinoma favoring colorectal origin.  MSI testing is pending.    8/25/2024: Bone scan with no obvious metastatic disease.    8/25/2024: Colonoscopy by Dr. Kaminski shows an infiltrative completely obstructing large mass found at the proximal rectum.  Pathology is pending.  CEA 2.10.    8/26/2024: MRI lumbar spine with no obvious metastatic disease in the lumbar spine.  However, there is a 3 mm enhancing lesion in the visualized lower thoracic spinal cord that could represent a spinal cord metastasis.  Cervical and thoracic spine MRI requested..    She has left lower quadrant fullness but denies any obstructive symptoms otherwise.  She is having bowel movements.  She did have some diarrhea but states this has improved.      She states that she had a colonoscopy at age 50 with no polyps.  Sister with a history of colorectal cancer.    History reviewed. No pertinent past medical history.    Past Surgical History:   Procedure Laterality Date    COLONOSCOPY N/A 8/25/2024    Procedure: COLONOSCOPY to 20cm with cold biopsies and needle tattoo;  Surgeon: Shadi Kaminski MD;  Location: [facility] ENDOSCOPY;  Service: Gastroenterology;  Laterality: N/A;  pre: abnormal imaging  post: poor prep, obstructing colon mass at 20cm, hemorrhoids    CRANIOTOMY N/A 8/21/2024    Procedure: Posterior fossa craniotomy for tumor using stereotactic  guidance;  Surgeon: Bull Doty MD;  Location: [facility];  Service: Neurosurgery;  Laterality: N/A;       SOCIAL HISTORY:   reports that she has been smoking cigarettes. She has never used smokeless tobacco. She reports current alcohol use. Drug use questions deferred to the physician.    FAMILY HISTORY:  family history is not on file.    ALLERGIES:  No Known Allergies    MEDICATIONS:  As listed in the electronic medical record.    Review of Systems   Gastrointestinal:         Left upper quadrant fullness   Neurological:  Positive for headaches.   All other systems reviewed and are negative.      Vitals:    08/26/24 0343 08/26/24 0855 08/26/24 0904 08/26/24 1221   BP: 121/67 148/82 148/72 119/63   BP Location: Left arm  Left arm Left arm   Patient Position: Lying  Lying Lying   Pulse: 72 76 87 71   Resp: 18  20 20   Temp: 97.9 °F (36.6 °C)  98 °F (36.7 °C) 98.9 °F (37.2 °C)   TempSrc: Oral  Oral Oral   SpO2: 97%   98%   Weight:       Height:           Physical Exam  Vitals reviewed.   Constitutional:       Appearance: She is well-developed.   HENT:      Head: Normocephalic and atraumatic.      Nose: Nose normal.   Eyes:      Conjunctiva/sclera: Conjunctivae normal.      Pupils: Pupils are equal, round, and reactive to light.   Cardiovascular:      Rate and Rhythm: Normal rate and regular rhythm.      Heart sounds: Normal heart sounds, S1 normal and S2 normal. No murmur heard.     No friction rub. No gallop.   Pulmonary:      Effort: Pulmonary effort is normal. No respiratory distress.      Breath sounds: Normal breath sounds. No stridor. No wheezing, rhonchi or rales.   Chest:      Chest wall: No tenderness.   Abdominal:      General: Bowel sounds are normal. There is no distension.      Palpations: Abdomen is soft. There is no mass.      Tenderness: There is no abdominal tenderness. There is no guarding or rebound.   Musculoskeletal:         General: Normal range of motion.      Cervical back: Neck  supple.   Lymphadenopathy:      Cervical: No cervical adenopathy.      Upper Body:      Right upper body: No supraclavicular adenopathy.      Left upper body: No supraclavicular adenopathy.   Skin:     General: Skin is warm and dry.      Findings: No erythema or rash.   Neurological:      Mental Status: She is alert and oriented to person, place, and time.      Cranial Nerves: No cranial nerve deficit.      Sensory: No sensory deficit.   Psychiatric:         Behavior: Behavior normal.         Thought Content: Thought content normal.         Judgment: Judgment normal.         DIAGNOSTIC DATA:  Results from last 7 days   Lab Units 08/25/24  0439   WBC 10*3/mm3 5.87   HEMOGLOBIN g/dL 11.6*   HEMATOCRIT % 34.0   PLATELETS 10*3/mm3 300     Lab Results   Component Value Date    NEUTROABS 4.24 08/25/2024     Results from last 7 days   Lab Units 08/25/24  0439   SODIUM mmol/L 140   POTASSIUM mmol/L 4.0   CHLORIDE mmol/L 103   CO2 mmol/L 29.0   BUN mg/dL 10   CREATININE mg/dL 0.55*   GLUCOSE mg/dL 103*   CALCIUM mg/dL 8.8     Results from last 7 days   Lab Units 08/22/24  0401 08/21/24  0512 08/20/24  0504   INR  0.99 1.06 1.09   APTT seconds 24.5  --  28.4     Results from last 7 days   Lab Units 08/23/24  0318   MAGNESIUM mg/dL 2.5*       IMAGING:      CT Chest Without Contrast Diagnostic (08/20/2024 01:44)  CT Abdomen Pelvis Without Contrast (08/20/2024 01:44)  MRI Brain With & Without Contrast (08/20/2024 01:06)    CT and MRI images personally reviewed. Rectal mass with brain metastases as outlined in the HPI    ASSESSMENT:  This is a 64 y.o. female with:    *Metastatic colon cancer  The patient presented on 8/19/2022 initially to urgent care with headache, nausea, vomiting, diarrhea.  She was advised to go to the ED from urgent care.  CT head 8/19/2024 showed some areas of increased attenuation over the right cerebral hemisphere and left temporal lobe concerning for metastatic disease with edema with some mass effect  upon the fourth ventricle.  MRI brain 8/20/2024 with a lesion at the right cerebellar hemisphere measuring 2.2 cm, lesion within the left temporal lobe measuring 9 mm, 1.0 cm lesion at the left cerebellar vermis, all with surrounding edema.  There was some mass effect upon the fourth ventricle with mild prominence of the lateral and third ventricles.  8/20/2024: CT chest abdomen and pelvis with no evidence of pulmonary metastatic disease.  There was some asymmetric wall thickening in the distal sigmoid colon with surrounding mesenteric soft tissue nodularity concerning for primary colon cancer.  A sclerotic lesion of the spinous process of T1 was said to be consistent with a bone island or other benign lesion.  8/21/2024: Posterior fossa craniectomy with gross total removal of a right cerebellar hemisphere metastasis Dr. Bull Doty.  Pathology consistent with metastatic poorly differentiated adenocarcinoma favoring colorectal origin.  MSI testing is pending.  8/25/2024: Bone scan with no obvious metastatic disease.  8/25/2024: Colonoscopy by Dr. Kaminski shows an infiltrative completely obstructing large mass found at the proximal rectum.  Pathology is pending.    CEA 2.10.  8/26/2024: MRI lumbar spine with no obvious metastatic disease in the lumbar spine.  However, there is a 3 mm enhancing lesion in the visualized lower thoracic spinal cord that could represent a spinal cord metastasis.  Cervical and thoracic spine MRI requested..    *Mild normocytic anemia  Hemoglobin 11.6      RECOMMENDATIONS/PLAN:   Will send brain tissue for NGS  Follow-up rectal biopsy result  Agree with general surgery consult.  Will need diversion if completely obstructed or nearly completely obstructed.  Dr. Chester requested a barium enema, performed, result pending.  Agree with radiation oncology consult.  Will need postoperative radiation to the brain, radiation to spinal cord lesion prior to considering palliative  chemotherapy.  Follow-up MRI cervical and cervical spine  Currently on a Medrol Dosepak  Outpatient PET scan when possible  Discussed the incurable nature of this malignancy with the patient, her , and her children  We will follow    Elieser Saunders MD     Electronically signed by Elieser Saunders MD at 08/26/24 9663       Terrie Crenshaw MD at 08/26/24 1407        Consult Orders    1. Inpatient Radiation Oncology Consult [[id number]] ordered by Waleska Alanis APRN at 08/26/24 1239                 DIAGNOSIS and REASON FOR CONSULTATION: brain metastases  -  for advice and recommendations for this diagnosis    Referring Provider:  Kendrick Epps II,*    HISTORY OF PRESENT ILLNESS:  The patient is a 64 y.o. year old female who presented to the ER on 8/19/24 with a 3-4 day hx of severe headaches followed by nausea, vomiting and diarrhea.      She had a brain mri on 8/20/24 which showed 3 separate lesions including a lesion in the right cerebellar hemisphere measuring 2.1 x 2.2 cm. The lesion within the left temporal lobe measures 9 x 7mm. There is a third lesion which is noted within the left side of the cerebellar vermis. This measures 1.0 x 0.8 cm.    She had a CT chest abdomen and pelvis on 8/20/24 which showed no evidence of metastatic disease within the thorax. Asymmetric wall thickening involving the distal sigmoid colon, with some surrounding mesenteric soft tissue nodularity. Appearance is very worrisome for primary colonic malignancy. Correlation with colonoscopy is recommended. MRI could allow for further assessment. Sclerotic lesion within the spinous process of T1 favor that this is a benign lesion such as a bone island.     She underwent resection of the right cerebellar lesion on 8/21/24 with pathology revealing metastatic poorly differentiated adenocarcinoma.     She had a CT head on 8/21/24 which showed status post resection of a right cerebellar avidly enhancing mass. Expected  postoperative changes are noted.  Decreased attenuation is appreciated involving the left temporallobe posteriorly and inferiorly consistent with vasogenic edema. Thiscorresponds to the 9 mm enhancing lesion present on the MRI examinationof 2024. The enhancing lesion involving the superior cerebellarvermis to the left is occult as is the area of dural enhancement  overlying the left frontal lobe superolaterally.    She underwent a colonoscopy yesterday with Dr. Shadi Kaminski with findings of a completely obstructing mass in the proximal rectum  Pathology is still pending.     She had a bone scan yesterday which showed Focal activity at the level of the distal right radial shaft could be a separate focus overlying soft tissue activity related to an injection attempt, or could represent an osseous process such as old fracture or lesion, correlate clinically, x-ray of the right forearm is recommended for further evaluation.    She had a mri of the lumbar spine earlier today which showed a 3 mm enhancing lesion in the visualized lower thoracic spinal cord at the horizontal level of the junction of middle and superior thirds of the T12 thoracic level that most probably represents a spinal cord metastasis. I recommend a dedicated cervical and thoracic spine MRI with and without contrast to     No localized activity is seen at the level of the previous CT  demonstrated T1 spinous process sclerotic focus, but the size of the  lesion may be below the resolution of bone scan, CT follow-up advised to  characterize change.    Her family hx includes a sister with colon cancer and father  from ?bone cancer.      I was asked to see the patient at the request of the referring provider noted above for advice and recommendations regarding this diagnosis.    Objective     Past Medical History: she  has no past medical history on file.    Past Surgical History:  she has a past surgical history that includes Craniotomy (N/A,  8/21/2024) and Colonoscopy (N/A, 8/25/2024).    Meds:    Current Facility-Administered Medications:     [DISCONTINUED] acetaminophen (TYLENOL) tablet 650 mg, 650 mg, Oral, Q4H PRN **OR** acetaminophen (TYLENOL) 160 MG/5ML oral solution 650 mg, 650 mg, Oral, Q4H PRN **OR** [DISCONTINUED] acetaminophen (TYLENOL) suppository 650 mg, 650 mg, Rectal, Q4H PRN, Sarika Wilburn MD    amLODIPine (NORVASC) tablet 5 mg, 5 mg, Oral, BID, Shadi Kaminski MD, 5 mg at 08/26/24 0855    atorvastatin (LIPITOR) tablet 10 mg, 10 mg, Oral, Daily, Shadi Kaminski MD, 10 mg at 08/26/24 0857    sennosides-docusate (PERICOLACE) 8.6-50 MG per tablet 1 tablet, 1 tablet, Oral, Nightly, 1 tablet at 08/24/24 2110 **AND** polyethylene glycol (MIRALAX) packet 17 g, 17 g, Oral, Daily PRN **AND** bisacodyl (DULCOLAX) EC tablet 5 mg, 5 mg, Oral, Daily PRN **AND** bisacodyl (DULCOLAX) suppository 10 mg, 10 mg, Rectal, Daily PRN, Shadi Kaminski MD    Calcium Replacement - Follow Nurse / BPA Driven Protocol, , Does not apply, PRN, Shadi Kaminski MD    docusate sodium (COLACE) capsule 100 mg, 100 mg, Oral, Daily, Shadi Kaminski MD, 100 mg at 08/26/24 0857    enalaprilat (VASOTEC) injection 0.625 mg, 0.625 mg, Intravenous, Q6H PRN, Shadi Kaminski MD    famotidine (PEPCID) tablet 20 mg, 20 mg, Oral, BID AC, Shadi Kaminski MD, 20 mg at 08/26/24 0856    fentaNYL citrate (PF) (SUBLIMAZE) injection, , Intravenous, Code / Trauma / Sedation Medication, Malvin Merritt MD, 50 mcg at 08/21/24 1158    HYDROcodone-acetaminophen (NORCO) 5-325 MG per tablet 1 tablet, 1 tablet, Oral, Q4H PRN, Sarika Wilburn MD, 1 tablet at 08/26/24 1300    labetalol (NORMODYNE,TRANDATE) injection 20 mg, 20 mg, Intravenous, Q2H PRN, Shadi Kaminski MD, 20 mg at 08/24/24 1800    levETIRAcetam (KEPPRA) tablet 500 mg, 500 mg, Oral, BID, Shadi Kaminski MD, 500 mg at 08/26/24 0856    Magnesium Standard Dose Replacement - Follow Nurse / BPA Driven Protocol, , Does not  apply, PRN, Shadi Kaminski MD    methocarbamol (ROBAXIN) tablet 750 mg, 750 mg, Oral, Q6H PRN, Shadi Kaminski MD, 750 mg at 08/26/24 1300    methylPREDNISolone (MEDROL) dose pack 4 mg, 4 mg, Oral, TID Around Food, Sarika Wilburn MD, 4 mg at 08/26/24 1340    [START ON 8/27/2024] methylPREDNISolone (MEDROL) dose pack 4 mg, 4 mg, Oral, Before Breakfast, Shadi Kaminski MD    [START ON 8/27/2024] methylPREDNISolone (MEDROL) dose pack 4 mg, 4 mg, Oral, Tonight, Shadi Kaminski MD    [START ON 8/28/2024] methylPREDNISolone (MEDROL) dose pack 4 mg, 4 mg, Oral, Before Breakfast, Shadi Kaminski MD    midazolam (VERSED) injection, , Intravenous, Code / Trauma / Sedation Medication, Malvin Merritt MD, 1 mg at 08/21/24 1157    nitroglycerin (NITROSTAT) SL tablet 0.4 mg, 0.4 mg, Sublingual, Q5 Min PRN, Shadi Kaminski MD    ondansetron ODT (ZOFRAN-ODT) disintegrating tablet 4 mg, 4 mg, Oral, Q6H PRN **OR** ondansetron (ZOFRAN) injection 4 mg, 4 mg, Intravenous, Q6H PRN, Shadi Kaminski MD    Phosphorus Replacement - Follow Nurse / BPA Driven Protocol, , Does not apply, Gordy ROWE Brian M, MD    Potassium Replacement - Follow Nurse / BPA Driven Protocol, , Does not apply, Gordy ROWE Brian M, MD    [CANCELED] Insert Peripheral IV, , , Once **AND** sodium chloride 0.9 % flush 10 mL, 10 mL, Intravenous, PRN, Shadi Kaminski MD    sodium chloride 0.9 % infusion 1,000 mL, 1,000 mL, Intravenous, Continuous, Shadi Kaminski MD, Last Rate: 25 mL/hr at 08/25/24 1055, 1,000 mL at 08/25/24 1055    Allergies:  No Known Allergies    Family History:  her family history is not on file.    Social History:  she  reports that she has been smoking cigarettes. She has never used smokeless tobacco. She reports current alcohol use. Drug use questions deferred to the physician.    Pertinent Findings on   Review of Systems   Constitutional:  Positive for appetite change and fatigue.   Respiratory: Negative.     Cardiovascular:  Negative.    Gastrointestinal:  Positive for diarrhea, nausea and vomiting.   Genitourinary: Negative.     Musculoskeletal: Negative.    Skin: Negative.    Neurological:  Positive for headaches.   Psychiatric/Behavioral: Negative.     :  Subjective     Vitals:    08/26/24 0343 08/26/24 0855 08/26/24 0904 08/26/24 1221   BP: 121/67 148/82 148/72 119/63   BP Location: Left arm  Left arm Left arm   Patient Position: Lying  Lying Lying   Pulse: 72 76 87 71   Resp: 18  20 20   Temp: 97.9 °F (36.6 °C)  98 °F (36.7 °C) 98.9 °F (37.2 °C)   TempSrc: Oral  Oral Oral   SpO2: 97%   98%   Weight:       Height:           Performance Status: (1) Restricted in physically strenuous activity, ambulatory and able to do work of light nature    Pertinent Findings on  Physical Exam  Constitutional:       Appearance: Normal appearance.   HENT:      Head: Atraumatic.        Comments: Incision clean, dry intact  Eyes:      Extraocular Movements: Extraocular movements intact.   Pulmonary:      Effort: Pulmonary effort is normal.   Musculoskeletal:         General: Normal range of motion.   Neurological:      General: No focal deficit present.      Mental Status: She is alert and oriented to person, place, and time.      Cranial Nerves: No cranial nerve deficit.      Motor: No weakness.   Psychiatric:         Mood and Affect: Mood normal.         Behavior: Behavior normal.     :      Assessment: 64 year old female with newly diagnosed rectal cancer with brain metastases, pod # 5 s/p resection of the right cerebellar metastases.     Plan:  I have personally reviewed her imaging including mri of the brain and CT scans.  I discussed with her post operative stereotactic radiation to the resected cerebellar met as well as SRS to the other 2 brain metastases.  I discussed possible acute side effects of fatigue, hair loss, headache, nausea, vomiting, bleeding, edema or seizures.  I discussed possible late effects of radionecrosis, late hearing loss  or neurocognitive changes.  She voiced understanding. She is discussing possibly proceeding with a diverting colostomy in the next 1-2 days.  We will need to complete her staging workup.  I have scheduled her to return to see me on Tuesday, September 3 for re-evaluation and CT simulation.  We will make decisions regarding possibly treating the rectal primary as well.     Objective   I spent greater than 70 mins (03683) on the unit and of that time 40 minutes  in counseling and coordination of care, including my review of imaging and pathology; indications, goals, logistics, alternatives and risks - both common and rare - for my recommendations as well as surveillance and potential outcomes. NCCN Guidelines were consulted, discussed with the patient and used to make the above recommendations.       Electronically signed by Terrie Crenshaw MD at 08/27/24 1340       Saturnino Chester MD at 08/26/24 1150        Consult Orders    1. Inpatient General Surgery Consult [[id number]] ordered by Tiffany Pinzon PA-C at 08/26/24 0831                 Consultation note    Referring physician: Kendrick Epps II*    Consulting Physician: Saturnino Chester MD    Reason for consultation: Colorectal cancer    Chief Complaint   Patient presents with    Dizziness    Headache    Hypertension       HPI:   The patient is a very pleasant 64 y.o. years old female that presented to the hospital with headaches.  She was found to have several metastatic lesions in the brain.  She underwent posterior fossa craniectomy with removal of right cerebral hemisphere metastatic lesion.  Pathology report showed evidence of adenocarcinoma with concerns of colonic origin.  She underwent CT scan abdomen pelvis that showed thickening of the colorectal area.  She underwent colonoscopy and she was found to have obstructing proximal rectal colon mass.  Biopsies were obtained.  Colonoscopy was not able to be advanced through the lesion.   The patient reports having abdominal bloating but denies any abdominal pain nausea or vomiting.  She has been having regular bowel function.  She tolerated bowel preparation without any problem.  Denies any rectal bleeding or recent change of bowel habits.  Mother with colon cancer at 65 years old.  Patient with history of colonoscopy in her 50s.      History reviewed. No pertinent past medical history.    Past Surgical History:   Procedure Laterality Date    COLONOSCOPY N/A 8/25/2024    Procedure: COLONOSCOPY to 20cm with cold biopsies and needle tattoo;  Surgeon: Shadi Kaminski MD;  Location: [facility] ENDOSCOPY;  Service: Gastroenterology;  Laterality: N/A;  pre: abnormal imaging  post: poor prep, obstructing colon mass at 20cm, hemorrhoids    CRANIOTOMY N/A 8/21/2024    Procedure: Posterior fossa craniotomy for tumor using stereotactic guidance;  Surgeon: Bull Doty MD;  Location: [facility] OR;  Service: Neurosurgery;  Laterality: N/A;         Current Facility-Administered Medications:     [DISCONTINUED] acetaminophen (TYLENOL) tablet 650 mg, 650 mg, Oral, Q4H PRN **OR** acetaminophen (TYLENOL) 160 MG/5ML oral solution 650 mg, 650 mg, Oral, Q4H PRN **OR** [DISCONTINUED] acetaminophen (TYLENOL) suppository 650 mg, 650 mg, Rectal, Q4H PRN, Sarika Wilburn MD    amLODIPine (NORVASC) tablet 5 mg, 5 mg, Oral, BID, Shadi Kaminski MD, 5 mg at 08/26/24 0855    atorvastatin (LIPITOR) tablet 10 mg, 10 mg, Oral, Daily, Shadi Kaminski MD, 10 mg at 08/26/24 0857    sennosides-docusate (PERICOLACE) 8.6-50 MG per tablet 1 tablet, 1 tablet, Oral, Nightly, 1 tablet at 08/24/24 2110 **AND** polyethylene glycol (MIRALAX) packet 17 g, 17 g, Oral, Daily PRN **AND** bisacodyl (DULCOLAX) EC tablet 5 mg, 5 mg, Oral, Daily PRN **AND** bisacodyl (DULCOLAX) suppository 10 mg, 10 mg, Rectal, Daily PRN, Shadi Kaminski, MD    Calcium Replacement - Follow Nurse / BPA Driven Protocol, , Does not apply, Gordy ROWE Brian M, MD     docusate sodium (COLACE) capsule 100 mg, 100 mg, Oral, Daily, Shadi Kaminski MD, 100 mg at 08/26/24 0857    enalaprilat (VASOTEC) injection 0.625 mg, 0.625 mg, Intravenous, Q6H PRN, Shadi Kaminski MD    famotidine (PEPCID) tablet 20 mg, 20 mg, Oral, BID AC, Shadi Kaminski MD, 20 mg at 08/26/24 0856    fentaNYL citrate (PF) (SUBLIMAZE) injection, , Intravenous, Code / Trauma / Sedation Medication, Malvin Merritt MD, 50 mcg at 08/21/24 1158    HYDROcodone-acetaminophen (NORCO) 5-325 MG per tablet 1 tablet, 1 tablet, Oral, Q4H PRN, Sarika Wilburn MD, 1 tablet at 08/26/24 0856    labetalol (NORMODYNE,TRANDATE) injection 20 mg, 20 mg, Intravenous, Q2H PRN, Shadi Kaminski MD, 20 mg at 08/24/24 1800    levETIRAcetam (KEPPRA) tablet 500 mg, 500 mg, Oral, BID, Shadi Kaminski MD, 500 mg at 08/26/24 0856    Magnesium Standard Dose Replacement - Follow Nurse / BPA Driven Protocol, , Does not apply, PRN, Shadi Kaminski MD    methocarbamol (ROBAXIN) tablet 750 mg, 750 mg, Oral, Q6H PRN, Shadi Kaminski MD, 750 mg at 08/26/24 0250    methylPREDNISolone (MEDROL) dose pack 4 mg, 4 mg, Oral, TID Around Food, Sarika Wilburn MD, 4 mg at 08/26/24 0857    [START ON 8/27/2024] methylPREDNISolone (MEDROL) dose pack 4 mg, 4 mg, Oral, Before Breakfast, Shadi Kaminski MD    [START ON 8/27/2024] methylPREDNISolone (MEDROL) dose pack 4 mg, 4 mg, Oral, Tonight, Shadi Kaminski MD    [START ON 8/28/2024] methylPREDNISolone (MEDROL) dose pack 4 mg, 4 mg, Oral, Before Breakfast, Shadi Kaminski MD    midazolam (VERSED) injection, , Intravenous, Code / Trauma / Sedation Medication, Malvin Merritt MD, 1 mg at 08/21/24 1157    nitroglycerin (NITROSTAT) SL tablet 0.4 mg, 0.4 mg, Sublingual, Q5 Min PRN, Shadi Kaminski MD    ondansetron ODT (ZOFRAN-ODT) disintegrating tablet 4 mg, 4 mg, Oral, Q6H PRN **OR** ondansetron (ZOFRAN) injection 4 mg, 4 mg, Intravenous, Q6H PRN, Shadi Kaminski MD    Phosphorus Replacement -  Follow Nurse / BPA Driven Protocol, , Does not apply, Gordy ROWE Brian M, MD    Potassium Replacement - Follow Nurse / BPA Driven Protocol, , Does not apply, Gordy ROWE Brian M, MD    [CANCELED] Insert Peripheral IV, , , Once **AND** sodium chloride 0.9 % flush 10 mL, 10 mL, Intravenous, PRN, Shadi Kaminski MD    sodium chloride 0.9 % infusion 1,000 mL, 1,000 mL, Intravenous, Continuous, Shadi Kaminski MD, Last Rate: 25 mL/hr at 08/25/24 1055, 1,000 mL at 08/25/24 1055    No Known Allergies    Social History     Socioeconomic History    Marital status: Single   Tobacco Use    Smoking status: Some Days     Types: Cigarettes    Smokeless tobacco: Never   Vaping Use    Vaping status: Never Used   Substance and Sexual Activity    Alcohol use: Yes    Drug use: Defer    Sexual activity: Defer       History reviewed. No pertinent family history.    ROS:   Denies any recent weight loss, complaints of intermittent episode of headaches    Physical Exam:   Vitals:    08/26/24 0904   BP: 148/72   Pulse: 87   Resp: 20   Temp: 98 °F (36.7 °C)   SpO2:      GENERAL:oriented to person, place, and time, alert, no acute distress  HEENT: normochephalic, atraumatic, no scleral icterus moist mucous membranes.  NECK: Supple  CHEST: Breathing comfortable  CARDIAC: regular rate and rhythm    ABDOMEN: Abdomen soft, nontender nondistended but feels bloated.  No rebound guarding or peritoneal signs  EXTREMITIES: no cyanosis, clubbing or edema    NEURO: alert and oriented, normal speech, cranial nerves 2-12 grossly intact, no focal deficits   SKIN: Moist, warm, no rashes.    Diagnostic workup:   CT scan of the abdomen and pelvis showing thickening of the wall of the wall of the proximal and mid rectum all the way proximally to the distal descending colon.  There there are no indirect signs of colonic obstruction.    White blood cell count 5.8, hemoglobin 11.6, platelets 300  CEA levels 2.1    Colonoscopy report from yesterday was  reviewed.  There is no mention about the distance of the mass from the anal verge I suspect patient likely has upper and mid rectum colon cancer.    Assessment and plan:   The patient is a very pleasant 64 y.o. 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  Colonoscopy was not able to pass through the mass in the rectum.  Patient does not have obstructed symptoms.  I discussed with the patient about further step.  First, we will need to establish how much of the lumen of the rectum is still open to decide whether she will need to have diverting colostomy or not.  We will continue to follow final pathology report from rectal mass.  I will place a consult for oncology to see the patient.  She will need to have port placed for chemotherapy.  I discussed with the patient that at this time and because the fact that she has metastatic disease that she will not be candidate for curative resection.    I will order a Gastrografin enema today   We will continue to follow    Case was discussed with patient and family    Risk and benefits of the current recommended treatment were explained to the patient that had time to ask questions that where answered, verbalized understanding and agreed with the plan.     Saturnino Chester MD  General, Minimally Invasive and Endoscopic Surgery  [facility]    [address], [address]  , [address]  P: [phone]  F: [phone]      Electronically signed by Saturnino Chester MD at 08/26/24 3726

## 2024-08-29 DIAGNOSIS — C20 RECTAL ADENOCARCINOMA: Primary | ICD-10-CM

## 2024-08-29 PROBLEM — Z45.2 ENCOUNTER FOR ADJUSTMENT OR MANAGEMENT OF VASCULAR ACCESS DEVICE: Status: ACTIVE | Noted: 2024-08-29

## 2024-08-29 LAB
ANION GAP SERPL CALCULATED.3IONS-SCNC: 10.8 MMOL/L (ref 5–15)
BUN SERPL-MCNC: 10 MG/DL (ref 8–23)
BUN/CREAT SERPL: 17.9 (ref 7–25)
CALCIUM SPEC-SCNC: 8.9 MG/DL (ref 8.6–10.5)
CHLORIDE SERPL-SCNC: 105 MMOL/L (ref 98–107)
CO2 SERPL-SCNC: 26.2 MMOL/L (ref 22–29)
CREAT SERPL-MCNC: 0.56 MG/DL (ref 0.57–1)
CYTO UR: NORMAL
DEPRECATED RDW RBC AUTO: 44.4 FL (ref 37–54)
EGFRCR SERPLBLD CKD-EPI 2021: 102.1 ML/MIN/1.73
ERYTHROCYTE [DISTWIDTH] IN BLOOD BY AUTOMATED COUNT: 12.3 % (ref 12.3–15.4)
GLUCOSE SERPL-MCNC: 88 MG/DL (ref 65–99)
HCT VFR BLD AUTO: 34 % (ref 34–46.6)
HGB BLD-MCNC: 11.7 G/DL (ref 12–15.9)
LAB AP CASE REPORT: NORMAL
LAB AP DIAGNOSIS COMMENT: NORMAL
LAB AP INTRADEPARTMENTAL CONSULT: NORMAL
LAB AP SPECIAL STAINS: NORMAL
MCH RBC QN AUTO: 33.6 PG (ref 26.6–33)
MCHC RBC AUTO-ENTMCNC: 34.4 G/DL (ref 31.5–35.7)
MCV RBC AUTO: 97.7 FL (ref 79–97)
PATH REPORT.ADDENDUM SPEC: NORMAL
PATH REPORT.FINAL DX SPEC: NORMAL
PATH REPORT.GROSS SPEC: NORMAL
PLATELET # BLD AUTO: 303 10*3/MM3 (ref 140–450)
PMV BLD AUTO: 8.7 FL (ref 6–12)
POTASSIUM SERPL-SCNC: 4.1 MMOL/L (ref 3.5–5.2)
RBC # BLD AUTO: 3.48 10*6/MM3 (ref 3.77–5.28)
SODIUM SERPL-SCNC: 142 MMOL/L (ref 136–145)
WBC NRBC COR # BLD AUTO: 7.47 10*3/MM3 (ref 3.4–10.8)

## 2024-08-29 PROCEDURE — 80048 BASIC METABOLIC PNL TOTAL CA: CPT | Performed by: SURGERY

## 2024-08-29 PROCEDURE — 63710000001 DEXAMETHASONE PER 0.25 MG: Performed by: INTERNAL MEDICINE

## 2024-08-29 PROCEDURE — 25010000002 ENOXAPARIN PER 10 MG

## 2024-08-29 PROCEDURE — 63710000001 DIPHENHYDRAMINE PER 50 MG: Performed by: STUDENT IN AN ORGANIZED HEALTH CARE EDUCATION/TRAINING PROGRAM

## 2024-08-29 PROCEDURE — 99024 POSTOP FOLLOW-UP VISIT: CPT | Performed by: NURSE PRACTITIONER

## 2024-08-29 PROCEDURE — 85027 COMPLETE CBC AUTOMATED: CPT | Performed by: SURGERY

## 2024-08-29 PROCEDURE — 99232 SBSQ HOSP IP/OBS MODERATE 35: CPT | Performed by: INTERNAL MEDICINE

## 2024-08-29 PROCEDURE — 99231 SBSQ HOSP IP/OBS SF/LOW 25: CPT | Performed by: PHYSICIAN ASSISTANT

## 2024-08-29 RX ORDER — DIPHENHYDRAMINE HCL 25 MG
25 CAPSULE ORAL ONCE
Status: COMPLETED | OUTPATIENT
Start: 2024-08-29 | End: 2024-08-29

## 2024-08-29 RX ORDER — SODIUM CHLORIDE 0.9 % (FLUSH) 0.9 %
10 SYRINGE (ML) INJECTION AS NEEDED
OUTPATIENT
Start: 2024-08-29

## 2024-08-29 RX ORDER — HYDROCODONE BITARTRATE AND ACETAMINOPHEN 7.5; 325 MG/1; MG/1
1 TABLET ORAL EVERY 4 HOURS PRN
Status: DISCONTINUED | OUTPATIENT
Start: 2024-08-29 | End: 2024-08-30 | Stop reason: HOSPADM

## 2024-08-29 RX ORDER — HEPARIN SODIUM (PORCINE) LOCK FLUSH IV SOLN 100 UNIT/ML 100 UNIT/ML
500 SOLUTION INTRAVENOUS AS NEEDED
OUTPATIENT
Start: 2024-08-29

## 2024-08-29 RX ORDER — DEXAMETHASONE 4 MG/1
4 TABLET ORAL EVERY 12 HOURS SCHEDULED
Status: DISCONTINUED | OUTPATIENT
Start: 2024-08-29 | End: 2024-08-30 | Stop reason: HOSPADM

## 2024-08-29 RX ADMIN — OXYCODONE HYDROCHLORIDE AND ACETAMINOPHEN 1 TABLET: 5; 325 TABLET ORAL at 08:08

## 2024-08-29 RX ADMIN — LEVETIRACETAM 500 MG: 500 TABLET, FILM COATED ORAL at 08:07

## 2024-08-29 RX ADMIN — HYDROCODONE BITARTRATE AND ACETAMINOPHEN 1 TABLET: 7.5; 325 TABLET ORAL at 22:16

## 2024-08-29 RX ADMIN — SENNOSIDES AND DOCUSATE SODIUM 1 TABLET: 50; 8.6 TABLET ORAL at 21:18

## 2024-08-29 RX ADMIN — FAMOTIDINE 20 MG: 20 TABLET, FILM COATED ORAL at 17:58

## 2024-08-29 RX ADMIN — DOCUSATE SODIUM 100 MG: 100 CAPSULE, LIQUID FILLED ORAL at 08:07

## 2024-08-29 RX ADMIN — AMLODIPINE BESYLATE 5 MG: 5 TABLET ORAL at 21:17

## 2024-08-29 RX ADMIN — ENOXAPARIN SODIUM 40 MG: 100 INJECTION SUBCUTANEOUS at 21:17

## 2024-08-29 RX ADMIN — DEXAMETHASONE 4 MG: 4 TABLET ORAL at 21:23

## 2024-08-29 RX ADMIN — AMLODIPINE BESYLATE 5 MG: 5 TABLET ORAL at 08:07

## 2024-08-29 RX ADMIN — METHOCARBAMOL TABLETS 750 MG: 500 TABLET, COATED ORAL at 08:06

## 2024-08-29 RX ADMIN — FAMOTIDINE 20 MG: 20 TABLET, FILM COATED ORAL at 08:07

## 2024-08-29 RX ADMIN — LEVETIRACETAM 500 MG: 500 TABLET, FILM COATED ORAL at 21:17

## 2024-08-29 RX ADMIN — ATORVASTATIN CALCIUM 10 MG: 20 TABLET, FILM COATED ORAL at 08:06

## 2024-08-29 RX ADMIN — HYDROCODONE BITARTRATE AND ACETAMINOPHEN 1 TABLET: 7.5; 325 TABLET ORAL at 13:49

## 2024-08-29 RX ADMIN — DIPHENHYDRAMINE HYDROCHLORIDE 25 MG: 25 CAPSULE ORAL at 15:14

## 2024-08-29 RX ADMIN — HYDROCODONE BITARTRATE AND ACETAMINOPHEN 1 TABLET: 7.5; 325 TABLET ORAL at 17:58

## 2024-08-29 NOTE — PLAN OF CARE
Goal Outcome Evaluation:  Plan of Care Reviewed With: patient        Progress: improving  Outcome Evaluation: Patient is alert x4. Patient is ambulating outside of room. colostomy is noted with small brown liquid stool.  Patient states she is passing gas.  Patient is complaining of HA and ABD pain.  Meds given according to order.  Family at bedside.

## 2024-08-29 NOTE — PROGRESS NOTES
"Nutrition Services    Patient Name:  Ely Sims  YOB: 1960  MRN: [id number]  Admit Date:  8/19/2024    Assessment Date:  08/29/24    NUTRITION SCREENING      Reason for Encounter LOS   Diagnosis/Problem POD 2 port placement and diverting colostomy. Colon mass and brain metastasis.        PO Diet Diet: Regular/House, Gastrointestinal; Low Irritant, Fiber-Restricted; Texture: Soft to Chew (NDD 3); Soft to Chew: Ground Meat; Fluid Consistency: Thin (IDDSI 0)   Supplements    PO Intake % % of all recorded meals per EMR. Pt stated she normally eats 3 meals per day.        Medications MAR reviewed by RD   Labs  Listed below, reviewed   Physical Findings No signs of muscle wasting or fat loss.    GI Function Last BM 8/29   Skin Status Multiple surgical incisions       Height  Weight  BMI  Weight Trend     Height: 170.2 cm (67.01\")  Weight: 72 kg (158 lb 11.7 oz) (Simultaneous filing. User may be unaware of other data.) (08/25/24 0500)  Body mass index is 24.85 kg/m².  Stable       Nutrition Problem (PES) No nutrition diagnosis at this time.       Intervention/Plan RD to follow up per protocol.     Results from last 7 days   Lab Units 08/29/24  0628 08/28/24  1635 08/28/24  0543 08/25/24  0439   SODIUM mmol/L 142  --  136 140   POTASSIUM mmol/L 4.1 4.1 3.6 4.0   CHLORIDE mmol/L 105  --  96* 103   CO2 mmol/L 26.2  --  24.9 29.0   BUN mg/dL 10  --  9 10   CREATININE mg/dL 0.56*  --  0.73 0.55*   CALCIUM mg/dL 8.9  --  9.2 8.8   GLUCOSE mg/dL 88  --  153* 103*     Results from last 7 days   Lab Units 08/29/24  0628 08/28/24  0543 08/24/24  0528 08/23/24  0318   MAGNESIUM mg/dL  --  2.0  --  2.5*   PHOSPHORUS mg/dL  --   --   --  2.4*   HEMOGLOBIN g/dL 11.7* 13.0   < > 10.5*   HEMATOCRIT % 34.0 38.4   < > 30.9*    < > = values in this interval not displayed.     Lab Results   Component Value Date    HGBA1C 5.50 08/24/2024         Electronically signed by:  Koby Russo  08/29/24 09:32 EDT    "

## 2024-08-29 NOTE — CASE MANAGEMENT/SOCIAL WORK
Continued Stay Note  [facility]     Patient Name: Ely Sims  MRN: [id number]  Today's Date: 8/29/2024    Admit Date: 8/19/2024    Plan: Plan discharge home with significant other and [facility].   Discharge Plan       Row Name 08/29/24 8773       Plan    Plan Plan discharge home with significant other and [facility].    Patient/Family in Agreement with Plan yes    Plan Comments Received call from [facility]. States able to accept patient. However, she has a 20% co-insurance which is approximately $40-50/visit. Informed patient at bedside regarding co-insurance. Says she is able to pay and is agreeable with [facility]. No additional needs noted. Plan discharge home with significant other and [facility] (new ostomy). Family to transport per private auto....[facility]                   Discharge Codes    No documentation.                 Expected Discharge Date and Time       Expected Discharge Date Expected Discharge Time    Aug 30, 2024               Vilma Perez RN

## 2024-08-29 NOTE — PROGRESS NOTES
Name: Ely Sims ADMIT: 2024   : 1960  PCP: Provider, No Known    MRN: [id number] LOS: 10 days   AGE/SEX: 64 y.o. female  ROOM: [facility]     Subjective   Subjective   No acute events overnight.  Patient states that she is feeling better this morning.  Denies chest pain or shortness of breath.  Did require dose of IV pain medications overnight and I have increased the dose of her Norco.  She has been tolerating a diet.    Objective   Objective     Vital Signs  Temp:  [97.9 °F (36.6 °C)-98.6 °F (37 °C)] 98.2 °F (36.8 °C)  Heart Rate:  [64-78] 75  Resp:  [18] 18  BP: (110-132)/(60-86) 131/86  SpO2:  [99 %-100 %] 100 %  on   ;   Device (Oxygen Therapy): room air  Body mass index is 24.85 kg/m².    Physical Exam  General: Alert, no acute distress.  Sitting up in bed.  Eating breakfast.  Pleasant and conversive.  ENT: No conjunctival injection or scleral icterus. Moist mucous membranes.   Neuro: Eyes open and moving in all directions, strength normal in all extremities, no focal deficits.   Lungs: Clear to auscultation bilaterally. No wheeze or crackles. No distress.   Heart: RRR, no murmurs. No edema.  Abdomen: Soft, mild distention.  Normal bowel sounds.  New ostomy in place, normal stool output noted.  Ext: Warm and well-perfused. No edema.   Skin: No rashes or lesions. IV site without swelling or erythema.     Results Review     I reviewed the patient's new clinical results:  Results from last 7 days   Lab Units 24  0628 24  0543 24  1012 24  0439   WBC 10*3/mm3 7.47 11.19* 11.49* 5.87   HEMOGLOBIN g/dL 11.7* 13.0 12.8 11.6*   PLATELETS 10*3/mm3 303 395 375 300     Results from last 7 days   Lab Units 24  0628 24  1635 24  0543 24  0439 24  0528   SODIUM mmol/L 142  --  136 140 140   POTASSIUM mmol/L 4.1 4.1 3.6 4.0 4.1   CHLORIDE mmol/L 105  --  96* 103 104   CO2 mmol/L 26.2  --  24.9 29.0 25.0   BUN mg/dL 10  --  9 10 12   CREATININE mg/dL 0.56*  --  " 0.73 0.55* 0.53*   GLUCOSE mg/dL 88  --  153* 103* 120*   EGFR mL/min/1.73 102.1  --  92.0 102.5 103.4       Results from last 7 days   Lab Units 08/29/24  0628 08/28/24  0543 08/25/24  0439 08/24/24  0528 08/23/24  0318   CALCIUM mg/dL 8.9 9.2 8.8 8.7 7.9*   MAGNESIUM mg/dL  --  2.0  --   --  2.5*   PHOSPHORUS mg/dL  --   --   --   --  2.4*       No results found for: \"HGBA1C\", \"POCGLU\"      MRI Thoracic Spine With & Without Contrast    Addendum Date: 8/29/2024    ADDENDUM: On the sagittal T1 postcontrast sequence of the thoracic spine is a nodular area of enhancement involving the cord is appreciated level of T12 measuring approximately 3 mm in size. This is not evident on the axial T1 postcontrast sequence. The nonvisualization on the axial postcontrast imaging is artifactual and due to the small size of the lesion and technique. This almost certainly represents a metastatic lesion. No other enhancing lesions are appreciated. The above information was discussed with Dr. Doty on 8/29/2024 at 0825 hours. The report should read as follows.  MRI CERVICAL SPINE W WO CONTRAST-, MRI THORACIC SPINE W WO CONTRAST-  HISTORY:  Evaluate for metastatic disease; C79.31-Secondary malignant neoplasm of brain; C80.1-Malignant (primary) neoplasm, unspecified; R51.9-Headache, unspecified; R03.0-Elevated blood-pressure reading, without diagnosis of hypertension; K63.89-Other specified diseases of intestine  COMPARISON: None  MRI EXAMINATION OF THE CERVICAL SPINE WITH AND WITHOUT CONTRAST:  A suboccipital craniotomy is noted on the right.  There is a grade 1 anterolisthesis of C3 upon C4 and C4 upon C5. There is moderate loss of disc height at C5-6 and C6-7. Signal intensity within the cord is normal on the sagittal and axial T2 sequences.  C2-3: Moderate facet degenerative disease is present on the left.  C3-4: Moderate facet degenerative disease is present bilaterally. A small central disc osteophyte complex is present with " tonsil herniation.  C4-5: Mild to moderate to severe facet degenerative disease is present on the right and left respectively.  C5-6: Moderate facet degenerative disease is present bilaterally. There is moderate foraminal stenosis on the right secondary to loss of disc height, uncovertebral degenerative disease and extension of the disc osteophyte complex into the neural foramen.  C6-7: There is mild canal stenosis secondary to a broad-based disc osteophyte complex. Moderate foraminal stenosis is present on the left secondary to uncovertebral degenerative disease.  C7-T1: There is no evidence of a disc bulge or herniation.  After contrast administration there was mild enhancement appreciated above the endplates at C5-6 and C6-7 consistent with degenerative disease. There was no evidence of abnormal enhancement to suggest metastatic disease.  IMPRESSION: There is no evidence of metastatic disease involving the cervical spine. Multilevel degenerative disc disease is noted as described above with no evidence of disc herniation, cord signal abnormality or cord compression.   MRI EXAMINATION OF THE THORACIC SPINE WITH AND WITHOUT CONTRAST:  There is mild levoscoliosis of the upper thoracic spine with a compensatory dextroscoliosis of the lower thoracic spine. The alignment of the thoracic spine is within normal limits. Signal intensity within the thoracic cord is normal on the sagittal and axial T2 sequences with no evidence of disc herniation or of cord compression. There is disc desiccation at all levels. A small right lateral disc osteophyte complex is present at T6/T7 resulting mild flattening of the ventral surface of the thecal sac. There is mild canal stenosis from T8-T11 secondary to mild facet and ligamentum flavum hypertrophy and a broad-based disc osteophyte complex. After contrast administration the 3 mm enhancing lesion is appreciated involving the thoracic cord at the level of T12 on the sagittal T1  postcontrast sequence (image 8). There is only a questionable area of enhancement suggested on the axial T1 postcontrast sequence. The suboptimal visualization of the enhancing lesion on the axial imaging is due to the small size of the lesion and technique. No other enhancing lesions are identified.  IMPRESSION: 1.  There is a 3 mm. Enhancing lesion appreciated involving the cord at the level of T12 consistent with a metastatic lesion. This is not well demonstrated on the axial imaging. No other metastatic disease is appreciated. 2.  Multilevel degenerative disease is noted as described above including scoliosis and mild canal stenosis from T8-T11. See above.   This report was finalized on 8/29/2024 8:33 AM by Dr. Cesar Levine M.D on Workstation: [id number]      Result Date: 8/29/2024  There is no evidence of metastatic disease involving the cervical spine. Multilevel degenerative disc disease is noted as described above with no evidence of disc herniation, cord signal abnormality or cord compression.   MRI EXAMINATION OF THE THORACIC SPINE WITH AND WITHOUT CONTRAST:  There is mild levoscoliosis of the upper thoracic spine with a compensatory dextroscoliosis of the lower thoracic spine. The alignment of the thoracic spine is within normal limits. Signal intensity within the thoracic cord is normal on the sagittal and axial T2 sequences with no evidence of disc herniation or of cord compression. There is disc desiccation at all levels. A small right lateral disc osteophyte complex is present at T6/T7 resulting mild flattening of the ventral surface of the thecal sac. There is mild canal stenosis from T8-T11 secondary to mild facet and ligamentum flavum hypertrophy and a broad-based disc osteophyte complex. After contrast administration there was no evidence of abnormal enhancement to suggest metastatic disease.  IMPRESSION: There is no evidence of metastatic disease, of a focal disc herniation, cord signal  abnormality or of cord compression. Multilevel degenerative disease is noted as described above including scoliosis and mild canal stenosis from T8-T11. See above.  This report was finalized on 8/28/2024 12:02 PM by Dr. Cesar Levine M.D on Workstation: [id number]      MRI Cervical Spine With & Without Contrast    Addendum Date: 8/29/2024    ADDENDUM: On the sagittal T1 postcontrast sequence of the thoracic spine is a nodular area of enhancement involving the cord is appreciated level of T12 measuring approximately 3 mm in size. This is not evident on the axial T1 postcontrast sequence. The nonvisualization on the axial postcontrast imaging is artifactual and due to the small size of the lesion and technique. This almost certainly represents a metastatic lesion. No other enhancing lesions are appreciated. The above information was discussed with Dr. Doty on 8/29/2024 at 0825 hours. The report should read as follows.  MRI CERVICAL SPINE W WO CONTRAST-, MRI THORACIC SPINE W WO CONTRAST-  HISTORY:  Evaluate for metastatic disease; C79.31-Secondary malignant neoplasm of brain; C80.1-Malignant (primary) neoplasm, unspecified; R51.9-Headache, unspecified; R03.0-Elevated blood-pressure reading, without diagnosis of hypertension; K63.89-Other specified diseases of intestine  COMPARISON: None  MRI EXAMINATION OF THE CERVICAL SPINE WITH AND WITHOUT CONTRAST:  A suboccipital craniotomy is noted on the right.  There is a grade 1 anterolisthesis of C3 upon C4 and C4 upon C5. There is moderate loss of disc height at C5-6 and C6-7. Signal intensity within the cord is normal on the sagittal and axial T2 sequences.  C2-3: Moderate facet degenerative disease is present on the left.  C3-4: Moderate facet degenerative disease is present bilaterally. A small central disc osteophyte complex is present with tonsil herniation.  C4-5: Mild to moderate to severe facet degenerative disease is present on the right and left respectively.   C5-6: Moderate facet degenerative disease is present bilaterally. There is moderate foraminal stenosis on the right secondary to loss of disc height, uncovertebral degenerative disease and extension of the disc osteophyte complex into the neural foramen.  C6-7: There is mild canal stenosis secondary to a broad-based disc osteophyte complex. Moderate foraminal stenosis is present on the left secondary to uncovertebral degenerative disease.  C7-T1: There is no evidence of a disc bulge or herniation.  After contrast administration there was mild enhancement appreciated above the endplates at C5-6 and C6-7 consistent with degenerative disease. There was no evidence of abnormal enhancement to suggest metastatic disease.  IMPRESSION: There is no evidence of metastatic disease involving the cervical spine. Multilevel degenerative disc disease is noted as described above with no evidence of disc herniation, cord signal abnormality or cord compression.   MRI EXAMINATION OF THE THORACIC SPINE WITH AND WITHOUT CONTRAST:  There is mild levoscoliosis of the upper thoracic spine with a compensatory dextroscoliosis of the lower thoracic spine. The alignment of the thoracic spine is within normal limits. Signal intensity within the thoracic cord is normal on the sagittal and axial T2 sequences with no evidence of disc herniation or of cord compression. There is disc desiccation at all levels. A small right lateral disc osteophyte complex is present at T6/T7 resulting mild flattening of the ventral surface of the thecal sac. There is mild canal stenosis from T8-T11 secondary to mild facet and ligamentum flavum hypertrophy and a broad-based disc osteophyte complex. After contrast administration the 3 mm enhancing lesion is appreciated involving the thoracic cord at the level of T12 on the sagittal T1 postcontrast sequence (image 8). There is only a questionable area of enhancement suggested on the axial T1 postcontrast sequence. The  suboptimal visualization of the enhancing lesion on the axial imaging is due to the small size of the lesion and technique. No other enhancing lesions are identified.  IMPRESSION: 1.  There is a 3 mm. Enhancing lesion appreciated involving the cord at the level of T12 consistent with a metastatic lesion. This is not well demonstrated on the axial imaging. No other metastatic disease is appreciated. 2.  Multilevel degenerative disease is noted as described above including scoliosis and mild canal stenosis from T8-T11. See above.   This report was finalized on 8/29/2024 8:33 AM by Dr. Cesar Levine M.D on Workstation: [id number]      Result Date: 8/29/2024  There is no evidence of metastatic disease involving the cervical spine. Multilevel degenerative disc disease is noted as described above with no evidence of disc herniation, cord signal abnormality or cord compression.   MRI EXAMINATION OF THE THORACIC SPINE WITH AND WITHOUT CONTRAST:  There is mild levoscoliosis of the upper thoracic spine with a compensatory dextroscoliosis of the lower thoracic spine. The alignment of the thoracic spine is within normal limits. Signal intensity within the thoracic cord is normal on the sagittal and axial T2 sequences with no evidence of disc herniation or of cord compression. There is disc desiccation at all levels. A small right lateral disc osteophyte complex is present at T6/T7 resulting mild flattening of the ventral surface of the thecal sac. There is mild canal stenosis from T8-T11 secondary to mild facet and ligamentum flavum hypertrophy and a broad-based disc osteophyte complex. After contrast administration there was no evidence of abnormal enhancement to suggest metastatic disease.  IMPRESSION: There is no evidence of metastatic disease, of a focal disc herniation, cord signal abnormality or of cord compression. Multilevel degenerative disease is noted as described above including scoliosis and mild canal stenosis  from T8-T11. See above.  This report was finalized on 8/28/2024 12:02 PM by Dr. Cesar Levine M.D on Workstation: [id number]       I have personally reviewed all medications:  Scheduled Medications  amLODIPine, 5 mg, Oral, BID  atorvastatin, 10 mg, Oral, Daily  docusate sodium, 100 mg, Oral, Daily  enoxaparin, 40 mg, Subcutaneous, Nightly  famotidine, 20 mg, Oral, BID AC  levETIRAcetam, 500 mg, Oral, BID  senna-docusate sodium, 1 tablet, Oral, Nightly    Infusions   Diet  Diet: Regular/House, Gastrointestinal; Low Irritant, Fiber-Restricted; Texture: Soft to Chew (NDD 3); Soft to Chew: Ground Meat; Fluid Consistency: Thin (IDDSI 0)      Intake/Output Summary (Last 24 hours) at 8/29/2024 1318  Last data filed at 8/28/2024 1800  Gross per 24 hour   Intake 1080 ml   Output --   Net 1080 ml       Assessment/Plan     Active Hospital Problems    Diagnosis  POA    **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  Patient has appointment on 9/3 as outpatient.  -Oncology following  -Increase Norco to 7.5 mg every 4 hours today.  Monitor for pain improvement with this.  If pain remains improved and all other consulting services are agreeable, possible discharge as early as tomorrow.    Leukocytosis  -WBC has normalized today  -Steroids likely contributing, no new infectious symptoms and has been afebrile  -Surgery earlier this week, likely reactive component as well  -Repeat CBC with morning labs    Hypertension  -Blood pressure trends acceptable  -Continue amlodipine  -Continue to monitor, additional medication titrations as needed based on BP trends    Anemia  -Hgb slightly low at 11.7, overall stable  -No signs of active bleeding  -Repeat CBC with morning labs, transfuse for Hgb less than 7    SCDs for DVT prophylaxis.  Full code.  Discussed with patient, family, and nursing staff.  Anticipate discharge in the next 1-2 days      Colette Koroma MD  [facility]  08/29/24  13:18 EDT

## 2024-08-29 NOTE — PROGRESS NOTES
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  She is doing well today.  No new complaints.    Current Facility-Administered Medications:     [DISCONTINUED] acetaminophen (TYLENOL) tablet 650 mg, 650 mg, Oral, Q4H PRN **OR** acetaminophen (TYLENOL) 160 MG/5ML oral solution 650 mg, 650 mg, Oral, Q4H PRN **OR** [DISCONTINUED] acetaminophen (TYLENOL) suppository 650 mg, 650 mg, Rectal, Q4H PRN, Sarika Wilburn MD    amLODIPine (NORVASC) tablet 5 mg, 5 mg, Oral, BID, Saturnino Chester MD, 5 mg at 08/29/24 0807    atorvastatin (LIPITOR) tablet 10 mg, 10 mg, Oral, Daily, Saturnino Chester MD, 10 mg at 08/29/24 0806    sennosides-docusate (PERICOLACE) 8.6-50 MG per tablet 1 tablet, 1 tablet, Oral, Nightly, 1 tablet at 08/28/24 2128 **AND** polyethylene glycol (MIRALAX) packet 17 g, 17 g, Oral, Daily PRN **AND** bisacodyl (DULCOLAX) EC tablet 5 mg, 5 mg, Oral, Daily PRN **AND** bisacodyl (DULCOLAX) suppository 10 mg, 10 mg, Rectal, Daily PRN, Saturnino Chester MD    Calcium Replacement - Follow Nurse / BPA Driven Protocol, , Does not apply, PRN, Saturnino Chester MD    docusate sodium (COLACE) capsule 100 mg, 100 mg, Oral, Daily, Saturnino Chester MD, 100 mg at 08/29/24 0807    enalaprilat (VASOTEC) injection 0.625 mg, 0.625 mg, Intravenous, Q6H PRN, Saturnino Chester MD    Enoxaparin Sodium (LOVENOX) syringe 40 mg, 40 mg, Subcutaneous, Nightly, Kendra Naidu, APRN, 40 mg at 08/28/24 2128    famotidine (PEPCID) tablet 20 mg, 20 mg, Oral, BID AC, Saturnino Chester MD, 20 mg at 08/29/24 0807    fentaNYL citrate (PF) (SUBLIMAZE) injection, , Intravenous, Code / Trauma / Sedation Medication, Malvin Merritt MD, 50 mcg at 08/21/24 1158    HYDROcodone-acetaminophen (NORCO) 7.5-325 MG per tablet 1  tablet, 1 tablet, Oral, Q4H PRN, Colette Koroma MD    labetalol (NORMODYNE,TRANDATE) injection 20 mg, 20 mg, Intravenous, Q2H PRN, Saturnino Chester MD, 20 mg at 08/24/24 1800    levETIRAcetam (KEPPRA) tablet 500 mg, 500 mg, Oral, BID, Saturnino Chester MD, 500 mg at 08/29/24 0807    Magnesium Standard Dose Replacement - Follow Nurse / BPA Driven Protocol, , Does not apply, Henrik ROWE Alberto Sebastian, MD    methocarbamol (ROBAXIN) tablet 750 mg, 750 mg, Oral, Q6H PRN, Saturnino Chester MD, 750 mg at 08/29/24 0806    midazolam (VERSED) injection, , Intravenous, Code / Trauma / Sedation Medication, Malvin Merritt MD, 1 mg at 08/21/24 1157    nitroglycerin (NITROSTAT) SL tablet 0.4 mg, 0.4 mg, Sublingual, Q5 Min PRN, Saturnino Chester MD    ondansetron ODT (ZOFRAN-ODT) disintegrating tablet 4 mg, 4 mg, Oral, Q6H PRN **OR** ondansetron (ZOFRAN) injection 4 mg, 4 mg, Intravenous, Q6H PRN, Saturnino Chester MD, 4 mg at 08/28/24 0525    oxyCODONE-acetaminophen (PERCOCET) 5-325 MG per tablet 1 tablet, 1 tablet, Oral, Q6H PRN, Saturnino Chester MD, 1 tablet at 08/29/24 0808    Phosphorus Replacement - Follow Nurse / BPA Driven Protocol, , Does not apply, Henrik ROWE Alberto Sebastian, MD    Potassium Replacement - Follow Nurse / BPA Driven Protocol, , Does not apply, Henrik ROWE Alberto Sebastian, MD    [CANCELED] Insert Peripheral IV, , , Once **AND** sodium chloride 0.9 % flush 10 mL, 10 mL, Intravenous, PRHenrik ZHANG Alberto Sebastian, MD      Objective     Vital Signs  Temp:  [97.9 °F (36.6 °C)-98.6 °F (37 °C)] 98.2 °F (36.8 °C)  Heart Rate:  [64-78] 75  Resp:  [18] 18  BP: (110-132)/(60-86) 131/86  Body mass index is 24.85 kg/m².    Intake/Output Summary (Last 24 hours) at 8/29/2024 1343  Last data filed at 8/28/2024 1800  Gross per 24 hour   Intake 1080 ml   Output --   Net 1080 ml     No intake/output data recorded.     Physical Exam:    General: patient awake,  "alert and cooperative   Eyes: Normal lids and lashes, no scleral icterus   Skin: warm and dry, not jaundiced   Pulm: regular and unlabored   Psychiatric: Normal mood and behavior; memory intact     Results Review:     I reviewed the patient's new clinical results.    Results from last 7 days   Lab Units 08/29/24  0628 08/28/24  0543 08/27/24  1012   WBC 10*3/mm3 7.47 11.19* 11.49*   HEMOGLOBIN g/dL 11.7* 13.0 12.8   HEMATOCRIT % 34.0 38.4 38.1   PLATELETS 10*3/mm3 303 395 375     Results from last 7 days   Lab Units 08/29/24  0628 08/28/24  1635 08/28/24  0543 08/25/24  0439   SODIUM mmol/L 142  --  136 140   POTASSIUM mmol/L 4.1 4.1 3.6 4.0   CHLORIDE mmol/L 105  --  96* 103   CO2 mmol/L 26.2  --  24.9 29.0   BUN mg/dL 10  --  9 10   CREATININE mg/dL 0.56*  --  0.73 0.55*   CALCIUM mg/dL 8.9  --  9.2 8.8   GLUCOSE mg/dL 88  --  153* 103*         No results found for: \"LIPASE\"    Radiology:  MRI Thoracic Spine With & Without Contrast   Final Result   Addendum (preliminary) 1 of 1   ADDENDUM: On the sagittal T1 postcontrast sequence of the thoracic spine   is a nodular area of enhancement involving the cord is appreciated level   of T12 measuring approximately 3 mm in size. This is not evident on the   axial T1 postcontrast sequence. The nonvisualization on the axial   postcontrast imaging is artifactual and due to the small size of the   lesion and technique. This almost certainly represents a metastatic   lesion. No other enhancing lesions are appreciated. The above   information was discussed with Dr. Doty on 8/29/2024 at 0825 hours. The   report should read as follows.       MRI CERVICAL SPINE W WO CONTRAST-, MRI THORACIC SPINE W WO CONTRAST-       HISTORY:  Evaluate for metastatic disease; C79.31-Secondary malignant   neoplasm of brain; C80.1-Malignant (primary) neoplasm, unspecified;   R51.9-Headache, unspecified; R03.0-Elevated blood-pressure reading,   without diagnosis of hypertension; K63.89-Other " specified diseases of   intestine       COMPARISON: None       MRI EXAMINATION OF THE CERVICAL SPINE WITH AND WITHOUT CONTRAST:       A suboccipital craniotomy is noted on the right.       There is a grade 1 anterolisthesis of C3 upon C4 and C4 upon C5. There   is moderate loss of disc height at C5-6 and C6-7. Signal intensity   within the cord is normal on the sagittal and axial T2 sequences.       C2-3: Moderate facet degenerative disease is present on the left.       C3-4: Moderate facet degenerative disease is present bilaterally. A   small central disc osteophyte complex is present with tonsil herniation.       C4-5: Mild to moderate to severe facet degenerative disease is present   on the right and left respectively.       C5-6: Moderate facet degenerative disease is present bilaterally. There   is moderate foraminal stenosis on the right secondary to loss of disc   height, uncovertebral degenerative disease and extension of the disc   osteophyte complex into the neural foramen.       C6-7: There is mild canal stenosis secondary to a broad-based disc   osteophyte complex. Moderate foraminal stenosis is present on the left   secondary to uncovertebral degenerative disease.       C7-T1: There is no evidence of a disc bulge or herniation.       After contrast administration there was mild enhancement appreciated   above the endplates at C5-6 and C6-7 consistent with degenerative   disease. There was no evidence of abnormal enhancement to suggest   metastatic disease.       IMPRESSION: There is no evidence of metastatic disease involving the   cervical spine. Multilevel degenerative disc disease is noted as   described above with no evidence of disc herniation, cord signal   abnormality or cord compression.           MRI EXAMINATION OF THE THORACIC SPINE WITH AND WITHOUT CONTRAST:       There is mild levoscoliosis of the upper thoracic spine with a   compensatory dextroscoliosis of the lower thoracic spine. The  alignment   of the thoracic spine is within normal limits. Signal intensity within   the thoracic cord is normal on the sagittal and axial T2 sequences with   no evidence of disc herniation or of cord compression. There is disc   desiccation at all levels. A small right lateral disc osteophyte complex   is present at T6/T7 resulting mild flattening of the ventral surface of   the thecal sac. There is mild canal stenosis from T8-T11 secondary to   mild facet and ligamentum flavum hypertrophy and a broad-based disc   osteophyte complex. After contrast administration the 3 mm enhancing   lesion is appreciated involving the thoracic cord at the level of T12 on   the sagittal T1 postcontrast sequence (image 8). There is only a   questionable area of enhancement suggested on the axial T1 postcontrast   sequence. The suboptimal visualization of the enhancing lesion on the   axial imaging is due to the small size of the lesion and technique. No   other enhancing lesions are identified.       IMPRESSION:    1.  There is a 3 mm. Enhancing lesion appreciated involving the cord at   the level of T12 consistent with a metastatic lesion. This is not well   demonstrated on the axial imaging. No other metastatic disease is   appreciated.    2.  Multilevel degenerative disease is noted as described above   including scoliosis and mild canal stenosis from T8-T11. See above.           This report was finalized on 8/29/2024 8:33 AM by Dr. Cesar Levine M.D   on Workstation: [id number]          Final   There is no evidence of metastatic disease involving the   cervical spine. Multilevel degenerative disc disease is noted as   described above with no evidence of disc herniation, cord signal   abnormality or cord compression.           MRI EXAMINATION OF THE THORACIC SPINE WITH AND WITHOUT CONTRAST:       There is mild levoscoliosis of the upper thoracic spine with a   compensatory dextroscoliosis of the lower thoracic spine. The  alignment   of the thoracic spine is within normal limits. Signal intensity within   the thoracic cord is normal on the sagittal and axial T2 sequences with   no evidence of disc herniation or of cord compression. There is disc   desiccation at all levels. A small right lateral disc osteophyte complex   is present at T6/T7 resulting mild flattening of the ventral surface of   the thecal sac. There is mild canal stenosis from T8-T11 secondary to   mild facet and ligamentum flavum hypertrophy and a broad-based disc   osteophyte complex. After contrast administration there was no evidence   of abnormal enhancement to suggest metastatic disease.       IMPRESSION: There is no evidence of metastatic disease, of a focal disc   herniation, cord signal abnormality or of cord compression. Multilevel   degenerative disease is noted as described above including scoliosis and   mild canal stenosis from T8-T11. See above.       This report was finalized on 8/28/2024 12:02 PM by Dr. Cesar Levine M.D on Workstation: [id number]          MRI Cervical Spine With & Without Contrast   Final Result   Addendum (preliminary) 1 of 1   ADDENDUM: On the sagittal T1 postcontrast sequence of the thoracic spine   is a nodular area of enhancement involving the cord is appreciated level   of T12 measuring approximately 3 mm in size. This is not evident on the   axial T1 postcontrast sequence. The nonvisualization on the axial   postcontrast imaging is artifactual and due to the small size of the   lesion and technique. This almost certainly represents a metastatic   lesion. No other enhancing lesions are appreciated. The above   information was discussed with Dr. Doty on 8/29/2024 at 0825 hours. The   report should read as follows.       MRI CERVICAL SPINE W WO CONTRAST-, MRI THORACIC SPINE W WO CONTRAST-       HISTORY:  Evaluate for metastatic disease; C79.31-Secondary malignant   neoplasm of brain; C80.1-Malignant (primary) neoplasm,  unspecified;   R51.9-Headache, unspecified; R03.0-Elevated blood-pressure reading,   without diagnosis of hypertension; K63.89-Other specified diseases of   intestine       COMPARISON: None       MRI EXAMINATION OF THE CERVICAL SPINE WITH AND WITHOUT CONTRAST:       A suboccipital craniotomy is noted on the right.       There is a grade 1 anterolisthesis of C3 upon C4 and C4 upon C5. There   is moderate loss of disc height at C5-6 and C6-7. Signal intensity   within the cord is normal on the sagittal and axial T2 sequences.       C2-3: Moderate facet degenerative disease is present on the left.       C3-4: Moderate facet degenerative disease is present bilaterally. A   small central disc osteophyte complex is present with tonsil herniation.       C4-5: Mild to moderate to severe facet degenerative disease is present   on the right and left respectively.       C5-6: Moderate facet degenerative disease is present bilaterally. There   is moderate foraminal stenosis on the right secondary to loss of disc   height, uncovertebral degenerative disease and extension of the disc   osteophyte complex into the neural foramen.       C6-7: There is mild canal stenosis secondary to a broad-based disc   osteophyte complex. Moderate foraminal stenosis is present on the left   secondary to uncovertebral degenerative disease.       C7-T1: There is no evidence of a disc bulge or herniation.       After contrast administration there was mild enhancement appreciated   above the endplates at C5-6 and C6-7 consistent with degenerative   disease. There was no evidence of abnormal enhancement to suggest   metastatic disease.       IMPRESSION: There is no evidence of metastatic disease involving the   cervical spine. Multilevel degenerative disc disease is noted as   described above with no evidence of disc herniation, cord signal   abnormality or cord compression.           MRI EXAMINATION OF THE THORACIC SPINE WITH AND WITHOUT CONTRAST:        There is mild levoscoliosis of the upper thoracic spine with a   compensatory dextroscoliosis of the lower thoracic spine. The alignment   of the thoracic spine is within normal limits. Signal intensity within   the thoracic cord is normal on the sagittal and axial T2 sequences with   no evidence of disc herniation or of cord compression. There is disc   desiccation at all levels. A small right lateral disc osteophyte complex   is present at T6/T7 resulting mild flattening of the ventral surface of   the thecal sac. There is mild canal stenosis from T8-T11 secondary to   mild facet and ligamentum flavum hypertrophy and a broad-based disc   osteophyte complex. After contrast administration the 3 mm enhancing   lesion is appreciated involving the thoracic cord at the level of T12 on   the sagittal T1 postcontrast sequence (image 8). There is only a   questionable area of enhancement suggested on the axial T1 postcontrast   sequence. The suboptimal visualization of the enhancing lesion on the   axial imaging is due to the small size of the lesion and technique. No   other enhancing lesions are identified.       IMPRESSION:    1.  There is a 3 mm. Enhancing lesion appreciated involving the cord at   the level of T12 consistent with a metastatic lesion. This is not well   demonstrated on the axial imaging. No other metastatic disease is   appreciated.    2.  Multilevel degenerative disease is noted as described above   including scoliosis and mild canal stenosis from T8-T11. See above.           This report was finalized on 8/29/2024 8:33 AM by Dr. Cesar Levine M.D   on Workstation: [id number]          Final   There is no evidence of metastatic disease involving the   cervical spine. Multilevel degenerative disc disease is noted as   described above with no evidence of disc herniation, cord signal   abnormality or cord compression.           MRI EXAMINATION OF THE THORACIC SPINE WITH AND WITHOUT CONTRAST:        There is mild levoscoliosis of the upper thoracic spine with a   compensatory dextroscoliosis of the lower thoracic spine. The alignment   of the thoracic spine is within normal limits. Signal intensity within   the thoracic cord is normal on the sagittal and axial T2 sequences with   no evidence of disc herniation or of cord compression. There is disc   desiccation at all levels. A small right lateral disc osteophyte complex   is present at T6/T7 resulting mild flattening of the ventral surface of   the thecal sac. There is mild canal stenosis from T8-T11 secondary to   mild facet and ligamentum flavum hypertrophy and a broad-based disc   osteophyte complex. After contrast administration there was no evidence   of abnormal enhancement to suggest metastatic disease.       IMPRESSION: There is no evidence of metastatic disease, of a focal disc   herniation, cord signal abnormality or of cord compression. Multilevel   degenerative disease is noted as described above including scoliosis and   mild canal stenosis from T8-T11. See above.       This report was finalized on 8/28/2024 12:02 PM by Dr. Cesar Levine M.D on Workstation: [id number]          FL C Arm During Surgery   Final Result      FL Barium Enema Water Soluble Single Contrast   Final Result   TECHNIQUE, FINDINGS AND IMPRESSION:   Scattered images were obtained of the abdomen. Prominent and dilated   loops of air-filled bowel are present throughout the abdomen and pelvis.       A small caliber rectal tube was then inserted gently into the rectum and   minimally insufflated under direct fluoroscopic guidance. Water-soluble   contrast was then administered into the rectum via gravity while   multiple fluoroscopic images were obtained in the AP, lateral oblique   views.       There is a long segment of irregular high-grade stenosis involving the   high rectum corresponding with the area of irregular wall thickening   seen on recent CT. This measures over  a segment of approximately 3.7 cm.   The narrowest segment appears to measure approximately 5 to 6 mm in   shortest diameter. Contrast passes easily through this segment into the   more proximal large bowel.       DAP: 3477.4 ?Gy*m2       This report was finalized on 8/26/2024 5:32 PM by Dr. Calvin Blanco M.D   on Workstation: [id number]          MRI Lumbar Spine With & Without Contrast   Final Result   1. 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 I recommend a dedicated cervical and thoracic   spine MRI with and without contrast to determine if there are any   additional spinal cord metastasis and to more comprehensively assess the   spinal cord that at the T12 level.       2. I see no osseous metastatic disease to the lumbar spine.       3. This patient has a levoscoliotic curvature of the lumbar spine with   its apex at the L3-4 lumbar level and there is severe disc space   narrowing and degenerative endplate changes with degenerative marrow   sclerosis and degenerative marrow edema in the central to right side of   the endplates at L3-4 along the inner margin of the levoscoliotic curve.   There is lumbar spondylosis as described above. I see no canal or   foraminal narrowing from T11 to L2 and at L2-3 I see mild canal and   bilateral foraminal narrowing. At L3-4, there is mild narrowing of the   right side of the canal, right lateral recess and there is eccentric   spurring into the right neural foramen and to the far right laterally   that moderately narrows the right neural foramen and abuts the   undersurface of the exiting right L4 nerve root. There is mild left   foraminal narrowing at L4-5 and L5-S1.       The results of this study were communicated to Dr. Cesar Iglesias by   telephone on 08/26/2024 at 8:40 AM.        This report was finalized on 8/26/2024 10:48 AM by Dr. Gilbert Hassan M.D   on Workstation: [id number]          NM Bone Scan Whole Body   Final Result       As described.               This report was finalized on 8/25/2024 11:13 AM by Dr. Ravi Jane M.D on Workstation: [id number]          MRI Brain With & Without Contrast   Final Result   1. Postoperative findings, as noted above.  Continued follow-up is   recommended.            This report was finalized on 8/23/2024 5:28 AM by Dr. Viridiana Altamirano M.D on Workstation: [id number]          CT Head Without Contrast   Final Result   1.  The patient is status post resection of a right cerebellar avidly   enhancing mass. Expected postoperative changes are noted. No   complicating feature is identified.   2.  Decreased attenuation is appreciated involving the left temporal   lobe posteriorly and inferiorly consistent with vasogenic edema. This   corresponds to the 9 mm enhancing lesion present on the MRI examination   of 8/20/2024. The enhancing lesion involving the superior cerebellar   vermis to the left is occult as is the area of dural enhancement   overlying the left frontal lobe superolaterally.       Radiation dose reduction techniques were utilized, including automated   exposure modulation based on body size.       This report was finalized on 8/21/2024 6:45 PM by Dr. Cesar Levine M.D   on Workstation: [id number]          CT Head With & Without Contrast   Final Result   1. There has been no change when compared to the MRI of the brain with   and without contrast earlier this morning on 08/20/2024 at 12:22 a.m.       2. This patient has 3 enhancing lesions in the brain and findings are   most consistent with metastatic disease to the brain. The enhancing   brain lesions which are most likely brain mets includes a 7 x 6 x 6 mm   enhancing lesion in the posterior inferior left temporal lobe that has   2.5 x 2.3 cm of surrounding vasogenic edema.  There is a 10 x 9 x 10 mm   enhancing lesion in the superior midline of the vermis of the cerebellum   that has mild surrounding edema. The largest enhancing lesion is in the   posterior inferior right cerebellum that measures 2.4 x 2 x 2 cm in size   and has a large amount of surrounding vasogenic edema tracking up to 4.5   x 4.5 cm with mild mass effect on the fourth ventricle. There is mild   dilatation of the lateral and third ventricles compatible with mild   hydrocephalus, unchanged. This patient has abnormal circumferential wall   thickening within the distal sigmoid colon seen on chest, abdomen and   pelvic CT earlier this morning on 08/20/2024 at 1:42 a.m. suggesting   there could be a sigmoid colon cancer and correlation with colonoscopy   is suggested.       Radiation dose reduction techniques were utilized, including automated   exposure control and exposure modulation based on body size.           This report was finalized on 8/21/2024 6:09 AM by Dr. Gilbert Hassan M.D   on Workstation: [id number]          CT Chest Without Contrast Diagnostic   Final Result       1. No evidence of metastatic disease within the thorax.   2. Asymmetric wall thickening involving the distal sigmoid colon, with   some surrounding mesenteric soft tissue nodularity. Appearance is very   worrisome for primary colonic malignancy. Correlation with colonoscopy   is recommended. MRI could allow for further assessment.   3. Sclerotic lesion within the spinous process of T1. I would favor that   this is a benign lesion such as a bone island. Consider short-term   follow-up or bone scan.       Radiation dose reduction techniques were utilized, including automated   exposure control and exposure modulation based on body size.           This report was finalized on 8/20/2024 3:54 AM by Dr. Viridiana Altamirano M.D on Workstation: [id number]          CT Abdomen Pelvis Without Contrast   Final Result       1. No evidence of metastatic  disease within the thorax.   2. Asymmetric wall thickening involving the distal sigmoid colon, with   some surrounding mesenteric soft tissue nodularity. Appearance is very   worrisome for primary colonic malignancy. Correlation with colonoscopy   is recommended. MRI could allow for further assessment.   3. Sclerotic lesion within the spinous process of T1. I would favor that   this is a benign lesion such as a bone island. Consider short-term   follow-up or bone scan.       Radiation dose reduction techniques were utilized, including automated   exposure control and exposure modulation based on body size.           This report was finalized on 8/20/2024 3:54 AM by Dr. Viridiana Altamirano M.D on Workstation: [id number]          MRI Brain With & Without Contrast   Final Result   1. Study confirms the presence of lesions within the left temporal lobe,   as well as the right cerebellar hemisphere. A third lesion is noted   within the cerebellar vermis. There is mass effect upon the fourth   ventricle. There is some mild dilatation of the lateral and third   ventricles. Continued follow-up is recommended.           This report was finalized on 8/20/2024 2:41 AM by Dr. Viridiana Altamirano M.D on Workstation: [id number]          CT Head Without Contrast   Final Result   Areas of increased attenuation are appreciated over the   right cerebral hemisphere posteriorly (2.1 cm) and left temporal lobe   inferiorly and posteriorly (6 mm) most consistent with that of   metastatic disease with associated edema. Edema is most prominent   involving the right cerebellar hemisphere. There is mass effect upon the   fourth ventricle and mild prominence of the lateral and third   ventricles. Further evaluation with MRI examination of the brain with   and without contrast is recommended.       The above information was called to and discussed with Dr. Epps.                   Radiation dose reduction techniques were utilized,  including automated   exposure modulation based on body size.       This report was finalized on 8/19/2024 6:40 PM by Dr. Cesar Levine M.D   on Workstation: [id number]          XR Chest 1 View   Final Result   1. No acute process           This report was finalized on 8/19/2024 4:32 PM by Dr. 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  General surgery and oncology are on board.  No further workup or treatment from GI standpoint.  Thank you for the consult.  Please call us back if we are needed further.    I discussed the patients findings and my recommendations with patient.    Dragon dictation used throughout this note.            Tiffany Pinzon PA-C  [facility]  [address] [place] [address]  Office: [phone]

## 2024-08-29 NOTE — PROGRESS NOTES
[facility] INPATIENT PROGRESS NOTE    Length of Stay:  10 days    CHIEF COMPLAINT/REASON FOR VISIT:  Metastatic colon cancer     SUBJECTIVE:  POD 2 s/p port placement and diverting colostomy.  Pain was significant this morning but she is feeling better now.  She anticipates discharge tomorrow.    ROS:  14 systems reviewed with pertinent positives and negatives in the HPI. Reviewed today.    OBJECTIVE:  Vitals:    08/28/24 1920 08/28/24 2325 08/29/24 0346 08/29/24 0735   BP: 132/72 110/60 120/77 131/86   BP Location: Left arm Left arm Right arm Right arm   Patient Position: Lying Lying Lying Sitting   Pulse: 78 71 64 75   Resp: 18 18 18 18   Temp: 97.9 °F (36.6 °C) 98.2 °F (36.8 °C) 98.6 °F (37 °C) 98.2 °F (36.8 °C)   TempSrc: Oral Oral Oral Oral   SpO2: 99% 100%     Weight:       Height:             PHYSICAL EXAMINATION:   General:  No acute distress, awake, alert and oriented.  Ambulating.  Skin:  Warm and dry, no visible rash  HEENT:  Normocephalic/atraumatic.    Chest:  Normal respiratory effort  Extremities:  No visible clubbing, cyanosis, or edema  Neuro/psych:  Grossly nonfocal.  Normal mood and affect.       DIAGNOSTIC DATA:  Results Review:     I reviewed the patient's new clinical results.    Results from last 7 days   Lab Units 08/29/24  0628   WBC 10*3/mm3 7.47   HEMOGLOBIN g/dL 11.7*   HEMATOCRIT % 34.0   PLATELETS 10*3/mm3 303     Lab Results   Component Value Date    NEUTROABS 8.20 (H) 08/27/2024     Results from last 7 days   Lab Units 08/29/24  0628   SODIUM mmol/L 142   POTASSIUM mmol/L 4.1   CHLORIDE mmol/L 105   CO2 mmol/L 26.2   BUN mg/dL 10   CREATININE mg/dL 0.56*   GLUCOSE mg/dL 88   CALCIUM mg/dL 8.9           Results from last 7 days   Lab Units 08/28/24  0543   MAGNESIUM mg/dL 2.0         IMAGING:    MRI Cervical Spine With & Without Contrast (08/28/2024 10:30)  MRI Thoracic Spine With & Without Contrast (08/28/2024 10:30)    ASSESSMENT:    This is a 64 y.o. female with:      *Metastatic colon cancer  The patient presented on 8/19/2022 initially to urgent care with headache, nausea, vomiting, diarrhea.  She was advised to go to the ED from urgent care.  CT head 8/19/2024 showed some areas of increased attenuation over the right cerebral hemisphere and left temporal lobe concerning for metastatic disease with edema with some mass effect upon the fourth ventricle.  MRI brain 8/20/2024 with a lesion at the right cerebellar hemisphere measuring 2.2 cm, lesion within the left temporal lobe measuring 9 mm, 1.0 cm lesion at the left cerebellar vermis, all with surrounding edema.  There was some mass effect upon the fourth ventricle with mild prominence of the lateral and third ventricles.  8/20/2024: CT chest abdomen and pelvis with no evidence of pulmonary metastatic disease.  There was some asymmetric wall thickening in the distal sigmoid colon with surrounding mesenteric soft tissue nodularity concerning for primary colon cancer.  A sclerotic lesion of the spinous process of T1 was said to be consistent with a bone island or other benign lesion.  8/21/2024: Posterior fossa craniectomy with gross total removal of a right cerebellar hemisphere metastasis Dr. Bull Doty.  Pathology consistent with metastatic poorly differentiated adenocarcinoma favoring colorectal origin.  MSI testing is pending.  8/25/2024: Bone scan with no obvious metastatic disease.  8/25/2024: Colonoscopy by Dr. Kaminski shows an infiltrative completely obstructing large mass found at the proximal rectum.  Pathology with invasive poorly differentiated carcinoma with neuroendocrine features. MSI studies pending.     CEA 2.10.  8/26/2024: MRI lumbar spine with no obvious metastatic disease in the lumbar spine.  However, there is a 3 mm enhancing lesion in the visualized lower thoracic spinal cord that could represent a spinal cord metastasis.  Cervical and thoracic spine MRI requested..  8/27/24: diverting loop sigmoid  colostomy and port placement by Dr. Chester  8/28/2024: MRI C and T-spine addendum made with a small 3 mm met to the spinal cord at T12     *Mild normocytic anemia  Hemoglobin normalized     *Lovenox for DVT prophylaxis     RECOMMENDATIONS/PLAN:   Will send brain tissue for NGS when available  Will need postoperative radiation to the brain prior to considering palliative chemotherapy. Will see Dr. Crenshaw on Tuesday 9/3.  Completed medrol dose pack. Start dexamethasone 4 mg bid. Keppra continues.   Outpatient PET scan to be performed in 1-2 weeks, and I will see her back after that for follow up.  Previously discussed the incurable nature of this malignancy with the patient, her , and her children on 8/26  No objection to discharge tomorrow. I will request follow up. Dicussed with the patient and her . Will discuss with Dr. Crenshaw.     Elieser Saunders MD

## 2024-08-29 NOTE — PLAN OF CARE
Goal Outcome Evaluation:  Plan of Care Reviewed With: patient   S/P Colostomy  pain medication effective.  Plan for home with family @ discharge.     Progress: improving

## 2024-08-29 NOTE — NURSING NOTE
"CWOCN- follow up on colostomy care. Patient has been emptying the appliance and cleaning the outlet of the pouch. She feels comfortable with that. We reviewed the general schedule for changing to appliance wafer, usually 2x/week, and emptying when it is 1/2 full. Reviewed closed vs drainable pouches. Chemo/radiation will affect her stool so that will determine when she is using a drainable vs closed end appliance.  Reviewed activity- walking, increasing exercise, etc. Discussed ADLs, activity- swimming, showers. Partner at bedside and also engaged in the conversation. Patient plays pickelball- she will need to wait on twisting, fast pace activity at this time but can see how she feels once she is out of the post op period.   Patient hoping to be discharged tomorrow if her pain is improved. We will change the pouch in the AM.        08/29/24 1504   Colostomy LLQ   No Placement date: If unknown, DO NOT use \"Add Comment\" note or Placement time: If unknown, DO NOT use \"Add Comment\" note found.   Location: LLQ   Stomal Appliance 2 piece;Intact;Drainable   Stoma Appearance round;swollen;moist;bridge in place;red   Peristomal Assessment HOA   Stoma Function flatus;stool   Stool Color brown   Stool Consistency liquid   Treatment Placement checked       "

## 2024-08-29 NOTE — PROGRESS NOTES
[facility] NEUROSURGERY INTRACRANIAL PROGRESS NOTE    PATIENT IDENTIFICATION:   Name:  Ely Sims      MRN:  [id number]     64 y.o.  female               Cc: POD # 8 s/p posterior fossa craniotomy for tumor removal       Subjective     Interval History:  No significant overnight events.  Feeling good, having some pain from colectomy.  No headache, nausea or vomiting       Objective     Vital signs in last 24 hours:  Temp:  [97.9 °F (36.6 °C)-98.6 °F (37 °C)] 98.2 °F (36.8 °C)  Heart Rate:  [64-78] 75  Resp:  [18] 18  BP: (110-132)/(60-86) 131/86      Intake/Output this shift:  No intake/output data recorded.      Intake/Output last 3 shifts:  I/O last 3 completed shifts:  In: 1560 [P.O.:1560]  Out: -     LABS:  Results from last 7 days   Lab Units 08/29/24  0628 08/28/24  0543 08/27/24  1012   WBC 10*3/mm3 7.47 11.19* 11.49*   HEMOGLOBIN g/dL 11.7* 13.0 12.8   HEMATOCRIT % 34.0 38.4 38.1   PLATELETS 10*3/mm3 303 395 375     Results from last 7 days   Lab Units 08/29/24  0628 08/28/24  1635 08/28/24  0543 08/25/24  0439   SODIUM mmol/L 142  --  136 140   POTASSIUM mmol/L 4.1 4.1 3.6 4.0   CHLORIDE mmol/L 105  --  96* 103   CO2 mmol/L 26.2  --  24.9 29.0   BUN mg/dL 10  --  9 10   CREATININE mg/dL 0.56*  --  0.73 0.55*   CALCIUM mg/dL 8.9  --  9.2 8.8   GLUCOSE mg/dL 88  --  153* 103*     Tissue Pathology 8/21- Metastatic poorly differentiated adenocarcinoma     IMAGING STUDIES:  MRI thoracic spine: 3 mm enhancing lesion involving the cord at the level of T12 consistent with metastatic lesion.    MRI cervical spine: No evidence of metastatic disease involving the cervical spine.  Multilevel degenerative disc disease.    I personally viewed the patient's MRI cervical and thoracic, it was also reviewed by and discussed with Dr Soren Sherwood reviewed/changed: Yes  Norvasc 5 mg p.o. twice daily  Lipitor 10 mg p.o. daily  Lovenox 40 mg SQ nightly  Pepcid 20 mg p.o. twice daily  Keppra 500 mg p.o. twice daily  Tylenol 650 mg  "p.o. every 4 hours as needed  Hydrocodone 7.5 mg 1 p.o. every 4 hours as needed  Robaxin 750 mg p.o. every 6 hours as needed  Zofran 4 mg IV every 6 hours as needed  Oxycodone 5 mg 1 p.o. every 6 hours as needed      Physical Exam:    General:  Awake, alert & oriented x 3.  Speech clear with no aphasia  CN III, IV, VI:  EOM's intact, PERRL.  CN VII:  Facial movements are symmetric, no weakness  Motor:  Normal muscle strength, bulk and tone in bilateral upper and lower extremities.  No fasciculations, rigidity, spasticity, or abnormal movements  Station & Gait:  Up ad kacy  Coordination:  Finger to nose intact bilaterally.  Heel to shin intact in bilateral lower extremities  Skin:   Right occipital craniotomy incision well appearing, well approximated.  No swelling, erythema, drainage      Assessment & Plan     ASSESSMENT:      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    Colonic mass    Anemia    Hyperglycemia    Hypertension    POD # 8 s/p posterior fossa craniotomy for tumor removal.  Brain tissue pathology showed poorly differentiated metastatic colorectal adenocarcinoma. Patient reports pain from colectomy, otherwise feeling well.  Discussed findings of metastatic lesion at T12 that will require radiation.  Patient is otherwise doing over all very well.  She has follow up scheduled with Radiation Oncology.  Dr Doty will see her back in the office in 2 weeks.  TORI will sign off at this time, she is ok for discharge from our standpoint.      PLAN:   -TORI to sign off  -F/U in 2 weeks with Dr Doty      I discussed the patient's findings and my recommendations with patient and Dr Doty    During patient visit, I utilized appropriate personal protective equipment including  gloves.  Appropriate PPE was worn during the entire visit.  Hand hygiene was completed before and after.      LOS: 10 days       Edie Sierra, APRN  8/29/2024  13:49 EDT    \"Dictated utilizing Dragon dictation\".      "

## 2024-08-29 NOTE — PROGRESS NOTES
Patient has been notified of results by admitting team and GI consultant  I have spoken to Dr. Chester and he is planning surgery

## 2024-08-30 ENCOUNTER — TELEPHONE (OUTPATIENT)
Dept: SURGERY | Facility: CLINIC | Age: 64
End: 2024-08-30
Payer: COMMERCIAL

## 2024-08-30 ENCOUNTER — DOCUMENTATION (OUTPATIENT)
Dept: HOME HEALTH SERVICES | Facility: HOME HEALTHCARE | Age: 64
End: 2024-08-30
Payer: COMMERCIAL

## 2024-08-30 ENCOUNTER — HOME HEALTH ADMISSION (OUTPATIENT)
Dept: HOME HEALTH SERVICES | Facility: HOME HEALTHCARE | Age: 64
End: 2024-08-30
Payer: COMMERCIAL

## 2024-08-30 ENCOUNTER — TELEPHONE (OUTPATIENT)
Dept: RADIATION ONCOLOGY | Facility: HOSPITAL | Age: 64
End: 2024-08-30
Payer: COMMERCIAL

## 2024-08-30 VITALS
WEIGHT: 158.73 LBS | TEMPERATURE: 98.1 F | SYSTOLIC BLOOD PRESSURE: 107 MMHG | RESPIRATION RATE: 18 BRPM | HEIGHT: 67 IN | BODY MASS INDEX: 24.91 KG/M2 | DIASTOLIC BLOOD PRESSURE: 72 MMHG | OXYGEN SATURATION: 96 % | HEART RATE: 68 BPM

## 2024-08-30 LAB
ANION GAP SERPL CALCULATED.3IONS-SCNC: 10.9 MMOL/L (ref 5–15)
BUN SERPL-MCNC: 10 MG/DL (ref 8–23)
BUN/CREAT SERPL: 18.2 (ref 7–25)
CALCIUM SPEC-SCNC: 9.1 MG/DL (ref 8.6–10.5)
CHLORIDE SERPL-SCNC: 99 MMOL/L (ref 98–107)
CO2 SERPL-SCNC: 27.1 MMOL/L (ref 22–29)
CREAT SERPL-MCNC: 0.55 MG/DL (ref 0.57–1)
CYTO UR: NORMAL
DEPRECATED RDW RBC AUTO: 44.2 FL (ref 37–54)
EGFRCR SERPLBLD CKD-EPI 2021: 102.5 ML/MIN/1.73
ERYTHROCYTE [DISTWIDTH] IN BLOOD BY AUTOMATED COUNT: 12.3 % (ref 12.3–15.4)
GLUCOSE SERPL-MCNC: 125 MG/DL (ref 65–99)
HCT VFR BLD AUTO: 37.1 % (ref 34–46.6)
HGB BLD-MCNC: 12.8 G/DL (ref 12–15.9)
LAB AP CASE REPORT: NORMAL
LAB AP DIAGNOSIS COMMENT: NORMAL
LAB AP SPECIAL STAINS: NORMAL
Lab: NORMAL
MCH RBC QN AUTO: 33.9 PG (ref 26.6–33)
MCHC RBC AUTO-ENTMCNC: 34.5 G/DL (ref 31.5–35.7)
MCV RBC AUTO: 98.1 FL (ref 79–97)
PATH REPORT.ADDENDUM SPEC: NORMAL
PATH REPORT.FINAL DX SPEC: NORMAL
PATH REPORT.GROSS SPEC: NORMAL
PLATELET # BLD AUTO: 333 10*3/MM3 (ref 140–450)
PMV BLD AUTO: 9 FL (ref 6–12)
POTASSIUM SERPL-SCNC: 4.1 MMOL/L (ref 3.5–5.2)
RBC # BLD AUTO: 3.78 10*6/MM3 (ref 3.77–5.28)
SODIUM SERPL-SCNC: 137 MMOL/L (ref 136–145)
WBC NRBC COR # BLD AUTO: 7.87 10*3/MM3 (ref 3.4–10.8)

## 2024-08-30 PROCEDURE — 63710000001 DEXAMETHASONE PER 0.25 MG: Performed by: INTERNAL MEDICINE

## 2024-08-30 PROCEDURE — 99024 POSTOP FOLLOW-UP VISIT: CPT

## 2024-08-30 PROCEDURE — 85027 COMPLETE CBC AUTOMATED: CPT | Performed by: SURGERY

## 2024-08-30 PROCEDURE — 80048 BASIC METABOLIC PNL TOTAL CA: CPT | Performed by: SURGERY

## 2024-08-30 RX ORDER — HYDROCODONE BITARTRATE AND ACETAMINOPHEN 7.5; 325 MG/1; MG/1
1 TABLET ORAL EVERY 4 HOURS PRN
Qty: 18 TABLET | Refills: 0 | Status: SHIPPED | OUTPATIENT
Start: 2024-08-30

## 2024-08-30 RX ORDER — PSEUDOEPHEDRINE HCL 30 MG
100 TABLET ORAL DAILY
Qty: 30 EACH | Refills: 0 | Status: SHIPPED | OUTPATIENT
Start: 2024-08-31

## 2024-08-30 RX ORDER — AMLODIPINE BESYLATE 5 MG/1
5 TABLET ORAL DAILY
Qty: 30 TABLET | Refills: 0 | Status: SHIPPED | OUTPATIENT
Start: 2024-08-30

## 2024-08-30 RX ORDER — LEVETIRACETAM 500 MG/1
500 TABLET ORAL 2 TIMES DAILY
Qty: 60 TABLET | Refills: 0 | Status: SHIPPED | OUTPATIENT
Start: 2024-08-30

## 2024-08-30 RX ORDER — DEXAMETHASONE 4 MG/1
4 TABLET ORAL EVERY 12 HOURS SCHEDULED
Qty: 60 TABLET | Refills: 0 | Status: SHIPPED | OUTPATIENT
Start: 2024-08-30

## 2024-08-30 RX ORDER — METHOCARBAMOL 750 MG/1
750 TABLET, FILM COATED ORAL EVERY 6 HOURS PRN
Qty: 30 TABLET | Refills: 0 | Status: SHIPPED | OUTPATIENT
Start: 2024-08-30

## 2024-08-30 RX ORDER — FAMOTIDINE 20 MG/1
20 TABLET, FILM COATED ORAL
Qty: 60 TABLET | Refills: 0 | Status: SHIPPED | OUTPATIENT
Start: 2024-08-30

## 2024-08-30 RX ADMIN — ATORVASTATIN CALCIUM 10 MG: 20 TABLET, FILM COATED ORAL at 08:18

## 2024-08-30 RX ADMIN — HYDROCODONE BITARTRATE AND ACETAMINOPHEN 1 TABLET: 7.5; 325 TABLET ORAL at 08:19

## 2024-08-30 RX ADMIN — LEVETIRACETAM 500 MG: 500 TABLET, FILM COATED ORAL at 08:20

## 2024-08-30 RX ADMIN — HYDROCODONE BITARTRATE AND ACETAMINOPHEN 1 TABLET: 7.5; 325 TABLET ORAL at 02:30

## 2024-08-30 RX ADMIN — DOCUSATE SODIUM 100 MG: 100 CAPSULE, LIQUID FILLED ORAL at 08:20

## 2024-08-30 RX ADMIN — AMLODIPINE BESYLATE 5 MG: 5 TABLET ORAL at 08:16

## 2024-08-30 RX ADMIN — FAMOTIDINE 20 MG: 20 TABLET, FILM COATED ORAL at 08:20

## 2024-08-30 RX ADMIN — DEXAMETHASONE 4 MG: 4 TABLET ORAL at 08:21

## 2024-08-30 NOTE — PROGRESS NOTES
Acute Care General Surgery Progress Note    Patient: Ely Sims  YOB: 1960  MRN: [id number]      Assessment  Ely Sims is a 64 y.o. female who is POD 3 laparoscopic diverting loop sigmoid colostomy and left subclavian Mediport placement by Dr. Chester.  Patient is doing very well this morning, doing well with ostomy teachings, and her pain is controlled.  Mediport site is in good order. Ostomy is pink, intact, and functioning. Incisions are in good order.  Labs and vitals are stable.    Plan  Okay for discharge from general surgery standpoint  No lifting greater than 15 pounds for 4 weeks  No submerging in water for 2 weeks  Will need to follow-up with Dr. Chester in 2 weeks      Subjective  Denies nausea or vomiting.  Denies abdominal pain.  Tolerating diet.  Ambulating well.  Doing well with new colostomy    Objective    Vitals:    08/30/24 0426   BP: 107/72   Pulse: 68   Resp: 18   Temp: 98.1 °F (36.7 °C)   SpO2: 96%     Physical Exam  Constitutional: Alert, oriented, no acute distress  Respiratory: Normal work of breathing, Symmetric excursion  Cardiovascular: Well pefused, no jugular venous distention evident   Abdominal: Soft, nondistended, nontender, ostomy pink and intact with liquid brown stool, incision sites are clean, dry, intact with no surrounding erythema  Skin: Warm, Dry      Laboratory Results  Results from last 7 days   Lab Units 08/30/24  0515 08/29/24  0628 08/28/24  0543 08/27/24  1012 08/25/24  0439 08/24/24  0528   WBC 10*3/mm3 7.87 7.47 11.19* 11.49* 5.87 8.21   HEMOGLOBIN g/dL 12.8 11.7* 13.0 12.8 11.6* 11.9*   HEMATOCRIT % 37.1 34.0 38.4 38.1 34.0 34.9   PLATELETS 10*3/mm3 333 303 395 375 300 312     Results from last 7 days   Lab Units 08/30/24  0515 08/29/24  0628 08/28/24  1635 08/28/24  0543   SODIUM mmol/L 137 142  --  136   POTASSIUM mmol/L 4.1 4.1 4.1 3.6   CHLORIDE mmol/L 99 105  --  96*   CO2 mmol/L 27.1 26.2  --  24.9   BUN mg/dL 10 10  --  9   CREATININE mg/dL 0.55*  0.56*  --  0.73   CALCIUM mg/dL 9.1 8.9  --  9.2   GLUCOSE mg/dL 125* 88  --  153*     I have personally reviewed 8/30 labs        GRETCHEN Gilliam  Acute Care General Surgery  [facility]    [address], [address]  [place], [place], [address]  P: [phone]  F: [phone]

## 2024-08-30 NOTE — PROGRESS NOTES
[facility] to provide Home Care services. Patient agreeable and denies previous HH. Contact information confirmed.

## 2024-08-30 NOTE — PLAN OF CARE
Goal Outcome Evaluation:  Plan of Care Reviewed With: patient   Plan for home today with family.  Patient had a good night.  Pain medication effective.  S/P Colostomy  8/26  Ongoing teaching / education.     Progress: improving

## 2024-08-30 NOTE — PLAN OF CARE
Goal Outcome Evaluation:  Plan of Care Reviewed With: patient        Patient is discharged home

## 2024-08-30 NOTE — CASE MANAGEMENT/SOCIAL WORK
Case Management Discharge Note      Final Note: Home with family and [facility] per private auto         Selected Continued Care - Discharged on 8/30/2024 Admission date: 8/19/2024 - Discharge disposition: Home or Self Care      Destination    No services have been selected for the patient.                Durable Medical Equipment    No services have been selected for the patient.                Dialysis/Infusion    No services have been selected for the patient.                Home Medical Care       Service Provider Selected Services Address Phone Fax Patient Preferred    [facility] [address] [address] [phone] --              Therapy    No services have been selected for the patient.                Community Resources    No services have been selected for the patient.                Community & DME    No services have been selected for the patient.                    Transportation Services  Private: Car    Final Discharge Disposition Code: 06 - home with home health care

## 2024-08-30 NOTE — PAYOR COMM NOTE
"Ely Sims (64 y.o. Female      PLEASE SEE ATTACHED FOR DC NOTICE    AND CONT STAY REQUEST     REF #[id number]     THANK YOU  MIR FRANCOIS RN/UM DEPT  [facility]   [phone]  [phone]       Date of Birth   1960    Social Security Number       Address   [address] [place] [address]    Home Phone   [phone]    MRN   [id number]       P[employer]   Holiness    Marital Status   Single                            Admission Date   8/19/24    Admission Type   Emergency    Admitting Provider   Isai Mathew MD    Attending Provider       Department, Room/Bed   [address], P590/1       Discharge Date   8/30/2024    Discharge Disposition   Home or Self Care    Discharge Destination                                 Attending Provider: (none)   Allergies: No Known Allergies    Isolation: None   Infection: None   Code Status: CPR    Ht: 170.2 cm (67.01\")   Wt: 72 kg (158 lb 11.7 oz)    Admission Cmt: None   Principal Problem: 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,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.1]                   Active Insurance as of 8/19/2024       Primary Coverage       Payor Plan Insurance Group Employer/Plan Group    [facility] [id number]       Payor Plan Address Payor Plan Phone Number Payor Plan Fax Number Effective Dates    [address] [address]   1/1/2024 - None Entered    [facility] [id number]         Subscriber Name Subscriber Birth Date Member ID       ELY SIMS 1960 [id number]                     Emergency Contacts        (Rel.) Home Phone Work Phone Mobile Phone    Elia Cadena -- -- [phone]    DON OBRIEN (Sister) -- -- [phone]    SurinderTiffany (Daughter) -- -- [phone]    Ubaldo Case (Partner) -- -- [phone]              [place]: [facility] [id number]  Tax ID [id number]     Physician Progress Notes (last 4 days)        Kendra Naidu APRN at 08/30/24 0752          Acute Care General Surgery Progress " Note    Patient: Ely Sims  YOB: 1960  MRN: [id number]      Assessment  Ely Sims is a 64 y.o. female who is POD 3 laparoscopic diverting loop sigmoid colostomy and left subclavian Mediport placement by Dr. Chester.  Patient is doing very well this morning, doing well with ostomy teachings, and her pain is controlled.  Mediport site is in good order. Ostomy is pink, intact, and functioning. Incisions are in good order.  Labs and vitals are stable.    Plan  Okay for discharge from general surgery standpoint  No lifting greater than 15 pounds for 4 weeks  No submerging in water for 2 weeks  Will need to follow-up with Dr. Chester in 2 weeks      Subjective  Denies nausea or vomiting.  Denies abdominal pain.  Tolerating diet.  Ambulating well.  Doing well with new colostomy    Objective    Vitals:    08/30/24 0426   BP: 107/72   Pulse: 68   Resp: 18   Temp: 98.1 °F (36.7 °C)   SpO2: 96%     Physical Exam  Constitutional: Alert, oriented, no acute distress  Respiratory: Normal work of breathing, Symmetric excursion  Cardiovascular: Well pefused, no jugular venous distention evident   Abdominal: Soft, nondistended, nontender, ostomy pink and intact with liquid brown stool, incision sites are clean, dry, intact with no surrounding erythema  Skin: Warm, Dry      Laboratory Results  Results from last 7 days   Lab Units 08/30/24  0515 08/29/24  0628 08/28/24  0543 08/27/24  1012 08/25/24  0439 08/24/24  0528   WBC 10*3/mm3 7.87 7.47 11.19* 11.49* 5.87 8.21   HEMOGLOBIN g/dL 12.8 11.7* 13.0 12.8 11.6* 11.9*   HEMATOCRIT % 37.1 34.0 38.4 38.1 34.0 34.9   PLATELETS 10*3/mm3 333 303 395 375 300 312     Results from last 7 days   Lab Units 08/30/24  0515 08/29/24  0628 08/28/24  1635 08/28/24  0543   SODIUM mmol/L 137 142  --  136   POTASSIUM mmol/L 4.1 4.1 4.1 3.6   CHLORIDE mmol/L 99 105  --  96*   CO2 mmol/L 27.1 26.2  --  24.9   BUN mg/dL 10 10  --  9   CREATININE mg/dL 0.55* 0.56*  --  0.73   CALCIUM mg/dL 9.1  8.9  --  9.2   GLUCOSE mg/dL 125* 88  --  153*     I have personally reviewed 8/30 labs        GRETCHEN Gilliam  Acute Care General Surgery  [facility]    [address], [address]  [place], [place], [address]  P: [phone]  F: [phone]      Electronically signed by Kendra Naidu APRN at 08/30/24 1720       Elieser Saunders MD at 08/29/24 1436          [facility] INPATIENT PROGRESS NOTE    Length of Stay:  10 days    CHIEF COMPLAINT/REASON FOR VISIT:  Metastatic colon cancer     SUBJECTIVE:  POD 2 s/p port placement and diverting colostomy.  Pain was significant this morning but she is feeling better now.  She anticipates discharge tomorrow.    ROS:  14 systems reviewed with pertinent positives and negatives in the HPI. Reviewed today.    OBJECTIVE:  Vitals:    08/28/24 1920 08/28/24 2325 08/29/24 0346 08/29/24 0735   BP: 132/72 110/60 120/77 131/86   BP Location: Left arm Left arm Right arm Right arm   Patient Position: Lying Lying Lying Sitting   Pulse: 78 71 64 75   Resp: 18 18 18 18   Temp: 97.9 °F (36.6 °C) 98.2 °F (36.8 °C) 98.6 °F (37 °C) 98.2 °F (36.8 °C)   TempSrc: Oral Oral Oral Oral   SpO2: 99% 100%     Weight:       Height:             PHYSICAL EXAMINATION:   General:  No acute distress, awake, alert and oriented.  Ambulating.  Skin:  Warm and dry, no visible rash  HEENT:  Normocephalic/atraumatic.    Chest:  Normal respiratory effort  Extremities:  No visible clubbing, cyanosis, or edema  Neuro/psych:  Grossly nonfocal.  Normal mood and affect.       DIAGNOSTIC DATA:  Results Review:     I reviewed the patient's new clinical results.    Results from last 7 days   Lab Units 08/29/24  0628   WBC 10*3/mm3 7.47   HEMOGLOBIN g/dL 11.7*   HEMATOCRIT % 34.0   PLATELETS 10*3/mm3 303     Lab Results   Component Value Date    NEUTROABS 8.20 (H) 08/27/2024     Results from last 7 days   Lab Units 08/29/24  0628   SODIUM mmol/L 142   POTASSIUM mmol/L 4.1   CHLORIDE mmol/L  105   CO2 mmol/L 26.2   BUN mg/dL 10   CREATININE mg/dL 0.56*   GLUCOSE mg/dL 88   CALCIUM mg/dL 8.9           Results from last 7 days   Lab Units 08/28/24  0543   MAGNESIUM mg/dL 2.0         IMAGING:    MRI Cervical Spine With & Without Contrast (08/28/2024 10:30)  MRI Thoracic Spine With & Without Contrast (08/28/2024 10:30)    ASSESSMENT:    This is a 64 y.o. female with:     *Metastatic colon cancer  The patient presented on 8/19/2022 initially to urgent care with headache, nausea, vomiting, diarrhea.  She was advised to go to the ED from urgent care.  CT head 8/19/2024 showed some areas of increased attenuation over the right cerebral hemisphere and left temporal lobe concerning for metastatic disease with edema with some mass effect upon the fourth ventricle.  MRI brain 8/20/2024 with a lesion at the right cerebellar hemisphere measuring 2.2 cm, lesion within the left temporal lobe measuring 9 mm, 1.0 cm lesion at the left cerebellar vermis, all with surrounding edema.  There was some mass effect upon the fourth ventricle with mild prominence of the lateral and third ventricles.  8/20/2024: CT chest abdomen and pelvis with no evidence of pulmonary metastatic disease.  There was some asymmetric wall thickening in the distal sigmoid colon with surrounding mesenteric soft tissue nodularity concerning for primary colon cancer.  A sclerotic lesion of the spinous process of T1 was said to be consistent with a bone island or other benign lesion.  8/21/2024: Posterior fossa craniectomy with gross total removal of a right cerebellar hemisphere metastasis Dr. Bull Doty.  Pathology consistent with metastatic poorly differentiated adenocarcinoma favoring colorectal origin.  MSI testing is pending.  8/25/2024: Bone scan with no obvious metastatic disease.  8/25/2024: Colonoscopy by Dr. Kaminski shows an infiltrative completely obstructing large mass found at the proximal rectum.  Pathology with invasive poorly  differentiated carcinoma with neuroendocrine features. MSI studies pending.     CEA 2.10.  8/26/2024: MRI lumbar spine with no obvious metastatic disease in the lumbar spine.  However, there is a 3 mm enhancing lesion in the visualized lower thoracic spinal cord that could represent a spinal cord metastasis.  Cervical and thoracic spine MRI requested..  8/27/24: diverting loop sigmoid colostomy and port placement by Dr. Chester  8/28/2024: MRI C and T-spine addendum made with a small 3 mm met to the spinal cord at T12     *Mild normocytic anemia  Hemoglobin normalized     *Lovenox for DVT prophylaxis     RECOMMENDATIONS/PLAN:   Will send brain tissue for NGS when available  Will need postoperative radiation to the brain prior to considering palliative chemotherapy. Will see Dr. Crenshaw on Tuesday 9/3.  Completed medrol dose pack. Start dexamethasone 4 mg bid. Keppra continues.   Outpatient PET scan to be performed in 1-2 weeks, and I will see her back after that for follow up.  Previously discussed the incurable nature of this malignancy with the patient, her , and her children on 8/26  No objection to discharge tomorrow. I will request follow up. Dicussed with the patient and her . Will discuss with Dr. Crenshaw.     Elieser Saunders MD     Electronically signed by Elieser Saunders MD at 08/29/24 1600       Tiffany Pinzon PA-C at 08/29/24 1343          [facility]  Inpatient Progress Note    Reason for Follow-up: Rectal mass    Subjective     Interval History:   Path with invasive poorly differentiated carcinoma with neuroendocrine features.  She is doing well today.  No new complaints.    Current Facility-Administered Medications:     [DISCONTINUED] acetaminophen (TYLENOL) tablet 650 mg, 650 mg, Oral, Q4H PRN **OR** acetaminophen (TYLENOL) 160 MG/5ML oral solution 650 mg, 650 mg, Oral, Q4H PRN **OR** [DISCONTINUED] acetaminophen (TYLENOL) suppository 650 mg, 650 mg, Rectal, Q4H  PRN, Sarika Wilburn MD    amLODIPine (NORVASC) tablet 5 mg, 5 mg, Oral, BID, Saturnino Chester MD, 5 mg at 08/29/24 0807    atorvastatin (LIPITOR) tablet 10 mg, 10 mg, Oral, Daily, Saturnino Chester MD, 10 mg at 08/29/24 0806    sennosides-docusate (PERICOLACE) 8.6-50 MG per tablet 1 tablet, 1 tablet, Oral, Nightly, 1 tablet at 08/28/24 2128 **AND** polyethylene glycol (MIRALAX) packet 17 g, 17 g, Oral, Daily PRN **AND** bisacodyl (DULCOLAX) EC tablet 5 mg, 5 mg, Oral, Daily PRN **AND** bisacodyl (DULCOLAX) suppository 10 mg, 10 mg, Rectal, Daily PRN, Saturnino Chester MD    Calcium Replacement - Follow Nurse / BPA Driven Protocol, , Does not apply, PRN, Saturnino Chester MD    docusate sodium (COLACE) capsule 100 mg, 100 mg, Oral, Daily, Saturnino Chester MD, 100 mg at 08/29/24 0807    enalaprilat (VASOTEC) injection 0.625 mg, 0.625 mg, Intravenous, Q6H PRN, Saturnino Chester MD    Enoxaparin Sodium (LOVENOX) syringe 40 mg, 40 mg, Subcutaneous, Nightly, Kendra Naidu, APRN, 40 mg at 08/28/24 2128    famotidine (PEPCID) tablet 20 mg, 20 mg, Oral, BID AC, Saturnino Chester MD, 20 mg at 08/29/24 0807    fentaNYL citrate (PF) (SUBLIMAZE) injection, , Intravenous, Code / Trauma / Sedation Medication, Malvin Merritt MD, 50 mcg at 08/21/24 1158    HYDROcodone-acetaminophen (NORCO) 7.5-325 MG per tablet 1 tablet, 1 tablet, Oral, Q4H PRN, Colette Koroma MD    labetalol (NORMODYNE,TRANDATE) injection 20 mg, 20 mg, Intravenous, Q2H PRN, Saturnino Chester MD, 20 mg at 08/24/24 1800    levETIRAcetam (KEPPRA) tablet 500 mg, 500 mg, Oral, BID, Saturnino Chester MD, 500 mg at 08/29/24 0807    Magnesium Standard Dose Replacement - Follow Nurse / BPA Driven Protocol, , Does not apply, PRN, Saturnino Chester MD    methocarbamol (ROBAXIN) tablet 750 mg, 750 mg, Oral, Q6H PRN, Saturnino Chester MD, 750 mg at 08/29/24 0806    midazolam  (VERSED) injection, , Intravenous, Code / Trauma / Sedation Medication, Malvin Merritt MD, 1 mg at 08/21/24 1157    nitroglycerin (NITROSTAT) SL tablet 0.4 mg, 0.4 mg, Sublingual, Q5 Min PRN, Saturnino Chester MD    ondansetron ODT (ZOFRAN-ODT) disintegrating tablet 4 mg, 4 mg, Oral, Q6H PRN **OR** ondansetron (ZOFRAN) injection 4 mg, 4 mg, Intravenous, Q6H PRN, Saturnino Chester MD, 4 mg at 08/28/24 0525    oxyCODONE-acetaminophen (PERCOCET) 5-325 MG per tablet 1 tablet, 1 tablet, Oral, Q6H PRN, Saturnino Chester MD, 1 tablet at 08/29/24 0808    Phosphorus Replacement - Follow Nurse / BPA Driven Protocol, , Does not apply, Henrik ROWE Alberto Sebastian, MD    Potassium Replacement - Follow Nurse / BPA Driven Protocol, , Does not apply, PRHenrik ZHANG Alberto Sebastian, MD    [CANCELED] Insert Peripheral IV, , , Once **AND** sodium chloride 0.9 % flush 10 mL, 10 mL, Intravenous, PRNHenrik Alberto Sebastian, MD      Objective     Vital Signs  Temp:  [97.9 °F (36.6 °C)-98.6 °F (37 °C)] 98.2 °F (36.8 °C)  Heart Rate:  [64-78] 75  Resp:  [18] 18  BP: (110-132)/(60-86) 131/86  Body mass index is 24.85 kg/m².    Intake/Output Summary (Last 24 hours) at 8/29/2024 1343  Last data filed at 8/28/2024 1800  Gross per 24 hour   Intake 1080 ml   Output --   Net 1080 ml     No intake/output data recorded.     Physical Exam:    General: patient awake, alert and cooperative   Eyes: Normal lids and lashes, no scleral icterus   Skin: warm and dry, not jaundiced   Pulm: regular and unlabored   Psychiatric: Normal mood and behavior; memory intact     Results Review:     I reviewed the patient's new clinical results.    Results from last 7 days   Lab Units 08/29/24  0628 08/28/24  0543 08/27/24  1012   WBC 10*3/mm3 7.47 11.19* 11.49*   HEMOGLOBIN g/dL 11.7* 13.0 12.8   HEMATOCRIT % 34.0 38.4 38.1   PLATELETS 10*3/mm3 303 395 375     Results from last 7 days   Lab Units 08/29/24  0628 08/28/24  7221  "08/28/24  0543 08/25/24  0439   SODIUM mmol/L 142  --  136 140   POTASSIUM mmol/L 4.1 4.1 3.6 4.0   CHLORIDE mmol/L 105  --  96* 103   CO2 mmol/L 26.2  --  24.9 29.0   BUN mg/dL 10  --  9 10   CREATININE mg/dL 0.56*  --  0.73 0.55*   CALCIUM mg/dL 8.9  --  9.2 8.8   GLUCOSE mg/dL 88  --  153* 103*         No results found for: \"LIPASE\"    Radiology:  MRI Thoracic Spine With & Without Contrast   Final Result   Addendum (preliminary) 1 of 1   ADDENDUM: On the sagittal T1 postcontrast sequence of the thoracic spine   is a nodular area of enhancement involving the cord is appreciated level   of T12 measuring approximately 3 mm in size. This is not evident on the   axial T1 postcontrast sequence. The nonvisualization on the axial   postcontrast imaging is artifactual and due to the small size of the   lesion and technique. This almost certainly represents a metastatic   lesion. No other enhancing lesions are appreciated. The above   information was discussed with Dr. Doty on 8/29/2024 at 0825 hours. The   report should read as follows.       MRI CERVICAL SPINE W WO CONTRAST-, MRI THORACIC SPINE W WO CONTRAST-       HISTORY:  Evaluate for metastatic disease; C79.31-Secondary malignant   neoplasm of brain; C80.1-Malignant (primary) neoplasm, unspecified;   R51.9-Headache, unspecified; R03.0-Elevated blood-pressure reading,   without diagnosis of hypertension; K63.89-Other specified diseases of   intestine       COMPARISON: None       MRI EXAMINATION OF THE CERVICAL SPINE WITH AND WITHOUT CONTRAST:       A suboccipital craniotomy is noted on the right.       There is a grade 1 anterolisthesis of C3 upon C4 and C4 upon C5. There   is moderate loss of disc height at C5-6 and C6-7. Signal intensity   within the cord is normal on the sagittal and axial T2 sequences.       C2-3: Moderate facet degenerative disease is present on the left.       C3-4: Moderate facet degenerative disease is present bilaterally. A   small central " disc osteophyte complex is present with tonsil herniation.       C4-5: Mild to moderate to severe facet degenerative disease is present   on the right and left respectively.       C5-6: Moderate facet degenerative disease is present bilaterally. There   is moderate foraminal stenosis on the right secondary to loss of disc   height, uncovertebral degenerative disease and extension of the disc   osteophyte complex into the neural foramen.       C6-7: There is mild canal stenosis secondary to a broad-based disc   osteophyte complex. Moderate foraminal stenosis is present on the left   secondary to uncovertebral degenerative disease.       C7-T1: There is no evidence of a disc bulge or herniation.       After contrast administration there was mild enhancement appreciated   above the endplates at C5-6 and C6-7 consistent with degenerative   disease. There was no evidence of abnormal enhancement to suggest   metastatic disease.       IMPRESSION: There is no evidence of metastatic disease involving the   cervical spine. Multilevel degenerative disc disease is noted as   described above with no evidence of disc herniation, cord signal   abnormality or cord compression.           MRI EXAMINATION OF THE THORACIC SPINE WITH AND WITHOUT CONTRAST:       There is mild levoscoliosis of the upper thoracic spine with a   compensatory dextroscoliosis of the lower thoracic spine. The alignment   of the thoracic spine is within normal limits. Signal intensity within   the thoracic cord is normal on the sagittal and axial T2 sequences with   no evidence of disc herniation or of cord compression. There is disc   desiccation at all levels. A small right lateral disc osteophyte complex   is present at T6/T7 resulting mild flattening of the ventral surface of   the thecal sac. There is mild canal stenosis from T8-T11 secondary to   mild facet and ligamentum flavum hypertrophy and a broad-based disc   osteophyte complex. After contrast  administration the 3 mm enhancing   lesion is appreciated involving the thoracic cord at the level of T12 on   the sagittal T1 postcontrast sequence (image 8). There is only a   questionable area of enhancement suggested on the axial T1 postcontrast   sequence. The suboptimal visualization of the enhancing lesion on the   axial imaging is due to the small size of the lesion and technique. No   other enhancing lesions are identified.       IMPRESSION:    1.  There is a 3 mm. Enhancing lesion appreciated involving the cord at   the level of T12 consistent with a metastatic lesion. This is not well   demonstrated on the axial imaging. No other metastatic disease is   appreciated.    2.  Multilevel degenerative disease is noted as described above   including scoliosis and mild canal stenosis from T8-T11. See above.           This report was finalized on 8/29/2024 8:33 AM by Dr. Cesar Levine M.D   on Workstation: [id number]          Final   There is no evidence of metastatic disease involving the   cervical spine. Multilevel degenerative disc disease is noted as   described above with no evidence of disc herniation, cord signal   abnormality or cord compression.           MRI EXAMINATION OF THE THORACIC SPINE WITH AND WITHOUT CONTRAST:       There is mild levoscoliosis of the upper thoracic spine with a   compensatory dextroscoliosis of the lower thoracic spine. The alignment   of the thoracic spine is within normal limits. Signal intensity within   the thoracic cord is normal on the sagittal and axial T2 sequences with   no evidence of disc herniation or of cord compression. There is disc   desiccation at all levels. A small right lateral disc osteophyte complex   is present at T6/T7 resulting mild flattening of the ventral surface of   the thecal sac. There is mild canal stenosis from T8-T11 secondary to   mild facet and ligamentum flavum hypertrophy and a broad-based disc   osteophyte complex. After contrast  administration there was no evidence   of abnormal enhancement to suggest metastatic disease.       IMPRESSION: There is no evidence of metastatic disease, of a focal disc   herniation, cord signal abnormality or of cord compression. Multilevel   degenerative disease is noted as described above including scoliosis and   mild canal stenosis from T8-T11. See above.       This report was finalized on 8/28/2024 12:02 PM by Dr. Cesar Levine M.D on Workstation: [id number]          MRI Cervical Spine With & Without Contrast   Final Result   Addendum (preliminary) 1 of 1   ADDENDUM: On the sagittal T1 postcontrast sequence of the thoracic spine   is a nodular area of enhancement involving the cord is appreciated level   of T12 measuring approximately 3 mm in size. This is not evident on the   axial T1 postcontrast sequence. The nonvisualization on the axial   postcontrast imaging is artifactual and due to the small size of the   lesion and technique. This almost certainly represents a metastatic   lesion. No other enhancing lesions are appreciated. The above   information was discussed with Dr. Doty on 8/29/2024 at 0825 hours. The   report should read as follows.       MRI CERVICAL SPINE W WO CONTRAST-, MRI THORACIC SPINE W WO CONTRAST-       HISTORY:  Evaluate for metastatic disease; C79.31-Secondary malignant   neoplasm of brain; C80.1-Malignant (primary) neoplasm, unspecified;   R51.9-Headache, unspecified; R03.0-Elevated blood-pressure reading,   without diagnosis of hypertension; K63.89-Other specified diseases of   intestine       COMPARISON: None       MRI EXAMINATION OF THE CERVICAL SPINE WITH AND WITHOUT CONTRAST:       A suboccipital craniotomy is noted on the right.       There is a grade 1 anterolisthesis of C3 upon C4 and C4 upon C5. There   is moderate loss of disc height at C5-6 and C6-7. Signal intensity   within the cord is normal on the sagittal and axial T2 sequences.       C2-3: Moderate facet  degenerative disease is present on the left.       C3-4: Moderate facet degenerative disease is present bilaterally. A   small central disc osteophyte complex is present with tonsil herniation.       C4-5: Mild to moderate to severe facet degenerative disease is present   on the right and left respectively.       C5-6: Moderate facet degenerative disease is present bilaterally. There   is moderate foraminal stenosis on the right secondary to loss of disc   height, uncovertebral degenerative disease and extension of the disc   osteophyte complex into the neural foramen.       C6-7: There is mild canal stenosis secondary to a broad-based disc   osteophyte complex. Moderate foraminal stenosis is present on the left   secondary to uncovertebral degenerative disease.       C7-T1: There is no evidence of a disc bulge or herniation.       After contrast administration there was mild enhancement appreciated   above the endplates at C5-6 and C6-7 consistent with degenerative   disease. There was no evidence of abnormal enhancement to suggest   metastatic disease.       IMPRESSION: There is no evidence of metastatic disease involving the   cervical spine. Multilevel degenerative disc disease is noted as   described above with no evidence of disc herniation, cord signal   abnormality or cord compression.           MRI EXAMINATION OF THE THORACIC SPINE WITH AND WITHOUT CONTRAST:       There is mild levoscoliosis of the upper thoracic spine with a   compensatory dextroscoliosis of the lower thoracic spine. The alignment   of the thoracic spine is within normal limits. Signal intensity within   the thoracic cord is normal on the sagittal and axial T2 sequences with   no evidence of disc herniation or of cord compression. There is disc   desiccation at all levels. A small right lateral disc osteophyte complex   is present at T6/T7 resulting mild flattening of the ventral surface of   the thecal sac. There is mild canal stenosis  from T8-T11 secondary to   mild facet and ligamentum flavum hypertrophy and a broad-based disc   osteophyte complex. After contrast administration the 3 mm enhancing   lesion is appreciated involving the thoracic cord at the level of T12 on   the sagittal T1 postcontrast sequence (image 8). There is only a   questionable area of enhancement suggested on the axial T1 postcontrast   sequence. The suboptimal visualization of the enhancing lesion on the   axial imaging is due to the small size of the lesion and technique. No   other enhancing lesions are identified.       IMPRESSION:    1.  There is a 3 mm. Enhancing lesion appreciated involving the cord at   the level of T12 consistent with a metastatic lesion. This is not well   demonstrated on the axial imaging. No other metastatic disease is   appreciated.    2.  Multilevel degenerative disease is noted as described above   including scoliosis and mild canal stenosis from T8-T11. See above.           This report was finalized on 8/29/2024 8:33 AM by Dr. Cesar Levine M.D   on Workstation: [id number]          Final   There is no evidence of metastatic disease involving the   cervical spine. Multilevel degenerative disc disease is noted as   described above with no evidence of disc herniation, cord signal   abnormality or cord compression.           MRI EXAMINATION OF THE THORACIC SPINE WITH AND WITHOUT CONTRAST:       There is mild levoscoliosis of the upper thoracic spine with a   compensatory dextroscoliosis of the lower thoracic spine. The alignment   of the thoracic spine is within normal limits. Signal intensity within   the thoracic cord is normal on the sagittal and axial T2 sequences with   no evidence of disc herniation or of cord compression. There is disc   desiccation at all levels. A small right lateral disc osteophyte complex   is present at T6/T7 resulting mild flattening of the ventral surface of   the thecal sac. There is mild canal stenosis from  T8-T11 secondary to   mild facet and ligamentum flavum hypertrophy and a broad-based disc   osteophyte complex. After contrast administration there was no evidence   of abnormal enhancement to suggest metastatic disease.       IMPRESSION: There is no evidence of metastatic disease, of a focal disc   herniation, cord signal abnormality or of cord compression. Multilevel   degenerative disease is noted as described above including scoliosis and   mild canal stenosis from T8-T11. See above.       This report was finalized on 8/28/2024 12:02 PM by Dr. Cesar Levine M.D on Workstation: [id number]          FL C Arm During Surgery   Final Result      FL Barium Enema Water Soluble Single Contrast   Final Result   TECHNIQUE, FINDINGS AND IMPRESSION:   Scattered images were obtained of the abdomen. Prominent and dilated   loops of air-filled bowel are present throughout the abdomen and pelvis.       A small caliber rectal tube was then inserted gently into the rectum and   minimally insufflated under direct fluoroscopic guidance. Water-soluble   contrast was then administered into the rectum via gravity while   multiple fluoroscopic images were obtained in the AP, lateral oblique   views.       There is a long segment of irregular high-grade stenosis involving the   high rectum corresponding with the area of irregular wall thickening   seen on recent CT. This measures over a segment of approximately 3.7 cm.   The narrowest segment appears to measure approximately 5 to 6 mm in   shortest diameter. Contrast passes easily through this segment into the   more proximal large bowel.       DAP: 3477.4 ?Gy*m2       This report was finalized on 8/26/2024 5:32 PM by Dr. Calvin Blanco M.D   on Workstation: [id number]          MRI Lumbar Spine With & Without Contrast   Final Result   1. There is a 3 mm enhancing lesion in the visualized lower thoracic   spinal cord at the horizontal level of the junction of middle and   superior  thirds of the T12 thoracic level that most probably represents   a spinal cord metastasis. I recommend a dedicated cervical and thoracic   spine MRI with and without contrast to determine if there are any   additional spinal cord metastasis and to more comprehensively assess the   spinal cord that at the T12 level.       2. I see no osseous metastatic disease to the lumbar spine.       3. This patient has a levoscoliotic curvature of the lumbar spine with   its apex at the L3-4 lumbar level and there is severe disc space   narrowing and degenerative endplate changes with degenerative marrow   sclerosis and degenerative marrow edema in the central to right side of   the endplates at L3-4 along the inner margin of the levoscoliotic curve.   There is lumbar spondylosis as described above. I see no canal or   foraminal narrowing from T11 to L2 and at L2-3 I see mild canal and   bilateral foraminal narrowing. At L3-4, there is mild narrowing of the   right side of the canal, right lateral recess and there is eccentric   spurring into the right neural foramen and to the far right laterally   that moderately narrows the right neural foramen and abuts the   undersurface of the exiting right L4 nerve root. There is mild left   foraminal narrowing at L4-5 and L5-S1.       The results of this study were communicated to Dr. Cesar Iglesias by   telephone on 08/26/2024 at 8:40 AM.       This report was finalized on 8/26/2024 10:48 AM by Dr. Gilbert Hassan M.D   on Workstation: [email or web address]          NM Bone Scan Whole Body   Final Result       As described.               This report was finalized on 8/25/2024 11:13 AM by Dr. Ravi Jane M.D on Workstation: [email or web address]          MRI Brain With & Without Contrast   Final Result   1. Postoperative findings, as noted above.  Continued follow-up is   recommended.            This report was finalized on 8/23/2024 5:28 AM by Dr. Viridiana Altamirano M.D on Workstation: [id number]           CT Head Without Contrast   Final Result   1.  The patient is status post resection of a right cerebellar avidly   enhancing mass. Expected postoperative changes are noted. No   complicating feature is identified.   2.  Decreased attenuation is appreciated involving the left temporal   lobe posteriorly and inferiorly consistent with vasogenic edema. This   corresponds to the 9 mm enhancing lesion present on the MRI examination   of 8/20/2024. The enhancing lesion involving the superior cerebellar   vermis to the left is occult as is the area of dural enhancement   overlying the left frontal lobe superolaterally.       Radiation dose reduction techniques were utilized, including automated   exposure modulation based on body size.       This report was finalized on 8/21/2024 6:45 PM by Dr. Cesar Leivne M.D   on Workstation: [id number]          CT Head With & Without Contrast   Final Result   1. There has been no change when compared to the MRI of the brain with   and without contrast earlier this morning on 08/20/2024 at 12:22 a.m.       2. This patient has 3 enhancing lesions in the brain and findings are   most consistent with metastatic disease to the brain. The enhancing   brain lesions which are most likely brain mets includes a 7 x 6 x 6 mm   enhancing lesion in the posterior inferior left temporal lobe that has   2.5 x 2.3 cm of surrounding vasogenic edema. There is a 10 x 9 x 10 mm   enhancing lesion in the superior midline of the vermis of the cerebellum   that has mild surrounding edema. The largest enhancing lesion is in the   posterior inferior right cerebellum that measures 2.4 x 2 x 2 cm in size   and has a large amount of surrounding vasogenic edema tracking up to 4.5   x 4.5 cm with mild mass effect on the fourth ventricle. There is mild   dilatation of the lateral and third ventricles compatible with mild   hydrocephalus, unchanged. This patient has abnormal circumferential wall   thickening  within the distal sigmoid colon seen on chest, abdomen and   pelvic CT earlier this morning on 08/20/2024 at 1:42 a.m. suggesting   there could be a sigmoid colon cancer and correlation with colonoscopy   is suggested.       Radiation dose reduction techniques were utilized, including automated   exposure control and exposure modulation based on body size.           This report was finalized on 8/21/2024 6:09 AM by Dr. Gilbert Hassan M.D   on Workstation: [id number]          CT Chest Without Contrast Diagnostic   Final Result       1. No evidence of metastatic disease within the thorax.   2. Asymmetric wall thickening involving the distal sigmoid colon, with   some surrounding mesenteric soft tissue nodularity. Appearance is very   worrisome for primary colonic malignancy. Correlation with colonoscopy   is recommended. MRI could allow for further assessment.   3. Sclerotic lesion within the spinous process of T1. I would favor that   this is a benign lesion such as a bone island. Consider short-term   follow-up or bone scan.       Radiation dose reduction techniques were utilized, including automated   exposure control and exposure modulation based on body size.           This report was finalized on 8/20/2024 3:54 AM by Dr. Viridiana Altamirano M.D on Workstation: [id number]          CT Abdomen Pelvis Without Contrast   Final Result       1. No evidence of metastatic disease within the thorax.   2. Asymmetric wall thickening involving the distal sigmoid colon, with   some surrounding mesenteric soft tissue nodularity. Appearance is very   worrisome for primary colonic malignancy. Correlation with colonoscopy   is recommended. MRI could allow for further assessment.   3. Sclerotic lesion within the spinous process of T1. I would favor that   this is a benign lesion such as a bone island. Consider short-term   follow-up or bone scan.       Radiation dose reduction techniques were utilized, including automated    exposure control and exposure modulation based on body size.           This report was finalized on 8/20/2024 3:54 AM by Dr. Viridiana Altamirano M.D on Workstation: [id number]          MRI Brain With & Without Contrast   Final Result   1. Study confirms the presence of lesions within the left temporal lobe,   as well as the right cerebellar hemisphere. A third lesion is noted   within the cerebellar vermis. There is mass effect upon the fourth   ventricle. There is some mild dilatation of the lateral and third   ventricles. Continued follow-up is recommended.           This report was finalized on 8/20/2024 2:41 AM by Dr. Viridiana Altamirano M.D on Workstation: [id number]          CT Head Without Contrast   Final Result   Areas of increased attenuation are appreciated over the   right cerebral hemisphere posteriorly (2.1 cm) and left temporal lobe   inferiorly and posteriorly (6 mm) most consistent with that of   metastatic disease with associated edema. Edema is most prominent   involving the right cerebellar hemisphere. There is mass effect upon the   fourth ventricle and mild prominence of the lateral and third   ventricles. Further evaluation with MRI examination of the brain with   and without contrast is recommended.       The above information was called to and discussed with Dr. Epps.                   Radiation dose reduction techniques were utilized, including automated   exposure modulation based on body size.       This report was finalized on 8/19/2024 6:40 PM by Dr. Cesar Levine M.D   on Workstation: [id number]          XR Chest 1 View   Final Result   1. No acute process           This report was finalized on 8/19/2024 4:32 PM by Dr. 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  General surgery and oncology are on board.  No further workup or treatment from GI standpoint.  Thank you for the consult.  Please call us back if we are needed further.    I discussed the patients findings and my recommendations with patient.    Dragon dictation used throughout this note.            Tiffany Pinzon PA-C  [facility]  [facility] [place] [address]  Office: [phone]                Electronically signed by Tiffany Pinzon PA-C at 24 [phone]       Colette Koroma MD at 24 [phone]              Name: Ely Sims ADMIT: 2024   : 1960  PCP: Provider, No Known    MRN: [id number] LOS: 10 days   AGE/SEX: 64 y.o. female  ROOM: [facility]     Subjective   Subjective   No acute events overnight.  Patient states that she is feeling better this morning.  Denies chest pain or shortness of breath.  Did require dose of IV pain medications overnight and I have increased the dose of her Norco.  She has been tolerating a diet.    Objective   Objective     Vital Signs  Temp:  [97.9 °F (36.6 °C)-98.6 °F (37 °C)] 98.2 °F (36.8 °C)  Heart Rate:  [64-78] 75  Resp:  [18] 18  BP: (110-132)/(60-86) 131/86  SpO2:  [99 %-100 %] 100 %  on   ;   Device (Oxygen Therapy): room air  Body mass index is 24.85 kg/m².    Physical Exam  General: Alert, no acute distress.  Sitting up in bed.  Eating breakfast.  Pleasant and conversive.  ENT: No conjunctival injection or scleral icterus. Moist mucous membranes.   Neuro: Eyes open and moving in all directions, strength normal in all extremities, no focal deficits.   Lungs: Clear to auscultation bilaterally. No wheeze or crackles. No distress.   Heart: RRR, no murmurs. No edema.  Abdomen: Soft, mild distention.  Normal bowel sounds.  New ostomy in place, normal stool output noted.  Ext: Warm and  "well-perfused. No edema.   Skin: No rashes or lesions. IV site without swelling or erythema.     Results Review     I reviewed the patient's new clinical results:  Results from last 7 days   Lab Units 08/29/24  0628 08/28/24  0543 08/27/24  1012 08/25/24  0439   WBC 10*3/mm3 7.47 11.19* 11.49* 5.87   HEMOGLOBIN g/dL 11.7* 13.0 12.8 11.6*   PLATELETS 10*3/mm3 303 395 375 300     Results from last 7 days   Lab Units 08/29/24  0628 08/28/24  1635 08/28/24  0543 08/25/24  0439 08/24/24  0528   SODIUM mmol/L 142  --  136 140 140   POTASSIUM mmol/L 4.1 4.1 3.6 4.0 4.1   CHLORIDE mmol/L 105  --  96* 103 104   CO2 mmol/L 26.2  --  24.9 29.0 25.0   BUN mg/dL 10  --  9 10 12   CREATININE mg/dL 0.56*  --  0.73 0.55* 0.53*   GLUCOSE mg/dL 88  --  153* 103* 120*   EGFR mL/min/1.73 102.1  --  92.0 102.5 103.4       Results from last 7 days   Lab Units 08/29/24  0628 08/28/24  0543 08/25/24  0439 08/24/24  0528 08/23/24  0318   CALCIUM mg/dL 8.9 9.2 8.8 8.7 7.9*   MAGNESIUM mg/dL  --  2.0  --   --  2.5*   PHOSPHORUS mg/dL  --   --   --   --  2.4*       No results found for: \"HGBA1C\", \"POCGLU\"      MRI Thoracic Spine With & Without Contrast    Addendum Date: 8/29/2024    ADDENDUM: On the sagittal T1 postcontrast sequence of the thoracic spine is a nodular area of enhancement involving the cord is appreciated level of T12 measuring approximately 3 mm in size. This is not evident on the axial T1 postcontrast sequence. The nonvisualization on the axial postcontrast imaging is artifactual and due to the small size of the lesion and technique. This almost certainly represents a metastatic lesion. No other enhancing lesions are appreciated. The above information was discussed with Dr. Doty on 8/29/2024 at 0825 hours. The report should read as follows.  MRI CERVICAL SPINE W WO CONTRAST-, MRI THORACIC SPINE W WO CONTRAST-  HISTORY:  Evaluate for metastatic disease; C79.31-Secondary malignant neoplasm of brain; C80.1-Malignant (primary) " neoplasm, unspecified; R51.9-Headache, unspecified; R03.0-Elevated blood-pressure reading, without diagnosis of hypertension; K63.89-Other specified diseases of intestine  COMPARISON: None  MRI EXAMINATION OF THE CERVICAL SPINE WITH AND WITHOUT CONTRAST:  A suboccipital craniotomy is noted on the right.  There is a grade 1 anterolisthesis of C3 upon C4 and C4 upon C5. There is moderate loss of disc height at C5-6 and C6-7. Signal intensity within the cord is normal on the sagittal and axial T2 sequences.  C2-3: Moderate facet degenerative disease is present on the left.  C3-4: Moderate facet degenerative disease is present bilaterally. A small central disc osteophyte complex is present with tonsil herniation.  C4-5: Mild to moderate to severe facet degenerative disease is present on the right and left respectively.  C5-6: Moderate facet degenerative disease is present bilaterally. There is moderate foraminal stenosis on the right secondary to loss of disc height, uncovertebral degenerative disease and extension of the disc osteophyte complex into the neural foramen.  C6-7: There is mild canal stenosis secondary to a broad-based disc osteophyte complex. Moderate foraminal stenosis is present on the left secondary to uncovertebral degenerative disease.  C7-T1: There is no evidence of a disc bulge or herniation.  After contrast administration there was mild enhancement appreciated above the endplates at C5-6 and C6-7 consistent with degenerative disease. There was no evidence of abnormal enhancement to suggest metastatic disease.  IMPRESSION: There is no evidence of metastatic disease involving the cervical spine. Multilevel degenerative disc disease is noted as described above with no evidence of disc herniation, cord signal abnormality or cord compression.   MRI EXAMINATION OF THE THORACIC SPINE WITH AND WITHOUT CONTRAST:  There is mild levoscoliosis of the upper thoracic spine with a compensatory dextroscoliosis of  the lower thoracic spine. The alignment of the thoracic spine is within normal limits. Signal intensity within the thoracic cord is normal on the sagittal and axial T2 sequences with no evidence of disc herniation or of cord compression. There is disc desiccation at all levels. A small right lateral disc osteophyte complex is present at T6/T7 resulting mild flattening of the ventral surface of the thecal sac. There is mild canal stenosis from T8-T11 secondary to mild facet and ligamentum flavum hypertrophy and a broad-based disc osteophyte complex. After contrast administration the 3 mm enhancing lesion is appreciated involving the thoracic cord at the level of T12 on the sagittal T1 postcontrast sequence (image 8). There is only a questionable area of enhancement suggested on the axial T1 postcontrast sequence. The suboptimal visualization of the enhancing lesion on the axial imaging is due to the small size of the lesion and technique. No other enhancing lesions are identified.  IMPRESSION: 1.  There is a 3 mm. Enhancing lesion appreciated involving the cord at the level of T12 consistent with a metastatic lesion. This is not well demonstrated on the axial imaging. No other metastatic disease is appreciated. 2.  Multilevel degenerative disease is noted as described above including scoliosis and mild canal stenosis from T8-T11. See above.   This report was finalized on 8/29/2024 8:33 AM by Dr. Cesar Levine M.D on Workstation: [id number]      Result Date: 8/29/2024  There is no evidence of metastatic disease involving the cervical spine. Multilevel degenerative disc disease is noted as described above with no evidence of disc herniation, cord signal abnormality or cord compression.   MRI EXAMINATION OF THE THORACIC SPINE WITH AND WITHOUT CONTRAST:  There is mild levoscoliosis of the upper thoracic spine with a compensatory dextroscoliosis of the lower thoracic spine. The alignment of the thoracic spine is within  normal limits. Signal intensity within the thoracic cord is normal on the sagittal and axial T2 sequences with no evidence of disc herniation or of cord compression. There is disc desiccation at all levels. A small right lateral disc osteophyte complex is present at T6/T7 resulting mild flattening of the ventral surface of the thecal sac. There is mild canal stenosis from T8-T11 secondary to mild facet and ligamentum flavum hypertrophy and a broad-based disc osteophyte complex. After contrast administration there was no evidence of abnormal enhancement to suggest metastatic disease.  IMPRESSION: There is no evidence of metastatic disease, of a focal disc herniation, cord signal abnormality or of cord compression. Multilevel degenerative disease is noted as described above including scoliosis and mild canal stenosis from T8-T11. See above.  This report was finalized on 8/28/2024 12:02 PM by Dr. Cesar Levine M.D on Workstation: [id number]      MRI Cervical Spine With & Without Contrast    Addendum Date: 8/29/2024    ADDENDUM: On the sagittal T1 postcontrast sequence of the thoracic spine is a nodular area of enhancement involving the cord is appreciated level of T12 measuring approximately 3 mm in size. This is not evident on the axial T1 postcontrast sequence. The nonvisualization on the axial postcontrast imaging is artifactual and due to the small size of the lesion and technique. This almost certainly represents a metastatic lesion. No other enhancing lesions are appreciated. The above information was discussed with Dr. Doty on 8/29/2024 at 0825 hours. The report should read as follows.  MRI CERVICAL SPINE W WO CONTRAST-, MRI THORACIC SPINE W WO CONTRAST-  HISTORY:  Evaluate for metastatic disease; C79.31-Secondary malignant neoplasm of brain; C80.1-Malignant (primary) neoplasm, unspecified; R51.9-Headache, unspecified; R03.0-Elevated blood-pressure reading, without diagnosis of hypertension; K63.89-Other  specified diseases of intestine  COMPARISON: None  MRI EXAMINATION OF THE CERVICAL SPINE WITH AND WITHOUT CONTRAST:  A suboccipital craniotomy is noted on the right.  There is a grade 1 anterolisthesis of C3 upon C4 and C4 upon C5. There is moderate loss of disc height at C5-6 and C6-7. Signal intensity within the cord is normal on the sagittal and axial T2 sequences.  C2-3: Moderate facet degenerative disease is present on the left.  C3-4: Moderate facet degenerative disease is present bilaterally. A small central disc osteophyte complex is present with tonsil herniation.  C4-5: Mild to moderate to severe facet degenerative disease is present on the right and left respectively.  C5-6: Moderate facet degenerative disease is present bilaterally. There is moderate foraminal stenosis on the right secondary to loss of disc height, uncovertebral degenerative disease and extension of the disc osteophyte complex into the neural foramen.  C6-7: There is mild canal stenosis secondary to a broad-based disc osteophyte complex. Moderate foraminal stenosis is present on the left secondary to uncovertebral degenerative disease.  C7-T1: There is no evidence of a disc bulge or herniation.  After contrast administration there was mild enhancement appreciated above the endplates at C5-6 and C6-7 consistent with degenerative disease. There was no evidence of abnormal enhancement to suggest metastatic disease.  IMPRESSION: There is no evidence of metastatic disease involving the cervical spine. Multilevel degenerative disc disease is noted as described above with no evidence of disc herniation, cord signal abnormality or cord compression.   MRI EXAMINATION OF THE THORACIC SPINE WITH AND WITHOUT CONTRAST:  There is mild levoscoliosis of the upper thoracic spine with a compensatory dextroscoliosis of the lower thoracic spine. The alignment of the thoracic spine is within normal limits. Signal intensity within the thoracic cord is normal  on the sagittal and axial T2 sequences with no evidence of disc herniation or of cord compression. There is disc desiccation at all levels. A small right lateral disc osteophyte complex is present at T6/T7 resulting mild flattening of the ventral surface of the thecal sac. There is mild canal stenosis from T8-T11 secondary to mild facet and ligamentum flavum hypertrophy and a broad-based disc osteophyte complex. After contrast administration the 3 mm enhancing lesion is appreciated involving the thoracic cord at the level of T12 on the sagittal T1 postcontrast sequence (image 8). There is only a questionable area of enhancement suggested on the axial T1 postcontrast sequence. The suboptimal visualization of the enhancing lesion on the axial imaging is due to the small size of the lesion and technique. No other enhancing lesions are identified.  IMPRESSION: 1.  There is a 3 mm. Enhancing lesion appreciated involving the cord at the level of T12 consistent with a metastatic lesion. This is not well demonstrated on the axial imaging. No other metastatic disease is appreciated. 2.  Multilevel degenerative disease is noted as described above including scoliosis and mild canal stenosis from T8-T11. See above.   This report was finalized on 8/29/2024 8:33 AM by Dr. Cesar Levine M.D on Workstation: [id number]      Result Date: 8/29/2024  There is no evidence of metastatic disease involving the cervical spine. Multilevel degenerative disc disease is noted as described above with no evidence of disc herniation, cord signal abnormality or cord compression.   MRI EXAMINATION OF THE THORACIC SPINE WITH AND WITHOUT CONTRAST:  There is mild levoscoliosis of the upper thoracic spine with a compensatory dextroscoliosis of the lower thoracic spine. The alignment of the thoracic spine is within normal limits. Signal intensity within the thoracic cord is normal on the sagittal and axial T2 sequences with no evidence of disc  herniation or of cord compression. There is disc desiccation at all levels. A small right lateral disc osteophyte complex is present at T6/T7 resulting mild flattening of the ventral surface of the thecal sac. There is mild canal stenosis from T8-T11 secondary to mild facet and ligamentum flavum hypertrophy and a broad-based disc osteophyte complex. After contrast administration there was no evidence of abnormal enhancement to suggest metastatic disease.  IMPRESSION: There is no evidence of metastatic disease, of a focal disc herniation, cord signal abnormality or of cord compression. Multilevel degenerative disease is noted as described above including scoliosis and mild canal stenosis from T8-T11. See above.  This report was finalized on 8/28/2024 12:02 PM by Dr. Cesar Levine M.D on Workstation: [id number]       I have personally reviewed all medications:  Scheduled Medications  amLODIPine, 5 mg, Oral, BID  atorvastatin, 10 mg, Oral, Daily  docusate sodium, 100 mg, Oral, Daily  enoxaparin, 40 mg, Subcutaneous, Nightly  famotidine, 20 mg, Oral, BID AC  levETIRAcetam, 500 mg, Oral, BID  senna-docusate sodium, 1 tablet, Oral, Nightly    Infusions   Diet  Diet: Regular/House, Gastrointestinal; Low Irritant, Fiber-Restricted; Texture: Soft to Chew (NDD 3); Soft to Chew: Ground Meat; Fluid Consistency: Thin (IDDSI 0)      Intake/Output Summary (Last 24 hours) at 8/29/2024 1318  Last data filed at 8/28/2024 1800  Gross per 24 hour   Intake 1080 ml   Output --   Net 1080 ml       Assessment/Plan     Active Hospital Problems    Diagnosis  POA    **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    Brain  metastasis  Colon mass  -Initial presentation for neurologic symptoms, found to have evidence of brain metastatic disease  -S/p posterior fossa craniectomy for tumor removal  -Brain biopsy results revealed poorly differentiated adenocarcinoma  -Colonoscopy revealed obstructing colonic mass, pathology with invasive poorly differentiated adenocarcinoma with neuroendocrine features  -MRI lumbar spine with likely evidence of metastatic disease at T12, MRI cervical with no evidence of metastatic disease, MRI thoracic spine with lesion at T12 consistent with metastatic disease  -GI following  -General Surgery following, s/p Mediport placement and laparoscopic diverting colostomy on 8/27  -Completed Medrol Dosepak and on Keppra per neurosurgery  -Radiation oncology consulted.  Patient has appointment on 9/3 as outpatient.  -Oncology following  -Increase Norco to 7.5 mg every 4 hours today.  Monitor for pain improvement with this.  If pain remains improved and all other consulting services are agreeable, possible discharge as early as tomorrow.    Leukocytosis  -WBC has normalized today  -Steroids likely contributing, no new infectious symptoms and has been afebrile  -Surgery earlier this week, likely reactive component as well  -Repeat CBC with morning labs    Hypertension  -Blood pressure trends acceptable  -Continue amlodipine  -Continue to monitor, additional medication titrations as needed based on BP trends    Anemia  -Hgb slightly low at 11.7, overall stable  -No signs of active bleeding  -Repeat CBC with morning labs, transfuse for Hgb less than 7    SCDs for DVT prophylaxis.  Full code.  Discussed with patient, family, and nursing staff.  Anticipate discharge in the next 1-2 days      Colette Koroma MD  [facility]  08/29/24  13:18 EDT      Electronically signed by Colette Koroma MD at 08/29/24 1323       Shadi Kaminski MD at 08/29/24 1122          Patient has been notified of results by  admitting team and GI consultant  I have spoken to Dr. Chester and he is planning surgery  Electronically signed by Shadi Kaminski MD at 08/29/24 1122       Edie Sierra APRN at 08/29/24 0955       Attestation signed by Bull Doty MD at 08/29/24 1420    I have reviewed this documentation and agree.                    [facility] NEUROSURGERY INTRACRANIAL PROGRESS NOTE    PATIENT IDENTIFICATION:   Name:  Ely Sims      MRN:  [id number]     64 y.o.  female               Cc: POD # 8 s/p posterior fossa craniotomy for tumor removal       Subjective     Interval History:  No significant overnight events.  Feeling good, having some pain from colectomy.  No headache, nausea or vomiting       Objective     Vital signs in last 24 hours:  Temp:  [97.9 °F (36.6 °C)-98.6 °F (37 °C)] 98.2 °F (36.8 °C)  Heart Rate:  [64-78] 75  Resp:  [18] 18  BP: (110-132)/(60-86) 131/86      Intake/Output this shift:  No intake/output data recorded.      Intake/Output last 3 shifts:  I/O last 3 completed shifts:  In: 1560 [P.O.:1560]  Out: -     LABS:  Results from last 7 days   Lab Units 08/29/24  0628 08/28/24  0543 08/27/24  1012   WBC 10*3/mm3 7.47 11.19* 11.49*   HEMOGLOBIN g/dL 11.7* 13.0 12.8   HEMATOCRIT % 34.0 38.4 38.1   PLATELETS 10*3/mm3 303 395 375     Results from last 7 days   Lab Units 08/29/24  0628 08/28/24  1635 08/28/24  0543 08/25/24  0439   SODIUM mmol/L 142  --  136 140   POTASSIUM mmol/L 4.1 4.1 3.6 4.0   CHLORIDE mmol/L 105  --  96* 103   CO2 mmol/L 26.2  --  24.9 29.0   BUN mg/dL 10  --  9 10   CREATININE mg/dL 0.56*  --  0.73 0.55*   CALCIUM mg/dL 8.9  --  9.2 8.8   GLUCOSE mg/dL 88  --  153* 103*     Tissue Pathology 8/21- Metastatic poorly differentiated adenocarcinoma     IMAGING STUDIES:  MRI thoracic spine: 3 mm enhancing lesion involving the cord at the level of T12 consistent with metastatic lesion.    MRI cervical spine: No evidence of metastatic disease involving the cervical spine.  Multilevel  degenerative disc disease.    I personally viewed the patient's MRI cervical and thoracic, it was also reviewed by and discussed with Dr Doty    Meds reviewed/changed: Yes  Norvasc 5 mg p.o. twice daily  Lipitor 10 mg p.o. daily  Lovenox 40 mg SQ nightly  Pepcid 20 mg p.o. twice daily  Keppra 500 mg p.o. twice daily  Tylenol 650 mg p.o. every 4 hours as needed  Hydrocodone 7.5 mg 1 p.o. every 4 hours as needed  Robaxin 750 mg p.o. every 6 hours as needed  Zofran 4 mg IV every 6 hours as needed  Oxycodone 5 mg 1 p.o. every 6 hours as needed      Physical Exam:    General:  Awake, alert & oriented x 3.  Speech clear with no aphasia  CN III, IV, VI:  EOM's intact, PERRL.  CN VII:  Facial movements are symmetric, no weakness  Motor:  Normal muscle strength, bulk and tone in bilateral upper and lower extremities.  No fasciculations, rigidity, spasticity, or abnormal movements  Station & Gait:  Up ad kacy  Coordination:  Finger to nose intact bilaterally.  Heel to shin intact in bilateral lower extremities  Skin:   Right occipital craniotomy incision well appearing, well approximated.  No swelling, erythema, drainage      Assessment & Plan     ASSESSMENT:      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    Colonic mass    Anemia    Hyperglycemia    Hypertension    POD # 8 s/p posterior fossa craniotomy for tumor removal.  Brain tissue pathology showed poorly differentiated metastatic colorectal adenocarcinoma. Patient reports pain from colectomy, otherwise feeling well.  Discussed findings of metastatic lesion at T12 that will require radiation.  Patient is otherwise doing over all very well.  She has follow up scheduled with Radiation Oncology.  Dr Doty will see her back in the office in 2 weeks.  TORI will sign off at this time, she is ok for discharge from our standpoint.      PLAN:   -TORI to sign off  -F/U in 2 weeks with Dr Doty      I discussed the patient's findings and my recommendations with patient and   "Soren    During patient visit, I utilized appropriate personal protective equipment including  gloves.  Appropriate PPE was worn during the entire visit.  Hand hygiene was completed before and after.      LOS: 10 days       Edie Sierra, APRN  8/29/2024  13:49 EDT    \"Dictated utilizing Dragon dictation\".        Electronically signed by Bull Doty MD at 08/29/24 1420       Elieser Saunders MD at 08/28/24 1335          [facility] GROUP INPATIENT PROGRESS NOTE    Length of Stay:  9 days    CHIEF COMPLAINT/REASON FOR VISIT:  Metastatic colon cancer     SUBJECTIVE:  POD 1 s/p port placement and diverting colostomy.  States she feels much better today with less pain.  She is eating.  MRI C and T-spine performed this morning.    ROS:  14 systems reviewed with pertinent positives and negatives in the HPI. Reviewed today.    OBJECTIVE:  Vitals:    08/28/24 0051 08/28/24 0300 08/28/24 0730 08/28/24 1115   BP: 126/74 134/77 122/84 127/84   BP Location: Right arm Right arm Right arm Left arm   Patient Position: Lying Lying Lying Lying   Pulse: 71 73 83 96   Resp: 18 18 18 18   Temp: 98.3 °F (36.8 °C) 98.5 °F (36.9 °C) 98.2 °F (36.8 °C) 97.9 °F (36.6 °C)   TempSrc: Oral Oral Oral Oral   SpO2: 96% 95% 99%    Weight:       Height:             PHYSICAL EXAMINATION:   General:  No acute distress, awake, alert and oriented.  Sitting up in a chair.  Skin:  Warm and dry, no visible rash  HEENT:  Normocephalic/atraumatic.    Chest:  Normal respiratory effort  Extremities:  No visible clubbing, cyanosis, or edema  Neuro/psych:  Grossly nonfocal.  Normal mood and affect.       DIAGNOSTIC DATA:  Results Review:     I reviewed the patient's new clinical results.    Results from last 7 days   Lab Units 08/28/24  0543   WBC 10*3/mm3 11.19*   HEMOGLOBIN g/dL 13.0   HEMATOCRIT % 38.4   PLATELETS 10*3/mm3 395     Lab Results   Component Value Date    NEUTROABS 8.20 (H) 08/27/2024     Results from last 7 days   Lab Units " 08/28/24  0543   SODIUM mmol/L 136   POTASSIUM mmol/L 3.6   CHLORIDE mmol/L 96*   CO2 mmol/L 24.9   BUN mg/dL 9   CREATININE mg/dL 0.73   GLUCOSE mg/dL 153*   CALCIUM mg/dL 9.2     Results from last 7 days   Lab Units 08/22/24  0401   INR  0.99   APTT seconds 24.5     Results from last 7 days   Lab Units 08/28/24  0543   MAGNESIUM mg/dL 2.0         IMAGING:    MRI Cervical Spine With & Without Contrast (08/28/2024 10:30)  MRI Thoracic Spine With & Without Contrast (08/28/2024 10:30)    ASSESSMENT:    This is a 64 y.o. female with:     *Metastatic colon cancer  The patient presented on 8/19/2022 initially to urgent care with headache, nausea, vomiting, diarrhea.  She was advised to go to the ED from urgent care.  CT head 8/19/2024 showed some areas of increased attenuation over the right cerebral hemisphere and left temporal lobe concerning for metastatic disease with edema with some mass effect upon the fourth ventricle.  MRI brain 8/20/2024 with a lesion at the right cerebellar hemisphere measuring 2.2 cm, lesion within the left temporal lobe measuring 9 mm, 1.0 cm lesion at the left cerebellar vermis, all with surrounding edema.  There was some mass effect upon the fourth ventricle with mild prominence of the lateral and third ventricles.  8/20/2024: CT chest abdomen and pelvis with no evidence of pulmonary metastatic disease.  There was some asymmetric wall thickening in the distal sigmoid colon with surrounding mesenteric soft tissue nodularity concerning for primary colon cancer.  A sclerotic lesion of the spinous process of T1 was said to be consistent with a bone island or other benign lesion.  8/21/2024: Posterior fossa craniectomy with gross total removal of a right cerebellar hemisphere metastasis Dr. Bull Doty.  Pathology consistent with metastatic poorly differentiated adenocarcinoma favoring colorectal origin.  MSI testing is pending.  8/25/2024: Bone scan with no obvious metastatic  disease.  2024: Colonoscopy by Dr. Kaminski shows an infiltrative completely obstructing large mass found at the proximal rectum.  Pathology is pending.    CEA 2.10.  2024: MRI lumbar spine with no obvious metastatic disease in the lumbar spine.  However, there is a 3 mm enhancing lesion in the visualized lower thoracic spinal cord that could represent a spinal cord metastasis.  Cervical and thoracic spine MRI requested..  24: diverting loop sigmoid colostomy and port placement by Dr. Chester  2024: MRI C and T-spine with no evidence of metastatic disease to the spine or spinal cord.     *Mild normocytic anemia  Hemoglobin normalized        RECOMMENDATIONS/PLAN:   Will send brain tissue for NGS when available  Follow-up rectal biopsy result, remains pending  MRI C and T spine performed today without apparent metastatic disease  Will need postoperative radiation to the brain prior to considering palliative chemotherapy.  Currently on a Medrol Dosepak. Will need ongoing dexamethasone, anticipating radiation  Outpatient PET scan to be considered when possible  Previously discussed the incurable nature of this malignancy with the patient, her , and her children on   We will follow    Elieser Saunders MD     Electronically signed by Elieser Saunders MD at 24 1341       Colette Koroma MD at 24 1058              Name: Ely Sims ADMIT: 2024   : 1960  PCP: Provider, No Known    MRN: [id number] LOS: 9 days   AGE/SEX: 64 y.o. female  ROOM: [facility]     Subjective   Subjective   No acute events overnight.  Patient eating breakfast this morning and states she is feeling much better.  Surgery completed yesterday.  Having some expected discomfort postoperatively.  Denies chest pain or shortness of breath.    Objective   Objective     Vital Signs  Temp:  [97.7 °F (36.5 °C)-98.6 °F (37 °C)] 98.2 °F (36.8 °C)  Heart Rate:  [67-83] 83  Resp:  [16-18] 18  BP: (110-156)/(65-87) 122/84  SpO2:   [94 %-100 %] 99 %  on  Flow (L/min):  [2-3] 2;   Device (Oxygen Therapy): room air  Body mass index is 24.85 kg/m².    Physical Exam  General: Alert, no acute distress.  Sitting up in bed.  Pleasant and conversive.  ENT: No conjunctival injection or scleral icterus. Moist mucous membranes.   Neuro: Eyes open and moving in all directions, strength normal in all extremities, no focal deficits.   Lungs: Clear to auscultation bilaterally. No wheeze or crackles. No distress.   Heart: RRR, no murmurs. No edema.  Abdomen: Soft, mild distention.  Normal bowel sounds.  New ostomy in place, no stool output noted.  Ext: Warm and well-perfused. No edema.   Skin: No rashes or lesions. IV site without swelling or erythema.     Results Review     I reviewed the patient's new clinical results:  Results from last 7 days   Lab Units 08/28/24  0543 08/27/24  1012 08/25/24 0439 08/24/24 0528   WBC 10*3/mm3 11.19* 11.49* 5.87 8.21   HEMOGLOBIN g/dL 13.0 12.8 11.6* 11.9*   PLATELETS 10*3/mm3 395 375 300 312     Results from last 7 days   Lab Units 08/28/24  0543 08/25/24  0439 08/24/24 0528 08/23/24  1325 08/23/24  0318   SODIUM mmol/L 136 140 140  --  139   POTASSIUM mmol/L 3.6 4.0 4.1 4.0 3.5   CHLORIDE mmol/L 96* 103 104  --  109*   CO2 mmol/L 24.9 29.0 25.0  --  23.0   BUN mg/dL 9 10 12  --  11   CREATININE mg/dL 0.73 0.55* 0.53*  --  0.58   GLUCOSE mg/dL 153* 103* 120*  --  145*   EGFR mL/min/1.73 92.0 102.5 103.4  --  101.2       Results from last 7 days   Lab Units 08/28/24  0543 08/25/24  0439 08/24/24  0528 08/23/24  0318 08/22/24  0401   CALCIUM mg/dL 9.2 8.8 8.7 7.9* 8.2*   MAGNESIUM mg/dL  --   --   --  2.5* 2.2   PHOSPHORUS mg/dL  --   --   --  2.4*  --        Glucose   Date/Time Value Ref Range Status   08/25/2024 1656 161 (H) 70 - 130 mg/dL Final       FL Barium Enema Water Soluble Single Contrast    Result Date: 8/26/2024  TECHNIQUE, FINDINGS AND IMPRESSION: Scattered images were obtained of the abdomen. Prominent and  dilated loops of air-filled bowel are present throughout the abdomen and pelvis.  A small caliber rectal tube was then inserted gently into the rectum and minimally insufflated under direct fluoroscopic guidance. Water-soluble contrast was then administered into the rectum via gravity while multiple fluoroscopic images were obtained in the AP, lateral oblique views.  There is a long segment of irregular high-grade stenosis involving the high rectum corresponding with the area of irregular wall thickening seen on recent CT. This measures over a segment of approximately 3.7 cm. The narrowest segment appears to measure approximately 5 to 6 mm in shortest diameter. Contrast passes easily through this segment into the more proximal large bowel.  DAP: 3477.4 ?Gy*m2  This report was finalized on 8/26/2024 5:32 PM by Dr. Calvin Blanco M.D on Workstation: [id number]       I have personally reviewed all medications:  Scheduled Medications  amLODIPine, 5 mg, Oral, BID  atorvastatin, 10 mg, Oral, Daily  docusate sodium, 100 mg, Oral, Daily  famotidine, 20 mg, Oral, BID AC  levETIRAcetam, 500 mg, Oral, BID  potassium chloride ER, 40 mEq, Oral, Q4H  senna-docusate sodium, 1 tablet, Oral, Nightly    Infusions   Diet  Diet: Regular/House, Gastrointestinal; Low Irritant, Fiber-Restricted; Texture: Soft to Chew (NDD 3); Soft to Chew: Ground Meat; Fluid Consistency: Thin (IDDSI 0)      Intake/Output Summary (Last 24 hours) at 8/28/2024 1058  Last data filed at 8/27/2024 1639  Gross per 24 hour   Intake 850 ml   Output --   Net 850 ml       Assessment/Plan     Active Hospital Problems    Diagnosis  POA    **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 female with Metastasis to  brain of unknown origin.    Brain metastasis  Colon mass  -Initial presentation for neurologic symptoms, found to have evidence of brain metastatic disease  -S/p posterior fossa craniectomy for tumor removal  -Brain biopsy results revealed poorly differentiated adenocarcinoma  -Colonoscopy revealed obstructing colonic mass, pathology pending  -MRI lumbar spine with likely evidence of metastatic disease, MRI cervical and thoracic spine pending  -GI following  -General Surgery following, s/p Mediport placement and laparoscopic diverting colostomy on 8/27  -Continue Medrol Dosepak and Keppra per neurosurgery  -Radiation oncology consulted.  Patient has appointment on 9/3 as outpatient.  -Oncology following  -Advancement of diet per general surgery    Leukocytosis  -WBC stable at 11K today  -Steroids likely contributing, no new infectious symptoms and has been afebrile  -Surgery yesterday, likely reactive component as well  -Repeat CBC with morning labs    Hypertension  -Blood pressure trends acceptable  -Continue amlodipine  -Continue to monitor, additional medication titrations as needed based on BP trends    Anemia  -Hgb normal today  -No signs of active bleeding  -Repeat CBC with morning labs, transfuse for Hgb less than 7    SCDs for DVT prophylaxis.  Full code.  Discussed with patient, family, and nursing staff.  Anticipate discharge in the next 1-2 days    At this point, patient has had Mediport placed and diverting colostomy completed.  She has an appointment scheduled with radiation oncology.  Per oncology, she will need to undergo radiation therapy prior to palliative chemotherapy.  When cleared from general surgery standpoint, patient likely will be appropriate for discharge home.  Anticipate this may be as soon as tomorrow.  Discussed with patient and family at bedside.    Colette Koroma MD  [facility]  08/28/24  10:58 EDT      Electronically signed by Colette Koroma MD at 08/28/24  1105       Edie Sierra, GRETCHEN at 08/28/24 1010       Attestation signed by Bull Doty MD at 08/29/24 0636    I have reviewed this documentation and agree.                    [facility] [place], operated by [facility] NEUROSURGERY INTRACRANIAL PROGRESS NOTE    PATIENT IDENTIFICATION:   Name:  Ely Sims      MRN:  [id number]     64 y.o.  female               Cc: POD # 7 s/p posterior fossa craniotomy for tumor removal      Subjective     Interval History: No significant overnight events.  Patient had laparoscopic diverting loop sigmoid colostomy yesterday by Dr. Chester.  Otherwise no complaints of headache, nausea, vomiting.    Objective     Vital signs in last 24 hours:  Temp:  [97.7 °F (36.5 °C)-98.6 °F (37 °C)] 98.2 °F (36.8 °C)  Heart Rate:  [67-83] 83  Resp:  [16-18] 18  BP: (110-156)/(65-87) 122/84      Intake/Output this shift:  No intake/output data recorded.      Intake/Output last 3 shifts:  I/O last 3 completed shifts:  In: 1690 [P.O.:840; I.V.:750; IV Piggyback:100]  Out: -     LABS:  Results from last 7 days   Lab Units 08/28/24  0543 08/27/24  1012 08/25/24  0439   WBC 10*3/mm3 11.19* 11.49* 5.87   HEMOGLOBIN g/dL 13.0 12.8 11.6*   HEMATOCRIT % 38.4 38.1 34.0   PLATELETS 10*3/mm3 395 375 300     Results from last 7 days   Lab Units 08/28/24  0543 08/25/24  0439 08/24/24  0528   SODIUM mmol/L 136 140 140   POTASSIUM mmol/L 3.6 4.0 4.1   CHLORIDE mmol/L 96* 103 104   CO2 mmol/L 24.9 29.0 25.0   BUN mg/dL 9 10 12   CREATININE mg/dL 0.73 0.55* 0.53*   CALCIUM mg/dL 9.2 8.8 8.7   GLUCOSE mg/dL 153* 103* 120*     Tissue Pathology 8/21- Metastatic poorly differentiated adenocarcinoma     IMAGING STUDIES:  MRI cervical and thoracic spine pending    I personally viewed the patient's chart, it was also reviewed by and discussed with Dr Doty    Meds reviewed/changed: Yes  Norvasc 5 mg p.o. twice daily  Lipitor 10 mg p.o. daily  Pepcid 20 mg p.o. twice daily  Keppra 500 mg p.o. twice daily  Tylenol 650 mg p.o. every 4 hours as  "needed  Hydrocodone 5 mg 1 p.o. every 4 hours as needed  Dilaudid 0.5 mg IV every 2 hours as needed  Robaxin-750 milligrams p.o. every 6 hours as needed      Physical Exam:    General:  Awake, alert & oriented x 3.  Speech clear with no aphasia  HEENT: Right occipital craniotomy incision is well-appearing, well-approximated, no surrounding erythema, swelling or drainage.  Neck:  Supple  CN III, IV, VI:  EOM's intact, PERRL.  CN VII:  Facial movements are symmetric, no weakness  Motor:  Normal muscle strength, bulk and tone in bilateral upper and lower extremities.  No fasciculations, rigidity, spasticity, or abnormal movements  Station & Gait:  Up ad kacy   Coordination:  Finger to nose intact bilaterally.      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  Brain tissue pathology showed poorly differentiated metastatic colorectal adenocarcinoma.  Patient underwent laparoscopic diverting loop sigmoid colostomy yesterday and seems to be doing well from that.  Patient was able to get her MRI of her cervical and thoracic spine.  Dr Doty will review these. Patient ok for discharge from [facility] once medically cleared and MRI's have been reviewed.    PLAN:   -Continue MDP      I discussed the patient's findings and my recommendations with patient and family and Dr Doty    During patient visit, I utilized appropriate personal protective equipment including  gloves.  Appropriate PPE was worn during the entire visit.  Hand hygiene was completed before and after.      LOS: 9 days       GRETCHEN Crum  8/28/2024  10:10 EDT    \"Dictated utilizing Dragon dictation\".        Electronically signed by Bull Doty MD at 08/29/24 0636       Kendra Naidu APRN at 08/28/24 0900       Attestation signed by Saturnino Chester MD at 08/28/24 1916    I have reviewed " this documentation and agree.                  Acute Care General Surgery Progress Note    Patient: Ely Sims  YOB: 1960  MRN: [id number]      Assessment  Ely Sims is a 64 y.o. female with a colonic mass who is POD 1 laparoscopic diverting loop sigmoid colostomy and left subclavian Mediport placement with Dr. Chester.  Patient is doing very well this morning, incisions are in good order and pain is controlled with medication.  Ostomy in intact, pink, and functioning.  Mediport site clean and intact with no surrounding erythema.  Labs and vitals are stable.    Plan  Okay to advance diet as tolerated  Encourage ambulation  Wound care for ostomy education  Lovenox for DVT prophylaxis    Subjective  Denies nausea or vomiting.  Tolerating clear liquid diet.  Ambulating well. voiding freely.    Objective    Vitals:    08/28/24 0730   BP: 122/84   Pulse: 83   Resp: 18   Temp: 98.2 °F (36.8 °C)   SpO2: 99%        Physical Exam  Constitutional: Alert, oriented, in no acute distress  Respiratory: Normal work of breathing, Symmetric excursion  Cardiovascular: Well pefused, no jugular venous distention evident   Abdominal: Soft, nondistended, expected postop tenderness, incision sites are clean, dry, intact with no surrounding erythema, colostomyLLQ intact and pink  Skin: Warm, dry      Laboratory Results  Results from last 7 days   Lab Units 08/28/24  0543 08/27/24  1012 08/25/24  0439 08/24/24  0528 08/23/24  0318 08/22/24  0401 08/21/24  1615   WBC 10*3/mm3 11.19* 11.49* 5.87 8.21 8.57 12.92* 16.36*   HEMOGLOBIN g/dL 13.0 12.8 11.6* 11.9* 10.5* 11.3* 13.0   HEMATOCRIT % 38.4 38.1 34.0 34.9 30.9* 32.6* 37.0   PLATELETS 10*3/mm3 395 375 300 312 269 284 350     Results from last 7 days   Lab Units 08/28/24  0543 08/25/24  0439 08/24/24  0528   SODIUM mmol/L 136 140 140   POTASSIUM mmol/L 3.6 4.0 4.1   CHLORIDE mmol/L 96* 103 104   CO2 mmol/L 24.9 29.0 25.0   BUN mg/dL 9 10 12   CREATININE mg/dL 0.73 0.55* 0.53*    CALCIUM mg/dL 9.2 8.8 8.7   GLUCOSE mg/dL 153* 103* 120*     I have personally reviewed 8/28 labs        GRETCHEN Gilliam  Acute Care General Surgery  [facility]    [address], [address]  [place], [place], [address]  P: [phone]  F: [phone]      Electronically signed by Saturnino Chester MD at 08/28/24 3576       Elieser Saunders MD at 08/27/24 1149          [facility] INPATIENT PROGRESS NOTE    Length of Stay:  8 days    CHIEF COMPLAINT/REASON FOR VISIT:  Metastatic colon cancer     SUBJECTIVE:  Denies any new issues today. Has been NPO for surgery.     ROS:  14 systems reviewed with pertinent positives and negatives in the HPI. Reviewed today.    OBJECTIVE:  Vitals:    08/27/24 0005 08/27/24 0405 08/27/24 0715 08/27/24 1130   BP: 140/93 149/86 158/79 146/83   BP Location: Right arm Right arm Right arm Right arm   Patient Position: Lying Lying Lying Lying   Pulse: 77 71  76   Resp: 18 18 18 18   Temp: 98.8 °F (37.1 °C) 99 °F (37.2 °C) 98.2 °F (36.8 °C) 97.7 °F (36.5 °C)   TempSrc: Oral Oral  Oral   SpO2: 95% 97% 94%    Weight:       Height:             PHYSICAL EXAMINATION:   General:  No acute distress, awake, alert and oriented  Skin:  Warm and dry, no visible rash  HEENT:  Normocephalic/atraumatic.    Chest:  Normal respiratory effort  Extremities:  No visible clubbing, cyanosis, or edema  Neuro/psych:  Grossly nonfocal.  Normal mood and affect.       DIAGNOSTIC DATA:  Results Review:     I reviewed the patient's new clinical results.    Results from last 7 days   Lab Units 08/27/24  1012   WBC 10*3/mm3 11.49*   HEMOGLOBIN g/dL 12.8   HEMATOCRIT % 38.1   PLATELETS 10*3/mm3 375     Lab Results   Component Value Date    NEUTROABS 8.20 (H) 08/27/2024     Results from last 7 days   Lab Units 08/25/24  0439   SODIUM mmol/L 140   POTASSIUM mmol/L 4.0   CHLORIDE mmol/L 103   CO2 mmol/L 29.0   BUN mg/dL 10   CREATININE mg/dL 0.55*   GLUCOSE mg/dL 103*   CALCIUM mg/dL  8.8     Results from last 7 days   Lab Units 08/22/24  0401 08/21/24  0512   INR  0.99 1.06   APTT seconds 24.5  --      Results from last 7 days   Lab Units 08/23/24  0318   MAGNESIUM mg/dL 2.5*         IMAGING:    None reviewed today    ASSESSMENT:    This is a 64 y.o. female with:     *Metastatic colon cancer  The patient presented on 8/19/2022 initially to urgent care with headache, nausea, vomiting, diarrhea.  She was advised to go to the ED from urgent care.  CT head 8/19/2024 showed some areas of increased attenuation over the right cerebral hemisphere and left temporal lobe concerning for metastatic disease with edema with some mass effect upon the fourth ventricle.  MRI brain 8/20/2024 with a lesion at the right cerebellar hemisphere measuring 2.2 cm, lesion within the left temporal lobe measuring 9 mm, 1.0 cm lesion at the left cerebellar vermis, all with surrounding edema.  There was some mass effect upon the fourth ventricle with mild prominence of the lateral and third ventricles.  8/20/2024: CT chest abdomen and pelvis with no evidence of pulmonary metastatic disease.  There was some asymmetric wall thickening in the distal sigmoid colon with surrounding mesenteric soft tissue nodularity concerning for primary colon cancer.  A sclerotic lesion of the spinous process of T1 was said to be consistent with a bone island or other benign lesion.  8/21/2024: Posterior fossa craniectomy with gross total removal of a right cerebellar hemisphere metastasis Dr. Bull Doty.  Pathology consistent with metastatic poorly differentiated adenocarcinoma favoring colorectal origin.  MSI testing is pending.  8/25/2024: Bone scan with no obvious metastatic disease.  8/25/2024: Colonoscopy by Dr. Kaminski shows an infiltrative completely obstructing large mass found at the proximal rectum.  Pathology is pending.    CEA 2.10.  8/26/2024: MRI lumbar spine with no obvious metastatic disease in the lumbar spine.  However, there  is a 3 mm enhancing lesion in the visualized lower thoracic spinal cord that could represent a spinal cord metastasis.  Cervical and thoracic spine MRI requested..     *Mild normocytic anemia  Hemoglobin 11.6        RECOMMENDATIONS/PLAN:   Will send brain tissue for NGS  Follow-up rectal biopsy result  Dr. Chester plans port placement and lap diverting colostomy today  Will need postoperative radiation to the brain, radiation to spinal cord lesion prior to considering palliative chemotherapy.  MRI cervical and thoracic spine requested, not yet performed  Currently on a [employer] Dosepak  Outpatient PET scan to be considered when possible  Discussed the incurable nature of this malignancy with the patient, her , and her children on   We will follow  Discussed with Dr. Chester, the patient, her sister, and her children    Elieser Saunders MD     Electronically signed by Elieser Saunders MD at 24 1230       Colette Koroma MD at 24 1040              Name: Ely Sims ADMIT: 2024   : 1960  PCP: Provider, No Known    MRN: [id number] LOS: 8 days   AGE/SEX: 64 y.o. female  ROOM: [facility]     Subjective   Subjective   No acute events overnight.  Patient did have barium enema done yesterday.  This morning, she has had a bowel movement.  Still having abdominal fullness and pain.  No chest pain or shortness of breath.    Objective   Objective     Vital Signs  Temp:  [98.2 °F (36.8 °C)-99 °F (37.2 °C)] 98.2 °F (36.8 °C)  Heart Rate:  [71-77] 71  Resp:  [18-20] 18  BP: (119-158)/(63-93) 158/79  SpO2:  [94 %-98 %] 94 %  on   ;   Device (Oxygen Therapy): room air  Body mass index is 24.85 kg/m².    Physical Exam  General: Alert, no acute distress.  Sitting up in bed.  Pleasant and conversive.  ENT: No conjunctival injection or scleral icterus. Moist mucous membranes.   Neuro: Eyes open and moving in all directions, strength normal in all extremities, no focal deficits.   Lungs: Clear to auscultation bilaterally.  No wheeze or crackles. No distress.   Heart: RRR, no murmurs. No edema.  Abdomen: Soft, mild distention.  Normal bowel sounds.  Mild tenderness to palpation.  Ext: Warm and well-perfused. No edema.   Skin: No rashes or lesions. IV site without swelling or erythema.     Results Review     I reviewed the patient's new clinical results:  Results from last 7 days   Lab Units 08/27/24  1012 08/25/24  0439 08/24/24  0528 08/23/24  0318   WBC 10*3/mm3 11.49* 5.87 8.21 8.57   HEMOGLOBIN g/dL 12.8 11.6* 11.9* 10.5*   PLATELETS 10*3/mm3 375 300 312 269     Results from last 7 days   Lab Units 08/25/24  0439 08/24/24  0528 08/23/24  1325 08/23/24  0318 08/22/24  0401   SODIUM mmol/L 140 140  --  139 140   POTASSIUM mmol/L 4.0 4.1 4.0 3.5 3.7   CHLORIDE mmol/L 103 104  --  109* 107   CO2 mmol/L 29.0 25.0  --  23.0 25.1   BUN mg/dL 10 12  --  11 14   CREATININE mg/dL 0.55* 0.53*  --  0.58 0.72   GLUCOSE mg/dL 103* 120*  --  145* 131*   EGFR mL/min/1.73 102.5 103.4  --  101.2 93.5       Results from last 7 days   Lab Units 08/25/24  0439 08/24/24  0528 08/23/24 0318 08/22/24  0401   CALCIUM mg/dL 8.8 8.7 7.9* 8.2*   MAGNESIUM mg/dL  --   --  2.5* 2.2   PHOSPHORUS mg/dL  --   --  2.4*  --        Glucose   Date/Time Value Ref Range Status   08/25/2024 1656 161 (H) 70 - 130 mg/dL Final   08/25/2024 0755 86 70 - 130 mg/dL Final   08/24/2024 2237 130 70 - 130 mg/dL Final   08/24/2024 1710 150 (H) 70 - 130 mg/dL Final   08/24/2024 1122 145 (H) 70 - 130 mg/dL Final       FL Barium Enema Water Soluble Single Contrast    Result Date: 8/26/2024  TECHNIQUE, FINDINGS AND IMPRESSION: Scattered images were obtained of the abdomen. Prominent and dilated loops of air-filled bowel are present throughout the abdomen and pelvis.  A small caliber rectal tube was then inserted gently into the rectum and minimally insufflated under direct fluoroscopic guidance. Water-soluble contrast was then administered into the rectum via gravity while multiple  fluoroscopic images were obtained in the AP, lateral oblique views.  There is a long segment of irregular high-grade stenosis involving the high rectum corresponding with the area of irregular wall thickening seen on recent CT. This measures over a segment of approximately 3.7 cm. The narrowest segment appears to measure approximately 5 to 6 mm in shortest diameter. Contrast passes easily through this segment into the more proximal large bowel.  DAP: 3477.4 ?Gy*m2  This report was finalized on 8/26/2024 5:32 PM by Dr. Calvin Blanco M.D on Workstation: [id number]      MRI Lumbar Spine With & Without Contrast    Result Date: 8/26/2024  1. 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 I recommend a dedicated cervical and thoracic spine MRI with and without contrast to determine if there are any additional spinal cord metastasis and to more comprehensively assess the spinal cord that at the T12 level.  2. I see no osseous metastatic disease to the lumbar spine.  3. This patient has a levoscoliotic curvature of the lumbar spine with its apex at the L3-4 lumbar level and there is severe disc space narrowing and degenerative endplate changes with degenerative marrow sclerosis and degenerative marrow edema in the central to right side of the endplates at L3-4 along the inner margin of the levoscoliotic curve. There is lumbar spondylosis as described above. I see no canal or foraminal narrowing from T11 to L2 and at L2-3 I see mild canal and bilateral foraminal narrowing. At L3-4, there is mild narrowing of the right side of the canal, right lateral recess and there is eccentric spurring into the right neural foramen and to the far right laterally that moderately narrows the right neural foramen and abuts the undersurface of the exiting right L4  nerve root. There is mild left foraminal narrowing at L4-5 and L5-S1.  The results of this study were communicated to Dr. Cesar Iglesias by telephone on 08/26/2024 at 8:40 AM.  This report was finalized on 8/26/2024 10:48 AM by Dr. Gilbert Hassan M.D on Workstation: [id number]       I have personally reviewed all medications:  Scheduled Medications  amLODIPine, 5 mg, Oral, BID  atorvastatin, 10 mg, Oral, Daily  docusate sodium, 100 mg, Oral, Daily  famotidine, 20 mg, Oral, BID AC  levETIRAcetam, 500 mg, Oral, BID  methylPREDNISolone, 4 mg, Oral, Tonight  [START ON 8/28/2024] methylPREDNISolone, 4 mg, Oral, Before Breakfast  senna-docusate sodium, 1 tablet, Oral, Nightly    Infusions   Diet  NPO Diet NPO Type: Sips with Meds      Intake/Output Summary (Last 24 hours) at 8/27/2024 1041  Last data filed at 8/27/2024 0749  Gross per 24 hour   Intake 840 ml   Output --   Net 840 ml       Assessment/Plan     Active Hospital Problems    Diagnosis  POA    **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    Brain metastasis  Colon mass  -Initial presentation for neurologic symptoms, found to have evidence of brain metastatic disease  -2 Days Post-Op posterior fossa craniectomy for tumor removal  -Brain biopsy results revealed poorly differentiated adenocarcinoma  -Colonoscopy revealed obstructing colonic mass, pathology pending  -MRI lumbar spine with likely evidence of metastatic disease, MRI cervical and thoracic spine pending  -GI following  -General Surgery following, plan for Mediport placement and laparoscopic diverting colostomy  -Continue Medrol Dosepak and Keppra per neurosurgery  -Radiation oncology  consulted  -Oncology following  -Remain NPO.  Surgery planned as above.    Leukocytosis  -WBC increased to 11K today  -Steroids likely contributing, no new infectious symptoms and has been afebrile  -Repeat CBC with morning labs    Hypertension  -Blood pressure slightly generous, but trends overall okay  -Continue amlodipine  -Continue to monitor, additional medication titrations as needed based on BP trends    Anemia  -Hgb normal today  -No signs of active bleeding  -Repeat CBC with morning labs, transfuse for Hgb less than 7    SCDs for DVT prophylaxis.  Full code.  Discussed with patient, family, and nursing staff.  Anticipate discharge TBD    Colette Koroma MD  [facility]  08/27/24  10:41 EDT      Electronically signed by Colette Koroma MD at 08/27/24 1054       Edie Sierra APRN at 08/27/24 0937       Attestation signed by Bull Doty MD at 08/27/24 1422    I have reviewed this documentation and agree.                    [facility] [place] NEUROSURGERY INTRACRANIAL PROGRESS NOTE    PATIENT IDENTIFICATION:   Name:  Ely Sims      MRN:  [id number]     64 y.o.  female               Cc: POD # 6 s/p posterior fossa craniectomy for tumor removal      Subjective     Interval History: No significant overnight events.  Patient denies headache, nausea, vomiting.        Objective     Vital signs in last 24 hours:  Temp:  [98.2 °F (36.8 °C)-99 °F (37.2 °C)] 98.2 °F (36.8 °C)  Heart Rate:  [71-77] 71  Resp:  [18-20] 18  BP: (119-158)/(63-93) 158/79      Intake/Output this shift:  I/O this shift:  In: 840 [P.O.:840]  Out: -       Intake/Output last 3 shifts:  I/O last 3 completed shifts:  In: 1840 [P.O.:1840]  Out: -     LABS:  Results from last 7 days   Lab Units 08/25/24  0439 08/24/24  0528 08/23/24  0318   WBC 10*3/mm3 5.87 8.21 8.57   HEMOGLOBIN g/dL 11.6* 11.9* 10.5*   HEMATOCRIT % 34.0 34.9 30.9*   PLATELETS 10*3/mm3 300 312 269     Results from last 7 days   Lab Units 08/25/24  0439  08/24/24  0528 08/23/24  1325 08/23/24  0318   SODIUM mmol/L 140 140  --  139   POTASSIUM mmol/L 4.0 4.1 4.0 3.5   CHLORIDE mmol/L 103 104  --  109*   CO2 mmol/L 29.0 25.0  --  23.0   BUN mg/dL 10 12  --  11   CREATININE mg/dL 0.55* 0.53*  --  0.58   CALCIUM mg/dL 8.8 8.7  --  7.9*   GLUCOSE mg/dL 103* 120*  --  145*     Tissue Pathology 8/21- Metastatic poorly differentiated adenocarcinoma    IMAGING STUDIES:  MRI Cervical & Thoracic pending    I personally viewed the patient's chart, it was also reviewed by and discussed with Dr Soren Sherwood reviewed/changed: Yes  Norvasc 5 mg p.o. twice daily  Lipitor 10 mg p.o. daily  Pepcid 20 mg p.o. twice daily  Keppra 500 mg p.o. twice daily  Medrol Dosepak  Hydrocodone 5 mg 1 p.o. every 4 hours as needed  Labetalol 20 mg IV every 2 hours as needed for SBP>150  Robaxin-750 milligrams p.o. every 6 hours as needed         Physical Exam:    General:  Awake, alert & oriented x 3.  Speech clear with no aphasia  HEENT: Right occipital craniotomy incision is well-appearing, well-approximated, no surrounding erythema, swelling or drainage.  Neck:  Supple  CN III, IV, VI:  EOM's intact, PERRL.  CN VII:  Facial movements are symmetric, no weakness  Motor:  Normal muscle strength, bulk and tone in bilateral upper and lower extremities.  No fasciculations, rigidity, spasticity, or abnormal movements  Station & Gait:  Up ad kacy in room  Coordination:  Finger to nose intact bilaterally.         Assessment & Plan     ASSESSMENT:      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    Colonic mass    Anemia    Hyperglycemia    Hypertension     POD # 6 s/p posterior fossa craniectomy for tumor removal.  Brain tissue pathology does show poorly differentiated metastatic colorectal adenocarcinoma.  Patient is feeling very well today with no complaints of headache, nausea, vomiting.  General surgery following, patient will go for diverting colostomy later today.  MRI of the cervical and thoracic spine  "have been ordered.  Patient is being followed by medical and radiation oncology.    PLAN:   -MRI cervical and thoracic spine  -Continue Medrol Dosepak  -Keep SBP <150    I discussed the patient's findings and my recommendations with patient, family, and Dr Doty    During patient visit, I utilized appropriate personal protective equipment including  gloves.  Appropriate PPE was worn during the entire visit.  Hand hygiene was completed before and after.      LOS: 8 days       Edie Vancelotus, APRN  8/27/2024  10:30 EDT    \"Dictated utilizing Dragon dictation\".        Electronically signed by Bull Doty MD at 08/27/24 1422       Satunrino Chester MD at 08/27/24 0909          CC: Metastatic colorectal cancer to brain and possible spine    S: No events overnight.  Patient continues to feel bloated.  Has been having liquid bowel movements.  Denies any nausea or vomiting.  Denies any abdominal pain  Gastrografin enema yesterday showed high-grade stenosis at the proximal rectum    O:   Vitals:    08/26/24 1930 08/27/24 0005 08/27/24 0405 08/27/24 0715   BP: 141/80 140/93 149/86 158/79   BP Location: Right arm Right arm Right arm Right arm   Patient Position: Lying Lying Lying Lying   Pulse: 76 77 71    Resp: 18 18 18 18   Temp: 98.2 °F (36.8 °C) 98.8 °F (37.1 °C) 99 °F (37.2 °C) 98.2 °F (36.8 °C)   TempSrc: Oral Oral Oral    SpO2: 98% 95% 97% 94%   Weight:       Height:          Alert, no acute distress  Breathing comfortable  Regular rate rhythm  Abdomen soft, nontender nondistended    Gastrografin enema showing tight stenosis at the proximal rectum/rectosigmoid junction.  There is passage of contrast into the proximal colon    No labs today    Assessment and plan    64-year-old female with metastatic colorectal cancer to brain and possible spine.  She has a high-grade stenosis at the proximal rectum/rectosigmoid junction.  Patient is mildly symptomatic.  We discussed with the patient about treatment " options that would include port placement and watchful waiting for her colonic stenosis versus proceeding with diverting colostomy so she can go through chemotherapy and radiation without any major colonic complications.  She is interested in undergoing diverting colostomy so to prevent complications during her treatment and I agree.    -Will plan for Mediport placement and laparoscopic diverting colostomy  -Risk and benefits of procedure including bleeding, infection, pneumothorax, hemothorax, perforation, wound complications, colostomy complications discussed in detail with the patient that verbalized understanding and agreed with the plan.  -Continue n.p.o.  -Ostomy nurse for marking consulted    Saturnino Chester MD, FACS  General, Minimally Invasive and Endoscopic Surgery  [facility]    [address], [address]  [place], [place], [address]  P: [phone]  F: [phone]      Electronically signed by Saturnino Chester MD at 24 0913              Discharge Summary        Colette Koroma MD at 24 0936              Patient Name: Ely Sims  : 1960  MRN: [id number]    Date of Admission: 2024  Date of Discharge:  2024  Primary Care Physician: Provider, No Known      Chief Complaint:   Dizziness, Headache, and Hypertension      Discharge Diagnoses     Active Hospital Problems    Diagnosis  POA    **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, C80.1]  Yes    Colonic mass [K63.89]  Yes     Class: Acute    Anemia [D64.9]  Yes    Hyperglycemia [R73.9]  Yes    Hypertension [I10]  Yes      Resolved Hospital Problems    Diagnosis Date Resolved POA    Compression of brain [G93.5] 2024 Yes        Hospital Course     Ms. Sims is a 64 y.o. female with a history of anxiety who presented to [facility] initially complaining of headache and dizziness.  Please see the admitting history and physical for further details.  She was found to have evidence of  metastatic brain lesions and was admitted to the hospital for further evaluation and treatment.      Neurosurgery was consulted on admission.  There was evidence of metastatic brain lesions from other site with mass effect.  Because of this, the patient underwent a posterior fossa craniectomy.  She was admitted to the ICU postoperatively.  After monitoring, she was transferred to the floor.  Additional workup included CT abdomen/pelvis which revealed possible malignancy in the GI tract.  GI was consulted for further evaluation.  The patient underwent colonoscopy which showed an obstructing colonic mass.  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  General surgery was consulted given the obstructing mass.  The patient underwent laparoscopic diverting colostomy with Mediport placement on 8/27.  Neurosurgery started the patient on steroids and Keppra given the brain lesions, and these will be continued at discharge.  She will follow-up with neurosurgery in 2 weeks.  The patient was also found to have a metastatic lesion at T12 on spine MRI.  Radiation oncology was consulted.  The patient has an appointment scheduled with radiation oncology on 9/3.  Oncology followed along throughout hospitalization.  The patient will need postoperative radiation to the brain prior to considering palliative chemotherapy.  They will arrange follow-up in their office after the patient has seen radiation oncology.  An outpatient PET scan will be done in 1-2 weeks.  Patient underwent teaching for new ostomy prior to discharge home.  On day of discharge, all consulting services were agreeable with discharge home.  Patient was counseled on reasons to return and conveyed understanding.  She was very eager for discharge.  Follow-up to be arranged by consulting services.    Day of Discharge     Subjective:  No acute events overnight.  Patient states her pain  is much better controlled.  Eating and drinking well.  Denies chest pain or shortness of breath.  She is very eager for discharge home today.    Physical Exam:  Temp:  [98.1 °F (36.7 °C)-98.6 °F (37 °C)] 98.1 °F (36.7 °C)  Heart Rate:  [68-79] 68  Resp:  [18] 18  BP: (107-127)/(60-75) 107/72  Body mass index is 24.85 kg/m².  Physical Exam  General: Alert, no acute distress.  Sitting up in bed.  Eating breakfast.  Pleasant and conversive.  ENT: No conjunctival injection or scleral icterus. Moist mucous membranes.   Neuro: Eyes open and moving in all directions, strength normal in all extremities, no focal deficits.   Lungs: Clear to auscultation bilaterally. No wheeze or crackles. No distress.   Heart: RRR, no murmurs. No edema.  Abdomen: Soft, mild distention.  Normal bowel sounds.  New ostomy in place, normal stool output noted.  Ext: Warm and well-perfused. No edema.   Skin: No rashes or lesions. IV site without swelling or erythema.     Consultants     Consult Orders (all) (From admission, onward)       Start     Ordered    08/26/24 1240  Inpatient Radiation Oncology Consult  Once        Specialty:  Radiation Oncology  Provider:  Prashant Varghese MD    08/26/24 1239    08/26/24 1209  Hematology & Oncology Inpatient Consult  Once        Specialty:  Hematology and Oncology  Provider:  Anamika Morley MD    08/26/24 1208    08/26/24 0831  Inpatient General Surgery Consult  Once        Specialty:  General Surgery  Provider:  Solomon Cheeny MD    08/26/24 0831    08/23/24 1214  Inpatient Internal Medicine Consult  Once        Specialty:  Internal Medicine  Provider:  Jerry Valdez MD    08/23/24 1214    08/21/24 1606  Inpatient Gastroenterology Consult  Once        Specialty:  Gastroenterology  Provider:  Shadi Loja MD    08/21/24 1605    08/19/24 1929  Pulmonology (on-call MD unless specified)  Once        Specialty:  Pulmonary Disease  Provider:  (Not yet assigned)    08/19/24 1928     08/19/24 1842  Neurosurgery (on-call MD unless specified)  Once        Specialty:  Neurosurgery  Provider:  Bull Doty MD    08/19/24 1841                  Procedures     COLOSTOMY LAPAROSCOPIC, INSERTION VENOUS ACCESS DEVICE    Imaging Results (All)       Procedure Component Value Units Date/Time    MRI Thoracic Spine With & Without Contrast [[id number]] Collected: 08/28/24 1156     Updated: 08/29/24 0836    Addenda:        ADDENDUM: On the sagittal T1 postcontrast sequence of the thoracic spine  is a nodular area of enhancement involving the cord is appreciated level  of T12 measuring approximately 3 mm in size. This is not evident on the  axial T1 postcontrast sequence. The nonvisualization on the axial  postcontrast imaging is artifactual and due to the small size of the  lesion and technique. This almost certainly represents a metastatic  lesion. No other enhancing lesions are appreciated. The above  information was discussed with Dr. Doty on 8/29/2024 at 0825 hours. The  report should read as follows.     MRI CERVICAL SPINE W WO CONTRAST-, MRI THORACIC SPINE W WO CONTRAST-     HISTORY:  Evaluate for metastatic disease; C79.31-Secondary malignant  neoplasm of brain; C80.1-Malignant (primary) neoplasm, unspecified;  R51.9-Headache, unspecified; R03.0-Elevated blood-pressure reading,  without diagnosis of hypertension; K63.89-Other specified diseases of  intestine     COMPARISON: None     MRI EXAMINATION OF THE CERVICAL SPINE WITH AND WITHOUT CONTRAST:     A suboccipital craniotomy is noted on the right.     There is a grade 1 anterolisthesis of C3 upon C4 and C4 upon C5. There  is moderate loss of disc height at C5-6 and C6-7. Signal intensity  within the cord is normal on the sagittal and axial T2 sequences.     C2-3: Moderate facet degenerative disease is present on the left.     C3-4: Moderate facet degenerative disease is present bilaterally. A  small central disc osteophyte complex is present with  tonsil herniation.     C4-5: Mild to moderate to severe facet degenerative disease is present  on the right and left respectively.     C5-6: Moderate facet degenerative disease is present bilaterally. There  is moderate foraminal stenosis on the right secondary to loss of disc  height, uncovertebral degenerative disease and extension of the disc  osteophyte complex into the neural foramen.     C6-7: There is mild canal stenosis secondary to a broad-based disc  osteophyte complex. Moderate foraminal stenosis is present on the left  secondary to uncovertebral degenerative disease.     C7-T1: There is no evidence of a disc bulge or herniation.     After contrast administration there was mild enhancement appreciated  above the endplates at C5-6 and C6-7 consistent with degenerative  disease. There was no evidence of abnormal enhancement to suggest  metastatic disease.     IMPRESSION: There is no evidence of metastatic disease involving the  cervical spine. Multilevel degenerative disc disease is noted as  described above with no evidence of disc herniation, cord signal  abnormality or cord compression.        MRI EXAMINATION OF THE THORACIC SPINE WITH AND WITHOUT CONTRAST:     There is mild levoscoliosis of the upper thoracic spine with a  compensatory dextroscoliosis of the lower thoracic spine. The alignment  of the thoracic spine is within normal limits. Signal intensity within  the thoracic cord is normal on the sagittal and axial T2 sequences with  no evidence of disc herniation or of cord compression. There is disc  desiccation at all levels. A small right lateral disc osteophyte complex  is present at T6/T7 resulting mild flattening of the ventral surface of  the thecal sac. There is mild canal stenosis from T8-T11 secondary to  mild facet and ligamentum flavum hypertrophy and a broad-based disc  osteophyte complex. After contrast administration the 3 mm enhancing  lesion is appreciated involving the thoracic  cord at the level of T12 on  the sagittal T1 postcontrast sequence (image 8). There is only a  questionable area of enhancement suggested on the axial T1 postcontrast  sequence. The suboptimal visualization of the enhancing lesion on the  axial imaging is due to the small size of the lesion and technique. No  other enhancing lesions are identified.     IMPRESSION:   1.  There is a 3 mm. Enhancing lesion appreciated involving the cord at  the level of T12 consistent with a metastatic lesion. This is not well  demonstrated on the axial imaging. No other metastatic disease is  appreciated.   2.  Multilevel degenerative disease is noted as described above  including scoliosis and mild canal stenosis from T8-T11. See above.        This report was finalized on 8/29/2024 8:33 AM by Dr. 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     There is a grade 1 anterolisthesis of C3 upon C4 and C4 upon C5. There  is moderate loss of disc height at C5-6 and C6-7. Signal intensity  within the cord is normal on the sagittal and axial T2 sequences.     C2-3: Moderate facet degenerative disease is present on the left.     C3-4: Moderate facet degenerative disease is present bilaterally. A  small central disc osteophyte complex is present with tonsil herniation.     C4-5: Mild to moderate to severe facet degenerative disease is present  on the right and left  respectively.     C5-6: Moderate facet degenerative disease is present bilaterally. There  is moderate foraminal stenosis on the right secondary to loss of disc  height, uncovertebral degenerative disease and extension of the disc  osteophyte complex into the neural foramen.     C6-7: There is mild canal stenosis secondary to a broad-based disc  osteophyte complex. Moderate foraminal stenosis is present on the left  secondary to uncovertebral degenerative disease.     C7-T1: There is no evidence of a disc bulge or herniation.     After contrast administration there was mild enhancement appreciated  above the endplates at C5-6 and C6-7 consistent with degenerative  disease. There was no evidence of abnormal enhancement to suggest  metastatic disease.       Impression:      There is no evidence of metastatic disease involving the  cervical spine. Multilevel degenerative disc disease is noted as  described above with no evidence of disc herniation, cord signal  abnormality or cord compression.        MRI EXAMINATION OF THE THORACIC SPINE WITH AND WITHOUT CONTRAST:     There is mild levoscoliosis of the upper thoracic spine with a  compensatory dextroscoliosis of the lower thoracic spine. The alignment  of the thoracic spine is within normal limits. Signal intensity within  the thoracic cord is normal on the sagittal and axial T2 sequences with  no evidence of disc herniation or of cord compression. There is disc  desiccation at all levels. A small right lateral disc osteophyte complex  is present at T6/T7 resulting mild flattening of the ventral surface of  the thecal sac. There is mild canal stenosis from T8-T11 secondary to  mild facet and ligamentum flavum hypertrophy and a broad-based disc  osteophyte complex. After contrast administration there was no evidence  of abnormal enhancement to suggest metastatic disease.     IMPRESSION: There is no evidence of metastatic disease, of a focal disc  herniation, cord signal  abnormality or of cord compression. Multilevel  degenerative disease is noted as described above including scoliosis and  mild canal stenosis from T8-T11. See above.     This report was finalized on 8/28/2024 12:02 PM by Dr. Cesar Levine M.D on Workstation: [id number]       MRI Cervical Spine With & Without Contrast [[id number]] Collected: 08/28/24 1156     Updated: 08/29/24 0836    Addenda:        ADDENDUM: On the sagittal T1 postcontrast sequence of the thoracic spine  is a nodular area of enhancement involving the cord is appreciated level  of T12 measuring approximately 3 mm in size. This is not evident on the  axial T1 postcontrast sequence. The nonvisualization on the axial  postcontrast imaging is artifactual and due to the small size of the  lesion and technique. This almost certainly represents a metastatic  lesion. No other enhancing lesions are appreciated. The above  information was discussed with Dr. Doty on 8/29/2024 at 0825 hours. The  report should read as follows.     MRI CERVICAL SPINE W WO CONTRAST-, MRI THORACIC SPINE W WO CONTRAST-     HISTORY:  Evaluate for metastatic disease; C79.31-Secondary malignant  neoplasm of brain; C80.1-Malignant (primary) neoplasm, unspecified;  R51.9-Headache, unspecified; R03.0-Elevated blood-pressure reading,  without diagnosis of hypertension; K63.89-Other specified diseases of  intestine     COMPARISON: None     MRI EXAMINATION OF THE CERVICAL SPINE WITH AND WITHOUT CONTRAST:     A suboccipital craniotomy is noted on the right.     There is a grade 1 anterolisthesis of C3 upon C4 and C4 upon C5. There  is moderate loss of disc height at C5-6 and C6-7. Signal intensity  within the cord is normal on the sagittal and axial T2 sequences.     C2-3: Moderate facet degenerative disease is present on the left.     C3-4: Moderate facet degenerative disease is present bilaterally. A  small central disc osteophyte complex is present with tonsil herniation.     C4-5:  Mild to moderate to severe facet degenerative disease is present  on the right and left respectively.     C5-6: Moderate facet degenerative disease is present bilaterally. There  is moderate foraminal stenosis on the right secondary to loss of disc  height, uncovertebral degenerative disease and extension of the disc  osteophyte complex into the neural foramen.     C6-7: There is mild canal stenosis secondary to a broad-based disc  osteophyte complex. Moderate foraminal stenosis is present on the left  secondary to uncovertebral degenerative disease.     C7-T1: There is no evidence of a disc bulge or herniation.     After contrast administration there was mild enhancement appreciated  above the endplates at C5-6 and C6-7 consistent with degenerative  disease. There was no evidence of abnormal enhancement to suggest  metastatic disease.     IMPRESSION: There is no evidence of metastatic disease involving the  cervical spine. Multilevel degenerative disc disease is noted as  described above with no evidence of disc herniation, cord signal  abnormality or cord compression.        MRI EXAMINATION OF THE THORACIC SPINE WITH AND WITHOUT CONTRAST:     There is mild levoscoliosis of the upper thoracic spine with a  compensatory dextroscoliosis of the lower thoracic spine. The alignment  of the thoracic spine is within normal limits. Signal intensity within  the thoracic cord is normal on the sagittal and axial T2 sequences with  no evidence of disc herniation or of cord compression. There is disc  desiccation at all levels. A small right lateral disc osteophyte complex  is present at T6/T7 resulting mild flattening of the ventral surface of  the thecal sac. There is mild canal stenosis from T8-T11 secondary to  mild facet and ligamentum flavum hypertrophy and a broad-based disc  osteophyte complex. After contrast administration the 3 mm enhancing  lesion is appreciated involving the thoracic cord at the level of T12 on  the  sagittal T1 postcontrast sequence (image 8). There is only a  questionable area of enhancement suggested on the axial T1 postcontrast  sequence. The suboptimal visualization of the enhancing lesion on the  axial imaging is due to the small size of the lesion and technique. No  other enhancing lesions are identified.     IMPRESSION:   1.  There is a 3 mm. Enhancing lesion appreciated involving the cord at  the level of T12 consistent with a metastatic lesion. This is not well  demonstrated on the axial imaging. No other metastatic disease is  appreciated.   2.  Multilevel degenerative disease is noted as described above  including scoliosis and mild canal stenosis from T8-T11. See above.        This report was finalized on 8/29/2024 8:33 AM by Dr. 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     There is a grade 1 anterolisthesis of C3 upon C4 and C4 upon C5. There  is moderate loss of disc height at C5-6 and C6-7. Signal intensity  within the cord is normal on the sagittal and axial T2 sequences.     C2-3: Moderate facet degenerative disease is present on the left.     C3-4: Moderate facet degenerative disease is present bilaterally. A  small central disc osteophyte complex is present with tonsil herniation.     C4-5: Mild to moderate to severe facet degenerative disease is present  on the right and left respectively.     C5-6: Moderate facet  degenerative disease is present bilaterally. There  is moderate foraminal stenosis on the right secondary to loss of disc  height, uncovertebral degenerative disease and extension of the disc  osteophyte complex into the neural foramen.     C6-7: There is mild canal stenosis secondary to a broad-based disc  osteophyte complex. Moderate foraminal stenosis is present on the left  secondary to uncovertebral degenerative disease.     C7-T1: There is no evidence of a disc bulge or herniation.     After contrast administration there was mild enhancement appreciated  above the endplates at C5-6 and C6-7 consistent with degenerative  disease. There was no evidence of abnormal enhancement to suggest  metastatic disease.       Impression:      There is no evidence of metastatic disease involving the  cervical spine. Multilevel degenerative disc disease is noted as  described above with no evidence of disc herniation, cord signal  abnormality or cord compression.        MRI EXAMINATION OF THE THORACIC SPINE WITH AND WITHOUT CONTRAST:     There is mild levoscoliosis of the upper thoracic spine with a  compensatory dextroscoliosis of the lower thoracic spine. The alignment  of the thoracic spine is within normal limits. Signal intensity within  the thoracic cord is normal on the sagittal and axial T2 sequences with  no evidence of disc herniation or of cord compression. There is disc  desiccation at all levels. A small right lateral disc osteophyte complex  is present at T6/T7 resulting mild flattening of the ventral surface of  the thecal sac. There is mild canal stenosis from T8-T11 secondary to  mild facet and ligamentum flavum hypertrophy and a broad-based disc  osteophyte complex. After contrast administration there was no evidence  of abnormal enhancement to suggest metastatic disease.     IMPRESSION: There is no evidence of metastatic disease, of a focal disc  herniation, cord signal abnormality or of cord compression.  Multilevel  degenerative disease is noted as described above including scoliosis and  mild canal stenosis from T8-T11. See above.     This report was finalized on 8/28/2024 12:02 PM by Dr. Cesar Levine M.D on Workstation: [id number]       FL C Arm During Surgery [[id number]] Resulted: 08/27/24 1529     Updated: 08/27/24 1529    Narrative:      This procedure was auto-finalized with no dictation required.    FL Barium Enema Water Soluble Single Contrast [[id number]] Collected: 08/26/24 1723     Updated: 08/26/24 1735    Narrative:      Fluoroscopic water-soluble barium enema     HISTORY: Rectal cancer, trouble passing scope through rectum, evaluate  for the degree of narrowing       Impression:      TECHNIQUE, FINDINGS AND IMPRESSION:  Scattered images were obtained of the abdomen. Prominent and dilated  loops of air-filled bowel are present throughout the abdomen and pelvis.     A small caliber rectal tube was then inserted gently into the rectum and  minimally insufflated under direct fluoroscopic guidance. Water-soluble  contrast was then administered into the rectum via gravity while  multiple fluoroscopic images were obtained in the AP, lateral oblique  views.     There is a long segment of irregular high-grade stenosis involving the  high rectum corresponding with the area of irregular wall thickening  seen on recent CT. This measures over a segment of approximately 3.7 cm.  The narrowest segment appears to measure approximately 5 to 6 mm in  shortest diameter. Contrast passes easily through this segment into the  more proximal large bowel.     DAP: 3477.4 ?Gy*m2     This report was finalized on 8/26/2024 5:32 PM by Dr. Calvin Blanco M.D  on Workstation: [id number]       MRI Lumbar Spine With & Without Contrast [[id number]] Collected: 08/26/24 0923     Updated: 08/26/24 1051    Narrative:      MRI OF THE LUMBAR SPINE WITH AND WITHOUT CONTRAST ON 08/26/2024     CLINICAL HISTORY: Patient has a history of  metastatic cancer of unknown  primary.     TECHNIQUE: Sagittal T1, proton density and fat-suppressed T2 and  postcontrast sagittal fat-suppressed T1-weighted images were obtained of  the lumbar spine. In addition, thin cut axial T1 and T2-weighted images  were obtained angled through the interspaces from L2 to S1 and axial T2  and postcontrast axial T1-weighted images were obtained from L1 to S2.      There are no prior lumbar spine MRIs for comparison. This study is  correlated to a whole body bone scan on 08/25/2024 as well as a CT scan  of the abdomen and pelvis on 08/20/2024.     FINDINGS: On the sagittal images there is evidence of a small nodular  enhancing lesion within the central aspect of the distal thoracic spinal  cord at the level of the superior body of the T12 thoracic level. 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     There is a levoscoliotic curvature of the lumbar spine with its apex at  the L3-4 lumbar level and there is severe narrowing of the central to  right side of the disc space at L3-4 with right-sided degenerative disc  and endplate changes and right lateral marginal endplate spurring along  the inner margin of the levoscoliotic curve. Recent CT of the abdomen  and pelvis demonstrated prominent degenerative marrow sclerosis in the  right inferior body and endplate of L3 and right superior body and  endplate of L4 and some vacuum disc phenomenon in the central to right  side of the disc space and far right lateral marginal endplate spurring  at L3-4.     At T11-12, the posterior disc margin and facets are normal with no canal  or foraminal narrowing.     At T12-L1, the posterior disc margin and facets are normal with no canal  or foraminal narrowing.     At L1-2, the posterior disc margin and facets are normal with no canal  or foraminal narrowing.     At L2-3, there is moderate  bilateral facet overgrowth and there is a  minimal 2-3 mm anterolisthesis of L2 on L3 and there is a mild diffuse  posterior disc bulge. There is mild canal and left foraminal narrowing.  There is synovial thickening off the right facets extending into the  posterior right foramen with mild right foraminal narrowing.     At L3-4, there is mild-to-moderate bilateral facet overgrowth, severe  disc space narrowing and degenerative endplate changes in the central to  right side of the disc space, a mild diffuse posterior disc bulge and  there is mild narrowing of the right side of the canal and slight right  lateral recess narrowing and there is eccentric spurring and bulging  disc material into the inferior right foramen and to the far right  laterally that moderately narrows the right neural foramen and abuts the  undersurface of the exiting right L3 nerve root within the right foramen  and lateral to the foramen to the far right laterally at L3-4.     At L4-5, there is mild-to-moderate bilateral facet overgrowth. The  posterior disc margin is normal. There is no canal narrowing. There is  mild left and no right foraminal narrowing.     At L5-S1, there is mild right and there is moderate left facet  overgrowth. The posterior disc margin is normal. There is no canal or  lateral recess or right foraminal narrowing. There is mild left  foraminal narrowing.       Impression:      1. 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 I recommend a dedicated cervical and thoracic  spine MRI with and without contrast to determine if there are any  additional spinal cord metastasis and to more comprehensively assess the  spinal cord that at the T12 level.     2. I see no osseous metastatic disease to the lumbar spine.     3. This patient has a  levoscoliotic curvature of the lumbar spine with  its apex at the L3-4 lumbar level and there is severe disc space  narrowing and degenerative endplate changes with degenerative marrow  sclerosis and degenerative marrow edema in the central to right side of  the endplates at L3-4 along the inner margin of the levoscoliotic curve.  There is lumbar spondylosis as described above. I see no canal or  foraminal narrowing from T11 to L2 and at L2-3 I see mild canal and  bilateral foraminal narrowing. At L3-4, there is mild narrowing of the  right side of the canal, right lateral recess and there is eccentric  spurring into the right neural foramen and to the far right laterally  that moderately narrows the right neural foramen and abuts the  undersurface of the exiting right L4 nerve root. There is mild left  foraminal narrowing at L4-5 and L5-S1.     The results of this study were communicated to Dr. Cesar Iglesias by  telephone on 08/26/2024 at 8:40 AM.     This report was finalized on 8/26/2024 10:48 AM by Dr. Gilbert Hassan M.D  on Workstation: [id number]       NM Bone Scan Whole Body [[id number]] Collected: 08/25/24 1106     Updated: 08/25/24 1116    Narrative:      NM BONE SCAN-     INDICATIONS: Metastatic disease. 6 mm sclerotic focus in the T1 spinous  process.     TECHNIQUE: Delayed whole body bone scan     COMPARISON: Correlated with CT from 8/20/2024     FINDINGS:     Radiotracer: 22.6 mCi Technetium 99m MDP, intravenous     Delayed anterior and posterior whole body projections were obtained, as  well as spot views at the level of the neck, thorax.     Typical appearing degenerative changes are seen at the shoulders,  elbows, hands, wrists, sacroiliac joints, knees, ankles, lower cervical  spine (on the left, just above the level of the prior chest CT, cervical  spine CT correlation could be considered if indicated), and mid lumbar  spine (on the right, corresponding to previous CT demonstrated  degenerative  change).     Small extravasation of activity is suggested at the injection site at  the right forearm. Focal activity at the level of the distal right  radial shaft could be a separate focus overlying soft tissue activity  related to an injection attempt, or could represent an osseous process  such as old fracture or lesion, correlate clinically, x-ray of the right  forearm is recommended for further evaluation.     No localized activity is seen at the level of the previous CT  demonstrated T1 spinous process sclerotic focus, but the size of the  lesion may be below the resolution of bone scan, CT follow-up advised to  characterize change.          Impression:         As described.           This report was finalized on 8/25/2024 11:13 AM by Dr. 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     FINDINGS:  Multiplanar images of the head were obtained without and with  gadolinium. Again, the patient is status post right suboccipital  craniotomy. There has been resection of a tumor within the right  cerebellar hemisphere. There is edema which is noted within the right  cerebellar hemisphere. It extends across the midline into the left  cerebellar hemisphere. There is some fluid and debris which is noted  within the operative bed. There is also air within the operative bed.  There does appear to be a small amount of hemorrhage as well, similar to  prior exam. Following contrast administration, there is only some  minimal peripheral enhancement, which may reflect some  expected  postoperative enhancement, although continued follow-up is recommended.  The patient has an additional lesion within the left temporal lobe,  which appears stable, as does a lesion within the cerebellar vermis.  These have surrounding edema. There is a tiny focus of restricted  diffusion noted within the left cerebellar hemisphere, which is new when  compared to prior exam. Tiny infarct is not excluded. Mass effect upon  the fourth ventricle is significantly improved when compared to the  prior study. Patient has scattered FLAIR and T2 hyperintensities within  the deep white matter, which are favored to represent some  microangiopathic change. There is mucosal thickening noted within the  ethmoid and maxillary sinuses. There is some edema noted within the  right occipital/posterior cervical soft tissues. More focal fluid  collection within this area measures up to 3.4 x 1.4 cm, and may  represent a seroma. There does appear to be some trace subdural  hemorrhage adjacent to the left temporal lobe. This measures up to 1.3  mm.          Impression:      1. Postoperative findings, as noted above.  Continued follow-up is  recommended.         This report was finalized on 8/23/2024 5:28 AM by Dr. Viridiana Altamirano M.D on Workstation: [id number]       CT Head Without Contrast [[id number]] Collected: 08/21/24 1840     Updated: 08/21/24 1848    Narrative:      CT HEAD WO CONTRAST-     HISTORY:  po     COMPARISON: CT head 8/20/2024 and MRI brain 8/20/2024     FINDINGS: The patient's head is canted in the scanner. A right  suboccipital craniotomy is noted. Resection cavity is appreciated  involving the right cerebral hemisphere posterolaterally a small volume  of blood is present within the operative bed, not unexpected. The  resection cavity corresponds to the enhancing mass present on the MRI  examination of 8/20/2024. The MRI examination also demonstrated an  enhancing mass involving the superior cerebellar  vermis on the left  measuring 1 cm in size and a 7 mm nodular area of enhancement involving  the inferior and posterior aspect of the left temporal lobe. An area of  dural enhancement was also present on the MRI examination overlying the  left frontal lobe superolaterally (axial T1 MPRAGE post image 120). This  is nonspecific. This may represent a small meningioma although a dural  metastatic deposit cannot be excluded.       Impression:      1.  The patient is status post resection of a right cerebellar avidly  enhancing mass. Expected postoperative changes are noted. No  complicating feature is identified.  2.  Decreased attenuation is appreciated involving the left temporal  lobe posteriorly and inferiorly consistent with vasogenic edema. This  corresponds to the 9 mm enhancing lesion present on the MRI examination  of 8/20/2024. The enhancing lesion involving the superior cerebellar  vermis to the left is occult as is the area of dural enhancement  overlying the left frontal lobe superolaterally.     Radiation dose reduction techniques were utilized, including automated  exposure modulation based on body size.     This report was finalized on 8/21/2024 6:45 PM by Dr. Cesar Levine M.D  on Workstation: [id number]       CT Head With & Without Contrast [[id number]] Collected: 08/20/24 2007     Updated: 08/21/24 0612    Narrative:      CT SCAN OF THE HEAD WITH AND WITHOUT CONTRAST ON 08/20/2024     CLINICAL HISTORY: Patient has 3 enhancing lesions within the brain seen  on MRI of the brain earlier this morning on 08/20/2024 at 12:22 a.m.  most consistent with multiple brain metastases.     TECHNIQUE: Spiral CT images were obtained from the base of the skull to  the vertex both pre- and post-intravenous contrast and the images were  reformatted and submitted in 1 mm thick axial, sagittal and coronal CT  sections with brain algorithm using stereotactic protocol.     This is correlated to the MRI of the brain earlier  this morning on  08/20/2024 12:22 a.m.     FINDINGS: Within the posterior inferior left temporal lobe, there is a  rounded small enhancing mass measuring 7 x 6 x 6 mm and it has  surrounding patchy low-density compatible surrounding vasogenic edema  tracking up to 2.5 x 2.3 cm. Within the midline in the superior vermis  of the cerebellum is a rounded enhancing mass that measures 10 x 9 x 10  mm with some mild surrounding low density compatible with mild vasogenic  edema and within the posterior inferior right cerebellum is an enhancing  mass that measures maximally 24 x 20 x 20 mm in mediolateral, anterior  posterior and craniocaudal dimension. This has exuberant surrounding  vasogenic edema tracking throughout the right cerebellum that tracks up  to 4.5 x 4.5 cm. There is some mass effect on the fourth ventricle. The  lateral and third ventricles are mildly enlarged suggesting mild  hydrocephalus. I see no midline shift. No extra-axial fluid collections  are identified and there is no evidence of acute intracranial  hemorrhage. The calvarium and the skull base are normal in appearance.  The paranasal sinuses and the mastoid air cells and the middle ear  cavities are clear. The orbits are normal in appearance.       Impression:      1. There has been no change when compared to the MRI of the brain with  and without contrast earlier this morning on 08/20/2024 at 12:22 a.m.     2. This patient has 3 enhancing lesions in the brain and findings are  most consistent with metastatic disease to the brain. The enhancing  brain lesions which are most likely brain mets includes a 7 x 6 x 6 mm  enhancing lesion in the posterior inferior left temporal lobe that has  2.5 x 2.3 cm of surrounding vasogenic edema. There is a 10 x 9 x 10 mm  enhancing lesion in the superior midline of the vermis of the cerebellum  that has mild surrounding edema. The largest enhancing lesion is in the  posterior inferior right cerebellum that  measures 2.4 x 2 x 2 cm in size  and has a large amount of surrounding vasogenic edema tracking up to 4.5  x 4.5 cm with mild mass effect on the fourth ventricle. There is mild  dilatation of the lateral and third ventricles compatible with mild  hydrocephalus, unchanged. This patient has abnormal circumferential wall  thickening within the distal sigmoid colon seen on chest, abdomen and  pelvic CT earlier this morning on 08/20/2024 at 1:42 a.m. suggesting  there could be a sigmoid colon cancer and correlation with colonoscopy  is suggested.     Radiation dose reduction techniques were utilized, including automated  exposure control and exposure modulation based on body size.        This report was finalized on 8/21/2024 6:09 AM by Dr. Gilbert Hassan M.D  on Workstation: [id number]       CT Chest Without Contrast Diagnostic [[id number]] Collected: 08/20/24 0338     Updated: 08/20/24 0357    Narrative:      CT OF THE CHEST WITHOUT CONTRAST; CT OF THE ABDOMEN PELVIS WITHOUT  CONTRAST     HISTORY: Brain metastases     COMPARISON: None available.     TECHNIQUE: Axial CT imaging was obtained from the thoracic inlet through  the symphysis pubis. No IV contrast was administered.     FINDINGS:  CT OF THE CHEST: No suspicious pulmonary nodules or masses are seen.  There is minimal biapical scarring. There is some dependent atelectasis.  Thyroid gland, trachea, and esophagus appear unremarkable. No definite  coronary artery calcifications are seen. Mediastinal lymph nodes do not  appear pathologically enlarged. There are bilateral breast implants.  Thoracic aorta is normal in caliber. The patient has thoracolumbar  scoliosis. There is some associated endplate sclerosis noted at L3-L4  and and T8-T9. The patient is noted to have a sclerotic lesion within  the spinous process of T1. Exact etiology is uncertain. It may simply  reflect a bone island. It measures 6 mm in size. Short-term follow-up is  recommended.     CT OF THE  ABDOMEN PELVIS: No suspicious hepatic lesions are seen. The  spleen, adrenal glands, stomach, duodenum, gallbladder, and pancreas  appear normal. The patient does have some excreted contrast material  from gadolinium from earlier MRI of the brain. No hydronephrosis is  seen. Further excreted gadolinium is noted within the urinary bladder.  Uterus appears normal. No adnexal masses are seen. The patient's distal  sigmoid colon appears thick walled. This raises the possibility of an  underlying mass lesion. Correlation with colonoscopy is recommended.  There is no evidence of bowel obstruction. The appendix is normal. The  patient does have some nodularity within the left pericolonic fat. The  largest area measures up to 2.7 x 1.5 cm. Neoplastic implant is not  excluded. There also appears to be some surrounding shotty perirectal  lymph nodes. MRI could allow for further staging.       Impression:         1. No evidence of metastatic disease within the thorax.  2. Asymmetric wall thickening involving the distal sigmoid colon, with  some surrounding mesenteric soft tissue nodularity. Appearance is very  worrisome for primary colonic malignancy. Correlation with colonoscopy  is recommended. MRI could allow for further assessment.  3. Sclerotic lesion within the spinous process of T1. I would favor that  this is a benign lesion such as a bone island. Consider short-term  follow-up or bone scan.     Radiation dose reduction techniques were utilized, including automated  exposure control and exposure modulation based on body size.        This report was finalized on 8/20/2024 3:54 AM by Dr. Viridiana Altamirano M.D on Workstation: [id number]       CT Abdomen Pelvis Without Contrast [[id number]] Collected: 08/20/24 0338     Updated: 08/20/24 0357    Narrative:      CT OF THE CHEST WITHOUT CONTRAST; CT OF THE ABDOMEN PELVIS WITHOUT  CONTRAST     HISTORY: Brain metastases     COMPARISON: None available.     TECHNIQUE: Axial CT  imaging was obtained from the thoracic inlet through  the symphysis pubis. No IV contrast was administered.     FINDINGS:  CT OF THE CHEST: No suspicious pulmonary nodules or masses are seen.  There is minimal biapical scarring. There is some dependent atelectasis.  Thyroid gland, trachea, and esophagus appear unremarkable. No definite  coronary artery calcifications are seen. Mediastinal lymph nodes do not  appear pathologically enlarged. There are bilateral breast implants.  Thoracic aorta is normal in caliber. The patient has thoracolumbar  scoliosis. There is some associated endplate sclerosis noted at L3-L4  and and T8-T9. The patient is noted to have a sclerotic lesion within  the spinous process of T1. Exact etiology is uncertain. It may simply  reflect a bone island. It measures 6 mm in size. Short-term follow-up is  recommended.     CT OF THE ABDOMEN PELVIS: No suspicious hepatic lesions are seen. The  spleen, adrenal glands, stomach, duodenum, gallbladder, and pancreas  appear normal. The patient does have some excreted contrast material  from gadolinium from earlier MRI of the brain. No hydronephrosis is  seen. Further excreted gadolinium is noted within the urinary bladder.  Uterus appears normal. No adnexal masses are seen. The patient's distal  sigmoid colon appears thick walled. This raises the possibility of an  underlying mass lesion. Correlation with colonoscopy is recommended.  There is no evidence of bowel obstruction. The appendix is normal. The  patient does have some nodularity within the left pericolonic fat. The  largest area measures up to 2.7 x 1.5 cm. Neoplastic implant is not  excluded. There also appears to be some surrounding shotty perirectal  lymph nodes. MRI could allow for further staging.       Impression:         1. No evidence of metastatic disease within the thorax.  2. Asymmetric wall thickening involving the distal sigmoid colon, with  some surrounding mesenteric soft tissue  nodularity. Appearance is very  worrisome for primary colonic malignancy. Correlation with colonoscopy  is recommended. MRI could allow for further assessment.  3. Sclerotic lesion within the spinous process of T1. I would favor that  this is a benign lesion such as a bone island. Consider short-term  follow-up or bone scan.     Radiation dose reduction techniques were utilized, including automated  exposure control and exposure modulation based on body size.        This report was finalized on 8/20/2024 3:54 AM by Dr. 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     FINDINGS:  Multiplanar images of the head were obtained without and with  gadolinium. No areas of restricted diffusion are seen to suggest acute  infarct. Prior head CT demonstrated lesions within the left temporal  lobe and right cerebellar hemisphere. The current study confirms those  lesions. The lesion within the right cerebellar hemisphere measures up  to 2.1 x 2.2 cm. The lesion within the left temporal lobe measures 9 x 7  mm. There is a third lesion which is noted within the left side of the  cerebellar vermis. This measures 1.0 x 0.8 cm. No definite additional  lesions are seen. All of the lesions have associated surrounding edema.  There is some mass effect upon the fourth ventricle. This appears  similar to the prior study. There is mild prominence of the lateral and  third ventricles. There is periventricular and deep white matter  microangiopathic change. Tiny focus of susceptibility artifact is noted  within the  lesion within the right cerebellar hemisphere. Otherwise,  there is no susceptibility artifact. Intracranial flow voids appear  intact. No large volume hemorrhage is seen. There is no evidence of  venous occlusion. There is some mucosal thickening within the ethmoid  and maxillary sinuses.       Impression:      1. Study confirms the presence of lesions within the left temporal lobe,  as well as the right cerebellar hemisphere. A third lesion is noted  within the cerebellar vermis. There is mass effect upon the fourth  ventricle. There is some mild dilatation of the lateral and third  ventricles. Continued follow-up is recommended.        This report was finalized on 8/20/2024 2:41 AM by Dr. Viridiana Altamirano M.D on Workstation: [id number]       CT Head Without Contrast [[id number]] Collected: 08/19/24 1836     Updated: 08/19/24 1843    Narrative:      CT HEAD WO CONTRAST-     HISTORY:  headache,vomiting     COMPARISON: None     FINDINGS: There is an area of increased attenuation involving the right  cerebellar hemisphere posteriorly measuring approximately 2.1 cm in  size. Decreased attenuation consistent with vasogenic edema is  appreciated involving the right cerebellar hemisphere. Beam hardening  artifact limits evaluation of the posterior fossa but there may be  decreased attenuation also involving the shravan laterally to the right.  Decreased attenuation involving the inferior aspect the right temporal  lobe posteriorly is appreciated measuring approximately 6 mm in size  with a small area of adjacent edema. There is mild prominence of the  lateral and third ventricles secondary to mass effect upon the inferior  aspect of the fourth ventricle by the right cerebellar mass and  associated edema. There is no evidence of uncal herniation or midline  shift.       Impression:      Areas of increased attenuation are appreciated over the  right cerebral hemisphere posteriorly (2.1 cm) and left temporal  lobe  inferiorly and posteriorly (6 mm) most consistent with that of  metastatic disease with associated edema. Edema is most prominent  involving the right cerebellar hemisphere. There is mass effect upon the  fourth ventricle and mild prominence of the lateral and third  ventricles. Further evaluation with MRI examination of the brain with  and without contrast is recommended.     The above information was called to and discussed with Dr. Epps.              Radiation dose reduction techniques were utilized, including automated  exposure modulation based on body size.     This report was finalized on 8/19/2024 6:40 PM by Dr. Cesar Levine M.D  on Workstation: [id number]       XR Chest 1 View [[id number]] Collected: 08/19/24 1631     Updated: 08/19/24 1635    Narrative:      XR CHEST 1 VW-8/19/2024     HISTORY: Weakness, dizziness.     Heart size is within normal limits. Lungs appear free of acute  infiltrates. There is mild thoracic scoliosis. Bones and soft tissues  are otherwise unremarkable.       Impression:      1. No acute process        This report was finalized on 8/19/2024 4:32 PM by Dr. Veto Escobar M.D on Workstation: [id number]                 Pertinent Labs     Results from last 7 days   Lab Units 08/30/24  0515 08/29/24  0628 08/28/24  0543 08/27/24  1012   WBC 10*3/mm3 7.87 7.47 11.19* 11.49*   HEMOGLOBIN g/dL 12.8 11.7* 13.0 12.8   PLATELETS 10*3/mm3 333 303 395 375     Results from last 7 days   Lab Units 08/30/24  0515 08/29/24  0628 08/28/24  1635 08/28/24  0543 08/25/24  0439   SODIUM mmol/L 137 142  --  136 140   POTASSIUM mmol/L 4.1 4.1 4.1 3.6 4.0   CHLORIDE mmol/L 99 105  --  96* 103   CO2 mmol/L 27.1 26.2  --  24.9 29.0   BUN mg/dL 10 10  --  9 10   CREATININE mg/dL 0.55* 0.56*  --  0.73 0.55*   GLUCOSE mg/dL 125* 88  --  153* 103*   EGFR mL/min/1.73 102.5 102.1  --  92.0 102.5       Results from last 7 days   Lab Units 08/30/24  0515 08/29/24  0628 08/28/24  0543 08/25/24  0439  "  CALCIUM mg/dL 9.1 8.9 9.2 8.8   MAGNESIUM mg/dL  --   --  2.0  --                Invalid input(s): \"LDLCALC\"          Test Results Pending at Discharge   No pending test results at time of discharge.    Discharge Details        Discharge Medications        New Medications        Instructions Start Date   amLODIPine 5 MG tablet  Commonly known as: NORVASC   5 mg, Oral, Daily      dexAMETHasone 4 MG tablet  Commonly known as: DECADRON   4 mg, Oral, Every 12 Hours Scheduled      docusate sodium 100 MG capsule   100 mg, Oral, Daily   Start Date: August 31, 2024     famotidine 20 MG tablet  Commonly known as: PEPCID   20 mg, Oral, 2 Times Daily Before Meals      HYDROcodone-acetaminophen 7.5-325 MG per tablet  Commonly known as: NORCO   1 tablet, Oral, Every 4 Hours PRN      levETIRAcetam 500 MG tablet  Commonly known as: KEPPRA   500 mg, Oral, 2 Times Daily      methocarbamol 750 MG tablet  Commonly known as: ROBAXIN   750 mg, Oral, Every 6 Hours PRN      naloxone 4 MG/0.1ML nasal spray  Commonly known as: NARCAN   Call 911. Don't prime. [place] in 1 nostril for overdose. Repeat in 2-3 minutes in other nostril if no or minimal breathing/responsiveness.             Continue These Medications        Instructions Start Date   amphetamine-dextroamphetamine 20 MG tablet  Commonly known as: ADDERALL   Take 1 tablet by mouth 2 (Two) Times a Day.      atorvastatin 10 MG tablet  Commonly known as: LIPITOR   10 mg, Oral, Daily      buPROPion  MG 24 hr tablet  Commonly known as: WELLBUTRIN XL   300 mg, Oral, Every Morning      LORazepam 0.5 MG tablet  Commonly known as: ATIVAN   Take 1 tablet by mouth Every 6 (Six) Hours As Needed for Anxiety.               No Known Allergies    Discharge Disposition:  Home or Self Care      Discharge Diet:  Diet Order   Procedures    Diet: Regular/House, Gastrointestinal; Low Irritant, Fiber-Restricted; Texture: Soft to Chew (NDD 3); Soft to Chew: Ground Meat; Fluid Consistency: Thin (IDDSI " 0)       Discharge Activity: No lifting greater than 15 pounds for 4 weeks, no submerging in water for 2 weeks      CODE STATUS:    Code Status and Medical Interventions: CPR (Attempt to Resuscitate); Full Support   Ordered at: 08/21/24 2107     Code Status (Patient has no pulse and is not breathing):    CPR (Attempt to Resuscitate)     Medical Interventions (Patient has pulse or is breathing):    Full Support       Future Appointments   Date Time Provider Department Center   9/3/2024  8:45 AM Terrie Crenshaw MD [facility] None   9/3/2024  9:00 AM [facility]   9/10/2024  3:10 PM Saturnino Emmanuel MD [facility]   9/12/2024  9:30 AM Bull Doty MD [facility]     Additional Instructions for the Follow-ups that You Need to Schedule       Ambulatory Referral to Home Health ([facility])   As directed      Face to Face Visit Date: 8/29/2024   Follow-up provider for Plan of Care?: I will be treating the patient on an ongoing basis.  Please send me the Plan of Care for signature.   Follow-up provider: SATURNINO EMMANUEL [[id number]]   Reason/Clinical Findings: ostomy   Describe mobility limitations that make leaving home difficult: ostomy   Nursing/Therapeutic Services Requested: Physical Therapy Skilled Nursing   Skilled nursing orders: Ostomy instruction   Frequency: 1 Week 1               Contact information for follow-up providers       Saturnino Emmanuel MD Follow up in 2 week(s).    Specialty: General Surgery  Contact information:  [address]  [facility] [address]  [place] [id number] [phone]               Provider, No Known .    Contact information:  [place] [id number] [phone]                       Contact information for after-discharge care       Home Medical Care       [facility] .    Service: Home Health Services  Contact information:  [address] [facility] [address]  [facility] [facility]  [id number]  [phone]                                   Additional Instructions for the Follow-ups that You Need to Schedule       Ambulatory Referral to Home Health (Hospital)   As directed      Face to Face Visit Date: 8/29/2024   Follow-up provider for Plan of Care?: I will be treating the patient on an ongoing basis.  Please send me the Plan of Care for signature.   Follow-up provider: RADHA EMMANUEL [[id number]]   Reason/Clinical Findings: ostomy   Describe mobility limitations that make leaving home difficult: ostomy   Nursing/Therapeutic Services Requested: Physical Therapy Skilled Nursing   Skilled nursing orders: Ostomy instruction   Frequency: 1 Week 1            Time Spent on Discharge:  Greater than 30 minutes      Colette Koroma MD  [facility]  08/30/24  09:36 EDT      Electronically signed by Colette Koroma MD at 08/30/24 0954

## 2024-08-30 NOTE — DISCHARGE INSTRUCTIONS
- Follow-up with Dr. Chester office next Tuesday, my office will call for appointment today  -Call if having worsening abdominal pain nausea vomiting abdominal distention or any other abnormal finding  -Take stool softeners and laxatives as needed to prevent constipation

## 2024-08-30 NOTE — NURSING NOTE
"[facility]- follow up for pouch change and ostomy teaching. Patient assisted hands on with all care. Bridge remains in place. Patient sees Dr Chester on Tues so I told her to take an extra pouch if he is removing the bridge. Reviewed ADLs and diet considerations- foods that increase gas/odor.    Ostomy education provided to the patient/family:    [x]Pouch changed   []Pt observed   [x]Pt participated    []CG participated   [x]Pouch emptied and cleaned   []Pt observed   [x]Pt participated   []CG participated   [x]Teaching packet/handouts provided/reviewed  [x]Return to ADL's  [x]Peristomal skin care  [x]Diet  [x]Use of closed end verses a drainable pouch  [x]Supplies at bedside  [x]Samples ordered       08/30/24 1118   Colostomy LLQ   No Placement date: If unknown, DO NOT use \"Add Comment\" note or Placement time: If unknown, DO NOT use \"Add Comment\" note found.   Location: LLQ   Stomal Appliance 2 piece;Intact;Changed;Drainable   Stoma Appearance round;swollen;moist;red;pink;protruding above skin level;bridge in place   Peristomal Assessment Intact   Accessories/Skin Care cleansed with water;skin barrier ring   Stoma Function flatus;stool   Stool Color brown   Stool Consistency loose   Treatment Bag change       "

## 2024-08-30 NOTE — DISCHARGE SUMMARY
Patient Name: Ely Sims  : 1960  MRN: [id number]    Date of Admission: 2024  Date of Discharge:  2024  Primary Care Physician: Provider, No Known      Chief Complaint:   Dizziness, Headache, and Hypertension      Discharge Diagnoses     Active Hospital Problems    Diagnosis  POA    **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, C80.1]  Yes    Colonic mass [K63.89]  Yes     Class: Acute    Anemia [D64.9]  Yes    Hyperglycemia [R73.9]  Yes    Hypertension [I10]  Yes      Resolved Hospital Problems    Diagnosis Date Resolved POA    Compression of brain [G93.5] 2024 Yes        Hospital Course     Ms. Sims is a 64 y.o. female with a history of anxiety who presented to [facility] initially complaining of headache and dizziness.  Please see the admitting history and physical for further details.  She was found to have evidence of metastatic brain lesions and was admitted to the hospital for further evaluation and treatment.      Neurosurgery was consulted on admission.  There was evidence of metastatic brain lesions from other site with mass effect.  Because of this, the patient underwent a posterior fossa craniectomy.  She was admitted to the ICU postoperatively.  After monitoring, she was transferred to the floor.  Additional workup included CT abdomen/pelvis which revealed possible malignancy in the GI tract.  GI was consulted for further evaluation.  The patient underwent colonoscopy which showed an obstructing colonic mass.  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  General surgery was consulted given the obstructing mass.  The patient underwent laparoscopic diverting colostomy with Mediport placement on .  Neurosurgery started the patient on steroids and Keppra given the brain lesions, and these will be continued at discharge.  She will follow-up with neurosurgery in 2 weeks.  The  patient was also found to have a metastatic lesion at T12 on spine MRI.  Radiation oncology was consulted.  The patient has an appointment scheduled with radiation oncology on 9/3.  Oncology followed along throughout hospitalization.  The patient will need postoperative radiation to the brain prior to considering palliative chemotherapy.  They will arrange follow-up in their office after the patient has seen radiation oncology.  An outpatient PET scan will be done in 1-2 weeks.  Patient underwent teaching for new ostomy prior to discharge home.  On day of discharge, all consulting services were agreeable with discharge home.  Patient was counseled on reasons to return and conveyed understanding.  She was very eager for discharge.  Follow-up to be arranged by consulting services.    Day of Discharge     Subjective:  No acute events overnight.  Patient states her pain is much better controlled.  Eating and drinking well.  Denies chest pain or shortness of breath.  She is very eager for discharge home today.    Physical Exam:  Temp:  [98.1 °F (36.7 °C)-98.6 °F (37 °C)] 98.1 °F (36.7 °C)  Heart Rate:  [68-79] 68  Resp:  [18] 18  BP: (107-127)/(60-75) 107/72  Body mass index is 24.85 kg/m².  Physical Exam  General: Alert, no acute distress.  Sitting up in bed.  Eating breakfast.  Pleasant and conversive.  ENT: No conjunctival injection or scleral icterus. Moist mucous membranes.   Neuro: Eyes open and moving in all directions, strength normal in all extremities, no focal deficits.   Lungs: Clear to auscultation bilaterally. No wheeze or crackles. No distress.   Heart: RRR, no murmurs. No edema.  Abdomen: Soft, mild distention.  Normal bowel sounds.  New ostomy in place, normal stool output noted.  Ext: Warm and well-perfused. No edema.   Skin: No rashes or lesions. IV site without swelling or erythema.     Consultants     Consult Orders (all) (From admission, onward)       Start     Ordered    08/26/24 1240  Inpatient  Radiation Oncology Consult  Once        Specialty:  Radiation Oncology  Provider:  Prashant Varghese MD    08/26/24 1239    08/26/24 1209  Hematology & Oncology Inpatient Consult  Once        Specialty:  Hematology and Oncology  Provider:  Anamika Morley MD    08/26/24 1208    08/26/24 0831  Inpatient General Surgery Consult  Once        Specialty:  General Surgery  Provider:  Solomon Cheney MD    08/26/24 0831    08/23/24 1214  Inpatient Internal Medicine Consult  Once        Specialty:  Internal Medicine  Provider:  Jerry Valdez MD    08/23/24 1214    08/21/24 1606  Inpatient Gastroenterology Consult  Once        Specialty:  Gastroenterology  Provider:  Shadi Loja MD    08/21/24 1605    08/19/24 1929  Pulmonology (on-call MD unless specified)  Once        Specialty:  Pulmonary Disease  Provider:  (Not yet assigned)    08/19/24 1928    08/19/24 1842  Neurosurgery (on-call MD unless specified)  Once        Specialty:  Neurosurgery  Provider:  Bull Doty MD    08/19/24 1841                  Procedures     COLOSTOMY LAPAROSCOPIC, INSERTION VENOUS ACCESS DEVICE    Imaging Results (All)       Procedure Component Value Units Date/Time    MRI Thoracic Spine With & Without Contrast [[id number]] Collected: 08/28/24 1156     Updated: 08/29/24 0836    Addenda:        ADDENDUM: On the sagittal T1 postcontrast sequence of the thoracic spine  is a nodular area of enhancement involving the cord is appreciated level  of T12 measuring approximately 3 mm in size. This is not evident on the  axial T1 postcontrast sequence. The nonvisualization on the axial  postcontrast imaging is artifactual and due to the small size of the  lesion and technique. This almost certainly represents a metastatic  lesion. No other enhancing lesions are appreciated. The above  information was discussed with Dr. Doty on 8/29/2024 at 0825 hours. The  report should read as follows.     MRI CERVICAL SPINE W WO CONTRAST-,  MRI THORACIC SPINE W WO CONTRAST-     HISTORY:  Evaluate for metastatic disease; C79.31-Secondary malignant  neoplasm of brain; C80.1-Malignant (primary) neoplasm, unspecified;  R51.9-Headache, unspecified; R03.0-Elevated blood-pressure reading,  without diagnosis of hypertension; K63.89-Other specified diseases of  intestine     COMPARISON: None     MRI EXAMINATION OF THE CERVICAL SPINE WITH AND WITHOUT CONTRAST:     A suboccipital craniotomy is noted on the right.     There is a grade 1 anterolisthesis of C3 upon C4 and C4 upon C5. There  is moderate loss of disc height at C5-6 and C6-7. Signal intensity  within the cord is normal on the sagittal and axial T2 sequences.     C2-3: Moderate facet degenerative disease is present on the left.     C3-4: Moderate facet degenerative disease is present bilaterally. A  small central disc osteophyte complex is present with tonsil herniation.     C4-5: Mild to moderate to severe facet degenerative disease is present  on the right and left respectively.     C5-6: Moderate facet degenerative disease is present bilaterally. There  is moderate foraminal stenosis on the right secondary to loss of disc  height, uncovertebral degenerative disease and extension of the disc  osteophyte complex into the neural foramen.     C6-7: There is mild canal stenosis secondary to a broad-based disc  osteophyte complex. Moderate foraminal stenosis is present on the left  secondary to uncovertebral degenerative disease.     C7-T1: There is no evidence of a disc bulge or herniation.     After contrast administration there was mild enhancement appreciated  above the endplates at C5-6 and C6-7 consistent with degenerative  disease. There was no evidence of abnormal enhancement to suggest  metastatic disease.     IMPRESSION: There is no evidence of metastatic disease involving the  cervical spine. Multilevel degenerative disc disease is noted as  described above with no evidence of disc herniation,  cord signal  abnormality or cord compression.        MRI EXAMINATION OF THE THORACIC SPINE WITH AND WITHOUT CONTRAST:     There is mild levoscoliosis of the upper thoracic spine with a  compensatory dextroscoliosis of the lower thoracic spine. The alignment  of the thoracic spine is within normal limits. Signal intensity within  the thoracic cord is normal on the sagittal and axial T2 sequences with  no evidence of disc herniation or of cord compression. There is disc  desiccation at all levels. A small right lateral disc osteophyte complex  is present at T6/T7 resulting mild flattening of the ventral surface of  the thecal sac. There is mild canal stenosis from T8-T11 secondary to  mild facet and ligamentum flavum hypertrophy and a broad-based disc  osteophyte complex. After contrast administration the 3 mm enhancing  lesion is appreciated involving the thoracic cord at the level of T12 on  the sagittal T1 postcontrast sequence (image 8). There is only a  questionable area of enhancement suggested on the axial T1 postcontrast  sequence. The suboptimal visualization of the enhancing lesion on the  axial imaging is due to the small size of the lesion and technique. No  other enhancing lesions are identified.     IMPRESSION:   1.  There is a 3 mm. Enhancing lesion appreciated involving the cord at  the level of T12 consistent with a metastatic lesion. This is not well  demonstrated on the axial imaging. No other metastatic disease is  appreciated.   2.  Multilevel degenerative disease is noted as described above  including scoliosis and mild canal stenosis from T8-T11. See above.        This report was finalized on 8/29/2024 8:33 AM by Dr. Cesar Levine M.D  on Workstation: [id number]     Signed: 08/29/24 0833 by Cesar Levine MD    Narrative:      MRI CERVICAL SPINE W WO CONTRAST-, MRI THORACIC SPINE W WO CONTRAST-     HISTORY:  Evaluate for metastatic disease; C79.31-Secondary malignant  neoplasm of brain;  C80.1-Malignant (primary) neoplasm, unspecified;  R51.9-Headache, unspecified; R03.0-Elevated blood-pressure reading,  without diagnosis of hypertension; K63.89-Other specified diseases of  intestine     COMPARISON: None     MRI EXAMINATION OF THE CERVICAL SPINE WITH AND WITHOUT CONTRAST:     A suboccipital craniotomy is noted on the right.     There is a grade 1 anterolisthesis of C3 upon C4 and C4 upon C5. There  is moderate loss of disc height at C5-6 and C6-7. Signal intensity  within the cord is normal on the sagittal and axial T2 sequences.     C2-3: Moderate facet degenerative disease is present on the left.     C3-4: Moderate facet degenerative disease is present bilaterally. A  small central disc osteophyte complex is present with tonsil herniation.     C4-5: Mild to moderate to severe facet degenerative disease is present  on the right and left respectively.     C5-6: Moderate facet degenerative disease is present bilaterally. There  is moderate foraminal stenosis on the right secondary to loss of disc  height, uncovertebral degenerative disease and extension of the disc  osteophyte complex into the neural foramen.     C6-7: There is mild canal stenosis secondary to a broad-based disc  osteophyte complex. Moderate foraminal stenosis is present on the left  secondary to uncovertebral degenerative disease.     C7-T1: There is no evidence of a disc bulge or herniation.     After contrast administration there was mild enhancement appreciated  above the endplates at C5-6 and C6-7 consistent with degenerative  disease. There was no evidence of abnormal enhancement to suggest  metastatic disease.       Impression:      There is no evidence of metastatic disease involving the  cervical spine. Multilevel degenerative disc disease is noted as  described above with no evidence of disc herniation, cord signal  abnormality or cord compression.        MRI EXAMINATION OF THE THORACIC SPINE WITH AND WITHOUT CONTRAST:      There is mild levoscoliosis of the upper thoracic spine with a  compensatory dextroscoliosis of the lower thoracic spine. The alignment  of the thoracic spine is within normal limits. Signal intensity within  the thoracic cord is normal on the sagittal and axial T2 sequences with  no evidence of disc herniation or of cord compression. There is disc  desiccation at all levels. A small right lateral disc osteophyte complex  is present at T6/T7 resulting mild flattening of the ventral surface of  the thecal sac. There is mild canal stenosis from T8-T11 secondary to  mild facet and ligamentum flavum hypertrophy and a broad-based disc  osteophyte complex. After contrast administration there was no evidence  of abnormal enhancement to suggest metastatic disease.     IMPRESSION: There is no evidence of metastatic disease, of a focal disc  herniation, cord signal abnormality or of cord compression. Multilevel  degenerative disease is noted as described above including scoliosis and  mild canal stenosis from T8-T11. See above.     This report was finalized on 8/28/2024 12:02 PM by Dr. Cesar Levine M.D on Workstation: [id number]       MRI Cervical Spine With & Without Contrast [[id number]] Collected: 08/28/24 1156     Updated: 08/29/24 0836    Addenda:        ADDENDUM: On the sagittal T1 postcontrast sequence of the thoracic spine  is a nodular area of enhancement involving the cord is appreciated level  of T12 measuring approximately 3 mm in size. This is not evident on the  axial T1 postcontrast sequence. The nonvisualization on the axial  postcontrast imaging is artifactual and due to the small size of the  lesion and technique. This almost certainly represents a metastatic  lesion. No other enhancing lesions are appreciated. The above  information was discussed with Dr. Doty on 8/29/2024 at 0825 hours. The  report should read as follows.     MRI CERVICAL SPINE W WO CONTRAST-, MRI THORACIC SPINE W WO CONTRAST-      HISTORY:  Evaluate for metastatic disease; C79.31-Secondary malignant  neoplasm of brain; C80.1-Malignant (primary) neoplasm, unspecified;  R51.9-Headache, unspecified; R03.0-Elevated blood-pressure reading,  without diagnosis of hypertension; K63.89-Other specified diseases of  intestine     COMPARISON: None     MRI EXAMINATION OF THE CERVICAL SPINE WITH AND WITHOUT CONTRAST:     A suboccipital craniotomy is noted on the right.     There is a grade 1 anterolisthesis of C3 upon C4 and C4 upon C5. There  is moderate loss of disc height at C5-6 and C6-7. Signal intensity  within the cord is normal on the sagittal and axial T2 sequences.     C2-3: Moderate facet degenerative disease is present on the left.     C3-4: Moderate facet degenerative disease is present bilaterally. A  small central disc osteophyte complex is present with tonsil herniation.     C4-5: Mild to moderate to severe facet degenerative disease is present  on the right and left respectively.     C5-6: Moderate facet degenerative disease is present bilaterally. There  is moderate foraminal stenosis on the right secondary to loss of disc  height, uncovertebral degenerative disease and extension of the disc  osteophyte complex into the neural foramen.     C6-7: There is mild canal stenosis secondary to a broad-based disc  osteophyte complex. Moderate foraminal stenosis is present on the left  secondary to uncovertebral degenerative disease.     C7-T1: There is no evidence of a disc bulge or herniation.     After contrast administration there was mild enhancement appreciated  above the endplates at C5-6 and C6-7 consistent with degenerative  disease. There was no evidence of abnormal enhancement to suggest  metastatic disease.     IMPRESSION: There is no evidence of metastatic disease involving the  cervical spine. Multilevel degenerative disc disease is noted as  described above with no evidence of disc herniation, cord signal  abnormality or cord  compression.        MRI EXAMINATION OF THE THORACIC SPINE WITH AND WITHOUT CONTRAST:     There is mild levoscoliosis of the upper thoracic spine with a  compensatory dextroscoliosis of the lower thoracic spine. The alignment  of the thoracic spine is within normal limits. Signal intensity within  the thoracic cord is normal on the sagittal and axial T2 sequences with  no evidence of disc herniation or of cord compression. There is disc  desiccation at all levels. A small right lateral disc osteophyte complex  is present at T6/T7 resulting mild flattening of the ventral surface of  the thecal sac. There is mild canal stenosis from T8-T11 secondary to  mild facet and ligamentum flavum hypertrophy and a broad-based disc  osteophyte complex. After contrast administration the 3 mm enhancing  lesion is appreciated involving the thoracic cord at the level of T12 on  the sagittal T1 postcontrast sequence (image 8). There is only a  questionable area of enhancement suggested on the axial T1 postcontrast  sequence. The suboptimal visualization of the enhancing lesion on the  axial imaging is due to the small size of the lesion and technique. No  other enhancing lesions are identified.     IMPRESSION:   1.  There is a 3 mm. Enhancing lesion appreciated involving the cord at  the level of T12 consistent with a metastatic lesion. This is not well  demonstrated on the axial imaging. No other metastatic disease is  appreciated.   2.  Multilevel degenerative disease is noted as described above  including scoliosis and mild canal stenosis from T8-T11. See above.        This report was finalized on 8/29/2024 8:33 AM by Dr. Cesar Levine M.D  on Workstation: [id number]     Signed: 08/29/24 0833 by Cesar Levine MD    Narrative:      MRI CERVICAL SPINE W WO CONTRAST-, MRI THORACIC SPINE W WO CONTRAST-     HISTORY:  Evaluate for metastatic disease; C79.31-Secondary malignant  neoplasm of brain; C80.1-Malignant (primary) neoplasm,  unspecified;  R51.9-Headache, unspecified; R03.0-Elevated blood-pressure reading,  without diagnosis of hypertension; K63.89-Other specified diseases of  intestine     COMPARISON: None     MRI EXAMINATION OF THE CERVICAL SPINE WITH AND WITHOUT CONTRAST:     A suboccipital craniotomy is noted on the right.     There is a grade 1 anterolisthesis of C3 upon C4 and C4 upon C5. There  is moderate loss of disc height at C5-6 and C6-7. Signal intensity  within the cord is normal on the sagittal and axial T2 sequences.     C2-3: Moderate facet degenerative disease is present on the left.     C3-4: Moderate facet degenerative disease is present bilaterally. A  small central disc osteophyte complex is present with tonsil herniation.     C4-5: Mild to moderate to severe facet degenerative disease is present  on the right and left respectively.     C5-6: Moderate facet degenerative disease is present bilaterally. There  is moderate foraminal stenosis on the right secondary to loss of disc  height, uncovertebral degenerative disease and extension of the disc  osteophyte complex into the neural foramen.     C6-7: There is mild canal stenosis secondary to a broad-based disc  osteophyte complex. Moderate foraminal stenosis is present on the left  secondary to uncovertebral degenerative disease.     C7-T1: There is no evidence of a disc bulge or herniation.     After contrast administration there was mild enhancement appreciated  above the endplates at C5-6 and C6-7 consistent with degenerative  disease. There was no evidence of abnormal enhancement to suggest  metastatic disease.       Impression:      There is no evidence of metastatic disease involving the  cervical spine. Multilevel degenerative disc disease is noted as  described above with no evidence of disc herniation, cord signal  abnormality or cord compression.        MRI EXAMINATION OF THE THORACIC SPINE WITH AND WITHOUT CONTRAST:     There is mild levoscoliosis of the  upper thoracic spine with a  compensatory dextroscoliosis of the lower thoracic spine. The alignment  of the thoracic spine is within normal limits. Signal intensity within  the thoracic cord is normal on the sagittal and axial T2 sequences with  no evidence of disc herniation or of cord compression. There is disc  desiccation at all levels. A small right lateral disc osteophyte complex  is present at T6/T7 resulting mild flattening of the ventral surface of  the thecal sac. There is mild canal stenosis from T8-T11 secondary to  mild facet and ligamentum flavum hypertrophy and a broad-based disc  osteophyte complex. After contrast administration there was no evidence  of abnormal enhancement to suggest metastatic disease.     IMPRESSION: There is no evidence of metastatic disease, of a focal disc  herniation, cord signal abnormality or of cord compression. Multilevel  degenerative disease is noted as described above including scoliosis and  mild canal stenosis from T8-T11. See above.     This report was finalized on 8/28/2024 12:02 PM by Dr. Cesar Levine M.D on Workstation: [id number]       FL C Arm During Surgery [[id number]] Resulted: 08/27/24 1529     Updated: 08/27/24 1529    Narrative:      This procedure was auto-finalized with no dictation required.    FL Barium Enema Water Soluble Single Contrast [[id number]] Collected: 08/26/24 1723     Updated: 08/26/24 1735    Narrative:      Fluoroscopic water-soluble barium enema     HISTORY: Rectal cancer, trouble passing scope through rectum, evaluate  for the degree of narrowing       Impression:      TECHNIQUE, FINDINGS AND IMPRESSION:  Scattered images were obtained of the abdomen. Prominent and dilated  loops of air-filled bowel are present throughout the abdomen and pelvis.     A small caliber rectal tube was then inserted gently into the rectum and  minimally insufflated under direct fluoroscopic guidance. Water-soluble  contrast was then administered into  the rectum via gravity while  multiple fluoroscopic images were obtained in the AP, lateral oblique  views.     There is a long segment of irregular high-grade stenosis involving the  high rectum corresponding with the area of irregular wall thickening  seen on recent CT. This measures over a segment of approximately 3.7 cm.  The narrowest segment appears to measure approximately 5 to 6 mm in  shortest diameter. Contrast passes easily through this segment into the  more proximal large bowel.     DAP: 3477.4 ?Gy*m2     This report was finalized on 8/26/2024 5:32 PM by Dr. Calvin Blanco M.D  on Workstation: [id number]       MRI Lumbar Spine With & Without Contrast [[id number]] Collected: 08/26/24 0923     Updated: 08/26/24 1051    Narrative:      MRI OF THE LUMBAR SPINE WITH AND WITHOUT CONTRAST ON 08/26/2024     CLINICAL HISTORY: Patient has a history of metastatic cancer of unknown  primary.     TECHNIQUE: Sagittal T1, proton density and fat-suppressed T2 and  postcontrast sagittal fat-suppressed T1-weighted images were obtained of  the lumbar spine. In addition, thin cut axial T1 and T2-weighted images  were obtained angled through the interspaces from L2 to S1 and axial T2  and postcontrast axial T1-weighted images were obtained from L1 to S2.      There are no prior lumbar spine MRIs for comparison. This study is  correlated to a whole body bone scan on 08/25/2024 as well as a CT scan  of the abdomen and pelvis on 08/20/2024.     FINDINGS: On the sagittal images there is evidence of a small nodular  enhancing lesion within the central aspect of the distal thoracic spinal  cord at the level of the superior body of the T12 thoracic level. 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     There is a levoscoliotic curvature of the lumbar spine with its apex at  the L3-4 lumbar level and there is severe narrowing of the  central to  right side of the disc space at L3-4 with right-sided degenerative disc  and endplate changes and right lateral marginal endplate spurring along  the inner margin of the levoscoliotic curve. Recent CT of the abdomen  and pelvis demonstrated prominent degenerative marrow sclerosis in the  right inferior body and endplate of L3 and right superior body and  endplate of L4 and some vacuum disc phenomenon in the central to right  side of the disc space and far right lateral marginal endplate spurring  at L3-4.     At T11-12, the posterior disc margin and facets are normal with no canal  or foraminal narrowing.     At T12-L1, the posterior disc margin and facets are normal with no canal  or foraminal narrowing.     At L1-2, the posterior disc margin and facets are normal with no canal  or foraminal narrowing.     At L2-3, there is moderate bilateral facet overgrowth and there is a  minimal 2-3 mm anterolisthesis of L2 on L3 and there is a mild diffuse  posterior disc bulge. There is mild canal and left foraminal narrowing.  There is synovial thickening off the right facets extending into the  posterior right foramen with mild right foraminal narrowing.     At L3-4, there is mild-to-moderate bilateral facet overgrowth, severe  disc space narrowing and degenerative endplate changes in the central to  right side of the disc space, a mild diffuse posterior disc bulge and  there is mild narrowing of the right side of the canal and slight right  lateral recess narrowing and there is eccentric spurring and bulging  disc material into the inferior right foramen and to the far right  laterally that moderately narrows the right neural foramen and abuts the  undersurface of the exiting right L3 nerve root within the right foramen  and lateral to the foramen to the far right laterally at L3-4.     At L4-5, there is mild-to-moderate bilateral facet overgrowth. The  posterior disc margin is normal. There is no canal  narrowing. There is  mild left and no right foraminal narrowing.     At L5-S1, there is mild right and there is moderate left facet  overgrowth. The posterior disc margin is normal. There is no canal or  lateral recess or right foraminal narrowing. There is mild left  foraminal narrowing.       Impression:      1. 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 I recommend a dedicated cervical and thoracic  spine MRI with and without contrast to determine if there are any  additional spinal cord metastasis and to more comprehensively assess the  spinal cord that at the T12 level.     2. I see no osseous metastatic disease to the lumbar spine.     3. This patient has a levoscoliotic curvature of the lumbar spine with  its apex at the L3-4 lumbar level and there is severe disc space  narrowing and degenerative endplate changes with degenerative marrow  sclerosis and degenerative marrow edema in the central to right side of  the endplates at L3-4 along the inner margin of the levoscoliotic curve.  There is lumbar spondylosis as described above. I see no canal or  foraminal narrowing from T11 to L2 and at L2-3 I see mild canal and  bilateral foraminal narrowing. At L3-4, there is mild narrowing of the  right side of the canal, right lateral recess and there is eccentric  spurring into the right neural foramen and to the far right laterally  that moderately narrows the right neural foramen and abuts the  undersurface of the exiting right L4 nerve root. There is mild left  foraminal narrowing at L4-5 and L5-S1.     The results of this study were communicated to Dr. Cesar Iglesias by  telephone on 08/26/2024 at 8:40 AM.     This report was finalized on 8/26/2024 10:48 AM by Dr. Gilbert Hassan M.D  on Workstation: [id number]       NM Bone Scan  Whole Body [[id number]] Collected: 08/25/24 1106     Updated: 08/25/24 1116    Narrative:      NM BONE SCAN-     INDICATIONS: Metastatic disease. 6 mm sclerotic focus in the T1 spinous  process.     TECHNIQUE: Delayed whole body bone scan     COMPARISON: Correlated with CT from 8/20/2024     FINDINGS:     Radiotracer: 22.6 mCi Technetium 99m MDP, intravenous     Delayed anterior and posterior whole body projections were obtained, as  well as spot views at the level of the neck, thorax.     Typical appearing degenerative changes are seen at the shoulders,  elbows, hands, wrists, sacroiliac joints, knees, ankles, lower cervical  spine (on the left, just above the level of the prior chest CT, cervical  spine CT correlation could be considered if indicated), and mid lumbar  spine (on the right, corresponding to previous CT demonstrated  degenerative change).     Small extravasation of activity is suggested at the injection site at  the right forearm. Focal activity at the level of the distal right  radial shaft could be a separate focus overlying soft tissue activity  related to an injection attempt, or could represent an osseous process  such as old fracture or lesion, correlate clinically, x-ray of the right  forearm is recommended for further evaluation.     No localized activity is seen at the level of the previous CT  demonstrated T1 spinous process sclerotic focus, but the size of the  lesion may be below the resolution of bone scan, CT follow-up advised to  characterize change.          Impression:         As described.           This report was finalized on 8/25/2024 11:13 AM by Dr. 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     FINDINGS:  Multiplanar images of the head were obtained without and with  gadolinium. Again, the patient is status post right suboccipital  craniotomy. There has been resection of a tumor within the right  cerebellar hemisphere. There is edema which is noted within the right  cerebellar hemisphere. It extends across the midline into the left  cerebellar hemisphere. There is some fluid and debris which is noted  within the operative bed. There is also air within the operative bed.  There does appear to be a small amount of hemorrhage as well, similar to  prior exam. Following contrast administration, there is only some  minimal peripheral enhancement, which may reflect some expected  postoperative enhancement, although continued follow-up is recommended.  The patient has an additional lesion within the left temporal lobe,  which appears stable, as does a lesion within the cerebellar vermis.  These have surrounding edema. There is a tiny focus of restricted  diffusion noted within the left cerebellar hemisphere, which is new when  compared to prior exam. Tiny infarct is not excluded. Mass effect upon  the fourth ventricle is significantly improved when compared to the  prior study. Patient has scattered FLAIR and T2 hyperintensities within  the deep white matter, which are favored to represent some  microangiopathic change. There is mucosal thickening noted within the  ethmoid and maxillary sinuses. There is some edema noted within the  right occipital/posterior cervical soft tissues. More focal fluid  collection within this area measures up to 3.4 x 1.4 cm, and may  represent a seroma. There does appear to be some trace subdural  hemorrhage adjacent to the left temporal lobe. This measures up to 1.3  mm.          Impression:      1. Postoperative  findings, as noted above.  Continued follow-up is  recommended.         This report was finalized on 8/23/2024 5:28 AM by Dr. Viridiana Altamirano M.D on Workstation: [id number]       CT Head Without Contrast [[id number]] Collected: 08/21/24 1840     Updated: 08/21/24 1848    Narrative:      CT HEAD WO CONTRAST-     HISTORY:  po     COMPARISON: CT head 8/20/2024 and MRI brain 8/20/2024     FINDINGS: The patient's head is canted in the scanner. A right  suboccipital craniotomy is noted. Resection cavity is appreciated  involving the right cerebral hemisphere posterolaterally a small volume  of blood is present within the operative bed, not unexpected. The  resection cavity corresponds to the enhancing mass present on the MRI  examination of 8/20/2024. The MRI examination also demonstrated an  enhancing mass involving the superior cerebellar vermis on the left  measuring 1 cm in size and a 7 mm nodular area of enhancement involving  the inferior and posterior aspect of the left temporal lobe. An area of  dural enhancement was also present on the MRI examination overlying the  left frontal lobe superolaterally (axial T1 MPRAGE post image 120). This  is nonspecific. This may represent a small meningioma although a dural  metastatic deposit cannot be excluded.       Impression:      1.  The patient is status post resection of a right cerebellar avidly  enhancing mass. Expected postoperative changes are noted. No  complicating feature is identified.  2.  Decreased attenuation is appreciated involving the left temporal  lobe posteriorly and inferiorly consistent with vasogenic edema. This  corresponds to the 9 mm enhancing lesion present on the MRI examination  of 8/20/2024. The enhancing lesion involving the superior cerebellar  vermis to the left is occult as is the area of dural enhancement  overlying the left frontal lobe superolaterally.     Radiation dose reduction techniques were utilized, including  automated  exposure modulation based on body size.     This report was finalized on 8/21/2024 6:45 PM by Dr. Cesar Levine M.D  on Workstation: [id number]       CT Head With & Without Contrast [[id number]] Collected: 08/20/24 2007     Updated: 08/21/24 0612    Narrative:      CT SCAN OF THE HEAD WITH AND WITHOUT CONTRAST ON 08/20/2024     CLINICAL HISTORY: Patient has 3 enhancing lesions within the brain seen  on MRI of the brain earlier this morning on 08/20/2024 at 12:22 a.m.  most consistent with multiple brain metastases.     TECHNIQUE: Spiral CT images were obtained from the base of the skull to  the vertex both pre- and post-intravenous contrast and the images were  reformatted and submitted in 1 mm thick axial, sagittal and coronal CT  sections with brain algorithm using stereotactic protocol.     This is correlated to the MRI of the brain earlier this morning on  08/20/2024 12:22 a.m.     FINDINGS: Within the posterior inferior left temporal lobe, there is a  rounded small enhancing mass measuring 7 x 6 x 6 mm and it has  surrounding patchy low-density compatible surrounding vasogenic edema  tracking up to 2.5 x 2.3 cm. Within the midline in the superior vermis  of the cerebellum is a rounded enhancing mass that measures 10 x 9 x 10  mm with some mild surrounding low density compatible with mild vasogenic  edema and within the posterior inferior right cerebellum is an enhancing  mass that measures maximally 24 x 20 x 20 mm in mediolateral, anterior  posterior and craniocaudal dimension. This has exuberant surrounding  vasogenic edema tracking throughout the right cerebellum that tracks up  to 4.5 x 4.5 cm. There is some mass effect on the fourth ventricle. The  lateral and third ventricles are mildly enlarged suggesting mild  hydrocephalus. I see no midline shift. No extra-axial fluid collections  are identified and there is no evidence of acute intracranial  hemorrhage. The calvarium and the skull base  are normal in appearance.  The paranasal sinuses and the mastoid air cells and the middle ear  cavities are clear. The orbits are normal in appearance.       Impression:      1. There has been no change when compared to the MRI of the brain with  and without contrast earlier this morning on 08/20/2024 at 12:22 a.m.     2. This patient has 3 enhancing lesions in the brain and findings are  most consistent with metastatic disease to the brain. The enhancing  brain lesions which are most likely brain mets includes a 7 x 6 x 6 mm  enhancing lesion in the posterior inferior left temporal lobe that has  2.5 x 2.3 cm of surrounding vasogenic edema. There is a 10 x 9 x 10 mm  enhancing lesion in the superior midline of the vermis of the cerebellum  that has mild surrounding edema. The largest enhancing lesion is in the  posterior inferior right cerebellum that measures 2.4 x 2 x 2 cm in size  and has a large amount of surrounding vasogenic edema tracking up to 4.5  x 4.5 cm with mild mass effect on the fourth ventricle. There is mild  dilatation of the lateral and third ventricles compatible with mild  hydrocephalus, unchanged. This patient has abnormal circumferential wall  thickening within the distal sigmoid colon seen on chest, abdomen and  pelvic CT earlier this morning on 08/20/2024 at 1:42 a.m. suggesting  there could be a sigmoid colon cancer and correlation with colonoscopy  is suggested.     Radiation dose reduction techniques were utilized, including automated  exposure control and exposure modulation based on body size.        This report was finalized on 8/21/2024 6:09 AM by Dr. Gilbert Hassan M.D  on Workstation: [id number]       CT Chest Without Contrast Diagnostic [[id number]] Collected: 08/20/24 0338     Updated: 08/20/24 0357    Narrative:      CT OF THE CHEST WITHOUT CONTRAST; CT OF THE ABDOMEN PELVIS WITHOUT  CONTRAST     HISTORY: Brain metastases     COMPARISON: None available.     TECHNIQUE: Axial CT  imaging was obtained from the thoracic inlet through  the symphysis pubis. No IV contrast was administered.     FINDINGS:  CT OF THE CHEST: No suspicious pulmonary nodules or masses are seen.  There is minimal biapical scarring. There is some dependent atelectasis.  Thyroid gland, trachea, and esophagus appear unremarkable. No definite  coronary artery calcifications are seen. Mediastinal lymph nodes do not  appear pathologically enlarged. There are bilateral breast implants.  Thoracic aorta is normal in caliber. The patient has thoracolumbar  scoliosis. There is some associated endplate sclerosis noted at L3-L4  and and T8-T9. The patient is noted to have a sclerotic lesion within  the spinous process of T1. Exact etiology is uncertain. It may simply  reflect a bone island. It measures 6 mm in size. Short-term follow-up is  recommended.     CT OF THE ABDOMEN PELVIS: No suspicious hepatic lesions are seen. The  spleen, adrenal glands, stomach, duodenum, gallbladder, and pancreas  appear normal. The patient does have some excreted contrast material  from gadolinium from earlier MRI of the brain. No hydronephrosis is  seen. Further excreted gadolinium is noted within the urinary bladder.  Uterus appears normal. No adnexal masses are seen. The patient's distal  sigmoid colon appears thick walled. This raises the possibility of an  underlying mass lesion. Correlation with colonoscopy is recommended.  There is no evidence of bowel obstruction. The appendix is normal. The  patient does have some nodularity within the left pericolonic fat. The  largest area measures up to 2.7 x 1.5 cm. Neoplastic implant is not  excluded. There also appears to be some surrounding shotty perirectal  lymph nodes. MRI could allow for further staging.       Impression:         1. No evidence of metastatic disease within the thorax.  2. Asymmetric wall thickening involving the distal sigmoid colon, with  some surrounding mesenteric soft tissue  nodularity. Appearance is very  worrisome for primary colonic malignancy. Correlation with colonoscopy  is recommended. MRI could allow for further assessment.  3. Sclerotic lesion within the spinous process of T1. I would favor that  this is a benign lesion such as a bone island. Consider short-term  follow-up or bone scan.     Radiation dose reduction techniques were utilized, including automated  exposure control and exposure modulation based on body size.        This report was finalized on 8/20/2024 3:54 AM by Dr. Viridiana Altamirano M.D on Workstation: [id number]       CT Abdomen Pelvis Without Contrast [[id number]] Collected: 08/20/24 0338     Updated: 08/20/24 0357    Narrative:      CT OF THE CHEST WITHOUT CONTRAST; CT OF THE ABDOMEN PELVIS WITHOUT  CONTRAST     HISTORY: Brain metastases     COMPARISON: None available.     TECHNIQUE: Axial CT imaging was obtained from the thoracic inlet through  the symphysis pubis. No IV contrast was administered.     FINDINGS:  CT OF THE CHEST: No suspicious pulmonary nodules or masses are seen.  There is minimal biapical scarring. There is some dependent atelectasis.  Thyroid gland, trachea, and esophagus appear unremarkable. No definite  coronary artery calcifications are seen. Mediastinal lymph nodes do not  appear pathologically enlarged. There are bilateral breast implants.  Thoracic aorta is normal in caliber. The patient has thoracolumbar  scoliosis. There is some associated endplate sclerosis noted at L3-L4  and and T8-T9. The patient is noted to have a sclerotic lesion within  the spinous process of T1. Exact etiology is uncertain. It may simply  reflect a bone island. It measures 6 mm in size. Short-term follow-up is  recommended.     CT OF THE ABDOMEN PELVIS: No suspicious hepatic lesions are seen. The  spleen, adrenal glands, stomach, duodenum, gallbladder, and pancreas  appear normal. The patient does have some excreted contrast material  from gadolinium from  earlier MRI of the brain. No hydronephrosis is  seen. Further excreted gadolinium is noted within the urinary bladder.  Uterus appears normal. No adnexal masses are seen. The patient's distal  sigmoid colon appears thick walled. This raises the possibility of an  underlying mass lesion. Correlation with colonoscopy is recommended.  There is no evidence of bowel obstruction. The appendix is normal. The  patient does have some nodularity within the left pericolonic fat. The  largest area measures up to 2.7 x 1.5 cm. Neoplastic implant is not  excluded. There also appears to be some surrounding shotty perirectal  lymph nodes. MRI could allow for further staging.       Impression:         1. No evidence of metastatic disease within the thorax.  2. Asymmetric wall thickening involving the distal sigmoid colon, with  some surrounding mesenteric soft tissue nodularity. Appearance is very  worrisome for primary colonic malignancy. Correlation with colonoscopy  is recommended. MRI could allow for further assessment.  3. Sclerotic lesion within the spinous process of T1. I would favor that  this is a benign lesion such as a bone island. Consider short-term  follow-up or bone scan.     Radiation dose reduction techniques were utilized, including automated  exposure control and exposure modulation based on body size.        This report was finalized on 8/20/2024 3:54 AM by Dr. 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     FINDINGS:  Multiplanar images of the head were obtained without and  with  gadolinium. No areas of restricted diffusion are seen to suggest acute  infarct. Prior head CT demonstrated lesions within the left temporal  lobe and right cerebellar hemisphere. The current study confirms those  lesions. The lesion within the right cerebellar hemisphere measures up  to 2.1 x 2.2 cm. The lesion within the left temporal lobe measures 9 x 7  mm. There is a third lesion which is noted within the left side of the  cerebellar vermis. This measures 1.0 x 0.8 cm. No definite additional  lesions are seen. All of the lesions have associated surrounding edema.  There is some mass effect upon the fourth ventricle. This appears  similar to the prior study. There is mild prominence of the lateral and  third ventricles. There is periventricular and deep white matter  microangiopathic change. Tiny focus of susceptibility artifact is noted  within the lesion within the right cerebellar hemisphere. Otherwise,  there is no susceptibility artifact. Intracranial flow voids appear  intact. No large volume hemorrhage is seen. There is no evidence of  venous occlusion. There is some mucosal thickening within the ethmoid  and maxillary sinuses.       Impression:      1. Study confirms the presence of lesions within the left temporal lobe,  as well as the right cerebellar hemisphere. A third lesion is noted  within the cerebellar vermis. There is mass effect upon the fourth  ventricle. There is some mild dilatation of the lateral and third  ventricles. Continued follow-up is recommended.        This report was finalized on 8/20/2024 2:41 AM by Dr. Viridiana Altamirano M.D on Workstation: [id number]       CT Head Without Contrast [[id number]] Collected: 08/19/24 1836     Updated: 08/19/24 1843    Narrative:      CT HEAD WO CONTRAST-     HISTORY:  headache,vomiting     COMPARISON: None     FINDINGS: There is an area of increased attenuation involving the right  cerebellar hemisphere posteriorly measuring approximately  2.1 cm in  size. Decreased attenuation consistent with vasogenic edema is  appreciated involving the right cerebellar hemisphere. Beam hardening  artifact limits evaluation of the posterior fossa but there may be  decreased attenuation also involving the shravan laterally to the right.  Decreased attenuation involving the inferior aspect the right temporal  lobe posteriorly is appreciated measuring approximately 6 mm in size  with a small area of adjacent edema. There is mild prominence of the  lateral and third ventricles secondary to mass effect upon the inferior  aspect of the fourth ventricle by the right cerebellar mass and  associated edema. There is no evidence of uncal herniation or midline  shift.       Impression:      Areas of increased attenuation are appreciated over the  right cerebral hemisphere posteriorly (2.1 cm) and left temporal lobe  inferiorly and posteriorly (6 mm) most consistent with that of  metastatic disease with associated edema. Edema is most prominent  involving the right cerebellar hemisphere. There is mass effect upon the  fourth ventricle and mild prominence of the lateral and third  ventricles. Further evaluation with MRI examination of the brain with  and without contrast is recommended.     The above information was called to and discussed with Dr. Epps.              Radiation dose reduction techniques were utilized, including automated  exposure modulation based on body size.     This report was finalized on 8/19/2024 6:40 PM by Dr. Cesar Levine M.D  on Workstation: [id number]       XR Chest 1 View [[id number]] Collected: 08/19/24 1631     Updated: 08/19/24 1635    Narrative:      XR CHEST 1 VW-8/19/2024     HISTORY: Weakness, dizziness.     Heart size is within normal limits. Lungs appear free of acute  infiltrates. There is mild thoracic scoliosis. Bones and soft tissues  are otherwise unremarkable.       Impression:      1. No acute process        This report was finalized  "on 8/19/2024 4:32 PM by Dr. Veto Escobar M.D on Workstation: [id number]                 Pertinent Labs     Results from last 7 days   Lab Units 08/30/24 0515 08/29/24 0628 08/28/24 0543 08/27/24  1012   WBC 10*3/mm3 7.87 7.47 11.19* 11.49*   HEMOGLOBIN g/dL 12.8 11.7* 13.0 12.8   PLATELETS 10*3/mm3 333 303 395 375     Results from last 7 days   Lab Units 08/30/24  0515 08/29/24  0628 08/28/24  1635 08/28/24  0543 08/25/24  0439   SODIUM mmol/L 137 142  --  136 140   POTASSIUM mmol/L 4.1 4.1 4.1 3.6 4.0   CHLORIDE mmol/L 99 105  --  96* 103   CO2 mmol/L 27.1 26.2  --  24.9 29.0   BUN mg/dL 10 10  --  9 10   CREATININE mg/dL 0.55* 0.56*  --  0.73 0.55*   GLUCOSE mg/dL 125* 88  --  153* 103*   EGFR mL/min/1.73 102.5 102.1  --  92.0 102.5       Results from last 7 days   Lab Units 08/30/24 0515 08/29/24 0628 08/28/24 0543 08/25/24  0439   CALCIUM mg/dL 9.1 8.9 9.2 8.8   MAGNESIUM mg/dL  --   --  2.0  --                Invalid input(s): \"LDLCALC\"          Test Results Pending at Discharge   No pending test results at time of discharge.    Discharge Details        Discharge Medications        New Medications        Instructions Start Date   amLODIPine 5 MG tablet  Commonly known as: NORVASC   5 mg, Oral, Daily      dexAMETHasone 4 MG tablet  Commonly known as: DECADRON   4 mg, Oral, Every 12 Hours Scheduled      docusate sodium 100 MG capsule   100 mg, Oral, Daily   Start Date: August 31, 2024     famotidine 20 MG tablet  Commonly known as: PEPCID   20 mg, Oral, 2 Times Daily Before Meals      HYDROcodone-acetaminophen 7.5-325 MG per tablet  Commonly known as: NORCO   1 tablet, Oral, Every 4 Hours PRN      levETIRAcetam 500 MG tablet  Commonly known as: KEPPRA   500 mg, Oral, 2 Times Daily      methocarbamol 750 MG tablet  Commonly known as: ROBAXIN   750 mg, Oral, Every 6 Hours PRN      naloxone 4 MG/0.1ML nasal spray  Commonly known as: NARCAN   Call 911. Don't prime. [place] in 1 nostril for overdose. " Repeat in 2-3 minutes in other nostril if no or minimal breathing/responsiveness.             Continue These Medications        Instructions Start Date   amphetamine-dextroamphetamine 20 MG tablet  Commonly known as: ADDERALL   Take 1 tablet by mouth 2 (Two) Times a Day.      atorvastatin 10 MG tablet  Commonly known as: LIPITOR   10 mg, Oral, Daily      buPROPion  MG 24 hr tablet  Commonly known as: WELLBUTRIN XL   300 mg, Oral, Every Morning      LORazepam 0.5 MG tablet  Commonly known as: ATIVAN   Take 1 tablet by mouth Every 6 (Six) Hours As Needed for Anxiety.               No Known Allergies    Discharge Disposition:  Home or Self Care      Discharge Diet:  Diet Order   Procedures    Diet: Regular/House, Gastrointestinal; Low Irritant, Fiber-Restricted; Texture: Soft to Chew (NDD 3); Soft to Chew: Ground Meat; Fluid Consistency: Thin (IDDSI 0)       Discharge Activity: No lifting greater than 15 pounds for 4 weeks, no submerging in water for 2 weeks      CODE STATUS:    Code Status and Medical Interventions: CPR (Attempt to Resuscitate); Full Support   Ordered at: 08/21/24 2107     Code Status (Patient has no pulse and is not breathing):    CPR (Attempt to Resuscitate)     Medical Interventions (Patient has pulse or is breathing):    Full Support       Future Appointments   Date Time Provider Department Center   9/3/2024  8:45 AM Terrie Crenhsaw MD [facility] None   9/3/2024  9:00 AM [facility] SIMULATOR [facility]   9/10/2024  3:10 PM Saturnino Emmanuel MD [facility]   9/12/2024  9:30 AM Bull Doty MD [facility]     Additional Instructions for the Follow-ups that You Need to Schedule       Ambulatory Referral to Home Health ([facility])   As directed      Face to Face Visit Date: 8/29/2024   Follow-up provider for Plan of Care?: I will be treating the patient on an ongoing basis.  Please send me the Plan of Care for signature.   Follow-up provider: SATURNINO EMMANUEL  [[id number]]   Reason/Clinical Findings: ostomy   Describe mobility limitations that make leaving home difficult: ostomy   Nursing/Therapeutic Services Requested: Physical Therapy Skilled Nursing   Skilled nursing orders: Ostomy instruction   Frequency: 1 Week 1               Contact information for follow-up providers       Saturnino Emmanuel MD Follow up in 2 week(s).    Specialty: General Surgery  Contact information:  [address]  [facility] [address]  [facility] [address]  [phone]               Provider, No Known .    Contact information:  [place] [id number] [phone]                       Contact information for after-discharge care       Home Medical Care       [facility] .    Service: Home Health Services  Contact information:  [address] [facility] [address]  [facility] [address] [phone]                                   Additional Instructions for the Follow-ups that You Need to Schedule       Ambulatory Referral to Home Health (Hospital)   As directed      Face to Face Visit Date: 8/29/2024   Follow-up provider for Plan of Care?: I will be treating the patient on an ongoing basis.  Please send me the Plan of Care for signature.   Follow-up provider: SATURNINO EMMANUEL [[id number]]   Reason/Clinical Findings: ostomy   Describe mobility limitations that make leaving home difficult: ostomy   Nursing/Therapeutic Services Requested: Physical Therapy Skilled Nursing   Skilled nursing orders: Ostomy instruction   Frequency: 1 Week 1            Time Spent on Discharge:  Greater than 30 minutes      Colette Koroma MD  [facility]  08/30/24  09:36 EDT

## 2024-08-31 ENCOUNTER — READMISSION MANAGEMENT (OUTPATIENT)
Dept: CALL CENTER | Facility: HOSPITAL | Age: 64
End: 2024-08-31
Payer: COMMERCIAL

## 2024-08-31 NOTE — OUTREACH NOTE
Prep Survey      Flowsheet Row Responses   [facility] facility patient discharged from? [place]   Is LACE score < 7 ? 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 Other   Does the patient have Home health ordered? Yes   What is the Home health agency?  [facility].   Is there a DME ordered? No   Prep survey completed? Yes            JULISSA NG - Registered Nurse

## 2024-09-01 ENCOUNTER — HOME CARE VISIT (OUTPATIENT)
Dept: HOME HEALTH SERVICES | Facility: HOME HEALTHCARE | Age: 64
End: 2024-09-01
Payer: COMMERCIAL

## 2024-09-01 PROCEDURE — G0299 HHS/HOSPICE OF RN EA 15 MIN: HCPCS

## 2024-09-03 ENCOUNTER — OFFICE VISIT (OUTPATIENT)
Dept: RADIATION ONCOLOGY | Facility: HOSPITAL | Age: 64
End: 2024-09-03
Payer: COMMERCIAL

## 2024-09-03 ENCOUNTER — OFFICE VISIT (OUTPATIENT)
Dept: SURGERY | Facility: CLINIC | Age: 64
End: 2024-09-03
Payer: COMMERCIAL

## 2024-09-03 ENCOUNTER — HOSPITAL ENCOUNTER (OUTPATIENT)
Dept: RADIATION ONCOLOGY | Facility: HOSPITAL | Age: 64
Setting detail: RADIATION/ONCOLOGY SERIES
End: 2024-09-03
Payer: COMMERCIAL

## 2024-09-03 ENCOUNTER — HOSPITAL ENCOUNTER (OUTPATIENT)
Dept: RADIATION ONCOLOGY | Facility: HOSPITAL | Age: 64
Discharge: HOME | End: 2024-09-03

## 2024-09-03 ENCOUNTER — HOME CARE VISIT (OUTPATIENT)
Dept: HOME HEALTH SERVICES | Facility: HOME HEALTHCARE | Age: 64
End: 2024-09-03
Payer: COMMERCIAL

## 2024-09-03 ENCOUNTER — READMISSION MANAGEMENT (OUTPATIENT)
Dept: CALL CENTER | Facility: HOSPITAL | Age: 64
End: 2024-09-03
Payer: COMMERCIAL

## 2024-09-03 VITALS
RESPIRATION RATE: 18 BRPM | HEIGHT: 67 IN | DIASTOLIC BLOOD PRESSURE: 74 MMHG | TEMPERATURE: 97 F | BODY MASS INDEX: 24.86 KG/M2 | HEART RATE: 68 BPM | OXYGEN SATURATION: 100 % | SYSTOLIC BLOOD PRESSURE: 122 MMHG

## 2024-09-03 VITALS
BODY MASS INDEX: 23.08 KG/M2 | WEIGHT: 147.4 LBS | OXYGEN SATURATION: 99 % | DIASTOLIC BLOOD PRESSURE: 74 MMHG | HEART RATE: 80 BPM | SYSTOLIC BLOOD PRESSURE: 139 MMHG

## 2024-09-03 VITALS
DIASTOLIC BLOOD PRESSURE: 78 MMHG | SYSTOLIC BLOOD PRESSURE: 144 MMHG | WEIGHT: 148 LBS | BODY MASS INDEX: 23.23 KG/M2 | HEIGHT: 67 IN

## 2024-09-03 DIAGNOSIS — C80.1 METASTASIS TO BRAIN OF UNKNOWN ORIGIN: ICD-10-CM

## 2024-09-03 DIAGNOSIS — Z51.89 VISIT FOR WOUND CHECK: Primary | ICD-10-CM

## 2024-09-03 DIAGNOSIS — C20 RECTAL ADENOCARCINOMA: Primary | ICD-10-CM

## 2024-09-03 DIAGNOSIS — Z09 POSTOP CHECK: ICD-10-CM

## 2024-09-03 DIAGNOSIS — C79.31 METASTASIS TO BRAIN OF UNKNOWN ORIGIN: ICD-10-CM

## 2024-09-03 PROCEDURE — G0463 HOSPITAL OUTPT CLINIC VISIT: HCPCS | Performed by: RADIOLOGY

## 2024-09-03 PROCEDURE — 77334 RADIATION TREATMENT AID(S): CPT | Performed by: RADIOLOGY

## 2024-09-03 PROCEDURE — 77263 THER RADIOLOGY TX PLNG CPLX: CPT | Performed by: RADIOLOGY

## 2024-09-03 PROCEDURE — 99024 POSTOP FOLLOW-UP VISIT: CPT | Performed by: SURGERY

## 2024-09-03 NOTE — HOME HEALTH
"SOC Note: Patient is A&Ox4, Vitals are WNL. Lungs are clear. Urinating without issues. New colostomy. Currently wearing a 2 piece coloplast and plans to change into convex 1 piece skyler with next change. She is interested in using closed ended pouches in the future. No supplies ordered until she finds which product she prefers. We are teaching patient how to change appliance. Friend is a retired nurse and will be available during teaching. Appliance changed last on 8/30 and due for change on 9/3. Stool is soft and stoma is healthy appearing. All incisions are without ss of infection. No lifting greater than 15 pounds for 4 weeks, no submerging in water for 2 weeks    Dr. Saunders - appt to made after radiation appointment (oncology)  Dr. Chester - 9/3 (surgeon)  Dr. Crenshaw - 9/3 (radiation)  Dr. Doty - (neurosurgeon) 9/12  Advised to make follow up with PCP since she has not seen in many yrs    Home Health ordered for: disciplines PT, SN 3w1, 2w3; Nurse will need to follow up after Dr. Chester's appointment on 9/3 for ostomy care. Believed he will be taking out bridge in office and WCON at hospital has packed precut appliance for her for when she goes    Reason for Hosp/Primary Dx/Co-morbidities: [facility] from 8/19-8/30. Presented with headaches and dizziness. Found to have metastatic brain lesions. Posterior craniotomy on 8/21. 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 Mediport placement and Colostomy on 8/27. metastatic lesion found at T12. The patient will need postoperative radiation to the brain prior to considering palliative chemotherapy.    Focus of Care: SN needed for teaching, assessments and care for new colostomy    Patient's goal(s): \"to know this stuff backwards and forwards\"    Current Functional status/mobility/DME: No assistive device is being used at this time. she is very steady and stable with ambulation    HB status/Living " Arrangements: Lives with spouse with supportive family and friends. States at times she may be at her friends home a couple of streets ([phone] [phone]) over when we come for home visits. Friend is helping care for and watch patient.    Skin Integrity/wound status:   posterior head - incision glued closed  Chest port covered with steri strips  abd with multiple lap sites  New colostomy to left side of abd    Code Status: CPR    Fall Risk/Safety concerns: low falls risk    Educated on Emergency Plan, steps to take prior to going to the ER and when to Call [facility].    Medication issues/Concerns: New meds - amlodipine, dexamethasone, colace, pepcid, norco, keppra, robaxin, narcan  Steriods and Keppra for brain lesions  Patient with 4 once daily med organizers. Advised on how to set 2 organizers up with morning and evening medications. Advised to only set up organizer for 1 week at this time    Additional Problems/Concerns: na    SDOH Barriers (i.e. caregiver concerns, social isolation, transportation, food insecurity, environment, income etc.)/Need for MSW: NA

## 2024-09-03 NOTE — PROGRESS NOTES
CC;: re-evaluation  for rectal cancer with brain metastastases    S:                          I had the pleasure of seeing Ely Sims  today in the [facility].  The patient is a 64 y.o. year old female who presented to the ER on 8/19/24 with a 3-4 day hx of severe headaches followed by nausea, vomiting and diarrhea.       She had a brain mri on 8/20/24 which showed 3 separate lesions including a lesion in the right cerebellar hemisphere measuring 2.1 x 2.2 cm. The lesion within the left temporal lobe measures 9 x 7mm. There is a third lesion which is noted within the left side of the cerebellar vermis. This measures 1.0 x 0.8 cm.     She had a CT chest abdomen and pelvis on 8/20/24 which showed no evidence of metastatic disease within the thorax. Asymmetric wall thickening involving the distal sigmoid colon, with some surrounding mesenteric soft tissue nodularity. Appearance is very worrisome for primary colonic malignancy. Correlation with colonoscopy is recommended. MRI could allow for further assessment. Sclerotic lesion within the spinous process of T1 favor that this is a benign lesion such as a bone island.      She underwent resection of the right cerebellar lesion on 8/21/24 with pathology revealing metastatic poorly differentiated adenocarcinoma.      She had a CT head on 8/21/24 which showed status post resection of a right cerebellar avidly enhancing mass. Expected postoperative changes are noted.  Decreased attenuation is appreciated involving the left temporallobe posteriorly and inferiorly consistent with vasogenic edema. Thiscorresponds to the 9 mm enhancing lesion present on the MRI examinationof 8/20/2024. The enhancing lesion involving the superior cerebellarvermis to the left is occult as is the area of dural enhancement  overlying the left frontal lobe superolaterally.     She underwent a colonoscopy yesterday with Dr. Shadi Kaminski with findings of a completely obstructing mass in the  proximal rectum  Pathology is still pending.      She had a bone scan yesterday which showed Focal activity at the level of the distal right radial shaft could be a separate focus overlying soft tissue activity related to an injection attempt, or could represent an osseous process such as old fracture or lesion, correlate clinically, x-ray of the right forearm is recommended for further evaluation.     She had a mri of the lumbar spine earlier today which showed a 3 mm enhancing lesion in the visualized lower thoracic spinal cord at the horizontal level of the junction of middle and superior thirds of the T12 thoracic level that most probably represents a spinal cord metastasis. I recommend a dedicated cervical and thoracic spine MRI with and without contrast to       Her family hx includes a sister with colon cancer and father  from ?bone cancer.        Interval hx 9/3/24:  She returns today for re-evaluation.  Since I last saw her she had a mri of the cervical and thoracic spine on 24 which showed on the sagittal T1 postcontrast sequence of the thoracic spine is a nodular area of enhancement involving the cord is appreciated level  of T12 measuring approximately 3 mm in size. This is not evident on the axial T1 postcontrast sequence. The nonvisualization on the axial  postcontrast imaging is artifactual and due to the small size of the lesion and technique. This almost certainly represents a metastatic  lesion.  There is no evidence of metastatic disease involving the cervical spine.    She underwent a laparoscopic diverting loop sigmoid colostomy and left subclavian Mediport placement by Dr. Chester last week.  She states her ostomy is functioning well.  She denies any headaches, nausea or vomiting.  She denies any numbness or weakness of her extremities.          Review of Systems   Constitutional: Negative.    HENT: Negative.     Respiratory: Negative.     Cardiovascular: Negative.     Gastrointestinal: Negative.    Neurological: Negative.    Hematological: Negative.    Psychiatric/Behavioral: Negative.         Past Medical History:   Diagnosis Date    Metastasis to brain     Rectal carcinoma 2024    Seasonal allergies          Past Surgical History:   Procedure Laterality Date    COLONOSCOPY N/A 08/25/2024    Procedure: COLONOSCOPY to 20cm with cold biopsies and needle tattoo;  Surgeon: Shadi Kaminski MD;  Location: [facility] ENDOSCOPY;  Service: Gastroenterology;  Laterality: N/A;  pre: abnormal imaging  post: poor prep, obstructing colon mass at 20cm, hemorrhoids    COLOSTOMY N/A 08/27/2024    Procedure: COLOSTOMY LAPAROSCOPIC;  Surgeon: Saturnino Chester MD;  Location: [facility] MAIN OR;  Service: General;  Laterality: N/A;    CRANIOTOMY N/A 08/21/2024    Procedure: Posterior fossa craniotomy for tumor using stereotactic guidance;  Surgeon: Bull Doty MD;  Location: [facility] MAIN OR;  Service: Neurosurgery;  Laterality: N/A;    ENDOMETRIAL ABLATION      TUBAL ABDOMINAL LIGATION      VENOUS ACCESS DEVICE (PORT) INSERTION Right 08/27/2024    Procedure: INSERTION VENOUS ACCESS DEVICE;  Surgeon: Saturnino Chester MD;  Location: [facility] MAIN OR;  Service: General;  Laterality: Right;         Social History     Socioeconomic History    Marital status: Single   Tobacco Use    Smoking status: Former     Types: Cigarettes    Smokeless tobacco: Never   Vaping Use    Vaping status: Never Used   Substance and Sexual Activity    Alcohol use: Yes    Drug use: Defer    Sexual activity: Defer         Family History   Problem Relation Age of Onset    Heart disease Mother     Colon cancer Sister           Objective    Physical Exam  Constitutional:       Appearance: Normal appearance.   HENT:      Head: Atraumatic.   Eyes:      Extraocular Movements: Extraocular movements intact.   Pulmonary:      Effort: Pulmonary effort is normal.   Musculoskeletal:         General: Normal range of motion.    Neurological:      General: No focal deficit present.      Mental Status: She is alert and oriented to person, place, and time.   Psychiatric:         Mood and Affect: Mood normal.         Behavior: Behavior normal.         Current Outpatient Medications on File Prior to Visit   Medication Sig Dispense Refill    amLODIPine (NORVASC) 5 MG tablet Take 1 tablet by mouth Daily. 30 tablet 0    amphetamine-dextroamphetamine (ADDERALL) 20 MG tablet Take 1 tablet by mouth 2 (Two) Times a Day.      atorvastatin (LIPITOR) 10 MG tablet Take 1 tablet by mouth Daily.      buPROPion XL (WELLBUTRIN XL) 150 MG 24 hr tablet Take 2 tablets by mouth Every Morning.      dexAMETHasone (DECADRON) 4 MG tablet Take 1 tablet by mouth Every 12 (Twelve) Hours. 60 tablet 0    docusate sodium 100 MG capsule Take 1 capsule by mouth Daily. 30 each 0    famotidine (PEPCID) 20 MG tablet Take 1 tablet by mouth 2 (Two) Times a Day Before Meals. 60 tablet 0    HYDROcodone-acetaminophen (NORCO) 7.5-325 MG per tablet Take 1 tablet by mouth Every 4 (Four) Hours As Needed for Moderate Pain. 18 tablet 0    levETIRAcetam (KEPPRA) 500 MG tablet Take 1 tablet by mouth 2 (Two) Times a Day. 60 tablet 0    LORazepam (ATIVAN) 0.5 MG tablet Take 1 tablet by mouth Every 6 (Six) Hours As Needed for Anxiety.      methocarbamol (ROBAXIN) 750 MG tablet Take 1 tablet by mouth Every 6 (Six) Hours As Needed for Muscle Spasms (neck pain). 30 tablet 0    naloxone (NARCAN) 4 MG/0.1ML nasal spray Call 911. Don't prime. [place] in 1 nostril for overdose. Repeat in 2-3 minutes in other nostril if no or minimal breathing/responsiveness. 2 each 0     No current facility-administered medications on file prior to visit.       ALLERGIES:  No Known Allergies    There were no vitals taken for this visit.          No data to display                  Assessment & Plan      64 year old female with newly diagnosed rectal cancer with brain metastases, pod # 5 s/p resection of the right  cerebellar metastases as well as a small lesion in the spinal cord at T12. I have personally reviewed her imaging including mri of the brain and spine.     I again discussed with her my recommendation for post operative stereotactic radiation to the resected cerebellar met as well as SRS to the other 2 brain metastases.  I also discussed with her palliative radiation to the spinal cord lesion at T12.     I discussed possible acute side effects of fatigue, hair loss, headache, nausea, vomiting, bleeding, edema or seizures.  I discussed possible late effects of radionecrosis, late hearing loss or neurocognitive changes, late damage to the spinal cord.  She voiced understanding. We will proceed with CT simulation today. And begin her treatments next week.     I personally spent greater than 45 minutes today assessing, managing, discussing and documenting my visit with the patient. That time includes review of records, imaging and pathology reports, obtaining my own history, performing a medically appropriate evaluation, counseling and educating the patient, discussing goals, logistics, alternatives and risks of my recommendations, surveillance options and potential outcomes. It also includes the time documenting the clinical information in the EMR and communicating my recommendations to the other involved physicians.               Thank you very much for allowing me to participate in the care of this very pleasant patient.    Sincerely,      Terrie Crenshaw MD

## 2024-09-03 NOTE — OUTREACH NOTE
Medical Week 1 Survey      Flowsheet Row Responses   [facility] patient discharged from? [place]   Does the patient have one of the following disease processes/diagnoses(primary or secondary)? Other   Week 1 attempt successful? 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 Yes   Is the patient having any side effects they believe may be caused by any medication additions or changes? No   Does the patient have all medications ordered at discharge? Yes   Is the patient taking all medications as directed (includes completed medication regime)? Yes   Comments regarding appointments Pt has seen oncologist since discharge.  Sister states she is getting radiation set up.   Does the patient have a primary care provider?  Yes   Does the patient have an appointment with their PCP within 7 days of discharge? N/A   Comments regarding PCP Pt is seeing Dr. Chester per sister   Has the patient kept scheduled appointments due by today? Yes   What is the Home health agency?  [facility].   Has home health visited the patient within 72 hours of discharge? Unsure  [sister is unsure if they have reached out]   Psychosocial issues? No   Did the patient receive a copy of their discharge instructions? Yes   Nursing interventions Reviewed instructions with patient   What is the patient's perception of their health status since discharge? Improving   If the patient is a current smoker, are they able to teach back resources for cessation? Not a smoker   Week 1 call completed? Yes   Graduated Yes   Would this patient benefit from a Referral to Amb Social Work? No   Is the patient interested in additional calls from an ambulatory ? No   Wrap up additional comments Pt doing ok at this time.  Sister states she is napping but has seen providers.  Denies needs.    Call end time 1611            CASANDRA AGUIRRE - Registered Nurse

## 2024-09-03 NOTE — CASE COMMUNICATION
Scheduled pt for PT [place] at 11:15AM today but then she called back and stated she is tired and prefers Thursday or Friday.

## 2024-09-04 ENCOUNTER — HOME CARE VISIT (OUTPATIENT)
Dept: HOME HEALTH SERVICES | Facility: HOME HEALTHCARE | Age: 64
End: 2024-09-04
Payer: COMMERCIAL

## 2024-09-04 PROCEDURE — G0299 HHS/HOSPICE OF RN EA 15 MIN: HCPCS

## 2024-09-04 PROCEDURE — G0300 HHS/HOSPICE OF LPN EA 15 MIN: HCPCS

## 2024-09-04 NOTE — PROGRESS NOTES
"Postoperative visit after diverting loop colostomy and Mediport placement    S: Patient is here today for follow-up after undergoing laparoscopic diverting loop colostomy and Mediport placement for metastatic colorectal cancer on 8/27/2024.  The patient is doing great and denies any complaint.  She reports having good ostomy function.  The abdominal bloating and fullness is resolved.  She will be starting chemoradiation therapy soon    O:   Vitals:    09/03/24 1318   BP: 144/78   Weight: 67.1 kg (148 lb)   Height: 170.2 cm (67\")      Alert, no acute distress  Breathing comfortable  Regular rate rhythm  Abdomen soft, nontender nondistended, ostomy in the left lower quadrant pink and viable with stool.  Ostomy keri in place    Pathology report from colonoscopy   Final Diagnosis   1.  Colon, 20 cm, biopsy:               A.  Invasive poorly differentiated carcinoma with neuroendocrine features.     Assessment and plan    64-year-old female status post laparoscopic diverting loop colostomy and Mediport placement for metastatic invasive poorly differentiated carcinoma with neuroendocrine features.  Patient is doing extremely well.  Ostomy keri was removed.  Discussed with the patient about the need to monitor ostomy output and take fiber supplementation or stool softener as needed for either constipation or loose stools.  She will follow-up with medical and radiation oncology    Okay to start chemotherapy and radiation from my standpoint.  She will follow-up in my office in 6 months for reevaluation.    She understood  "

## 2024-09-05 ENCOUNTER — TELEPHONE (OUTPATIENT)
Dept: GASTROENTEROLOGY | Facility: CLINIC | Age: 64
End: 2024-09-05
Payer: COMMERCIAL

## 2024-09-05 NOTE — TELEPHONE ENCOUNTER
----- Message from Shadi Kaminski sent at 8/29/2024 11:22 AM EDT -----  Patient has been notified of results by admitting team and GI consultant  I have spoken to Dr. Chester and he is planning surgery

## 2024-09-06 ENCOUNTER — LAB (OUTPATIENT)
Dept: LAB | Facility: HOSPITAL | Age: 64
End: 2024-09-06
Payer: COMMERCIAL

## 2024-09-06 ENCOUNTER — HOME CARE VISIT (OUTPATIENT)
Dept: HOME HEALTH SERVICES | Facility: HOME HEALTHCARE | Age: 64
End: 2024-09-06
Payer: COMMERCIAL

## 2024-09-06 ENCOUNTER — TELEPHONE (OUTPATIENT)
Dept: SURGERY | Facility: CLINIC | Age: 64
End: 2024-09-06
Payer: COMMERCIAL

## 2024-09-06 DIAGNOSIS — C20 RECTAL ADENOCARCINOMA: ICD-10-CM

## 2024-09-06 DIAGNOSIS — C80.1 METASTASIS TO BRAIN OF UNKNOWN ORIGIN: Primary | ICD-10-CM

## 2024-09-06 DIAGNOSIS — C79.31 METASTASIS TO BRAIN OF UNKNOWN ORIGIN: Primary | ICD-10-CM

## 2024-09-06 LAB
ALBUMIN SERPL-MCNC: 3.9 G/DL (ref 3.5–5.2)
ALBUMIN/GLOB SERPL: 1.8 G/DL
ALP SERPL-CCNC: 58 U/L (ref 39–117)
ALT SERPL W P-5'-P-CCNC: 28 U/L (ref 1–33)
ANION GAP SERPL CALCULATED.3IONS-SCNC: 6.7 MMOL/L (ref 5–15)
AST SERPL-CCNC: 15 U/L (ref 1–32)
BASOPHILS # BLD AUTO: 0.01 10*3/MM3 (ref 0–0.2)
BASOPHILS NFR BLD AUTO: 0.1 % (ref 0–1.5)
BILIRUB SERPL-MCNC: 0.3 MG/DL (ref 0–1.2)
BUN SERPL-MCNC: 16 MG/DL (ref 8–23)
BUN/CREAT SERPL: 20.3 (ref 7–25)
CALCIUM SPEC-SCNC: 9 MG/DL (ref 8.6–10.5)
CEA SERPL-MCNC: 2.1 NG/ML
CHLORIDE SERPL-SCNC: 102 MMOL/L (ref 98–107)
CO2 SERPL-SCNC: 29.3 MMOL/L (ref 22–29)
CREAT SERPL-MCNC: 0.79 MG/DL (ref 0.57–1)
DEPRECATED RDW RBC AUTO: 49.9 FL (ref 37–54)
EGFRCR SERPLBLD CKD-EPI 2021: 83.6 ML/MIN/1.73
EOSINOPHIL # BLD AUTO: 0.06 10*3/MM3 (ref 0–0.4)
EOSINOPHIL NFR BLD AUTO: 0.6 % (ref 0.3–6.2)
ERYTHROCYTE [DISTWIDTH] IN BLOOD BY AUTOMATED COUNT: 13.5 % (ref 12.3–15.4)
GLOBULIN UR ELPH-MCNC: 2.2 GM/DL
GLUCOSE SERPL-MCNC: 87 MG/DL (ref 65–99)
HCT VFR BLD AUTO: 34.5 % (ref 34–46.6)
HGB BLD-MCNC: 11.1 G/DL (ref 12–15.9)
IMM GRANULOCYTES # BLD AUTO: 0.06 10*3/MM3 (ref 0–0.05)
IMM GRANULOCYTES NFR BLD AUTO: 0.6 % (ref 0–0.5)
LYMPHOCYTES # BLD AUTO: 2.58 10*3/MM3 (ref 0.7–3.1)
LYMPHOCYTES NFR BLD AUTO: 26.9 % (ref 19.6–45.3)
MCH RBC QN AUTO: 32.6 PG (ref 26.6–33)
MCHC RBC AUTO-ENTMCNC: 32.2 G/DL (ref 31.5–35.7)
MCV RBC AUTO: 101.5 FL (ref 79–97)
MONOCYTES # BLD AUTO: 1.11 10*3/MM3 (ref 0.1–0.9)
MONOCYTES NFR BLD AUTO: 11.6 % (ref 5–12)
NEUTROPHILS NFR BLD AUTO: 5.78 10*3/MM3 (ref 1.7–7)
NEUTROPHILS NFR BLD AUTO: 60.2 % (ref 42.7–76)
NRBC BLD AUTO-RTO: 0 /100 WBC (ref 0–0.2)
PLATELET # BLD AUTO: 316 10*3/MM3 (ref 140–450)
PMV BLD AUTO: 8.6 FL (ref 6–12)
POTASSIUM SERPL-SCNC: 4.4 MMOL/L (ref 3.5–5.2)
PROT SERPL-MCNC: 6.1 G/DL (ref 6–8.5)
RBC # BLD AUTO: 3.4 10*6/MM3 (ref 3.77–5.28)
SODIUM SERPL-SCNC: 138 MMOL/L (ref 136–145)
WBC NRBC COR # BLD AUTO: 9.6 10*3/MM3 (ref 3.4–10.8)

## 2024-09-06 PROCEDURE — 82378 CARCINOEMBRYONIC ANTIGEN: CPT | Performed by: INTERNAL MEDICINE

## 2024-09-06 PROCEDURE — G0299 HHS/HOSPICE OF RN EA 15 MIN: HCPCS

## 2024-09-06 PROCEDURE — 80053 COMPREHEN METABOLIC PANEL: CPT

## 2024-09-06 PROCEDURE — 85025 COMPLETE CBC W/AUTO DIFF WBC: CPT

## 2024-09-06 PROCEDURE — 36415 COLL VENOUS BLD VENIPUNCTURE: CPT

## 2024-09-06 NOTE — TELEPHONE ENCOUNTER
Caller: TINY ROLDAN    Relationship: NURSE     Best call back number: [phone]  What orders are you requesting (i.e. lab or imaging): VERBAL ORDER TO RESCHEDULE PT

## 2024-09-06 NOTE — CASE COMMUNICATION
The Physical Therapy Visit for the above patient was missed 9/6/24 on  due to patient cancellation.  Rescheduled for Monday 9/9/24.  Therefore the prescribed frequency of visits was not met.      If you have any questions or would like further information about this patient please contact Shannan Pedroza PT [facility] at [phone].  Thank you for allowing us to assist you in the care of your patients.

## 2024-09-07 VITALS
SYSTOLIC BLOOD PRESSURE: 150 MMHG | HEART RATE: 86 BPM | TEMPERATURE: 97.9 F | DIASTOLIC BLOOD PRESSURE: 70 MMHG | OXYGEN SATURATION: 97 % | RESPIRATION RATE: 18 BRPM

## 2024-09-09 ENCOUNTER — HOME CARE VISIT (OUTPATIENT)
Dept: HOME HEALTH SERVICES | Facility: HOME HEALTHCARE | Age: 64
End: 2024-09-09
Payer: COMMERCIAL

## 2024-09-09 VITALS
OXYGEN SATURATION: 99 % | TEMPERATURE: 96.6 F | RESPIRATION RATE: 16 BRPM | SYSTOLIC BLOOD PRESSURE: 140 MMHG | DIASTOLIC BLOOD PRESSURE: 82 MMHG | HEART RATE: 96 BPM

## 2024-09-09 LAB
CYTO UR: NORMAL
LAB AP CASE REPORT: NORMAL
LAB AP DIAGNOSIS COMMENT: NORMAL
LAB AP SPECIAL STAINS: NORMAL
Lab: NORMAL
Lab: NORMAL
PATH REPORT.ADDENDUM SPEC: NORMAL
PATH REPORT.FINAL DX SPEC: NORMAL
PATH REPORT.GROSS SPEC: NORMAL

## 2024-09-09 PROCEDURE — 77301 RADIOTHERAPY DOSE PLAN IMRT: CPT | Performed by: RADIOLOGY

## 2024-09-09 PROCEDURE — 77338 DESIGN MLC DEVICE FOR IMRT: CPT | Performed by: RADIOLOGY

## 2024-09-09 PROCEDURE — 77300 RADIATION THERAPY DOSE PLAN: CPT | Performed by: RADIOLOGY

## 2024-09-09 PROCEDURE — G0151 HHCP-SERV OF PT,EA 15 MIN: HCPCS

## 2024-09-09 NOTE — PROGRESS NOTES
Subjective   Patient ID: Ely Sims is a 64 y.o. female is here today for 2 week  hospital follow-up. Patient had a Posterior fossa craniotomy for tumor using stereotactic guidance done on 8/21/2024, and a PET Scan/Skull done today     Today patient states that she has dull HA's, patient incision looks healthy, no redness , no drainage, no redness    History of Present Illness    This patient returns today.  She is doing well.  She has healed well from her posterior fossa surgery.    The following portions of the patient's history were reviewed and updated as appropriate: allergies, current medications, past family history, past medical history, past social history, past surgical history, and problem list.    Review of Systems   Constitutional:  Negative for chills and fever.   HENT:  Negative for congestion.    Eyes:  Negative for visual disturbance.   Neurological:  Positive for headaches. Negative for dizziness and light-headedness.       I reviewed the review of systems listed by the patient and discussed by my MA    Objective     There were no vitals filed for this visit.  There is no height or weight on file to calculate BMI.    Tobacco Use: Medium Risk (9/12/2024)    Patient History     Smoking Tobacco Use: Former     Smokeless Tobacco Use: Never     Passive Exposure: Not on file          Physical Exam  Eyes:      General: Lids are normal.      Extraocular Movements: Extraocular movements intact.      Pupils: Pupils are equal, round, and reactive to light.   Neurological:      Mental Status: She is alert.      Motor: Motor strength is normal.     Coordination: Coordination is intact.      Deep Tendon Reflexes:      Reflex Scores:       Tricep reflexes are 2+ on the right side and 2+ on the left side.       Bicep reflexes are 2+ on the right side and 2+ on the left side.       Brachioradialis reflexes are 2+ on the right side and 2+ on the left side.       Patellar reflexes are 2+ on the right side and 2+ on  the left side.       Achilles reflexes are 2+ on the right side and 2+ on the left side.      Neurological Exam  Mental Status  Alert.    Cranial Nerves  CN II: Visual acuity is normal. Visual fields full to confrontation.  CN III, IV, VI: Extraocular movements intact bilaterally. Normal lids and orbits bilaterally. Pupils equal round and reactive to light bilaterally.  CN V: Facial sensation is normal.  CN VII: Full and symmetric facial movement.  CN IX, X: Palate elevates symmetrically. Normal gag reflex.  CN XI: Shoulder shrug strength is normal.  CN XII: Tongue midline without atrophy or fasciculations.    Motor   Strength is 5/5 throughout all four extremities.    Sensory  Sensation is intact to light touch, pinprick, vibration and proprioception in all four extremities.    Reflexes                                            Right                      Left  Brachioradialis                    2+                         2+  Biceps                                 2+                         2+  Triceps                                2+                         2+  Finger flex                           2+                         2+  Hamstring                            2+                         2+  Patellar                                2+                         2+  Achilles                                2+                         2+    Coordination    Finger-to-nose, rapid alternating movements and heel-to-shin normal bilaterally without dysmetria.    Gait  Normal casual, toe, heel and tandem gait.    Her incision looks well-healed.      Assessment & Plan   Independent Review of Radiographic Studies:      I personally reviewed the images from the following studies.    I reviewed a PET scan done this morning.  There is not yet a report.  I do not really see any evidence of increased uptake other than in the colorectal region and the area of the colostomy.    Medical Decision Making:      I told the patient that I  would like to see her about a month after she finishes radiation.  That will be the end of October.  We will see her back at that time with  and MRI     Diagnoses and all orders for this visit:    1. Follow-up examination following surgery (Primary)  -     MRI Brain With & Without Contrast; Future      Return in about 6 weeks (around 10/24/2024).

## 2024-09-09 NOTE — HOME HEALTH
Physical Therapy Evaluation   Diagnosis: colostomy, spinal cord tumor removed      Pertinent Medical History: see epic, setting iup radiation and chemo    Prior level of function:   Ambulation: independent   Activities of daily living: independent   Instrumental activities of daily living: independent   Driving: drives   Other/ Hobbies/Work:  works from home    Home Environment:  lives with  in home with steps to enter/ exit, is staying with sister at times  Goal for Physical Therapy: does not require PT at this time  Skilled Physical Therapy beyond this visit not indicated.

## 2024-09-10 ENCOUNTER — HOME CARE VISIT (OUTPATIENT)
Dept: HOME HEALTH SERVICES | Facility: HOME HEALTHCARE | Age: 64
End: 2024-09-10
Payer: COMMERCIAL

## 2024-09-10 ENCOUNTER — HOSPITAL ENCOUNTER (OUTPATIENT)
Dept: RADIATION ONCOLOGY | Facility: HOSPITAL | Age: 64
Discharge: HOME | End: 2024-09-10

## 2024-09-10 VITALS
HEART RATE: 89 BPM | SYSTOLIC BLOOD PRESSURE: 142 MMHG | OXYGEN SATURATION: 99 % | DIASTOLIC BLOOD PRESSURE: 82 MMHG | TEMPERATURE: 97 F | RESPIRATION RATE: 18 BRPM

## 2024-09-10 LAB
RAD ONC ARIA COURSE ID: NORMAL
RAD ONC ARIA COURSE INTENT: NORMAL
RAD ONC ARIA COURSE LAST TREATMENT DATE: NORMAL
RAD ONC ARIA COURSE START DATE: NORMAL
RAD ONC ARIA COURSE TREATMENT ELAPSED DAYS: 0
RAD ONC ARIA FIRST TREATMENT DATE: NORMAL
RAD ONC ARIA PLAN FRACTIONS TREATED TO DATE: 1
RAD ONC ARIA PLAN ID: NORMAL
RAD ONC ARIA PLAN PRESCRIBED DOSE PER FRACTION: 3 GY
RAD ONC ARIA PLAN PRIMARY REFERENCE POINT: NORMAL
RAD ONC ARIA PLAN TOTAL FRACTIONS PRESCRIBED: 10
RAD ONC ARIA PLAN TOTAL PRESCRIBED DOSE: 3000 CGY
RAD ONC ARIA REFERENCE POINT DOSAGE GIVEN TO DATE: 3 GY
RAD ONC ARIA REFERENCE POINT ID: NORMAL
RAD ONC ARIA REFERENCE POINT SESSION DOSAGE GIVEN: 3 GY

## 2024-09-10 PROCEDURE — 77300 RADIATION THERAPY DOSE PLAN: CPT | Performed by: RADIOLOGY

## 2024-09-10 PROCEDURE — G0299 HHS/HOSPICE OF RN EA 15 MIN: HCPCS

## 2024-09-10 PROCEDURE — 77334 RADIATION TREATMENT AID(S): CPT | Performed by: RADIOLOGY

## 2024-09-10 PROCEDURE — 77427 RADIATION TX MANAGEMENT X5: CPT | Performed by: RADIOLOGY

## 2024-09-10 PROCEDURE — 77014 CHG CT GUIDANCE RADIATION THERAPY FLDS PLACEMENT: CPT | Performed by: RADIOLOGY

## 2024-09-10 PROCEDURE — 77386: CPT | Performed by: RADIOLOGY

## 2024-09-11 ENCOUNTER — HOSPITAL ENCOUNTER (OUTPATIENT)
Dept: PET IMAGING | Facility: HOSPITAL | Age: 64
Discharge: HOME | End: 2024-09-11
Payer: COMMERCIAL

## 2024-09-11 ENCOUNTER — LAB (OUTPATIENT)
Dept: LAB | Facility: HOSPITAL | Age: 64
End: 2024-09-11
Payer: COMMERCIAL

## 2024-09-11 ENCOUNTER — HOSPITAL ENCOUNTER (OUTPATIENT)
Dept: RADIATION ONCOLOGY | Facility: HOSPITAL | Age: 64
Discharge: HOME | End: 2024-09-11

## 2024-09-11 ENCOUNTER — RADIATION ONCOLOGY WEEKLY ASSESSMENT (OUTPATIENT)
Dept: RADIATION ONCOLOGY | Facility: HOSPITAL | Age: 64
End: 2024-09-11
Payer: COMMERCIAL

## 2024-09-11 DIAGNOSIS — C79.31 METASTASIS TO BRAIN OF UNKNOWN ORIGIN: ICD-10-CM

## 2024-09-11 DIAGNOSIS — C20 RECTAL ADENOCARCINOMA: ICD-10-CM

## 2024-09-11 DIAGNOSIS — C80.1 METASTASIS TO BRAIN OF UNKNOWN ORIGIN: ICD-10-CM

## 2024-09-11 DIAGNOSIS — C20 RECTAL ADENOCARCINOMA: Primary | ICD-10-CM

## 2024-09-11 LAB
RAD ONC ARIA COURSE ID: NORMAL
RAD ONC ARIA COURSE INTENT: NORMAL
RAD ONC ARIA COURSE LAST TREATMENT DATE: NORMAL
RAD ONC ARIA COURSE START DATE: NORMAL
RAD ONC ARIA COURSE TREATMENT ELAPSED DAYS: 1
RAD ONC ARIA FIRST TREATMENT DATE: NORMAL
RAD ONC ARIA PLAN FRACTIONS TREATED TO DATE: 1
RAD ONC ARIA PLAN FRACTIONS TREATED TO DATE: 2
RAD ONC ARIA PLAN ID: NORMAL
RAD ONC ARIA PLAN ID: NORMAL
RAD ONC ARIA PLAN PRESCRIBED DOSE PER FRACTION: 20 GY
RAD ONC ARIA PLAN PRESCRIBED DOSE PER FRACTION: 3 GY
RAD ONC ARIA PLAN PRIMARY REFERENCE POINT: NORMAL
RAD ONC ARIA PLAN PRIMARY REFERENCE POINT: NORMAL
RAD ONC ARIA PLAN TOTAL FRACTIONS PRESCRIBED: 1
RAD ONC ARIA PLAN TOTAL FRACTIONS PRESCRIBED: 10
RAD ONC ARIA PLAN TOTAL PRESCRIBED DOSE: 2000 CGY
RAD ONC ARIA PLAN TOTAL PRESCRIBED DOSE: 3000 CGY
RAD ONC ARIA REFERENCE POINT DOSAGE GIVEN TO DATE: 20 GY
RAD ONC ARIA REFERENCE POINT DOSAGE GIVEN TO DATE: 6 GY
RAD ONC ARIA REFERENCE POINT ID: NORMAL
RAD ONC ARIA REFERENCE POINT ID: NORMAL
RAD ONC ARIA REFERENCE POINT SESSION DOSAGE GIVEN: 20 GY
RAD ONC ARIA REFERENCE POINT SESSION DOSAGE GIVEN: 3 GY

## 2024-09-11 PROCEDURE — 77014 CHG CT GUIDANCE RADIATION THERAPY FLDS PLACEMENT: CPT | Performed by: RADIOLOGY

## 2024-09-11 PROCEDURE — 36415 COLL VENOUS BLD VENIPUNCTURE: CPT

## 2024-09-11 PROCEDURE — 77386: CPT | Performed by: RADIOLOGY

## 2024-09-11 NOTE — PROGRESS NOTES
Radiation Oncology  On-Treatment Note      Patient: Ely Sims    MRN: [id number]    Attending Physician: Terrie Crenshaw MD     Diagnosis:     ICD-10-CM ICD-9-CM   1. Rectal adenocarcinoma  C20 154.1   2. 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  C79.31 198.3    C80.1 199.1       Radiation Therapy Visit:  Continue radiation therapy, Dosimetry plan remains acceptable, Films reviewed and remains acceptable, Pain assessed, Pain management planned, Radiation dose schedule reviewed and remains acceptable, Radiation technique remains acceptable, and Symptoms within expected range    Radiation Treatments       Active   Plans   T12-RA   Most recent treatment: Dose planned: 300 cGy (fraction 2 on 9/11/2024)   Total: Dose planned: 3,000 cGy (10 fractions)   Elapsed Days: 1      Reference Points   CerebellarVermis   Most recent treatment: Dose given: 2,000 cGy (on 9/11/2024)   Total: Dose given: 2,000 cGy   Elapsed Days: 1      RX: PTVT12   Most recent treatment: Dose given: 300 cGy (on 9/11/2024)   Total: Dose given: 600 cGy   Elapsed Days: 1                      Physical Examination:  Vitals: There were no vitals taken for this visit.  There were no vitals filed for this visit.    Fully active, able to carry on all pre-disease performance without restriction = 0    We examined the relevant areas: yes  Findings are within the expected range for this stage of treatment: yes  -------------------------------------------------------------------------------------------------------------------    ACTION ITEMS:  Patient tolerating treatment well and as expected for this stage in their treatment  Today we are treating the single fx SRS to cerebellar vermis,20Gy.  I spoke with her and she is on decadron 4mg daily in am.  I instructed her to increase this to 4mg tid, q 8hrs until I see her again next Tuesday, then will likely start to taper again. She voiced understanding.       Terrie Crenshaw MD  Radiation Oncology

## 2024-09-12 ENCOUNTER — HOSPITAL ENCOUNTER (OUTPATIENT)
Dept: PET IMAGING | Facility: HOSPITAL | Age: 64
Discharge: HOME | End: 2024-09-12
Payer: COMMERCIAL

## 2024-09-12 ENCOUNTER — HOSPITAL ENCOUNTER (OUTPATIENT)
Dept: RADIATION ONCOLOGY | Facility: HOSPITAL | Age: 64
Discharge: HOME | End: 2024-09-12

## 2024-09-12 ENCOUNTER — OFFICE VISIT (OUTPATIENT)
Dept: NEUROSURGERY | Facility: CLINIC | Age: 64
End: 2024-09-12
Payer: COMMERCIAL

## 2024-09-12 DIAGNOSIS — Z09 FOLLOW-UP EXAMINATION FOLLOWING SURGERY: Primary | ICD-10-CM

## 2024-09-12 LAB
GLUCOSE BLDC GLUCOMTR-MCNC: 117 MG/DL (ref 70–130)
RAD ONC ARIA COURSE ID: NORMAL
RAD ONC ARIA COURSE INTENT: NORMAL
RAD ONC ARIA COURSE LAST TREATMENT DATE: NORMAL
RAD ONC ARIA COURSE START DATE: NORMAL
RAD ONC ARIA COURSE TREATMENT ELAPSED DAYS: 2
RAD ONC ARIA FIRST TREATMENT DATE: NORMAL
RAD ONC ARIA PLAN FRACTIONS TREATED TO DATE: 3
RAD ONC ARIA PLAN ID: NORMAL
RAD ONC ARIA PLAN PRESCRIBED DOSE PER FRACTION: 3 GY
RAD ONC ARIA PLAN PRIMARY REFERENCE POINT: NORMAL
RAD ONC ARIA PLAN TOTAL FRACTIONS PRESCRIBED: 10
RAD ONC ARIA PLAN TOTAL PRESCRIBED DOSE: 3000 CGY
RAD ONC ARIA REFERENCE POINT DOSAGE GIVEN TO DATE: 9 GY
RAD ONC ARIA REFERENCE POINT ID: NORMAL
RAD ONC ARIA REFERENCE POINT SESSION DOSAGE GIVEN: 3 GY

## 2024-09-12 PROCEDURE — 77386: CPT | Performed by: RADIOLOGY

## 2024-09-12 PROCEDURE — 99024 POSTOP FOLLOW-UP VISIT: CPT | Performed by: NEUROLOGICAL SURGERY

## 2024-09-12 PROCEDURE — 0 FLUDEOXYGLUCOSE F18 SOLUTION: Performed by: INTERNAL MEDICINE

## 2024-09-12 PROCEDURE — 77014 CHG CT GUIDANCE RADIATION THERAPY FLDS PLACEMENT: CPT | Performed by: RADIOLOGY

## 2024-09-12 PROCEDURE — 82948 REAGENT STRIP/BLOOD GLUCOSE: CPT

## 2024-09-12 PROCEDURE — 78815 PET IMAGE W/CT SKULL-THIGH: CPT

## 2024-09-12 PROCEDURE — 77336 RADIATION PHYSICS CONSULT: CPT | Performed by: RADIOLOGY

## 2024-09-12 PROCEDURE — A9552 F18 FDG: HCPCS | Performed by: INTERNAL MEDICINE

## 2024-09-12 RX ADMIN — FLUDEOXYGLUCOSE F 18 1 DOSE: 200 INJECTION, SOLUTION INTRAVENOUS at 06:54

## 2024-09-13 ENCOUNTER — HOSPITAL ENCOUNTER (OUTPATIENT)
Dept: RADIATION ONCOLOGY | Facility: HOSPITAL | Age: 64
Discharge: HOME | End: 2024-09-13

## 2024-09-13 ENCOUNTER — HOME CARE VISIT (OUTPATIENT)
Dept: HOME HEALTH SERVICES | Facility: HOME HEALTHCARE | Age: 64
End: 2024-09-13
Payer: COMMERCIAL

## 2024-09-13 VITALS
OXYGEN SATURATION: 99 % | TEMPERATURE: 96.5 F | RESPIRATION RATE: 18 BRPM | SYSTOLIC BLOOD PRESSURE: 138 MMHG | DIASTOLIC BLOOD PRESSURE: 70 MMHG | HEART RATE: 89 BPM

## 2024-09-13 LAB
RAD ONC ARIA COURSE ID: NORMAL
RAD ONC ARIA COURSE INTENT: NORMAL
RAD ONC ARIA COURSE LAST TREATMENT DATE: NORMAL
RAD ONC ARIA COURSE START DATE: NORMAL
RAD ONC ARIA COURSE TREATMENT ELAPSED DAYS: 3
RAD ONC ARIA FIRST TREATMENT DATE: NORMAL
RAD ONC ARIA PLAN FRACTIONS TREATED TO DATE: 4
RAD ONC ARIA PLAN ID: NORMAL
RAD ONC ARIA PLAN PRESCRIBED DOSE PER FRACTION: 3 GY
RAD ONC ARIA PLAN PRIMARY REFERENCE POINT: NORMAL
RAD ONC ARIA PLAN TOTAL FRACTIONS PRESCRIBED: 10
RAD ONC ARIA PLAN TOTAL PRESCRIBED DOSE: 3000 CGY
RAD ONC ARIA REFERENCE POINT DOSAGE GIVEN TO DATE: 12 GY
RAD ONC ARIA REFERENCE POINT ID: NORMAL
RAD ONC ARIA REFERENCE POINT SESSION DOSAGE GIVEN: 3 GY

## 2024-09-13 PROCEDURE — 77386: CPT | Performed by: RADIOLOGY

## 2024-09-13 PROCEDURE — 77014 CHG CT GUIDANCE RADIATION THERAPY FLDS PLACEMENT: CPT | Performed by: RADIOLOGY

## 2024-09-13 PROCEDURE — G0299 HHS/HOSPICE OF RN EA 15 MIN: HCPCS

## 2024-09-16 ENCOUNTER — HOSPITAL ENCOUNTER (OUTPATIENT)
Dept: RADIATION ONCOLOGY | Facility: HOSPITAL | Age: 64
Discharge: HOME | End: 2024-09-16
Payer: COMMERCIAL

## 2024-09-16 LAB
RAD ONC ARIA COURSE ID: NORMAL
RAD ONC ARIA COURSE INTENT: NORMAL
RAD ONC ARIA COURSE LAST TREATMENT DATE: NORMAL
RAD ONC ARIA COURSE START DATE: NORMAL
RAD ONC ARIA COURSE TREATMENT ELAPSED DAYS: 6
RAD ONC ARIA FIRST TREATMENT DATE: NORMAL
RAD ONC ARIA PLAN FRACTIONS TREATED TO DATE: 1
RAD ONC ARIA PLAN FRACTIONS TREATED TO DATE: 5
RAD ONC ARIA PLAN ID: NORMAL
RAD ONC ARIA PLAN ID: NORMAL
RAD ONC ARIA PLAN PRESCRIBED DOSE PER FRACTION: 3 GY
RAD ONC ARIA PLAN PRESCRIBED DOSE PER FRACTION: 6 GY
RAD ONC ARIA PLAN PRIMARY REFERENCE POINT: NORMAL
RAD ONC ARIA PLAN PRIMARY REFERENCE POINT: NORMAL
RAD ONC ARIA PLAN TOTAL FRACTIONS PRESCRIBED: 10
RAD ONC ARIA PLAN TOTAL FRACTIONS PRESCRIBED: 5
RAD ONC ARIA PLAN TOTAL PRESCRIBED DOSE: 3000 CGY
RAD ONC ARIA PLAN TOTAL PRESCRIBED DOSE: 3000 CGY
RAD ONC ARIA REFERENCE POINT DOSAGE GIVEN TO DATE: 15 GY
RAD ONC ARIA REFERENCE POINT DOSAGE GIVEN TO DATE: 6 GY
RAD ONC ARIA REFERENCE POINT ID: NORMAL
RAD ONC ARIA REFERENCE POINT ID: NORMAL
RAD ONC ARIA REFERENCE POINT SESSION DOSAGE GIVEN: 3 GY
RAD ONC ARIA REFERENCE POINT SESSION DOSAGE GIVEN: 6 GY

## 2024-09-16 PROCEDURE — 77386: CPT | Performed by: RADIOLOGY

## 2024-09-16 PROCEDURE — 77014 CHG CT GUIDANCE RADIATION THERAPY FLDS PLACEMENT: CPT | Performed by: RADIOLOGY

## 2024-09-17 ENCOUNTER — RADIATION ONCOLOGY WEEKLY ASSESSMENT (OUTPATIENT)
Dept: RADIATION ONCOLOGY | Facility: HOSPITAL | Age: 64
End: 2024-09-17
Payer: COMMERCIAL

## 2024-09-17 ENCOUNTER — HOME CARE VISIT (OUTPATIENT)
Dept: HOME HEALTH SERVICES | Facility: HOME HEALTHCARE | Age: 64
End: 2024-09-17
Payer: COMMERCIAL

## 2024-09-17 ENCOUNTER — HOSPITAL ENCOUNTER (OUTPATIENT)
Dept: RADIATION ONCOLOGY | Facility: HOSPITAL | Age: 64
Discharge: HOME | End: 2024-09-17

## 2024-09-17 VITALS
HEART RATE: 80 BPM | SYSTOLIC BLOOD PRESSURE: 183 MMHG | BODY MASS INDEX: 22.63 KG/M2 | WEIGHT: 144.5 LBS | DIASTOLIC BLOOD PRESSURE: 91 MMHG | OXYGEN SATURATION: 98 %

## 2024-09-17 DIAGNOSIS — C20 RECTAL ADENOCARCINOMA: Primary | ICD-10-CM

## 2024-09-17 DIAGNOSIS — C79.31 METASTASIS TO BRAIN OF UNKNOWN ORIGIN: ICD-10-CM

## 2024-09-17 DIAGNOSIS — C80.1 METASTASIS TO BRAIN OF UNKNOWN ORIGIN: ICD-10-CM

## 2024-09-17 LAB
RAD ONC ARIA COURSE ID: NORMAL
RAD ONC ARIA COURSE INTENT: NORMAL
RAD ONC ARIA COURSE LAST TREATMENT DATE: NORMAL
RAD ONC ARIA COURSE START DATE: NORMAL
RAD ONC ARIA COURSE TREATMENT ELAPSED DAYS: 7
RAD ONC ARIA FIRST TREATMENT DATE: NORMAL
RAD ONC ARIA PLAN FRACTIONS TREATED TO DATE: 2
RAD ONC ARIA PLAN FRACTIONS TREATED TO DATE: 6
RAD ONC ARIA PLAN ID: NORMAL
RAD ONC ARIA PLAN ID: NORMAL
RAD ONC ARIA PLAN PRESCRIBED DOSE PER FRACTION: 3 GY
RAD ONC ARIA PLAN PRESCRIBED DOSE PER FRACTION: 6 GY
RAD ONC ARIA PLAN PRIMARY REFERENCE POINT: NORMAL
RAD ONC ARIA PLAN PRIMARY REFERENCE POINT: NORMAL
RAD ONC ARIA PLAN TOTAL FRACTIONS PRESCRIBED: 10
RAD ONC ARIA PLAN TOTAL FRACTIONS PRESCRIBED: 5
RAD ONC ARIA PLAN TOTAL PRESCRIBED DOSE: 3000 CGY
RAD ONC ARIA PLAN TOTAL PRESCRIBED DOSE: 3000 CGY
RAD ONC ARIA REFERENCE POINT DOSAGE GIVEN TO DATE: 12 GY
RAD ONC ARIA REFERENCE POINT DOSAGE GIVEN TO DATE: 18 GY
RAD ONC ARIA REFERENCE POINT ID: NORMAL
RAD ONC ARIA REFERENCE POINT ID: NORMAL
RAD ONC ARIA REFERENCE POINT SESSION DOSAGE GIVEN: 3 GY
RAD ONC ARIA REFERENCE POINT SESSION DOSAGE GIVEN: 6 GY

## 2024-09-17 PROCEDURE — 77386: CPT | Performed by: RADIOLOGY

## 2024-09-17 PROCEDURE — 77427 RADIATION TX MANAGEMENT X5: CPT | Performed by: RADIOLOGY

## 2024-09-17 PROCEDURE — 77014 CHG CT GUIDANCE RADIATION THERAPY FLDS PLACEMENT: CPT | Performed by: RADIOLOGY

## 2024-09-17 PROCEDURE — G0300 HHS/HOSPICE OF LPN EA 15 MIN: HCPCS

## 2024-09-18 ENCOUNTER — HOSPITAL ENCOUNTER (OUTPATIENT)
Dept: RADIATION ONCOLOGY | Facility: HOSPITAL | Age: 64
Discharge: HOME | End: 2024-09-18

## 2024-09-18 ENCOUNTER — TELEPHONE (OUTPATIENT)
Dept: ONCOLOGY | Facility: CLINIC | Age: 64
End: 2024-09-18

## 2024-09-18 VITALS
TEMPERATURE: 97.8 F | HEART RATE: 86 BPM | OXYGEN SATURATION: 96 % | DIASTOLIC BLOOD PRESSURE: 82 MMHG | RESPIRATION RATE: 20 BRPM | SYSTOLIC BLOOD PRESSURE: 142 MMHG

## 2024-09-18 LAB
RAD ONC ARIA COURSE ID: NORMAL
RAD ONC ARIA COURSE INTENT: NORMAL
RAD ONC ARIA COURSE LAST TREATMENT DATE: NORMAL
RAD ONC ARIA COURSE START DATE: NORMAL
RAD ONC ARIA COURSE TREATMENT ELAPSED DAYS: 8
RAD ONC ARIA FIRST TREATMENT DATE: NORMAL
RAD ONC ARIA PLAN FRACTIONS TREATED TO DATE: 1
RAD ONC ARIA PLAN FRACTIONS TREATED TO DATE: 7
RAD ONC ARIA PLAN ID: NORMAL
RAD ONC ARIA PLAN ID: NORMAL
RAD ONC ARIA PLAN PRESCRIBED DOSE PER FRACTION: 20 GY
RAD ONC ARIA PLAN PRESCRIBED DOSE PER FRACTION: 3 GY
RAD ONC ARIA PLAN PRIMARY REFERENCE POINT: NORMAL
RAD ONC ARIA PLAN PRIMARY REFERENCE POINT: NORMAL
RAD ONC ARIA PLAN TOTAL FRACTIONS PRESCRIBED: 1
RAD ONC ARIA PLAN TOTAL FRACTIONS PRESCRIBED: 10
RAD ONC ARIA PLAN TOTAL PRESCRIBED DOSE: 2000 CGY
RAD ONC ARIA PLAN TOTAL PRESCRIBED DOSE: 3000 CGY
RAD ONC ARIA REFERENCE POINT DOSAGE GIVEN TO DATE: 20 GY
RAD ONC ARIA REFERENCE POINT DOSAGE GIVEN TO DATE: 21 GY
RAD ONC ARIA REFERENCE POINT ID: NORMAL
RAD ONC ARIA REFERENCE POINT ID: NORMAL
RAD ONC ARIA REFERENCE POINT SESSION DOSAGE GIVEN: 20 GY
RAD ONC ARIA REFERENCE POINT SESSION DOSAGE GIVEN: 3 GY

## 2024-09-18 PROCEDURE — 77386: CPT | Performed by: RADIOLOGY

## 2024-09-18 PROCEDURE — 77014 CHG CT GUIDANCE RADIATION THERAPY FLDS PLACEMENT: CPT | Performed by: RADIOLOGY

## 2024-09-18 NOTE — TELEPHONE ENCOUNTER
Caller: Ely Sims    Relationship: Self    Best call back number: [phone]     What is the best time to reach you: ASAP    Who are you requesting to speak with (clinical staff, provider,  specific staff member): SCHEDULING        What was the call regarding: PLEASE CALL PT OR SEND MYCHART MESSAGE TO R/S HER PORT FLUSH AND HOSP FOLLOW UP THAT WERE CANCELED YESTERDAY.  PREFERS MORNING APPT IF POSSIBLE.

## 2024-09-19 ENCOUNTER — HOSPITAL ENCOUNTER (OUTPATIENT)
Dept: RADIATION ONCOLOGY | Facility: HOSPITAL | Age: 64
Discharge: HOME | End: 2024-09-19

## 2024-09-19 ENCOUNTER — TELEPHONE (OUTPATIENT)
Dept: ONCOLOGY | Facility: CLINIC | Age: 64
End: 2024-09-19

## 2024-09-19 LAB
RAD ONC ARIA COURSE ID: NORMAL
RAD ONC ARIA COURSE INTENT: NORMAL
RAD ONC ARIA COURSE LAST TREATMENT DATE: NORMAL
RAD ONC ARIA COURSE START DATE: NORMAL
RAD ONC ARIA COURSE TREATMENT ELAPSED DAYS: 9
RAD ONC ARIA FIRST TREATMENT DATE: NORMAL
RAD ONC ARIA PLAN FRACTIONS TREATED TO DATE: 3
RAD ONC ARIA PLAN FRACTIONS TREATED TO DATE: 8
RAD ONC ARIA PLAN ID: NORMAL
RAD ONC ARIA PLAN ID: NORMAL
RAD ONC ARIA PLAN PRESCRIBED DOSE PER FRACTION: 3 GY
RAD ONC ARIA PLAN PRESCRIBED DOSE PER FRACTION: 6 GY
RAD ONC ARIA PLAN PRIMARY REFERENCE POINT: NORMAL
RAD ONC ARIA PLAN PRIMARY REFERENCE POINT: NORMAL
RAD ONC ARIA PLAN TOTAL FRACTIONS PRESCRIBED: 10
RAD ONC ARIA PLAN TOTAL FRACTIONS PRESCRIBED: 5
RAD ONC ARIA PLAN TOTAL PRESCRIBED DOSE: 3000 CGY
RAD ONC ARIA PLAN TOTAL PRESCRIBED DOSE: 3000 CGY
RAD ONC ARIA REFERENCE POINT DOSAGE GIVEN TO DATE: 18 GY
RAD ONC ARIA REFERENCE POINT DOSAGE GIVEN TO DATE: 24 GY
RAD ONC ARIA REFERENCE POINT ID: NORMAL
RAD ONC ARIA REFERENCE POINT ID: NORMAL
RAD ONC ARIA REFERENCE POINT SESSION DOSAGE GIVEN: 3 GY
RAD ONC ARIA REFERENCE POINT SESSION DOSAGE GIVEN: 6 GY

## 2024-09-19 PROCEDURE — 77336 RADIATION PHYSICS CONSULT: CPT | Performed by: RADIOLOGY

## 2024-09-19 PROCEDURE — 77386: CPT | Performed by: RADIOLOGY

## 2024-09-19 PROCEDURE — 77014 CHG CT GUIDANCE RADIATION THERAPY FLDS PLACEMENT: CPT | Performed by: RADIOLOGY

## 2024-09-19 NOTE — TELEPHONE ENCOUNTER
Caller: Ely Sims    Relationship to patient: Self    Best call back number: [phone]    Chief complaint: HAD TO CANCEL PREVIOUS APPOINTMENT DUE TO TRANSPORTATION     Type of visit: FLUSH AND HOSPITAL FOLLOW UP    Requested date: ANY NEXT AVAILABLE     If rescheduling, when is the original appointment: 9-17     Additional notes:PLEASE ADVISE

## 2024-09-20 ENCOUNTER — HOSPITAL ENCOUNTER (OUTPATIENT)
Dept: RADIATION ONCOLOGY | Facility: HOSPITAL | Age: 64
Discharge: HOME | End: 2024-09-20

## 2024-09-20 ENCOUNTER — HOME CARE VISIT (OUTPATIENT)
Dept: HOME HEALTH SERVICES | Facility: HOME HEALTHCARE | Age: 64
End: 2024-09-20
Payer: COMMERCIAL

## 2024-09-20 LAB
RAD ONC ARIA COURSE ID: NORMAL
RAD ONC ARIA COURSE INTENT: NORMAL
RAD ONC ARIA COURSE LAST TREATMENT DATE: NORMAL
RAD ONC ARIA COURSE START DATE: NORMAL
RAD ONC ARIA COURSE TREATMENT ELAPSED DAYS: 10
RAD ONC ARIA FIRST TREATMENT DATE: NORMAL
RAD ONC ARIA PLAN FRACTIONS TREATED TO DATE: 9
RAD ONC ARIA PLAN ID: NORMAL
RAD ONC ARIA PLAN PRESCRIBED DOSE PER FRACTION: 3 GY
RAD ONC ARIA PLAN PRIMARY REFERENCE POINT: NORMAL
RAD ONC ARIA PLAN TOTAL FRACTIONS PRESCRIBED: 10
RAD ONC ARIA PLAN TOTAL PRESCRIBED DOSE: 3000 CGY
RAD ONC ARIA REFERENCE POINT DOSAGE GIVEN TO DATE: 27 GY
RAD ONC ARIA REFERENCE POINT ID: NORMAL
RAD ONC ARIA REFERENCE POINT SESSION DOSAGE GIVEN: 3 GY

## 2024-09-20 PROCEDURE — 77014 CHG CT GUIDANCE RADIATION THERAPY FLDS PLACEMENT: CPT | Performed by: RADIOLOGY

## 2024-09-20 PROCEDURE — 77386: CPT | Performed by: RADIOLOGY

## 2024-09-21 LAB
DNA RANGE(S) EXAMINED NAR: NORMAL
DNA RANGE(S) EXAMINED NAR: NORMAL
GENE DIS ANL INTERP-IMP: POSITIVE
GENE DIS ANL INTERP-IMP: POSITIVE
GENE DIS ASSESSED: NORMAL
GENE DIS ASSESSED: NORMAL
GENE MUT TESTED BLD/T: 4.7 M/MB
MSI CA SPEC-IMP: NORMAL
REASON FOR STUDY: NORMAL
REASON FOR STUDY: NORMAL
TEMPUS BLOOD TUMOR MUTATIONAL BURDEN: 17.3 M/MB
TEMPUS GERMLINE NOTE: NORMAL
TEMPUS LCA: NORMAL
TEMPUS LCA: NORMAL
TEMPUS MSI NOTE: NORMAL
TEMPUS PORTAL: NORMAL
TEMPUS PORTAL: NORMAL
TEMPUS THERAPY1: NORMAL
TEMPUS THERAPY1: NORMAL
TEMPUS THERAPY2: NORMAL
TEMPUS THERAPY2: NORMAL
TEMPUS THERAPY3: NORMAL
TEMPUS THERAPY3: NORMAL
TEMPUS THERAPY4: NORMAL
TEMPUS THERAPY4: NORMAL
TEMPUS THERAPY5: NORMAL
TEMPUS THERAPY5: NORMAL
TEMPUS THERAPY6: NORMAL
TEMPUS THERAPY7: NORMAL
TEMPUS THERAPY8: NORMAL
TEMPUS THERAPYCOUNT: 5
TEMPUS THERAPYCOUNT: 8
TEMPUS TRIALCOUNT: 3
TEMPUS TRIALCOUNT: 3
TEMPUS TRIALMATCHES1: NORMAL
TEMPUS TRIALMATCHES1: NORMAL
TEMPUS TRIALMATCHES2: NORMAL
TEMPUS TRIALMATCHES2: NORMAL
TEMPUS TRIALMATCHES3: NORMAL
TEMPUS TRIALMATCHES3: NORMAL

## 2024-09-23 ENCOUNTER — HOSPITAL ENCOUNTER (OUTPATIENT)
Dept: RADIATION ONCOLOGY | Facility: HOSPITAL | Age: 64
Discharge: HOME | End: 2024-09-23
Payer: COMMERCIAL

## 2024-09-23 LAB
CYTO UR: NORMAL
LAB AP CASE REPORT: NORMAL
LAB AP DIAGNOSIS COMMENT: NORMAL
LAB AP INTRADEPARTMENTAL CONSULT: NORMAL
LAB AP SPECIAL STAINS: NORMAL
Lab: NORMAL
PATH REPORT.ADDENDUM SPEC: NORMAL
PATH REPORT.FINAL DX SPEC: NORMAL
PATH REPORT.GROSS SPEC: NORMAL
RAD ONC ARIA COURSE ID: NORMAL
RAD ONC ARIA COURSE INTENT: NORMAL
RAD ONC ARIA COURSE LAST TREATMENT DATE: NORMAL
RAD ONC ARIA COURSE START DATE: NORMAL
RAD ONC ARIA COURSE TREATMENT ELAPSED DAYS: 13
RAD ONC ARIA FIRST TREATMENT DATE: NORMAL
RAD ONC ARIA PLAN FRACTIONS TREATED TO DATE: 10
RAD ONC ARIA PLAN FRACTIONS TREATED TO DATE: 4
RAD ONC ARIA PLAN ID: NORMAL
RAD ONC ARIA PLAN ID: NORMAL
RAD ONC ARIA PLAN PRESCRIBED DOSE PER FRACTION: 3 GY
RAD ONC ARIA PLAN PRESCRIBED DOSE PER FRACTION: 6 GY
RAD ONC ARIA PLAN PRIMARY REFERENCE POINT: NORMAL
RAD ONC ARIA PLAN PRIMARY REFERENCE POINT: NORMAL
RAD ONC ARIA PLAN TOTAL FRACTIONS PRESCRIBED: 10
RAD ONC ARIA PLAN TOTAL FRACTIONS PRESCRIBED: 5
RAD ONC ARIA PLAN TOTAL PRESCRIBED DOSE: 3000 CGY
RAD ONC ARIA PLAN TOTAL PRESCRIBED DOSE: 3000 CGY
RAD ONC ARIA REFERENCE POINT DOSAGE GIVEN TO DATE: 24 GY
RAD ONC ARIA REFERENCE POINT DOSAGE GIVEN TO DATE: 30 GY
RAD ONC ARIA REFERENCE POINT ID: NORMAL
RAD ONC ARIA REFERENCE POINT ID: NORMAL
RAD ONC ARIA REFERENCE POINT SESSION DOSAGE GIVEN: 3 GY
RAD ONC ARIA REFERENCE POINT SESSION DOSAGE GIVEN: 6 GY
REF LAB TEST RESULTS: NORMAL

## 2024-09-23 PROCEDURE — 77386: CPT | Performed by: RADIOLOGY

## 2024-09-23 PROCEDURE — 77014 CHG CT GUIDANCE RADIATION THERAPY FLDS PLACEMENT: CPT | Performed by: RADIOLOGY

## 2024-09-24 ENCOUNTER — RADIATION ONCOLOGY WEEKLY ASSESSMENT (OUTPATIENT)
Dept: RADIATION ONCOLOGY | Facility: HOSPITAL | Age: 64
End: 2024-09-24
Payer: COMMERCIAL

## 2024-09-24 ENCOUNTER — HOSPITAL ENCOUNTER (OUTPATIENT)
Dept: RADIATION ONCOLOGY | Facility: HOSPITAL | Age: 64
Discharge: HOME | End: 2024-09-24

## 2024-09-24 VITALS — HEART RATE: 88 BPM | OXYGEN SATURATION: 99 % | SYSTOLIC BLOOD PRESSURE: 172 MMHG | DIASTOLIC BLOOD PRESSURE: 96 MMHG

## 2024-09-24 DIAGNOSIS — C79.31 METASTASIS TO BRAIN OF UNKNOWN ORIGIN: ICD-10-CM

## 2024-09-24 DIAGNOSIS — C80.1 METASTASIS TO BRAIN OF UNKNOWN ORIGIN: ICD-10-CM

## 2024-09-24 DIAGNOSIS — C20 RECTAL ADENOCARCINOMA: Primary | ICD-10-CM

## 2024-09-24 LAB
RAD ONC ARIA COURSE ID: NORMAL
RAD ONC ARIA COURSE INTENT: NORMAL
RAD ONC ARIA COURSE LAST TREATMENT DATE: NORMAL
RAD ONC ARIA COURSE START DATE: NORMAL
RAD ONC ARIA COURSE TREATMENT ELAPSED DAYS: 14
RAD ONC ARIA FIRST TREATMENT DATE: NORMAL
RAD ONC ARIA PLAN FRACTIONS TREATED TO DATE: 5
RAD ONC ARIA PLAN ID: NORMAL
RAD ONC ARIA PLAN PRESCRIBED DOSE PER FRACTION: 6 GY
RAD ONC ARIA PLAN PRIMARY REFERENCE POINT: NORMAL
RAD ONC ARIA PLAN TOTAL FRACTIONS PRESCRIBED: 5
RAD ONC ARIA PLAN TOTAL PRESCRIBED DOSE: 3000 CGY
RAD ONC ARIA REFERENCE POINT DOSAGE GIVEN TO DATE: 30 GY
RAD ONC ARIA REFERENCE POINT ID: NORMAL
RAD ONC ARIA REFERENCE POINT SESSION DOSAGE GIVEN: 6 GY

## 2024-09-24 PROCEDURE — 77386: CPT | Performed by: RADIOLOGY

## 2024-09-24 PROCEDURE — 77014 CHG CT GUIDANCE RADIATION THERAPY FLDS PLACEMENT: CPT | Performed by: RADIOLOGY

## 2024-09-24 RX ORDER — FLUCONAZOLE 100 MG/1
TABLET ORAL
Qty: 9 TABLET | Refills: 0 | Status: SHIPPED | OUTPATIENT
Start: 2024-09-24

## 2024-09-24 RX ORDER — NYSTATIN 100000 [USP'U]/ML
500000 SUSPENSION ORAL 4 TIMES DAILY
Qty: 200 ML | Refills: 0 | Status: SHIPPED | OUTPATIENT
Start: 2024-09-24 | End: 2024-10-01

## 2024-09-25 ENCOUNTER — APPOINTMENT (OUTPATIENT)
Dept: LAB | Facility: HOSPITAL | Age: 64
End: 2024-09-25
Payer: COMMERCIAL

## 2024-09-25 ENCOUNTER — OFFICE VISIT (OUTPATIENT)
Dept: ONCOLOGY | Facility: CLINIC | Age: 64
End: 2024-09-25
Payer: COMMERCIAL

## 2024-09-25 ENCOUNTER — HOME CARE VISIT (OUTPATIENT)
Dept: HOME HEALTH SERVICES | Facility: HOME HEALTHCARE | Age: 64
End: 2024-09-25
Payer: COMMERCIAL

## 2024-09-25 ENCOUNTER — TELEPHONE (OUTPATIENT)
Dept: ONCOLOGY | Facility: CLINIC | Age: 64
End: 2024-09-25

## 2024-09-25 ENCOUNTER — INFUSION (OUTPATIENT)
Dept: ONCOLOGY | Facility: HOSPITAL | Age: 64
End: 2024-09-25
Payer: COMMERCIAL

## 2024-09-25 VITALS
DIASTOLIC BLOOD PRESSURE: 78 MMHG | BODY MASS INDEX: 22.6 KG/M2 | WEIGHT: 144 LBS | OXYGEN SATURATION: 96 % | HEIGHT: 67 IN | TEMPERATURE: 98.3 F | HEART RATE: 83 BPM | SYSTOLIC BLOOD PRESSURE: 136 MMHG

## 2024-09-25 DIAGNOSIS — C80.1 METASTASIS TO BRAIN OF UNKNOWN ORIGIN: ICD-10-CM

## 2024-09-25 DIAGNOSIS — C20 RECTAL ADENOCARCINOMA: Primary | ICD-10-CM

## 2024-09-25 DIAGNOSIS — G89.3 CANCER ASSOCIATED PAIN: Primary | ICD-10-CM

## 2024-09-25 DIAGNOSIS — G89.3 CANCER ASSOCIATED PAIN: ICD-10-CM

## 2024-09-25 DIAGNOSIS — C20 RECTAL ADENOCARCINOMA: ICD-10-CM

## 2024-09-25 DIAGNOSIS — Z45.2 ENCOUNTER FOR ADJUSTMENT OR MANAGEMENT OF VASCULAR ACCESS DEVICE: Primary | ICD-10-CM

## 2024-09-25 DIAGNOSIS — C79.31 METASTASIS TO BRAIN OF UNKNOWN ORIGIN: ICD-10-CM

## 2024-09-25 DIAGNOSIS — K63.89 COLONIC MASS: ICD-10-CM

## 2024-09-25 LAB
ALBUMIN SERPL-MCNC: 3.9 G/DL (ref 3.5–5.2)
ALBUMIN/GLOB SERPL: 1.7 G/DL
ALP SERPL-CCNC: 52 U/L (ref 39–117)
ALT SERPL W P-5'-P-CCNC: 39 U/L (ref 1–33)
ANION GAP SERPL CALCULATED.3IONS-SCNC: 12.2 MMOL/L (ref 5–15)
AST SERPL-CCNC: 17 U/L (ref 1–32)
BASOPHILS # BLD AUTO: 0.02 10*3/MM3 (ref 0–0.2)
BASOPHILS NFR BLD AUTO: 0.3 % (ref 0–1.5)
BILIRUB SERPL-MCNC: 0.6 MG/DL (ref 0–1.2)
BUN SERPL-MCNC: 22 MG/DL (ref 8–23)
BUN/CREAT SERPL: 34.9 (ref 7–25)
CALCIUM SPEC-SCNC: 8.7 MG/DL (ref 8.6–10.5)
CEA SERPL-MCNC: 2.3 NG/ML
CHLORIDE SERPL-SCNC: 101 MMOL/L (ref 98–107)
CO2 SERPL-SCNC: 26.8 MMOL/L (ref 22–29)
CREAT SERPL-MCNC: 0.63 MG/DL (ref 0.57–1)
DEPRECATED RDW RBC AUTO: 55.2 FL (ref 37–54)
DNA RANGE(S) EXAMINED NAR: NORMAL
EGFRCR SERPLBLD CKD-EPI 2021: 99.2 ML/MIN/1.73
EOSINOPHIL # BLD AUTO: 0.03 10*3/MM3 (ref 0–0.4)
EOSINOPHIL NFR BLD AUTO: 0.4 % (ref 0.3–6.2)
ERYTHROCYTE [DISTWIDTH] IN BLOOD BY AUTOMATED COUNT: 15 % (ref 12.3–15.4)
GENE DIS ANL INTERP-IMP: POSITIVE
GENE DIS ASSESSED: NORMAL
GLOBULIN UR ELPH-MCNC: 2.3 GM/DL
GLUCOSE SERPL-MCNC: 85 MG/DL (ref 65–99)
HCT VFR BLD AUTO: 39.9 % (ref 34–46.6)
HGB BLD-MCNC: 13.4 G/DL (ref 12–15.9)
IMM GRANULOCYTES # BLD AUTO: 0.13 10*3/MM3 (ref 0–0.05)
IMM GRANULOCYTES NFR BLD AUTO: 1.6 % (ref 0–0.5)
LYMPHOCYTES # BLD AUTO: 0.59 10*3/MM3 (ref 0.7–3.1)
LYMPHOCYTES NFR BLD AUTO: 7.4 % (ref 19.6–45.3)
MCH RBC QN AUTO: 33.8 PG (ref 26.6–33)
MCHC RBC AUTO-ENTMCNC: 33.6 G/DL (ref 31.5–35.7)
MCV RBC AUTO: 100.5 FL (ref 79–97)
MONOCYTES # BLD AUTO: 0.71 10*3/MM3 (ref 0.1–0.9)
MONOCYTES NFR BLD AUTO: 8.9 % (ref 5–12)
NEUTROPHILS NFR BLD AUTO: 6.5 10*3/MM3 (ref 1.7–7)
NEUTROPHILS NFR BLD AUTO: 81.4 % (ref 42.7–76)
NRBC BLD AUTO-RTO: 0 /100 WBC (ref 0–0.2)
PLATELET # BLD AUTO: 206 10*3/MM3 (ref 140–450)
PMV BLD AUTO: 7.9 FL (ref 6–12)
POTASSIUM SERPL-SCNC: 3.8 MMOL/L (ref 3.5–5.2)
PROT SERPL-MCNC: 6.2 G/DL (ref 6–8.5)
RBC # BLD AUTO: 3.97 10*6/MM3 (ref 3.77–5.28)
REASON FOR STUDY: NORMAL
SODIUM SERPL-SCNC: 140 MMOL/L (ref 136–145)
TEMPUS AMENDMENTNOTE1: NORMAL
TEMPUS BLOOD TUMOR MUTATIONAL BURDEN: 17.3 M/MB
TEMPUS LCA: NORMAL
TEMPUS MSI NOTE: NORMAL
TEMPUS PORTAL: NORMAL
TEMPUS THERAPY1: NORMAL
TEMPUS THERAPY2: NORMAL
TEMPUS THERAPY3: NORMAL
TEMPUS THERAPY4: NORMAL
TEMPUS THERAPY5: NORMAL
TEMPUS THERAPYCOUNT: 5
TEMPUS TRIALCOUNT: 3
TEMPUS TRIALMATCHES1: NORMAL
TEMPUS TRIALMATCHES2: NORMAL
TEMPUS TRIALMATCHES3: NORMAL
WBC NRBC COR # BLD AUTO: 7.98 10*3/MM3 (ref 3.4–10.8)

## 2024-09-25 PROCEDURE — 25010000002 HEPARIN LOCK FLUSH PER 10 UNITS: Performed by: INTERNAL MEDICINE

## 2024-09-25 PROCEDURE — 85025 COMPLETE CBC W/AUTO DIFF WBC: CPT

## 2024-09-25 PROCEDURE — 82378 CARCINOEMBRYONIC ANTIGEN: CPT | Performed by: INTERNAL MEDICINE

## 2024-09-25 PROCEDURE — 80053 COMPREHEN METABOLIC PANEL: CPT

## 2024-09-25 PROCEDURE — 36591 DRAW BLOOD OFF VENOUS DEVICE: CPT

## 2024-09-25 RX ORDER — SODIUM CHLORIDE 0.9 % (FLUSH) 0.9 %
10 SYRINGE (ML) INJECTION AS NEEDED
Status: DISCONTINUED | OUTPATIENT
Start: 2024-09-25 | End: 2024-09-25 | Stop reason: HOSPADM

## 2024-09-25 RX ORDER — HYDROCODONE BITARTRATE AND ACETAMINOPHEN 5; 325 MG/1; MG/1
1 TABLET ORAL EVERY 6 HOURS PRN
Qty: 60 TABLET | Refills: 0 | Status: SHIPPED | OUTPATIENT
Start: 2024-09-25

## 2024-09-25 RX ORDER — HEPARIN SODIUM (PORCINE) LOCK FLUSH IV SOLN 100 UNIT/ML 100 UNIT/ML
500 SOLUTION INTRAVENOUS AS NEEDED
Status: DISCONTINUED | OUTPATIENT
Start: 2024-09-25 | End: 2024-09-25 | Stop reason: HOSPADM

## 2024-09-25 RX ORDER — SODIUM CHLORIDE 0.9 % (FLUSH) 0.9 %
10 SYRINGE (ML) INJECTION AS NEEDED
OUTPATIENT
Start: 2024-09-25

## 2024-09-25 RX ORDER — HEPARIN SODIUM (PORCINE) LOCK FLUSH IV SOLN 100 UNIT/ML 100 UNIT/ML
500 SOLUTION INTRAVENOUS AS NEEDED
OUTPATIENT
Start: 2024-09-25

## 2024-09-25 RX ORDER — HYDROCODONE BITARTRATE AND ACETAMINOPHEN 5; 325 MG/1; MG/1
1 TABLET ORAL EVERY 6 HOURS PRN
Qty: 60 TABLET | Refills: 0 | Status: SHIPPED | OUTPATIENT
Start: 2024-09-25 | End: 2024-09-25 | Stop reason: SDUPTHER

## 2024-09-25 RX ADMIN — Medication 10 ML: at 09:33

## 2024-09-25 RX ADMIN — Medication 500 UNITS: at 09:33

## 2024-09-26 LAB
CYTO UR: NORMAL
DNA RANGE(S) EXAMINED NAR: NORMAL
GENE DIS ANL INTERP-IMP: POSITIVE
GENE DIS ASSESSED: NORMAL
GENE MUT TESTED BLD/T: 4.7 M/MB
LAB AP CASE REPORT: NORMAL
LAB AP DIAGNOSIS COMMENT: NORMAL
LAB AP SPECIAL STAINS: NORMAL
Lab: NORMAL
MSI CA SPEC-IMP: NORMAL
PATH REPORT.ADDENDUM SPEC: NORMAL
PATH REPORT.FINAL DX SPEC: NORMAL
PATH REPORT.GROSS SPEC: NORMAL
REASON FOR STUDY: NORMAL
TEMPUS AMENDMENTNOTE1: NORMAL
TEMPUS GERMLINE NOTE: NORMAL
TEMPUS LCA: NORMAL
TEMPUS PERTINENTNEGATIVES: NORMAL
TEMPUS PORTAL: NORMAL
TEMPUS THERAPY1: NORMAL
TEMPUS THERAPY2: NORMAL
TEMPUS THERAPY3: NORMAL
TEMPUS THERAPY4: NORMAL
TEMPUS THERAPY5: NORMAL
TEMPUS THERAPYCOUNT: 5
TEMPUS TRIALCOUNT: 3
TEMPUS TRIALMATCHES1: NORMAL
TEMPUS TRIALMATCHES2: NORMAL
TEMPUS TRIALMATCHES3: NORMAL

## 2024-09-27 ENCOUNTER — HOME CARE VISIT (OUTPATIENT)
Dept: HOME HEALTH SERVICES | Facility: HOME HEALTHCARE | Age: 64
End: 2024-09-27
Payer: COMMERCIAL

## 2024-09-27 NOTE — Clinical Note
Unable to make contact with patient for discharge, she had previously stated taht all of her nursing needs were met and since we are unable to make contact todays visit will be a discharge without a visit 9/27/24.

## 2024-09-30 ENCOUNTER — HOSPITAL ENCOUNTER (OUTPATIENT)
Dept: RADIATION ONCOLOGY | Facility: HOSPITAL | Age: 64
Discharge: HOME | End: 2024-09-30
Payer: COMMERCIAL

## 2024-09-30 LAB
RAD ONC ARIA COURSE END DATE: NORMAL
RAD ONC ARIA COURSE ID: NORMAL
RAD ONC ARIA COURSE INTENT: NORMAL
RAD ONC ARIA COURSE LAST TREATMENT DATE: NORMAL
RAD ONC ARIA COURSE START DATE: NORMAL
RAD ONC ARIA COURSE TREATMENT ELAPSED DAYS: 14
RAD ONC ARIA FIRST TREATMENT DATE: NORMAL
RAD ONC ARIA PLAN FRACTIONS TREATED TO DATE: 1
RAD ONC ARIA PLAN FRACTIONS TREATED TO DATE: 1
RAD ONC ARIA PLAN FRACTIONS TREATED TO DATE: 10
RAD ONC ARIA PLAN FRACTIONS TREATED TO DATE: 5
RAD ONC ARIA PLAN ID: NORMAL
RAD ONC ARIA PLAN NAME: NORMAL
RAD ONC ARIA PLAN PRESCRIBED DOSE PER FRACTION: 20 GY
RAD ONC ARIA PLAN PRESCRIBED DOSE PER FRACTION: 20 GY
RAD ONC ARIA PLAN PRESCRIBED DOSE PER FRACTION: 3 GY
RAD ONC ARIA PLAN PRESCRIBED DOSE PER FRACTION: 6 GY
RAD ONC ARIA PLAN PRIMARY REFERENCE POINT: NORMAL
RAD ONC ARIA PLAN TOTAL FRACTIONS PRESCRIBED: 1
RAD ONC ARIA PLAN TOTAL FRACTIONS PRESCRIBED: 1
RAD ONC ARIA PLAN TOTAL FRACTIONS PRESCRIBED: 10
RAD ONC ARIA PLAN TOTAL FRACTIONS PRESCRIBED: 5
RAD ONC ARIA PLAN TOTAL PRESCRIBED DOSE: 2000 CGY
RAD ONC ARIA PLAN TOTAL PRESCRIBED DOSE: 2000 CGY
RAD ONC ARIA PLAN TOTAL PRESCRIBED DOSE: 3000 CGY
RAD ONC ARIA PLAN TOTAL PRESCRIBED DOSE: 3000 CGY
RAD ONC ARIA REFERENCE POINT DOSAGE GIVEN TO DATE: 20 GY
RAD ONC ARIA REFERENCE POINT DOSAGE GIVEN TO DATE: 20 GY
RAD ONC ARIA REFERENCE POINT DOSAGE GIVEN TO DATE: 30 GY
RAD ONC ARIA REFERENCE POINT DOSAGE GIVEN TO DATE: 30 GY
RAD ONC ARIA REFERENCE POINT ID: NORMAL

## 2024-09-30 NOTE — HOME HEALTH
Unable to make contact with patient for discharge, she had previously stated taht all of her nursing needs were met and since we are unable to make contact todays visit will be a discharge without a visit.

## 2024-10-01 DIAGNOSIS — K63.89 COLONIC MASS: ICD-10-CM

## 2024-10-01 DIAGNOSIS — C20 RECTAL ADENOCARCINOMA: Primary | ICD-10-CM

## 2024-10-01 NOTE — PROGRESS NOTES
Discussed at general conference this morning.  Plan chemotherapy with FOLFOX followed likely by concurrent chemoradiation if she has had a positive response.  She is probably not a surgical candidate for removal of the primary tumor at the high rectum/low sigmoid colon given the presence of metastatic disease but this can be reassessed.  Refer to nutrition.  Refer to navigation.

## 2024-10-02 ENCOUNTER — TELEPHONE (OUTPATIENT)
Dept: OTHER | Facility: HOSPITAL | Age: 64
End: 2024-10-02
Payer: COMMERCIAL

## 2024-10-02 ENCOUNTER — TELEMEDICINE (OUTPATIENT)
Dept: ONCOLOGY | Facility: CLINIC | Age: 64
End: 2024-10-02
Payer: COMMERCIAL

## 2024-10-02 DIAGNOSIS — C80.1 METASTASIS TO BRAIN OF UNKNOWN ORIGIN: ICD-10-CM

## 2024-10-02 DIAGNOSIS — K63.89 COLONIC MASS: ICD-10-CM

## 2024-10-02 DIAGNOSIS — C79.31 METASTASIS TO BRAIN OF UNKNOWN ORIGIN: ICD-10-CM

## 2024-10-02 DIAGNOSIS — C20 RECTAL ADENOCARCINOMA: Primary | ICD-10-CM

## 2024-10-02 RX ORDER — ONDANSETRON 8 MG/1
8 TABLET, FILM COATED ORAL 3 TIMES DAILY PRN
Qty: 30 TABLET | Refills: 5 | Status: SHIPPED | OUTPATIENT
Start: 2024-10-02

## 2024-10-02 RX ORDER — LIDOCAINE/PRILOCAINE 2.5 %-2.5%
1 CREAM (GRAM) TOPICAL AS NEEDED
Qty: 30 G | Refills: 2 | Status: SHIPPED | OUTPATIENT
Start: 2024-10-02

## 2024-10-02 NOTE — PROGRESS NOTES
[facility] Hematology/Oncology Treatment Plan Summary    Name: Ely Sims  [facility]# [id number]  MD: Dr. Saunders    Diagnosis:     ICD-10-CM ICD-9-CM   1. Rectal adenocarcinoma  C20 154.1   2. 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  C79.31 198.3    C80.1 199.1   3. 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    Starting on: 10/8/2024    Repeat after 2-3 months: CT Scan    Items for home use: Imodium AD (for diarrhea), Tylenol (for fever and/or pain), and Thermometer    Rx written for: [] Nausea    [] Pre-Treatment   EMLA cream to port site 30 minutes prior to access and ondansetron 8 mg by mouth every 8 hours as needed for nausea    Notes:     Next Steps: PORT     Completing Provider: GRETCHEN Tinajero             Date/time: 10/02/2024      Please note: You will be seen by a provider frequently with your treatment plan. This plan may change depending on many factors, if so, this will be discussed with you by your physician.  Last update 03/2022.

## 2024-10-02 NOTE — PROGRESS NOTES
TREATMENT  PREPARATION    Ely Sims  [id number]  1960    Chief Complaint: Treatment preparation and needs assessment    History of present illness:  Ely Sims is a 64 y.o. year old female who is here today for treatment preparation and needs assessment.  The patient has been diagnosed with   Encounter Diagnoses   Name Primary?    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     Past Medical History, Past Surgical History, Social History, Family History have been reviewed and are without significant changes except as mentioned.    Physical Exam:    There were no vitals filed for this visit.  Vitals:    10/02/24 1339   PainSc: 0-No pain        ECOG score: 0             Physical Exam  HENT:      Head: Normocephalic and atraumatic.   Eyes:      Extraocular Movements: Extraocular movements intact.      Conjunctiva/sclera: Conjunctivae normal.   Pulmonary:      Effort: Pulmonary effort is normal. No respiratory distress.   Neurological:      General: No focal deficit present.      Mental Status: She is alert and oriented to person, place, and time.   Psychiatric:         Mood and Affect: Mood normal.         Behavior: Behavior normal.            NEEDS ASSESSMENTS    Genetics  The patient's new diagnosis and family history have been reviewed for genetic counseling needs. The patient will not be referred..     Psychosocial and Barriers to care  The patient has completed a PHQ-9 Depression Screening and the Distress Thermometer (DT) today.  PHQ-9 results show PHQ-2 Total Score: 0 PHQ-9 Total Score: PHQ-9 Total Score: 0     The patient scored their distress today as   on a scale of 0-10 with 0 being no distress and 10 being extreme distress. Problems marked by the patient as being an issue for them within the last week include   .      Results were reviewed along with psychosocial resources offered by our cancer center.  Our Supportive Oncology team will be flagged for a score of 4 or above, and a same day call will be made for a score of 9 or 10.  A mental health referral is offered at that time. Patients who score less than 4 have been educated on our support services and can be referred to our  upon request.  The patient will not be referred to our .       Nutrition  The patient has completed the malnutrition screening today. They scored Malnutrition Screening Tool  Have you recently lost weight without trying?  If yes, how much weight have you lost?: 0--> No  Have you been eating poorly because of a decreased appetite?: 0--> No  MST score: 0   with a score of 0-1 meaning not at risk in a score of 2 or greater meaning at risk.  Patients with a score of 3 or higher will be referred to our oncology dietitian for support. Patients beginning at risk treatment regimens or who have dietary concerns will also be referred to our oncology dietitian. The patient will be referred.    Functional Assessment  Persons who are age 70 or greater will be screened for qualification of a comprehensive geriatric assessment by our survivorship nurse practitioner.  Older adults with cancer face unique challenges. These may include an increased  "risk of drug reactions, financial burdens, and caregiver stress. The patient scored   . Patients scoring 14 or lower will referred for an older adult functional assessment with the survivorship advanced practice registered nurse to ensure all needed support is provided as patients plan for their treatments. NOT APPLICABLE    Intravenous Access Assessment  The patient and I discussed planned intravenous chemo/biotherapy as well as other IV treatments that are often needed throughout the course of treatment. These may include, but are not limited to blood transfusions, antibiotics, and IV hydration. Discussed that depending on selected treatment and vein assessment, patient may require venous access device (VAD) which could include but not limited to a Mediport or PICC line. Risks and benefits of VADs reviewed. The patient will be treated via Port.    Reproductive/Sexual Activity   People should avoid becoming pregnant and should not get a partner pregnant while undergoing chemo/biotherapy.  People of childbearing age should use effective contraception during active therapy. The best recommendation for all people is to use a barrier method for a minimum of 1 week after the last infusion of chemo/biotherapy to prevent your partner being exposed to byproducts from treatment medications in bodily fluids. Effective contraception should be discussed with your oncology team to make sure it is safe to take based on your diagnosis. Possible options include oral contraceptives, barrier methods. Chemo/biotherapy can change your ability to reproduce children in the future.  There are options for fertility preservation. NOT APPLICABLE    Advanced Care Planning  Advance Care Planning   The patient and I discussed advanced care planning, \"Conversations that Matter\".   This service is offered for development of advance directives with a certified ACP facilitator.  The patient does have an up-to-date advanced directive. This document " is on file with our office. The patient is not interested in an appointment with one of our facilitators to create or update their advanced directives.    Have you reviewed your Advance Directive and is it valid for this stay?: Yes  Patient Requests Assistance on Advance Directives: Patient Declined          Smoking cessation  Tobacco Use: Medium Risk (10/2/2024)    Patient History     Smoking Tobacco Use: Former     Smokeless Tobacco Use: Never     Passive Exposure: Not on file       Patient and I discussed their tobacco use history. Referral will not be made for smoking cessation.      Palliative Care  When appropriate, the patient and I discussed the availability palliative care services and when appropriate Hospice care. Palliative care is not the same as Hospice care which was explained to the patient.The patient is not interested in additional information from our  on these services.    Survivorship   When appropriate, we discussed that we will refer the patient to survivorship clinic to discuss next steps following completion of planned treatment.  Reviewed this visit will include assessment of your physical, psychological, functional, and spiritual needs as a survivor and the need at attend this visit when scheduled.    TREATMENT EDUCATION    Today I met with the patient to discuss the chemo/biotherapy regimen recommended for treatment of Rectal adenocarcinoma    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  .  The patient was given explanation of treatment premed side effects including office policy that prohibits patients to drive if sedating medications are administered, MD explanation given regarding benefits, side effects, toxicities and goals of treatment.  The patient received a Chemotherapy/Biotherapy Plan Summary including diagnosis and explanation of specific treatment plan.    SIDE EFFECTS:  Common side effects were discussed with the patient and/or significant other.  Discussion included  where applicable hair loss/discoloration, anemia/fatigue, infection/chills/fever, appetite, bleeding risk/precautions, constipation, diarrhea, mouth sores, taste alteration, loss of appetite, nausea/vomiting, peripheral neuropathy, skin/nail changes, rash, muscle aches/weakness, photosensitivity, weight gain/loss, hearing loss, dizziness, menopausal symptoms, menstrual irregularity, sterility, high blood pressure, heart damage, liver damage, lung damage, kidney damage, DVT/PE risk, fluid retention, pleural/pericardial effusion, somnolence, electrolyte/LFT imbalance, vein exercises and/or the possible need for vascular access/port placement.  The patient was advised that although uncommon, leakage of an infused medication from the vein or venous access device may lead to skin breakdown and/or other tissue damage.  The patient was advised that he/she may have pain, bleeding, and/or bruising from the insertion of a needle in their vein or venous access device (port).  The patient was further advised that, in spite of proper technique, infection with redness and irritation may rarely occur at the site where the needle was inserted.  The patient was advised that if complications occur, additional medical treatment is available.  Finally, where applicable we have reviewed rare but potential immune mediated side effects including shortness of breath, cough, chest pain (pneumonitis), abdominal pain, diarrhea (colitis), thyroiditis (hypothyroid or hyperthyroid), hepatitis and liver dysfunction, nephritis and renal dysfunction.    Discussion also included side effects specific to drugs in the treatment plan, specifically:    Treatment Plans       Name Type Plan Dates Plan Provider         Active    OP COLON mFOLFOX6 OXALIplatin / Leucovorin / Fluorouracil ONCOLOGY TREATMENT  10/13/2024 - Present lEieser Saunders MD                      Questions answered and additional information discussed on topics including:  Anemia,  Thrombocytopenia, Neutropenia, Nutrition and appetite changes, Constipation, Diarrhea, Nausea & vomiting, Mouth sores, Alopecia, Intimate activity, Nervous system changes, and Pain       Assessment and Plan:    Diagnoses and all orders for this visit:    1. Rectal adenocarcinoma (Primary)    2. 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      The patient and I have reviewed their diagnosis and scheduled treatment plan. Needs assessment was completed where applicable including genetics, psychosocial needs, barriers to care, VAD evaluation, advanced care planning, survivorship, and palliative care services where indicated. Referrals have been ordered as appropriate based upon evaluation today and patient desires.   Chemo/biotherapy teaching was completed today and consent obtained. See separate documentation for further details.  Adequate time was given to answer questions.  Patient made aware of their care team members and contact information if they have questions or problems throughout the treatment course.  Discussion held and written information provided describing frequency of office visits and ongoing monitoring throughout the treatment plan.     Reviewed with patient any prescribed medication sent to pharmacy.  Education provided regarding proper storage, safe handling, and proper disposal of unused medication.  Proper handling of body fluids and waste discussed and written information provided.  If appropriate, patient had pretreatment labs drawn today.    Learning assessment completed at initial patient encounter. See separate flowsheet. Chemo/biotherapy education comprehension assessed at today's visit.    Mode of Visit: Video  Visit Platform: Video Options: [employer]  Location of patient: home  Location of provider: [facility] clinic  You have chosen to receive care through a telehealth visit.  Does the patient consent to use a video/audio connection for  your medical care today? Yes  The visit included audio and video interaction. No technical issues occurred during this visit.     I spent 44 minutes caring for Ely on this date of service. This time includes time spent by me in the following activities: preparing for the visit, reviewing tests, obtaining and/or reviewing a separately obtained history, performing a medically appropriate examination and/or evaluation, counseling and educating the patient/family/caregiver, ordering medications, tests, or procedures, referring and communicating with other health care professionals, documenting information in the medical record, and care coordination.     Tiffany Whittington, APRN   10/02/24

## 2024-10-04 LAB
CLINICAL INFO: NORMAL
DPYD GENE MUT ANL BLD/T: NORMAL
DPYD GENE PROD MET ACT IMP BLD/T-IMP: NORMAL
IMP & REVIEW OF LAB RESULTS: NORMAL
MEDICAL DIRECTOR REVIEW: NORMAL

## 2024-10-07 ENCOUNTER — PATIENT OUTREACH (OUTPATIENT)
Dept: OTHER | Facility: HOSPITAL | Age: 64
End: 2024-10-07
Payer: COMMERCIAL

## 2024-10-07 NOTE — PROGRESS NOTES
Phoned Ely and introduced myself as oncology nurse navigator and informed her I can serve as her  for his tx here at [facility]. She did have a question about what time her appt was tomorrow, informed her to arrive @ 0900 and Debibe the dietician will see her @ or around 0930. She is planning a vacation to [place] and will have her tx tomorrow with her 5FU pump d/c on 10/09. She is leaving for [place] on 10/10 and will return on 10/20. She does report having her zofran prescription and I will meet her tomorrow and provide her with the diarrhea protocol for her to have before she goes out of town. She did have a question about understanding her bills and I will provide her with billing office information tomorrow.

## 2024-10-08 ENCOUNTER — CLINICAL SUPPORT (OUTPATIENT)
Dept: OTHER | Facility: HOSPITAL | Age: 64
End: 2024-10-08
Payer: COMMERCIAL

## 2024-10-08 ENCOUNTER — PATIENT OUTREACH (OUTPATIENT)
Dept: OTHER | Facility: HOSPITAL | Age: 64
End: 2024-10-08
Payer: COMMERCIAL

## 2024-10-08 ENCOUNTER — INFUSION (OUTPATIENT)
Dept: ONCOLOGY | Facility: HOSPITAL | Age: 64
End: 2024-10-08
Payer: COMMERCIAL

## 2024-10-08 VITALS
TEMPERATURE: 97 F | RESPIRATION RATE: 16 BRPM | HEART RATE: 86 BPM | SYSTOLIC BLOOD PRESSURE: 139 MMHG | WEIGHT: 151.2 LBS | BODY MASS INDEX: 23.68 KG/M2 | DIASTOLIC BLOOD PRESSURE: 75 MMHG | OXYGEN SATURATION: 95 %

## 2024-10-08 DIAGNOSIS — C20 RECTAL ADENOCARCINOMA: ICD-10-CM

## 2024-10-08 DIAGNOSIS — C80.1 METASTASIS TO BRAIN OF UNKNOWN ORIGIN: Primary | ICD-10-CM

## 2024-10-08 DIAGNOSIS — K63.89 COLONIC MASS: ICD-10-CM

## 2024-10-08 DIAGNOSIS — C79.31 METASTASIS TO BRAIN OF UNKNOWN ORIGIN: Primary | ICD-10-CM

## 2024-10-08 LAB
ALBUMIN SERPL-MCNC: 3.5 G/DL (ref 3.5–5.2)
ALBUMIN/GLOB SERPL: 1.5 G/DL
ALP SERPL-CCNC: 60 U/L (ref 39–117)
ALT SERPL W P-5'-P-CCNC: 23 U/L (ref 1–33)
ANION GAP SERPL CALCULATED.3IONS-SCNC: 10 MMOL/L (ref 5–15)
AST SERPL-CCNC: 16 U/L (ref 1–32)
BASOPHILS # BLD AUTO: 0.04 10*3/MM3 (ref 0–0.2)
BASOPHILS NFR BLD AUTO: 0.9 % (ref 0–1.5)
BILIRUB SERPL-MCNC: 0.2 MG/DL (ref 0–1.2)
BUN SERPL-MCNC: 16 MG/DL (ref 8–23)
BUN/CREAT SERPL: 26.2 (ref 7–25)
CALCIUM SPEC-SCNC: 8.9 MG/DL (ref 8.6–10.5)
CHLORIDE SERPL-SCNC: 101 MMOL/L (ref 98–107)
CO2 SERPL-SCNC: 26 MMOL/L (ref 22–29)
CREAT SERPL-MCNC: 0.61 MG/DL (ref 0.57–1)
DEPRECATED RDW RBC AUTO: 55.6 FL (ref 37–54)
EGFRCR SERPLBLD CKD-EPI 2021: 100 ML/MIN/1.73
EOSINOPHIL # BLD AUTO: 0.08 10*3/MM3 (ref 0–0.4)
EOSINOPHIL NFR BLD AUTO: 1.7 % (ref 0.3–6.2)
ERYTHROCYTE [DISTWIDTH] IN BLOOD BY AUTOMATED COUNT: 14.9 % (ref 12.3–15.4)
GLOBULIN UR ELPH-MCNC: 2.4 GM/DL
GLUCOSE SERPL-MCNC: 100 MG/DL (ref 65–99)
HCT VFR BLD AUTO: 32.2 % (ref 34–46.6)
HGB BLD-MCNC: 10.9 G/DL (ref 12–15.9)
IMM GRANULOCYTES # BLD AUTO: 0.17 10*3/MM3 (ref 0–0.05)
IMM GRANULOCYTES NFR BLD AUTO: 3.7 % (ref 0–0.5)
LYMPHOCYTES # BLD AUTO: 0.68 10*3/MM3 (ref 0.7–3.1)
LYMPHOCYTES NFR BLD AUTO: 14.8 % (ref 19.6–45.3)
MCH RBC QN AUTO: 34.7 PG (ref 26.6–33)
MCHC RBC AUTO-ENTMCNC: 33.9 G/DL (ref 31.5–35.7)
MCV RBC AUTO: 102.5 FL (ref 79–97)
MONOCYTES # BLD AUTO: 0.53 10*3/MM3 (ref 0.1–0.9)
MONOCYTES NFR BLD AUTO: 11.5 % (ref 5–12)
NEUTROPHILS NFR BLD AUTO: 3.1 10*3/MM3 (ref 1.7–7)
NEUTROPHILS NFR BLD AUTO: 67.4 % (ref 42.7–76)
NRBC BLD AUTO-RTO: 0 /100 WBC (ref 0–0.2)
PLATELET # BLD AUTO: 201 10*3/MM3 (ref 140–450)
PMV BLD AUTO: 8.5 FL (ref 6–12)
POTASSIUM SERPL-SCNC: 3.5 MMOL/L (ref 3.5–5.2)
PROT SERPL-MCNC: 5.9 G/DL (ref 6–8.5)
RBC # BLD AUTO: 3.14 10*6/MM3 (ref 3.77–5.28)
SODIUM SERPL-SCNC: 137 MMOL/L (ref 136–145)
WBC NRBC COR # BLD AUTO: 4.6 10*3/MM3 (ref 3.4–10.8)

## 2024-10-08 PROCEDURE — 80053 COMPREHEN METABOLIC PANEL: CPT

## 2024-10-08 PROCEDURE — G0498 CHEMO EXTEND IV INFUS W/PUMP: HCPCS

## 2024-10-08 PROCEDURE — 85025 COMPLETE CBC W/AUTO DIFF WBC: CPT

## 2024-10-08 PROCEDURE — 25010000002 FOSAPREPITANT PER 1 MG: Performed by: INTERNAL MEDICINE

## 2024-10-08 PROCEDURE — 25010000002 PALONOSETRON PER 25 MCG: Performed by: INTERNAL MEDICINE

## 2024-10-08 PROCEDURE — 96368 THER/DIAG CONCURRENT INF: CPT

## 2024-10-08 PROCEDURE — 25010000002 FLUOROURACIL PER 500 MG: Performed by: INTERNAL MEDICINE

## 2024-10-08 PROCEDURE — 96413 CHEMO IV INFUSION 1 HR: CPT

## 2024-10-08 PROCEDURE — 25810000003 SODIUM CHLORIDE 0.9 % SOLUTION 250 ML FLEX CONT: Performed by: INTERNAL MEDICINE

## 2024-10-08 PROCEDURE — 25010000002 OXALIPLATIN PER 0.5 MG: Performed by: INTERNAL MEDICINE

## 2024-10-08 PROCEDURE — 96366 THER/PROPH/DIAG IV INF ADDON: CPT

## 2024-10-08 PROCEDURE — 96367 TX/PROPH/DG ADDL SEQ IV INF: CPT

## 2024-10-08 PROCEDURE — 25010000002 DEXAMETHASONE SODIUM PHOSPHATE 100 MG/10ML SOLUTION: Performed by: INTERNAL MEDICINE

## 2024-10-08 PROCEDURE — 96411 CHEMO IV PUSH ADDL DRUG: CPT

## 2024-10-08 PROCEDURE — 96375 TX/PRO/DX INJ NEW DRUG ADDON: CPT

## 2024-10-08 PROCEDURE — 96415 CHEMO IV INFUSION ADDL HR: CPT

## 2024-10-08 PROCEDURE — 25010000002 LEUCOVORIN CALCIUM PER 50 MG: Performed by: INTERNAL MEDICINE

## 2024-10-08 PROCEDURE — 0 DEXTROSE 5 % SOLUTION: Performed by: INTERNAL MEDICINE

## 2024-10-08 PROCEDURE — 0 DEXTROSE 5 % SOLUTION 250 ML FLEX CONT: Performed by: INTERNAL MEDICINE

## 2024-10-08 RX ORDER — DIPHENHYDRAMINE HYDROCHLORIDE 50 MG/ML
50 INJECTION INTRAMUSCULAR; INTRAVENOUS AS NEEDED
Status: CANCELLED | OUTPATIENT
Start: 2024-10-08

## 2024-10-08 RX ORDER — FLUOROURACIL 50 MG/ML
400 INJECTION, SOLUTION INTRAVENOUS ONCE
Status: COMPLETED | OUTPATIENT
Start: 2024-10-08 | End: 2024-10-08

## 2024-10-08 RX ORDER — DEXTROSE MONOHYDRATE 50 MG/ML
20 INJECTION, SOLUTION INTRAVENOUS ONCE
Status: COMPLETED | OUTPATIENT
Start: 2024-10-08 | End: 2024-10-08

## 2024-10-08 RX ORDER — PALONOSETRON 0.05 MG/ML
0.25 INJECTION, SOLUTION INTRAVENOUS ONCE
Status: COMPLETED | OUTPATIENT
Start: 2024-10-08 | End: 2024-10-08

## 2024-10-08 RX ORDER — FAMOTIDINE 10 MG/ML
20 INJECTION, SOLUTION INTRAVENOUS AS NEEDED
Status: CANCELLED | OUTPATIENT
Start: 2024-10-08

## 2024-10-08 RX ORDER — FLUOROURACIL 50 MG/ML
400 INJECTION, SOLUTION INTRAVENOUS ONCE
Status: CANCELLED | OUTPATIENT
Start: 2024-10-08

## 2024-10-08 RX ADMIN — FLUOROURACIL 4220 MG: 50 INJECTION, SOLUTION INTRAVENOUS at 12:46

## 2024-10-08 RX ADMIN — DEXAMETHASONE SODIUM PHOSPHATE 12 MG: 10 INJECTION, SOLUTION INTRAMUSCULAR; INTRAVENOUS at 09:50

## 2024-10-08 RX ADMIN — FOSAPREPITANT 100 ML: 150 INJECTION, POWDER, LYOPHILIZED, FOR SOLUTION INTRAVENOUS at 10:08

## 2024-10-08 RX ADMIN — FLUOROURACIL 700 MG: 50 INJECTION, SOLUTION INTRAVENOUS at 12:45

## 2024-10-08 RX ADMIN — DEXTROSE MONOHYDRATE 20 ML/HR: 50 INJECTION, SOLUTION INTRAVENOUS at 09:50

## 2024-10-08 RX ADMIN — OXALIPLATIN 150 MG: 5 INJECTION, SOLUTION INTRAVENOUS at 10:47

## 2024-10-08 RX ADMIN — PALONOSETRON HYDROCHLORIDE 0.25 MG: 0.25 INJECTION INTRAVENOUS at 09:50

## 2024-10-08 RX ADMIN — LEUCOVORIN CALCIUM 700 MG: 350 INJECTION, POWDER, LYOPHILIZED, FOR SUSPENSION INTRAMUSCULAR; INTRAVENOUS at 10:46
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
## 2024-10-08 NOTE — PROGRESS NOTES
Met with Ely at infusion center today.  She was accompanied by her sister Aziza today and she informed me her sister is a colon cancer survivor. She started her first tx today C1D1 Folfox. She reported her next appt is on 10/10 @ noon for her pump d/c. She is planning to leave on 10/11 for [place]. Resource folder given to her today that included my contact info,  Cancer care resource pamphlet, ACS education of Colon Ca, handout for mouth sores and we discussed the use of Rissa's magic if her mouth sores return, ACS handout for diarrhea and [facility] center protocol for diarrhea medication management. She was also given a [facility] chemotherapy side effects worksheet and was encouraged to use it for symptom management. We discussed the importance of tracking her symptoms in order to notify the oncology office when necessary.  C2 D2 is planned for 10/22. Advised her to reach out to me if needed and I plan to follow up with her after C4 D1.

## 2024-10-10 ENCOUNTER — DOCUMENTATION (OUTPATIENT)
Dept: RADIATION ONCOLOGY | Facility: HOSPITAL | Age: 64
End: 2024-10-10
Payer: COMMERCIAL

## 2024-10-10 ENCOUNTER — INFUSION (OUTPATIENT)
Dept: ONCOLOGY | Facility: HOSPITAL | Age: 64
End: 2024-10-10
Payer: COMMERCIAL

## 2024-10-10 DIAGNOSIS — C79.31 METASTASIS TO BRAIN OF UNKNOWN ORIGIN: ICD-10-CM

## 2024-10-10 DIAGNOSIS — Z45.2 ENCOUNTER FOR ADJUSTMENT OR MANAGEMENT OF VASCULAR ACCESS DEVICE: Primary | ICD-10-CM

## 2024-10-10 DIAGNOSIS — C20 RECTAL ADENOCARCINOMA: Primary | ICD-10-CM

## 2024-10-10 DIAGNOSIS — C80.1 METASTASIS TO BRAIN OF UNKNOWN ORIGIN: ICD-10-CM

## 2024-10-10 DIAGNOSIS — C20 RECTAL ADENOCARCINOMA: ICD-10-CM

## 2024-10-10 PROCEDURE — 25010000002 HEPARIN LOCK FLUSH PER 10 UNITS: Performed by: INTERNAL MEDICINE

## 2024-10-10 RX ORDER — SODIUM CHLORIDE 0.9 % (FLUSH) 0.9 %
10 SYRINGE (ML) INJECTION AS NEEDED
Status: DISCONTINUED | OUTPATIENT
Start: 2024-10-10 | End: 2024-10-10 | Stop reason: HOSPADM

## 2024-10-10 RX ORDER — HEPARIN SODIUM (PORCINE) LOCK FLUSH IV SOLN 100 UNIT/ML 100 UNIT/ML
500 SOLUTION INTRAVENOUS AS NEEDED
OUTPATIENT
Start: 2024-10-10

## 2024-10-10 RX ORDER — HEPARIN SODIUM (PORCINE) LOCK FLUSH IV SOLN 100 UNIT/ML 100 UNIT/ML
500 SOLUTION INTRAVENOUS AS NEEDED
Status: DISCONTINUED | OUTPATIENT
Start: 2024-10-10 | End: 2024-10-10 | Stop reason: HOSPADM

## 2024-10-10 RX ORDER — SODIUM CHLORIDE 0.9 % (FLUSH) 0.9 %
10 SYRINGE (ML) INJECTION AS NEEDED
OUTPATIENT
Start: 2024-10-10

## 2024-10-10 RX ADMIN — Medication 500 UNITS: at 12:11

## 2024-10-10 RX ADMIN — Medication 10 ML: at 12:11

## 2024-10-10 NOTE — PROGRESS NOTES
Radiation Treatment Summary Note      Patient Name: Ely Sims  : 1960    Attending Provider: Terrie Crenshaw MD      Diagnosis:     ICD-10-CM ICD-9-CM   1. Rectal adenocarcinoma  C20 154.1   2. 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  C79.31 198.3    C80.1 199.1       Radiation Start Date: 9/10/24    Radiation Completion Date: 24    Site treated:  T12 spine 9/10/24 - 24  Cerebellar vermis SRS 24  Left temporal lobe met SRS 24  Right cerebellar resection cavity 24-24      Prescription:     She received a dose of 30gy in 10 fractions to the T12 spine met delivered with VMAT  She received a dose of 20gy in single fx to the cerebellar vermis met with SRS  She received a dose of 20gy in single fx to the left temporal lobe delivered with cones  She received a dose of 30Gy in 5 fractions to the right cerebellar resections cavity delivered with VMAT  using 6mv photons     Final Delivered Dose Deviated From Initially Prescribed Dose: No    Concurrent Chemotherapy: No    Patient Tolerated Treatment Without Unexpected Side Effects/Complications: Yes    ECOG: Fully active, able to carry on all pre-disease performance without restriction = 0    Pain Management Plan: None Indicated/PRN OTC    Follow-Up Plan: 3 months    Imaging Ordered for Follow-Up: mri brain in 3 months         Terrie Crenshaw MD

## 2024-10-22 ENCOUNTER — CLINICAL SUPPORT (OUTPATIENT)
Dept: OTHER | Facility: HOSPITAL | Age: 64
End: 2024-10-22
Payer: COMMERCIAL

## 2024-10-22 ENCOUNTER — INFUSION (OUTPATIENT)
Dept: ONCOLOGY | Facility: HOSPITAL | Age: 64
End: 2024-10-22
Payer: COMMERCIAL

## 2024-10-22 ENCOUNTER — OFFICE VISIT (OUTPATIENT)
Dept: ONCOLOGY | Facility: CLINIC | Age: 64
End: 2024-10-22
Payer: COMMERCIAL

## 2024-10-22 VITALS
HEART RATE: 76 BPM | SYSTOLIC BLOOD PRESSURE: 144 MMHG | WEIGHT: 149 LBS | OXYGEN SATURATION: 100 % | DIASTOLIC BLOOD PRESSURE: 84 MMHG | HEIGHT: 67 IN | BODY MASS INDEX: 23.39 KG/M2 | TEMPERATURE: 98.4 F | RESPIRATION RATE: 16 BRPM

## 2024-10-22 DIAGNOSIS — C80.1 METASTASIS TO BRAIN OF UNKNOWN ORIGIN: Primary | ICD-10-CM

## 2024-10-22 DIAGNOSIS — C20 RECTAL ADENOCARCINOMA: Primary | ICD-10-CM

## 2024-10-22 DIAGNOSIS — C20 RECTAL ADENOCARCINOMA: ICD-10-CM

## 2024-10-22 DIAGNOSIS — C79.31 METASTASIS TO BRAIN OF UNKNOWN ORIGIN: ICD-10-CM

## 2024-10-22 DIAGNOSIS — C80.1 METASTASIS TO BRAIN OF UNKNOWN ORIGIN: ICD-10-CM

## 2024-10-22 DIAGNOSIS — K63.89 COLONIC MASS: ICD-10-CM

## 2024-10-22 DIAGNOSIS — C79.31 METASTASIS TO BRAIN OF UNKNOWN ORIGIN: Primary | ICD-10-CM

## 2024-10-22 LAB
ALBUMIN SERPL-MCNC: 3.5 G/DL (ref 3.5–5.2)
ALBUMIN/GLOB SERPL: 1.3 G/DL
ALP SERPL-CCNC: 70 U/L (ref 39–117)
ALT SERPL W P-5'-P-CCNC: 26 U/L (ref 1–33)
ANION GAP SERPL CALCULATED.3IONS-SCNC: 11 MMOL/L (ref 5–15)
AST SERPL-CCNC: 27 U/L (ref 1–32)
BASOPHILS # BLD AUTO: 0.02 10*3/MM3 (ref 0–0.2)
BASOPHILS NFR BLD AUTO: 0.6 % (ref 0–1.5)
BILIRUB SERPL-MCNC: 0.2 MG/DL (ref 0–1.2)
BUN SERPL-MCNC: 6 MG/DL (ref 8–23)
BUN/CREAT SERPL: 8.6 (ref 7–25)
CALCIUM SPEC-SCNC: 8.9 MG/DL (ref 8.6–10.5)
CHLORIDE SERPL-SCNC: 102 MMOL/L (ref 98–107)
CO2 SERPL-SCNC: 25 MMOL/L (ref 22–29)
CREAT SERPL-MCNC: 0.7 MG/DL (ref 0.57–1)
DEPRECATED RDW RBC AUTO: 56.2 FL (ref 37–54)
EGFRCR SERPLBLD CKD-EPI 2021: 96.7 ML/MIN/1.73
EOSINOPHIL # BLD AUTO: 0.05 10*3/MM3 (ref 0–0.4)
EOSINOPHIL NFR BLD AUTO: 1.6 % (ref 0.3–6.2)
ERYTHROCYTE [DISTWIDTH] IN BLOOD BY AUTOMATED COUNT: 15.3 % (ref 12.3–15.4)
GLOBULIN UR ELPH-MCNC: 2.7 GM/DL
GLUCOSE SERPL-MCNC: 109 MG/DL (ref 65–99)
HCT VFR BLD AUTO: 31 % (ref 34–46.6)
HGB BLD-MCNC: 10.2 G/DL (ref 12–15.9)
IMM GRANULOCYTES # BLD AUTO: 0.01 10*3/MM3 (ref 0–0.05)
IMM GRANULOCYTES NFR BLD AUTO: 0.3 % (ref 0–0.5)
LYMPHOCYTES # BLD AUTO: 1.28 10*3/MM3 (ref 0.7–3.1)
LYMPHOCYTES NFR BLD AUTO: 41.6 % (ref 19.6–45.3)
MCH RBC QN AUTO: 33.6 PG (ref 26.6–33)
MCHC RBC AUTO-ENTMCNC: 32.9 G/DL (ref 31.5–35.7)
MCV RBC AUTO: 102 FL (ref 79–97)
MONOCYTES # BLD AUTO: 0.8 10*3/MM3 (ref 0.1–0.9)
MONOCYTES NFR BLD AUTO: 26 % (ref 5–12)
NEUTROPHILS NFR BLD AUTO: 0.92 10*3/MM3 (ref 1.7–7)
NEUTROPHILS NFR BLD AUTO: 29.9 % (ref 42.7–76)
NRBC BLD AUTO-RTO: 0 /100 WBC (ref 0–0.2)
PLATELET # BLD AUTO: 272 10*3/MM3 (ref 140–450)
PMV BLD AUTO: 8.6 FL (ref 6–12)
POTASSIUM SERPL-SCNC: 3.7 MMOL/L (ref 3.5–5.2)
PROT SERPL-MCNC: 6.2 G/DL (ref 6–8.5)
RBC # BLD AUTO: 3.04 10*6/MM3 (ref 3.77–5.28)
SODIUM SERPL-SCNC: 138 MMOL/L (ref 136–145)
WBC NRBC COR # BLD AUTO: 3.08 10*3/MM3 (ref 3.4–10.8)

## 2024-10-22 PROCEDURE — G0498 CHEMO EXTEND IV INFUS W/PUMP: HCPCS

## 2024-10-22 PROCEDURE — 85025 COMPLETE CBC W/AUTO DIFF WBC: CPT

## 2024-10-22 PROCEDURE — 96368 THER/DIAG CONCURRENT INF: CPT

## 2024-10-22 PROCEDURE — 96413 CHEMO IV INFUSION 1 HR: CPT

## 2024-10-22 PROCEDURE — 25010000002 FLUOROURACIL PER 500 MG

## 2024-10-22 PROCEDURE — 0 DEXTROSE 5 % SOLUTION 250 ML FLEX CONT

## 2024-10-22 PROCEDURE — 25010000002 PALONOSETRON PER 25 MCG

## 2024-10-22 PROCEDURE — 25010000002 LEUCOVORIN CALCIUM PER 50 MG

## 2024-10-22 PROCEDURE — 99214 OFFICE O/P EST MOD 30 MIN: CPT

## 2024-10-22 PROCEDURE — 25010000002 DEXAMETHASONE SODIUM PHOSPHATE 100 MG/10ML SOLUTION

## 2024-10-22 PROCEDURE — 96366 THER/PROPH/DIAG IV INF ADDON: CPT

## 2024-10-22 PROCEDURE — 25810000003 SODIUM CHLORIDE 0.9 % SOLUTION 250 ML FLEX CONT

## 2024-10-22 PROCEDURE — 96375 TX/PRO/DX INJ NEW DRUG ADDON: CPT

## 2024-10-22 PROCEDURE — 96367 TX/PROPH/DG ADDL SEQ IV INF: CPT

## 2024-10-22 PROCEDURE — 25010000002 FOSAPREPITANT PER 1 MG

## 2024-10-22 PROCEDURE — 25010000002 OXALIPLATIN PER 0.5 MG

## 2024-10-22 PROCEDURE — 0 DEXTROSE 5 % SOLUTION

## 2024-10-22 PROCEDURE — 96415 CHEMO IV INFUSION ADDL HR: CPT

## 2024-10-22 PROCEDURE — 80053 COMPREHEN METABOLIC PANEL: CPT

## 2024-10-22 RX ORDER — PALONOSETRON 0.05 MG/ML
0.25 INJECTION, SOLUTION INTRAVENOUS ONCE
Status: CANCELLED | OUTPATIENT
Start: 2024-10-22

## 2024-10-22 RX ORDER — DEXTROSE MONOHYDRATE 50 MG/ML
20 INJECTION, SOLUTION INTRAVENOUS ONCE
Status: CANCELLED | OUTPATIENT
Start: 2024-10-22

## 2024-10-22 RX ORDER — DIPHENHYDRAMINE HYDROCHLORIDE 50 MG/ML
50 INJECTION INTRAMUSCULAR; INTRAVENOUS AS NEEDED
Status: CANCELLED | OUTPATIENT
Start: 2024-10-22

## 2024-10-22 RX ORDER — PALONOSETRON 0.05 MG/ML
0.25 INJECTION, SOLUTION INTRAVENOUS ONCE
Status: COMPLETED | OUTPATIENT
Start: 2024-10-22 | End: 2024-10-22

## 2024-10-22 RX ORDER — FAMOTIDINE 10 MG/ML
20 INJECTION, SOLUTION INTRAVENOUS AS NEEDED
Status: CANCELLED | OUTPATIENT
Start: 2024-10-22

## 2024-10-22 RX ORDER — DEXTROSE MONOHYDRATE 50 MG/ML
20 INJECTION, SOLUTION INTRAVENOUS ONCE
Status: COMPLETED | OUTPATIENT
Start: 2024-10-22 | End: 2024-10-22

## 2024-10-22 RX ADMIN — LEUCOVORIN CALCIUM 700 MG: 350 INJECTION, POWDER, LYOPHILIZED, FOR SUSPENSION INTRAMUSCULAR; INTRAVENOUS at 11:13

## 2024-10-22 RX ADMIN — FLUOROURACIL 4220 MG: 50 INJECTION, SOLUTION INTRAVENOUS at 13:16

## 2024-10-22 RX ADMIN — FOSAPREPITANT 100 ML: 150 INJECTION, POWDER, LYOPHILIZED, FOR SOLUTION INTRAVENOUS at 10:39

## 2024-10-22 RX ADMIN — PALONOSETRON HYDROCHLORIDE 0.25 MG: 0.25 INJECTION INTRAVENOUS at 10:39

## 2024-10-22 RX ADMIN — DEXAMETHASONE SODIUM PHOSPHATE 12 MG: 10 INJECTION, SOLUTION INTRAMUSCULAR; INTRAVENOUS at 10:23

## 2024-10-22 RX ADMIN — OXALIPLATIN 150 MG: 5 INJECTION, SOLUTION INTRAVENOUS at 11:13

## 2024-10-22 RX ADMIN — DEXTROSE MONOHYDRATE 20 ML/HR: 50 INJECTION, SOLUTION INTRAVENOUS at 11:00

## 2024-10-22 NOTE — PROGRESS NOTES
"[facility] OUTPATIENT FOLLOW UP CLINIC VISIT    REASON FOR FOLLOW-UP:    Metastatic colon cancer     HISTORY OF PRESENT ILLNESS:  Ely Sims is a 64 y.o. female who returns today for follow up of the above issue. She is due today for Cycle 2 Day 1 FOLFOX. She reports doing well after the first cycle- denies nausea/vomiting/diarrhea, numbness/tingling, or changes in her appetite. She denies cold sensitivities. She does continue to have lower abdominal cramping which happens at night. She has had this since surgery and it is unchanged since starting chemotherapy. She \"feels like she can feel the food digesting\". She states stoma is healing well. She takes norco which does help with this pain.     PHYSICAL EXAMINATION:  Vitals:    10/22/24 0919   BP: 144/84   Pulse: 76   Resp: 16   Temp: 98.4 °F (36.9 °C)   TempSrc: Oral   SpO2: 100%   Weight: 67.6 kg (149 lb)   Height: 170.2 cm (67.01\")   PainSc: 0-No pain       General: No acute distress, alert and oriented.   Skin:  Warm and dry, no visible rash  HEENT:  Grade II mucositis on the soft palate  Chest:  Normal respiratory effort. CTAB.  Benign-appearing Mediport present.  Heart: RRR  Abdomen: Ostomy present.   Extremities:  No visible clubbing, cyanosis, or edema  Neuro/psych:  Grossly nonfocal.  Normal mood and affect.    DIAGNOSTIC DATA:  Results from last 7 days   Lab Units 10/22/24  0856   WBC 10*3/mm3 3.08*   NEUTROS ABS 10*3/mm3 0.92*   HEMOGLOBIN g/dL 10.2*   HEMATOCRIT % 31.0*   PLATELETS 10*3/mm3 272     Results from last 7 days   Lab Units 10/22/24  0856   SODIUM mmol/L 138   POTASSIUM mmol/L 3.7   CHLORIDE mmol/L 102   CO2 mmol/L 25.0   BUN mg/dL 6*   CREATININE mg/dL 0.70   CALCIUM mg/dL 8.9   ALBUMIN g/dL 3.5   BILIRUBIN mg/dL 0.2   ALK PHOS U/L 70   ALT (SGPT) U/L 26   AST (SGOT) U/L 27   GLUCOSE mg/dL 109*         IMAGING:    NM PET/CT Skull Base to Mid Thigh (09/12/2024 08:13)      ASSESSMENT:  This is a 64 y.o. female with:    *Metastatic " colon cancer  The patient presented on 8/19/2022 initially to urgent care with headache, nausea, vomiting, diarrhea.  She was advised to go to the ED from urgent care.  CT head 8/19/2024 showed some areas of increased attenuation over the right cerebral hemisphere and left temporal lobe concerning for metastatic disease with edema with some mass effect upon the fourth ventricle.  MRI brain 8/20/2024 with a lesion at the right cerebellar hemisphere measuring 2.2 cm, lesion within the left temporal lobe measuring 9 mm, 1.0 cm lesion at the left cerebellar vermis, all with surrounding edema.  There was some mass effect upon the fourth ventricle with mild prominence of the lateral and third ventricles.  8/20/2024: CT chest abdomen and pelvis with no evidence of pulmonary metastatic disease.  There was some asymmetric wall thickening in the distal sigmoid colon with surrounding mesenteric soft tissue nodularity concerning for primary colon cancer.  A sclerotic lesion of the spinous process of T1 was said to be consistent with a bone island or other benign lesion.  8/21/2024: Posterior fossa craniectomy with gross total removal of a right cerebellar hemisphere metastasis Dr. Bull Doty.  Pathology consistent with metastatic poorly differentiated adenocarcinoma favoring colorectal origin.  MSI testing is pending.  8/25/2024: Bone scan with no obvious metastatic disease.  8/25/2024: Colonoscopy by Dr. Kaminski shows an infiltrative completely obstructing large mass found at the proximal rectum.  Pathology with invasive poorly differentiated carcinoma with neuroendocrine features. MSI studies pending.     CEA 2.10.  8/26/2024: MRI lumbar spine with no obvious metastatic disease in the lumbar spine.  However, there is a 3 mm enhancing lesion in the visualized lower thoracic spinal cord that could represent a spinal cord metastasis.  Cervical and thoracic spine MRI requested..  8/27/24: diverting loop sigmoid colostomy and  port placement by Dr. Chester  8/28/2024: MRI C and T-spine addendum made with a small 3 mm met to the spinal cord at T12  Completed radiation to the brain and spine on 9/24/24  PET scan 9/12/24 with no metastatic disease. FDG avid mass at the high rectum.  10/22/2024: Proceed with Cycle 2 FOLFOX. Will omit 5 FU bolus due to neutropenia. Future treatment plans adjusted. In regards to lower abdominal cramping which occurs at night, advised patient to discuss with Dr. Chester (surgeon).      *Mild normocytic anemia  Hemoglobin improved, pending today    *Neutropenia secondary to chemotherapy  10/22/2024:  today. Discussed with Dr. Saunders, will proceed with treatment today, but omitting the 5 FU bolus today and for future treatments.      PLAN:  Proceed with Cycle 2 FOLFOX, omitted the 5 FU bolus due to neutropenia. Future treatment plans adjusted accordingly.   Return to clinic in 2 weeks for NP visit, Cycle 3 FOLFOX, cbc/cmp.   Instructed to reach out sooner with any concerns.     Patient is on a high risk medication requiring close monitoring for toxicity.    GRETCHEN Fowler

## 2024-10-22 NOTE — PROGRESS NOTES
OUTPATIENT ONCOLOGY NUTRITION ASSESSMENT    Patient Name: Ely Sims  YOB: 1960  MRN: [id number]  Assessment Date: 10/22/2024    COMMENTS: Follow up during her infusion today. Receiving cycle 2 FOLFOX.   The patient reports that she tolerated cycle one well without any issues. She is eating well.   Will continue to be available as needed.            Reason for Assessment Physician referral, New assessment, Education      Diagnosis/Problem   Stage IV rectal cancer with brain mets, S/P Diverting Colostomy   Treatment Plan Chemotherapy, Radiation 5FU, Leucovorin and Oxaliplatin   Frequency Q 2 weeks x 4 (possibly 8)   Goal of cancer treatment Disease control   Comments:          Encounter Information        Nutrition/Diet History:  Tolerating po well. Likes sweets   Oral Nutrition Supplements:    Factors/Symptoms Affecting Intake: No factors at this time   Comments:      Medical/Surgical History Past Medical History:   Diagnosis Date    Metastasis to brain     Rectal carcinoma 2024    Seasonal allergies        Past Surgical History:   Procedure Laterality Date    COLONOSCOPY N/A 08/25/2024    Procedure: COLONOSCOPY to 20cm with cold biopsies and needle tattoo;  Surgeon: Shadi Kaminski MD;  Location: [facility] ENDOSCOPY;  Service: Gastroenterology;  Laterality: N/A;  pre: abnormal imaging  post: poor prep, obstructing colon mass at 20cm, hemorrhoids    COLOSTOMY N/A 08/27/2024    Procedure: COLOSTOMY LAPAROSCOPIC;  Surgeon: Saturnino Chester MD;  Location: [facility] OR;  Service: General;  Laterality: N/A;    CRANIOTOMY N/A 08/21/2024    Procedure: Posterior fossa craniotomy for tumor using stereotactic guidance;  Surgeon: Bull Doty MD;  Location: [facility] OR;  Service: Neurosurgery;  Laterality: N/A;    ENDOMETRIAL ABLATION      TUBAL ABDOMINAL LIGATION      VENOUS ACCESS DEVICE (PORT) INSERTION Right 08/27/2024    Procedure: INSERTION VENOUS ACCESS DEVICE;  Surgeon: Saturnino Chester  "MD Sohail;  Location: [facility];  Service: General;  Laterality: Right;               Anthropometrics        Current Height Ht Readings from Last 1 Encounters:   10/22/24 170.2 cm (67.01\")      Current Weight Wt Readings from Last 1 Encounters:   10/22/24 67.6 kg (149 lb)      Weight History Wt Readings from Last 30 Encounters:   10/22/24 0919 67.6 kg (149 lb)   10/08/24 0903 68.6 kg (151 lb 3.2 oz)   09/25/24 0828 65.3 kg (144 lb)   09/17/24 1159 65.5 kg (144 lb 8 oz)   09/03/24 1318 67.1 kg (148 lb)   09/03/24 0904 66.9 kg (147 lb 6.4 oz)   08/25/24 0500 72 kg (158 lb 11.7 oz)   08/21/24 0513 65.3 kg (143 lb 15.4 oz)   08/21/24 0200 65.3 kg (143 lb 15.4 oz)   08/20/24 0648 65.1 kg (143 lb 8.3 oz)   08/20/24 0505 65.1 kg (143 lb 8.3 oz)   08/19/24 2017 65.1 kg (143 lb 8.3 oz)   08/19/24 1425 65.8 kg (145 lb)          Medications           Current medications: HYDROcodone-acetaminophen, LORazepam, acetaminophen, amLODIPine, amphetamine-dextroamphetamine, atorvastatin, buPROPion XL, dexAMETHasone, docusate sodium, famotidine, fluconazole, levETIRAcetam, lidocaine-prilocaine, naloxone, and ondansetron                 Tests/Procedures        Tests/Procedures Chemotherapy, Radiation     Labs       Pertinent Labs    Results from last 7 days   Lab Units 10/22/24  0856   SODIUM mmol/L 138   POTASSIUM mmol/L 3.7   CHLORIDE mmol/L 102   CO2 mmol/L 25.0   BUN mg/dL 6*   CREATININE mg/dL 0.70   CALCIUM mg/dL 8.9   BILIRUBIN mg/dL 0.2   ALK PHOS U/L 70   ALT (SGPT) U/L 26   AST (SGOT) U/L 27   GLUCOSE mg/dL 109*     Results from last 7 days   Lab Units 10/22/24  0856   HEMOGLOBIN g/dL 10.2*   HEMATOCRIT % 31.0*   WBC 10*3/mm3 3.08*   ALBUMIN g/dL 3.5     Results from last 7 days   Lab Units 10/22/24  0856   PLATELETS 10*3/mm3 272     COVID19   Date Value Ref Range Status   08/19/2024 Not Detected Not Detected - Ref. Range Final     Lab Results   Component Value Date    HGBA1C 5.50 08/24/2024          Physical Findings  "       Physical Appearance alert, oriented     Edema  not assessed   Gastrointestinal colostomy   Tubes/Lines/Drains Implantable Port   Oral/Mouth Cavity WNL (mucositis with XRT)   Skin Intact     NUTRITION INTERVENTION / PLAN OF CARE      Intervention Goal(s) Maintain nutrition status, Reduce/improve symptoms, Provide information, Meet estimated needs, Disease management/therapy, Tolerate PO , Maintain intake, and Maintain weight         RD Intervention/Action Encouraged intake, Follow Tx progress, Recommended/ordered         Recommendations:       PO Diet Consume calorie and protein dense healthy foods      Supplements       Snacks            --      Monitor/Evaluation Follow up as needed       --    Electronically signed by:  Berenice Minaya RD, LD  10/22/24 14:20 EDT

## 2024-10-22 NOTE — PROGRESS NOTES
R/w Bethany APRN low ANC. V/o ok to treat. Omitting 5FU bolus today.    Lab Results   Component Value Date    NEUTROABS 0.92 (L) 10/22/2024

## 2024-10-23 DIAGNOSIS — G89.3 CANCER ASSOCIATED PAIN: ICD-10-CM

## 2024-10-23 LAB — TEMPUS PORTAL: NORMAL

## 2024-10-23 RX ORDER — HYDROCODONE BITARTRATE AND ACETAMINOPHEN 5; 325 MG/1; MG/1
1 TABLET ORAL EVERY 6 HOURS PRN
Qty: 60 TABLET | Refills: 0 | Status: SHIPPED | OUTPATIENT
Start: 2024-10-23 | End: 2024-10-24 | Stop reason: SDUPTHER

## 2024-10-24 ENCOUNTER — HOSPITAL ENCOUNTER (OUTPATIENT)
Dept: MRI IMAGING | Facility: HOSPITAL | Age: 64
Discharge: HOME | End: 2024-10-24
Admitting: NEUROLOGICAL SURGERY
Payer: COMMERCIAL

## 2024-10-24 ENCOUNTER — INFUSION (OUTPATIENT)
Dept: ONCOLOGY | Facility: HOSPITAL | Age: 64
End: 2024-10-24
Payer: COMMERCIAL

## 2024-10-24 DIAGNOSIS — G89.3 CANCER ASSOCIATED PAIN: ICD-10-CM

## 2024-10-24 DIAGNOSIS — C79.31 METASTASIS TO BRAIN OF UNKNOWN ORIGIN: Primary | ICD-10-CM

## 2024-10-24 DIAGNOSIS — Z45.2 ENCOUNTER FOR ADJUSTMENT OR MANAGEMENT OF VASCULAR ACCESS DEVICE: ICD-10-CM

## 2024-10-24 DIAGNOSIS — C20 RECTAL ADENOCARCINOMA: ICD-10-CM

## 2024-10-24 DIAGNOSIS — K63.89 COLONIC MASS: ICD-10-CM

## 2024-10-24 DIAGNOSIS — C80.1 METASTASIS TO BRAIN OF UNKNOWN ORIGIN: Primary | ICD-10-CM

## 2024-10-24 DIAGNOSIS — Z09 FOLLOW-UP EXAMINATION FOLLOWING SURGERY: ICD-10-CM

## 2024-10-24 PROCEDURE — A9577 INJ MULTIHANCE: HCPCS | Performed by: NEUROLOGICAL SURGERY

## 2024-10-24 PROCEDURE — 70553 MRI BRAIN STEM W/O & W/DYE: CPT

## 2024-10-24 PROCEDURE — 0 GADOBENATE DIMEGLUMINE 529 MG/ML SOLUTION: Performed by: NEUROLOGICAL SURGERY

## 2024-10-24 PROCEDURE — 25010000002 HEPARIN LOCK FLUSH PER 10 UNITS: Performed by: INTERNAL MEDICINE

## 2024-10-24 RX ORDER — HEPARIN SODIUM (PORCINE) LOCK FLUSH IV SOLN 100 UNIT/ML 100 UNIT/ML
500 SOLUTION INTRAVENOUS AS NEEDED
OUTPATIENT
Start: 2024-10-24

## 2024-10-24 RX ORDER — HEPARIN SODIUM (PORCINE) LOCK FLUSH IV SOLN 100 UNIT/ML 100 UNIT/ML
500 SOLUTION INTRAVENOUS AS NEEDED
Status: DISCONTINUED | OUTPATIENT
Start: 2024-10-24 | End: 2024-10-24 | Stop reason: HOSPADM

## 2024-10-24 RX ORDER — SODIUM CHLORIDE 0.9 % (FLUSH) 0.9 %
10 SYRINGE (ML) INJECTION AS NEEDED
OUTPATIENT
Start: 2024-10-24

## 2024-10-24 RX ORDER — SODIUM CHLORIDE 0.9 % (FLUSH) 0.9 %
10 SYRINGE (ML) INJECTION AS NEEDED
Status: DISCONTINUED | OUTPATIENT
Start: 2024-10-24 | End: 2024-10-24 | Stop reason: HOSPADM

## 2024-10-24 RX ORDER — HYDROCODONE BITARTRATE AND ACETAMINOPHEN 5; 325 MG/1; MG/1
1 TABLET ORAL EVERY 6 HOURS PRN
Qty: 10 TABLET | Refills: 0 | Status: SHIPPED | OUTPATIENT
Start: 2024-10-24

## 2024-10-24 RX ADMIN — Medication 500 UNITS: at 10:47

## 2024-10-24 RX ADMIN — GADOBENATE DIMEGLUMINE 15 ML: 529 INJECTION, SOLUTION INTRAVENOUS at 07:14

## 2024-10-24 RX ADMIN — Medication 10 ML: at 10:48

## 2024-10-28 NOTE — PROGRESS NOTES
Subjective   Patient ID: Ely Sims is a 64 y.o. female is here today for follow-up with a new MRI Brain done on 10/24/2024.    History of Present Illness    This patient returns today.  She is doing well.  She is feeling pretty good overall.  She has no headaches.  She has finished radiation treatments to her brain and her spine.    Review of Systems   Constitutional:  Negative for chills and fever.   Eyes:  Negative for visual disturbance.   Musculoskeletal:  Negative for gait problem.   Neurological:  Negative for dizziness, light-headedness and headaches.       I reviewed the review of systems listed by the patient and discussed by my MA    Objective     There were no vitals filed for this visit.  There is no height or weight on file to calculate BMI.    Tobacco Use: Medium Risk (10/29/2024)    Patient History     Smoking Tobacco Use: Former     Smokeless Tobacco Use: Never     Passive Exposure: Not on file          Physical Exam  Neurological:      Mental Status: She is alert and oriented to person, place, and time.       Neurological Exam  Mental Status  Alert. Oriented to person, place, and time.          Assessment & Plan   Independent Review of Radiographic Studies:      I personally reviewed the images from the following studies.    I reviewed her MRI of the brain.  This shows no evidence of recurrent or residual tumor in the resection cavity.  The other 2 abnormalities in the brain we were watching have both gotten smaller because of the radiation.  The spine was not imaged.    Medical Decision Making:      I told the patient we should scan her again in 3 months.  We will plan to look at both her thoracic spine and her brain.    Diagnoses and all orders for this visit:    1. Follow-up examination following surgery (Primary)  -     MRI Brain With & Without Contrast; Future  -     MRI Thoracic Spine With & Without Contrast; Future      Return in about 3 months (around 1/29/2025).

## 2024-10-29 ENCOUNTER — OFFICE VISIT (OUTPATIENT)
Dept: NEUROSURGERY | Facility: CLINIC | Age: 64
End: 2024-10-29
Payer: COMMERCIAL

## 2024-10-29 DIAGNOSIS — Z09 FOLLOW-UP EXAMINATION FOLLOWING SURGERY: Primary | ICD-10-CM

## 2024-10-29 PROBLEM — C18.9 COLON CANCER METASTASIZED TO BRAIN: Status: ACTIVE | Noted: 2024-08-19

## 2024-10-29 PROCEDURE — 99024 POSTOP FOLLOW-UP VISIT: CPT | Performed by: NEUROLOGICAL SURGERY

## 2024-11-01 ENCOUNTER — DOCUMENTATION (OUTPATIENT)
Dept: RADIATION ONCOLOGY | Facility: HOSPITAL | Age: 64
End: 2024-11-01
Payer: COMMERCIAL

## 2024-11-01 ENCOUNTER — OFFICE VISIT (OUTPATIENT)
Dept: SURGERY | Facility: CLINIC | Age: 64
End: 2024-11-01
Payer: COMMERCIAL

## 2024-11-01 VITALS
OXYGEN SATURATION: 97 % | DIASTOLIC BLOOD PRESSURE: 74 MMHG | BODY MASS INDEX: 22.76 KG/M2 | HEART RATE: 81 BPM | HEIGHT: 67 IN | WEIGHT: 145 LBS | SYSTOLIC BLOOD PRESSURE: 132 MMHG

## 2024-11-01 DIAGNOSIS — K59.01 SLOW TRANSIT CONSTIPATION: Primary | ICD-10-CM

## 2024-11-01 PROCEDURE — 99024 POSTOP FOLLOW-UP VISIT: CPT | Performed by: SURGERY

## 2024-11-01 RX ORDER — AMOXICILLIN 250 MG
2 CAPSULE ORAL DAILY
Qty: 90 TABLET | Refills: 2 | Status: SHIPPED | OUTPATIENT
Start: 2024-11-01

## 2024-11-01 RX ORDER — POLYETHYLENE GLYCOL 3350 17 G/17G
17 POWDER, FOR SOLUTION ORAL DAILY
Qty: 90 EACH | Refills: 2 | Status: SHIPPED | OUTPATIENT
Start: 2024-11-01

## 2024-11-01 NOTE — PATIENT INSTRUCTIONS
A high fiber diet with plenty of fluids (up to 8 glasses of water daily) is suggested to relieve these symptoms.  Metamucil, 1 tablespoon once or twice daily can be used to keep bowels regular if needed.

## 2024-11-01 NOTE — LETTER
"November 3, 2024     Terrie Crenshaw MD  [address]  [address] [address]    Patient: Ely Sims   YOB: 1960   Date of Visit: 11/1/2024     Dear Terrie Crenshaw MD:       Thank you for referring Ely Sims to me for evaluation. Below are the relevant portions of my assessment and plan of care.    If you have questions, please do not hesitate to call me. I look forward to following Ely along with you.         Sincerely,        Saturnino Chester MD        CC: MD Bull Rasheed MD Gallo, Saturnino Sauceda MD  11/03/24 2106  Sign when Signing Visit  CC: Abdominal pain status was diverting descending loop colostomy    S: Patient is a very pleasant 64-year-old female that is here today for evaluation secondary to abdominal cramping and constipation.  The patient reports that for the past several weeks she has been having intermittent episodes of abdominal pain that are cramping in nature and he feels like the bowel is squeezing very hard.  The pain lasts until she has bowel movements.  She reports changing her ostomy bag 3-4 times a day.  She reports intermittent hard and soft stools.  She denies any bleeding.  She denies any rectal bleeding  She recently underwent MRI of the brain that show improvement of brain lesions.    O:   Vitals:    11/01/24 0906   BP: 132/74   Pulse: 81   SpO2: 97%   Weight: 65.8 kg (145 lb)   Height: 170.2 cm (67.01\")      Alert, no acute distress  Breathing comfortable  Abdomen soft, nontender, nontender, ostomy pink and viable with stool  No hernias    Diagnostic studies:   PET scan 9/12/2024: Persistent intense masslike uptake within the high rectum and sigmoid colon with soft tissue thickening over the left aspect of the mass likely extension into retroperitoneum.  There is moderate amount of stool throughout the colon    Assessment and plan    64-year-old female with metastatic colon cancer s/p diverting descending loop " colostomy.  Patient is having abdominal cramping and pain due to constipation.  Discussed with the patient about further step.  She should be on a bowel regimen with stool softener, fiber supplementation and MiraLAX.  Discussed with her that if severe constipation she can always take MiraLAX twice daily.  Will see how she does.  Patient will follow-up with me in March or earlier having further issues.  She should continue chemotherapy there is her main goal of treatment.    She understood    Saturnino Chester MD, FACS  General, Minimally Invasive and Endoscopic Surgery  [facility]    [address], [address]  [place], [place], [address]  P: [phone]  F: [phone]

## 2024-11-01 NOTE — PROGRESS NOTES
Health Maintenance Summary     Topic Due On Due Status Completed On    MAMMOGRAM - BREAST CANCER SCREENING Feb 14, 2016 Overdue Feb 14, 2014    Colorectal Cancer Screening - Colonoscopy Jun 16, 2026 Not Due Jun 16, 2016    Immunization - TDAP Pregnancy  Hidden     IMMUNIZATION - DTaP/Tdap/Td Jul 10, 2019 Not Due Jul 10, 2009    Immunization-Influenza Sep 1, 2017 Not Due           Patient is due for topics as listed above, she wishes to decline at this time .     MRI request from Dr. Crenshaw has been closed per Andra Crenshaw's nurse

## 2024-11-04 NOTE — PROGRESS NOTES
"CC: Abdominal pain status was diverting descending loop colostomy    S: Patient is a very pleasant 64-year-old female that is here today for evaluation secondary to abdominal cramping and constipation.  The patient reports that for the past several weeks she has been having intermittent episodes of abdominal pain that are cramping in nature and he feels like the bowel is squeezing very hard.  The pain lasts until she has bowel movements.  She reports changing her ostomy bag 3-4 times a day.  She reports intermittent hard and soft stools.  She denies any bleeding.  She denies any rectal bleeding  She recently underwent MRI of the brain that show improvement of brain lesions.    O:   Vitals:    11/01/24 0906   BP: 132/74   Pulse: 81   SpO2: 97%   Weight: 65.8 kg (145 lb)   Height: 170.2 cm (67.01\")      Alert, no acute distress  Breathing comfortable  Abdomen soft, nontender, nontender, ostomy pink and viable with stool  No hernias    Diagnostic studies:   PET scan 9/12/2024: Persistent intense masslike uptake within the high rectum and sigmoid colon with soft tissue thickening over the left aspect of the mass likely extension into retroperitoneum.  There is moderate amount of stool throughout the colon    Assessment and plan    64-year-old female with metastatic colon cancer s/p diverting descending loop colostomy.  Patient is having abdominal cramping and pain due to constipation.  Discussed with the patient about further step.  She should be on a bowel regimen with stool softener, fiber supplementation and MiraLAX.  Discussed with her that if severe constipation she can always take MiraLAX twice daily.  Will see how she does.  Patient will follow-up with me in March or earlier having further issues.  She should continue chemotherapy there is her main goal of treatment.    She understood    Saturnino Chester MD, FACS  General, Minimally Invasive and Endoscopic Surgery  [facility]    [address], " [address]  [place], [place], [address]  P: [phone]  F: [phone]

## 2024-11-04 NOTE — PROGRESS NOTES
"[facility] GROUP OUTPATIENT FOLLOW UP CLINIC VISIT    REASON FOR FOLLOW-UP:    Metastatic colon cancer     HISTORY OF PRESENT ILLNESS:    She presents today for Cycle 3 FOLFOX. She recently saw Dr. Chester for the ongoing lower abdominal cramping, he advised metamucil and senokot which she recently started and seems to be helping. She denies nausea or numbness/tingling. She did have the cold sensitivities for a couple of days after treatment, but this resolved. She does think she slept in a weird position last night because she woke up and the lower right portion of her back.her side is sore. Massage helps to make it feel better, she otherwise has not done anything else for it. She states it feels like a dull ache and the pain is positional. She can easily fall asleep, but struggles with staying asleep and wakes up multiple times throughout the night. She feels this is mostly related to the abdominal cramping/ostomy.     PHYSICAL EXAMINATION:  Vitals:    11/05/24 0838   BP: 137/85   Pulse: 87   Resp: 16   Temp: 98.6 °F (37 °C)   TempSrc: Oral   SpO2: 96%   Weight: 65.6 kg (144 lb 9.6 oz)   Height: 170.2 cm (67\")   PainSc:   2       General: No acute distress, alert and oriented.   Skin:  Warm and dry, no visible rash  HEENT:  clear, moist. No visible sores.   Chest:  Normal respiratory effort. CTAB.  Benign-appearing Mediport present.  Heart: RRR  Abdomen: Ostomy present.   Extremities:  No visible clubbing, cyanosis, or edema  Neuro/psych:  Grossly nonfocal.  Normal mood and affect.      DIAGNOSTIC DATA:  Results from last 7 days   Lab Units 11/05/24  0816   WBC 10*3/mm3 4.05   NEUTROS ABS 10*3/mm3 0.98*   HEMOGLOBIN g/dL 11.2*   HEMATOCRIT % 34.3   PLATELETS 10*3/mm3 327     Results from last 7 days   Lab Units 11/05/24  0816   SODIUM mmol/L 137   POTASSIUM mmol/L 3.7   CHLORIDE mmol/L 101   CO2 mmol/L 20.1*   BUN mg/dL 5*   CREATININE mg/dL 0.56*   CALCIUM mg/dL 8.9   ALBUMIN g/dL 3.5   BILIRUBIN mg/dL 0.5 "   ALK PHOS U/L 101   ALT (SGPT) U/L 17   AST (SGOT) U/L 30   GLUCOSE mg/dL 85         IMAGING:    No new imaging to review today.      ASSESSMENT:  This is a 64 y.o. female with:    *Metastatic colon cancer  The patient presented on 8/19/2022 initially to urgent care with headache, nausea, vomiting, diarrhea.  She was advised to go to the ED from urgent care.  CT head 8/19/2024 showed some areas of increased attenuation over the right cerebral hemisphere and left temporal lobe concerning for metastatic disease with edema with some mass effect upon the fourth ventricle.  MRI brain 8/20/2024 with a lesion at the right cerebellar hemisphere measuring 2.2 cm, lesion within the left temporal lobe measuring 9 mm, 1.0 cm lesion at the left cerebellar vermis, all with surrounding edema.  There was some mass effect upon the fourth ventricle with mild prominence of the lateral and third ventricles.  8/20/2024: CT chest abdomen and pelvis with no evidence of pulmonary metastatic disease.  There was some asymmetric wall thickening in the distal sigmoid colon with surrounding mesenteric soft tissue nodularity concerning for primary colon cancer.  A sclerotic lesion of the spinous process of T1 was said to be consistent with a bone island or other benign lesion.  8/21/2024: Posterior fossa craniectomy with gross total removal of a right cerebellar hemisphere metastasis Dr. Bull Doty.  Pathology consistent with metastatic poorly differentiated adenocarcinoma favoring colorectal origin.  MSI testing is pending.  8/25/2024: Bone scan with no obvious metastatic disease.  8/25/2024: Colonoscopy by Dr. Kaminski shows an infiltrative completely obstructing large mass found at the proximal rectum.  Pathology with invasive poorly differentiated carcinoma with neuroendocrine features. MSI studies pending.     CEA 2.10.  8/26/2024: MRI lumbar spine with no obvious metastatic disease in the lumbar spine.  However, there is a 3 mm enhancing  lesion in the visualized lower thoracic spinal cord that could represent a spinal cord metastasis.  Cervical and thoracic spine MRI requested..  8/27/24: diverting loop sigmoid colostomy and port placement by Dr. Chester  8/28/2024: MRI C and T-spine addendum made with a small 3 mm met to the spinal cord at T12  Completed radiation to the brain and spine on 9/24/24  PET scan 9/12/24 with no metastatic disease. FDG avid mass at the high rectum.  10/22/2024: Proceed with Cycle 2 FOLFOX. Will omit 5 FU bolus due to neutropenia. Future treatment plans adjusted. In regards to lower abdominal cramping which occurs at night, advised patient to discuss with Dr. Chester (surgeon).   11/5/2024: Proceed with Cycle 3 FOLFOX with 5 FU bolus omitted. WBC 4.05 and . Will give a dose of tylenol today while in the infusion area for side/back pain. Instructed her to reach out if the pain/soreness does not subside.      *Mild normocytic anemia  Hemoglobin improved, pending today    *Neutropenia secondary to chemotherapy  10/22/2024:  today. Discussed with Dr. Saunders, will proceed with treatment today, but omitting the 5 FU bolus today and for future treatments.   11/5/2024: . Will proceed with treatment today and continue to closely monitor counts.     *Abdominal cramping   11/5/2024: Continue metamucil and senokot as needed. Cramping has improved since starting this regimen.     *Insomnia   11/5/2024: She is struggling with staying asleep and thinks its mainly related to ostomy/abdominal cramping, so hopefully the bowel regimen will resolve the pain, therefore, leading to less sleep disturbance. Nonetheless, we discussed if this is not the case, she could try melatonin.       PLAN:   Proceed with cycle 3 FOLFOX, 5-FU bolus admitted with cycle 2 due to neutropenia.  Return to clinic in 2 weeks for MD or NP visit, cycle 4 FOLFOX, and CBC/CMP.  Instructed her to reach out sooner with any concerns.    Patient is on a high  risk medication requiring close monitoring for toxicity.    Bethany Fuller, APRN

## 2024-11-05 ENCOUNTER — OFFICE VISIT (OUTPATIENT)
Dept: ONCOLOGY | Facility: CLINIC | Age: 64
End: 2024-11-05
Payer: COMMERCIAL

## 2024-11-05 ENCOUNTER — INFUSION (OUTPATIENT)
Dept: ONCOLOGY | Facility: HOSPITAL | Age: 64
End: 2024-11-05
Payer: COMMERCIAL

## 2024-11-05 ENCOUNTER — CLINICAL SUPPORT (OUTPATIENT)
Dept: OTHER | Facility: HOSPITAL | Age: 64
End: 2024-11-05
Payer: COMMERCIAL

## 2024-11-05 VITALS
SYSTOLIC BLOOD PRESSURE: 137 MMHG | RESPIRATION RATE: 16 BRPM | DIASTOLIC BLOOD PRESSURE: 85 MMHG | BODY MASS INDEX: 22.7 KG/M2 | TEMPERATURE: 98.6 F | HEART RATE: 87 BPM | WEIGHT: 144.6 LBS | HEIGHT: 67 IN | OXYGEN SATURATION: 96 %

## 2024-11-05 DIAGNOSIS — K63.89 COLONIC MASS: ICD-10-CM

## 2024-11-05 DIAGNOSIS — C20 RECTAL ADENOCARCINOMA: Primary | ICD-10-CM

## 2024-11-05 DIAGNOSIS — C18.9 COLON CANCER METASTASIZED TO BRAIN: ICD-10-CM

## 2024-11-05 DIAGNOSIS — C18.9 COLON CANCER METASTASIZED TO BRAIN: Primary | ICD-10-CM

## 2024-11-05 DIAGNOSIS — C79.31 COLON CANCER METASTASIZED TO BRAIN: ICD-10-CM

## 2024-11-05 DIAGNOSIS — C20 RECTAL ADENOCARCINOMA: ICD-10-CM

## 2024-11-05 DIAGNOSIS — C79.31 COLON CANCER METASTASIZED TO BRAIN: Primary | ICD-10-CM

## 2024-11-05 LAB
ALBUMIN SERPL-MCNC: 3.5 G/DL (ref 3.5–5.2)
ALBUMIN/GLOB SERPL: 1 G/DL
ALP SERPL-CCNC: 101 U/L (ref 39–117)
ALT SERPL W P-5'-P-CCNC: 17 U/L (ref 1–33)
ANION GAP SERPL CALCULATED.3IONS-SCNC: 15.9 MMOL/L (ref 5–15)
AST SERPL-CCNC: 30 U/L (ref 1–32)
BASOPHILS # BLD AUTO: 0.03 10*3/MM3 (ref 0–0.2)
BASOPHILS NFR BLD AUTO: 0.7 % (ref 0–1.5)
BILIRUB SERPL-MCNC: 0.5 MG/DL (ref 0–1.2)
BUN SERPL-MCNC: 5 MG/DL (ref 8–23)
BUN/CREAT SERPL: 8.9 (ref 7–25)
CALCIUM SPEC-SCNC: 8.9 MG/DL (ref 8.6–10.5)
CHLORIDE SERPL-SCNC: 101 MMOL/L (ref 98–107)
CO2 SERPL-SCNC: 20.1 MMOL/L (ref 22–29)
CREAT SERPL-MCNC: 0.56 MG/DL (ref 0.57–1)
DEPRECATED RDW RBC AUTO: 52.9 FL (ref 37–54)
EGFRCR SERPLBLD CKD-EPI 2021: 102.1 ML/MIN/1.73
EOSINOPHIL # BLD AUTO: 0.04 10*3/MM3 (ref 0–0.4)
EOSINOPHIL NFR BLD AUTO: 1 % (ref 0.3–6.2)
ERYTHROCYTE [DISTWIDTH] IN BLOOD BY AUTOMATED COUNT: 14.6 % (ref 12.3–15.4)
GLOBULIN UR ELPH-MCNC: 3.6 GM/DL
GLUCOSE SERPL-MCNC: 85 MG/DL (ref 65–99)
HCT VFR BLD AUTO: 34.3 % (ref 34–46.6)
HGB BLD-MCNC: 11.2 G/DL (ref 12–15.9)
IMM GRANULOCYTES # BLD AUTO: 0.02 10*3/MM3 (ref 0–0.05)
IMM GRANULOCYTES NFR BLD AUTO: 0.5 % (ref 0–0.5)
LYMPHOCYTES # BLD AUTO: 1.64 10*3/MM3 (ref 0.7–3.1)
LYMPHOCYTES NFR BLD AUTO: 40.5 % (ref 19.6–45.3)
MCH RBC QN AUTO: 32.7 PG (ref 26.6–33)
MCHC RBC AUTO-ENTMCNC: 32.7 G/DL (ref 31.5–35.7)
MCV RBC AUTO: 100.3 FL (ref 79–97)
MONOCYTES # BLD AUTO: 1.34 10*3/MM3 (ref 0.1–0.9)
MONOCYTES NFR BLD AUTO: 33.1 % (ref 5–12)
NEUTROPHILS NFR BLD AUTO: 0.98 10*3/MM3 (ref 1.7–7)
NEUTROPHILS NFR BLD AUTO: 24.2 % (ref 42.7–76)
NRBC BLD AUTO-RTO: 0 /100 WBC (ref 0–0.2)
PLATELET # BLD AUTO: 327 10*3/MM3 (ref 140–450)
PMV BLD AUTO: 9.1 FL (ref 6–12)
POTASSIUM SERPL-SCNC: 3.7 MMOL/L (ref 3.5–5.2)
PROT SERPL-MCNC: 7.1 G/DL (ref 6–8.5)
RBC # BLD AUTO: 3.42 10*6/MM3 (ref 3.77–5.28)
SODIUM SERPL-SCNC: 137 MMOL/L (ref 136–145)
WBC NRBC COR # BLD AUTO: 4.05 10*3/MM3 (ref 3.4–10.8)

## 2024-11-05 PROCEDURE — 25010000002 FOSAPREPITANT PER 1 MG

## 2024-11-05 PROCEDURE — 80053 COMPREHEN METABOLIC PANEL: CPT

## 2024-11-05 PROCEDURE — G0498 CHEMO EXTEND IV INFUS W/PUMP: HCPCS

## 2024-11-05 PROCEDURE — 0 DEXTROSE 5 % SOLUTION 250 ML FLEX CONT

## 2024-11-05 PROCEDURE — 25010000002 FLUOROURACIL PER 500 MG

## 2024-11-05 PROCEDURE — 25010000002 LEUCOVORIN CALCIUM PER 50 MG

## 2024-11-05 PROCEDURE — 85025 COMPLETE CBC W/AUTO DIFF WBC: CPT

## 2024-11-05 PROCEDURE — 25010000002 OXALIPLATIN PER 0.5 MG

## 2024-11-05 PROCEDURE — 96375 TX/PRO/DX INJ NEW DRUG ADDON: CPT

## 2024-11-05 PROCEDURE — 25810000003 SODIUM CHLORIDE 0.9 % SOLUTION 250 ML FLEX CONT

## 2024-11-05 PROCEDURE — 96368 THER/DIAG CONCURRENT INF: CPT

## 2024-11-05 PROCEDURE — 25010000002 PALONOSETRON PER 25 MCG

## 2024-11-05 PROCEDURE — 0 DEXTROSE 5 % SOLUTION

## 2024-11-05 PROCEDURE — 96367 TX/PROPH/DG ADDL SEQ IV INF: CPT

## 2024-11-05 PROCEDURE — 96416 CHEMO PROLONG INFUSE W/PUMP: CPT

## 2024-11-05 PROCEDURE — 96413 CHEMO IV INFUSION 1 HR: CPT

## 2024-11-05 PROCEDURE — 96415 CHEMO IV INFUSION ADDL HR: CPT

## 2024-11-05 PROCEDURE — 25010000002 DEXAMETHASONE SODIUM PHOSPHATE 100 MG/10ML SOLUTION

## 2024-11-05 RX ORDER — PALONOSETRON 0.05 MG/ML
0.25 INJECTION, SOLUTION INTRAVENOUS ONCE
Status: COMPLETED | OUTPATIENT
Start: 2024-11-05 | End: 2024-11-05

## 2024-11-05 RX ORDER — PALONOSETRON 0.05 MG/ML
0.25 INJECTION, SOLUTION INTRAVENOUS ONCE
Status: CANCELLED | OUTPATIENT
Start: 2024-11-05

## 2024-11-05 RX ORDER — DEXTROSE MONOHYDRATE 50 MG/ML
20 INJECTION, SOLUTION INTRAVENOUS ONCE
Status: CANCELLED | OUTPATIENT
Start: 2024-11-05

## 2024-11-05 RX ORDER — FAMOTIDINE 10 MG/ML
20 INJECTION, SOLUTION INTRAVENOUS AS NEEDED
Status: CANCELLED | OUTPATIENT
Start: 2024-11-05

## 2024-11-05 RX ORDER — DIPHENHYDRAMINE HYDROCHLORIDE 50 MG/ML
50 INJECTION INTRAMUSCULAR; INTRAVENOUS AS NEEDED
Status: CANCELLED | OUTPATIENT
Start: 2024-11-05

## 2024-11-05 RX ORDER — DEXTROSE MONOHYDRATE 50 MG/ML
20 INJECTION, SOLUTION INTRAVENOUS ONCE
Status: COMPLETED | OUTPATIENT
Start: 2024-11-05 | End: 2024-11-05

## 2024-11-05 RX ORDER — ACETAMINOPHEN 500 MG
500 TABLET ORAL ONCE
Status: COMPLETED | OUTPATIENT
Start: 2024-11-05 | End: 2024-11-05

## 2024-11-05 RX ADMIN — PALONOSETRON HYDROCHLORIDE 0.25 MG: 0.25 INJECTION INTRAVENOUS at 09:22

## 2024-11-05 RX ADMIN — FOSAPREPITANT 100 ML: 150 INJECTION, POWDER, LYOPHILIZED, FOR SOLUTION INTRAVENOUS at 09:43

## 2024-11-05 RX ADMIN — OXALIPLATIN 150 MG: 5 INJECTION, SOLUTION INTRAVENOUS at 10:18

## 2024-11-05 RX ADMIN — DEXTROSE MONOHYDRATE 20 ML/HR: 50 INJECTION, SOLUTION INTRAVENOUS at 09:21

## 2024-11-05 RX ADMIN — DEXAMETHASONE SODIUM PHOSPHATE 12 MG: 10 INJECTION, SOLUTION INTRAMUSCULAR; INTRAVENOUS at 09:24

## 2024-11-05 RX ADMIN — LEUCOVORIN CALCIUM 700 MG: 350 INJECTION, POWDER, LYOPHILIZED, FOR SUSPENSION INTRAMUSCULAR; INTRAVENOUS at 10:18

## 2024-11-05 RX ADMIN — FLUOROURACIL 4220 MG: 50 INJECTION, SOLUTION INTRAVENOUS at 12:22

## 2024-11-05 RX ADMIN — ACETAMINOPHEN 500 MG: 500 TABLET ORAL at 09:21

## 2024-11-05 NOTE — PROGRESS NOTES
OUTPATIENT ONCOLOGY NUTRITION ASSESSMENT    Patient Name: Ely Sims  YOB: 1960  MRN: [id number]  Assessment Date: 11/5/2024    COMMENTS: Follow up during her infusion today. Receiving cycle 3 FOLFOX, 5FU bolus omitted during cycle 2 due to neutropenia. Labs from today reviewed..   The patient reports no issues with diarrhea, has developed some diminishing taste and alterations but she is still eating.  Her weight is stable. Provided her with a handout to help her with her taste issues and encouraged her to try the baking soda/salt rinses (recipe provided).     Will continue to be available as needed.  Pt appreciative of visit.        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, S/P Diverting Colostomy   Treatment Plan Chemotherapy, Radiation 5FU, Leucovorin and Oxaliplatin   Frequency Q 2 weeks x 4 (possibly 8)   Goal of cancer treatment Disease control   Comments:        Encounter Information        Nutrition/Diet History:  Tolerating po well. Likes sweets   Oral Nutrition Supplements:    Factors/Symptoms Affecting Intake: Taste changes   Comments:      Medical/Surgical History Past Medical History:   Diagnosis Date    Metastasis to brain     Rectal carcinoma 2024    Seasonal allergies        Past Surgical History:   Procedure Laterality Date    COLONOSCOPY N/A 08/25/2024    Procedure: COLONOSCOPY to 20cm with cold biopsies and needle tattoo;  Surgeon: Shadi Kaminski MD;  Location: [facility] ENDOSCOPY;  Service: Gastroenterology;  Laterality: N/A;  pre: abnormal imaging  post: poor prep, obstructing colon mass at 20cm, hemorrhoids    COLOSTOMY N/A 08/27/2024    Procedure: COLOSTOMY LAPAROSCOPIC;  Surgeon: Saturnino Chester MD;  Location: [facility] MAIN OR;  Service: General;  Laterality: N/A;    CRANIOTOMY N/A 08/21/2024    Procedure: Posterior fossa craniotomy for tumor using stereotactic guidance;  Surgeon: Bull Doty MD;  Location:   "[facility]IN OR;  Service: Neurosurgery;  Laterality: N/A;    ENDOMETRIAL ABLATION      TUBAL ABDOMINAL LIGATION      VENOUS ACCESS DEVICE (PORT) INSERTION Right 08/27/2024    Procedure: INSERTION VENOUS ACCESS DEVICE;  Surgeon: Saturnino Chester MD;  Location: [facility] MAIN OR;  Service: General;  Laterality: Right;               Anthropometrics        Current Height Ht Readings from Last 1 Encounters:   11/05/24 170.2 cm (67\")      Current Weight Wt Readings from Last 1 Encounters:   11/05/24 65.6 kg (144 lb 9.6 oz)      Weight History Wt Readings from Last 30 Encounters:   11/05/24 0838 65.6 kg (144 lb 9.6 oz)   11/01/24 0906 65.8 kg (145 lb)   10/22/24 0919 67.6 kg (149 lb)   10/08/24 0903 68.6 kg (151 lb 3.2 oz)   09/25/24 0828 65.3 kg (144 lb)   09/17/24 1159 65.5 kg (144 lb 8 oz)   09/03/24 1318 67.1 kg (148 lb)   09/03/24 0904 66.9 kg (147 lb 6.4 oz)   08/25/24 0500 72 kg (158 lb 11.7 oz)   08/21/24 0513 65.3 kg (143 lb 15.4 oz)   08/21/24 0200 65.3 kg (143 lb 15.4 oz)   08/20/24 0648 65.1 kg (143 lb 8.3 oz)   08/20/24 0505 65.1 kg (143 lb 8.3 oz)   08/19/24 2017 65.1 kg (143 lb 8.3 oz)   08/19/24 1425 65.8 kg (145 lb)          Medications           Current medications: LORazepam, acetaminophen, amphetamine-dextroamphetamine, atorvastatin, buPROPion XL, lidocaine-prilocaine, ondansetron, polyethylene glycol, and sennosides-docusate                 Tests/Procedures        Tests/Procedures Chemotherapy, Radiation     Labs       Pertinent Labs    Results from last 7 days   Lab Units 11/05/24  0816   SODIUM mmol/L 137   POTASSIUM mmol/L 3.7   CHLORIDE mmol/L 101   CO2 mmol/L 20.1*   BUN mg/dL 5*   CREATININE mg/dL 0.56*   CALCIUM mg/dL 8.9   BILIRUBIN mg/dL 0.5   ALK PHOS U/L 101   ALT (SGPT) U/L 17   AST (SGOT) U/L 30   GLUCOSE mg/dL 85     Results from last 7 days   Lab Units 11/05/24  0816   HEMOGLOBIN g/dL 11.2*   HEMATOCRIT % 34.3   WBC 10*3/mm3 4.05   ALBUMIN g/dL 3.5     Results from last 7 days   Lab " Units 11/05/24  0816   PLATELETS 10*3/mm3 327     COVID19   Date Value Ref Range Status   08/19/2024 Not Detected Not Detected - Ref. Range Final     Lab Results   Component Value Date    HGBA1C 5.50 08/24/2024          Physical Findings        Physical Appearance alert, oriented     Edema  not assessed   Gastrointestinal colostomy   Tubes/Lines/Drains Implantable Port   Oral/Mouth Cavity WNL    Skin Intact     NUTRITION INTERVENTION / PLAN OF CARE      Intervention Goal(s) Maintain nutrition status, Reduce/improve symptoms, Provide information, Meet estimated needs, Disease management/therapy, Tolerate PO , Maintain intake, and Maintain weight         RD Intervention/Action Encouraged intake, Follow Tx progress, Recommended/ordered         Recommendations:       PO Diet Consume calorie and protein dense healthy foods      Supplements       Snacks     Try mouth rinses       --      Monitor/Evaluation Follow up as needed       --    Electronically signed by:  Debbie Epps RD  11/05/24 09:19 EST

## 2024-11-07 ENCOUNTER — INFUSION (OUTPATIENT)
Dept: ONCOLOGY | Facility: HOSPITAL | Age: 64
End: 2024-11-07
Payer: COMMERCIAL

## 2024-11-07 DIAGNOSIS — C20 RECTAL ADENOCARCINOMA: ICD-10-CM

## 2024-11-07 DIAGNOSIS — C18.9 COLON CANCER METASTASIZED TO BRAIN: Primary | ICD-10-CM

## 2024-11-07 DIAGNOSIS — C79.31 COLON CANCER METASTASIZED TO BRAIN: Primary | ICD-10-CM

## 2024-11-07 DIAGNOSIS — K63.89 COLONIC MASS: ICD-10-CM

## 2024-11-07 DIAGNOSIS — Z45.2 ENCOUNTER FOR ADJUSTMENT OR MANAGEMENT OF VASCULAR ACCESS DEVICE: ICD-10-CM

## 2024-11-07 PROCEDURE — 25010000002 HEPARIN LOCK FLUSH PER 10 UNITS: Performed by: INTERNAL MEDICINE

## 2024-11-07 RX ORDER — SODIUM CHLORIDE 0.9 % (FLUSH) 0.9 %
10 SYRINGE (ML) INJECTION AS NEEDED
OUTPATIENT
Start: 2024-11-07

## 2024-11-07 RX ORDER — SODIUM CHLORIDE 0.9 % (FLUSH) 0.9 %
10 SYRINGE (ML) INJECTION AS NEEDED
Status: DISCONTINUED | OUTPATIENT
Start: 2024-11-07 | End: 2024-11-07 | Stop reason: HOSPADM

## 2024-11-07 RX ORDER — HEPARIN SODIUM (PORCINE) LOCK FLUSH IV SOLN 100 UNIT/ML 100 UNIT/ML
500 SOLUTION INTRAVENOUS AS NEEDED
OUTPATIENT
Start: 2024-11-07

## 2024-11-07 RX ORDER — HEPARIN SODIUM (PORCINE) LOCK FLUSH IV SOLN 100 UNIT/ML 100 UNIT/ML
500 SOLUTION INTRAVENOUS AS NEEDED
Status: DISCONTINUED | OUTPATIENT
Start: 2024-11-07 | End: 2024-11-07 | Stop reason: HOSPADM

## 2024-11-07 RX ADMIN — Medication 10 ML: at 10:28

## 2024-11-07 RX ADMIN — Medication 500 UNITS: at 10:29

## 2024-11-13 NOTE — PROGRESS NOTES
"[facility] GROUP OUTPATIENT FOLLOW UP CLINIC VISIT    REASON FOR FOLLOW-UP:    Metastatic colon cancer     HISTORY OF PRESENT ILLNESS:    Ely Sims is a 64 y.o. female who returns for follow up.    She presents today for Cycle #4 FOLFOX.     For the past 2 to 3 weeks she has been having worsening lower abdominal pain.  It is now constant and dull but worsening and today she is rocking in pain.  She has been taking over-the-counter pain medication but nothing prescription strength.  This morning she had some nausea and vomiting but this resolved after vomiting.  Otherwise, nausea and vomiting has not been an issue for her.  No sick contacts.  No fevers or chills.  Her stool output is constant and loose.        PHYSICAL EXAMINATION:  Vitals:    11/19/24 0848   BP: 144/94   Pulse: 77   Resp: 16   Temp: 98.1 °F (36.7 °C)   TempSrc: Oral   SpO2: 95%  Comment: Gel polish   Weight: 63.7 kg (140 lb 8 oz)   Height: 170.2 cm (67.01\")   PainSc:   8   PainLoc: Abdomen  Comment: Pain wont go away, it is by her ostomy bag but she is not sure if that is why she is in pain.       General: Alert and oriented.  Mildly distressed due to pain.  Skin:  Warm and dry, no visible rash  HEENT:  clear, moist. No visible sores.   Chest:  Normal respiratory effort. CTAB.  Benign-appearing Mediport present.  Heart: RRR  Abdomen: Ostomy present.  Soft.  Diffuse hypogastric tenderness to palpation without rebound or guarding.  Extremities:  No visible clubbing, cyanosis, or edema  Neuro/psych:  Grossly nonfocal.  Normal mood and affect.      DIAGNOSTIC DATA:  CBC and Differential (11/19/2024 08:36)   Comprehensive metabolic panel (11/19/2024 08:36)     IMAGING:    No new imaging to review today.      ASSESSMENT:  This is a 64 y.o. female with:    *Metastatic colon cancer  The patient presented on 8/19/2022 initially to urgent care with headache, nausea, vomiting, diarrhea.  She was advised to go to the ED from urgent care.  CT head " 8/19/2024 showed some areas of increased attenuation over the right cerebral hemisphere and left temporal lobe concerning for metastatic disease with edema with some mass effect upon the fourth ventricle.  MRI brain 8/20/2024 with a lesion at the right cerebellar hemisphere measuring 2.2 cm, lesion within the left temporal lobe measuring 9 mm, 1.0 cm lesion at the left cerebellar vermis, all with surrounding edema.  There was some mass effect upon the fourth ventricle with mild prominence of the lateral and third ventricles.  8/20/2024: CT chest abdomen and pelvis with no evidence of pulmonary metastatic disease.  There was some asymmetric wall thickening in the distal sigmoid colon with surrounding mesenteric soft tissue nodularity concerning for primary colon cancer.  A sclerotic lesion of the spinous process of T1 was said to be consistent with a bone island or other benign lesion.  8/21/2024: Posterior fossa craniectomy with gross total removal of a right cerebellar hemisphere metastasis Dr. Bull Doty.  Pathology consistent with metastatic poorly differentiated adenocarcinoma favoring colorectal origin.  MSI testing is pending.  8/25/2024: Bone scan with no obvious metastatic disease.  8/25/2024: Colonoscopy by Dr. Kaminski shows an infiltrative completely obstructing large mass found at the proximal rectum.  Pathology with invasive poorly differentiated carcinoma with neuroendocrine features. MSI studies pending.     CEA 2.10.  8/26/2024: MRI lumbar spine with no obvious metastatic disease in the lumbar spine.  However, there is a 3 mm enhancing lesion in the visualized lower thoracic spinal cord that could represent a spinal cord metastasis.  Cervical and thoracic spine MRI requested..  8/27/24: diverting loop sigmoid colostomy and port placement by Dr. Chester  8/28/2024: MRI C and T-spine addendum made with a small 3 mm met to the spinal cord at T12  Completed radiation to the brain and spine on 9/24/24  PET  scan 9/12/24 with no metastatic disease. FDG avid mass at the high rectum.  10/22/2024: Proceed with Cycle 2 FOLFOX. Will omit 5 FU bolus due to neutropenia. Future treatment plans adjusted. In regards to lower abdominal cramping which occurs at night, advised patient to discuss with Dr. Chester (surgeon).   11/5/2024: Proceed with Cycle 3 FOLFOX with 5 FU bolus omitted. WBC 4.05 and . Will give a dose of tylenol today while in the infusion area for side/back pain. Instructed her to reach out if the pain/soreness does not subside.   11/19/2024: She presents for cycle #4 of therapy.  Therapy has been well-tolerated.  However, she is having worsening abdominal pain as well as some nausea and vomiting this morning.  See below.     *Mild normocytic anemia  Hemoglobin 11.3, from 11.2    *Neutropenia secondary to chemotherapy  10/22/2024:  today. Discussed with Dr. Saunders, will proceed with treatment today, but omitting the 5 FU bolus today and for future treatments.   11/5/2024: . Will proceed with treatment today and continue to closely monitor counts.   11/19/2024: White blood cell count 3.03, ANC 0.88    *Abdominal pain  11/5/2024: Continue metamucil and senokot as needed. Cramping has improved since starting this regimen.   11/19/2024: Pain is now in the hypogastric area, constant and worsening over the past 2 to 3 weeks.  She is only using over-the-counter pain medication and no opiates.    *Loose ostomy output: She may be taking too many medications keeping the soft.  Will need to monitor.  Follow-up CT imaging.    *Insomnia     PLAN:   2 mg IV morphine in the infusion room today to try to help her pain.  Prescription for hydrocodone/acetaminophen 5/325 mg tablets 1 every 6 hours as needed electronically sent to her pharmacy.  I encouraged her to notify us if her pain is not improving.  If her pain does not improve today in the infusion area then we will send her over to the emergency department.   Otherwise, plan CT imaging in the very near future for assessment of her malignancy and response to therapy.  Proceed with treatment today, cycle #4 FOLFOX.  We previously omitted the 5-FU bolus.  We will decrease oxaliplatin and the 5-FU infusion by 10% today  Antiemetics as needed.  No longer nauseated at this time.  See me in 2 weeks for cycle #5 with scan review  I encouraged her to keep us informed regarding her pain    Patient is on a high risk medication requiring close monitoring for toxicity.  Severe exacerbation of symptoms discussed with her and her sister today.

## 2024-11-19 ENCOUNTER — OFFICE VISIT (OUTPATIENT)
Dept: ONCOLOGY | Facility: CLINIC | Age: 64
End: 2024-11-19
Payer: COMMERCIAL

## 2024-11-19 ENCOUNTER — INFUSION (OUTPATIENT)
Dept: ONCOLOGY | Facility: HOSPITAL | Age: 64
End: 2024-11-19
Payer: COMMERCIAL

## 2024-11-19 ENCOUNTER — CLINICAL SUPPORT (OUTPATIENT)
Dept: OTHER | Facility: HOSPITAL | Age: 64
End: 2024-11-19
Payer: COMMERCIAL

## 2024-11-19 VITALS
WEIGHT: 140.5 LBS | RESPIRATION RATE: 16 BRPM | SYSTOLIC BLOOD PRESSURE: 144 MMHG | OXYGEN SATURATION: 95 % | HEART RATE: 77 BPM | DIASTOLIC BLOOD PRESSURE: 94 MMHG | TEMPERATURE: 98.1 F | BODY MASS INDEX: 22.05 KG/M2 | HEIGHT: 67 IN

## 2024-11-19 DIAGNOSIS — C79.31 COLON CANCER METASTASIZED TO BRAIN: ICD-10-CM

## 2024-11-19 DIAGNOSIS — G89.3 CANCER ASSOCIATED PAIN: Primary | ICD-10-CM

## 2024-11-19 DIAGNOSIS — C20 RECTAL ADENOCARCINOMA: Primary | ICD-10-CM

## 2024-11-19 DIAGNOSIS — C18.9 COLON CANCER METASTASIZED TO BRAIN: ICD-10-CM

## 2024-11-19 DIAGNOSIS — C20 RECTAL ADENOCARCINOMA: ICD-10-CM

## 2024-11-19 DIAGNOSIS — K63.89 COLONIC MASS: ICD-10-CM

## 2024-11-19 DIAGNOSIS — C18.9 COLON CANCER METASTASIZED TO BRAIN: Primary | ICD-10-CM

## 2024-11-19 DIAGNOSIS — C79.31 COLON CANCER METASTASIZED TO BRAIN: Primary | ICD-10-CM

## 2024-11-19 LAB
ALBUMIN SERPL-MCNC: 3.8 G/DL (ref 3.5–5.2)
ALBUMIN/GLOB SERPL: 1.1 G/DL
ALP SERPL-CCNC: 135 U/L (ref 39–117)
ALT SERPL W P-5'-P-CCNC: 20 U/L (ref 1–33)
ANION GAP SERPL CALCULATED.3IONS-SCNC: 13.9 MMOL/L (ref 5–15)
AST SERPL-CCNC: 61 U/L (ref 1–32)
BASOPHILS # BLD AUTO: 0.03 10*3/MM3 (ref 0–0.2)
BASOPHILS NFR BLD AUTO: 1 % (ref 0–1.5)
BILIRUB SERPL-MCNC: 0.6 MG/DL (ref 0–1.2)
BUN SERPL-MCNC: 7 MG/DL (ref 8–23)
BUN/CREAT SERPL: 11.7 (ref 7–25)
CALCIUM SPEC-SCNC: 9.5 MG/DL (ref 8.6–10.5)
CHLORIDE SERPL-SCNC: 95 MMOL/L (ref 98–107)
CO2 SERPL-SCNC: 23.1 MMOL/L (ref 22–29)
CREAT SERPL-MCNC: 0.6 MG/DL (ref 0.57–1)
DEPRECATED RDW RBC AUTO: 51.3 FL (ref 37–54)
EGFRCR SERPLBLD CKD-EPI 2021: 100.4 ML/MIN/1.73
EOSINOPHIL # BLD AUTO: 0.03 10*3/MM3 (ref 0–0.4)
EOSINOPHIL NFR BLD AUTO: 1 % (ref 0.3–6.2)
ERYTHROCYTE [DISTWIDTH] IN BLOOD BY AUTOMATED COUNT: 14.5 % (ref 12.3–15.4)
GLOBULIN UR ELPH-MCNC: 3.4 GM/DL
GLUCOSE SERPL-MCNC: 86 MG/DL (ref 65–99)
HCT VFR BLD AUTO: 33.3 % (ref 34–46.6)
HGB BLD-MCNC: 11.3 G/DL (ref 12–15.9)
IMM GRANULOCYTES # BLD AUTO: 0.01 10*3/MM3 (ref 0–0.05)
IMM GRANULOCYTES NFR BLD AUTO: 0.3 % (ref 0–0.5)
LYMPHOCYTES # BLD AUTO: 0.94 10*3/MM3 (ref 0.7–3.1)
LYMPHOCYTES NFR BLD AUTO: 31 % (ref 19.6–45.3)
MCH RBC QN AUTO: 33.1 PG (ref 26.6–33)
MCHC RBC AUTO-ENTMCNC: 33.9 G/DL (ref 31.5–35.7)
MCV RBC AUTO: 97.7 FL (ref 79–97)
MONOCYTES # BLD AUTO: 1.14 10*3/MM3 (ref 0.1–0.9)
MONOCYTES NFR BLD AUTO: 37.6 % (ref 5–12)
NEUTROPHILS NFR BLD AUTO: 0.88 10*3/MM3 (ref 1.7–7)
NEUTROPHILS NFR BLD AUTO: 29.1 % (ref 42.7–76)
NRBC BLD AUTO-RTO: 0 /100 WBC (ref 0–0.2)
PLATELET # BLD AUTO: 244 10*3/MM3 (ref 140–450)
PMV BLD AUTO: 8.7 FL (ref 6–12)
POTASSIUM SERPL-SCNC: 3.6 MMOL/L (ref 3.5–5.2)
PROT SERPL-MCNC: 7.2 G/DL (ref 6–8.5)
RBC # BLD AUTO: 3.41 10*6/MM3 (ref 3.77–5.28)
SODIUM SERPL-SCNC: 132 MMOL/L (ref 136–145)
WBC NRBC COR # BLD AUTO: 3.03 10*3/MM3 (ref 3.4–10.8)

## 2024-11-19 PROCEDURE — 96367 TX/PROPH/DG ADDL SEQ IV INF: CPT

## 2024-11-19 PROCEDURE — 80053 COMPREHEN METABOLIC PANEL: CPT

## 2024-11-19 PROCEDURE — 85025 COMPLETE CBC W/AUTO DIFF WBC: CPT

## 2024-11-19 PROCEDURE — 25010000002 FLUOROURACIL PER 500 MG: Performed by: INTERNAL MEDICINE

## 2024-11-19 PROCEDURE — 96375 TX/PRO/DX INJ NEW DRUG ADDON: CPT

## 2024-11-19 PROCEDURE — 96415 CHEMO IV INFUSION ADDL HR: CPT

## 2024-11-19 PROCEDURE — 25010000002 LEUCOVORIN CALCIUM PER 50 MG: Performed by: INTERNAL MEDICINE

## 2024-11-19 PROCEDURE — 96366 THER/PROPH/DIAG IV INF ADDON: CPT

## 2024-11-19 PROCEDURE — 96368 THER/DIAG CONCURRENT INF: CPT

## 2024-11-19 PROCEDURE — 96413 CHEMO IV INFUSION 1 HR: CPT

## 2024-11-19 PROCEDURE — 25010000002 FOSAPREPITANT PER 1 MG: Performed by: INTERNAL MEDICINE

## 2024-11-19 PROCEDURE — 25010000002 PALONOSETRON PER 25 MCG: Performed by: INTERNAL MEDICINE

## 2024-11-19 PROCEDURE — 25010000002 DEXAMETHASONE SODIUM PHOSPHATE 100 MG/10ML SOLUTION: Performed by: INTERNAL MEDICINE

## 2024-11-19 PROCEDURE — 25010000002 OXALIPLATIN PER 0.5 MG: Performed by: INTERNAL MEDICINE

## 2024-11-19 PROCEDURE — 25010000003 DEXTROSE 5 % SOLUTION: Performed by: INTERNAL MEDICINE

## 2024-11-19 PROCEDURE — 25010000002 MORPHINE SULFATE (PF) 2 MG/ML SOLUTION: Performed by: INTERNAL MEDICINE

## 2024-11-19 PROCEDURE — 25810000003 SODIUM CHLORIDE 0.9 % SOLUTION 250 ML FLEX CONT: Performed by: INTERNAL MEDICINE

## 2024-11-19 PROCEDURE — 99215 OFFICE O/P EST HI 40 MIN: CPT | Performed by: INTERNAL MEDICINE

## 2024-11-19 PROCEDURE — G0498 CHEMO EXTEND IV INFUS W/PUMP: HCPCS

## 2024-11-19 PROCEDURE — 25010000003 DEXTROSE 5 % SOLUTION 250 ML FLEX CONT: Performed by: INTERNAL MEDICINE

## 2024-11-19 RX ORDER — DEXTROSE MONOHYDRATE 50 MG/ML
20 INJECTION, SOLUTION INTRAVENOUS ONCE
Status: COMPLETED | OUTPATIENT
Start: 2024-11-19 | End: 2024-11-19

## 2024-11-19 RX ORDER — PALONOSETRON 0.05 MG/ML
0.25 INJECTION, SOLUTION INTRAVENOUS ONCE
Status: CANCELLED | OUTPATIENT
Start: 2024-11-19

## 2024-11-19 RX ORDER — HYDROCODONE BITARTRATE AND ACETAMINOPHEN 5; 325 MG/1; MG/1
1 TABLET ORAL EVERY 6 HOURS PRN
Qty: 120 TABLET | Refills: 0 | Status: SHIPPED | OUTPATIENT
Start: 2024-11-19 | End: 2024-11-25

## 2024-11-19 RX ORDER — DEXTROSE MONOHYDRATE 50 MG/ML
20 INJECTION, SOLUTION INTRAVENOUS ONCE
Status: CANCELLED | OUTPATIENT
Start: 2024-11-19

## 2024-11-19 RX ORDER — FAMOTIDINE 10 MG/ML
20 INJECTION, SOLUTION INTRAVENOUS AS NEEDED
Status: CANCELLED | OUTPATIENT
Start: 2024-11-19

## 2024-11-19 RX ORDER — MORPHINE SULFATE 2 MG/ML
2 INJECTION, SOLUTION INTRAMUSCULAR; INTRAVENOUS ONCE
Status: COMPLETED | OUTPATIENT
Start: 2024-11-19 | End: 2024-11-19

## 2024-11-19 RX ORDER — DIPHENHYDRAMINE HYDROCHLORIDE 50 MG/ML
50 INJECTION INTRAMUSCULAR; INTRAVENOUS AS NEEDED
Status: CANCELLED | OUTPATIENT
Start: 2024-11-19

## 2024-11-19 RX ORDER — PALONOSETRON 0.05 MG/ML
0.25 INJECTION, SOLUTION INTRAVENOUS ONCE
Status: COMPLETED | OUTPATIENT
Start: 2024-11-19 | End: 2024-11-19

## 2024-11-19 RX ADMIN — PALONOSETRON HYDROCHLORIDE 0.25 MG: 0.25 INJECTION INTRAVENOUS at 09:46

## 2024-11-19 RX ADMIN — FLUOROURACIL 3800 MG: 50 INJECTION, SOLUTION INTRAVENOUS at 13:00

## 2024-11-19 RX ADMIN — MORPHINE SULFATE 2 MG: 2 INJECTION, SOLUTION INTRAMUSCULAR; INTRAVENOUS at 09:43

## 2024-11-19 RX ADMIN — DEXTROSE MONOHYDRATE 20 ML/HR: 50 INJECTION, SOLUTION INTRAVENOUS at 09:45

## 2024-11-19 RX ADMIN — OXALIPLATIN 135 MG: 5 INJECTION, SOLUTION INTRAVENOUS at 10:49

## 2024-11-19 RX ADMIN — DEXAMETHASONE SODIUM PHOSPHATE 12 MG: 10 INJECTION, SOLUTION INTRAMUSCULAR; INTRAVENOUS at 10:29

## 2024-11-19 RX ADMIN — LEUCOVORIN CALCIUM 700 MG: 350 INJECTION, POWDER, LYOPHILIZED, FOR SUSPENSION INTRAMUSCULAR; INTRAVENOUS at 10:49

## 2024-11-19 RX ADMIN — FOSAPREPITANT 100 ML: 150 INJECTION, POWDER, LYOPHILIZED, FOR SOLUTION INTRAVENOUS at 09:50

## 2024-11-19 NOTE — PROGRESS NOTES
OUTPATIENT ONCOLOGY NUTRITION ASSESSMENT    Patient Name: Ely Sims  YOB: 1960  MRN: [id number]  Assessment Date: 11/19/2024    COMMENTS: Follow up during her infusion today. Receiving cycle 4 FOLFOX, Oxaliplatin and 5FU bolus reduced by 10% today. Labs from today reviewed.   She has been struggling with progressive abd pain and then had some N/V this am that she attributes to the pain. She is getting IV morphine in the infusion room today.  Her bowels are moving - changes her ostomy bag 5-6 times a day. Her taste sensation has improved.  Her weight is down 4 lbs today (x 2 weeks). MD plans to send her for a CT of the abd to evaluate her pain.     Will continue to be available as needed.  Pt appreciative of visit.        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, S/P Diverting Colostomy   Treatment Plan Chemotherapy, Radiation 5FU, Leucovorin and Oxaliplatin   Frequency Q 2 weeks x 4 (possibly 8)   Goal of cancer treatment Disease control   Comments:        Encounter Information        Nutrition/Diet History:  Tolerating po except today (N/V)   Oral Nutrition Supplements:    Factors/Symptoms Affecting Intake: Pain in abd   Comments:      Medical/Surgical History Past Medical History:   Diagnosis Date    Metastasis to brain     Rectal carcinoma 2024    Seasonal allergies        Past Surgical History:   Procedure Laterality Date    COLONOSCOPY N/A 08/25/2024    Procedure: COLONOSCOPY to 20cm with cold biopsies and needle tattoo;  Surgeon: Shaid Kaminski MD;  Location: [facility] ENDOSCOPY;  Service: Gastroenterology;  Laterality: N/A;  pre: abnormal imaging  post: poor prep, obstructing colon mass at 20cm, hemorrhoids    COLOSTOMY N/A 08/27/2024    Procedure: COLOSTOMY LAPAROSCOPIC;  Surgeon: Saturnino Chester MD;  Location: [facility] MAIN OR;  Service: General;  Laterality: N/A;    CRANIOTOMY N/A 08/21/2024    Procedure: Posterior fossa craniotomy  "for tumor using stereotactic guidance;  Surgeon: Bull Doty MD;  Location: [facility] MAIN OR;  Service: Neurosurgery;  Laterality: N/A;    ENDOMETRIAL ABLATION      TUBAL ABDOMINAL LIGATION      VENOUS ACCESS DEVICE (PORT) INSERTION Right 08/27/2024    Procedure: INSERTION VENOUS ACCESS DEVICE;  Surgeon: Saturnino Chester MD;  Location: [facility] MAIN OR;  Service: General;  Laterality: Right;               Anthropometrics        Current Height Ht Readings from Last 1 Encounters:   11/19/24 170.2 cm (67.01\")      Current Weight Wt Readings from Last 1 Encounters:   11/19/24 63.7 kg (140 lb 8 oz)      Weight History Wt Readings from Last 30 Encounters:   11/19/24 0848 63.7 kg (140 lb 8 oz)   11/05/24 0838 65.6 kg (144 lb 9.6 oz)   11/01/24 0906 65.8 kg (145 lb)   10/22/24 0919 67.6 kg (149 lb)   10/08/24 0903 68.6 kg (151 lb 3.2 oz)   09/25/24 0828 65.3 kg (144 lb)   09/17/24 1159 65.5 kg (144 lb 8 oz)   09/03/24 1318 67.1 kg (148 lb)   09/03/24 0904 66.9 kg (147 lb 6.4 oz)   08/25/24 0500 72 kg (158 lb 11.7 oz)   08/21/24 0513 65.3 kg (143 lb 15.4 oz)   08/21/24 0200 65.3 kg (143 lb 15.4 oz)   08/20/24 0648 65.1 kg (143 lb 8.3 oz)   08/20/24 0505 65.1 kg (143 lb 8.3 oz)   08/19/24 2017 65.1 kg (143 lb 8.3 oz)   08/19/24 1425 65.8 kg (145 lb)          Medications           Current medications: HYDROcodone-acetaminophen, LORazepam, acetaminophen, amphetamine-dextroamphetamine, atorvastatin, buPROPion XL, lidocaine-prilocaine, ondansetron, polyethylene glycol, and sennosides-docusate                 Tests/Procedures        Tests/Procedures Chemotherapy, CT     Labs       Pertinent Labs    Results from last 7 days   Lab Units 11/19/24  0836   SODIUM mmol/L 132*   POTASSIUM mmol/L 3.6   CHLORIDE mmol/L 95*   CO2 mmol/L 23.1   BUN mg/dL 7*   CREATININE mg/dL 0.60   CALCIUM mg/dL 9.5   BILIRUBIN mg/dL 0.6   ALK PHOS U/L 135*   ALT (SGPT) U/L 20   AST (SGOT) U/L 61*   GLUCOSE mg/dL 86     Results from last 7 days "   Lab Units 11/19/24  0836   HEMOGLOBIN g/dL 11.3*   HEMATOCRIT % 33.3*   WBC 10*3/mm3 3.03*   ALBUMIN g/dL 3.8     Results from last 7 days   Lab Units 11/19/24  0836   PLATELETS 10*3/mm3 244     COVID19   Date Value Ref Range Status   08/19/2024 Not Detected Not Detected - Ref. Range Final     Lab Results   Component Value Date    HGBA1C 5.50 08/24/2024          Physical Findings        Physical Appearance alert, oriented     Edema  not assessed   Gastrointestinal colostomy   Tubes/Lines/Drains Implantable Port   Oral/Mouth Cavity WNL    Skin Intact     NUTRITION INTERVENTION / PLAN OF CARE      Intervention Goal(s) Maintain nutrition status, Reduce/improve symptoms, Provide information, Meet estimated needs, Disease management/therapy, Tolerate PO , Maintain intake, and Maintain weight         RD Intervention/Action Encouraged intake, Follow Tx progress, Recommended/ordered         Recommendations:       PO Diet Consume calorie and protein dense healthy foods, small frequent meals      Supplements       Snacks            --      Monitor/Evaluation Follow up as needed       --    Electronically signed by:  Debbie Epps RD  11/19/24 10:19 EST

## 2024-11-20 ENCOUNTER — PATIENT OUTREACH (OUTPATIENT)
Dept: ONCOLOGY | Facility: HOSPITAL | Age: 64
End: 2024-11-20
Payer: COMMERCIAL

## 2024-11-20 NOTE — PROGRESS NOTES
"Phoned pt to today to discuss-post treatment side effects of Folfox. She c/o of abd pain yesterday to Dr. Saunders but since taking the Norco 5/325mg every 6 hours her pain is under control. She knows to call Dr. Saunders's office if the pain is not controlled with this regimen. She did ask about scheduling an appt for massage in cancer [facility]. Provided her Tammi the massage therapist's  phone number to schedule. She stated she is feeling pretty good after her tx but she does have vision disturbance once to three times a day she stated  \"it's like black bugs go across the horizon of my eyes\" Informed her I will notify Dr. Saunders and she may need to see a RN or APRN tomorrow with her visit to discuss. Will follow up her after C5.   "

## 2024-11-21 ENCOUNTER — TELEPHONE (OUTPATIENT)
Dept: ONCOLOGY | Facility: CLINIC | Age: 64
End: 2024-11-21
Payer: COMMERCIAL

## 2024-11-21 ENCOUNTER — INFUSION (OUTPATIENT)
Dept: ONCOLOGY | Facility: HOSPITAL | Age: 64
End: 2024-11-21
Payer: COMMERCIAL

## 2024-11-21 DIAGNOSIS — Z45.2 ENCOUNTER FOR ADJUSTMENT OR MANAGEMENT OF VASCULAR ACCESS DEVICE: ICD-10-CM

## 2024-11-21 DIAGNOSIS — K63.89 COLONIC MASS: ICD-10-CM

## 2024-11-21 DIAGNOSIS — C79.31 COLON CANCER METASTASIZED TO BRAIN: Primary | ICD-10-CM

## 2024-11-21 DIAGNOSIS — C18.9 COLON CANCER METASTASIZED TO BRAIN: Primary | ICD-10-CM

## 2024-11-21 DIAGNOSIS — C20 RECTAL ADENOCARCINOMA: ICD-10-CM

## 2024-11-21 PROCEDURE — 25010000002 HEPARIN LOCK FLUSH PER 10 UNITS: Performed by: INTERNAL MEDICINE

## 2024-11-21 RX ORDER — SODIUM CHLORIDE 0.9 % (FLUSH) 0.9 %
10 SYRINGE (ML) INJECTION AS NEEDED
Status: DISCONTINUED | OUTPATIENT
Start: 2024-11-21 | End: 2024-11-21 | Stop reason: HOSPADM

## 2024-11-21 RX ORDER — HEPARIN SODIUM (PORCINE) LOCK FLUSH IV SOLN 100 UNIT/ML 100 UNIT/ML
500 SOLUTION INTRAVENOUS AS NEEDED
OUTPATIENT
Start: 2024-11-21

## 2024-11-21 RX ORDER — SODIUM CHLORIDE 0.9 % (FLUSH) 0.9 %
10 SYRINGE (ML) INJECTION AS NEEDED
OUTPATIENT
Start: 2024-11-21

## 2024-11-21 RX ORDER — HEPARIN SODIUM (PORCINE) LOCK FLUSH IV SOLN 100 UNIT/ML 100 UNIT/ML
500 SOLUTION INTRAVENOUS AS NEEDED
Status: DISCONTINUED | OUTPATIENT
Start: 2024-11-21 | End: 2024-11-21 | Stop reason: HOSPADM

## 2024-11-21 RX ADMIN — Medication 500 UNITS: at 10:38

## 2024-11-21 NOTE — TELEPHONE ENCOUNTER
Called patient in response to staff message from nurse navigator regarding visual disturbances. See note. Patient states this only began after starting chemotherapy and happens 1-3x/daily. Denies headache. States it is not enough to bother her. Relayed information to Dr. Saunders. Linnette Chambers, RN

## 2024-11-22 NOTE — PROGRESS NOTES
"[facility] OUTPATIENT FOLLOW UP CLINIC VISIT    REASON FOR FOLLOW-UP:    Metastatic colon cancer     HISTORY OF PRESENT ILLNESS:    Ely Sims is a 64 y.o. female who returns for follow up.    She called late last week with worsening lower abdominal pain.  We started morphine sustained-release 15 mg every 12 hours with a recommendation to increase to every 8 hours of this was not helping.  She did increase to every 8 hours with improvement in her pain.    Unfortunately, she misunderstood instructions for her pain medication.  She has not been taking the hydrocodone since starting the morphine.  Her pain has remained significant.      PHYSICAL EXAMINATION:  Vitals:    12/03/24 0845 12/03/24 0855   BP: 160/93 147/87   Pulse: 88    Resp: 16    Temp: 98.4 °F (36.9 °C)    TempSrc: Oral    SpO2: 97%    Weight: 62.2 kg (137 lb 3.2 oz)    Height: 170.2 cm (67.01\")    PainSc:   7    PainLoc: Abdomen        General: Alert and oriented.  Mildly distressed due to pain.  Skin:  Warm and dry, no visible rash  HEENT:  clear, moist. No visible sores.   Chest:  Normal respiratory effort. CTAB.  Benign-appearing Mediport present.  Heart: RRR  Abdomen: Ostomy present.  Soft.  Diffuse hypogastric tenderness to palpation without rebound or guarding.  Extremities:  No visible clubbing, cyanosis, or edema  Neuro/psych:  Grossly nonfocal.  Normal mood and affect.      DIAGNOSTIC DATA:  CEA (12/03/2024 08:41)  Comprehensive Metabolic Panel (12/03/2024 08:41)  CBC & Differential (12/03/2024 08:41)    IMAGING:    CT Abdomen Pelvis With Contrast (11/26/2024 13:21)  CT Chest With Contrast Diagnostic (11/26/2024 13:21)    CT images personally reviewed.  Unfortunately her has been significant progression of disease with some lung lesions, mediastinal adenopathy, numerous liver metastases, mesenteric metastatic disease, adenopathy at the milad hepatis     ASSESSMENT:  This is a 64 y.o. female with:    *Metastatic colon cancer  The " patient presented on 8/19/2022 initially to urgent care with headache, nausea, vomiting, diarrhea.  She was advised to go to the ED from urgent care.  CT head 8/19/2024 showed some areas of increased attenuation over the right cerebral hemisphere and left temporal lobe concerning for metastatic disease with edema with some mass effect upon the fourth ventricle.  MRI brain 8/20/2024 with a lesion at the right cerebellar hemisphere measuring 2.2 cm, lesion within the left temporal lobe measuring 9 mm, 1.0 cm lesion at the left cerebellar vermis, all with surrounding edema.  There was some mass effect upon the fourth ventricle with mild prominence of the lateral and third ventricles.  8/20/2024: CT chest abdomen and pelvis with no evidence of pulmonary metastatic disease.  There was some asymmetric wall thickening in the distal sigmoid colon with surrounding mesenteric soft tissue nodularity concerning for primary colon cancer.  A sclerotic lesion of the spinous process of T1 was said to be consistent with a bone island or other benign lesion.  8/21/2024: Posterior fossa craniectomy with gross total removal of a right cerebellar hemisphere metastasis Dr. Bull Doty.  Pathology consistent with metastatic poorly differentiated adenocarcinoma favoring colorectal origin.  MSI testing is pending.  Tempus with a BRAF V600E mutation.  TMB 4.7 mutations per megabase.  Microsatellite stable.  8/25/2024: Bone scan with no obvious metastatic disease.  8/25/2024: Colonoscopy by Dr. Kaminski shows an infiltrative completely obstructing large mass found at the proximal rectum.  Pathology with invasive poorly differentiated carcinoma with neuroendocrine features. MSI studies pending.     CEA 2.10.  8/26/2024: MRI lumbar spine with no obvious metastatic disease in the lumbar spine.  However, there is a 3 mm enhancing lesion in the visualized lower thoracic spinal cord that could represent a spinal cord metastasis.  Cervical and  thoracic spine MRI requested..  8/27/24: diverting loop sigmoid colostomy and port placement by Dr. Chester  8/28/2024: MRI C and T-spine addendum made with a small 3 mm met to the spinal cord at T12  Completed radiation to the brain and spine on 9/24/24  PET scan 9/12/24 with no metastatic disease. FDG avid mass at the high rectum.  10/22/2024: Proceed with Cycle 2 FOLFOX. Will omit 5 FU bolus due to neutropenia. Future treatment plans adjusted. In regards to lower abdominal cramping which occurs at night, advised patient to discuss with Dr. Chester (surgeon).   11/5/2024: Proceed with Cycle 3 FOLFOX with 5 FU bolus omitted. WBC 4.05 and . Will give a dose of tylenol today while in the infusion area for side/back pain. Instructed her to reach out if the pain/soreness does not subside.   11/19/2024: She presents for cycle #4 of therapy.  Therapy has been well-tolerated.  However, she is having worsening abdominal pain as well as some nausea and vomiting this morning.  See below.  11/26/2024: CT imaging with significant progression of disease     *Mild normocytic anemia  Hemoglobin 11.2    *Neutropenia secondary to chemotherapy  10/22/2024:  today. Discussed with Dr. Saunders, will proceed with treatment today, but omitting the 5 FU bolus today and for future treatments.   11/5/2024: . Will proceed with treatment today and continue to closely monitor counts.   12/3/2024: White blood cell count 4.13, ANC 1.61    *Abdominal pain  We have started sustained-release morphine 15 mg every 8 hours.  We had intended her for her to take hydrocodone also as needed but she has not been doing this thinking that morphine replaced the hydrocodone.  She was encouraged to take hydrocodone as needed as well.    *Insomnia     PLAN:   With progression, cetuximab or panitumumab plus encorafenib given the presence of a BRAF V600 E mutation.  Plan panitumumab due to ease of administration every 2 weeks and potential for fewer  adverse effects compared to cetuximab. Encorafenib is 300 mg daily.  Panitumumab is 6 mg/kg every 14 days.  Plan treatment until disease progression or unacceptable toxicity.  Treatment is palliative and she understands this.  We will plan to initiate therapy very quickly given significant pression of disease.  Continue morphine 15 mg sustained-release every 8 hours and resume hydrocodone/acetaminophen as needed as well.  I encouraged her to notify us if her pain remains uncontrolled on this regimen and we will modify as appropriate.  I will plan to see her back with cycle #2 of panitumumab    I spent 45 minutes in this visit today reviewing her record, communicating with her and her sister, examining her, communicating with staff, placing orders, documenting the encounter.

## 2024-11-25 DIAGNOSIS — G89.3 CANCER ASSOCIATED PAIN: Primary | ICD-10-CM

## 2024-11-25 RX ORDER — HYDROCODONE BITARTRATE AND ACETAMINOPHEN 10; 325 MG/1; MG/1
1 TABLET ORAL EVERY 6 HOURS PRN
Qty: 120 TABLET | Refills: 0 | Status: SHIPPED | OUTPATIENT
Start: 2024-11-25

## 2024-11-26 ENCOUNTER — HOSPITAL ENCOUNTER (OUTPATIENT)
Dept: CT IMAGING | Facility: HOSPITAL | Age: 64
Discharge: HOME | End: 2024-11-26
Admitting: INTERNAL MEDICINE
Payer: COMMERCIAL

## 2024-11-26 ENCOUNTER — INFUSION (OUTPATIENT)
Dept: ONCOLOGY | Facility: HOSPITAL | Age: 64
End: 2024-11-26
Payer: COMMERCIAL

## 2024-11-26 DIAGNOSIS — C20 RECTAL ADENOCARCINOMA: ICD-10-CM

## 2024-11-26 DIAGNOSIS — C79.31 COLON CANCER METASTASIZED TO BRAIN: ICD-10-CM

## 2024-11-26 DIAGNOSIS — C18.9 COLON CANCER METASTASIZED TO BRAIN: ICD-10-CM

## 2024-11-26 DIAGNOSIS — K63.89 COLONIC MASS: ICD-10-CM

## 2024-11-26 PROCEDURE — 25510000001 IOPAMIDOL 61 % SOLUTION: Performed by: INTERNAL MEDICINE

## 2024-11-26 PROCEDURE — 25510000002 DIATRIZOATE MEGLUMINE & SODIUM PER 1 ML: Performed by: INTERNAL MEDICINE

## 2024-11-26 PROCEDURE — 74177 CT ABD & PELVIS W/CONTRAST: CPT

## 2024-11-26 PROCEDURE — 71260 CT THORAX DX C+: CPT

## 2024-11-26 RX ORDER — IOPAMIDOL 612 MG/ML
100 INJECTION, SOLUTION INTRAVASCULAR
Status: COMPLETED | OUTPATIENT
Start: 2024-11-26 | End: 2024-11-26

## 2024-11-26 RX ORDER — DIATRIZOATE MEGLUMINE AND DIATRIZOATE SODIUM 660; 100 MG/ML; MG/ML
30 SOLUTION ORAL; RECTAL
Status: COMPLETED | OUTPATIENT
Start: 2024-11-26 | End: 2024-11-26

## 2024-11-26 RX ADMIN — IOPAMIDOL 85 ML: 612 INJECTION, SOLUTION INTRAVENOUS at 13:12

## 2024-11-26 RX ADMIN — DIATRIZOATE MEGLUMINE AND DIATRIZOATE SODIUM 30 ML: 660; 100 LIQUID ORAL; RECTAL at 11:45

## 2024-11-27 ENCOUNTER — TELEPHONE (OUTPATIENT)
Dept: ONCOLOGY | Facility: CLINIC | Age: 64
End: 2024-11-27
Payer: COMMERCIAL

## 2024-11-27 RX ORDER — PROCHLORPERAZINE MALEATE 10 MG
10 TABLET ORAL EVERY 8 HOURS PRN
Qty: 90 TABLET | Refills: 2 | Status: SHIPPED | OUTPATIENT
Start: 2024-11-27

## 2024-11-27 NOTE — TELEPHONE ENCOUNTER
Patient called in reporting nausea w/ the 2 tablets of her Norco 5. She can't stay on top of the pain taking only 1 tablet. Per Dr. Saunders okay for her to take Zofran prior to Norco and if that doesn't work for her she can try compazine. Will send script for both. Verified pharmacy and patient v/u. Linnette Chambers RN

## 2024-11-27 NOTE — TELEPHONE ENCOUNTER
Called patient, let her know that the CT results are not back yet and that Dr. Saunders's nurse will call them will the results when they are back in the office on Monday. Pt kathrynu

## 2024-11-29 DIAGNOSIS — K63.89 COLONIC MASS: ICD-10-CM

## 2024-11-29 DIAGNOSIS — G89.3 CANCER ASSOCIATED PAIN: Primary | ICD-10-CM

## 2024-11-29 DIAGNOSIS — C79.31 COLON CANCER METASTASIZED TO BRAIN: ICD-10-CM

## 2024-11-29 DIAGNOSIS — C20 RECTAL ADENOCARCINOMA: ICD-10-CM

## 2024-11-29 DIAGNOSIS — C18.9 COLON CANCER METASTASIZED TO BRAIN: ICD-10-CM

## 2024-11-29 RX ORDER — MORPHINE SULFATE 15 MG/1
15 TABLET, FILM COATED, EXTENDED RELEASE ORAL 2 TIMES DAILY
Qty: 60 TABLET | Refills: 0 | Status: SHIPPED | OUTPATIENT
Start: 2024-11-29 | End: 2024-12-02

## 2024-11-29 NOTE — PROGRESS NOTES
On call note:    Patient called with ongoing constant lower abdominal pain.    We have rx hydrocodone/apap for this which has not helped.    We last rx the 10/325 mg dose but she states she was given 5/325 mg.    1 or two really isn't helping.    Nausea is better with compazine.    CT results are preliminary but do indicate progression of her cancer, which is likely the cause of her pain. I did discuss this with her today.     Discussed adding sustained release morphine 15 mg every 12 hours. She is agreeable. Rx sent to her [facility] pharmacy. She will let us know if they do not have it.    Advised she can increase to every 8 hour dosing if there is no pain relief in a couple of days, but I advised her to notify us over the weekend.    Will need to transition to FOLFIRI-bevacizumab ASAP.     Addendum:  [facility] has 5 day supply of the sustained release morphine, so a new rx will be due early next week.

## 2024-12-02 ENCOUNTER — TELEPHONE (OUTPATIENT)
Dept: ONCOLOGY | Facility: CLINIC | Age: 64
End: 2024-12-02
Payer: COMMERCIAL

## 2024-12-02 DIAGNOSIS — G89.3 CANCER ASSOCIATED PAIN: ICD-10-CM

## 2024-12-02 RX ORDER — MORPHINE SULFATE 15 MG/1
15 TABLET, FILM COATED, EXTENDED RELEASE ORAL 3 TIMES DAILY
Qty: 90 TABLET | Refills: 0 | Status: SHIPPED | OUTPATIENT
Start: 2024-12-02 | End: 2024-12-02 | Stop reason: SDUPTHER

## 2024-12-02 RX ORDER — MORPHINE SULFATE 15 MG/1
15 TABLET, FILM COATED, EXTENDED RELEASE ORAL EVERY 8 HOURS
Qty: 90 TABLET | Refills: 0 | Status: SHIPPED | OUTPATIENT
Start: 2024-12-02 | End: 2025-01-01

## 2024-12-02 NOTE — TELEPHONE ENCOUNTER
Patient called in regarding refill on morphine and needing to increase frequency to TID. Discussed w/ Dr. Saunders who is agreeable. Sent new script and verified pharmacy. Also advised patient she may receive a call from scheduling today regarding chemo education since we will have to arrange an appointment d/t upcoming treatment change. Pt v/u. Linnette Chambers RN

## 2024-12-03 ENCOUNTER — APPOINTMENT (OUTPATIENT)
Dept: ONCOLOGY | Facility: HOSPITAL | Age: 64
End: 2024-12-03
Payer: COMMERCIAL

## 2024-12-03 ENCOUNTER — INFUSION (OUTPATIENT)
Dept: LAB | Facility: HOSPITAL | Age: 64
End: 2024-12-03
Payer: COMMERCIAL

## 2024-12-03 ENCOUNTER — OFFICE VISIT (OUTPATIENT)
Dept: ONCOLOGY | Facility: CLINIC | Age: 64
End: 2024-12-03
Payer: COMMERCIAL

## 2024-12-03 VITALS
RESPIRATION RATE: 16 BRPM | OXYGEN SATURATION: 97 % | HEIGHT: 67 IN | TEMPERATURE: 98.4 F | WEIGHT: 137.2 LBS | DIASTOLIC BLOOD PRESSURE: 87 MMHG | BODY MASS INDEX: 21.53 KG/M2 | HEART RATE: 88 BPM | SYSTOLIC BLOOD PRESSURE: 147 MMHG

## 2024-12-03 DIAGNOSIS — C79.31 COLON CANCER METASTASIZED TO BRAIN: ICD-10-CM

## 2024-12-03 DIAGNOSIS — C18.9 COLON CANCER METASTASIZED TO BRAIN: Primary | ICD-10-CM

## 2024-12-03 DIAGNOSIS — C20 RECTAL ADENOCARCINOMA: ICD-10-CM

## 2024-12-03 DIAGNOSIS — C18.9 COLON CANCER METASTASIZED TO BRAIN: ICD-10-CM

## 2024-12-03 DIAGNOSIS — Z45.2 ENCOUNTER FOR ADJUSTMENT OR MANAGEMENT OF VASCULAR ACCESS DEVICE: Primary | ICD-10-CM

## 2024-12-03 DIAGNOSIS — Z45.2 ENCOUNTER FOR VENOUS ACCESS DEVICE CARE: ICD-10-CM

## 2024-12-03 DIAGNOSIS — C79.31 COLON CANCER METASTASIZED TO BRAIN: Primary | ICD-10-CM

## 2024-12-03 LAB
ALBUMIN SERPL-MCNC: 3.5 G/DL (ref 3.5–5.2)
ALBUMIN/GLOB SERPL: 1 G/DL
ALP SERPL-CCNC: 319 U/L (ref 39–117)
ALT SERPL W P-5'-P-CCNC: 37 U/L (ref 1–33)
ANION GAP SERPL CALCULATED.3IONS-SCNC: 13.2 MMOL/L (ref 5–15)
AST SERPL-CCNC: 134 U/L (ref 1–32)
BASOPHILS # BLD AUTO: 0.04 10*3/MM3 (ref 0–0.2)
BASOPHILS NFR BLD AUTO: 1 % (ref 0–1.5)
BILIRUB SERPL-MCNC: 1.2 MG/DL (ref 0–1.2)
BUN SERPL-MCNC: 4 MG/DL (ref 8–23)
BUN/CREAT SERPL: 6.6 (ref 7–25)
CALCIUM SPEC-SCNC: 9.4 MG/DL (ref 8.6–10.5)
CEA SERPL-MCNC: 6.42 NG/ML
CHLORIDE SERPL-SCNC: 94 MMOL/L (ref 98–107)
CO2 SERPL-SCNC: 26.8 MMOL/L (ref 22–29)
CREAT SERPL-MCNC: 0.61 MG/DL (ref 0.57–1)
DEPRECATED RDW RBC AUTO: 55.1 FL (ref 37–54)
EGFRCR SERPLBLD CKD-EPI 2021: 100 ML/MIN/1.73
EOSINOPHIL # BLD AUTO: 0.04 10*3/MM3 (ref 0–0.4)
EOSINOPHIL NFR BLD AUTO: 1 % (ref 0.3–6.2)
ERYTHROCYTE [DISTWIDTH] IN BLOOD BY AUTOMATED COUNT: 15 % (ref 12.3–15.4)
GLOBULIN UR ELPH-MCNC: 3.6 GM/DL
GLUCOSE SERPL-MCNC: 94 MG/DL (ref 65–99)
HCT VFR BLD AUTO: 34.8 % (ref 34–46.6)
HGB BLD-MCNC: 11.2 G/DL (ref 12–15.9)
IMM GRANULOCYTES # BLD AUTO: 0.02 10*3/MM3 (ref 0–0.05)
IMM GRANULOCYTES NFR BLD AUTO: 0.5 % (ref 0–0.5)
LYMPHOCYTES # BLD AUTO: 1 10*3/MM3 (ref 0.7–3.1)
LYMPHOCYTES NFR BLD AUTO: 24.2 % (ref 19.6–45.3)
MCH RBC QN AUTO: 32.1 PG (ref 26.6–33)
MCHC RBC AUTO-ENTMCNC: 32.2 G/DL (ref 31.5–35.7)
MCV RBC AUTO: 99.7 FL (ref 79–97)
MONOCYTES # BLD AUTO: 1.42 10*3/MM3 (ref 0.1–0.9)
MONOCYTES NFR BLD AUTO: 34.4 % (ref 5–12)
NEUTROPHILS NFR BLD AUTO: 1.61 10*3/MM3 (ref 1.7–7)
NEUTROPHILS NFR BLD AUTO: 38.9 % (ref 42.7–76)
NRBC BLD AUTO-RTO: 0 /100 WBC (ref 0–0.2)
PLATELET # BLD AUTO: 261 10*3/MM3 (ref 140–450)
PMV BLD AUTO: 9.5 FL (ref 6–12)
POTASSIUM SERPL-SCNC: 3.7 MMOL/L (ref 3.5–5.2)
PROT SERPL-MCNC: 7.1 G/DL (ref 6–8.5)
RBC # BLD AUTO: 3.49 10*6/MM3 (ref 3.77–5.28)
SODIUM SERPL-SCNC: 134 MMOL/L (ref 136–145)
WBC NRBC COR # BLD AUTO: 4.13 10*3/MM3 (ref 3.4–10.8)

## 2024-12-03 PROCEDURE — 36415 COLL VENOUS BLD VENIPUNCTURE: CPT

## 2024-12-03 PROCEDURE — 80053 COMPREHEN METABOLIC PANEL: CPT

## 2024-12-03 PROCEDURE — 85025 COMPLETE CBC W/AUTO DIFF WBC: CPT

## 2024-12-03 PROCEDURE — 25010000002 HEPARIN LOCK FLUSH PER 10 UNITS: Performed by: INTERNAL MEDICINE

## 2024-12-03 PROCEDURE — 82378 CARCINOEMBRYONIC ANTIGEN: CPT | Performed by: INTERNAL MEDICINE

## 2024-12-03 PROCEDURE — 96523 IRRIG DRUG DELIVERY DEVICE: CPT

## 2024-12-03 RX ORDER — SODIUM CHLORIDE 0.9 % (FLUSH) 0.9 %
10 SYRINGE (ML) INJECTION AS NEEDED
Status: CANCELLED | OUTPATIENT
Start: 2024-12-03

## 2024-12-03 RX ORDER — HEPARIN SODIUM (PORCINE) LOCK FLUSH IV SOLN 100 UNIT/ML 100 UNIT/ML
500 SOLUTION INTRAVENOUS AS NEEDED
Status: CANCELLED | OUTPATIENT
Start: 2024-12-03

## 2024-12-03 RX ORDER — SODIUM CHLORIDE 0.9 % (FLUSH) 0.9 %
10 SYRINGE (ML) INJECTION AS NEEDED
Status: DISCONTINUED | OUTPATIENT
Start: 2024-12-03 | End: 2024-12-16 | Stop reason: HOSPADM

## 2024-12-03 RX ORDER — HEPARIN SODIUM (PORCINE) LOCK FLUSH IV SOLN 100 UNIT/ML 100 UNIT/ML
500 SOLUTION INTRAVENOUS AS NEEDED
Status: DISCONTINUED | OUTPATIENT
Start: 2024-12-03 | End: 2024-12-16 | Stop reason: HOSPADM

## 2024-12-03 RX ADMIN — Medication 10 ML: at 08:40

## 2024-12-03 RX ADMIN — Medication 500 UNITS: at 08:39

## 2024-12-04 ENCOUNTER — SPECIALTY PHARMACY (OUTPATIENT)
Dept: PHARMACY | Facility: HOSPITAL | Age: 64
End: 2024-12-04
Payer: COMMERCIAL

## 2024-12-04 ENCOUNTER — TELEPHONE (OUTPATIENT)
Dept: ONCOLOGY | Facility: CLINIC | Age: 64
End: 2024-12-04
Payer: COMMERCIAL

## 2024-12-04 ENCOUNTER — SPECIALTY PHARMACY (OUTPATIENT)
Dept: ONCOLOGY | Facility: HOSPITAL | Age: 64
End: 2024-12-04
Payer: COMMERCIAL

## 2024-12-04 ENCOUNTER — OFFICE VISIT (OUTPATIENT)
Dept: ONCOLOGY | Facility: CLINIC | Age: 64
End: 2024-12-04
Payer: COMMERCIAL

## 2024-12-04 VITALS
TEMPERATURE: 97.8 F | BODY MASS INDEX: 21.31 KG/M2 | DIASTOLIC BLOOD PRESSURE: 77 MMHG | WEIGHT: 135.8 LBS | HEART RATE: 91 BPM | SYSTOLIC BLOOD PRESSURE: 144 MMHG | OXYGEN SATURATION: 95 % | HEIGHT: 67 IN

## 2024-12-04 VITALS — WEIGHT: 135.8 LBS | BODY MASS INDEX: 21.26 KG/M2

## 2024-12-04 DIAGNOSIS — C18.9 COLON CANCER METASTASIZED TO BRAIN: Primary | ICD-10-CM

## 2024-12-04 DIAGNOSIS — C79.31 METASTASIS TO BRAIN OF UNKNOWN ORIGIN: ICD-10-CM

## 2024-12-04 DIAGNOSIS — C79.31 COLON CANCER METASTASIZED TO BRAIN: Primary | ICD-10-CM

## 2024-12-04 DIAGNOSIS — C80.1 METASTASIS TO BRAIN OF UNKNOWN ORIGIN: ICD-10-CM

## 2024-12-04 NOTE — PROGRESS NOTES
Specialty Pharmacy Patient Management Program        Ely is seen by their provider for metastatic colorectal cancer. Patient to be started on specialty medication(s): Encorafenib 300 mg po daily + Panitumumab IV.    It is undecided if the patient will be filling specialty medication at [facility] and/or enrolling in the Oncology Patient Management Program at this time and enrollment is therefore UNDER REVIEW.     Benefit Investigation  As of 12/4/2024 08:22 EST  To be conducted by specialty pharmacy Care Coordinator.    Jillian Paz RPH  12/4/2024  08:22 EST

## 2024-12-04 NOTE — TELEPHONE ENCOUNTER
Pharmacy Name:  [facility]    Reference Number (if applicable): [id number]    Pharmacy representative name: ZAY    Pharmacy representative phone number: [phone]    What medication are you calling in regards to: TRELL    What question does the pharmacy have: PRIOR AUTH WAS SENT IN FOR THE PRESCRIPTION THEY ARE NEEDING FAXED OVER THE BRAF POSITIVE TEST RESULTS PLEASE FAX [phone]    Who is the provider that prescribed the medication: DR CIFUENTES

## 2024-12-04 NOTE — PROGRESS NOTES
TREATMENT  PREPARATION    Ely Sims  [id number]  1960    Chief Complaint: Treatment preparation and needs assessment    History of present illness:  Ely Sims is a 64 y.o. year old female who is here today for treatment preparation and needs assessment.  The patient has been diagnosed with No diagnosis found. and is scheduled to begin treatment with:     Oncology History:    Oncology/Hematology History   Colon cancer metastasized to brain   8/19/2024 Initial Diagnosis    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     9/25/2024 -  Chemotherapy    OP CENTRAL VENOUS ACCESS DEVICE Access, Care, and Maintenance (CVAD)     10/8/2024 - 11/21/2024 Chemotherapy    OP COLON [id number] OXALIplatin / Leucovorin / Fluorouracil     12/4/2024 - 12/4/2024 Chemotherapy    OP COLORECTAL FOLFIRI + Bevacizumab 5mg/kg (Irinotecan / Leucovorin / Fluorouracil / Bevacizumab)     12/10/2024 -  Chemotherapy    OP COLORECTAL Panitumumab + Encorafenib     Rectal adenocarcinoma   8/29/2024 Initial Diagnosis    Rectal adenocarcinoma     9/25/2024 -  Chemotherapy    OP CENTRAL VENOUS ACCESS DEVICE Access, Care, and Maintenance (CVAD)     10/8/2024 - 11/21/2024 Chemotherapy    OP COLON [id number] OXALIplatin / Leucovorin / Fluorouracil     12/4/2024 - 12/4/2024 Chemotherapy    OP COLORECTAL FOLFIRI + Bevacizumab 5mg/kg (Irinotecan / Leucovorin / Fluorouracil / Bevacizumab)     12/10/2024 -  Chemotherapy    OP COLORECTAL Panitumumab + Encorafenib         The current medication list and allergy list were reviewed and reconciled.     Past Medical History, Past Surgical History, Social History, Family History have been reviewed and are without significant changes except as mentioned.    Physical Exam:    Vitals:    12/04/24 0943   BP: 144/77   Pulse: 91   Temp: 97.8 °F (36.6 °C)   SpO2: 95%     Vitals:    12/04/24 0943   PainSc: 0-No pain        ECOG score: 0             Physical Exam  Constitutional:       General: She is not in acute distress.      Appearance: She is well-developed.   HENT:      Head: Normocephalic.   Eyes:      Pupils: Pupils are equal, round, and reactive to light.   Cardiovascular:      Rate and Rhythm: Normal rate and regular rhythm.      Pulses: Normal pulses.      Heart sounds: Normal heart sounds.   Pulmonary:      Effort: Pulmonary effort is normal. No respiratory distress.      Breath sounds: Normal breath sounds. No wheezing or rales.   Abdominal:      General: Bowel sounds are normal. There is no distension.      Palpations: Abdomen is soft. There is no mass.      Tenderness: There is no abdominal tenderness.   Musculoskeletal:         General: No deformity. Normal range of motion.      Cervical back: Normal range of motion.   Lymphadenopathy:      Cervical: No cervical adenopathy.      Upper Body:      Right upper body: No supraclavicular adenopathy.      Left upper body: No supraclavicular adenopathy.   Skin:     General: Skin is warm and dry.      Coloration: Skin is not pale.      Findings: No rash.   Neurological:      Mental Status: She is alert and oriented to person, place, and time.      Cranial Nerves: No cranial nerve deficit.   Psychiatric:         Behavior: Behavior normal.           NEEDS ASSESSMENTS    Genetics  The patient's new diagnosis and family history have been reviewed for genetic counseling needs. The patient will not be referred..     Psychosocial and Barriers to care  The patient has completed a PHQ-9 Depression Screening and the Distress Thermometer (DT) today.  PHQ-9 results show PHQ-2 Total Score:   PHQ-9 Total Score:        The patient scored their distress today as Distress Level: 10 on a scale of 0-10 with 0 being no distress and 10 being extreme distress. Problems marked by the patient as being an issue for them within the last week include Physical concerns  Sleep: Yes .      Results were reviewed along with psychosocial resources offered by our cancer center.  Our Supportive Oncology team will be  flagged for a score of 4 or above, and a same day call will be made for a score of 9 or 10.  A mental health referral is offered at that time. Patients who score less than 4 have been educated on our support services and can be referred to our  upon request.  The patient will be referred to our .       Nutrition  The patient has completed the malnutrition screening today. They scored Malnutrition Screening Tool  Have you recently lost weight without trying?  If yes, how much weight have you lost?: 0--> No  Have you been eating poorly because of a decreased appetite?: 0--> No  MST score: 0   with a score of 0-1 meaning not at risk in a score of 2 or greater meaning at risk.  Patients with a score of 3 or higher will be referred to our oncology dietitian for support. Patients beginning at risk treatment regimens or who have dietary concerns will also be referred to our oncology dietitian. The patient will not be referred.    Functional Assessment  Persons who are age 70 or greater will be screened for qualification of a comprehensive geriatric assessment by our survivorship nurse practitioner.  Older adults with cancer face unique challenges. These may include an increased risk of drug reactions, financial burdens, and caregiver stress. The patient scored   . Patients scoring 14 or lower will referred for an older adult functional assessment with the survivorship advanced practice registered nurse to ensure all needed support is provided as patients plan for their treatments. NOT APPLICABLE    Intravenous Access Assessment  The patient and I discussed planned intravenous chemo/biotherapy as well as other IV treatments that are often needed throughout the course of treatment. These may include, but are not limited to blood transfusions, antibiotics, and IV hydration. Discussed that depending on selected treatment and vein assessment, patient may require venous access device (VAD) which could  "include but not limited to a Mediport or PICC line. Risks and benefits of VADs reviewed. The patient will be treated via Port and Oral Treatment.    Reproductive/Sexual Activity   People should avoid becoming pregnant and should not get a partner pregnant while undergoing chemo/biotherapy.  People of childbearing age should use effective contraception during active therapy. The best recommendation for all people is to use a barrier method for a minimum of 1 week after the last infusion of chemo/biotherapy to prevent your partner being exposed to byproducts from treatment medications in bodily fluids. Effective contraception should be discussed with your oncology team to make sure it is safe to take based on your diagnosis. Possible options include oral contraceptives, barrier methods. Chemo/biotherapy can change your ability to reproduce children in the future.  There are options for fertility preservation. NOT APPLICABLE    Advanced Care Planning  Advance Care Planning   The patient and I discussed advanced care planning, \"Conversations that Matter\".   This service is offered for development of advance directives with a certified ACP facilitator.  The patient does have an up-to-date advanced directive. This document is on file with our office. The patient is not interested in an appointment with one of our facilitators to create or update their advanced directives.    Have you reviewed your Advance Directive and is it valid for this stay?: Yes          Smoking cessation  Tobacco Use: Medium Risk (12/4/2024)    Patient History     Smoking Tobacco Use: Former     Smokeless Tobacco Use: Never     Passive Exposure: Not on file       Patient and I discussed their tobacco use history. Referral will not be made for smoking cessation.      Palliative Care  When appropriate, the patient and I discussed the availability palliative care services and when appropriate Hospice care. Palliative care is not the same as Hospice " care which was explained to the patient.The patient is not interested in additional information from our  on these services.    Survivorship   When appropriate, we discussed that we will refer the patient to survivorship clinic to discuss next steps following completion of planned treatment.  Reviewed this visit will include assessment of your physical, psychological, functional, and spiritual needs as a survivor and the need at attend this visit when scheduled.    TREATMENT EDUCATION    Today I met with the patient to discuss the chemo/biotherapy regimen recommended for treatment of There are no diagnoses linked to this encounter..  The patient was given explanation of treatment premed side effects including office policy that prohibits patients to drive if sedating medications are administered, MD explanation given regarding benefits, side effects, toxicities and goals of treatment.  The patient received a Chemotherapy/Biotherapy Plan Summary including diagnosis and explanation of specific treatment plan.    SIDE EFFECTS:  Common side effects were discussed with the patient and/or significant other.  Discussion included where applicable hair loss/discoloration, anemia/fatigue, infection/chills/fever, appetite, bleeding risk/precautions, constipation, diarrhea, mouth sores, taste alteration, loss of appetite, nausea/vomiting, peripheral neuropathy, skin/nail changes, rash, muscle aches/weakness, photosensitivity, weight gain/loss, hearing loss, dizziness, menopausal symptoms, menstrual irregularity, sterility, high blood pressure, heart damage, liver damage, lung damage, kidney damage, DVT/PE risk, fluid retention, pleural/pericardial effusion, somnolence, electrolyte/LFT imbalance, vein exercises and/or the possible need for vascular access/port placement.  The patient was advised that although uncommon, leakage of an infused medication from the vein or venous access device may lead to skin breakdown  and/or other tissue damage.  The patient was advised that he/she may have pain, bleeding, and/or bruising from the insertion of a needle in their vein or venous access device (port).  The patient was further advised that, in spite of proper technique, infection with redness and irritation may rarely occur at the site where the needle was inserted.  The patient was advised that if complications occur, additional medical treatment is available.  Finally, where applicable we have reviewed rare but potential immune mediated side effects including shortness of breath, cough, chest pain (pneumonitis), abdominal pain, diarrhea (colitis), thyroiditis (hypothyroid or hyperthyroid), hepatitis and liver dysfunction, nephritis and renal dysfunction.    Discussion also included side effects specific to drugs in the treatment plan, specifically:    Treatment Plans       Name Type Plan Dates Plan Provider         Active    OP COLORECTAL Panitumumab + Encorafenib ONCOLOGY TREATMENT 12/9/2024 - Present Elieser Saunders MD                      Assessment and Plan:    There are no diagnoses linked to this encounter.  No orders of the defined types were placed in this encounter.        The patient and I have reviewed their diagnosis and scheduled treatment plan. Needs assessment was completed where applicable including genetics, psychosocial needs, barriers to care, VAD evaluation, advanced care planning, survivorship, and palliative care services where indicated. Referrals have been ordered as appropriate based upon evaluation today and patient desires.   Chemo/biotherapy teaching was completed today and consent obtained. See separate documentation for further details.  Adequate time was given to answer questions.  Patient made aware of their care team members and contact information if they have questions or problems throughout the treatment course.  Discussion held and written information provided describing frequency of office visits  and ongoing monitoring throughout the treatment plan.     Reviewed with patient any prescribed medication sent to pharmacy.  Education provided regarding proper storage, safe handling, and proper disposal of unused medication.  Proper handling of body fluids and waste discussed and written information provided.  If appropriate, patient had pretreatment labs drawn today.    Learning assessment completed at initial patient encounter. See separate flowsheet. Chemo/biotherapy education comprehension assessed at today's visit.    I spent 12 minutes caring for Ely on this date of service. This time includes time spent by me in the following activities: preparing for the visit, reviewing tests, obtaining and/or reviewing a separately obtained history, performing a medically appropriate examination and/or evaluation, counseling and educating the patient/family/caregiver, referring and communicating with other health care professionals, documenting information in the medical record, and care coordination.     Tiffany Whittington, APRN   12/04/24

## 2024-12-04 NOTE — TELEPHONE ENCOUNTER
HUB call from Lenore at [facility] forwarded to me. She is requesting pts BRAF results be faxed for the [place] PA that was submitted.    This has been faxed to [facility] [phone]     Xenia Mckeon, Pharmacy Technician  12/04/24  09:51 EST

## 2024-12-04 NOTE — PROGRESS NOTES
Specialty Pharmacy Patient Management Program  Oncology Initial Assessment     Ely Sims was referred by their provider to the Oncology Patient Management program offered by [facility] for metastatic colorectal cancer on 12/04/24.  An initial outreach was conducted, including assessment of therapy appropriateness and specialty medication education for Braftovi (encorafenib) + Vectibix (panitumumab). The patient was introduced to services offered by [facility], including: regular assessments, refill coordination, curbside pick-up or mail order delivery options, prior authorization maintenance, and financial assistance programs as applicable. The patient was also provided with contact information for the pharmacy team.     Goal of chemotherapy: palliative and disease control    Treatment Medication(s) / Frequency and Dosing    Braftovi (encorafenib) 300 mg po daily  Vectibix (panutumumab) 6mg/kg IV every 14 days    Number of cycles: until disease progression or unacceptable toxicity    Start date of oral specialty medication: As soon as oral specialty medication is available.    Follow-up Testing to be determined after TBD cycles by MD.     Items for home use: Imodium AD (for diarrhea)    Rx written for: [] Nausea    [] Pre-Chemo   prochlorperazine 10 mg by mouth every 6 hours as needed for nausea  Patient has home supply  Completing Pharmacist: Jillian Paz RPH             Date/time: 12/04/2024 10:46 EST     Insurance Coverage & Financial Support  PA pending     Relevant Past Medical History and Comorbidities  Relevant medical history and concomitant health conditions were discussed with the patient. The patient's chart has been reviewed for relevant past medical history and comorbid conditions and updated as necessary.  Past Medical History:   Diagnosis Date    Metastasis to brain     Rectal carcinoma 2024    Seasonal allergies      Social History     Socioeconomic  History    Marital status: Single   Tobacco Use    Smoking status: Former     Types: Cigarettes    Smokeless tobacco: Never   Vaping Use    Vaping status: Never Used   Substance and Sexual Activity    Alcohol use: Yes    Drug use: Defer    Sexual activity: Defer       Problem list reviewed by Jillian Paz RPH on 12/4/2024 at 10:45 AM    Allergies  Known allergies and reactions were discussed with the patient. The patient's chart has been reviewed for  allergy information and updated as necessary.   No Known Allergies    Allergies reviewed by Jillian Paz RPH on 12/4/2024 at 10:45 AM    Relevant Laboratory Values  Relevant laboratory values were discussed with the patient. The following specialty medication dose adjustment(s) are recommended: none  Lab Results   Component Value Date    GLUCOSE 94 12/03/2024    CALCIUM 9.4 12/03/2024     (L) 12/03/2024    K 3.7 12/03/2024    CO2 26.8 12/03/2024    CL 94 (L) 12/03/2024    BUN 4 (L) 12/03/2024    CREATININE 0.61 12/03/2024    BCR 6.6 (L) 12/03/2024    ANIONGAP 13.2 12/03/2024     Lab Results   Component Value Date    WBC 4.13 12/03/2024    RBC 3.49 (L) 12/03/2024    HGB 11.2 (L) 12/03/2024    HCT 34.8 12/03/2024    MCV 99.7 (H) 12/03/2024    MCH 32.1 12/03/2024    MCHC 32.2 12/03/2024    RDW 15.0 12/03/2024    RDWSD 55.1 (H) 12/03/2024    MPV 9.5 12/03/2024     12/03/2024    NEUTRORELPCT 38.9 (L) 12/03/2024    LYMPHORELPCT 24.2 12/03/2024    MONORELPCT 34.4 (H) 12/03/2024    EOSRELPCT 1.0 12/03/2024    BASORELPCT 1.0 12/03/2024    AUTOIGPER 0.5 12/03/2024    NEUTROABS 1.61 (L) 12/03/2024    LYMPHSABS 1.00 12/03/2024    MONOSABS 1.42 (H) 12/03/2024    EOSABS 0.04 12/03/2024    BASOSABS 0.04 12/03/2024    AUTOIGNUM 0.02 12/03/2024    NRBC 0.0 12/03/2024       Current Medication List  This medication list has been reviewed with the patient and evaluated for any interactions or necessary modifications/recommendations, and updated to include all  prescription medications, OTC medications, and supplements the patient is currently taking.  This list reflects what is contained in the patient's profile, which has also been marked as reviewed to communicate to other providers it is the most up to date version of the patient's current medication therapy.     Current Outpatient Medications:     amphetamine-dextroamphetamine (ADDERALL) 20 MG tablet, Take 1 tablet by mouth 2 (Two) Times a Day. Only taking when she is working  Indications: Attention Deficit Hyperactivity Disorder, Disp: , Rfl:     atorvastatin (LIPITOR) 10 MG tablet, Take 1 tablet by mouth Daily., Disp: , Rfl:     buPROPion XL (WELLBUTRIN XL) 150 MG 24 hr tablet, Take 2 tablets by mouth Every Morning. Indications: Attention Deficit Hyperactivity Disorder, Disp: , Rfl:     HYDROcodone-acetaminophen (NORCO)  MG per tablet, Take 1 tablet by mouth Every 6 (Six) Hours As Needed for Moderate Pain., Disp: 120 tablet, Rfl: 0    lidocaine-prilocaine (EMLA) 2.5-2.5 % cream, Apply 1 Application topically to the appropriate area as directed As Needed for Mild Pain. Apply robbin size amount to port site prior to chemotherapy, Disp: 30 g, Rfl: 2    LORazepam (ATIVAN) 0.5 MG tablet, Take 1 tablet by mouth Every 6 (Six) Hours As Needed for Anxiety. Indications: Feeling Anxious, Disp: , Rfl:     Morphine (MS CONTIN) 15 MG 12 hr tablet, Take 1 tablet by mouth Every 8 (Eight) Hours for 30 days., Disp: 90 tablet, Rfl: 0    polyethylene glycol (MIRALAX) 17 g packet, Take 17 g by mouth Daily., Disp: 90 each, Rfl: 2    prochlorperazine (COMPAZINE) 10 MG tablet, Take 1 tablet by mouth Every 8 (Eight) Hours As Needed for Nausea or Vomiting., Disp: 90 tablet, Rfl: 2    sennosides-docusate (senna-docusate sodium) 8.6-50 MG per tablet, Take 2 tablets by mouth Daily., Disp: 90 tablet, Rfl: 2  No current facility-administered medications for this visit.    Facility-Administered Medications Ordered in Other Visits:      heparin injection 500 Units, 500 Units, Intravenous, Chip ROWE Andrew, MD, 500 Units at 12/03/24 0839    sodium chloride 0.9 % flush 10 mL, 10 mL, Intravenous, PRLEATHA, Elieser Saunders MD, 10 mL at 12/03/24 0840    Medicines reviewed by Jillian Paz [facility] on 12/4/2024 at 10:45 AM    Drug Interactions  Reviewed concomitant medications, allergies, labs, comorbidities/medical history, quality of life, and immunization history.   Drug-drug interactions noted and discussed during education: no significant drug interactions noted. . Reminded the patient to let us know before making any changes or starting any new prescription or OTC medications so we can first assess drug interactions.  Drug-food interactions noted and discussed during education: Patient was instructed to avoid eating grapefruit and drinking grapefruit juice    Initial Education Provided for Specialty Medication  The patient has been provided with the following education and any applicable administration techniques (i.e. self-injection) have been demonstrated for the therapies indicated. All questions and concerns have been addressed prior to the patient receiving the medication, and the patient has verbalized comprehension of the education and any materials provided. Additional patient education shall be provided and documented upon request by the patient, provider, or payer.    Provided patient with:   Education sheets about the medication, 24-hour clinic phone number and my contact information and instructions to call should additional questions arise.     Medication Education Sheets Provided:   Oral Specialty Medication: Braftovi (encorafenib)  IV:  Panitumumab    Other Education Sheets Provided:   EGFR Rash    TOPICS COMMENTS   Storage and Handling of Oral Specialty Medication Store in the original container, in a dry location out of direct sunlight, and out of reach of children or pets. and Store at room temperature.  Discussed safe handling and what to  do with any unused medication.   Administration of Oral Specialty Medication Take with or without food at the same time(s) each day.   Adherence to Oral Specialty Regimen and Handling Missed Doses Patient is likely to have good treatment adherence; reinforced the importance of adherence. Reviewed how to address missed doses and to let us know of any missed doses.   Anemia: role of RBC, cause, s/s, ways to manage, role of transfusion Reviewed the role of RBC and the use of transfusions if hemoglobin decreases too much.  Patient to notify us if they experience shortness of breath, dizziness, or palpitations.  Also let patient know they could feel more tired than usual and to try to stay active, but rest if they need to.    Thrombocytopenia: role of platelet, cause, s/s, ways to prevent bleeding, things to avoid, when to seek help Reviewed the role of platelets in blood clotting and when to call clinic (bloody nose that bleeds for 5 mins despite pressure, a cut that won't stop bleeding despite pressure, gums that bleed excessively with brushing or flossing). Recommended using an electric razor, soft bristle toothbrush, and blowing your nose gently.    Neutropenia: role of WBC, cause, infection precautions, s/s of infection, when to call MD Reviewed the role of WBC, good infection prevention practices, and when to call the clinic (temperature 100.4F, sore throat, burning urination, etc)   Nutrition and Appetite Changes:  importance of maintaining healthy diet & weight, ways to manage to improve intake, dietary consult, exercise regimen, electrolyte and/or blood glucose abnormalities Decreased Appetite: Discussed the risk of decreased appetite. Recommended eating smaller, more frequent meals. Instructed the patient to contact the clinic if losing weight or having difficulty eating enough to maintain energy level., Electrolyte Abnormalities:  Explained that the oncology therapy may lead to abnormalities in electrolytes,  specifically: potassium, magnesium, calcium   Diarrhea: causes, s/s of dehydration, ways to manage, dietary changes, when to call MD Chemotherapy : Discussed the risk of diarrhea. Instructed patient on use OTC loperamide with diarrhea onset, but emphasized the importance of calling the clinic if 4-6 episodes in 24 hours not relieved by OTC loperamide.   Constipation: causes, ways to manage, dietary changes, when to call MD Provided supplementary handout with instructions for use of docusate and other OTC therapies to manage constipation.  Instructed to call us if medications aren't working.   Nausea/Vomiting: cause, use of antiemetics, dietary changes, when to call MD Emetic risk: Low-Minimal  PRN home meds: Prochlorperazine  Pharmacy home meds sent to: patient has supply at home    Instructed the patient to take a dose of the PRN medication at the first onset of nausea and if it's not working to call us for additional medications.  Also provided non-drug measures to mitigate nausea.               Pain: causes, ways to manage Chemo: Discussed muscle and joint aches/pains with chemotherapy, and recommended the use of OTC pain relief with ibuprofen or acetaminophen if needed.   Skin/Nail Changes: cause, s/s, ways to manage EGFR Rash: Explained the rash may appear as circular splotches of redness surrounding small pimple-like marks across the upper body (chest, arms, hands, neck, face, back). Discussed prevention and protection strategies (moisturizers, sunscreen) and treatment (doxy- or minocycline, topical steroids). A supplementary handout with this information was also given to the patient.        Organ Toxicities: cause, s/s, need for diagnostic tests, labs, when to notify MD Discussed potential effects on organ systems, monitoring, diagnostic tests, labs, and when to notify their MD. Discussed the signs/symptoms of the following: cardiotoxicity, eye changes (pain, blurry vision, watery eyes, photophobia), lung  changes, and skin changes   Infusion-Related Reactions or Injection-Site Reaction: Cause, s/s, anaphylaxis, monitoring, etc. Discussed the risk of an infusion reaction and symptoms such as: fever, chills, dizziness, itchiness or rash, flushing, trouble breathing, wheezing, sudden back pain, or feeling faint.  Instructed the patient to notify their nurse if they start feeling weird at any point during their infusion.    Reviewed how infusion or injection-site reactions are managed.   Miscellaneous Financial Issues: TBD  Lab Draws:  per MD discretion   Infertility and Sexuality:  causes, fertility preservation options, sexuality changes, ways to manage, importance of birth control The patient is not of childbearing potential.   Home Care: how to manage bodily fluids Counseled on management of soiled linens and proper flush technique.  Discussed how to manage all the side effects at home and advised when to contact the MD office   Survivorship: distress, distress assessment, secondary malignancies, early/late effects, follow-up, social issues, social support Discussed the rare, but possible risk of secondary malignancies months to years after treatment, most commonly non-melanoma skin cancer.       Adherence and Self-Administration  Adherence related to the patient's specialty therapy was discussed with the patient. The Adherence segment of this outreach has been reviewed and updated.     Is there a concern with patient's ability to self administer the medication correctly and without issue?: No  Were any potential barriers to adherence identified during the initial assessment or patient education?: No  Are there any concerns regarding the patient's understanding of the importance of medication adherence?: No  Methods for Supporting Patient Adherence and/or Self-Administration:  not needed  Expected duration of therapy: Until disease progression or intolerable toxicity    Open Medication Therapy Problems  No medication  therapy recommendations to display    Goals of Therapy  Goals related to the patient's specialty therapy were discussed with the patient. The Patient Goals segment of this outreach has been reviewed and updated.   Goals Addressed Today        Specialty Pharmacy General Goal      Progression free survival based on scans              Wrap up  Discussed aforementioned material with patient in person, face-to-face, in clinic.   Chemo consents/CCA were signed at today's visit.   Medication availability: patient is aware to contact our office if medication is not delivered in timely manner  Patient and sister, present in today's appointment, expressed understanding.  Patient demonstrates ability to self-administer medication. No barriers to adherence identified.  All questions and concerns addressed.     Reassessment Plan & Follow-Up  1. Medication Therapy Changes: none  2. Related Plans, Therapy Recommendations, or Therapy Problems to Be Addressed: none  3. Pharmacist to perform regular assessments no more than (6) months from the previous assessment.  4. Care Coordinator to set up future refill outreaches, coordinate prescription delivery, and escalate clinical questions to pharmacist.  5. Welcome information and patient satisfaction survey to be sent by specialty pharmacy team with patient's initial fill.    Attestation  Therapeutic appropriateness: Appropriate   I attest the patient was actively involved in and has agreed to the above plan of care. If the prescribed therapy is at any point deemed not appropriate based on the current or future assessments, a consultation will be initiated with the patient's specialty care provider to determine the best course of action. The revised plan of therapy will be documented along with any required assessments and/or additional patient education provided.     Jillian Paz, Saniya, [facility]  Clinical Specialty Pharmacist, Oncology  12/4/2024  10:46 EST

## 2024-12-04 NOTE — PROGRESS NOTES
Staff message rec yesterday-Dr Saunders would like to start pt on panitumumab + encorafenib.    Braftovi dose is 300 mg daily.     I have submitted the PA to [facility] through [employer]. Currently waiting for a decision.    Jillian Paz RPH sent to Xenia Mckeon, Pharmacy Technician         Previous Messages       ----- Message -----  From: Linnette Chambers RN  Sent: 12/3/2024  12:46 PM EST  To: Jillian Paz RPH    It will be 300 daily just checked w/ him. Thanks!!    Jillian Paz RPH sent to Linnette Chambers RN; Xenia Mckeon, Pharmacy Technician; Elieser Saunders MD  Would you like encorafenib dosed at 300 mg po daily?    Just let us know.    Thanks!          Previous Messages       ----- Message -----  From: Linnette Chambers RN  Sent: 12/3/2024   8:29 AM EST  To: [facility] Oral Onc Pool    Entered a new plan for panitumumab. Dr. Saunders would like to start her on that + encorafenib.    Thanks!!    Xenia Mckeon, Pharmacy Technician  12/04/24  08:24 EST

## 2024-12-05 ENCOUNTER — SPECIALTY PHARMACY (OUTPATIENT)
Dept: PHARMACY | Facility: HOSPITAL | Age: 64
End: 2024-12-05
Payer: COMMERCIAL

## 2024-12-05 ENCOUNTER — HOSPITAL ENCOUNTER (OUTPATIENT)
Dept: GENERAL RADIOLOGY | Facility: HOSPITAL | Age: 64
Discharge: HOME | End: 2024-12-05
Admitting: INTERNAL MEDICINE
Payer: COMMERCIAL

## 2024-12-05 DIAGNOSIS — C20 RECTAL ADENOCARCINOMA: Primary | ICD-10-CM

## 2024-12-05 DIAGNOSIS — Z45.2 ENCOUNTER FOR ADJUSTMENT OR MANAGEMENT OF VASCULAR ACCESS DEVICE: Primary | ICD-10-CM

## 2024-12-05 DIAGNOSIS — K63.89 COLONIC MASS: ICD-10-CM

## 2024-12-05 DIAGNOSIS — C79.31 COLON CANCER METASTASIZED TO BRAIN: ICD-10-CM

## 2024-12-05 DIAGNOSIS — C18.9 COLON CANCER METASTASIZED TO BRAIN: ICD-10-CM

## 2024-12-05 DIAGNOSIS — C20 RECTAL ADENOCARCINOMA: ICD-10-CM

## 2024-12-05 DIAGNOSIS — Z45.2 ENCOUNTER FOR VENOUS ACCESS DEVICE CARE: ICD-10-CM

## 2024-12-05 PROCEDURE — 25010000002 HEPARIN LOCK FLUSH PER 10 UNITS: Performed by: INTERNAL MEDICINE

## 2024-12-05 PROCEDURE — 36598 INJ W/FLUOR EVAL CV DEVICE: CPT

## 2024-12-05 RX ORDER — SODIUM CHLORIDE 9 MG/ML
20 INJECTION, SOLUTION INTRAVENOUS ONCE
OUTPATIENT
Start: 2024-12-10

## 2024-12-05 RX ORDER — HEPARIN SODIUM (PORCINE) LOCK FLUSH IV SOLN 100 UNIT/ML 100 UNIT/ML
500 SOLUTION INTRAVENOUS AS NEEDED
Status: DISCONTINUED | OUTPATIENT
Start: 2024-12-05 | End: 2024-12-06 | Stop reason: HOSPADM

## 2024-12-05 RX ORDER — SODIUM CHLORIDE 0.9 % (FLUSH) 0.9 %
10 SYRINGE (ML) INJECTION AS NEEDED
Status: DISCONTINUED | OUTPATIENT
Start: 2024-12-05 | End: 2024-12-06 | Stop reason: HOSPADM

## 2024-12-05 RX ORDER — SODIUM CHLORIDE 0.9 % (FLUSH) 0.9 %
10 SYRINGE (ML) INJECTION AS NEEDED
OUTPATIENT
Start: 2024-12-05

## 2024-12-05 RX ORDER — DIPHENHYDRAMINE HYDROCHLORIDE 50 MG/ML
50 INJECTION INTRAMUSCULAR; INTRAVENOUS AS NEEDED
OUTPATIENT
Start: 2024-12-10

## 2024-12-05 RX ORDER — HEPARIN SODIUM (PORCINE) LOCK FLUSH IV SOLN 100 UNIT/ML 100 UNIT/ML
500 SOLUTION INTRAVENOUS AS NEEDED
OUTPATIENT
Start: 2024-12-05

## 2024-12-05 RX ORDER — LIDOCAINE/PRILOCAINE 2.5 %-2.5%
CREAM (GRAM) TOPICAL
Qty: 30 G | Refills: 1 | Status: SHIPPED | OUTPATIENT
Start: 2024-12-05

## 2024-12-05 RX ORDER — HYDROCORTISONE SODIUM SUCCINATE 100 MG/2ML
100 INJECTION INTRAMUSCULAR; INTRAVENOUS AS NEEDED
OUTPATIENT
Start: 2024-12-10

## 2024-12-05 RX ORDER — FAMOTIDINE 10 MG/ML
20 INJECTION, SOLUTION INTRAVENOUS AS NEEDED
OUTPATIENT
Start: 2024-12-10

## 2024-12-05 RX ADMIN — Medication 10 ML: at 13:10

## 2024-12-05 RX ADMIN — Medication 500 UNITS: at 13:10

## 2024-12-05 NOTE — PROGRESS NOTES
Benefits Investigation Summary    Prescription: New Therapy-Braftovi    Dispensing pharmacy: [facility]    Copay amount: $0 with copay card    PLAN: [facility]  BIN: [id number]  PCN: [id number]  RX GROUP: [id number]    Prior Auth and Med Assistance notes: Braftovi approved from 12/4/2024-12/4/2025 through [employer]. Pt is eligible for the copay card and I will obtain this for her. She will have to fill with [facility] as [facility] is out of network with her plan.    Xenia Mckeon, Pharmacy Technician  12/05/24  08:32 EST

## 2024-12-05 NOTE — PROGRESS NOTES
Braftovi approved from 12/4/2024-12/4/2025 through [employer].    Test claim ran ([facility] as they dispense Pfizer products) and it rejected as Product not appropriate for this location. Pt will have to fill with [facility].    She was educated by [facility] Pharmacist yesterday. The rx will be sent to [facility] and I will follow for delivery.    Xenia Mckeon, Pharmacy Technician  12/05/24  08:30 EST

## 2024-12-05 NOTE — NURSING NOTE
Port deaccessed. No signs/symptoms of distress. Directed her and guest to Patient discharge to exit.

## 2024-12-05 NOTE — PROGRESS NOTES
Specialty Pharmacy Patient Management Program  Per Protocol Prescription Order or Refill       Requested Prescriptions     Signed Prescriptions Disp Refills    encorafenib (BRAFTOVI) 75 MG capsule 120 capsule 2     Sig: Take 4 capsules by mouth Daily.     Prescription orders above were sent to [facility] per Collaborative Care Agreement Protocol.     Completed independent double check on medication order/RX.    Jacki Esquivel Rph, BCOP  Clinical Specialty Pharmacist, Oncology  12/5/2024  09:21 EST

## 2024-12-05 NOTE — PROGRESS NOTES
Specialty Pharmacy Patient Management Program  Per Protocol Prescription Order or Refill     Patient will be filling or currently fills medications at [facility] and is enrolled in the Patient Management Program.    Requested Prescriptions     Signed Prescriptions Disp Refills    encorafenib (BRAFTOVI) 75 MG capsule 120 capsule 2     Sig: Take 4 capsules by mouth Daily.     Prescription orders above were sent to the pharmacy per Collaborative Care Agreement Protocol.     Last Office Visit: 12/3/24  Next Office Visit: 12/10/24    Jillian Paz PharmD, [facility]  Clinical Specialty Pharmacist, Oncology  12/5/2024  09:16 EST

## 2024-12-10 ENCOUNTER — INFUSION (OUTPATIENT)
Dept: ONCOLOGY | Facility: HOSPITAL | Age: 64
End: 2024-12-10
Payer: COMMERCIAL

## 2024-12-10 ENCOUNTER — TELEPHONE (OUTPATIENT)
Dept: PSYCHIATRY | Facility: HOSPITAL | Age: 64
End: 2024-12-10
Payer: COMMERCIAL

## 2024-12-10 VITALS
OXYGEN SATURATION: 95 % | BODY MASS INDEX: 21.63 KG/M2 | HEART RATE: 92 BPM | RESPIRATION RATE: 15 BRPM | WEIGHT: 137.8 LBS | HEIGHT: 67 IN | TEMPERATURE: 98.9 F

## 2024-12-10 DIAGNOSIS — K63.89 COLONIC MASS: ICD-10-CM

## 2024-12-10 DIAGNOSIS — Z45.2 ENCOUNTER FOR ADJUSTMENT OR MANAGEMENT OF VASCULAR ACCESS DEVICE: ICD-10-CM

## 2024-12-10 DIAGNOSIS — C18.9 COLON CANCER METASTASIZED TO BRAIN: ICD-10-CM

## 2024-12-10 DIAGNOSIS — C20 RECTAL ADENOCARCINOMA: Primary | ICD-10-CM

## 2024-12-10 DIAGNOSIS — C79.31 COLON CANCER METASTASIZED TO BRAIN: ICD-10-CM

## 2024-12-10 LAB
ALBUMIN SERPL-MCNC: 3.1 G/DL (ref 3.5–5.2)
ALBUMIN/GLOB SERPL: 0.8 G/DL
ALP SERPL-CCNC: 588 U/L (ref 39–117)
ALT SERPL W P-5'-P-CCNC: 58 U/L (ref 1–33)
ANION GAP SERPL CALCULATED.3IONS-SCNC: 12.8 MMOL/L (ref 5–15)
AST SERPL-CCNC: 151 U/L (ref 1–32)
BASOPHILS # BLD AUTO: 0.07 10*3/MM3 (ref 0–0.2)
BASOPHILS NFR BLD AUTO: 0.7 % (ref 0–1.5)
BILIRUB SERPL-MCNC: 1.5 MG/DL (ref 0–1.2)
BUN SERPL-MCNC: 6 MG/DL (ref 8–23)
BUN/CREAT SERPL: 9.4 (ref 7–25)
CALCIUM SPEC-SCNC: 9.3 MG/DL (ref 8.6–10.5)
CHLORIDE SERPL-SCNC: 96 MMOL/L (ref 98–107)
CO2 SERPL-SCNC: 26.2 MMOL/L (ref 22–29)
CREAT SERPL-MCNC: 0.64 MG/DL (ref 0.57–1)
DEPRECATED RDW RBC AUTO: 55.4 FL (ref 37–54)
EGFRCR SERPLBLD CKD-EPI 2021: 98.8 ML/MIN/1.73
EOSINOPHIL # BLD AUTO: 0.03 10*3/MM3 (ref 0–0.4)
EOSINOPHIL NFR BLD AUTO: 0.3 % (ref 0.3–6.2)
ERYTHROCYTE [DISTWIDTH] IN BLOOD BY AUTOMATED COUNT: 15.9 % (ref 12.3–15.4)
GLOBULIN UR ELPH-MCNC: 4 GM/DL
GLUCOSE SERPL-MCNC: 93 MG/DL (ref 65–99)
HCT VFR BLD AUTO: 33.9 % (ref 34–46.6)
HGB BLD-MCNC: 11.1 G/DL (ref 12–15.9)
IMM GRANULOCYTES # BLD AUTO: 0.33 10*3/MM3 (ref 0–0.05)
IMM GRANULOCYTES NFR BLD AUTO: 3.1 % (ref 0–0.5)
LYMPHOCYTES # BLD AUTO: 0.83 10*3/MM3 (ref 0.7–3.1)
LYMPHOCYTES NFR BLD AUTO: 7.9 % (ref 19.6–45.3)
MAGNESIUM SERPL-MCNC: 1.5 MG/DL (ref 1.6–2.4)
MCH RBC QN AUTO: 31.6 PG (ref 26.6–33)
MCHC RBC AUTO-ENTMCNC: 32.7 G/DL (ref 31.5–35.7)
MCV RBC AUTO: 96.6 FL (ref 79–97)
MONOCYTES # BLD AUTO: 1.99 10*3/MM3 (ref 0.1–0.9)
MONOCYTES NFR BLD AUTO: 18.9 % (ref 5–12)
NEUTROPHILS NFR BLD AUTO: 69.1 % (ref 42.7–76)
NEUTROPHILS NFR BLD AUTO: 7.29 10*3/MM3 (ref 1.7–7)
NRBC BLD AUTO-RTO: 0 /100 WBC (ref 0–0.2)
PLATELET # BLD AUTO: 319 10*3/MM3 (ref 140–450)
PMV BLD AUTO: 9.6 FL (ref 6–12)
POTASSIUM SERPL-SCNC: 3.1 MMOL/L (ref 3.5–5.2)
PROT SERPL-MCNC: 7.1 G/DL (ref 6–8.5)
RBC # BLD AUTO: 3.51 10*6/MM3 (ref 3.77–5.28)
SODIUM SERPL-SCNC: 135 MMOL/L (ref 136–145)
WBC NRBC COR # BLD AUTO: 10.54 10*3/MM3 (ref 3.4–10.8)

## 2024-12-10 PROCEDURE — 96413 CHEMO IV INFUSION 1 HR: CPT

## 2024-12-10 PROCEDURE — 83735 ASSAY OF MAGNESIUM: CPT | Performed by: INTERNAL MEDICINE

## 2024-12-10 PROCEDURE — 85025 COMPLETE CBC W/AUTO DIFF WBC: CPT | Performed by: INTERNAL MEDICINE

## 2024-12-10 PROCEDURE — 25010000002 ALTEPLASE 2 MG RECONSTITUTED SOLUTION: Performed by: INTERNAL MEDICINE

## 2024-12-10 PROCEDURE — 36593 DECLOT VASCULAR DEVICE: CPT

## 2024-12-10 PROCEDURE — 80053 COMPREHEN METABOLIC PANEL: CPT | Performed by: INTERNAL MEDICINE

## 2024-12-10 PROCEDURE — 25010000002 PANITUMUMAB 400 MG/20ML SOLUTION 20 ML VIAL: Performed by: INTERNAL MEDICINE

## 2024-12-10 PROCEDURE — 96366 THER/PROPH/DIAG IV INF ADDON: CPT

## 2024-12-10 PROCEDURE — 25810000003 SODIUM CHLORIDE 0.9 % SOLUTION: Performed by: INTERNAL MEDICINE

## 2024-12-10 PROCEDURE — 25010000002 MAGNESIUM SULFATE 2 GM/50ML SOLUTION: Performed by: INTERNAL MEDICINE

## 2024-12-10 PROCEDURE — 96367 TX/PROPH/DG ADDL SEQ IV INF: CPT

## 2024-12-10 RX ORDER — SODIUM CHLORIDE 0.9 % (FLUSH) 0.9 %
10 SYRINGE (ML) INJECTION AS NEEDED
OUTPATIENT
Start: 2024-12-10

## 2024-12-10 RX ORDER — HEPARIN SODIUM (PORCINE) LOCK FLUSH IV SOLN 100 UNIT/ML 100 UNIT/ML
500 SOLUTION INTRAVENOUS AS NEEDED
OUTPATIENT
Start: 2024-12-10

## 2024-12-10 RX ORDER — SODIUM CHLORIDE 0.9 % (FLUSH) 0.9 %
10 SYRINGE (ML) INJECTION AS NEEDED
Status: DISCONTINUED | OUTPATIENT
Start: 2024-12-10 | End: 2024-12-10 | Stop reason: HOSPADM

## 2024-12-10 RX ORDER — SODIUM CHLORIDE 9 MG/ML
20 INJECTION, SOLUTION INTRAVENOUS ONCE
Status: COMPLETED | OUTPATIENT
Start: 2024-12-10 | End: 2024-12-10

## 2024-12-10 RX ORDER — POTASSIUM CHLORIDE 750 MG/1
20 TABLET, EXTENDED RELEASE ORAL DAILY
Qty: 30 TABLET | Refills: 0 | Status: ON HOLD | OUTPATIENT
Start: 2024-12-10

## 2024-12-10 RX ORDER — HEPARIN SODIUM (PORCINE) LOCK FLUSH IV SOLN 100 UNIT/ML 100 UNIT/ML
500 SOLUTION INTRAVENOUS AS NEEDED
Status: DISCONTINUED | OUTPATIENT
Start: 2024-12-10 | End: 2024-12-10 | Stop reason: HOSPADM

## 2024-12-10 RX ORDER — MAGNESIUM SULFATE HEPTAHYDRATE 40 MG/ML
2 INJECTION, SOLUTION INTRAVENOUS ONCE
Status: COMPLETED | OUTPATIENT
Start: 2024-12-10 | End: 2024-12-10

## 2024-12-10 RX ADMIN — ALTEPLASE: 2.2 INJECTION, POWDER, LYOPHILIZED, FOR SOLUTION INTRAVENOUS at 09:45

## 2024-12-10 RX ADMIN — MAGNESIUM SULFATE HEPTAHYDRATE 2 G: 40 INJECTION, SOLUTION INTRAVENOUS at 10:51

## 2024-12-10 RX ADMIN — SODIUM CHLORIDE 20 ML/HR: 9 INJECTION, SOLUTION INTRAVENOUS at 10:50

## 2024-12-10 RX ADMIN — PANITUMUMAB 380 MG: 400 SOLUTION INTRAVENOUS at 12:20

## 2024-12-10 NOTE — NURSING NOTE
"Pt to infusion room for first time vectibex   Chemo education previously done , chemo permit signed  Labs resulted.  Lab Results   Component Value Date    WBC 10.54 12/10/2024    HGB 11.1 (L) 12/10/2024    HCT 33.9 (L) 12/10/2024    MCV 96.6 12/10/2024     12/10/2024     Lab Results   Component Value Date    GLUCOSE 93 12/10/2024    BUN 6 (L) 12/10/2024    CREATININE 0.64 12/10/2024     (L) 12/10/2024    K 3.1 (L) 12/10/2024    CL 96 (L) 12/10/2024    CALCIUM 9.3 12/10/2024    PROTEINTOT 7.1 12/10/2024    ALBUMIN 3.1 (L) 12/10/2024    ALT 58 (H) 12/10/2024     (H) 12/10/2024    ALKPHOS 588 (H) 12/10/2024    BILITOT 1.5 (H) 12/10/2024    GLOB 4.0 12/10/2024    AGRATIO 0.8 12/10/2024    BCR 9.4 12/10/2024    ANIONGAP 12.8 12/10/2024    EGFR 98.8 12/10/2024     Reviewed all labs with Dr Webb .  Aliza to proceed with treatment today  Mag 1.5 , Per Dr webb , replace per protocol.  K 3.1 .   Per Dr Webb RX for potassium tablets sent to pt phamracy    Pt port not returning blood , pt had dye study done on12/05.   Showed fibrin sheath. Per Dr Webb , ok to use activase in port  Activase instilled and after 30\"  excellent blood return noted.  Aspirated 10cc and discarded    "

## 2024-12-10 NOTE — TELEPHONE ENCOUNTER
Oncology Support Services    OSW called the patient to follow up on the Distress Screening score of 10.  The patient was unavailable, due to being asleep after the morning infusion.  OSW talked with the patient's partner, Ubaldo Case.     Distress Screening Follow-up    Diagnosis: Metastatic Colon Cancer    Location of Distress Screening: Medical Oncology    Distress Level: 10 (12/4/2024  9:00 AM)    Physical Concerns:    Sleep: Y  Memory or concentration: Y    Mr. Case reported the patient is not sleeping well, due to pain. The patient is communicating with her medical providers about the pain and is supposed to be calling them back to let them know if a new medicine they have started her on is helping.  OSW encouraged ongoing communication.      Mr. Case reported the patient has been spending 18-20 hours in bed and that is not acceptable to the patient.  She wants help with the pain, but also wants to be able to do things during the day.         Interventions:     OSW explained the reason for an appointment with GRETCHEN Melo, and Mr. Case requested the appointment be rescheduled.  The patient cancelled it in error.  OSW will mail the Welcome Letter, [employer], [employer] and contact information to the patient's home.         Comments:    OSW sent a staff message to Dolly Arzola requesting the patient be rescheduled with GRETCHEN Melo.

## 2024-12-12 ENCOUNTER — SPECIALTY PHARMACY (OUTPATIENT)
Dept: PHARMACY | Facility: HOSPITAL | Age: 64
End: 2024-12-12
Payer: COMMERCIAL

## 2024-12-12 NOTE — PROGRESS NOTES
Braftovi supply from [facility] delivered to pt on 12/12/2024 at 2:47 pm.    [facility] tracking # [id number]    Xenia Mckeon, Pharmacy Technician  12/12/24  14:51 EST

## 2024-12-13 ENCOUNTER — HOSPITAL ENCOUNTER (INPATIENT)
Facility: HOSPITAL | Age: 64
LOS: 7 days | Discharge: HOME | End: 2024-12-20
Attending: INTERNAL MEDICINE | Admitting: INTERNAL MEDICINE
Payer: COMMERCIAL

## 2024-12-13 ENCOUNTER — TELEPHONE (OUTPATIENT)
Dept: ONCOLOGY | Facility: CLINIC | Age: 64
End: 2024-12-13
Payer: COMMERCIAL

## 2024-12-13 ENCOUNTER — INFUSION (OUTPATIENT)
Dept: ONCOLOGY | Facility: HOSPITAL | Age: 64
End: 2024-12-13
Payer: COMMERCIAL

## 2024-12-13 ENCOUNTER — PRIOR AUTHORIZATION (OUTPATIENT)
Dept: ONCOLOGY | Facility: CLINIC | Age: 64
End: 2024-12-13
Payer: COMMERCIAL

## 2024-12-13 ENCOUNTER — OFFICE VISIT (OUTPATIENT)
Dept: ONCOLOGY | Facility: CLINIC | Age: 64
End: 2024-12-13
Payer: COMMERCIAL

## 2024-12-13 ENCOUNTER — LAB (OUTPATIENT)
Dept: OTHER | Facility: HOSPITAL | Age: 64
End: 2024-12-13
Payer: COMMERCIAL

## 2024-12-13 VITALS
SYSTOLIC BLOOD PRESSURE: 142 MMHG | DIASTOLIC BLOOD PRESSURE: 90 MMHG | BODY MASS INDEX: 20.97 KG/M2 | TEMPERATURE: 97.4 F | OXYGEN SATURATION: 96 % | HEIGHT: 67 IN | WEIGHT: 133.6 LBS | RESPIRATION RATE: 15 BRPM

## 2024-12-13 DIAGNOSIS — C79.31 COLON CANCER METASTASIZED TO BRAIN: ICD-10-CM

## 2024-12-13 DIAGNOSIS — K63.89 COLONIC MASS: Primary | ICD-10-CM

## 2024-12-13 DIAGNOSIS — C18.9 COLON CANCER METASTASIZED TO BRAIN: Primary | ICD-10-CM

## 2024-12-13 DIAGNOSIS — C18.9 COLON CANCER METASTASIZED TO BRAIN: ICD-10-CM

## 2024-12-13 DIAGNOSIS — C79.31 COLON CANCER METASTASIZED TO BRAIN: Primary | ICD-10-CM

## 2024-12-13 DIAGNOSIS — E86.0 DEHYDRATION: Primary | ICD-10-CM

## 2024-12-13 DIAGNOSIS — E86.0 DEHYDRATION: ICD-10-CM

## 2024-12-13 DIAGNOSIS — Z79.899 HIGH RISK MEDICATION USE: ICD-10-CM

## 2024-12-13 DIAGNOSIS — G89.3 CANCER ASSOCIATED PAIN: ICD-10-CM

## 2024-12-13 PROBLEM — R10.9 ABDOMINAL PAIN: Status: ACTIVE | Noted: 2024-12-13

## 2024-12-13 PROBLEM — R79.89 ABNORMAL LFTS: Status: ACTIVE | Noted: 2024-12-13

## 2024-12-13 PROBLEM — E87.6 HYPOKALEMIA: Status: ACTIVE | Noted: 2024-12-13

## 2024-12-13 LAB
ALBUMIN SERPL-MCNC: 3.2 G/DL (ref 3.5–5.2)
ALBUMIN/GLOB SERPL: 0.9 G/DL
ALP SERPL-CCNC: 720 U/L (ref 39–117)
ALT SERPL W P-5'-P-CCNC: 44 U/L (ref 1–33)
ANION GAP SERPL CALCULATED.3IONS-SCNC: 15.4 MMOL/L (ref 5–15)
AST SERPL-CCNC: 137 U/L (ref 1–32)
BASOPHILS # BLD AUTO: 0.07 10*3/MM3 (ref 0–0.2)
BASOPHILS NFR BLD AUTO: 0.4 % (ref 0–1.5)
BILIRUB SERPL-MCNC: 2.4 MG/DL (ref 0–1.2)
BUN SERPL-MCNC: 13 MG/DL (ref 8–23)
BUN/CREAT SERPL: 18.8 (ref 7–25)
CALCIUM SPEC-SCNC: 8.4 MG/DL (ref 8.6–10.5)
CHLORIDE SERPL-SCNC: 93 MMOL/L (ref 98–107)
CO2 SERPL-SCNC: 27.6 MMOL/L (ref 22–29)
CREAT SERPL-MCNC: 0.69 MG/DL (ref 0.57–1)
DEPRECATED RDW RBC AUTO: 56.4 FL (ref 37–54)
EGFRCR SERPLBLD CKD-EPI 2021: 97.1 ML/MIN/1.73
EOSINOPHIL # BLD AUTO: 0.02 10*3/MM3 (ref 0–0.4)
EOSINOPHIL NFR BLD AUTO: 0.1 % (ref 0.3–6.2)
ERYTHROCYTE [DISTWIDTH] IN BLOOD BY AUTOMATED COUNT: 16.7 % (ref 12.3–15.4)
GLOBULIN UR ELPH-MCNC: 3.7 GM/DL
GLUCOSE SERPL-MCNC: 126 MG/DL (ref 65–99)
HCT VFR BLD AUTO: 35.7 % (ref 34–46.6)
HGB BLD-MCNC: 11.7 G/DL (ref 12–15.9)
IMM GRANULOCYTES # BLD AUTO: 0.35 10*3/MM3 (ref 0–0.05)
IMM GRANULOCYTES NFR BLD AUTO: 1.9 % (ref 0–0.5)
LYMPHOCYTES # BLD AUTO: 0.83 10*3/MM3 (ref 0.7–3.1)
LYMPHOCYTES NFR BLD AUTO: 4.5 % (ref 19.6–45.3)
MCH RBC QN AUTO: 31.2 PG (ref 26.6–33)
MCHC RBC AUTO-ENTMCNC: 32.8 G/DL (ref 31.5–35.7)
MCV RBC AUTO: 95.2 FL (ref 79–97)
MONOCYTES # BLD AUTO: 2.59 10*3/MM3 (ref 0.1–0.9)
MONOCYTES NFR BLD AUTO: 14 % (ref 5–12)
NEUTROPHILS NFR BLD AUTO: 14.58 10*3/MM3 (ref 1.7–7)
NEUTROPHILS NFR BLD AUTO: 79.1 % (ref 42.7–76)
NRBC BLD AUTO-RTO: 0.1 /100 WBC (ref 0–0.2)
PLATELET # BLD AUTO: 289 10*3/MM3 (ref 140–450)
PMV BLD AUTO: 10.5 FL (ref 6–12)
POTASSIUM SERPL-SCNC: 2.9 MMOL/L (ref 3.5–5.2)
PROT SERPL-MCNC: 6.9 G/DL (ref 6–8.5)
RBC # BLD AUTO: 3.75 10*6/MM3 (ref 3.77–5.28)
SODIUM SERPL-SCNC: 136 MMOL/L (ref 136–145)
WBC NRBC COR # BLD AUTO: 18.44 10*3/MM3 (ref 3.4–10.8)

## 2024-12-13 PROCEDURE — 96374 THER/PROPH/DIAG INJ IV PUSH: CPT

## 2024-12-13 PROCEDURE — 25010000002 SODIUM CHLORIDE 0.9 % WITH KCL 20 MEQ 20-0.9 MEQ/L-% SOLUTION: Performed by: INTERNAL MEDICINE

## 2024-12-13 PROCEDURE — 25010000002 ONDANSETRON PER 1 MG: Performed by: NURSE PRACTITIONER

## 2024-12-13 PROCEDURE — 85025 COMPLETE CBC W/AUTO DIFF WBC: CPT | Performed by: NURSE PRACTITIONER

## 2024-12-13 PROCEDURE — 96375 TX/PRO/DX INJ NEW DRUG ADDON: CPT

## 2024-12-13 PROCEDURE — 25010000002 HYDROMORPHONE 1 MG/ML SOLUTION: Performed by: INTERNAL MEDICINE

## 2024-12-13 PROCEDURE — 25010000002 MORPHINE PER 10 MG: Performed by: INTERNAL MEDICINE

## 2024-12-13 PROCEDURE — 25810000003 SODIUM CHLORIDE 0.9 % SOLUTION: Performed by: NURSE PRACTITIONER

## 2024-12-13 PROCEDURE — 96361 HYDRATE IV INFUSION ADD-ON: CPT

## 2024-12-13 PROCEDURE — 80053 COMPREHEN METABOLIC PANEL: CPT | Performed by: NURSE PRACTITIONER

## 2024-12-13 RX ORDER — POTASSIUM CHLORIDE 750 MG/1
40 TABLET, FILM COATED, EXTENDED RELEASE ORAL EVERY 4 HOURS
Status: COMPLETED | OUTPATIENT
Start: 2024-12-13 | End: 2024-12-14

## 2024-12-13 RX ORDER — LIDOCAINE 40 MG/G
CREAM TOPICAL AS NEEDED
Status: DISCONTINUED | OUTPATIENT
Start: 2024-12-13 | End: 2024-12-20 | Stop reason: HOSPADM

## 2024-12-13 RX ORDER — MORPHINE SULFATE 2 MG/ML
2 INJECTION, SOLUTION INTRAMUSCULAR; INTRAVENOUS ONCE
Status: COMPLETED | OUTPATIENT
Start: 2024-12-13 | End: 2024-12-13

## 2024-12-13 RX ORDER — ONDANSETRON 2 MG/ML
8 INJECTION INTRAMUSCULAR; INTRAVENOUS ONCE
Status: COMPLETED | OUTPATIENT
Start: 2024-12-13 | End: 2024-12-13

## 2024-12-13 RX ORDER — HYDROCODONE BITARTRATE AND ACETAMINOPHEN 5; 325 MG/1; MG/1
1 TABLET ORAL EVERY 6 HOURS PRN
COMMUNITY
End: 2024-12-20 | Stop reason: HOSPADM

## 2024-12-13 RX ORDER — DEXTROAMPHETAMINE SACCHARATE, AMPHETAMINE ASPARTATE, DEXTROAMPHETAMINE SULFATE AND AMPHETAMINE SULFATE 1.25; 1.25; 1.25; 1.25 MG/1; MG/1; MG/1; MG/1
20 TABLET ORAL 2 TIMES DAILY
Status: DISCONTINUED | OUTPATIENT
Start: 2024-12-14 | End: 2024-12-17

## 2024-12-13 RX ORDER — MORPHINE SULFATE 15 MG/1
15 TABLET, FILM COATED, EXTENDED RELEASE ORAL EVERY 8 HOURS
Status: DISCONTINUED | OUTPATIENT
Start: 2024-12-13 | End: 2024-12-14

## 2024-12-13 RX ORDER — AMOXICILLIN 250 MG
2 CAPSULE ORAL DAILY
Status: DISCONTINUED | OUTPATIENT
Start: 2024-12-13 | End: 2024-12-20 | Stop reason: HOSPADM

## 2024-12-13 RX ORDER — ONDANSETRON 2 MG/ML
4 INJECTION INTRAMUSCULAR; INTRAVENOUS EVERY 6 HOURS PRN
Status: DISCONTINUED | OUTPATIENT
Start: 2024-12-13 | End: 2024-12-20 | Stop reason: HOSPADM

## 2024-12-13 RX ORDER — BUPROPION HYDROCHLORIDE 300 MG/1
300 TABLET ORAL EVERY MORNING
Status: DISCONTINUED | OUTPATIENT
Start: 2024-12-14 | End: 2024-12-19

## 2024-12-13 RX ORDER — NALOXONE HCL 0.4 MG/ML
0.4 VIAL (ML) INJECTION
Status: DISCONTINUED | OUTPATIENT
Start: 2024-12-13 | End: 2024-12-20 | Stop reason: HOSPADM

## 2024-12-13 RX ORDER — SODIUM CHLORIDE 0.9 % (FLUSH) 0.9 %
10 SYRINGE (ML) INJECTION EVERY 12 HOURS SCHEDULED
Status: DISCONTINUED | OUTPATIENT
Start: 2024-12-13 | End: 2024-12-20 | Stop reason: HOSPADM

## 2024-12-13 RX ORDER — SODIUM CHLORIDE 0.9 % (FLUSH) 0.9 %
10 SYRINGE (ML) INJECTION AS NEEDED
Status: DISCONTINUED | OUTPATIENT
Start: 2024-12-13 | End: 2024-12-20 | Stop reason: HOSPADM

## 2024-12-13 RX ORDER — POLYETHYLENE GLYCOL 3350 17 G/17G
17 POWDER, FOR SOLUTION ORAL DAILY
Status: DISCONTINUED | OUTPATIENT
Start: 2024-12-13 | End: 2024-12-20 | Stop reason: HOSPADM

## 2024-12-13 RX ORDER — SODIUM CHLORIDE AND POTASSIUM CHLORIDE 150; 900 MG/100ML; MG/100ML
125 INJECTION, SOLUTION INTRAVENOUS CONTINUOUS
Status: DISPENSED | OUTPATIENT
Start: 2024-12-13 | End: 2024-12-14

## 2024-12-13 RX ORDER — LORAZEPAM 0.5 MG/1
0.5 TABLET ORAL EVERY 6 HOURS PRN
Status: DISCONTINUED | OUTPATIENT
Start: 2024-12-13 | End: 2024-12-16

## 2024-12-13 RX ORDER — PROCHLORPERAZINE MALEATE 10 MG
10 TABLET ORAL EVERY 8 HOURS PRN
Status: DISCONTINUED | OUTPATIENT
Start: 2024-12-13 | End: 2024-12-16

## 2024-12-13 RX ORDER — SODIUM CHLORIDE 9 MG/ML
1000 INJECTION, SOLUTION INTRAVENOUS ONCE
Status: COMPLETED | OUTPATIENT
Start: 2024-12-13 | End: 2024-12-13

## 2024-12-13 RX ORDER — POTASSIUM CHLORIDE 1.5 G/1.58G
20 POWDER, FOR SOLUTION ORAL
Status: DISCONTINUED | OUTPATIENT
Start: 2024-12-13 | End: 2024-12-15

## 2024-12-13 RX ORDER — SODIUM CHLORIDE 9 MG/ML
40 INJECTION, SOLUTION INTRAVENOUS AS NEEDED
Status: DISCONTINUED | OUTPATIENT
Start: 2024-12-13 | End: 2024-12-20 | Stop reason: HOSPADM

## 2024-12-13 RX ADMIN — SODIUM CHLORIDE AND POTASSIUM CHLORIDE 125 ML/HR: 9; 1.49 INJECTION, SOLUTION INTRAVENOUS at 19:02

## 2024-12-13 RX ADMIN — POTASSIUM CHLORIDE 20 MEQ: 1.5 POWDER, FOR SOLUTION ORAL at 19:54

## 2024-12-13 RX ADMIN — MORPHINE SULFATE 15 MG: 15 TABLET, FILM COATED, EXTENDED RELEASE ORAL at 23:55

## 2024-12-13 RX ADMIN — HYDROMORPHONE HYDROCHLORIDE 1 MG: 1 INJECTION, SOLUTION INTRAMUSCULAR; INTRAVENOUS; SUBCUTANEOUS at 23:55

## 2024-12-13 RX ADMIN — ONDANSETRON 8 MG: 2 INJECTION INTRAMUSCULAR; INTRAVENOUS at 13:57

## 2024-12-13 RX ADMIN — HYDROMORPHONE HYDROCHLORIDE 1 MG: 1 INJECTION, SOLUTION INTRAMUSCULAR; INTRAVENOUS; SUBCUTANEOUS at 19:23

## 2024-12-13 RX ADMIN — SODIUM CHLORIDE 1000 ML: 9 INJECTION, SOLUTION INTRAVENOUS at 13:46

## 2024-12-13 RX ADMIN — MORPHINE SULFATE 2 MG: 2 INJECTION, SOLUTION INTRAMUSCULAR; INTRAVENOUS at 15:18

## 2024-12-13 RX ADMIN — POTASSIUM CHLORIDE 40 MEQ: 750 TABLET, EXTENDED RELEASE ORAL at 21:46

## 2024-12-13 RX ADMIN — HYDROMORPHONE HYDROCHLORIDE 1 MG: 1 INJECTION, SOLUTION INTRAMUSCULAR; INTRAVENOUS; SUBCUTANEOUS at 21:41

## 2024-12-13 RX ADMIN — SODIUM CHLORIDE AND POTASSIUM CHLORIDE 125 ML/HR: 9; 1.49 INJECTION, SOLUTION INTRAVENOUS at 18:59

## 2024-12-13 NOTE — TELEPHONE ENCOUNTER
PT DIRECT ADMITTED FROM OFFICE TO [facility] - VIA [facility] PORTAL, PENDING AUTH REF #[id number], CLINCALS ATTACHED AND BED BOARD NOTIFIED.

## 2024-12-13 NOTE — TELEPHONE ENCOUNTER
Patient called in reporting vomiting and decreased appetite x 3 days since treatment. Has only been taking compazine 1-2x/daily. Advised she increase to 3. C/o dizziness when  up and about. Denies diarrhea. S/w Marisela and will bring in for fluids and labs to EP. Pt's s/o v/u. Plan placed. Linnette Chambers RN

## 2024-12-13 NOTE — H&P
Internal medicine history and physical  INTERNAL MEDICINE   [facility]       Patient Identification:  Name: Ely Sims  Age: 64 y.o.  Sex: female  :  1960  MRN: [id number]                   Primary Care Physician: Provider, No Known                               Date of admission:2024    Chief Complaint: Worsening abdominal pain since Tuesday.    History of Present Illness:   Patient is a 64-year-old female who has complicated past medical history including history of metastatic colon cancer with mets to brain requiring craniotomy for posterior fossa mass removal from the right cerebellar hemisphere and was found to have poorly differentiated adenocarcinoma of colorectal origin.  CT scan of the abdomen pelvis at that time of initial evaluation showed thickening of the colon and surrounding mesenteric tissue involvement consistent with primary colon cancer.  Patient had colonoscopy which confirmed the presence of poorly differentiated carcinoma with neuroendocrine features and afterwards she was noted to have thoracic spinal cord metastatic disease.  Patient was seen by general surgery service and underwent diverting loop sigmoid colostomy and port placement.  Patient was subsequently started on chemotherapy as well as radiation therapy to her brain and spine.  Patient received 4 cycles of chemotherapy since the diagnosis until 2024 and subsequently a repeat CAT scan performed on 2024 shows progression of the disease.  Patient was started on modified regimen on 12/10/2024 consisting of panitumumab and encorafenib.  She had a follow-up visit today after the first treatment and noted to have severe pain and worsening nausea and vomiting and unable to keep anything down.  Blood work done today showed potassium of 2.9 and white blood cell count of 18,000.  Patient was having some chills.  Because of her abdominal discomfort since introduction of new chemo immuno regimen and  unable to keep anything down patient was admitted directly for further care and management.  After receiving IV fluids and pain medications patient is feeling somewhat better and antiemetics patient is currently feeling better and being visited by family members at the bedside.      Past Medical History:  Past Medical History:   Diagnosis Date    Metastasis to brain     Rectal carcinoma 2024    Seasonal allergies      Past Surgical History:  Past Surgical History:   Procedure Laterality Date    COLONOSCOPY N/A 08/25/2024    Procedure: COLONOSCOPY to 20cm with cold biopsies and needle tattoo;  Surgeon: Shadi Kaminski MD;  Location: [facility] ENDOSCOPY;  Service: Gastroenterology;  Laterality: N/A;  pre: abnormal imaging  post: poor prep, obstructing colon mass at 20cm, hemorrhoids    COLOSTOMY N/A 08/27/2024    Procedure: COLOSTOMY LAPAROSCOPIC;  Surgeon: Saturnino Chester MD;  Location: [facility] OR;  Service: General;  Laterality: N/A;    CRANIOTOMY N/A 08/21/2024    Procedure: Posterior fossa craniotomy for tumor using stereotactic guidance;  Surgeon: Bull Doty MD;  Location: [facility] OR;  Service: Neurosurgery;  Laterality: N/A;    ENDOMETRIAL ABLATION      TUBAL ABDOMINAL LIGATION      VENOUS ACCESS DEVICE (PORT) INSERTION Right 08/27/2024    Procedure: INSERTION VENOUS ACCESS DEVICE;  Surgeon: Saturnino Chester MD;  Location: [facility] OR;  Service: General;  Laterality: Right;      Home Meds:  Medications Prior to Admission   Medication Sig Dispense Refill Last Dose/Taking    amphetamine-dextroamphetamine (ADDERALL) 20 MG tablet Take 1 tablet by mouth 2 (Two) Times a Day. Only taking when she is working  Indications: Attention Deficit Hyperactivity Disorder   11/22/2024    encorafenib (BRAFTOVI) 75 MG capsule Take 4 capsules by mouth Daily. 120 capsule 2 12/12/2024    HYDROcodone-acetaminophen (NORCO) 5-325 MG per tablet Take 1 tablet by mouth Every 6 (Six) Hours As Needed for  Moderate Pain.   Taking As Needed    lidocaine-prilocaine (EMLA) 2.5-2.5 % cream Apply 1 Application topically to the appropriate area as directed As Needed for Mild Pain. Apply robbin size amount to port site prior to chemotherapy 30 g 2 Taking As Needed    LORazepam (ATIVAN) 0.5 MG tablet Take 1 tablet by mouth Every 6 (Six) Hours As Needed for Anxiety. Indications: Feeling Anxious   Past Week    Morphine (MS CONTIN) 15 MG 12 hr tablet Take 1 tablet by mouth Every 8 (Eight) Hours for 30 days. 90 tablet 0 Taking    prochlorperazine (COMPAZINE) 10 MG tablet Take 1 tablet by mouth Every 8 (Eight) Hours As Needed for Nausea or Vomiting. 90 tablet 2 Taking As Needed    atorvastatin (LIPITOR) 10 MG tablet Take 1 tablet by mouth Daily.   More than a month    buPROPion XL (WELLBUTRIN XL) 150 MG 24 hr tablet Take 2 tablets by mouth Every Morning. Indications: Attention Deficit Hyperactivity Disorder (Patient taking differently: Take 2 tablets by mouth Every Morning. Pt reports not taking on a daily basis, have not taken recently  Indications: Attention Deficit Hyperactivity Disorder)   More than a month    HYDROcodone-acetaminophen (NORCO)  MG per tablet Take 1 tablet by mouth Every 6 (Six) Hours As Needed for Moderate Pain. 120 tablet 0     lidocaine-prilocaine (EMLA) 2.5-2.5 % cream Apply nickel size amount to port site 30 min before appointment time. Do not rub in. Cover with plastic wrap. 30 g 1     polyethylene glycol (MIRALAX) 17 g packet Take 17 g by mouth Daily. 90 each 2 More than a month    potassium chloride 10 MEQ CR tablet Take 2 tablets by mouth Daily. 30 tablet 0 More than a month    sennosides-docusate (senna-docusate sodium) 8.6-50 MG per tablet Take 2 tablets by mouth Daily. 90 tablet 2 More than a month     Current Meds:   No current facility-administered medications for this encounter.    Facility-Administered Medications Ordered in Other Encounters:     heparin injection 500 Units, 500 Units,  Intravenous, PRN, Elieser Saunders MD, 500 Units at 12/03/24 0839    sodium chloride 0.9 % flush 10 mL, 10 mL, Intravenous, PRN, Elieser Saunders MD, 10 mL at 12/03/24 0840  Allergies:  No Known Allergies  Social History:   Social History     Tobacco Use    Smoking status: Former     Types: Cigarettes    Smokeless tobacco: Never   Substance Use Topics    Alcohol use: Yes      Family History:  Family History   Problem Relation Age of Onset    Heart disease Mother     Colon cancer Sister           Review of Systems  See history of present illness and past medical history.       Vitals:   /89 (BP Location: Right arm, Patient Position: Lying)   Pulse 115   Temp 97.5 °F (36.4 °C) (Oral)   Resp 16   Wt 61.5 kg (135 lb 9.3 oz)   SpO2 96%   BMI 21.24 kg/m²   I/O: No intake or output data in the 24 hours ending 12/13/24 1208  Exam:  Patient is examined using the personal protective equipment as per guidelines from infection control for this particular patient as enacted.  Hand washing was performed before and after patient interaction.  General Appearance:  Ill-appearing female appears older than stated age   Head:    Normocephalic, without obvious abnormality, atraumatic   Eyes:    PERRL, conjunctiva/corneas clear, EOM's intact, both eyes   Ears:    Normal external ear canals, both ears   Nose:   Nares normal, septum midline, mucosa normal, no drainage    or sinus tenderness   Throat:   Lips, tongue, gums normal; oral mucosa pink and moist   Neck: Supple and no adenopathy   Back:     Symmetric, no curvature, ROM normal, no CVA tenderness   Lungs:     Clear to auscultation bilaterally, respirations unlabored   Chest Wall:    No tenderness or deformity, port in place    Heart:  S1-S2 regular   Abdomen:   Generalized abdominal tenderness with ostomy in place and functional.   Extremities:   Extremities normal, atraumatic, no cyanosis or edema   Pulses:   Pulses palpable in all extremities; symmetric all extremities    Skin:   Skin color normal, Skin is warm and dry,  no rashes or palpable lesions   Neurologic: Grossly nonfocal       Data Review:      I reviewed the patient's new clinical results.  Results from last 7 days   Lab Units 12/13/24  1334 12/10/24  0932   WBC 10*3/mm3 18.44* 10.54   HEMOGLOBIN g/dL 11.7* 11.1*   PLATELETS 10*3/mm3 289 319     Results from last 7 days   Lab Units 12/13/24  1334 12/10/24  0932   SODIUM mmol/L 136 135*   POTASSIUM mmol/L 2.9* 3.1*   CHLORIDE mmol/L 93* 96*   CO2 mmol/L 27.6 26.2   BUN mg/dL 13 6*   CREATININE mg/dL 0.69 0.64   CALCIUM mg/dL 8.4* 9.3   GLUCOSE mg/dL 126* 93     No radiology results for the last day  Microbiology Results (last 10 days)       ** No results found for the last 240 hours. **          Brief Urine Lab Results  (Last result in the past 365 days)        Color   Clarity   Blood   Leuk Est   Nitrite   Protein   CREAT   Urine HCG        08/19/24 1708 Yellow   Cloudy   Negative   Trace   Negative   30 mg/dL (1+)                   Assessment:  Active Hospital Problems    Diagnosis  POA    Abdominal pain [R10.9]  Unknown    Dehydration [E86.0]  Yes    Hypokalemia [E87.6]  Unknown    Abnormal LFTs [R79.89]  Unknown    Rectal adenocarcinoma [C20]  Yes    Anemia [D64.9]  Yes    Colon cancer metastasized to brain [C18.9, C79.31]  Yes       Medical decision making/care plan: See admitting orders  Worsening abdominal pain in the setting of underlying colorectal malignancy, diverting colostomy and unable to keep anything down with nausea vomiting vomiting and element of dehydration-plan is to admit the patient continue with IV fluids replace electrolytes provide her symptomatic relief and consult hematology oncology service.  Progressive colorectal malignancy with mets to brain and spinal cord status post craniectomy and radiation treatment despite 4 cycles of chemo and radiation to the metastatic areas and recently switched to palliative immunotherapy with first dose on  12/10/2024-consult hematology oncology service.  Leukocytosis with subjective chills-get blood cultures keep an eye on her fever and hemodynamics and low threshold to start broad-spectrum antibiotic therapy if she shows evidence of fever or is started to feel unwell.  Significant electrolyte imbalance-replace potassium magnesium per protocol.      Estefania Jacobs MD   12/13/2024  18:38 EST    Parts of this note may be an electronic transcription/translation of spoken language to printed text using the Dragon dictation system.

## 2024-12-13 NOTE — PROGRESS NOTES
[facility] CBC GROUP OUTPATIENT FOLLOW UP CLINIC VISIT    REASON FOR FOLLOW-UP:    Metastatic colon cancer     HISTORY OF PRESENT ILLNESS:    Ely Sims is a 64 y.o. female who returns for triage visit.  She is seen with her children present.  She reports severe pain in her abdomen despite taking MS Contin 15 mg every 8 hours and Norco 10/325 every 6 hours.  Reports since Tuesday her pain has continued to worsen.  She is also started having nausea and vomiting that started Tuesday as well.  Reports not being able to keep down any fluid or food since Tuesday.  Has been able to keep down some medications.  Also started feeling chilled a few hours ago, did not check her temperature but did not have a fever in clinic.    PHYSICAL EXAMINATION:  There were no vitals filed for this visit.    General: Alert and oriented.  Mildly distressed due to pain.  Skin:  Warm and dry, no visible rash  HEENT:  clear, moist. No visible sores.   Chest:  Normal respiratory effort. CTAB.  Benign-appearing Mediport present.  Heart: RRR  Abdomen: Ostomy present.  Soft.  Diffuse hypogastric tenderness to palpation without rebound or guarding.  Extremities:  No visible clubbing, cyanosis, or edema  Neuro/psych:  Grossly nonfocal.  Normal mood and affect.      DIAGNOSTIC DATA:  Comprehensive metabolic panel (12/13/2024 13:34)  CBC and Differential (12/13/2024 13:34)    IMAGING:    None today     ASSESSMENT:  This is a 64 y.o. female with:    *Metastatic colon cancer  The patient presented on 8/19/2022 initially to urgent care with headache, nausea, vomiting, diarrhea.  She was advised to go to the ED from urgent care.  CT head 8/19/2024 showed some areas of increased attenuation over the right cerebral hemisphere and left temporal lobe concerning for metastatic disease with edema with some mass effect upon the fourth ventricle.  MRI brain 8/20/2024 with a lesion at the right cerebellar hemisphere measuring 2.2 cm, lesion within the left  temporal lobe measuring 9 mm, 1.0 cm lesion at the left cerebellar vermis, all with surrounding edema.  There was some mass effect upon the fourth ventricle with mild prominence of the lateral and third ventricles.  8/20/2024: CT chest abdomen and pelvis with no evidence of pulmonary metastatic disease.  There was some asymmetric wall thickening in the distal sigmoid colon with surrounding mesenteric soft tissue nodularity concerning for primary colon cancer.  A sclerotic lesion of the spinous process of T1 was said to be consistent with a bone island or other benign lesion.  8/21/2024: Posterior fossa craniectomy with gross total removal of a right cerebellar hemisphere metastasis Dr. Bull Doty.  Pathology consistent with metastatic poorly differentiated adenocarcinoma favoring colorectal origin.  MSI testing is pending.  [place]us with a BRAF V600E mutation.  TMB 4.7 mutations per megabase.  Microsatellite stable.  8/25/2024: Bone scan with no obvious metastatic disease.  8/25/2024: Colonoscopy by Dr. Kaminski shows an infiltrative completely obstructing large mass found at the proximal rectum.  Pathology with invasive poorly differentiated carcinoma with neuroendocrine features. MSI studies pending.     CEA 2.10.  8/26/2024: MRI lumbar spine with no obvious metastatic disease in the lumbar spine.  However, there is a 3 mm enhancing lesion in the visualized lower thoracic spinal cord that could represent a spinal cord metastasis.  Cervical and thoracic spine MRI requested..  8/27/24: diverting loop sigmoid colostomy and port placement by Dr. Chester  8/28/2024: MRI C and T-spine addendum made with a small 3 mm met to the spinal cord at T12  Completed radiation to the brain and spine on 9/24/24  PET scan 9/12/24 with no metastatic disease. FDG avid mass at the high rectum.  10/22/2024: Proceed with Cycle 2 FOLFOX. Will omit 5 FU bolus due to neutropenia. Future treatment plans adjusted. In regards to lower abdominal  cramping which occurs at night, advised patient to discuss with Dr. Chester (surgeon).   11/5/2024: Proceed with Cycle 3 FOLFOX with 5 FU bolus omitted. WBC 4.05 and . Will give a dose of tylenol today while in the infusion area for side/back pain. Instructed her to reach out if the pain/soreness does not subside.   11/19/2024: She presents for cycle #4 of therapy.  Therapy has been well-tolerated.  However, she is having worsening abdominal pain as well as some nausea and vomiting this morning.  See below.  11/26/2024: CT imaging with significant progression of disease  With progression, cetuximab or panitumumab plus encorafenib given the presence of a BRAF V600 E mutation.  Plan panitumumab due to ease of administration every 2 weeks and potential for fewer adverse effects compared to cetuximab. Encorafenib is 300 mg daily.  Panitumumab is 6 mg/kg every 14 days.  Plan treatment until disease progression or unacceptable toxicity.  Treatment is palliative and she understands this.  We will plan to initiate therapy very quickly given significant pression of disease.  C1 D1 panitumumab and encorafenib 12/10/2024  12/13/2024: seen for triage visit concerning severe pain and uncontrolled nausea/vomiting.  She has not been able to keep down any food or drink for the last 3 days.  Has been able to keep down some medications.  Has been utilizing Compazine every 6 hours without any relief.  As far as her pain she is continued MS Contin 15 mg every 8 hours and Norco 10/325 every 6 hours, despite this her pain has continued to worsen.  Her WBC is elevated at 18.44, and she reports developing  chills a few hours ago.  Her bilirubin has also increase dto 2.4.  She will require admission through [facility].     *Mild normocytic anemia  Hemoglobin 11.7    *Neutropenia secondary to chemotherapy  10/22/2024:  today. Discussed with Dr. Saunders, will proceed with treatment today, but omitting the 5 FU bolus today and for future  treatments.   11/5/2024: . Will proceed with treatment today and continue to closely monitor counts.   12/3/2024: White blood cell count 4.13, ANC 1.61  12/13/2024: WBC 18.44, ANC 14.58    *Abdominal pain  We have started sustained-release morphine 15 mg every 8 hours.  We had intended her for her to take hydrocodone also as needed but she has not been doing this thinking that morphine replaced the hydrocodone.  She was encouraged to take hydrocodone as needed as well.  12/13/2024: Patient is having severe uncontrolled pain.  She has been taking MS Contin 15 mg every 8 hours and Norco 10/325 every 6 hours.  Despite this her pain has continued to worsen.  She will require direct admission through [facility] for further management.    *Uncontrolled nausea/vomiting-has been utilizing Compazine every 6 hours without any relief.  Has not been able to eat or drink in 3 days.  Has been able to take some medication by mouth.    *Hypokalemia-potassium 2.9, secondary to uncontrolled nausea/vomiting.  This will be addressed during hospitalization.    *Insomnia     PLAN:   1 L IV normal saline in clinic.   8 mg IV Zofran in clinic.  2mg IV morphine in clinic today.  Patient will require admission through [facility] for management of severe pain and uncontrolled nausea/vomiting.    Patient is scheduled to return 12/23/2024 for MD and cycle 2 panitumumab.  Treatment plan: Encorafenib 300 mg daily and panitumumab 6 mg/kg every 14 days.  Treatment is palliative.  Plan treatment until disease progression or unacceptable toxicity.    High risk medication.  Reviewed with Dr. Saunders who is in agreement.    I spent 62 minutes caring for Ely on this date of service. This time includes time spent by me in the following activities: preparing for the visit, reviewing tests, obtaining and/or reviewing a separately obtained history, performing a medically appropriate examination and/or evaluation, counseling and educating the  patient/family/caregiver, ordering medications, tests, or procedures, documenting information in the medical record, and care coordination.

## 2024-12-13 NOTE — NURSING NOTE
Pt here for IVF with antiemetics as she has had no appetite and has not been eating or drinking. She did have vomiting this morning. She took compazine around 12:45. Pt rating her pain at a 7. She has been taking her hydrocodone and MS Contin as directed. Pt seen by GRETCHEN Lund with decision to admit pt to [facility]. Order from Dr. Saunders to administer 2mg IV Morphine. Order placed and pt informed.

## 2024-12-14 ENCOUNTER — APPOINTMENT (OUTPATIENT)
Dept: CT IMAGING | Facility: HOSPITAL | Age: 64
End: 2024-12-14
Payer: COMMERCIAL

## 2024-12-14 LAB
ALBUMIN SERPL-MCNC: 2.8 G/DL (ref 3.5–5.2)
ALBUMIN/GLOB SERPL: 0.8 G/DL
ALP SERPL-CCNC: 691 U/L (ref 39–117)
ALT SERPL W P-5'-P-CCNC: 41 U/L (ref 1–33)
ANION GAP SERPL CALCULATED.3IONS-SCNC: 17 MMOL/L (ref 5–15)
AST SERPL-CCNC: 128 U/L (ref 1–32)
BASOPHILS # BLD AUTO: 0.05 10*3/MM3 (ref 0–0.2)
BASOPHILS NFR BLD AUTO: 0.2 % (ref 0–1.5)
BILIRUB SERPL-MCNC: 1.8 MG/DL (ref 0–1.2)
BILIRUB UR QL STRIP: NEGATIVE
BUN SERPL-MCNC: 12 MG/DL (ref 8–23)
BUN/CREAT SERPL: 17.4 (ref 7–25)
CALCIUM SPEC-SCNC: 8.1 MG/DL (ref 8.6–10.5)
CHLORIDE SERPL-SCNC: 100 MMOL/L (ref 98–107)
CLARITY UR: CLEAR
CO2 SERPL-SCNC: 17 MMOL/L (ref 22–29)
COLOR UR: ABNORMAL
CREAT SERPL-MCNC: 0.69 MG/DL (ref 0.57–1)
DEPRECATED RDW RBC AUTO: 51.3 FL (ref 37–54)
EGFRCR SERPLBLD CKD-EPI 2021: 97.1 ML/MIN/1.73
EOSINOPHIL # BLD AUTO: 0.04 10*3/MM3 (ref 0–0.4)
EOSINOPHIL NFR BLD AUTO: 0.2 % (ref 0.3–6.2)
ERYTHROCYTE [DISTWIDTH] IN BLOOD BY AUTOMATED COUNT: 14.7 % (ref 12.3–15.4)
GLOBULIN UR ELPH-MCNC: 3.7 GM/DL
GLUCOSE SERPL-MCNC: 94 MG/DL (ref 65–99)
GLUCOSE UR STRIP-MCNC: NEGATIVE MG/DL
HCT VFR BLD AUTO: 37.3 % (ref 34–46.6)
HGB BLD-MCNC: 12 G/DL (ref 12–15.9)
HGB UR QL STRIP.AUTO: NEGATIVE
IMM GRANULOCYTES # BLD AUTO: 0.25 10*3/MM3 (ref 0–0.05)
IMM GRANULOCYTES NFR BLD AUTO: 1.2 % (ref 0–0.5)
KETONES UR QL STRIP: ABNORMAL
LEUKOCYTE ESTERASE UR QL STRIP.AUTO: NEGATIVE
LYMPHOCYTES # BLD AUTO: 0.58 10*3/MM3 (ref 0.7–3.1)
LYMPHOCYTES NFR BLD AUTO: 2.9 % (ref 19.6–45.3)
MAGNESIUM SERPL-MCNC: 1.4 MG/DL (ref 1.6–2.4)
MCH RBC QN AUTO: 31.3 PG (ref 26.6–33)
MCHC RBC AUTO-ENTMCNC: 32.2 G/DL (ref 31.5–35.7)
MCV RBC AUTO: 97.1 FL (ref 79–97)
MONOCYTES # BLD AUTO: 2.65 10*3/MM3 (ref 0.1–0.9)
MONOCYTES NFR BLD AUTO: 13 % (ref 5–12)
NEUTROPHILS NFR BLD AUTO: 16.76 10*3/MM3 (ref 1.7–7)
NEUTROPHILS NFR BLD AUTO: 82.5 % (ref 42.7–76)
NITRITE UR QL STRIP: NEGATIVE
NRBC BLD AUTO-RTO: 0 /100 WBC (ref 0–0.2)
PH UR STRIP.AUTO: 6 [PH] (ref 5–8)
PHOSPHATE SERPL-MCNC: 2.8 MG/DL (ref 2.5–4.5)
PLATELET # BLD AUTO: 241 10*3/MM3 (ref 140–450)
PMV BLD AUTO: 10.7 FL (ref 6–12)
POTASSIUM SERPL-SCNC: 4.7 MMOL/L (ref 3.5–5.2)
PROT SERPL-MCNC: 6.5 G/DL (ref 6–8.5)
PROT UR QL STRIP: ABNORMAL
RBC # BLD AUTO: 3.84 10*6/MM3 (ref 3.77–5.28)
SODIUM SERPL-SCNC: 134 MMOL/L (ref 136–145)
SP GR UR STRIP: 1.01 (ref 1–1.03)
UROBILINOGEN UR QL STRIP: ABNORMAL
WBC NRBC COR # BLD AUTO: 20.33 10*3/MM3 (ref 3.4–10.8)

## 2024-12-14 PROCEDURE — 84100 ASSAY OF PHOSPHORUS: CPT | Performed by: INTERNAL MEDICINE

## 2024-12-14 PROCEDURE — 25510000001 IOPAMIDOL 61 % SOLUTION: Performed by: INTERNAL MEDICINE

## 2024-12-14 PROCEDURE — 83735 ASSAY OF MAGNESIUM: CPT | Performed by: INTERNAL MEDICINE

## 2024-12-14 PROCEDURE — 80053 COMPREHEN METABOLIC PANEL: CPT | Performed by: INTERNAL MEDICINE

## 2024-12-14 PROCEDURE — 81003 URINALYSIS AUTO W/O SCOPE: CPT | Performed by: INTERNAL MEDICINE

## 2024-12-14 PROCEDURE — 25010000002 MAGNESIUM SULFATE 2 GM/50ML SOLUTION: Performed by: INTERNAL MEDICINE

## 2024-12-14 PROCEDURE — 99223 1ST HOSP IP/OBS HIGH 75: CPT | Performed by: INTERNAL MEDICINE

## 2024-12-14 PROCEDURE — 74177 CT ABD & PELVIS W/CONTRAST: CPT

## 2024-12-14 PROCEDURE — 85025 COMPLETE CBC W/AUTO DIFF WBC: CPT | Performed by: INTERNAL MEDICINE

## 2024-12-14 PROCEDURE — 25010000002 DEXAMETHASONE PER 1 MG: Performed by: INTERNAL MEDICINE

## 2024-12-14 PROCEDURE — 25010000002 SODIUM CHLORIDE 0.9 % WITH KCL 20 MEQ 20-0.9 MEQ/L-% SOLUTION: Performed by: INTERNAL MEDICINE

## 2024-12-14 PROCEDURE — 25510000002 DIATRIZOATE MEGLUMINE & SODIUM PER 1 ML: Performed by: INTERNAL MEDICINE

## 2024-12-14 PROCEDURE — 99222 1ST HOSP IP/OBS MODERATE 55: CPT | Performed by: INTERNAL MEDICINE

## 2024-12-14 PROCEDURE — 25010000002 KCL (0.149%) IN NACL 20-0.9 MEQ/L-% SOLUTION: Performed by: INTERNAL MEDICINE

## 2024-12-14 PROCEDURE — 87040 BLOOD CULTURE FOR BACTERIA: CPT | Performed by: INTERNAL MEDICINE

## 2024-12-14 PROCEDURE — 25010000002 HYDROMORPHONE 1 MG/ML SOLUTION: Performed by: INTERNAL MEDICINE

## 2024-12-14 RX ORDER — HEPARIN SODIUM (PORCINE) LOCK FLUSH IV SOLN 100 UNIT/ML 100 UNIT/ML
5 SOLUTION INTRAVENOUS AS NEEDED
Status: DISCONTINUED | OUTPATIENT
Start: 2024-12-14 | End: 2024-12-20 | Stop reason: HOSPADM

## 2024-12-14 RX ORDER — SODIUM CHLORIDE 9 MG/ML
40 INJECTION, SOLUTION INTRAVENOUS AS NEEDED
Status: DISCONTINUED | OUTPATIENT
Start: 2024-12-14 | End: 2024-12-20 | Stop reason: HOSPADM

## 2024-12-14 RX ORDER — SODIUM CHLORIDE 0.9 % (FLUSH) 0.9 %
20 SYRINGE (ML) INJECTION AS NEEDED
Status: DISCONTINUED | OUTPATIENT
Start: 2024-12-14 | End: 2024-12-20 | Stop reason: HOSPADM

## 2024-12-14 RX ORDER — SCOLOPAMINE TRANSDERMAL SYSTEM 1 MG/1
1 PATCH, EXTENDED RELEASE TRANSDERMAL
Status: DISCONTINUED | OUTPATIENT
Start: 2024-12-14 | End: 2024-12-17

## 2024-12-14 RX ORDER — SODIUM CHLORIDE 0.9 % (FLUSH) 0.9 %
10 SYRINGE (ML) INJECTION EVERY 12 HOURS SCHEDULED
Status: DISCONTINUED | OUTPATIENT
Start: 2024-12-14 | End: 2024-12-20 | Stop reason: HOSPADM

## 2024-12-14 RX ORDER — IOPAMIDOL 612 MG/ML
100 INJECTION, SOLUTION INTRAVASCULAR
Status: COMPLETED | OUTPATIENT
Start: 2024-12-14 | End: 2024-12-14

## 2024-12-14 RX ORDER — DEXAMETHASONE SODIUM PHOSPHATE 4 MG/ML
4 INJECTION, SOLUTION INTRA-ARTICULAR; INTRALESIONAL; INTRAMUSCULAR; INTRAVENOUS; SOFT TISSUE EVERY 8 HOURS
Status: DISCONTINUED | OUTPATIENT
Start: 2024-12-14 | End: 2024-12-17

## 2024-12-14 RX ORDER — FENTANYL 50 UG/1
1 PATCH TRANSDERMAL
Status: COMPLETED | OUTPATIENT
Start: 2024-12-14 | End: 2024-12-20

## 2024-12-14 RX ORDER — DIATRIZOATE MEGLUMINE AND DIATRIZOATE SODIUM 660; 100 MG/ML; MG/ML
30 SOLUTION ORAL; RECTAL ONCE
Status: COMPLETED | OUTPATIENT
Start: 2024-12-14 | End: 2024-12-14

## 2024-12-14 RX ORDER — SODIUM CHLORIDE 0.9 % (FLUSH) 0.9 %
10 SYRINGE (ML) INJECTION AS NEEDED
Status: DISCONTINUED | OUTPATIENT
Start: 2024-12-14 | End: 2024-12-20 | Stop reason: HOSPADM

## 2024-12-14 RX ORDER — MAGNESIUM SULFATE HEPTAHYDRATE 40 MG/ML
2 INJECTION, SOLUTION INTRAVENOUS
Status: COMPLETED | OUTPATIENT
Start: 2024-12-14 | End: 2024-12-14

## 2024-12-14 RX ADMIN — DIATRIZOATE MEGLUMINE AND DIATRIZOATE SODIUM 30 ML: 600; 100 SOLUTION ORAL; RECTAL at 20:12

## 2024-12-14 RX ADMIN — FENTANYL 1 PATCH: 50 PATCH TRANSDERMAL at 14:13

## 2024-12-14 RX ADMIN — HYDROMORPHONE HYDROCHLORIDE 1 MG: 1 INJECTION, SOLUTION INTRAMUSCULAR; INTRAVENOUS; SUBCUTANEOUS at 02:09

## 2024-12-14 RX ADMIN — LORAZEPAM 0.5 MG: 0.5 TABLET ORAL at 12:05

## 2024-12-14 RX ADMIN — HYDROMORPHONE HYDROCHLORIDE 1 MG: 1 INJECTION, SOLUTION INTRAMUSCULAR; INTRAVENOUS; SUBCUTANEOUS at 20:22

## 2024-12-14 RX ADMIN — MORPHINE SULFATE 15 MG: 15 TABLET, FILM COATED, EXTENDED RELEASE ORAL at 07:37

## 2024-12-14 RX ADMIN — DEXAMETHASONE SODIUM PHOSPHATE 4 MG: 4 INJECTION, SOLUTION INTRAMUSCULAR; INTRAVENOUS at 09:55

## 2024-12-14 RX ADMIN — DEXAMETHASONE SODIUM PHOSPHATE 4 MG: 4 INJECTION, SOLUTION INTRAMUSCULAR; INTRAVENOUS at 17:50

## 2024-12-14 RX ADMIN — POTASSIUM CHLORIDE 40 MEQ: 750 TABLET, EXTENDED RELEASE ORAL at 02:01

## 2024-12-14 RX ADMIN — POTASSIUM CHLORIDE 20 MEQ: 1.5 POWDER, FOR SOLUTION ORAL at 09:24

## 2024-12-14 RX ADMIN — Medication 10 ML: at 20:22

## 2024-12-14 RX ADMIN — HYDROMORPHONE HYDROCHLORIDE 1 MG: 1 INJECTION, SOLUTION INTRAMUSCULAR; INTRAVENOUS; SUBCUTANEOUS at 06:26

## 2024-12-14 RX ADMIN — MAGNESIUM SULFATE HEPTAHYDRATE 2 G: 40 INJECTION, SOLUTION INTRAVENOUS at 12:01

## 2024-12-14 RX ADMIN — SENNOSIDES AND DOCUSATE SODIUM 2 TABLET: 50; 8.6 TABLET ORAL at 09:24

## 2024-12-14 RX ADMIN — POLYETHYLENE GLYCOL 3350 17 G: 17 POWDER, FOR SOLUTION ORAL at 09:24

## 2024-12-14 RX ADMIN — SCOPOLAMINE 1 PATCH: 1.5 PATCH, EXTENDED RELEASE TRANSDERMAL at 20:11

## 2024-12-14 RX ADMIN — Medication 10 ML: at 20:23

## 2024-12-14 RX ADMIN — LORAZEPAM 0.5 MG: 0.5 TABLET ORAL at 23:59

## 2024-12-14 RX ADMIN — SODIUM CHLORIDE AND POTASSIUM CHLORIDE 125 ML/HR: 9; 1.49 INJECTION, SOLUTION INTRAVENOUS at 10:02

## 2024-12-14 RX ADMIN — IOPAMIDOL 85 ML: 612 INJECTION, SOLUTION INTRAVENOUS at 21:52

## 2024-12-14 RX ADMIN — Medication 10 ML: at 09:55

## 2024-12-14 RX ADMIN — MAGNESIUM SULFATE HEPTAHYDRATE 2 G: 40 INJECTION, SOLUTION INTRAVENOUS at 10:03

## 2024-12-14 RX ADMIN — POTASSIUM CHLORIDE 20 MEQ: 1.5 POWDER, FOR SOLUTION ORAL at 12:01

## 2024-12-14 RX ADMIN — MAGNESIUM SULFATE HEPTAHYDRATE 2 G: 40 INJECTION, SOLUTION INTRAVENOUS at 14:13

## 2024-12-14 RX ADMIN — POTASSIUM CHLORIDE 40 MEQ: 750 TABLET, EXTENDED RELEASE ORAL at 07:36

## 2024-12-14 RX ADMIN — HYDROMORPHONE HYDROCHLORIDE 1 MG: 1 INJECTION, SOLUTION INTRAMUSCULAR; INTRAVENOUS; SUBCUTANEOUS at 09:38

## 2024-12-14 RX ADMIN — SODIUM CHLORIDE AND POTASSIUM CHLORIDE 125 ML/HR: 9; 1.49 INJECTION, SOLUTION INTRAVENOUS at 02:49

## 2024-12-14 RX ADMIN — LORAZEPAM 0.5 MG: 0.5 TABLET ORAL at 18:10

## 2024-12-14 RX ADMIN — POTASSIUM CHLORIDE 20 MEQ: 1.5 POWDER, FOR SOLUTION ORAL at 17:50

## 2024-12-14 NOTE — CONSULTS
Subjective     REASON FOR CONSULTATION:    Evaluation and management for metastatic colorectal cancer, BRAF V600E patient                             REQUESTING PHYSICIAN:  Dr. Fazal MD    RECORDS OBTAINED:  Records of the patients history including those obtained from the referring provider were reviewed and summarized in detail.    HISTORY OF PRESENT ILLNESS:  The patient is a 64 y.o. year old female with medical history significant for metastatic colorectal carcinoma with diffuse mets to the mesentery, brain, lungs and thoracic spinal cord s/p diverting loop sigmoid colostomy, radiation to the brain and spinal cord lesions who recently progressed on frontline therapy with FOLFOX and received first dose of second line Vectibix infusion/oral bravtovi on 12/10/2024 was admitted directly from the oncology clinic with complaints of worsening abdominal pain, nausea, vomiting and poor oral intake.  Patient was hemodynamically stable on presentation.  Labs noted leukocytosis of 18.44, mild anemia of 11.7 and hypokalemia of 2.9.  Patient had elevated bilirubin of 2.4 with AST of 137, ALT 44 and .    Patient was admitted and started on IV fluids, electrolyte replacement and IV pain management.  Hematology/oncology has been consulted for further evaluation and management.    Patient is well-known to our service and follows up with Dr. Saunders for metastatic CRC management.      Past Medical History:   Diagnosis Date    Metastasis to brain     Rectal carcinoma 2024    Seasonal allergies         Past Surgical History:   Procedure Laterality Date    COLONOSCOPY N/A 08/25/2024    Procedure: COLONOSCOPY to 20cm with cold biopsies and needle tattoo;  Surgeon: Shadi Kaminski MD;  Location: [facility] ENDOSCOPY;  Service: Gastroenterology;  Laterality: N/A;  pre: abnormal imaging  post: poor prep, obstructing colon mass at 20cm, hemorrhoids    COLOSTOMY N/A 08/27/2024    Procedure: COLOSTOMY LAPAROSCOPIC;  Surgeon: Henrik  Saturnino Sauceda MD;  Location: [facility] MA[facility];  Service: General;  Laterality: N/A;    CRANIOTOMY N/A 08/21/2024    Procedure: Posterior fossa craniotomy for tumor using stereotactic guidance;  Surgeon: Bull Doty MD;  [facility];  Service: Neurosurgery;  Laterality: N/A;    ENDOMETRIAL ABLATION      TUBAL ABDOMINAL LIGATION      VENOUS ACCESS DEVICE (PORT) INSERTION Right 08/27/2024    Procedure: INSERTION VENOUS ACCESS DEVICE;  Surgeon: Saturnino Chester MD;  Location: [facility] MAIN OR;  Service: General;  Laterality: Right;        Current Facility-Administered Medications on File Prior to Encounter   Medication Dose Route Frequency Provider Last Rate Last Admin    heparin injection 500 Units  500 Units Intravenous PRN Elieser Saunders MD   500 Units at 12/03/24 0839    [COMPLETED] morphine injection 2 mg  2 mg Intravenous Once Elieser Saunders MD   2 mg at 12/13/24 1518    [COMPLETED] ondansetron (ZOFRAN) injection 8 mg  8 mg Intravenous Once Marisela Garza APRN   8 mg at 12/13/24 1357    sodium chloride 0.9 % flush 10 mL  10 mL Intravenous PRN Elieser Saunders MD   10 mL at 12/03/24 0840    [COMPLETED] sodium chloride 0.9 % infusion 1,000 mL  1,000 mL Intravenous Once Marisela Garza APRN   Stopped at 12/13/24 1526     Current Outpatient Medications on File Prior to Encounter   Medication Sig Dispense Refill    amphetamine-dextroamphetamine (ADDERALL) 20 MG tablet Take 1 tablet by mouth 2 (Two) Times a Day. Only taking when she is working  Indications: Attention Deficit Hyperactivity Disorder      encorafenib (BRAFTOVI) 75 MG capsule Take 4 capsules by mouth Daily. 120 capsule 2    HYDROcodone-acetaminophen (NORCO) 5-325 MG per tablet Take 1 tablet by mouth Every 6 (Six) Hours As Needed for Moderate Pain.      lidocaine-prilocaine (EMLA) 2.5-2.5 % cream Apply 1 Application topically to the appropriate area as directed As Needed for Mild Pain. Apply robbin size amount to port site prior to  chemotherapy 30 g 2    LORazepam (ATIVAN) 0.5 MG tablet Take 1 tablet by mouth Every 6 (Six) Hours As Needed for Anxiety. Indications: Feeling Anxious      Morphine (MS CONTIN) 15 MG 12 hr tablet Take 1 tablet by mouth Every 8 (Eight) Hours for 30 days. 90 tablet 0    prochlorperazine (COMPAZINE) 10 MG tablet Take 1 tablet by mouth Every 8 (Eight) Hours As Needed for Nausea or Vomiting. 90 tablet 2    atorvastatin (LIPITOR) 10 MG tablet Take 1 tablet by mouth Daily.      buPROPion XL (WELLBUTRIN XL) 150 MG 24 hr tablet Take 2 tablets by mouth Every Morning. Indications: Attention Deficit Hyperactivity Disorder (Patient taking differently: Take 2 tablets by mouth Every Morning. Pt reports not taking on a daily basis, have not taken recently  Indications: Attention Deficit Hyperactivity Disorder)      HYDROcodone-acetaminophen (NORCO)  MG per tablet Take 1 tablet by mouth Every 6 (Six) Hours As Needed for Moderate Pain. 120 tablet 0    lidocaine-prilocaine (EMLA) 2.5-2.5 % cream Apply nickel size amount to port site 30 min before appointment time. Do not rub in. Cover with plastic wrap. 30 g 1    polyethylene glycol (MIRALAX) 17 g packet Take 17 g by mouth Daily. 90 each 2    potassium chloride 10 MEQ CR tablet Take 2 tablets by mouth Daily. 30 tablet 0    sennosides-docusate (senna-docusate sodium) 8.6-50 MG per tablet Take 2 tablets by mouth Daily. 90 tablet 2        ALLERGIES:  No Known Allergies     Social History     Socioeconomic History    Marital status: Single   Tobacco Use    Smoking status: Former     Types: Cigarettes    Smokeless tobacco: Never   Vaping Use    Vaping status: Never Used   Substance and Sexual Activity    Alcohol use: Yes    Drug use: Defer    Sexual activity: Defer        Family History   Problem Relation Age of Onset    Heart disease Mother     Colon cancer Sister         Review of Systems   As per HPI    Objective     Vitals:    12/13/24 2015 12/14/24 0152 12/14/24 0434 12/14/24  0733   BP: 144/86  145/81 148/87   BP Location: Right arm  Right arm Right arm   Patient Position: Lying  Lying Lying   Pulse: 111 111 109 108   Resp: 16  16 16   Temp: 98.2 °F (36.8 °C)  98.8 °F (37.1 °C) 98.2 °F (36.8 °C)   TempSrc: Oral  Oral Oral   SpO2: 93% 93% 94% 96%   Weight:             12/13/2024     1:30 PM   Current Status   ECOG score 2       Physical Exam    CONSTITUTIONAL:  Vital signs reviewed.  No distress, looks comfortable.  EYES:  Conjunctivae and lids unremarkable.    EARS,NOSE,MOUTH,THROAT:  Ears and nose appear unremarkable.    RESPIRATORY:  Normal respiratory effort.   CARDIOVASCULAR:  Normal heart rate  GASTROINTESTINAL: Abdomen appears unremarkable.  Ostomy noted  LYMPHATIC:  No cervical, supraclavicular lymphadenopathy.  SKIN:  Warm.  No rashes.  PSYCHIATRIC:  Normal judgment and insight.  Normal mood and affect.  NEURO: AAOx3, no obvious focal deficits.      RECENT LABS:  Hematology WBC   Date Value Ref Range Status   12/14/2024 20.33 (H) 3.40 - 10.80 10*3/mm3 Final     RBC   Date Value Ref Range Status   12/14/2024 3.84 3.77 - 5.28 10*6/mm3 Final     Hemoglobin   Date Value Ref Range Status   12/14/2024 12.0 12.0 - 15.9 g/dL Final     Hematocrit   Date Value Ref Range Status   12/14/2024 37.3 34.0 - 46.6 % Final     Platelets   Date Value Ref Range Status   12/14/2024 241 140 - 450 10*3/mm3 Final          Assessment & Plan   Patient is a pleasant 64-year-old female with:    # Abdominal pain, nausea and vomiting:  Likely secondary to underlying diffuse mesenteric, liver and lymph node metastasis, made worse by recent administration of Vectibix on 12/10/2024.  Will start IV dexamethasone 4 mg every 8 hours to help with symptoms  Patient is unable to take p.o. morphine for pain relief.  Will discontinue morphine and switch to fentanyl patch 50 mcg every 72 hours.    Continue IV Dilaudid 1 mg every 2 hours as needed.  Will transition to p.o. Norco at discharge  Continue IV fluids, IV Zofran  and Compazine and electrolyte replacement    # Metastatic colorectal cancer, BRAF V600E mutated:  Has diffuse mets to the mesentery, brain, lungs and thoracic spinal cord s/p diverting loop sigmoid colostomy, radiation to the brain and spinal cord lesions   She recently progressed on frontline therapy with FOLFOX and received first dose of second line Vectibix infusion/oral bravtovi on 12/10/2024   Advised to continue p.o. Braftovi 300 mg daily while inpatient  Patient has a follow-up appointment with Dr. Saunders on 12/23/2024 at 1 PM for next cycle of Vectibix.    # Hypokalemia: Replacement per primary    Recommendations:  -Continue IV fluids, antiemetics and electrolyte replacement per primary  -Will start IV dexamethasone 4 mg every 8 hours  -Start fentanyl patch 50 mcg gasseri 2 hours.  DC morphine  -Continue IV Dilaudid 1 mg every 2 hours as needed  -Continue p.o. Braftovi as inpatient  -Will follow          I spent 82 minutes on this encounter, before, during & after the visit evaluating the patient, reviewing records and writing orders.

## 2024-12-14 NOTE — SIGNIFICANT NOTE
12/14/24 1045   OTHER   Discipline physical therapist   Rehab Time/Intention   Session Not Performed other (see comments);patient/family declined evaluation  (Pt decline PT needs at this time, sitting EOB upon arrival - reports she has been getting up in the room on her own, noted no bed alarm on. Encouraged pt to continue mobilizing throughout the day. acute PT will s/o)   Therapy Assessment/Plan (PT)   Criteria for Skilled Interventions Met (PT) no;no problems identified which require skilled intervention

## 2024-12-14 NOTE — PROGRESS NOTES
Name: Ely Sims ADMIT: 2024   : 1960  PCP: Provider, No Known    MRN: [id number] LOS: 1 days   AGE/SEX: 64 y.o. female  ROOM: [facility]     Subjective   Subjective   Patient is seen at bedside, no new complaints, still has abdominal pain.       Objective   Objective   Vital Signs  Temp:  [97.5 °F (36.4 °C)-98.8 °F (37.1 °C)] 98.2 °F (36.8 °C)  Heart Rate:  [108-115] 108  Resp:  [16] 16  BP: (144-149)/(81-89) 148/87  SpO2:  [93 %-96 %] 96 %  on   ;   Device (Oxygen Therapy): room air  Body mass index is 21.24 kg/m².  Physical Exam  General, awake and alert.  Head and ENT, normocephalic and atraumatic.  Lungs, symmetric expansion, equal air entry bilaterally.  Heart, regular rate and rhythm.  Abdomen, generalized tenderness  Extremities, no clubbing or cyanosis.  Neuro, no focal deficits.  Skin: Warm and no rash.  Psych, normal mood and affect.  Musculoskeletal, joint examination is grossly normal.      Results Review     I reviewed the patient's new clinical results.  Results from last 7 days   Lab Units 12/14/24  0453 12/13/24  1334 12/10/24  0932   WBC 10*3/mm3 20.33* 18.44* 10.54   HEMOGLOBIN g/dL 12.0 11.7* 11.1*   PLATELETS 10*3/mm3 241 289 319     Results from last 7 days   Lab Units 244 12/10/24  0932   SODIUM mmol/L 134* 136 135*   POTASSIUM mmol/L 4.7 2.9* 3.1*   CHLORIDE mmol/L 100 93* 96*   CO2 mmol/L 17.0* 27.6 26.2   BUN mg/dL 12 13 6*   CREATININE mg/dL 0.69 0.69 0.64   GLUCOSE mg/dL 94 126* 93   EGFR mL/min/1.73 97.1 97.1 98.8     Results from last 7 days   Lab Units 12/14/24  0453 12/13/24  1334 12/10/24  0932   ALBUMIN g/dL 2.8* 3.2* 3.1*   BILIRUBIN mg/dL 1.8* 2.4* 1.5*   ALK PHOS U/L 691* 720* 588*   AST (SGOT) U/L 128* 137* 151*   ALT (SGPT) U/L 41* 44* 58*     Results from last 7 days   Lab Units 24  0453 24  1334 12/10/24  0932   CALCIUM mg/dL 8.1* 8.4* 9.3   ALBUMIN g/dL 2.8* 3.2* 3.1*   MAGNESIUM mg/dL 1.4*  --  1.5*   PHOSPHORUS mg/dL 2.8  " --   --        No results found for: \"HGBA1C\", \"POCGLU\"    No radiology results for the last day    I have personally reviewed all medications:  Scheduled Medications  amphetamine-dextroamphetamine, 20 mg, Oral, BID  buPROPion XL, 300 mg, Oral, QAM  fentaNYL, 1 patch, Transdermal, Q72H   And  [START ON 12/15/2024] Check Fentanyl Patch Placement, 1 each, Not Applicable, Q12H  dexAMETHasone, 4 mg, Intravenous, Q8H  magnesium sulfate, 2 g, Intravenous, Q2H  polyethylene glycol, 17 g, Oral, Daily  potassium chloride, 20 mEq, Oral, TID With Meals  sennosides-docusate, 2 tablet, Oral, Daily  sodium chloride, 10 mL, Intravenous, Q12H  sodium chloride, 10 mL, Intravenous, Q12H    Infusions  sodium chloride 0.9 % with KCl 20 mEq, 125 mL/hr, Last Rate: 125 mL/hr (12/14/24 1002)    Diet  Diet: Regular/House; Fluid Consistency: Thin (IDDSI 0)    I have personally reviewed:  [x]  Laboratory   [x]  Microbiology   [x]  Radiology   [x]  EKG/Telemetry  [x]  Cardiology/Vascular   []  Pathology    []  Records       Assessment/Plan     Active Hospital Problems    Diagnosis  POA    **Abdominal pain [R10.9]  Unknown    Dehydration [E86.0]  Yes    Hypokalemia [E87.6]  Unknown    Abnormal LFTs [R79.89]  Unknown    Rectal adenocarcinoma [C20]  Yes    Anemia [D64.9]  Yes    Colon cancer metastasized to brain [C18.9, C79.31]  Yes      Resolved Hospital Problems   No resolved problems to display.       64 y.o. female admitted with Abdominal pain.    Assessment plan  1.  Worsening abdominal pain, in the setting of underlying colorectal malignancy, diverting colostomy and unable to keep anything down, with nausea and vomiting, continue pain control.  Symptomatic management.    2.  Progressive colorectal malignancy with metastasis, management based on oncology recommendations.    3.  Leukocytosis, no acute signs of infection at this point of time.  Continue to monitor closely.    4.  CODE STATUS is full code.  Further plans based on hospital " course.      Les Diehl MD  [facility]  12/14/24  15:38 EST

## 2024-12-14 NOTE — PLAN OF CARE
Goal Outcome Evaluation:  Plan of Care Reviewed With: patient        Progress: improving  Outcome Evaluation: Abdominal pain controlled with IV Dilaudid and PO MS Contin. IVF's per chest port. Potassium replacement given. Ambulates CGA 1 to BR. Colostomy with no output this shift. Patient states good bm per ostomy around noon Friday 12/13/24. Family stayed the night, are supportive and involved in care.

## 2024-12-14 NOTE — CONSULTS
[facility]  Initial Inpatient Consult Note    Referring Provider: Dr. Diehl    Reason for Consultation: Abdominal pain    Subjective     History of present illness:    64 y.o. female hx of metastatic CRC with mets to the brain lungs, with previous obstruction, now with end colostomy, hx of mesentary spread as well. Pt with acute on chronic abdominal pain, reports pain got a lot worse. She is having stool output in colostomy, brown stool seen. No dysuria or cough. She reports no new symptoms otherwise, she is awake and alert and without visible pain at this time.     Past Medical History:  Past Medical History:   Diagnosis Date    Metastasis to brain     Rectal carcinoma 2024    Seasonal allergies      Past Surgical History:  Past Surgical History:   Procedure Laterality Date    COLONOSCOPY N/A 08/25/2024    Procedure: COLONOSCOPY to 20cm with cold biopsies and needle tattoo;  Surgeon: Shadi Kaminski MD;  Location: [facility] ENDOSCOPY;  Service: Gastroenterology;  Laterality: N/A;  pre: abnormal imaging  post: poor prep, obstructing colon mass at 20cm, hemorrhoids    COLOSTOMY N/A 08/27/2024    Procedure: COLOSTOMY LAPAROSCOPIC;  Surgeon: Saturnino Chester MD;  Location: [facility] OR;  Service: General;  Laterality: N/A;    CRANIOTOMY N/A 08/21/2024    Procedure: Posterior fossa craniotomy for tumor using stereotactic guidance;  Surgeon: Bull Doty MD;  Location: [facility] OR;  Service: Neurosurgery;  Laterality: N/A;    ENDOMETRIAL ABLATION      TUBAL ABDOMINAL LIGATION      VENOUS ACCESS DEVICE (PORT) INSERTION Right 08/27/2024    Procedure: INSERTION VENOUS ACCESS DEVICE;  Surgeon: Saturnino Chester MD;  Location: [facility] OR;  Service: General;  Laterality: Right;      Social History:   Social History     Tobacco Use    Smoking status: Former     Types: Cigarettes    Smokeless tobacco: Never   Substance Use Topics    Alcohol use: Yes      Family  History:  Family History   Problem Relation Age of Onset    Heart disease Mother     Colon cancer Sister        Home Meds:  Medications Prior to Admission   Medication Sig Dispense Refill Last Dose/Taking    amphetamine-dextroamphetamine (ADDERALL) 20 MG tablet Take 1 tablet by mouth 2 (Two) Times a Day. Only taking when she is working  Indications: Attention Deficit Hyperactivity Disorder   Past Month    encorafenib (BRAFTOVI) 75 MG capsule Take 4 capsules by mouth Daily. 120 capsule 2 12/13/2024    HYDROcodone-acetaminophen (NORCO) 5-325 MG per tablet Take 1 tablet by mouth Every 6 (Six) Hours As Needed for Moderate Pain.   12/13/2024    lidocaine-prilocaine (EMLA) 2.5-2.5 % cream Apply 1 Application topically to the appropriate area as directed As Needed for Mild Pain. Apply robbin size amount to port site prior to chemotherapy 30 g 2 Taking As Needed    LORazepam (ATIVAN) 0.5 MG tablet Take 1 tablet by mouth Every 6 (Six) Hours As Needed for Anxiety. Indications: Feeling Anxious   Past Week    Morphine (MS CONTIN) 15 MG 12 hr tablet Take 1 tablet by mouth Every 8 (Eight) Hours for 30 days. 90 tablet 0 Taking    prochlorperazine (COMPAZINE) 10 MG tablet Take 1 tablet by mouth Every 8 (Eight) Hours As Needed for Nausea or Vomiting. 90 tablet 2 12/13/2024    atorvastatin (LIPITOR) 10 MG tablet Take 1 tablet by mouth Daily.   More than a month    buPROPion XL (WELLBUTRIN XL) 150 MG 24 hr tablet Take 2 tablets by mouth Every Morning. Indications: Attention Deficit Hyperactivity Disorder (Patient taking differently: Take 2 tablets by mouth Every Morning. Pt reports not taking on a daily basis, have not taken recently  Indications: Attention Deficit Hyperactivity Disorder)   More than a month    HYDROcodone-acetaminophen (NORCO)  MG per tablet Take 1 tablet by mouth Every 6 (Six) Hours As Needed for Moderate Pain. 120 tablet 0 Unknown    lidocaine-prilocaine (EMLA) 2.5-2.5 % cream Apply nickel size amount to port  site 30 min before appointment time. Do not rub in. Cover with plastic wrap. 30 g 1     polyethylene glycol (MIRALAX) 17 g packet Take 17 g by mouth Daily. 90 each 2 More than a month    potassium chloride 10 MEQ CR tablet Take 2 tablets by mouth Daily. 30 tablet 0 More than a month    sennosides-docusate (senna-docusate sodium) 8.6-50 MG per tablet Take 2 tablets by mouth Daily. 90 tablet 2 More than a month     Current Meds:   amphetamine-dextroamphetamine, 20 mg, Oral, BID  buPROPion XL, 300 mg, Oral, QAM  fentaNYL, 1 patch, Transdermal, Q72H   And  [START ON 12/15/2024] Check Fentanyl Patch Placement, 1 each, Not Applicable, Q12H  dexAMETHasone, 4 mg, Intravenous, Q8H  encorafenib, 300 mg, Oral, Daily  polyethylene glycol, 17 g, Oral, Daily  potassium chloride, 20 mEq, Oral, TID With Meals  sennosides-docusate, 2 tablet, Oral, Daily  sodium chloride, 10 mL, Intravenous, Q12H  sodium chloride, 10 mL, Intravenous, Q12H      Allergies:  No Known Allergies  Review of Systems  Pertinent items are noted in HPI     Objective     Vital Signs  Temp:  [97.5 °F (36.4 °C)-98.8 °F (37.1 °C)] 97.5 °F (36.4 °C)  Heart Rate:  [] 90  Resp:  [16] 16  BP: (143-148)/(81-87) 143/87  Physical Exam:  General Appearance:    Alert, cooperative, in no acute distress   Head:    Normocephalic, without obvious abnormality, atraumatic   Eyes:          Conjunctivae and sclerae normal, no icterus   Throat:   No thrush, oral mucosa moist   Neck:   Supple, no adenopathy   Lungs:     Clear to auscultation bilaterally    Heart:    Regular rhythm and normal rate    Chest Wall:    No abnormalities observed   Abdomen:     Soft, nondistended, nontender; normal bowel sounds   Extremities:   No edema, no redness   Skin:   No bruising or rash   Psychiatric:   Normal mood and insight     Results Review:   I reviewed the patient's new clinical results.    Results from last 7 days   Lab Units 12/14/24  0453 12/13/24  1334 12/10/24  0932   WBC 10*3/mm3  "20.33* 18.44* 10.54   HEMOGLOBIN g/dL 12.0 11.7* 11.1*   HEMATOCRIT % 37.3 35.7 33.9*   PLATELETS 10*3/mm3 241 289 319     Results from last 7 days   Lab Units 12/14/24  0453 12/13/24  1334 12/10/24  0932   SODIUM mmol/L 134* 136 135*   POTASSIUM mmol/L 4.7 2.9* 3.1*   CHLORIDE mmol/L 100 93* 96*   CO2 mmol/L 17.0* 27.6 26.2   BUN mg/dL 12 13 6*   CREATININE mg/dL 0.69 0.69 0.64   CALCIUM mg/dL 8.1* 8.4* 9.3   BILIRUBIN mg/dL 1.8* 2.4* 1.5*   ALK PHOS U/L 691* 720* 588*   ALT (SGPT) U/L 41* 44* 58*   AST (SGOT) U/L 128* 137* 151*   GLUCOSE mg/dL 94 126* 93         No results found for: \"LIPASE\"    Radiology:  CT Abdomen Pelvis With Contrast    (Results Pending)       Assessment & Plan   Active Hospital Problems    Diagnosis     **Abdominal pain     Dehydration     Hypokalemia     Abnormal LFTs     Rectal adenocarcinoma     Anemia     Colon cancer metastasized to brain        Assessment:  Acute on chronic abdominal pain  -Setting of metastatic CRC with mesenteric/brain/lung mets  -Pain control  -Pending CT scan, abdomen is soft, non distended, bowel sounds present  -Rule out obstruction  -Supportive care    2. Metastatic CRC   -mets with brain/lung/mesentary   -oncology on board    3. N/V   -feeling better, scop patch, PRN zofran, may schedule if worsens, will need to check QTC first if schedule zofran    Plan: Pending CT scan, does not appear obstructed. Having BM and abdomen is soft. Differential of cancer and pain. Recommend pain management if pain worsens or uncontrolled without identifiable etiology other than metastatic abdominal CRC pain.         I discussed the patient's findings and my recommendations with patient and family.    Xu Barrera MD            "

## 2024-12-14 NOTE — PLAN OF CARE
Goal Outcome Evaluation:            Pt A/Ox4, agitated at times, RA, up with SBA. Consults placed for GI and ID due to no stool output from colostomy and elevated WBC's. Blood cultures in process, needleless connectors. Fentanyl patch applied to right arm , PRN Dilaudid and Ativan given. Magnesium and potassium replaced per protocol. Awaiting for CT of abd. Plan of care ongoing, VSS.

## 2024-12-15 PROBLEM — E43 SEVERE PROTEIN-CALORIE MALNUTRITION: Status: ACTIVE | Noted: 2024-12-15

## 2024-12-15 LAB
ALBUMIN SERPL-MCNC: 2.7 G/DL (ref 3.5–5.2)
ALBUMIN/GLOB SERPL: 0.8 G/DL
ALP SERPL-CCNC: 670 U/L (ref 39–117)
ALT SERPL W P-5'-P-CCNC: 39 U/L (ref 1–33)
ANION GAP SERPL CALCULATED.3IONS-SCNC: 9 MMOL/L (ref 5–15)
AST SERPL-CCNC: 96 U/L (ref 1–32)
BASOPHILS # BLD AUTO: 0.02 10*3/MM3 (ref 0–0.2)
BASOPHILS NFR BLD AUTO: 0.1 % (ref 0–1.5)
BILIRUB SERPL-MCNC: 1.2 MG/DL (ref 0–1.2)
BUN SERPL-MCNC: 10 MG/DL (ref 8–23)
BUN/CREAT SERPL: 18.5 (ref 7–25)
CALCIUM SPEC-SCNC: 7.8 MG/DL (ref 8.6–10.5)
CHLORIDE SERPL-SCNC: 103 MMOL/L (ref 98–107)
CO2 SERPL-SCNC: 23 MMOL/L (ref 22–29)
CREAT SERPL-MCNC: 0.54 MG/DL (ref 0.57–1)
DEPRECATED RDW RBC AUTO: 52.9 FL (ref 37–54)
EGFRCR SERPLBLD CKD-EPI 2021: 103 ML/MIN/1.73
EOSINOPHIL # BLD AUTO: 0 10*3/MM3 (ref 0–0.4)
EOSINOPHIL NFR BLD AUTO: 0 % (ref 0.3–6.2)
ERYTHROCYTE [DISTWIDTH] IN BLOOD BY AUTOMATED COUNT: 14.9 % (ref 12.3–15.4)
GLOBULIN UR ELPH-MCNC: 3.2 GM/DL
GLUCOSE SERPL-MCNC: 125 MG/DL (ref 65–99)
HCT VFR BLD AUTO: 30.6 % (ref 34–46.6)
HGB BLD-MCNC: 9.8 G/DL (ref 12–15.9)
IMM GRANULOCYTES # BLD AUTO: 0.18 10*3/MM3 (ref 0–0.05)
IMM GRANULOCYTES NFR BLD AUTO: 1.2 % (ref 0–0.5)
LYMPHOCYTES # BLD AUTO: 0.41 10*3/MM3 (ref 0.7–3.1)
LYMPHOCYTES NFR BLD AUTO: 2.7 % (ref 19.6–45.3)
MAGNESIUM SERPL-MCNC: 2.1 MG/DL (ref 1.6–2.4)
MCH RBC QN AUTO: 31.2 PG (ref 26.6–33)
MCHC RBC AUTO-ENTMCNC: 32 G/DL (ref 31.5–35.7)
MCV RBC AUTO: 97.5 FL (ref 79–97)
MONOCYTES # BLD AUTO: 0.75 10*3/MM3 (ref 0.1–0.9)
MONOCYTES NFR BLD AUTO: 5 % (ref 5–12)
NEUTROPHILS NFR BLD AUTO: 13.56 10*3/MM3 (ref 1.7–7)
NEUTROPHILS NFR BLD AUTO: 91 % (ref 42.7–76)
NRBC BLD AUTO-RTO: 0 /100 WBC (ref 0–0.2)
PLATELET # BLD AUTO: 190 10*3/MM3 (ref 140–450)
PMV BLD AUTO: 10.8 FL (ref 6–12)
POTASSIUM SERPL-SCNC: 5.4 MMOL/L (ref 3.5–5.2)
PROT SERPL-MCNC: 5.9 G/DL (ref 6–8.5)
RBC # BLD AUTO: 3.14 10*6/MM3 (ref 3.77–5.28)
SODIUM SERPL-SCNC: 135 MMOL/L (ref 136–145)
WBC NRBC COR # BLD AUTO: 14.92 10*3/MM3 (ref 3.4–10.8)

## 2024-12-15 PROCEDURE — 85025 COMPLETE CBC W/AUTO DIFF WBC: CPT | Performed by: INTERNAL MEDICINE

## 2024-12-15 PROCEDURE — 25010000002 HYDROMORPHONE 1 MG/ML SOLUTION: Performed by: INTERNAL MEDICINE

## 2024-12-15 PROCEDURE — 99232 SBSQ HOSP IP/OBS MODERATE 35: CPT | Performed by: INTERNAL MEDICINE

## 2024-12-15 PROCEDURE — 83735 ASSAY OF MAGNESIUM: CPT | Performed by: INTERNAL MEDICINE

## 2024-12-15 PROCEDURE — 80053 COMPREHEN METABOLIC PANEL: CPT | Performed by: INTERNAL MEDICINE

## 2024-12-15 PROCEDURE — 25010000002 LORAZEPAM PER 2 MG: Performed by: INTERNAL MEDICINE

## 2024-12-15 PROCEDURE — 25010000002 DEXAMETHASONE PER 1 MG: Performed by: INTERNAL MEDICINE

## 2024-12-15 RX ORDER — LORAZEPAM 2 MG/ML
1 INJECTION INTRAMUSCULAR ONCE
Status: COMPLETED | OUTPATIENT
Start: 2024-12-15 | End: 2024-12-15

## 2024-12-15 RX ADMIN — LORAZEPAM 0.5 MG: 0.5 TABLET ORAL at 09:18

## 2024-12-15 RX ADMIN — DEXAMETHASONE SODIUM PHOSPHATE 4 MG: 4 INJECTION, SOLUTION INTRAMUSCULAR; INTRAVENOUS at 17:50

## 2024-12-15 RX ADMIN — Medication 10 ML: at 09:00

## 2024-12-15 RX ADMIN — HYDROMORPHONE HYDROCHLORIDE 1 MG: 1 INJECTION, SOLUTION INTRAMUSCULAR; INTRAVENOUS; SUBCUTANEOUS at 20:33

## 2024-12-15 RX ADMIN — Medication 10 ML: at 20:26

## 2024-12-15 RX ADMIN — DEXAMETHASONE SODIUM PHOSPHATE 4 MG: 4 INJECTION, SOLUTION INTRAMUSCULAR; INTRAVENOUS at 12:00

## 2024-12-15 RX ADMIN — SENNOSIDES AND DOCUSATE SODIUM 2 TABLET: 50; 8.6 TABLET ORAL at 09:14

## 2024-12-15 RX ADMIN — LORAZEPAM 1 MG: 2 INJECTION INTRAMUSCULAR; INTRAVENOUS at 13:56

## 2024-12-15 RX ADMIN — DEXAMETHASONE SODIUM PHOSPHATE 4 MG: 4 INJECTION, SOLUTION INTRAMUSCULAR; INTRAVENOUS at 02:10

## 2024-12-15 NOTE — CONSULTS
Nutrition Services    Patient Name:  Ely Sims  YOB: 1960  MRN: [id number]  Admit Date:  12/13/2024    Assessment Date:  12/15/24    Summary: 63 yo female adm with abd pain.  Hx below noted    Patient meets ASPEN/AND criteria for nutrition diagnosis of severe malnutrition of chronic illness based on: 6% weight loss over past 3 months associated with decreased intake.        CLINICAL NUTRITION ASSESSMENT      Reason for Assessment Nurse Admission Screen     Diagnosis/Problem   Abd pain, decreased intake, wt loss.   Medical/Surgical History Past Medical History:   Diagnosis Date    Metastasis to brain     Rectal carcinoma 2024    Seasonal allergies        Past Surgical History:   Procedure Laterality Date    COLONOSCOPY N/A 08/25/2024    Procedure: COLONOSCOPY to 20cm with cold biopsies and needle tattoo;  Surgeon: Shadi Kaminski MD;  Location: [facility] ENDOSCOPY;  Service: Gastroenterology;  Laterality: N/A;  pre: abnormal imaging  post: poor prep, obstructing colon mass at 20cm, hemorrhoids    COLOSTOMY N/A 08/27/2024    Procedure: COLOSTOMY LAPAROSCOPIC;  Surgeon: Saturnino Chester MD;  Location: [facility];  Service: General;  Laterality: N/A;    CRANIOTOMY N/A 08/21/2024    Procedure: Posterior fossa craniotomy for tumor using stereotactic guidance;  Surgeon: Bull Doty MD;  Location: [facility];  Service: Neurosurgery;  Laterality: N/A;    ENDOMETRIAL ABLATION      TUBAL ABDOMINAL LIGATION      VENOUS ACCESS DEVICE (PORT) INSERTION Right 08/27/2024    Procedure: INSERTION VENOUS ACCESS DEVICE;  Surgeon: Saturnino Chester MD;  Location: [facility];  Service: General;  Laterality: Right;        Anthropometrics        Current Height  Current Weight  BMI kg/m2    Weight: 61.5 kg (135 lb 9.3 oz) (12/13/24 1714)  Body mass index is 21.24 kg/m².   Adjusted BMI (if applicable)    BMI Category Normal/Healthy (18.4 - 24.9)   Ideal Body Weight (IBW) 135# +/- 10%   Usual  Body Weight (UBW) ~145#   Weight Trend Loss   Weight History Wt Readings from Last 30 Encounters:   12/13/24 1714 61.5 kg (135 lb 9.3 oz)   12/13/24 1329 60.6 kg (133 lb 9.6 oz)   12/10/24 0901 62.5 kg (137 lb 12.8 oz)   12/04/24 0943 61.6 kg (135 lb 12.8 oz)   12/04/24 0942 61.6 kg (135 lb 12.8 oz)   12/03/24 0845 62.2 kg (137 lb 3.2 oz)   11/19/24 0848 63.7 kg (140 lb 8 oz)   11/05/24 0838 65.6 kg (144 lb 9.6 oz)   11/01/24 0906 65.8 kg (145 lb)   10/22/24 0919 67.6 kg (149 lb)   10/08/24 0903 68.6 kg (151 lb 3.2 oz)   09/25/24 0828 65.3 kg (144 lb)   09/17/24 1159 65.5 kg (144 lb 8 oz)   09/03/24 1318 67.1 kg (148 lb)   09/03/24 0904 66.9 kg (147 lb 6.4 oz)   08/25/24 0500 72 kg (158 lb 11.7 oz)   08/21/24 0513 65.3 kg (143 lb 15.4 oz)   08/21/24 0200 65.3 kg (143 lb 15.4 oz)   08/20/24 0648 65.1 kg (143 lb 8.3 oz)   08/20/24 0505 65.1 kg (143 lb 8.3 oz)   08/19/24 2017 65.1 kg (143 lb 8.3 oz)   08/19/24 1425 65.8 kg (145 lb)      --  Labs       Pertinent Labs    Results from last 7 days   Lab Units 12/15/24  0528 12/14/24  0453 12/13/24  1334   SODIUM mmol/L 135* 134* 136   POTASSIUM mmol/L 5.4* 4.7 2.9*   CHLORIDE mmol/L 103 100 93*   CO2 mmol/L 23.0 17.0* 27.6   BUN mg/dL 10 12 13   CREATININE mg/dL 0.54* 0.69 0.69   CALCIUM mg/dL 7.8* 8.1* 8.4*   BILIRUBIN mg/dL 1.2 1.8* 2.4*   ALK PHOS U/L 670* 691* 720*   ALT (SGPT) U/L 39* 41* 44*   AST (SGOT) U/L 96* 128* 137*   GLUCOSE mg/dL 125* 94 126*     Results from last 7 days   Lab Units 12/15/24  0528 12/14/24  0453 12/13/24  1334 12/10/24  0932   MAGNESIUM mg/dL 2.1 1.4*  --  1.5*   PHOSPHORUS mg/dL  --  2.8  --   --    HEMOGLOBIN g/dL 9.8* 12.0   < > 11.1*   HEMATOCRIT % 30.6* 37.3   < > 33.9*   WBC 10*3/mm3 14.92* 20.33*   < > 10.54   ALBUMIN g/dL 2.7* 2.8*   < > 3.1*    < > = values in this interval not displayed.     Results from last 7 days   Lab Units 12/15/24  0528 12/14/24  0453 12/13/24  1334 12/10/24  0932   PLATELETS 10*3/mm3 190 241 289 319      COVID19   Date Value Ref Range Status   08/19/2024 Not Detected Not Detected - Ref. Range Final     Lab Results   Component Value Date    HGBA1C 5.50 08/24/2024          Medications           Scheduled Medications amphetamine-dextroamphetamine, 20 mg, Oral, BID  buPROPion XL, 300 mg, Oral, QAM  fentaNYL, 1 patch, Transdermal, Q72H   And  Check Fentanyl Patch Placement, 1 each, Not Applicable, Q12H  dexAMETHasone, 4 mg, Intravenous, Q8H  encorafenib, 300 mg, Oral, Daily  polyethylene glycol, 17 g, Oral, Daily  potassium chloride, 20 mEq, Oral, TID With Meals  Scopolamine, 1 patch, Transdermal, Q72H  sennosides-docusate, 2 tablet, Oral, Daily  sodium chloride, 10 mL, Intravenous, Q12H  sodium chloride, 10 mL, Intravenous, Q12H       Infusions     PRN Medications   Calcium Replacement - Follow Nurse / BPA Driven Protocol    heparin    HYDROmorphone **AND** naloxone    lidocaine    LORazepam    Magnesium Standard Dose Replacement - Follow Nurse / BPA Driven Protocol    ondansetron    Phosphorus Replacement - Follow Nurse / BPA Driven Protocol    Potassium Replacement - Follow Nurse / BPA Driven Protocol    prochlorperazine    sodium chloride    sodium chloride    sodium chloride    sodium chloride    sodium chloride     Physical Findings          General Findings frail, other: Tearful.   Oral/Mouth Cavity WDL   Edema  no edema   Gastrointestinal abdominal pain   Skin  skin intact   Tubes/Drains/Lines none   NFPE frail, other: Patient said they had just received bad news from the MD, further discussion deferred to another time   --  Malnutrition Severity Assessment      Patient meets criteria for : Severe Malnutrition  Malnutrition Type (Last 8 Hours)       Malnutrition Severity Assessment       Row Name 12/15/24 1128       Malnutrition Severity Assessment    Malnutrition Type Chronic Disease - Related Malnutrition      Row Name 12/15/24 1128       Insufficient Energy Intake     Insufficient Energy Intake Findings  Severe    Insufficient Energy Intake  <75% of est. energy requirement for > or equal to 3 months      Row Name 12/15/24 1128       Unintentional Weight Loss     Unintentional Weight Loss Findings Severe    Unintentional Weight Loss  Weight loss greater than 5% in one month  weight loss of 6% over past 3 months      Row Name 12/15/24 1128       Criteria Met (Must meet criteria for severity in at least 2 of these categories: M Wasting, Fat Loss, Fluid, Secondary Signs, Wt. Status, Intake)    Patient meets criteria for  Severe Malnutrition                       Current Nutrition Orders & Evaluation of Intake       Oral Nutrition     Food Allergies NKFA   Current PO Diet Diet: Regular/House; Fluid Consistency: Thin (IDDSI 0)   Supplement n/a   PO Evaluation     % PO Intake 50%    Factors Affecting Intake: abdominal pain   --  PES STATEMENT / NUTRITION DIAGNOSIS      Nutrition Dx Problem  Problem: Altered GI Function  Etiology: Medical Diagnosis - Metastatic CRC    Signs/Symptoms: Report of Minimal PO Intake and Unintended Weight Change     NUTRITION INTERVENTION / PLAN OF CARE      Intervention Goal(s) Accepts oral nutrition supplement and Maintain weight         RD Intervention/Action Supplement provided   --      Prescription/Orders:       PO Diet       Supplements Add Boost Breeze TID (Provides 750 kcals, 42 g protein if consumed)         Enteral Nutrition       Parenteral Nutrition    New Prescription Ordered? Yes   --      Monitor/Evaluation PO intake, Supplement intake, Weight, GI status, Symptoms   Discharge Plan/Needs Pending clinical course   --    RD to follow per protocol.      Electronically signed by:  Uli Aguilar RD  12/15/24 11:21 EST

## 2024-12-15 NOTE — PROGRESS NOTES
[facility]  Inpatient Progress Note    Reason for Follow Up:  Abdominal pain    Subjective     Interval History:   -Pt is feeling better, history taken from , pt was asleep, got ativan. He reports her pain is much better after fentanyl patch.     Current Facility-Administered Medications:     amphetamine-dextroamphetamine (ADDERALL) tablet 20 mg, 20 mg, Oral, BID, Estefania Jacobs MD    buPROPion XL (WELLBUTRIN XL) 24 hr tablet 300 mg, 300 mg, Oral, QAM, Estefania Jacobs MD    Calcium Replacement - Follow Nurse / BPA Driven Protocol, , Not Applicable, PRN, Estefania Jacobs MD    fentaNYL (DURAGESIC) 50 MCG/HR patch 1 patch, 1 patch, Transdermal, Q72H, 1 patch at 12/14/24 1413 **AND** Check Fentanyl Patch Placement, 1 each, Not Applicable, Q12H, Junior Jang MD    dexAMETHasone (DECADRON) injection 4 mg, 4 mg, Intravenous, Q8H, Junior Jang MD, 4 mg at 12/15/24 1200    encorafenib (BRAFTOVI) capsule 300 mg, 300 mg, Oral, Daily, Junior Jang MD, 300 mg at 12/15/24 0924    heparin injection 500 Units, 5 mL, Intravenous, PRN, Les Diehl MD    HYDROmorphone (DILAUDID) injection 1 mg, 1 mg, Intravenous, Q2H PRN, 1 mg at 12/14/24 2022 **AND** naloxone (NARCAN) injection 0.4 mg, 0.4 mg, Intravenous, Q5 Min PRN, Estefania Jacobs MD    lidocaine (LMX) 4 % cream, , Topical, PRN, Estefania Jacobs MD    LORazepam (ATIVAN) tablet 0.5 mg, 0.5 mg, Oral, Q6H PRN, Estefania Jacobs MD, 0.5 mg at 12/15/24 0918    Magnesium Standard Dose Replacement - Follow Nurse / BPA Driven Protocol, , Not Applicable, PRN, Estefania Jacobs MD    ondansetron (ZOFRAN) injection 4 mg, 4 mg, Intravenous, Q6H PRN, Estefania Jacobs MD    Phosphorus Replacement - Follow Nurse / BPA Driven Protocol, , Not Applicable, PRN, Estefania Jacobs MD    polyethylene glycol (MIRALAX) packet 17 g, 17 g, Oral, Daily, Estefania Jacobs MD, 17 g at 12/14/24 0924    potassium chloride (KLOR-CON) packet 20 mEq, 20 mEq, Oral, TID With Meals,  Estefania Jacobs MD, 20 mEq at 12/14/24 1750    Potassium Replacement - Follow Nurse / BPA Driven Protocol, , Not Applicable, PRN, Estefania Jacobs MD    prochlorperazine (COMPAZINE) tablet 10 mg, 10 mg, Oral, Q8H PRN, Estefania Jacobs MD    scopolamine patch 1 mg/72 hr, 1 patch, Transdermal, Q72H, Xu Barrera MD, 1 patch at 12/14/24 2011    sennosides-docusate (PERICOLACE) 8.6-50 MG per tablet 2 tablet, 2 tablet, Oral, Daily, Estefania Jacobs MD, 2 tablet at 12/15/24 0914    sodium chloride 0.9 % flush 10 mL, 10 mL, Intravenous, Q12H, Estefania Jacobs MD, 10 mL at 12/15/24 0900    sodium chloride 0.9 % flush 10 mL, 10 mL, Intravenous, PRN, Estefania Jacobs MD    sodium chloride 0.9 % flush 10 mL, 10 mL, Intravenous, Q12H, Les Diehl MD, 10 mL at 12/15/24 0900    sodium chloride 0.9 % flush 10 mL, 10 mL, Intravenous, Fazal ROWE Abhishek, MD    sodium chloride 0.9 % flush 20 mL, 20 mL, Intravenous, PRFazal ZHANG Abhishek, MD    sodium chloride 0.9 % infusion 40 mL, 40 mL, Intravenous, PRN, Estefania Jacobs MD    sodium chloride 0.9 % infusion 40 mL, 40 mL, Intravenous, Fazal ROWE Abhishek, MD    Facility-Administered Medications Ordered in Other Encounters:     heparin injection 500 Units, 500 Units, Intravenous, PRN, Elieser Saunders MD, 500 Units at 12/03/24 0839    sodium chloride 0.9 % flush 10 mL, 10 mL, Intravenous, SOLEDAD, Elieser Saunders MD, 10 mL at 12/03/24 0840  Review of Systems:    The following systems were reviewed and negative;  gastrointestinal    Objective     Vital Signs  Temp:  [97.5 °F (36.4 °C)-97.9 °F (36.6 °C)] 97.5 °F (36.4 °C)  Heart Rate:  [87-93] 93  Resp:  [16-18] 18  BP: (135-161)/(76-93) 149/83  Body mass index is 21.24 kg/m².    Intake/Output Summary (Last 24 hours) at 12/15/2024 1504  Last data filed at 12/15/2024 1430  Gross per 24 hour   Intake 600 ml   Output --   Net 600 ml     I/O this shift:  In: 480 [P.O.:480]  Out: -      Physical Exam:   General: patient awake, alert and cooperative   Eyes:  "Normal lids and lashes, no scleral icterus   Neck: supple, normal ROM   Skin: warm and dry, not jaundiced   Cardiovascular: regular rhythm and rate, no murmurs auscultated   Pulm: clear to auscultation bilaterally, regular and unlabored   Abdomen: soft, nontender, nondistended; normal bowel sounds   Extremities: no rash or edema   Psychiatric: Normal mood and behavior; memory intact     Results Review:     I reviewed the patient's new clinical results.    Results from last 7 days   Lab Units 12/15/24  0528 12/14/24 0453 12/13/24  1334   WBC 10*3/mm3 14.92* 20.33* 18.44*   HEMOGLOBIN g/dL 9.8* 12.0 11.7*   HEMATOCRIT % 30.6* 37.3 35.7   PLATELETS 10*3/mm3 190 241 289     Results from last 7 days   Lab Units 12/15/24  0528 12/14/24 0453 12/13/24  1334   SODIUM mmol/L 135* 134* 136   POTASSIUM mmol/L 5.4* 4.7 2.9*   CHLORIDE mmol/L 103 100 93*   CO2 mmol/L 23.0 17.0* 27.6   BUN mg/dL 10 12 13   CREATININE mg/dL 0.54* 0.69 0.69   CALCIUM mg/dL 7.8* 8.1* 8.4*   BILIRUBIN mg/dL 1.2 1.8* 2.4*   ALK PHOS U/L 670* 691* 720*   ALT (SGPT) U/L 39* 41* 44*   AST (SGOT) U/L 96* 128* 137*   GLUCOSE mg/dL 125* 94 126*         No results found for: \"LIPASE\"    Radiology:  CT Abdomen Pelvis With Contrast   Final Result         Electronically signed by Jean Woodward MD on 12-14-24 at 2329          Assessment & Plan     Active Hospital Problems    Diagnosis     **Abdominal pain     Dehydration     Hypokalemia     Abnormal LFTs     Rectal adenocarcinoma     Anemia     Colon cancer metastasized to brain        Assessment:  Acute on chronic abdominal pain-CT scan worsening cancer burden  -Setting of metastatic CRC with mesenteric/brain/lung mets  -Pain control  -Pending CT scan, abdomen is soft, non distended, bowel sounds present  -Rule out obstruction  -Supportive care     2. Metastatic CRC              -mets with brain/lung/mesentary              -oncology on board     3. N/V              -feeling better, scop patch, PRN zofran, " may schedule if worsens, will need to check QTC first if schedule zofran     Plan: CT showing worsening tumor burden, family requesting discharge home, feeling better on pain patch. Recommend pain management on discharge, no other plans from GI standpoint, no obstruction. Having stool GI will sign off.       I discussed the patients findings and my recommendations with patient.    Xu Barrera MD

## 2024-12-15 NOTE — CONSULTS
Infectious Diseases Progress Note    Estfeania Jacobs MD     [facility]  Los: 2 days  Patient Identification:  Name: Ely Sims  Age: 64 y.o.  Sex: female  :  1960  MRN: [id number]         Primary Care Physician: Provider, No Known  Requesting physician: Dr. Diehl  Date of consultation 12/15/2024  Reason for consultation: Leukocytosis  Subjective: Overall feeling somewhat better in terms of pain but still feels very much uncomfortable.  Denies any fever and chills.  Interval History: See admission history and physical performed as cross cover for internal medicine service on 2024.  Since then patient has been seen by hematology oncology service as well as gastroenterologist service and it appears that her current worsening of nausea vomiting and abdominal pain is likely due to progression of her underlying malignancy made worse by the administration Vectibix.  Steroid has been initiated with instruction to continue her Braftovi.  GI service recommend supportive care.    Objective:    Scheduled Meds:amphetamine-dextroamphetamine, 20 mg, Oral, BID  buPROPion XL, 300 mg, Oral, QAM  fentaNYL, 1 patch, Transdermal, Q72H   And  Check Fentanyl Patch Placement, 1 each, Not Applicable, Q12H  dexAMETHasone, 4 mg, Intravenous, Q8H  encorafenib, 300 mg, Oral, Daily  polyethylene glycol, 17 g, Oral, Daily  potassium chloride, 20 mEq, Oral, TID With Meals  Scopolamine, 1 patch, Transdermal, Q72H  sennosides-docusate, 2 tablet, Oral, Daily  sodium chloride, 10 mL, Intravenous, Q12H  sodium chloride, 10 mL, Intravenous, Q12H      Continuous Infusions:     Vital signs in last 24 hours:  Temp:  [97.5 °F (36.4 °C)-98.2 °F (36.8 °C)] 97.5 °F (36.4 °C)  Heart Rate:  [] 89  Resp:  [16-18] 18  BP: (135-161)/(76-93) 161/93    Intake/Output:    Intake/Output Summary (Last 24 hours) at 12/15/2024 0726  Last data filed at 2024  Gross per 24 hour   Intake 120 ml   Output --   Net 120 ml        Exam:  /93 (BP Location: Right arm, Patient Position: Lying)   Pulse 89   Temp 97.5 °F (36.4 °C) (Oral)   Resp 18   Wt 61.5 kg (135 lb 9.3 oz)   SpO2 96%   BMI 21.24 kg/m²   Patient is examined using the personal protective equipment as per guidelines from infection control for this particular patient as enacted.  Hand washing was performed before and after patient interaction.  General Appearance:  Awake alert and ill-appearing currently being assisted in walking around the hallways by the family members, gets tired easily and appears uncomfortable                          Head:    Normocephalic, without obvious abnormality, atraumatic                           Eyes:    PERRL, conjunctivae/corneas clear, EOM's intact, both eyes                         Throat: Dry oral mucosa                           Neck:   Supple, symmetrical, trachea midline, no JVD                         Lungs:    Clear to auscultation bilaterally, respirations unlabored                 Chest Wall:    No tenderness or deformity                          Heart:  S1-S2 regular                  Abdomen:   Generalized abdominal tenderness but soft no guarding rigidity or rebound noted                 Extremities:   Extremities normal, atraumatic, no cyanosis or edema                        Pulses:   Pulses palpable in all extremities                            Skin:   Skin is warm and dry,  no rashes or palpable lesions                  Neurologic: Alert and oriented x 3       Data Review:    I reviewed the patient's new clinical results.  Results from last 7 days   Lab Units 12/15/24  0528 12/14/24  0453 12/13/24  1334 12/10/24  0932   WBC 10*3/mm3 14.92* 20.33* 18.44* 10.54   HEMOGLOBIN g/dL 9.8* 12.0 11.7* 11.1*   PLATELETS 10*3/mm3 190 755 289 319     Results from last 7 days   Lab Units 12/15/24  0528 12/14/24  0453 12/13/24  1334 12/10/24  0932   SODIUM mmol/L 135* 134* 136 135*   POTASSIUM mmol/L 5.4* 4.7 2.9* 3.1*    CHLORIDE mmol/L 103 100 93* 96*   CO2 mmol/L 23.0 17.0* 27.6 26.2   BUN mg/dL 10 12 13 6*   CREATININE mg/dL 0.54* 0.69 0.69 0.64   CALCIUM mg/dL 7.8* 8.1* 8.4* 9.3   GLUCOSE mg/dL 125* 94 126* 93     Microbiology Results (last 10 days)       ** No results found for the last 240 hours. **              Assessment:    Abdominal pain    Colon cancer metastasized to brain    Anemia    Rectal adenocarcinoma    Dehydration    Hypokalemia    Abnormal LFTs  Leukocytosis-multifactorial including likely due to:  progression of underlying malignancy versus  steroid use.  She is at risk of infectious process such as infected Mediport as well as common causes of infection that could occur to anybody regardless of her malignancy and its progression-and need to be closely monitored for pneumonia, UTI, phlebitis at the IV site, evolving C. difficile infection etc.    Recommendations/discussion:  At this juncture agree with supportive care and closely monitor her progress under the direction of primary team and hematology oncology service.  Patient will require exhaustive review of system to identify above possibilities for evolving line infection but low threshold to perform septic workup such as blood cultures urinalysis urine cultures and repeat chest x-ray if she spikes fever.  Management of other issues per primary team.  Thank you very much for letting me be the part of your patient care please see above impression and recommendations  Estefania Jacobs MD  12/15/2024  07:26 EST    Parts of this note may be an electronic transcription/translation of spoken language to printed text using the Dragon dictation system.

## 2024-12-15 NOTE — PLAN OF CARE
Goal Outcome Evaluation:  Plan of Care Reviewed With: patient        Progress: improving  Outcome Evaluation: Abdominal pain controlled with pain meds. No c/o nausea or vomiting. Colostomy bag leaked, assisted patient to change bag. Restless at times/ urinary frequency. Ativan given x1. Bed alarm used for pt's forgetfulness/ somewhat confused at times.

## 2024-12-15 NOTE — PROGRESS NOTES
Subjective     HISTORY OF PRESENT ILLNESS:   No acute issues overnight.  Patient reports of better pain control this morning.  Not much nausea or vomiting today.  Abdomen remains slightly distended.  Is quite anxious and jittery this morning.  Family numbers at bedside.  Afebrile.  Vital stable.    Past Medical History, Past Surgical History, Social History, Family History have been reviewed and are without significant changes except as mentioned.    Review of Systems   A comprehensive 14 point review of systems was performed and was negative except as mentioned.    Medications:  The current medication list was reviewed in the EMR    ALLERGIES:  No Known Allergies    Objective      Vitals:    12/14/24 1605 12/14/24 1928 12/15/24 0246 12/15/24 0732   BP: 143/87 135/76 161/93 139/86   BP Location: Right arm Right arm Right arm Right arm   Patient Position: Lying Sitting Lying Lying   Pulse: 90 87 89 89   Resp: 16 18 18 18   Temp: 97.5 °F (36.4 °C) 97.9 °F (36.6 °C) 97.5 °F (36.4 °C) 97.5 °F (36.4 °C)   TempSrc: Oral Oral Oral Oral   SpO2: 96% 98% 96% 97%   Weight:             12/13/2024     1:30 PM   Current Status   ECOG score 2       Physical Exam    CONSTITUTIONAL:  Vital signs reviewed.  No distress, looks comfortable.  EYES:  Conjunctivae and lids unremarkable.    EARS,NOSE,MOUTH,THROAT:  Ears and nose appear unremarkable.    RESPIRATORY:  Normal respiratory effort.   CARDIOVASCULAR:  Normal heart rate  GASTROINTESTINAL: Abdomen appears unremarkable.  Distended  LYMPHATIC:  No cervical, supraclavicular lymphadenopathy.  SKIN:  Warm.  No rashes.  PSYCHIATRIC:  Normal judgment and insight.  Normal mood and affect.  NEURO: AAOx3, no obvious focal deficits.      RECENT LABS:  Hematology WBC   Date Value Ref Range Status   12/15/2024 14.92 (H) 3.40 - 10.80 10*3/mm3 Final     RBC   Date Value Ref Range Status   12/15/2024 3.14 (L) 3.77 - 5.28 10*6/mm3 Final     Hemoglobin   Date Value Ref Range Status   12/15/2024  9.8 (L) 12.0 - 15.9 g/dL Final     Hematocrit   Date Value Ref Range Status   12/15/2024 30.6 (L) 34.0 - 46.6 % Final     Platelets   Date Value Ref Range Status   12/15/2024 190 140 - 450 10*3/mm3 Final              Assessment & Plan   Patient is a pleasant 64-year-old female with:     # Abdominal pain, nausea and vomiting:  Likely secondary to underlying diffuse mesenteric, liver and lymph node metastasis, made worse by recent administration of Vectibix on 12/10/2024.  Will start IV dexamethasone 4 mg every 8 hours to help with symptoms  Patient is unable to take p.o. morphine for pain relief.  Will discontinue morphine and switched to fentanyl patch 50 mcg every 72 hours.    12/15/2024: Pain better controlled with fentanyl patch 50 mcg every 72 hours and IV Dilaudid 1 mg every 2 hours as needed.  Continue current regimen.  Will transition to p.o. [place] at discharge.   Continue IV dexamethasone 4 mg every 8 hours to help with N/V and abdominal distention.  Will switch to twice daily dose tomorrow if symptoms remain well-controlled.   Continue IV fluids, IV Zofran and Compazine and electrolyte replacement     # Metastatic colorectal cancer, BRAF V600E mutated:  Has diffuse mets to the mesentery, brain, lungs and thoracic spinal cord s/p diverting loop sigmoid colostomy, radiation to the brain and spinal cord lesions   She recently progressed on frontline therapy with FOLFOX and received first dose of second line Vectibix infusion/oral bravtovi on 12/10/2024   Advised to continue p.o. Braftovi 300 mg daily while inpatient  12/15/2024: Reviewed the CT A/P findings performed yesterday which shows significant and rapid disease progression in last 2-3 weeks.  Reviewed poor prognosis and try to discuss goals of care.  Patient appears not ready for this discussion yet.  Patient has a follow-up appointment with Dr. Saunders on 12/23/2024 at 1 PM for next cycle of Vectibix.    # Anxiety:   12/15: Will administer IV Ativan 1 mg  dose today.  Continue p.o. Ativan as needed     # Hypokalemia: Replacement per primary    # Abnormal LFTs: Liver metastasis related.  Improving.     Recommendations:  -Continue IV antiemetics and electrolyte replacement per primary  -Continue IV dexamethasone 4 mg every 8 hours.  Reduce dose to twice daily if symptoms remain well-controlled  -IV Ativan 1 mg x 1 dose today  -Continue fentanyl patch 50 mcg every 72 hours.    -Continue IV Dilaudid 1 mg every 2 hours as needed  -Continue p.o. Braftovi as inpatient  -Will follow while inpatient

## 2024-12-15 NOTE — PLAN OF CARE
Goal Outcome Evaluation:            Assumed care for this patient at 1430. Prior to this, previous nurse gave her a one time dose of IV Ativan. Pt is restless. Family remains at bedside. Pt manages colostomy with supplies from home. Plan of care ongoing, VSS.

## 2024-12-15 NOTE — PROGRESS NOTES
Name: Ely Sims ADMIT: 2024   : 1960  PCP: Provider, No Known    MRN: [id number] LOS: 2 days   AGE/SEX: 64 y.o. female  ROOM: [facility]     Subjective   Subjective   Patient is seen at bedside today, no acute events have been noted.       Objective   Objective   Vital Signs  Temp:  [97.5 °F (36.4 °C)-97.9 °F (36.6 °C)] 97.5 °F (36.4 °C)  Heart Rate:  [87-93] 93  Resp:  [16-18] 18  BP: (135-161)/(76-93) 149/83  SpO2:  [94 %-98 %] 94 %  on   ;   Device (Oxygen Therapy): room air  Body mass index is 21.24 kg/m².  Physical Exam  General, awake and alert.  Head and ENT, normocephalic and atraumatic.  Lungs, symmetric expansion, equal air entry bilaterally.  Heart, regular rate and rhythm.  Abdomen, soft and nontender.  Extremities, no clubbing or cyanosis.  Neuro, no focal deficits.  Skin: Warm and no rash.  Psych, normal mood and affect.  Musculoskeletal, joint examination is grossly normal.    Copied text material from yesterday's note has been reviewed for appropriate changes and remains accurate as of 12/15/24.      Results Review     I reviewed the patient's new clinical results.  Results from last 7 days   Lab Units 12/15/24  0524  0453 24  1334 12/10/24  0932   WBC 10*3/mm3 14.92* 20.33* 18.44* 10.54   HEMOGLOBIN g/dL 9.8* 12.0 11.7* 11.1*   PLATELETS 10*3/mm3 190 241 289 319     Results from last 7 days   Lab Units 12/15/24  0528 24  0453 24  1334 12/10/24  0932   SODIUM mmol/L 135* 134* 136 135*   POTASSIUM mmol/L 5.4* 4.7 2.9* 3.1*   CHLORIDE mmol/L 103 100 93* 96*   CO2 mmol/L 23.0 17.0* 27.6 26.2   BUN mg/dL 10 12 13 6*   CREATININE mg/dL 0.54* 0.69 0.69 0.64   GLUCOSE mg/dL 125* 94 126* 93   EGFR mL/min/1.73 103.0 97.1 97.1 98.8     Results from last 7 days   Lab Units 12/15/24  0528 24  0453 24  1334 12/10/24  0932   ALBUMIN g/dL 2.7* 2.8* 3.2* 3.1*   BILIRUBIN mg/dL 1.2 1.8* 2.4* 1.5*   ALK PHOS U/L 670* 691* 720* 588*   AST (SGOT) U/L 96* 128* 137*  "151*   ALT (SGPT) U/L 39* 41* 44* 58*     Results from last 7 days   Lab Units 12/15/24  0528 12/14/24  0453 12/13/24  1334 12/10/24  0932   CALCIUM mg/dL 7.8* 8.1* 8.4* 9.3   ALBUMIN g/dL 2.7* 2.8* 3.2* 3.1*   MAGNESIUM mg/dL 2.1 1.4*  --  1.5*   PHOSPHORUS mg/dL  --  2.8  --   --        No results found for: \"HGBA1C\", \"POCGLU\"    CT Abdomen Pelvis With Contrast    Result Date: 12/14/2024  Electronically signed by Jean Woodward MD on 12-14-24 at 2329     I have personally reviewed all medications:  Scheduled Medications  amphetamine-dextroamphetamine, 20 mg, Oral, BID  buPROPion XL, 300 mg, Oral, QAM  fentaNYL, 1 patch, Transdermal, Q72H   And  Check Fentanyl Patch Placement, 1 each, Not Applicable, Q12H  dexAMETHasone, 4 mg, Intravenous, Q8H  encorafenib, 300 mg, Oral, Daily  polyethylene glycol, 17 g, Oral, Daily  potassium chloride, 20 mEq, Oral, TID With Meals  Scopolamine, 1 patch, Transdermal, Q72H  sennosides-docusate, 2 tablet, Oral, Daily  sodium chloride, 10 mL, Intravenous, Q12H  sodium chloride, 10 mL, Intravenous, Q12H    Infusions   Diet  Diet: Regular/House; Fluid Consistency: Thin (IDDSI 0)    I have personally reviewed:  [x]  Laboratory   [x]  Microbiology   [x]  Radiology   [x]  EKG/Telemetry  [x]  Cardiology/Vascular   []  Pathology    []  Records       Assessment/Plan     Active Hospital Problems    Diagnosis  POA    **Abdominal pain [R10.9]  Unknown    Severe protein-calorie malnutrition [E43]  Yes    Dehydration [E86.0]  Yes    Hypokalemia [E87.6]  Unknown    Abnormal LFTs [R79.89]  Unknown    Rectal adenocarcinoma [C20]  Yes    Anemia [D64.9]  Yes    Colon cancer metastasized to brain [C18.9, C79.31]  Yes      Resolved Hospital Problems   No resolved problems to display.       64 y.o. female admitted with Abdominal pain.    Assessment plan  1.  Worsening abdominal pain, in the setting of underlying colorectal malignancy, diverting colostomy and unable to keep anything down, with nausea and " vomiting, continue pain control.  Symptomatic management.     2.  Progressive colorectal malignancy with metastasis, management based on oncology recommendations.     3.  Leukocytosis, no acute signs of infection at this point of time.  Continue to monitor closely.    4.  Hyperkalemia, stop scheduled potassium.     5.  CODE STATUS is full code.  Further plans based on hospital course.         Les Diehl MD  [facility]  12/15/24  16:00 EST

## 2024-12-16 PROBLEM — F41.9 ANXIETY: Status: ACTIVE | Noted: 2024-12-16

## 2024-12-16 LAB
25(OH)D3 SERPL-MCNC: 35.1 NG/ML (ref 30–100)
ALBUMIN SERPL-MCNC: 3 G/DL (ref 3.5–5.2)
ALBUMIN/GLOB SERPL: 0.9 G/DL
ALP SERPL-CCNC: 688 U/L (ref 39–117)
ALT SERPL W P-5'-P-CCNC: 33 U/L (ref 1–33)
AMMONIA BLD-SCNC: 40 UMOL/L (ref 11–51)
ANION GAP SERPL CALCULATED.3IONS-SCNC: 10.1 MMOL/L (ref 5–15)
AST SERPL-CCNC: 52 U/L (ref 1–32)
BASOPHILS # BLD AUTO: 0.02 10*3/MM3 (ref 0–0.2)
BASOPHILS NFR BLD AUTO: 0.1 % (ref 0–1.5)
BILIRUB SERPL-MCNC: 1 MG/DL (ref 0–1.2)
BUN SERPL-MCNC: 15 MG/DL (ref 8–23)
BUN/CREAT SERPL: 21.7 (ref 7–25)
CALCIUM SPEC-SCNC: 8.4 MG/DL (ref 8.6–10.5)
CHLORIDE SERPL-SCNC: 100 MMOL/L (ref 98–107)
CO2 SERPL-SCNC: 22.9 MMOL/L (ref 22–29)
CREAT SERPL-MCNC: 0.69 MG/DL (ref 0.57–1)
DEPRECATED RDW RBC AUTO: 54.5 FL (ref 37–54)
EGFRCR SERPLBLD CKD-EPI 2021: 97.1 ML/MIN/1.73
EOSINOPHIL # BLD AUTO: 0 10*3/MM3 (ref 0–0.4)
EOSINOPHIL NFR BLD AUTO: 0 % (ref 0.3–6.2)
ERYTHROCYTE [DISTWIDTH] IN BLOOD BY AUTOMATED COUNT: 15.5 % (ref 12.3–15.4)
GLOBULIN UR ELPH-MCNC: 3.3 GM/DL
GLUCOSE SERPL-MCNC: 159 MG/DL (ref 65–99)
HCT VFR BLD AUTO: 30.6 % (ref 34–46.6)
HGB BLD-MCNC: 10 G/DL (ref 12–15.9)
IMM GRANULOCYTES # BLD AUTO: 0.2 10*3/MM3 (ref 0–0.05)
IMM GRANULOCYTES NFR BLD AUTO: 1.2 % (ref 0–0.5)
LYMPHOCYTES # BLD AUTO: 0.42 10*3/MM3 (ref 0.7–3.1)
LYMPHOCYTES NFR BLD AUTO: 2.6 % (ref 19.6–45.3)
MCH RBC QN AUTO: 31.7 PG (ref 26.6–33)
MCHC RBC AUTO-ENTMCNC: 32.7 G/DL (ref 31.5–35.7)
MCV RBC AUTO: 97.1 FL (ref 79–97)
MONOCYTES # BLD AUTO: 0.98 10*3/MM3 (ref 0.1–0.9)
MONOCYTES NFR BLD AUTO: 6 % (ref 5–12)
NEUTROPHILS NFR BLD AUTO: 14.75 10*3/MM3 (ref 1.7–7)
NEUTROPHILS NFR BLD AUTO: 90.1 % (ref 42.7–76)
NRBC BLD AUTO-RTO: 0 /100 WBC (ref 0–0.2)
PLATELET # BLD AUTO: 210 10*3/MM3 (ref 140–450)
PMV BLD AUTO: 10.8 FL (ref 6–12)
POTASSIUM SERPL-SCNC: 4.7 MMOL/L (ref 3.5–5.2)
PROT SERPL-MCNC: 6.3 G/DL (ref 6–8.5)
QT INTERVAL: 360 MS
QTC INTERVAL: 453 MS
RBC # BLD AUTO: 3.15 10*6/MM3 (ref 3.77–5.28)
SODIUM SERPL-SCNC: 133 MMOL/L (ref 136–145)
T4 FREE SERPL-MCNC: 1.51 NG/DL (ref 0.92–1.68)
TSH SERPL DL<=0.05 MIU/L-ACNC: 6.18 UIU/ML (ref 0.27–4.2)
VIT B12 BLD-MCNC: 1257 PG/ML (ref 211–946)
WBC NRBC COR # BLD AUTO: 16.37 10*3/MM3 (ref 3.4–10.8)

## 2024-12-16 PROCEDURE — 80053 COMPREHEN METABOLIC PANEL: CPT | Performed by: INTERNAL MEDICINE

## 2024-12-16 PROCEDURE — 84443 ASSAY THYROID STIM HORMONE: CPT | Performed by: PSYCHIATRY & NEUROLOGY

## 2024-12-16 PROCEDURE — 99233 SBSQ HOSP IP/OBS HIGH 50: CPT | Performed by: INTERNAL MEDICINE

## 2024-12-16 PROCEDURE — 84439 ASSAY OF FREE THYROXINE: CPT | Performed by: INTERNAL MEDICINE

## 2024-12-16 PROCEDURE — 25010000002 HYDROMORPHONE 1 MG/ML SOLUTION: Performed by: INTERNAL MEDICINE

## 2024-12-16 PROCEDURE — 93010 ELECTROCARDIOGRAM REPORT: CPT | Performed by: INTERNAL MEDICINE

## 2024-12-16 PROCEDURE — 82140 ASSAY OF AMMONIA: CPT | Performed by: INTERNAL MEDICINE

## 2024-12-16 PROCEDURE — 99222 1ST HOSP IP/OBS MODERATE 55: CPT | Performed by: PSYCHIATRY & NEUROLOGY

## 2024-12-16 PROCEDURE — 82306 VITAMIN D 25 HYDROXY: CPT | Performed by: PSYCHIATRY & NEUROLOGY

## 2024-12-16 PROCEDURE — 93005 ELECTROCARDIOGRAM TRACING: CPT | Performed by: INTERNAL MEDICINE

## 2024-12-16 PROCEDURE — 25010000002 DEXAMETHASONE PER 1 MG: Performed by: INTERNAL MEDICINE

## 2024-12-16 PROCEDURE — 85025 COMPLETE CBC W/AUTO DIFF WBC: CPT | Performed by: INTERNAL MEDICINE

## 2024-12-16 PROCEDURE — 82607 VITAMIN B-12: CPT | Performed by: PSYCHIATRY & NEUROLOGY

## 2024-12-16 RX ORDER — ZIPRASIDONE MESYLATE 20 MG/ML
20 INJECTION, POWDER, LYOPHILIZED, FOR SOLUTION INTRAMUSCULAR EVERY 6 HOURS PRN
Status: DISCONTINUED | OUTPATIENT
Start: 2024-12-16 | End: 2024-12-19

## 2024-12-16 RX ADMIN — LORAZEPAM 0.5 MG: 0.5 TABLET ORAL at 01:31

## 2024-12-16 RX ADMIN — LORAZEPAM 0.5 MG: 0.5 TABLET ORAL at 10:54

## 2024-12-16 RX ADMIN — HYDROMORPHONE HYDROCHLORIDE 1 MG: 1 INJECTION, SOLUTION INTRAMUSCULAR; INTRAVENOUS; SUBCUTANEOUS at 14:59

## 2024-12-16 RX ADMIN — POLYETHYLENE GLYCOL 3350 17 G: 17 POWDER, FOR SOLUTION ORAL at 08:59

## 2024-12-16 RX ADMIN — Medication 10 ML: at 08:59

## 2024-12-16 RX ADMIN — DEXAMETHASONE SODIUM PHOSPHATE 4 MG: 4 INJECTION, SOLUTION INTRAMUSCULAR; INTRAVENOUS at 01:31

## 2024-12-16 RX ADMIN — HYDROMORPHONE HYDROCHLORIDE 1 MG: 1 INJECTION, SOLUTION INTRAMUSCULAR; INTRAVENOUS; SUBCUTANEOUS at 20:38

## 2024-12-16 RX ADMIN — Medication 10 ML: at 20:50

## 2024-12-16 RX ADMIN — SENNOSIDES AND DOCUSATE SODIUM 2 TABLET: 50; 8.6 TABLET ORAL at 09:05

## 2024-12-16 RX ADMIN — DEXAMETHASONE SODIUM PHOSPHATE 4 MG: 4 INJECTION, SOLUTION INTRAMUSCULAR; INTRAVENOUS at 18:33

## 2024-12-16 RX ADMIN — DEXAMETHASONE SODIUM PHOSPHATE 4 MG: 4 INJECTION, SOLUTION INTRAMUSCULAR; INTRAVENOUS at 12:14

## 2024-12-16 RX ADMIN — HYDROMORPHONE HYDROCHLORIDE 1 MG: 1 INJECTION, SOLUTION INTRAMUSCULAR; INTRAVENOUS; SUBCUTANEOUS at 06:00

## 2024-12-16 NOTE — NURSING NOTE
"   12/16/24 1351   Colostomy LLQ   No Placement date: If unknown, DO NOT use \"Add Comment\" note or Placement time: If unknown, DO NOT use \"Add Comment\" note found.   Location: LLQ   Stomal Appliance 1 piece;Changed   Stoma Appearance irregular;rosebud appearance;moist;red;protruding above skin level   Peristomal Assessment Clean;Intact   Accessories/Skin Care cleansed with water;skin barrier ring  ([place] 1-piece pouch with barrier ring)   Stoma Function flatus;stool   Stool Color brown   Stool Consistency soft;creamy   Treatment Bag change;Site care   Output (mL) 200 mL     CWON note: pt with an existing colostomy. She is confused and agitated, currently requiring assistance with her pouch changes and management. She wears a 1-piece appliance at home with a clamp. Family can bring home supplies if they choose, but I have placed her in the Fanrock 1-piece pouch with barrier ring. Extra pouches are at bedside. WOC team will follow 2x week for pouch changes while in-inpatient.   "

## 2024-12-16 NOTE — PROGRESS NOTES
Infectious Diseases Progress Note    Estefania Jacobs MD     [facility]  Los: 3 days  Patient Identification:  Name: Ely Sims  Age: 64 y.o.  Sex: female  :  1960  MRN: [id number]         Primary Care Physician: Provider, No Known  Requesting physician: Dr. Diehl  Date of consultation 12/15/2024  Reason for consultation: Leukocytosis  Subjective: Overall feeling somewhat better in terms of pain but still feels very much uncomfortable.  Denies any fever and chills.  Interval History: See admission history and physical performed as cross cover for internal medicine service on 2024.  Since then patient has been seen by hematology oncology service as well as gastroenterologist service and it appears that her current worsening of nausea vomiting and abdominal pain is likely due to progression of her underlying malignancy made worse by the administration Vectibix.  Steroid has been initiated with instruction to continue her Braftovi.  GI service recommend supportive care.    Objective:    Scheduled Meds:amphetamine-dextroamphetamine, 20 mg, Oral, BID  buPROPion XL, 300 mg, Oral, QAM  fentaNYL, 1 patch, Transdermal, Q72H   And  Check Fentanyl Patch Placement, 1 each, Not Applicable, Q12H  dexAMETHasone, 4 mg, Intravenous, Q8H  encorafenib, 300 mg, Oral, Daily  polyethylene glycol, 17 g, Oral, Daily  Scopolamine, 1 patch, Transdermal, Q72H  sennosides-docusate, 2 tablet, Oral, Daily  sodium chloride, 10 mL, Intravenous, Q12H  sodium chloride, 10 mL, Intravenous, Q12H      Continuous Infusions:     Vital signs in last 24 hours:  Temp:  [97.5 °F (36.4 °C)-98.2 °F (36.8 °C)] 97.6 °F (36.4 °C)  Heart Rate:  [] 97  Resp:  [16-18] 16  BP: (119-149)/(78-88) 147/87    Intake/Output:    Intake/Output Summary (Last 24 hours) at 2024 0849  Last data filed at 12/15/2024 2023  Gross per 24 hour   Intake 480 ml   Output 150 ml   Net 330 ml       Exam:  /87 (BP Location: Left arm, Patient  Position: Lying)   Pulse 97   Temp 97.6 °F (36.4 °C) (Oral)   Resp 16   Wt 61.5 kg (135 lb 9.3 oz)   SpO2 96%   BMI 21.24 kg/m²   Patient is examined using the personal protective equipment as per guidelines from infection control for this particular patient as enacted.  Hand washing was performed before and after patient interaction.  General Appearance:  Awake alert and ill-appearing currently being assisted in walking around the hallways by the family members, gets tired easily and appears uncomfortable                          Head:    Normocephalic, without obvious abnormality, atraumatic                           Eyes:    PERRL, conjunctivae/corneas clear, EOM's intact, both eyes                         Throat: Dry oral mucosa                           Neck:   Supple, symmetrical, trachea midline, no JVD                         Lungs:    Clear to auscultation bilaterally, respirations unlabored                 Chest Wall:    No tenderness or deformity                          Heart:  S1-S2 regular                  Abdomen:   Generalized abdominal tenderness but soft no guarding rigidity or rebound noted                 Extremities:   Extremities normal, atraumatic, no cyanosis or edema                        Pulses:   Pulses palpable in all extremities                            Skin:   Skin is warm and dry,  no rashes or palpable lesions                  Neurologic: Alert and oriented x 3       Data Review:    I reviewed the patient's new clinical results.  Results from last 7 days   Lab Units 12/16/24  0546 12/15/24  0528 12/14/24  0453 12/13/24  1334 12/10/24  0932   WBC 10*3/mm3 16.37* 14.92* 20.33* 18.44* 10.54   HEMOGLOBIN g/dL 10.0* 9.8* 12.0 11.7* 11.1*   PLATELETS 10*3/mm3 210 190 241 289 319     Results from last 7 days   Lab Units 12/16/24  0546 12/15/24  0528 12/14/24  0453 12/13/24  1334 12/10/24  0932   SODIUM mmol/L 133* 135* 134* 136 135*   POTASSIUM mmol/L 4.7 5.4* 4.7 2.9* 3.1*    CHLORIDE mmol/L 100 103 100 93* 96*   CO2 mmol/L 22.9 23.0 17.0* 27.6 26.2   BUN mg/dL 15 10 12 13 6*   CREATININE mg/dL 0.69 0.54* 0.69 0.69 0.64   CALCIUM mg/dL 8.4* 7.8* 8.1* 8.4* 9.3   GLUCOSE mg/dL 159* 125* 94 126* 93     Microbiology Results (last 10 days)       Procedure Component Value - Date/Time    Blood Culture - Blood, Arm, Left [[id number]]  (Normal) Collected: 12/14/24 1200    Lab Status: Preliminary result Specimen: Blood from Arm, Left Updated: 12/15/24 1231     Blood Culture No growth at 24 hours    Blood Culture - Blood, Blood, Central Line [[id number]]  (Normal) Collected: 12/14/24 0954    Lab Status: Preliminary result Specimen: Blood, Central Line Updated: 12/15/24 1031     Blood Culture No growth at 24 hours              Assessment:    Abdominal pain    Colon cancer metastasized to brain    Anemia    Rectal adenocarcinoma    Dehydration    Hypokalemia    Abnormal LFTs    Severe protein-calorie malnutrition  Leukocytosis-multifactorial including likely due to:  progression of underlying malignancy versus  steroid use.  She is at risk of infectious process such as infected Mediport as well as common causes of infection that could occur to anybody regardless of her malignancy and its progression-and need to be closely monitored for pneumonia, UTI, phlebitis at the IV site, evolving C. difficile infection etc.    Recommendations/discussion:  At this juncture agree with supportive care and closely monitor her progress under the direction of primary team and hematology oncology service.  Patient will require exhaustive review of system to identify above possibilities for evolving line infection but low threshold to perform septic workup such as blood cultures urinalysis urine cultures and repeat chest x-ray if she spikes fever.  Management of other issues per primary team.  Thank you very much for letting me be the part of your patient care please see above impression and recommendations  Jawed  MD Reynaldo  12/16/2024  08:49 EST    Parts of this note may be an electronic transcription/translation of spoken language to printed text using the Dragon dictation system.

## 2024-12-16 NOTE — PROGRESS NOTES
Subjective     HISTORY OF PRESENT ILLNESS:   Patient is metastatic colon cancer.    12/16/2024  and the daughter reports patient become more confused last night after IV Ativan for anxiety.  This morning patient is still has some confusion in the time of visit.  Patient is afebrile.  Vital stable.      Past Medical History, Past Surgical History, Social History, Family History have been reviewed and are without significant changes except as mentioned.    Review of Systems   A comprehensive 14 point review of systems was performed and was negative except as mentioned.    Medications:  The current medication list was reviewed in the EMR    ALLERGIES:  No Known Allergies    Objective      Vitals:    12/15/24 1948 12/15/24 2038 12/16/24 0307 12/16/24 0751   BP: 141/80  143/88 147/87   BP Location: Right arm  Right arm Left arm   Patient Position: Lying  Lying Lying   Pulse: 101 92 105 97   Resp: 18 16 16 16   Temp: 98.2 °F (36.8 °C)  97.7 °F (36.5 °C) 97.6 °F (36.4 °C)   TempSrc: Oral  Oral Oral   SpO2: 98% 97% 94% 96%   Weight:             12/13/2024     1:30 PM   Current Status   ECOG score 2       Physical Exam    GENERAL:  Well-developed, well-nourished in no acute distress.    SKIN:  Warm, dry without rashes, purpura or petechiae.  HEENT:  Normocephalic.   CHEST: Normal respiratory effort.  Lungs clear to auscultation. Good airflow.  CARDIAC:  Regular rate and rhythm. Normal S1,S2.  ABDOMEN:  Soft, no tender.  Colostomy in place.  Bowel sounds normal.  EXTREMITIES:  No lower extremity edema.   PSYCHIATRIC: Somewhat confused.       RECENT LABS:  CBC:      Lab 12/16/24  0546 12/15/24  0528 12/14/24  0453 12/13/24  1334 12/10/24  0932   WBC 16.37* 14.92* 20.33* 18.44* 10.54   HEMOGLOBIN 10.0* 9.8* 12.0 11.7* 11.1*   HEMATOCRIT 30.6* 30.6* 37.3 35.7 33.9*   PLATELETS 210 190 241 289 319   NEUTROS ABS 14.75* 13.56* 16.76* 14.58* 7.29*   IMMATURE GRANS (ABS) 0.20* 0.18* 0.25* 0.35* 0.33*   LYMPHS ABS 0.42* 0.41*  "0.58* 0.83 0.83   MONOS ABS 0.98* 0.75 2.65* 2.59* 1.99*   EOS ABS 0.00 0.00 0.04 0.02 0.03   MCV 97.1* 97.5* 97.1* 95.2 96.6     CMP:        Lab 12/16/24  0546 12/15/24  0528 12/14/24  0453 12/13/24  1334 12/10/24  0932   SODIUM 133* 135* 134* 136 135*   POTASSIUM 4.7 5.4* 4.7 2.9* 3.1*   CHLORIDE 100 103 100 93* 96*   CO2 22.9 23.0 17.0* 27.6 26.2   ANION GAP 10.1 9.0 17.0* 15.4* 12.8   BUN 15 10 12 13 6*   CREATININE 0.69 0.54* 0.69 0.69 0.64   EGFR 97.1 103.0 97.1 97.1 98.8   GLUCOSE 159* 125* 94 126* 93   CALCIUM 8.4* 7.8* 8.1* 8.4* 9.3   MAGNESIUM  --  2.1 1.4*  --  1.5*   PHOSPHORUS  --   --  2.8  --   --    TOTAL PROTEIN 6.3 5.9* 6.5 6.9 7.1   ALBUMIN 3.0* 2.7* 2.8* 3.2* 3.1*   GLOBULIN 3.3 3.2 3.7 3.7 4.0   ALT (SGPT) 33 39* 41* 44* 58*   AST (SGOT) 52* 96* 128* 137* 151*   BILIRUBIN 1.0 1.2 1.8* 2.4* 1.5*   ALK PHOS 688* 670* 691* 720* 588*     No results found for: \"IRON\", \"LABIRON\", \"TRANSFERRIN\", \"TIBC\", \"FERRITIN\"  No results found for: \"FOLATE\"  Lab Results   Component Value Date    [id number] 1,257 (H) 12/16/2024           Assessment & Plan   Patient is a pleasant 64-year-old female with:     # Abdominal pain, nausea and vomiting:  Likely secondary to underlying diffuse mesenteric, liver and lymph node metastasis, made worse by recent administration of Vectibix on 12/10/2024.  Will start IV dexamethasone 4 mg every 8 hours to help with symptoms  Patient is unable to take p.o. morphine for pain relief.  Will discontinue morphine and switched to fentanyl patch 50 mcg every 72 hours.    12/15/2024: Pain better controlled with fentanyl patch 50 mcg every 72 hours and IV Dilaudid 1 mg every 2 hours as needed.  Continue current regimen.  Will transition to p.o. [place] at discharge.   Continue IV dexamethasone 4 mg every 8 hours to help with N/V and abdominal distention.  Will switch to twice daily dose tomorrow if symptoms remain well-controlled.   Continue IV fluids, IV Zofran and Compazine and electrolyte " replacement  12/16/2024 slightly improved symptoms.     # Metastatic colorectal cancer, BRAF V600E mutated:  Has diffuse mets to the mesentery, brain, lungs and thoracic spinal cord s/p diverting loop sigmoid colostomy, radiation to the brain and spinal cord lesions   She recently progressed on frontline therapy with FOLFOX and received first dose of second line Vectibix infusion/oral bravtovi on 12/10/2024   Advised to continue p.o. Braftovi 300 mg daily while inpatient  12/15/2024: Reviewed the CT A/P findings performed yesterday which shows significant and rapid disease progression in last 2-3 weeks.  Reviewed poor prognosis and try to discuss goals of care.  Patient appears not ready for this discussion yet.  Patient has a follow-up appointment with Dr. Saunders on 12/23/2024 at 1 PM for next cycle of Vectibix.    # Anxiety:   12/15: Will administer IV Ativan 1 mg dose today.  Continue p.o. Ativan as needed  12/16/2024 family members  and daughter reports patient become more sedated with confusion after Ativan IV.  Also received 1 dose of oral this morning.   Ativan has already been discontinued at the time of seeing patient.    *.  Anemia.   Most likely related to metastatic colon cancer and chemotherapy.    Supratherapeutic B12 level 1257 and Hgb 10.0 on 12/16/2024.  Will check iron status ferritin and folate.     # Hypokalemia: Replacement per primary    # Abnormal LFTs: Liver metastasis related.  Improving.     Recommendations:  -Discontinue Ativan, not tolerating become more confused and agitated.  -Continue IV antiemetics and electrolyte replacement per primary  -Continue IV dexamethasone 4 mg every 8 hours.  Reduce dose to twice daily if symptoms remain well-controlled  -Continue fentanyl patch 50 mcg every 72 hours.    -Continue IV Dilaudid 1 mg every 2 hours as needed  -Continue p.o. Braftovi as inpatient for her metastatic colon cancer  -Monitor CBC, check ferritin iron profile and the folate level  tomorrow morning.   - Will follow while inpatient       This patient is complete new to me today for all the problems listed above.  This first time I am seeing this patient.    Reviewed notes from her primary oncologist Dr. Saunders, and also notes from Dr. Jang who has been taking care of patient for the past couple days, notes from ID service, and the primary team [facility].    Discussed with the patient mostly her  and the daughter at bedside.    I spent 55 minutes on this encounter, before, during & after the visit evaluating the patient, reviewing records and writing orders.

## 2024-12-16 NOTE — PLAN OF CARE
Goal Outcome Evaluation:              Outcome Evaluation: Pt is anxious and restless most of the time, barely slept, up and down every 10-20 minutes. C/o abdominal pain and back pain. Dilaudid given twice, ativan once. Unsteady. Family at bedside, assisting with care. Refused wellbutrin and adderall.

## 2024-12-16 NOTE — PROGRESS NOTES
Name: Ely Sims ADMIT: 2024   : 1960  PCP: Provider, No Known    MRN: [id number] LOS: 3 days   AGE/SEX: 64 y.o. female  ROOM: [facility]/     Subjective   Subjective   Patient is seen at bedside, she appears to be more confused today.       Objective   Objective   Vital Signs  Temp:  [97.6 °F (36.4 °C)-98.2 °F (36.8 °C)] 97.9 °F (36.6 °C)  Heart Rate:  [] 95  Resp:  [16-18] 16  BP: (141-158)/(80-95) 157/95  SpO2:  [94 %-99 %] 96 %  on   ;   Device (Oxygen Therapy): room air  Body mass index is 21.24 kg/m².  Physical Exam  General, awake and alert.  Head and ENT, normocephalic and atraumatic.  Lungs, symmetric expansion, equal air entry bilaterally.  Heart, regular rate and rhythm.  Abdomen, soft and nontender.  Extremities, no clubbing or cyanosis.  Neuro, no focal deficits.  Skin: Warm and no rash.  Psych, normal mood and affect.  Musculoskeletal, joint examination is grossly normal.     Copied text material from yesterday's note has been reviewed for appropriate changes and remains accurate as of 24.      Results Review     I reviewed the patient's new clinical results.  Results from last 7 days   Lab Units 24  0546 12/15/24  0528 24  0453 12/13/24  1334   WBC 10*3/mm3 16.37* 14.92* 20.33* 18.44*   HEMOGLOBIN g/dL 10.0* 9.8* 12.0 11.7*   PLATELETS 10*3/mm3 210 190 241 289     Results from last 7 days   Lab Units 24  0546 12/15/24  0528 24  0453 12/13/24  1334   SODIUM mmol/L 133* 135* 134* 136   POTASSIUM mmol/L 4.7 5.4* 4.7 2.9*   CHLORIDE mmol/L 100 103 100 93*   CO2 mmol/L 22.9 23.0 17.0* 27.6   BUN mg/dL 15 10 12 13   CREATININE mg/dL 0.69 0.54* 0.69 0.69   GLUCOSE mg/dL 159* 125* 94 126*   EGFR mL/min/1.73 97.1 103.0 97.1 97.1     Results from last 7 days   Lab Units 24  0546 12/15/24  0528 24  0453 24  1334   ALBUMIN g/dL 3.0* 2.7* 2.8* 3.2*   BILIRUBIN mg/dL 1.0 1.2 1.8* 2.4*   ALK PHOS U/L 688* 670* 691* 720*   AST (SGOT) U/L 52* 96* 128*  "137*   ALT (SGPT) U/L 33 39* 41* 44*     Results from last 7 days   Lab Units 12/16/24  0546 12/15/24  0528 12/14/24  0453 12/13/24  1334 12/10/24  0932   CALCIUM mg/dL 8.4* 7.8* 8.1* 8.4* 9.3   ALBUMIN g/dL 3.0* 2.7* 2.8* 3.2* 3.1*   MAGNESIUM mg/dL  --  2.1 1.4*  --  1.5*   PHOSPHORUS mg/dL  --   --  2.8  --   --        No results found for: \"HGBA1C\", \"POCGLU\"    CT Abdomen Pelvis With Contrast    Result Date: 12/14/2024  Electronically signed by Jean Woodward MD on 12-14-24 at 2329     I have personally reviewed all medications:  Scheduled Medications  amphetamine-dextroamphetamine, 20 mg, Oral, BID  buPROPion XL, 300 mg, Oral, QAM  fentaNYL, 1 patch, Transdermal, Q72H   And  Check Fentanyl Patch Placement, 1 each, Not Applicable, Q12H  dexAMETHasone, 4 mg, Intravenous, Q8H  encorafenib, 300 mg, Oral, Daily  polyethylene glycol, 17 g, Oral, Daily  Scopolamine, 1 patch, Transdermal, Q72H  sennosides-docusate, 2 tablet, Oral, Daily  sodium chloride, 10 mL, Intravenous, Q12H  sodium chloride, 10 mL, Intravenous, Q12H    Infusions   Diet  Diet: Regular/House; Fluid Consistency: Thin (IDDSI 0)    I have personally reviewed:  [x]  Laboratory   [x]  Microbiology   [x]  Radiology   [x]  EKG/Telemetry  [x]  Cardiology/Vascular   []  Pathology    []  Records       Assessment/Plan     Active Hospital Problems    Diagnosis  POA    **Abdominal pain [R10.9]  Unknown    Severe protein-calorie malnutrition [E43]  Yes    Dehydration [E86.0]  Yes    Hypokalemia [E87.6]  Unknown    Abnormal LFTs [R79.89]  Unknown    Rectal adenocarcinoma [C20]  Yes    Anemia [D64.9]  Yes    Colon cancer metastasized to brain [C18.9, C79.31]  Yes      Resolved Hospital Problems   No resolved problems to display.       64 y.o. female admitted with Abdominal pain.    Assessment plan  1.  Worsening abdominal pain, in the setting of underlying colorectal malignancy, diverting colostomy and unable to keep anything down, with nausea and vomiting, " continue pain control.  Symptomatic management.     2.  Progressive colorectal malignancy with metastasis, management based on oncology recommendations.     3.  Leukocytosis, no acute signs of infection at this point of time.  Continue to monitor closely.     4.  Hyperkalemia, stop scheduled potassium.    5.  Worsening mental status, neurology evaluation today.     6.  CODE STATUS is full code.  Further plans based on hospital course.       Les Diehl MD  [facility]  12/16/24  16:42 EST

## 2024-12-16 NOTE — CASE MANAGEMENT/SOCIAL WORK
Continued Stay Note  [facility]     Patient Name: Ely Sims  MRN: [id number]  Today's Date: 12/16/2024    Admit Date: 12/13/2024    Plan: Plan is to return home with family to transport.   Discharge Plan       Row Name 12/16/24 1606       Plan    Plan Plan is to return home with family to transport.    Patient/Family in Agreement with Plan yes    Plan Comments CCP met with pt and pt's daughter/Tiffany at bedside. Introduced self and role of CCP. Pt gave CCP permission to speak in front of Tiffany. Face sheet information and pharmacy verified. Pt lives with her boyfriend in a 5STE single story home. Pt drives, is IADLs, and has no DME.  Pt does have a living will. Pt is not enrolled in meds to beds and denies trouble affording or managing medications. Patient has no history of using HH or SNF. Plan is to return home with family to transport. Tiffany confirms all pt's information. RLutes RN/CCP                   Discharge Codes    No documentation.                       Corinne Baez RN

## 2024-12-16 NOTE — CONSULTS
Neurology Consult Note    Referring Provider: Dr. Diehl  Reason for Consultation: altered mental status    History of present illness:    The patient is a 64 year old woman diagnosed with metastatic rectal adenocarcinoma in 8/2024 after cerebellar mass was found and resected.    History is obtained from chart and family at bedside.    Patient was admitted 12/13/2024 for worsening abdominal pain.  She was found to have poorly differentiated adenocarcinoma of colorectal origin.  CT scan of the abdomen pelvis at that time of initial evaluation showed thickening of the colon and surrounding mesenteric tissue involvement consistent with primary colon cancer.  Patient had colonoscopy which confirmed the presence of poorly differentiated carcinoma with neuroendocrine features and afterwards she was noted to have thoracic spinal cord metastatic disease.  Patient was seen by general surgery service and underwent diverting loop sigmoid colostomy and port placement.  Patient was subsequently started on chemotherapy as well as radiation therapy to her brain and spine.  Patient received 4 cycles of chemotherapy since the diagnosis until 11/19/2024 and subsequently a repeat CAT scan performed on 11/26/2024 shows progression of the disease.  Patient was started on modified regimen on 12/10/2024 consisting of panitumumab and encorafenib.  She had a follow-up visit today after the first treatment and noted to have severe pain and worsening nausea and vomiting and unable to keep anything down.    Family reports patient was lucid and oriented on admission. Since yesterday afternoon she has been confused. She had a very poor night with restlessness and mild agitation. Today she remains very restless and is angry at times.    On 12/14/24 home medication, MS Contin 15 mg BID was changed to fentanyl 50 mcg/hr. She remains on prn Dilaudid 1 mg (receives 2-4 doses per 24 hours). She has also received lorazepam 0.5 mg 2-3 times per  day.      Past Medical History  Past Medical History:   Diagnosis Date    Metastasis to brain     Rectal carcinoma 2024    Seasonal allergies      Past Surgical History  Past Surgical History:   Procedure Laterality Date    COLONOSCOPY N/A 08/25/2024    Procedure: COLONOSCOPY to 20cm with cold biopsies and needle tattoo;  Surgeon: Shadi Kaminski MD;  Location: [facility] ENDOSCOPY;  Service: Gastroenterology;  Laterality: N/A;  pre: abnormal imaging  post: poor prep, obstructing colon mass at 20cm, hemorrhoids    COLOSTOMY N/A 08/27/2024    Procedure: COLOSTOMY LAPAROSCOPIC;  Surgeon: Saturnino Chester MD;  Location: [facility] MAIN OR;  Service: General;  Laterality: N/A;    CRANIOTOMY N/A 08/21/2024    Procedure: Posterior fossa craniotomy for tumor using stereotactic guidance;  Surgeon: Bull Doty MD;  Location: [facility] MAIN OR;  Service: Neurosurgery;  Laterality: N/A;    ENDOMETRIAL ABLATION      TUBAL ABDOMINAL LIGATION      VENOUS ACCESS DEVICE (PORT) INSERTION Right 08/27/2024    Procedure: INSERTION VENOUS ACCESS DEVICE;  Surgeon: Saturnino Chester MD;  Location: [facility] MAIN OR;  Service: General;  Laterality: Right;       Family History  Family History   Problem Relation Age of Onset    Heart disease Mother     Colon cancer Sister      No Known Allergies    Social History  Social History     Socioeconomic History    Marital status: Single   Tobacco Use    Smoking status: Former     Types: Cigarettes    Smokeless tobacco: Never   Vaping Use    Vaping status: Never Used   Substance and Sexual Activity    Alcohol use: Yes    Drug use: Defer    Sexual activity: Defer     Review of Systems   All other systems reviewed and are negative.    Medications  Scheduled Meds:amphetamine-dextroamphetamine, 20 mg, Oral, BID  buPROPion XL, 300 mg, Oral, QAM  fentaNYL, 1 patch, Transdermal, Q72H   And  Check Fentanyl Patch Placement, 1 each, Not Applicable, Q12H  dexAMETHasone, 4 mg, Intravenous,  Q8H  encorafenib, 300 mg, Oral, Daily  polyethylene glycol, 17 g, Oral, Daily  Scopolamine, 1 patch, Transdermal, Q72H  sennosides-docusate, 2 tablet, Oral, Daily  sodium chloride, 10 mL, Intravenous, Q12H  sodium chloride, 10 mL, Intravenous, Q12H      Continuous Infusions:   PRN Meds:.  Calcium Replacement - Follow Nurse / BPA Driven Protocol    heparin    HYDROmorphone **AND** naloxone    lidocaine    LORazepam    Magnesium Standard Dose Replacement - Follow Nurse / BPA Driven Protocol    ondansetron    Phosphorus Replacement - Follow Nurse / BPA Driven Protocol    Potassium Replacement - Follow Nurse / BPA Driven Protocol    prochlorperazine    sodium chloride    sodium chloride    sodium chloride    sodium chloride    sodium chloride    Vital Signs   Temp:  [97.6 °F (36.4 °C)-98.2 °F (36.8 °C)] 97.9 °F (36.6 °C)  Heart Rate:  [] 99  Resp:  [16-18] 16  BP: (119-158)/(78-92) 158/92    Examination:  Constitutional: Well-groomed, well-nourished  HENT:  normal  Eyes: Normal conjunctivae  CVS:  Regular rate and rhythm.  No murmurs.  Good peripheral perfusion.   Resp :   Nonlabored respirations  Musculoskeletal:  No signs of peripheral edema, normal range, no deformities  Skin:  No rash, normal turgor  Neurologic:    Alert, follows some one step commands   Very inattentive and easily distracted           Restless   No dysarthria  EOMF without nystagmus  Pupils symmetric and equally reactive  Face symmetric  Normal power in all extremities proximally and distally  Normal coordination  Reflexes symmetric and not hyperactive  Plantar responses flexor  Gait is steady  Psychiatric: Restless, always moving, up and down from bed, into bathroom, in bathroom started shower while still wearing hospital gown    Results Review:  Results from last 7 days   Lab Units 12/16/24  0546 12/15/24  0528 12/14/24  0453   WBC 10*3/mm3 16.37* 14.92* 20.33*   HEMOGLOBIN g/dL 10.0* 9.8* 12.0   HEMATOCRIT % 30.6* 30.6* 37.3   PLATELETS  10*3/mm3 210 190 241     Results from last 7 days   Lab Units 12/16/24  0546 12/15/24  0528 12/14/24  0453   SODIUM mmol/L 133* 135* 134*   POTASSIUM mmol/L 4.7 5.4* 4.7   CHLORIDE mmol/L 100 103 100   CO2 mmol/L 22.9 23.0 17.0*   BUN mg/dL 15 10 12   CREATININE mg/dL 0.69 0.54* 0.69   CALCIUM mg/dL 8.4* 7.8* 8.1*   BILIRUBIN mg/dL 1.0 1.2 1.8*   ALK PHOS U/L 688* 670* 691*   ALT (SGPT) U/L 33 39* 41*   AST (SGOT) U/L 52* 96* 128*   GLUCOSE mg/dL 159* 125* 94      TSH  6.180  Free T4 pending    Radiology    Brain MRI 10/24/2024  1. On 08/21/2024, the patient underwent a 3.5 x 3.5 cm posterior  inferior lateral right occipital - suboccipital craniectomy for  resection of a 2.3 x 2.1 cm enhancing posterior inferior medial right  cerebellar metastatic lesion found at surgical pathology to be a poorly  differentiated adenocarcinoma of colon primary. There has been interval  contraction of the resection cavity with no evidence of residual  enhancing tumor. There is expected scar and granulation tissue along the surgical tract and resection cavity. Extending in the extra-axial space  along the posterior inferior lateral right cerebellum through the  craniectomy defect into the right inferior lateral occipital and  suboccipital scalp is a pseudomeningocele that maximally measures 4.0 x 2.3 x 7 cm in mediolateral and anterior posterior and craniocaudal  dimensions.     2. The enhancing metastatic lesions in the superior cerebellar vermis  and in the inferior medial left temporal occipital juxtacortical white  matter have both decreased in size with resolution of the surrounding  edema. There is a residual 3 x 3 x 3 mm enhancing metastatic lesion  superior vermis of the cerebellum and 4 x 4 mm  enhancing lesion in the  inferomedial left temporal juxtacortical white matter. The findings  indicate a response of the residual enhancing metastatic lesions to  treatment and no new enhancing lesions are seen. The remainder of  the  MRI of the brain is unremarkable.  Images reviewed independently    Medical Decision Making and Recommendations  Confusion and akathisia  Likely secondary to medication changes    Hesitate to stop fentanyl, which is newest addition, since has decreased pain from 10 to 7 and she is more comfortable    Instead will stop prn lorazepam as it could be the fentanyl/lorazepam combination.    Will try Geodon if needed for significant agitation.    Also check status of vitamins B12 and D.    She has not received prn Compazine but will stop the order as this medication can worsen akathisia    If improvement is not seen with medication changes, may need re-imaging of brain.    I discussed these findings and my recommendations with patient and family        Tricia Smith MD  12/16/24  14:18 EST

## 2024-12-17 ENCOUNTER — APPOINTMENT (OUTPATIENT)
Dept: CT IMAGING | Facility: HOSPITAL | Age: 64
End: 2024-12-17
Payer: COMMERCIAL

## 2024-12-17 LAB
ALBUMIN SERPL-MCNC: 3.4 G/DL (ref 3.5–5.2)
ALBUMIN/GLOB SERPL: 0.9 G/DL
ALP SERPL-CCNC: 811 U/L (ref 39–117)
ALT SERPL W P-5'-P-CCNC: 38 U/L (ref 1–33)
ANION GAP SERPL CALCULATED.3IONS-SCNC: 16 MMOL/L (ref 5–15)
AST SERPL-CCNC: 52 U/L (ref 1–32)
BASOPHILS # BLD AUTO: 0.04 10*3/MM3 (ref 0–0.2)
BASOPHILS NFR BLD AUTO: 0.2 % (ref 0–1.5)
BILIRUB SERPL-MCNC: 1.1 MG/DL (ref 0–1.2)
BUN SERPL-MCNC: 13 MG/DL (ref 8–23)
BUN/CREAT SERPL: 22.4 (ref 7–25)
CALCIUM SPEC-SCNC: 8.9 MG/DL (ref 8.6–10.5)
CHLORIDE SERPL-SCNC: 100 MMOL/L (ref 98–107)
CO2 SERPL-SCNC: 23 MMOL/L (ref 22–29)
CREAT SERPL-MCNC: 0.58 MG/DL (ref 0.57–1)
DEPRECATED RDW RBC AUTO: 51.1 FL (ref 37–54)
EGFRCR SERPLBLD CKD-EPI 2021: 101.2 ML/MIN/1.73
EOSINOPHIL # BLD AUTO: 0 10*3/MM3 (ref 0–0.4)
EOSINOPHIL NFR BLD AUTO: 0 % (ref 0.3–6.2)
ERYTHROCYTE [DISTWIDTH] IN BLOOD BY AUTOMATED COUNT: 14.9 % (ref 12.3–15.4)
FERRITIN SERPL-MCNC: 350 NG/ML (ref 13–150)
FOLATE SERPL-MCNC: 11.2 NG/ML (ref 4.78–24.2)
GLOBULIN UR ELPH-MCNC: 3.6 GM/DL
GLUCOSE SERPL-MCNC: 92 MG/DL (ref 65–99)
HCT VFR BLD AUTO: 33.3 % (ref 34–46.6)
HGB BLD-MCNC: 11.2 G/DL (ref 12–15.9)
IMM GRANULOCYTES # BLD AUTO: 0.33 10*3/MM3 (ref 0–0.05)
IMM GRANULOCYTES NFR BLD AUTO: 2 % (ref 0–0.5)
IRON 24H UR-MRATE: 62 MCG/DL (ref 37–145)
IRON SATN MFR SERPL: 27 % (ref 20–50)
LYMPHOCYTES # BLD AUTO: 0.53 10*3/MM3 (ref 0.7–3.1)
LYMPHOCYTES NFR BLD AUTO: 3.2 % (ref 19.6–45.3)
MCH RBC QN AUTO: 31.8 PG (ref 26.6–33)
MCHC RBC AUTO-ENTMCNC: 33.6 G/DL (ref 31.5–35.7)
MCV RBC AUTO: 94.6 FL (ref 79–97)
MONOCYTES # BLD AUTO: 2 10*3/MM3 (ref 0.1–0.9)
MONOCYTES NFR BLD AUTO: 12 % (ref 5–12)
NEUTROPHILS NFR BLD AUTO: 13.76 10*3/MM3 (ref 1.7–7)
NEUTROPHILS NFR BLD AUTO: 82.6 % (ref 42.7–76)
NRBC BLD AUTO-RTO: 0 /100 WBC (ref 0–0.2)
PLATELET # BLD AUTO: 226 10*3/MM3 (ref 140–450)
PMV BLD AUTO: 10.6 FL (ref 6–12)
POTASSIUM SERPL-SCNC: 4.3 MMOL/L (ref 3.5–5.2)
PROT SERPL-MCNC: 7 G/DL (ref 6–8.5)
QT INTERVAL: 291 MS
QTC INTERVAL: 428 MS
RBC # BLD AUTO: 3.52 10*6/MM3 (ref 3.77–5.28)
SODIUM SERPL-SCNC: 139 MMOL/L (ref 136–145)
TIBC SERPL-MCNC: 226 MCG/DL (ref 298–536)
TRANSFERRIN SERPL-MCNC: 152 MG/DL (ref 200–360)
TROPONIN T SERPL HS-MCNC: 9 NG/L
WBC NRBC COR # BLD AUTO: 16.66 10*3/MM3 (ref 3.4–10.8)

## 2024-12-17 PROCEDURE — 83540 ASSAY OF IRON: CPT | Performed by: INTERNAL MEDICINE

## 2024-12-17 PROCEDURE — 85025 COMPLETE CBC W/AUTO DIFF WBC: CPT | Performed by: INTERNAL MEDICINE

## 2024-12-17 PROCEDURE — 99231 SBSQ HOSP IP/OBS SF/LOW 25: CPT | Performed by: PSYCHIATRY & NEUROLOGY

## 2024-12-17 PROCEDURE — 25010000002 LORAZEPAM PER 2 MG: Performed by: HOSPITALIST

## 2024-12-17 PROCEDURE — 82746 ASSAY OF FOLIC ACID SERUM: CPT | Performed by: INTERNAL MEDICINE

## 2024-12-17 PROCEDURE — 80053 COMPREHEN METABOLIC PANEL: CPT | Performed by: INTERNAL MEDICINE

## 2024-12-17 PROCEDURE — 84484 ASSAY OF TROPONIN QUANT: CPT | Performed by: HOSPITALIST

## 2024-12-17 PROCEDURE — 99222 1ST HOSP IP/OBS MODERATE 55: CPT | Performed by: STUDENT IN AN ORGANIZED HEALTH CARE EDUCATION/TRAINING PROGRAM

## 2024-12-17 PROCEDURE — 84466 ASSAY OF TRANSFERRIN: CPT | Performed by: INTERNAL MEDICINE

## 2024-12-17 PROCEDURE — 25010000002 DEXAMETHASONE PER 1 MG: Performed by: INTERNAL MEDICINE

## 2024-12-17 PROCEDURE — 82728 ASSAY OF FERRITIN: CPT | Performed by: INTERNAL MEDICINE

## 2024-12-17 PROCEDURE — 25510000001 IOPAMIDOL PER 1 ML: Performed by: HOSPITALIST

## 2024-12-17 PROCEDURE — 71275 CT ANGIOGRAPHY CHEST: CPT

## 2024-12-17 PROCEDURE — 93010 ELECTROCARDIOGRAM REPORT: CPT | Performed by: INTERNAL MEDICINE

## 2024-12-17 PROCEDURE — 25010000002 HYDROMORPHONE 1 MG/ML SOLUTION: Performed by: INTERNAL MEDICINE

## 2024-12-17 PROCEDURE — 93005 ELECTROCARDIOGRAM TRACING: CPT | Performed by: HOSPITALIST

## 2024-12-17 PROCEDURE — 99233 SBSQ HOSP IP/OBS HIGH 50: CPT | Performed by: INTERNAL MEDICINE

## 2024-12-17 RX ORDER — LABETALOL HYDROCHLORIDE 5 MG/ML
10 INJECTION, SOLUTION INTRAVENOUS EVERY 4 HOURS PRN
Status: DISCONTINUED | OUTPATIENT
Start: 2024-12-17 | End: 2024-12-17

## 2024-12-17 RX ORDER — LABETALOL HYDROCHLORIDE 5 MG/ML
10 INJECTION, SOLUTION INTRAVENOUS ONCE
Status: DISCONTINUED | OUTPATIENT
Start: 2024-12-17 | End: 2024-12-17

## 2024-12-17 RX ORDER — LORAZEPAM 2 MG/ML
1 INJECTION INTRAMUSCULAR EVERY 4 HOURS PRN
Status: DISCONTINUED | OUTPATIENT
Start: 2024-12-17 | End: 2024-12-19

## 2024-12-17 RX ORDER — IOPAMIDOL 755 MG/ML
100 INJECTION, SOLUTION INTRAVASCULAR
Status: COMPLETED | OUTPATIENT
Start: 2024-12-17 | End: 2024-12-17

## 2024-12-17 RX ORDER — DEXAMETHASONE SODIUM PHOSPHATE 4 MG/ML
4 INJECTION, SOLUTION INTRA-ARTICULAR; INTRALESIONAL; INTRAMUSCULAR; INTRAVENOUS; SOFT TISSUE EVERY 12 HOURS
Status: DISCONTINUED | OUTPATIENT
Start: 2024-12-18 | End: 2024-12-19

## 2024-12-17 RX ORDER — LORAZEPAM 0.5 MG/1
0.5 TABLET ORAL 2 TIMES DAILY
Status: DISCONTINUED | OUTPATIENT
Start: 2024-12-17 | End: 2024-12-17

## 2024-12-17 RX ORDER — ALPRAZOLAM 0.5 MG
0.5 TABLET ORAL 3 TIMES DAILY PRN
Status: DISCONTINUED | OUTPATIENT
Start: 2024-12-17 | End: 2024-12-18

## 2024-12-17 RX ADMIN — SENNOSIDES AND DOCUSATE SODIUM 2 TABLET: 50; 8.6 TABLET ORAL at 08:00

## 2024-12-17 RX ADMIN — HYDROMORPHONE HYDROCHLORIDE 1 MG: 1 INJECTION, SOLUTION INTRAMUSCULAR; INTRAVENOUS; SUBCUTANEOUS at 16:11

## 2024-12-17 RX ADMIN — Medication 10 ML: at 08:01

## 2024-12-17 RX ADMIN — Medication 10 ML: at 21:04

## 2024-12-17 RX ADMIN — POLYETHYLENE GLYCOL 3350 17 G: 17 POWDER, FOR SOLUTION ORAL at 08:00

## 2024-12-17 RX ADMIN — BUPROPION HYDROCHLORIDE 300 MG: 300 TABLET, EXTENDED RELEASE ORAL at 08:00

## 2024-12-17 RX ADMIN — DEXAMETHASONE SODIUM PHOSPHATE 4 MG: 4 INJECTION, SOLUTION INTRAMUSCULAR; INTRAVENOUS at 03:02

## 2024-12-17 RX ADMIN — IOPAMIDOL 95 ML: 755 INJECTION, SOLUTION INTRAVENOUS at 11:18

## 2024-12-17 RX ADMIN — HYDROMORPHONE HYDROCHLORIDE 1 MG: 1 INJECTION, SOLUTION INTRAMUSCULAR; INTRAVENOUS; SUBCUTANEOUS at 01:31

## 2024-12-17 RX ADMIN — HYDROMORPHONE HYDROCHLORIDE 1 MG: 1 INJECTION, SOLUTION INTRAMUSCULAR; INTRAVENOUS; SUBCUTANEOUS at 08:00

## 2024-12-17 RX ADMIN — HYDROMORPHONE HYDROCHLORIDE 1 MG: 1 INJECTION, SOLUTION INTRAMUSCULAR; INTRAVENOUS; SUBCUTANEOUS at 04:35

## 2024-12-17 RX ADMIN — FENTANYL 1 PATCH: 50 PATCH TRANSDERMAL at 14:17

## 2024-12-17 RX ADMIN — LORAZEPAM 1 MG: 2 INJECTION INTRAMUSCULAR; INTRAVENOUS at 09:48

## 2024-12-17 RX ADMIN — DEXTROAMPHETAMINE SACCHARATE, AMPHETAMINE ASPARTATE, DEXTROAMPHETAMINE SULFATE AND AMPHETAMINE SULFATE 20 MG: 1.25; 1.25; 1.25; 1.25 TABLET ORAL at 08:00

## 2024-12-17 RX ADMIN — Medication 10 ML: at 21:03

## 2024-12-17 NOTE — CONSULTS
Date of Hospital Visit: 24  Encounter Provider: Corey Gaviria MD  Place of Service: [facility] CARDIOLOGY  Patient Name: Ely Sims  :1960  Referral Provider: Estefania Jacobs MD    Chief complaint  Chest pain and elevated blood pressures    History of Present Illness  64-year-old woman with metastatic colon cancer to the brain and spine requiring craniotomy, diverting loop sigmoid colostomy and port placement and chemotherapy and radiation who was admitted on 2024 for further management of severe pain and worsening nausea and vomiting.  Her pain is believed to be secondary to diffuse metastatic disease and she has been started on steroids in addition to narcotics for pain control.  Patient was noted to be altered over the last 2 days and also complained of chest pain so we have been consulted for further management.  Her blood pressure has also been intermittently elevated.    Today, she remembers having chest pain but notes it is no longer there. She is unable to elaborate on her symptoms. She denies palpitations.      Past Medical History:   Diagnosis Date    Metastasis to brain     Rectal carcinoma     Seasonal allergies        Past Surgical History:   Procedure Laterality Date    COLONOSCOPY N/A 2024    Procedure: COLONOSCOPY to 20cm with cold biopsies and needle tattoo;  Surgeon: Shadi Kaminski MD;  Location: [facility] ENDOSCOPY;  Service: Gastroenterology;  Laterality: N/A;  pre: abnormal imaging  post: poor prep, obstructing colon mass at 20cm, hemorrhoids    COLOSTOMY N/A 2024    Procedure: COLOSTOMY LAPAROSCOPIC;  Surgeon: Saturnino Chester MD;  Location: [facility] OR;  Service: General;  Laterality: N/A;    CRANIOTOMY N/A 2024    Procedure: Posterior fossa craniotomy for tumor using stereotactic guidance;  Surgeon: Bull Doty MD;  Location: [facility] OR;  Service: Neurosurgery;  Laterality: N/A;    ENDOMETRIAL ABLATION       TUBAL ABDOMINAL LIGATION      VENOUS ACCESS DEVICE (PORT) INSERTION Right 08/27/2024    Procedure: INSERTION VENOUS ACCESS DEVICE;  Surgeon: Saturnino Chester MD;  Location: [facility];  Service: General;  Laterality: Right;       Medications Prior to Admission   Medication Sig Dispense Refill Last Dose/Taking    amphetamine-dextroamphetamine (ADDERALL) 20 MG tablet Take 1 tablet by mouth 2 (Two) Times a Day. Only taking when she is working  Indications: Attention Deficit Hyperactivity Disorder   Past Month    encorafenib (BRAFTOVI) 75 MG capsule Take 4 capsules by mouth Daily. 120 capsule 2 12/13/2024    HYDROcodone-acetaminophen (NORCO) 5-325 MG per tablet Take 1 tablet by mouth Every 6 (Six) Hours As Needed for Moderate Pain.   12/13/2024    lidocaine-prilocaine (EMLA) 2.5-2.5 % cream Apply 1 Application topically to the appropriate area as directed As Needed for Mild Pain. Apply robbin size amount to port site prior to chemotherapy 30 g 2 Taking As Needed    LORazepam (ATIVAN) 0.5 MG tablet Take 1 tablet by mouth Every 6 (Six) Hours As Needed for Anxiety. Indications: Feeling Anxious   Past Week    Morphine (MS CONTIN) 15 MG 12 hr tablet Take 1 tablet by mouth Every 8 (Eight) Hours for 30 days. 90 tablet 0 Taking    prochlorperazine (COMPAZINE) 10 MG tablet Take 1 tablet by mouth Every 8 (Eight) Hours As Needed for Nausea or Vomiting. 90 tablet 2 12/13/2024    atorvastatin (LIPITOR) 10 MG tablet Take 1 tablet by mouth Daily.   More than a month    buPROPion XL (WELLBUTRIN XL) 150 MG 24 hr tablet Take 2 tablets by mouth Every Morning. Indications: Attention Deficit Hyperactivity Disorder (Patient taking differently: Take 2 tablets by mouth Every Morning. Pt reports not taking on a daily basis, have not taken recently  Indications: Attention Deficit Hyperactivity Disorder)   More than a month    HYDROcodone-acetaminophen (NORCO)  MG per tablet Take 1 tablet by mouth Every 6 (Six) Hours As Needed  for Moderate Pain. 120 tablet 0 Unknown    lidocaine-prilocaine (EMLA) 2.5-2.5 % cream Apply nickel size amount to port site 30 min before appointment time. Do not rub in. Cover with plastic wrap. 30 g 1     polyethylene glycol (MIRALAX) 17 g packet Take 17 g by mouth Daily. 90 each 2 More than a month    potassium chloride 10 MEQ CR tablet Take 2 tablets by mouth Daily. 30 tablet 0 More than a month    sennosides-docusate (senna-docusate sodium) 8.6-50 MG per tablet Take 2 tablets by mouth Daily. 90 tablet 2 More than a month       Current Meds  Scheduled Meds:amphetamine-dextroamphetamine, 20 mg, Oral, BID  buPROPion XL, 300 mg, Oral, QAM  fentaNYL, 1 patch, Transdermal, Q72H   And  Check Fentanyl Patch Placement, 1 each, Not Applicable, Q12H  dexAMETHasone, 4 mg, Intravenous, Q8H  encorafenib, 300 mg, Oral, Daily  polyethylene glycol, 17 g, Oral, Daily  Scopolamine, 1 patch, Transdermal, Q72H  sennosides-docusate, 2 tablet, Oral, Daily  sodium chloride, 10 mL, Intravenous, Q12H  sodium chloride, 10 mL, Intravenous, Q12H      Continuous Infusions:   PRN Meds:.  Calcium Replacement - Follow Nurse / BPA Driven Protocol    heparin    HYDROmorphone **AND** naloxone    lidocaine    Magnesium Standard Dose Replacement - Follow Nurse / BPA Driven Protocol    ondansetron    Phosphorus Replacement - Follow Nurse / BPA Driven Protocol    Potassium Replacement - Follow Nurse / BPA Driven Protocol    sodium chloride    sodium chloride    sodium chloride    sodium chloride    sodium chloride    ziprasidone    Allergies as of 12/13/2024    (No Known Allergies)       Social History     Socioeconomic History    Marital status: Single   Tobacco Use    Smoking status: Former     Types: Cigarettes    Smokeless tobacco: Never   Vaping Use    Vaping status: Never Used   Substance and Sexual Activity    Alcohol use: Yes    Drug use: Defer    Sexual activity: Defer       Family History   Problem Relation Age of Onset    Heart disease  Mother     Colon cancer Sister        REVIEW OF SYSTEMS:   12 point ROS was performed and is negative except as outlined in HPI          Objective:   Temp:  [97.3 °F (36.3 °C)-97.9 °F (36.6 °C)] 97.5 °F (36.4 °C)  Heart Rate:  [] 124  Resp:  [18] 18  BP: (157-183)/() 162/114  Body mass index is 21.24 kg/m².  Flowsheet Rows      Flowsheet Row First Filed Value   Admission Height --   Admission Weight 61.5 kg (135 lb 9.3 oz) Documented at 12/13/2024 1714          Vitals:    12/17/24 0809   BP: (!) 162/114   Pulse: (!) 124   Resp:    Temp:    SpO2:        GEN: alert, conversational dyspnea  HEENT: NACT, EOMI, moist mucous membranes  Lungs: CTAB, no wheezes, rales or rhonchi  CV: Tachycardic rate, regular rhythm, normal S1, S2, no murmurs, +2 radial pulses b/l, no carotid bruit  Abdomen: soft, nontender, nondistended, NABS  Extremities: 1-2+ edema  Skin: no rash, warm, dry  Heme/Lymph: no bruising  Psych: organized thought, normal behavior and affect      Lab Review:   Results from last 7 days   Lab Units 12/17/24  0147   SODIUM mmol/L 139   POTASSIUM mmol/L 4.3   CHLORIDE mmol/L 100   CO2 mmol/L 23.0   BUN mg/dL 13   CREATININE mg/dL 0.58   CALCIUM mg/dL 8.9   BILIRUBIN mg/dL 1.1   ALK PHOS U/L 811*   ALT (SGPT) U/L 38*   AST (SGOT) U/L 52*   GLUCOSE mg/dL 92         Results from last 7 days   Lab Units 12/17/24  0147 12/16/24  0546 12/15/24  0528   WBC 10*3/mm3 16.66* 16.37* 14.92*   HEMOGLOBIN g/dL 11.2* 10.0* 9.8*   HEMATOCRIT % 33.3* 30.6* 30.6*   PLATELETS 10*3/mm3 226 210 190         Results from last 7 days   Lab Units 12/15/24  0528   MAGNESIUM mg/dL 2.1               I personally viewed and interpreted the patient's EKG/Telemetry data)      Abdominal pain    Colon cancer metastasized to brain    Anemia    Rectal adenocarcinoma    Dehydration    Hypokalemia    Abnormal LFTs    Severe protein-calorie malnutrition    Anxiety    Assessment and Plan:  #Chest pain  #Tachycardia  #AMS  #metastatic colon  CA    Her symptoms and workup are not consistent with ACS.  Given her history, PE is possible.  There is also possible that her symptoms were secondary to arrhythmia given her tachycardia.    - Will obtain EKG  - CTA to rule out PE      Corey Fenton MD, [facility], [facility]  12/17/24  08:28 EST.

## 2024-12-17 NOTE — PROGRESS NOTES
[facility]     LOS: 4 days     Subjective:    CC:No chief complaint on file.    DIET:  Diet Order   Procedures    Diet: Regular/House; Fluid Consistency: Thin (IDDSI 0)       Objective:    Vital Signs:  Temp:  [97.3 °F (36.3 °C)-98.1 °F (36.7 °C)] 97.5 °F (36.4 °C)  Heart Rate:  [102-143] 102  Resp:  [16-20] 16  BP: (139-184)/() 149/95    SpO2:  [95 %-100 %] 99 %  on   ;   Device (Oxygen Therapy): room air  Body mass index is 21.24 kg/m².    Physical Exam  General Patient is awake alert, seems slightly confused but follows commands.  Had previously been ambulating in the [place].  We asked her to try and rest morning and she has  Heart is tachycardic, rhythm is normal, no murmurs rubs gallops  Lungs are clear to auscultation bilaterally no wheezes rhonchi  Abdomen slight tenderness    Results Review:    Glucose   Date Value Ref Range Status   12/17/2024 92 65 - 99 mg/dL Final   12/16/2024 159 (H) 65 - 99 mg/dL Final   12/15/2024 125 (H) 65 - 99 mg/dL Final     Results from last 7 days   Lab Units 12/17/24  0147   WBC 10*3/mm3 16.66*   HEMOGLOBIN g/dL 11.2*   HEMATOCRIT % 33.3*   PLATELETS 10*3/mm3 226     Results from last 7 days   Lab Units 12/17/24  0147   SODIUM mmol/L 139   POTASSIUM mmol/L 4.3   CHLORIDE mmol/L 100   CO2 mmol/L 23.0   BUN mg/dL 13   CREATININE mg/dL 0.58   CALCIUM mg/dL 8.9   BILIRUBIN mg/dL 1.1   ALK PHOS U/L 811*   ALT (SGPT) U/L 38*   AST (SGOT) U/L 52*   GLUCOSE mg/dL 92         Results from last 7 days   Lab Units 12/15/24  0528   MAGNESIUM mg/dL 2.1     Results from last 7 days   Lab Units 12/17/24  0147   HSTROP T ng/L 9     Cultures:  Blood Culture   Date Value Ref Range Status   12/14/2024 No growth at 3 days  Preliminary   12/14/2024 No growth at 3 days  Preliminary       I have reviewed daily medications and changes in CPOE    Scheduled meds  buPROPion XL, 300 mg, Oral, QAM  fentaNYL, 1 patch, Transdermal, Q72H   And  Check Fentanyl Patch Placement, 1  each, Not Applicable, Q12H  [START ON 12/18/2024] dexAMETHasone, 4 mg, Intravenous, Q12H  encorafenib, 300 mg, Oral, Daily  LORazepam, 0.5 mg, Oral, BID  polyethylene glycol, 17 g, Oral, Daily  sennosides-docusate, 2 tablet, Oral, Daily  sodium chloride, 10 mL, Intravenous, Q12H  sodium chloride, 10 mL, Intravenous, Q12H           PRN meds    Calcium Replacement - Follow Nurse / BPA Driven Protocol    heparin    HYDROmorphone **AND** naloxone    lidocaine    LORazepam    Magnesium Standard Dose Replacement - Follow Nurse / BPA Driven Protocol    ondansetron    Phosphorus Replacement - Follow Nurse / BPA Driven Protocol    Potassium Replacement - Follow Nurse / BPA Driven Protocol    sodium chloride    sodium chloride    sodium chloride    sodium chloride    sodium chloride    ziprasidone        Abdominal pain    Colon cancer metastasized to brain    Anemia    Rectal adenocarcinoma    Dehydration    Hypokalemia    Abnormal LFTs    Severe protein-calorie malnutrition    Anxiety        Assessment/Plan:      64 y.o. female admitted with Abdominal pain.     Assessment plan  1.  Worsening abdominal pain, in the setting of underlying colorectal malignancy, diverting colostomy and unable to keep anything down, with nausea and vomiting, continue pain control.  Symptomatic management.  -IV pain medication, a lot of anxiety so restarted Ativan with the understanding could make her confusion worse.  -Is very tachycardic this morning thought to be secondary to pain and anxiety.  It has improved with more aggressive control of this.     2.  Progressive colorectal malignancy with metastasis, management based on oncology recommendations.     3.  Leukocytosis, no acute signs of infection at this point of time.  Continue to monitor closely.     4.  Hyperkalemia, stop scheduled potassium.     5.  Worsening mental status, seen by neurology  6.  Tachycardia-suspicion for DVT low as her tachycardia is improved with better pain medication    -CT angio unrevealing for pulmonary embolism   -Heart rate likely secondary to pain and anxiety   -Patient seen by cardiology     7.  CODE STATUS is full code.  Further plans based on hospital course.      Cesar Ko MD  12/17/24  16:33 EST

## 2024-12-17 NOTE — PROGRESS NOTES
Neurology Progress Note    Reason for visit  Follow up for altered mental status    Interval History  Family reports patient restless overnight, never needed Geodon. Family present the entire night.  This morning, prn IV lorazepam was ordered and she received a dose at 9:45 am. Nursing reports this was given for the CT angio study.    Medications:  Scheduled Meds:buPROPion XL, 300 mg, Oral, QAM  fentaNYL, 1 patch, Transdermal, Q72H   And  Check Fentanyl Patch Placement, 1 each, Not Applicable, Q12H  [START ON 12/18/2024] dexAMETHasone, 4 mg, Intravenous, Q12H  encorafenib, 300 mg, Oral, Daily  LORazepam, 0.5 mg, Oral, BID  polyethylene glycol, 17 g, Oral, Daily  sennosides-docusate, 2 tablet, Oral, Daily  sodium chloride, 10 mL, Intravenous, Q12H  sodium chloride, 10 mL, Intravenous, Q12H      Continuous Infusions:   PRN Meds:.  Calcium Replacement - Follow Nurse / BPA Driven Protocol    heparin    HYDROmorphone **AND** naloxone    lidocaine    LORazepam    Magnesium Standard Dose Replacement - Follow Nurse / BPA Driven Protocol    ondansetron    Phosphorus Replacement - Follow Nurse / BPA Driven Protocol    Potassium Replacement - Follow Nurse / BPA Driven Protocol    sodium chloride    sodium chloride    sodium chloride    sodium chloride    sodium chloride    ziprasidone    Review of Systems:   Review of Systems   All other systems reviewed and are negative.    Vital Signs  Temp:  [97.3 °F (36.3 °C)-98.1 °F (36.7 °C)] 98.1 °F (36.7 °C)  Heart Rate:  [] 110  Resp:  [18-20] 18  BP: (139-184)/() 159/100    Physical Exam:  Constitutional: appears comfortable  HEENT:  normal  CVS:  Regular rate and rhythm.    Musculoskeletal:  No signs of peripheral edema  Neurologic:        Alert, pleasant and cooperative        Not oriented to situation, place or time        Eating lunch  Psychiatric: mild restlessness     Results Review:    B12  1257  Vit D  35.1  TSH  6.107  Free T4 1.51    Medical Decision Making and  Recommendations  Confusion and akathisia  Likely secondary to medication changes    Slight improvement today     Hesitate to stop fentanyl, which is newest addition, since has decreased pain from 10 to 7 and she is more comfortable     Recommend to continue to avoid lorazepam in this patient.     Try Geodon if needed for significant agitation.     If improvement is not seen with medication changes, may need re-imaging of brain.     I discussed these findings and my recommendations with patient and family.           Tricia Smith MD  12/17/24  14:47 EST

## 2024-12-17 NOTE — NURSING NOTE
"   12/17/24 0823   Colostomy LLQ   No Placement date: If unknown, DO NOT use \"Add Comment\" note or Placement time: If unknown, DO NOT use \"Add Comment\" note found.   Location: LLQ   Stomal Appliance 1 piece;Leaking;Changed;2 piece  (she was wearing a 1-piece, but changed to a 2-piece today)   Stoma Appearance irregular;rosebud appearance;moist;red;protruding above skin level   Peristomal Assessment Pink  (uneven peristomal plane, appliances are leaking along the inferior aspect, today she was placed in a soft convex to fill the dip in the inferior aspect)   Accessories/Skin Care cleansed with water;skin barrier ring;convex wafer  ([place] 2 3/4\" soft convex wafer and pouch with barrier ring.)   Stoma Function flatus;stool   Stool Color brown   Stool Consistency creamy;soft   Treatment Bag change;Site care     CWON note: pt seen for follow up for existing colostomy. The appliance leakage again overnight. When I arrived today, the appliance was loose and the seal was inadequate. She was placed in the [place] 2 3/4\" soft convex wafer and pouch with barrier ring, this should better fill the defect in the inferior aspect of the peristomal plane and create a better seal. We may need to add a belt, but I am unsure if pt will tolerate this, considering her level of agitation. She has not slept for well over 24 hours and she is in constant motion, walking, moving about the room, going to the bathroom. Daughter is with the patient.   "

## 2024-12-17 NOTE — PLAN OF CARE
Goal Outcome Evaluation:         Patient alert and oriented to self, family at bedside. Pt has been restless this shift, with constant walking, unable to sit, redirection from family has been unsuccessful. Pt request pain medication which has been dosed 3x thus far however pt remains restless as family suspects pt is in pain. Colostomy bag has been changed once this shift, stoma cleaned an another bag applied. Stoma protrudes causing bag to loose seal often. Wound is clean, dry, and intact at this time. Left chest port noted w/needle change due 12/20/24. Fentanyl patch in place and due for change 12/17/24. Pt  family does not want her to have Geodon for her agitation/restlessness at this time. Pt on room air, no n/v this shift. Plan of care ongoing.

## 2024-12-17 NOTE — PLAN OF CARE
Goal Outcome Evaluation:  Plan of Care Reviewed With: patient        Progress: improving      Pt alert to self, pleasantly confused. Very anxious and restless this morning, ambulating non stop in room and hallway. Pt took pills whole with water, see MAR. PRN Dilaudid given x2 for ABD pain. Fentanyl patch changed this afternoon. PRN 1 mg ativan given this morning, pt able to rest and sleep for 2 hours. More cooperative and less restless this afternoon. Pt tolerating diet. Sitter at bedside for safety and wondering precautions. Pt family visiting throughout the day. Plan of care ongoing.       BP elevated this morning. Dr. Ko aware, see orders. After RN administered IV ativan, and pt's rested, BP went down. Pt's daughter at bedside this morning looked very tired, concerned regarding pt increased confusion. CT angio completed.       Problem: Adult Inpatient Plan of Care  Goal: Absence of Hospital-Acquired Illness or Injury  Intervention: Identify and Manage Fall Risk  Recent Flowsheet Documentation  Taken 12/17/2024 1800 by Padmini Vega RN  Safety Promotion/Fall Prevention:   assistive device/personal items within reach   clutter free environment maintained   fall prevention program maintained   nonskid shoes/slippers when out of bed   room organization consistent   safety round/check completed  Taken 12/17/2024 1610 by Padmini Vega, RN  Safety Promotion/Fall Prevention:   assistive device/personal items within reach   clutter free environment maintained   fall prevention program maintained   nonskid shoes/slippers when out of bed   room organization consistent   safety round/check completed  Taken 12/17/2024 1410 by Padmini Vega, RN  Safety Promotion/Fall Prevention:   assistive device/personal items within reach   clutter free environment maintained   fall prevention program maintained   nonskid shoes/slippers when out of bed   room organization consistent   safety round/check completed  Taken  12/17/2024 1215 by Padmini Vega RN  Safety Promotion/Fall Prevention:   assistive device/personal items within reach   clutter free environment maintained   fall prevention program maintained   nonskid shoes/slippers when out of bed   room organization consistent   safety round/check completed  Taken 12/17/2024 1105 by Padmini Vega RN  Safety Promotion/Fall Prevention: (CT) patient off unit  Taken 12/17/2024 1000 by Padmini Vega RN  Safety Promotion/Fall Prevention:   assistive device/personal items within reach   clutter free environment maintained   fall prevention program maintained   nonskid shoes/slippers when out of bed   room organization consistent   safety round/check completed  Taken 12/17/2024 0800 by Padmini Vega RN  Safety Promotion/Fall Prevention:   assistive device/personal items within reach   clutter free environment maintained   fall prevention program maintained   nonskid shoes/slippers when out of bed   room organization consistent   safety round/check completed     Problem: Adult Inpatient Plan of Care  Goal: Absence of Hospital-Acquired Illness or Injury  Intervention: Prevent Skin Injury  Recent Flowsheet Documentation  Taken 12/17/2024 1800 by Padmini Vega RN  Body Position: position changed independently  Taken 12/17/2024 1610 by Padmini Vega RN  Body Position: position changed independently  Taken 12/17/2024 1410 by Padmini Vega RN  Body Position: (pt's resting comfortably in bed, eyes closed, non labored breathing)   side-lying   left  Taken 12/17/2024 1215 by Padmini Vega RN  Body Position: sitting up in bed  Taken 12/17/2024 1000 by Padmini Vega RN  Body Position: (pt's ambulating in room) position changed independently  Taken 12/17/2024 0800 by Padmini Vega RN  Body Position: position changed independently  Skin Protection: transparent dressing maintained     Problem: Adult Inpatient Plan of Care  Goal: Optimal Comfort and  Wellbeing  Intervention: Monitor Pain and Promote Comfort  Recent Flowsheet Documentation  Taken 12/17/2024 1610 by Padmini Vega, RN  Pain Management Interventions:   pain management plan reviewed with patient/caregiver   pain medication given  Taken 12/17/2024 0800 by Padmini Vega, RN  Pain Management Interventions:   pain management plan reviewed with patient/caregiver   pain medication given

## 2024-12-17 NOTE — NURSING NOTE
Pt A/Ox2-4, agitated at times,restless majority of shift, intermittent confusion. Frequent rounding complete, family remains at bedside.PRN Dilaudid given. Cardiology consulted due to c/o chest pain and elevated blood pressures at times.12 lead ECG complete: normal results. PRN Ativan dc'd per neurology. Colostomy bag changed by CWON. Fentanyl patch change due tomorrow, it is currently on her right upper arm. Colostomy bag changed by CWON. Plan of care ongoing, VSS.

## 2024-12-17 NOTE — PROGRESS NOTES
Subjective     HISTORY OF PRESENT ILLNESS:   Patient is metastatic colon cancer.    12/16/2024  and the daughter reports patient become more confused last night after IV Ativan for anxiety.  This morning patient is still has some confusion in the time of visit.  Patient is afebrile.  Vital stable.    12/17/2024  Patient was agitated overnight, and she received 1 mg Ativan again this morning, and then was confused afterwards due to medication.         Past Medical History, Past Surgical History, Social History, Family History have been reviewed and are without significant changes except as mentioned.    Review of Systems   A comprehensive 14 point review of systems was performed and was negative except as mentioned.    Medications:  The current medication list was reviewed in the EMR    ALLERGIES:  No Known Allergies    Objective      Vitals:    12/17/24 1025 12/17/24 1233 12/17/24 1234 12/17/24 1451   BP: 139/90 161/100 159/100 149/95   BP Location:  Right arm Left arm Right arm   Patient Position:  Lying Lying Lying   Pulse: 103 110  102   Resp:  18  16   Temp:  98.1 °F (36.7 °C)  97.5 °F (36.4 °C)   TempSrc:  Oral  Oral   SpO2:  98%  99%   Weight:             12/13/2024     1:30 PM   Current Status   ECOG score 2       Physical Exam    GENERAL:  Well-developed, well-nourished female in no acute distress.    HEENT:  Normocephalic.   CHEST: Normal respiratory effort.  Lungs clear to auscultation. Good airflow.  CARDIAC: Tachycardic.    ABDOMEN:  Soft, no tender.  Colostomy in place.  Bowel sounds normal.  EXTREMITIES: Trace edema bilateral lower extremity.   PSYCHIATRIC: Mentally improved this afternoon.       RECENT LABS:  CBC:      Lab 12/17/24  0147 12/16/24  0546 12/15/24  0528 12/14/24  0453 12/13/24  1334   WBC 16.66* 16.37* 14.92* 20.33* 18.44*   HEMOGLOBIN 11.2* 10.0* 9.8* 12.0 11.7*   HEMATOCRIT 33.3* 30.6* 30.6* 37.3 35.7   PLATELETS 226 210 190 241 289   NEUTROS ABS 13.76* 14.75* 13.56* 16.76*  14.58*   IMMATURE GRANS (ABS) 0.33* 0.20* 0.18* 0.25* 0.35*   LYMPHS ABS 0.53* 0.42* 0.41* 0.58* 0.83   MONOS ABS 2.00* 0.98* 0.75 2.65* 2.59*   EOS ABS 0.00 0.00 0.00 0.04 0.02   MCV 94.6 97.1* 97.5* 97.1* 95.2     CMP:        Lab 12/17/24  0147 12/16/24  0546 12/15/24  0528 12/14/24  0453 12/13/24  1334   SODIUM 139 133* 135* 134* 136   POTASSIUM 4.3 4.7 5.4* 4.7 2.9*   CHLORIDE 100 100 103 100 93*   CO2 23.0 22.9 23.0 17.0* 27.6   ANION GAP 16.0* 10.1 9.0 17.0* 15.4*   BUN 13 15 10 12 13   CREATININE 0.58 0.69 0.54* 0.69 0.69   EGFR 101.2 97.1 103.0 97.1 97.1   GLUCOSE 92 159* 125* 94 126*   CALCIUM 8.9 8.4* 7.8* 8.1* 8.4*   MAGNESIUM  --   --  2.1 1.4*  --    PHOSPHORUS  --   --   --  2.8  --    TOTAL PROTEIN 7.0 6.3 5.9* 6.5 6.9   ALBUMIN 3.4* 3.0* 2.7* 2.8* 3.2*   GLOBULIN 3.6 3.3 3.2 3.7 3.7   ALT (SGPT) 38* 33 39* 41* 44*   AST (SGOT) 52* 52* 96* 128* 137*   BILIRUBIN 1.1 1.0 1.2 1.8* 2.4*   ALK PHOS 811* 688* 670* 691* 720*     Lab Results   Component Value Date    IRON 62 12/17/2024    LABIRON 27 12/17/2024    TRANSFERRIN 152 (L) 12/17/2024    TIBC 226 (L) 12/17/2024    FERRITIN 350.00 (H) 12/17/2024     Lab Results   Component Value Date    FOLATE 11.20 12/17/2024     Lab Results   Component Value Date    [id number] 1,257 (H) 12/16/2024       CT Angiogram Chest  Narrative: CT ANGIOGRAM CHEST-     INDICATION: Rule out pulmonary embolism.     COMPARISON: CT chest November 26, 2024     TECHNIQUE:  CTA chest with IV contrast. Coronal and sagittal reformats. Three  dimensional reconstructions. Radiation dose reduction techniques were  utilized, including automated exposure control and exposure modulation  based on body size.     FINDINGS:      Pulmonary arteries: Streak artifact from contrast in the venous system.  No central pulmonary embolism. Few small subsegmental filling defects  seen eccentric along the vessels that are most likely secondary to tumor  emboli. For example, small filling defect seen in the  medial basilar  segment of the right lower lobe along a subsegmental pulmonary artery,  series 4, axial mage 81, favored tumor embolus. For example, small  filling defect in a subsegmental pulmonary artery in the apical left  upper lobe, series 4, axial mage 37, favored tumor embolus.     Chest wall: Bilateral breast implants. No lymphadenopathy. Left-sided  Port-A-Cath with the catheter in the superior vena cava.     Mediastinum: Coronary artery atherosclerotic calcification. Heart is  normal in size. No pericardial effusion. Mediastinal and hilar  lymphadenopathy. For example, right paratracheal lymph node, series 4,  axial mage 48, measures 1.5 cm, previously 1.0 cm. For example, AP  window lymph node, series 4, axial mage 47, measures 1.0 cm, previously  0.7 cm. For example, left anterior hilar lymph node, series 4, axial  image 60, measures 1.4 cm, previously 0.8 cm. Additional mediastinal and  bilateral hilar lymph nodes that are enlarged and increased in size in  the interval.     Lung/pleura: No effusions. Patent central airways. Innumerable bilateral  pulmonary nodules, in a random distribution, consistent with pulmonary  metastases. For example, solid pulmonary nodule in the apical right  upper lobe, series 5, axial mage 28, measures 7 mm, previously 5 mm. For  example, solid pulmonary nodule in the right middle lobe, series 5,  axial mage 74, measures 7 mm, previously 5 mm. For example, solid  pulmonary nodule in the lingula, series 5, axial mage 83, measures 6 mm,  previously 3 mm. Innumerable additional solid pulmonary nodules that  have increased in size in the interval. Small groundglass opacity in the  anterior left upper lobe, series 5, axial mage 37. Additional  groundglass opacities in the anterior segment left upper lobe, series 5,  axial mage 53.     Upper abdomen: Innumerable hypoattenuating liver masses, suboptimally  visualized on this phase of imaging. Upper abdominal lymphadenopathy.  For  example, periportal/gastrohepatic ligament lymphadenopathy, series  4, axial mage 151, measures 1.4 cm, previously 1.0 cm.     Osseous structures: Spondylosis/degenerative disc disease seen at T8/T9.     Impression:    1. Progression in metastatic disease with significant increase in size  of pulmonary metastasis and significant increase in size of mediastinal  and hilar charlie metastatic disease and upper abdominal charlie metastatic  disease. Diffuse liver metastases also present, suboptimally evaluated  on this phase of imaging.  2. No central pulmonary embolism. Scattered tiny filling defects seen in  distal subsegmental pulmonary arteries, which are favored to be  secondary to tumor emboli.  3. Small groundglass opacities seen in the anterior segment left upper  lobe, may be infectious or inflammatory.     This report was finalized on 12/17/2024 12:23 PM by Dr. Will Wong M.D on Workstation: [id number]           Assessment & Plan   Patient is a pleasant 64-year-old female with:     # Abdominal pain, nausea and vomiting:  Likely secondary to underlying diffuse mesenteric, liver and lymph node metastasis, made worse by recent administration of Vectibix on 12/10/2024.  Will start IV dexamethasone 4 mg every 8 hours to help with symptoms  Patient is unable to take p.o. morphine for pain relief.  Will discontinue morphine and switched to fentanyl patch 50 mcg every 72 hours.    12/15/2024: Pain better controlled with fentanyl patch 50 mcg every 72 hours and IV Dilaudid 1 mg every 2 hours as needed.  Continue current regimen.  Will transition to p.o. [place] at discharge.   Continue IV dexamethasone 4 mg every 8 hours to help with N/V and abdominal distention.  Will switch to twice daily dose tomorrow if symptoms remain well-controlled.   Continue IV fluids, IV Zofran and Compazine and electrolyte replacement  12/16/2024 slightly improved symptoms.  Abdomen came is controlled better with fentanyl patch 50  mcg/hr.     # Metastatic colorectal cancer, BRAF V600E mutated:  Has diffuse mets to the mesentery, brain, lungs and thoracic spinal cord s/p diverting loop sigmoid colostomy, radiation to the brain and spinal cord lesions   She recently progressed on frontline therapy with FOLFOX and received first dose of second line Vectibix infusion/oral bravtovi on 12/10/2024   Advised to continue p.o. Braftovi 300 mg daily while inpatient  12/15/2024: Reviewed the CT A/P findings performed yesterday which shows significant and rapid disease progression in last 2-3 weeks.  Reviewed poor prognosis and try to discuss goals of care.  Patient appears not ready for this discussion yet.  Patient has a follow-up appointment with Dr. Saunders on 12/23/2024 at 1 PM for next cycle of Vectibix.   12/17/2024 patient reports some chest pain.  CT of the chest showed disease progression.  There was no true meaningful pulmonary emboli, instead in the distal small pulmonary arteries look like tumor thrombus according to radiologist.  No need for anticoagulation.  Her chest pain is due to likely progressive metastatic cancer.    # Anxiety:   12/15: Will administer IV Ativan 1 mg dose today.  Continue p.o. Ativan as needed  12/16/2024 family members  and daughter reports patient become more sedated with confusion after Ativan IV.  Also received 1 dose of oral this morning.   Ativan has already been discontinued at the time of seeing patient.  12/17/2024 patient received another dose of IV Ativan 1 mg and if she had some confusion and drowsiness afterwards.  I discussed with Dr. Ko, may consider try Xanax every 8 hours.   is agreeable.    *.  Anemia.   Most likely related to metastatic colon cancer and chemotherapy.    Supratherapeutic B12 level 1257 and Hgb 10.0 on 12/16/2024.  Will check iron status ferritin and folate.  Lab study on 12/17/2024 reported normal folate, slightly supratherapeutic B12 level, in the normal iron  saturation 27% with ferritin 350.  No evidence for deficiency.  Her anemia is due to metastatic colon cancer and the chemotherapy.     # Hypokalemia: Replacement per primary    # Abnormal LFTs: Liver metastasis related.  Improving.     Recommendations:  -Discontinue Ativan, not tolerating become more confused.   -Will try low-dose Xanax 0.5 mg oral every 8 hours for anxiety.  -Continue IV antiemetics and electrolyte replacement per primary  -Hold IV dexamethasone 4 mg every 8 hours.  May consider lower dose to twice daily for her metastatic brain lesion.  -Continue fentanyl patch 50 mcg every 72 hours.    -Continue IV Dilaudid 1 mg every 2 hours as needed  -Continue p.o. Braftovi as inpatient for her metastatic colon cancer  -Monitor CBC tomorrow morning.   - Will follow while inpatient    Discussed with A Dr. Ko.     Discussed with the patient mostly her  and the daughter at bedside.    I also spoke with nursing staff.      DANIELA MARTINEZ M.D., Ph.D.

## 2024-12-18 ENCOUNTER — TELEPHONE (OUTPATIENT)
Dept: ONCOLOGY | Facility: CLINIC | Age: 64
End: 2024-12-18
Payer: COMMERCIAL

## 2024-12-18 LAB
ALBUMIN SERPL-MCNC: 3 G/DL (ref 3.5–5.2)
ALBUMIN/GLOB SERPL: 1 G/DL
ALP SERPL-CCNC: 640 U/L (ref 39–117)
ALT SERPL W P-5'-P-CCNC: 29 U/L (ref 1–33)
ANION GAP SERPL CALCULATED.3IONS-SCNC: 12 MMOL/L (ref 5–15)
AST SERPL-CCNC: 37 U/L (ref 1–32)
BASOPHILS # BLD AUTO: 0.04 10*3/MM3 (ref 0–0.2)
BASOPHILS NFR BLD AUTO: 0.2 % (ref 0–1.5)
BILIRUB SERPL-MCNC: 1.1 MG/DL (ref 0–1.2)
BUN SERPL-MCNC: 9 MG/DL (ref 8–23)
BUN/CREAT SERPL: 16.7 (ref 7–25)
CALCIUM SPEC-SCNC: 8 MG/DL (ref 8.6–10.5)
CHLORIDE SERPL-SCNC: 106 MMOL/L (ref 98–107)
CO2 SERPL-SCNC: 21 MMOL/L (ref 22–29)
CREAT SERPL-MCNC: 0.54 MG/DL (ref 0.57–1)
DEPRECATED RDW RBC AUTO: 53.6 FL (ref 37–54)
EGFRCR SERPLBLD CKD-EPI 2021: 103 ML/MIN/1.73
EOSINOPHIL # BLD AUTO: 0.01 10*3/MM3 (ref 0–0.4)
EOSINOPHIL NFR BLD AUTO: 0.1 % (ref 0.3–6.2)
ERYTHROCYTE [DISTWIDTH] IN BLOOD BY AUTOMATED COUNT: 15.6 % (ref 12.3–15.4)
GLOBULIN UR ELPH-MCNC: 3.1 GM/DL
GLUCOSE SERPL-MCNC: 89 MG/DL (ref 65–99)
HCT VFR BLD AUTO: 32.1 % (ref 34–46.6)
HGB BLD-MCNC: 10.7 G/DL (ref 12–15.9)
IMM GRANULOCYTES # BLD AUTO: 0.33 10*3/MM3 (ref 0–0.05)
IMM GRANULOCYTES NFR BLD AUTO: 2 % (ref 0–0.5)
LYMPHOCYTES # BLD AUTO: 0.6 10*3/MM3 (ref 0.7–3.1)
LYMPHOCYTES NFR BLD AUTO: 3.6 % (ref 19.6–45.3)
MCH RBC QN AUTO: 32.2 PG (ref 26.6–33)
MCHC RBC AUTO-ENTMCNC: 33.3 G/DL (ref 31.5–35.7)
MCV RBC AUTO: 96.7 FL (ref 79–97)
MONOCYTES # BLD AUTO: 1.64 10*3/MM3 (ref 0.1–0.9)
MONOCYTES NFR BLD AUTO: 9.8 % (ref 5–12)
NEUTROPHILS NFR BLD AUTO: 14.1 10*3/MM3 (ref 1.7–7)
NEUTROPHILS NFR BLD AUTO: 84.3 % (ref 42.7–76)
NRBC BLD AUTO-RTO: 0.1 /100 WBC (ref 0–0.2)
PLATELET # BLD AUTO: 184 10*3/MM3 (ref 140–450)
PMV BLD AUTO: 10.5 FL (ref 6–12)
POTASSIUM SERPL-SCNC: 3.3 MMOL/L (ref 3.5–5.2)
POTASSIUM SERPL-SCNC: 3.6 MMOL/L (ref 3.5–5.2)
PROT SERPL-MCNC: 6.1 G/DL (ref 6–8.5)
RBC # BLD AUTO: 3.32 10*6/MM3 (ref 3.77–5.28)
SODIUM SERPL-SCNC: 139 MMOL/L (ref 136–145)
WBC NRBC COR # BLD AUTO: 16.72 10*3/MM3 (ref 3.4–10.8)

## 2024-12-18 PROCEDURE — 99233 SBSQ HOSP IP/OBS HIGH 50: CPT | Performed by: INTERNAL MEDICINE

## 2024-12-18 PROCEDURE — 25010000002 ZIPRASIDONE MESYLATE PER 10 MG: Performed by: PSYCHIATRY & NEUROLOGY

## 2024-12-18 PROCEDURE — 84132 ASSAY OF SERUM POTASSIUM: CPT | Performed by: HOSPITALIST

## 2024-12-18 PROCEDURE — 80053 COMPREHEN METABOLIC PANEL: CPT | Performed by: INTERNAL MEDICINE

## 2024-12-18 PROCEDURE — 25010000002 HYDROMORPHONE 1 MG/ML SOLUTION: Performed by: INTERNAL MEDICINE

## 2024-12-18 PROCEDURE — 99232 SBSQ HOSP IP/OBS MODERATE 35: CPT | Performed by: STUDENT IN AN ORGANIZED HEALTH CARE EDUCATION/TRAINING PROGRAM

## 2024-12-18 PROCEDURE — 25010000002 DEXAMETHASONE PER 1 MG: Performed by: HOSPITALIST

## 2024-12-18 PROCEDURE — 85025 COMPLETE CBC W/AUTO DIFF WBC: CPT | Performed by: INTERNAL MEDICINE

## 2024-12-18 RX ORDER — AMLODIPINE BESYLATE 10 MG/1
10 TABLET ORAL
Status: DISCONTINUED | OUTPATIENT
Start: 2024-12-18 | End: 2024-12-20 | Stop reason: HOSPADM

## 2024-12-18 RX ORDER — POTASSIUM CHLORIDE 750 MG/1
40 TABLET, FILM COATED, EXTENDED RELEASE ORAL EVERY 4 HOURS
Status: COMPLETED | OUTPATIENT
Start: 2024-12-18 | End: 2024-12-18

## 2024-12-18 RX ORDER — AMLODIPINE BESYLATE 5 MG/1
5 TABLET ORAL
Status: DISCONTINUED | OUTPATIENT
Start: 2024-12-18 | End: 2024-12-18

## 2024-12-18 RX ORDER — ALPRAZOLAM 0.5 MG
0.5 TABLET ORAL 3 TIMES DAILY
Status: DISCONTINUED | OUTPATIENT
Start: 2024-12-18 | End: 2024-12-20 | Stop reason: HOSPADM

## 2024-12-18 RX ORDER — POTASSIUM CHLORIDE 750 MG/1
40 TABLET, FILM COATED, EXTENDED RELEASE ORAL EVERY 4 HOURS
Status: COMPLETED | OUTPATIENT
Start: 2024-12-19 | End: 2024-12-19

## 2024-12-18 RX ADMIN — HYDROMORPHONE HYDROCHLORIDE 1 MG: 1 INJECTION, SOLUTION INTRAMUSCULAR; INTRAVENOUS; SUBCUTANEOUS at 05:42

## 2024-12-18 RX ADMIN — HYDROMORPHONE HYDROCHLORIDE 1 MG: 1 INJECTION, SOLUTION INTRAMUSCULAR; INTRAVENOUS; SUBCUTANEOUS at 15:07

## 2024-12-18 RX ADMIN — ZIPRASIDONE MESYLATE 20 MG: 20 INJECTION, POWDER, LYOPHILIZED, FOR SOLUTION INTRAMUSCULAR at 18:30

## 2024-12-18 RX ADMIN — ALPRAZOLAM 0.5 MG: 0.5 TABLET ORAL at 09:13

## 2024-12-18 RX ADMIN — BUPROPION HYDROCHLORIDE 300 MG: 300 TABLET, EXTENDED RELEASE ORAL at 06:08

## 2024-12-18 RX ADMIN — SENNOSIDES AND DOCUSATE SODIUM 2 TABLET: 50; 8.6 TABLET ORAL at 09:09

## 2024-12-18 RX ADMIN — DEXAMETHASONE SODIUM PHOSPHATE 4 MG: 4 INJECTION, SOLUTION INTRAMUSCULAR; INTRAVENOUS at 05:42

## 2024-12-18 RX ADMIN — DEXAMETHASONE SODIUM PHOSPHATE 4 MG: 4 INJECTION, SOLUTION INTRAMUSCULAR; INTRAVENOUS at 17:17

## 2024-12-18 RX ADMIN — HYDROMORPHONE HYDROCHLORIDE 1 MG: 1 INJECTION, SOLUTION INTRAMUSCULAR; INTRAVENOUS; SUBCUTANEOUS at 00:00

## 2024-12-18 RX ADMIN — Medication 10 ML: at 09:10

## 2024-12-18 RX ADMIN — Medication 10 ML: at 21:57

## 2024-12-18 RX ADMIN — Medication 10 ML: at 21:56

## 2024-12-18 RX ADMIN — HYDROMORPHONE HYDROCHLORIDE 1 MG: 1 INJECTION, SOLUTION INTRAMUSCULAR; INTRAVENOUS; SUBCUTANEOUS at 10:44

## 2024-12-18 RX ADMIN — ALPRAZOLAM 0.5 MG: 0.5 TABLET ORAL at 17:17

## 2024-12-18 RX ADMIN — POTASSIUM CHLORIDE 40 MEQ: 750 TABLET, EXTENDED RELEASE ORAL at 09:13

## 2024-12-18 RX ADMIN — POTASSIUM CHLORIDE 40 MEQ: 750 TABLET, EXTENDED RELEASE ORAL at 13:21

## 2024-12-18 RX ADMIN — AMLODIPINE BESYLATE 10 MG: 10 TABLET ORAL at 09:09

## 2024-12-18 NOTE — PROGRESS NOTES
"    Patient Name: Ely Sims  :1960  64 y.o.      Patient Care Team:  Provider, No Known as PCP - General  Terrie Crenshaw MD as Consulting Physician (Radiation Oncology)  Bull Doty MD as Surgeon (Neurosurgery)  Saturnino Chester MD as Referring Physician (General Surgery)  Elieser Saunders MD as Consulting Physician (Hematology and Oncology)  Debbie Epps RD as Dietitian (Nutrition)    Chief Complaint:   Abdominal pain, tachycardia, tumor emboli    Interval History:   NAEO, CTA with likely tumor emboli in distal subsegmental pulmonary arteries. She notes right upper quadrant pain.     Objective   Vital Signs  Temp:  [97.5 °F (36.4 °C)-98.2 °F (36.8 °C)] 98.2 °F (36.8 °C)  Heart Rate:  [102-136] 127  Resp:  [16-20] 18  BP: (139-184)/() 163/96  No intake or output data in the 24 hours ending 24 0847  Flowsheet Rows      Flowsheet Row First Filed Value   Admission Height 170.2 cm (67.01\") Documented at 2024 2306   Admission Weight 61.5 kg (135 lb 9.3 oz) Documented at 2024 1714            GEN: no distress, alert and oriented  HEENT: NACT, EOMI, moist mucous membranes  Lungs: CTAB, no wheezes, rales or rhonchi  CV: tachycardic rate, regular rhythm, normal S1, S2, no murmurs, +2 radial pulses b/l, no carotid bruit  Abdomen: soft, nontender, nondistended, NABS, + ostomy  Extremities: 2-3+ edema  Skin: no rash, warm, dry  Heme/Lymph: no bruising  Psych: organized thought, normal behavior and affect    Results Review:    Results from last 7 days   Lab Units 24  0749   SODIUM mmol/L 139   POTASSIUM mmol/L 3.6   CHLORIDE mmol/L 106   CO2 mmol/L 21.0*   BUN mg/dL 9   CREATININE mg/dL 0.54*   GLUCOSE mg/dL 89   CALCIUM mg/dL 8.0*     Results from last 7 days   Lab Units 24  0147   HSTROP T ng/L 9     Results from last 7 days   Lab Units 24  0749   WBC 10*3/mm3 16.72*   HEMOGLOBIN g/dL 10.7*   HEMATOCRIT % 32.1*   PLATELETS 10*3/mm3 184         Results from " last 7 days   Lab Units 12/15/24  0528   MAGNESIUM mg/dL 2.1                   Medication Review:   amLODIPine, 10 mg, Oral, Q24H  buPROPion XL, 300 mg, Oral, QAM  fentaNYL, 1 patch, Transdermal, Q72H   And  Check Fentanyl Patch Placement, 1 each, Not Applicable, Q12H  dexAMETHasone, 4 mg, Intravenous, Q12H  encorafenib, 300 mg, Oral, Daily  polyethylene glycol, 17 g, Oral, Daily  sennosides-docusate, 2 tablet, Oral, Daily  sodium chloride, 10 mL, Intravenous, Q12H  sodium chloride, 10 mL, Intravenous, Q12H              Assessment & Plan   #Chest pain, improved  #SOB  #Tumor emboli  #sinus tachycardia  Severe hypertension  #AMS  #metastatic colon CA     64-year-old woman with metastatic colon cancer to the brain and spine requiring craniotomy, diverting loop sigmoid colostomy and port placement and chemotherapy and radiation who was admitted on 12/13/2024 for further management of severe pain and worsening nausea and vomiting.  Her pain is believed to be secondary to diffuse metastatic disease and she has been started on steroids in addition to narcotics for pain control.  Patient was noted to be altered over the last 2 days and also complained of chest pain so we have been consulted for further management.  Her blood pressure has also been intermittently elevated.     CTA chest with scattered filling defects in the distal subsegmental pulmonary arteries believed to be secondary to tumor emboli    EKG with sinus tachycardia    Hypertension likely 2/2 steroid initiation.   Tachycardia multifactorial, would initially treat the hypertension with non AVN blocking agents.    - Agree with starting amlodipine 10 mg daily, can continue to monitor blood pressures, if uncontrolled can start carvedilol as well    Corey Fenton MD, [facility], [facility]  12/18/24  08:47 EST

## 2024-12-18 NOTE — DISCHARGE PLACEMENT REQUEST
"Ely Sims (64 y.o. Female)       Date of Birth   1960    Social Security Number       Address   [address] [address]    Home Phone   [phone]    MRN   [id number]       Anabaptism   Confucianist    Marital Status   Single                            Admission Date   12/13/24    Admission Type   Urgent    Admitting Provider   Estefania Jacobs MD    Attending Provider   Cesar Ko MD    Department, Room/Bed   [address], P398/1       Discharge Date       Discharge Disposition       Discharge Destination                                 Attending Provider: Cesar Ko MD    Allergies: No Known Allergies    Isolation: None   Infection: None   Code Status: CPR    Ht: 170.2 cm (67.01\")   Wt: 61.5 kg (135 lb 9.3 oz)    Admission Cmt: None   Principal Problem: Abdominal pain [R10.9]                   Active Insurance as of 12/13/2024       Primary Coverage       Payor Plan Insurance Group Employer/Plan Group    [facility] [id number]       Payor Plan Address Payor Plan Phone Number Payor Plan Fax Number Effective Dates    [address]   1/1/2024 - None Entered    [facility] [id number]         Subscriber Name Subscriber Birth Date Member ID       ELY SIMS 1960 [id number]                     Emergency Contacts        (Rel.) Home Phone Work Phone Mobile Phone    Ubaldo Case (Partner) -- -- [phone]    Elia Fox -- -- [phone]    DON OBRIEN (Sister) -- -- [phone]    CARL FOX (Daughter) -- -- [phone]                "

## 2024-12-18 NOTE — PROGRESS NOTES
Subjective     HISTORY OF PRESENT ILLNESS:   Patient is metastatic colon cancer.    12/16/2024  and the daughter reports patient become more confused last night after IV Ativan for anxiety.  This morning patient is still has some confusion in the time of visit.  Patient is afebrile.  Vital stable.    12/17/2024  Patient was agitated overnight, and she received 1 mg Ativan again this morning, and then was confused afterwards due to medication.     12/18/2024  Her sister reports patient is still having some intermittent confusion.  Patient has been eating reasonably well with no nausea no vomiting.        Past Medical History, Past Surgical History, Social History, Family History have been reviewed and are without significant changes except as mentioned.    Review of Systems   A comprehensive 14 point review of systems was performed and was negative except as mentioned.    Medications:  The current medication list was reviewed in the EMR    ALLERGIES:  No Known Allergies    Objective      Vitals:    12/18/24 0440 12/18/24 0707 12/18/24 1158 12/18/24 1512   BP: (!) 178/108 163/96 109/68 140/85   BP Location: Right arm Left arm Left arm Left arm   Patient Position: Sitting Sitting Lying Sitting   Pulse: 112 (!) 127 105 (!) 121   Resp: 16 18 16 20   Temp: 98.2 °F (36.8 °C) 98.2 °F (36.8 °C) 97.3 °F (36.3 °C) 98.1 °F (36.7 °C)   TempSrc: Oral Oral Oral Oral   SpO2: 99% 93% 96% 96%   Weight:       Height:             12/13/2024     1:30 PM   Current Status   ECOG score 2       Physical Exam    GENERAL:  Well-developed, well-nourished female in no acute distress.    HEENT:  Normocephalic.   CHEST: Normal respiratory effort.  Lungs clear to auscultation. Good airflow.  CARDIAC: Tachycardic.    ABDOMEN:  Soft, no tender.  Colostomy in place.  Bowel sounds normal.  EXTREMITIES: Trace edema bilateral lower extremity.   PSYCHIATRIC: Intermittent confusion.  Overall improved.       RECENT LABS:  CBC:      Lab  12/18/24  0749 12/17/24  0147 12/16/24  0546 12/15/24  0528 12/14/24  0453   WBC 16.72* 16.66* 16.37* 14.92* 20.33*   HEMOGLOBIN 10.7* 11.2* 10.0* 9.8* 12.0   HEMATOCRIT 32.1* 33.3* 30.6* 30.6* 37.3   PLATELETS 184 226 210 190 241   NEUTROS ABS 14.10* 13.76* 14.75* 13.56* 16.76*   IMMATURE GRANS (ABS) 0.33* 0.33* 0.20* 0.18* 0.25*   LYMPHS ABS 0.60* 0.53* 0.42* 0.41* 0.58*   MONOS ABS 1.64* 2.00* 0.98* 0.75 2.65*   EOS ABS 0.01 0.00 0.00 0.00 0.04   MCV 96.7 94.6 97.1* 97.5* 97.1*     CMP:        Lab 12/18/24  0749 12/17/24  0147 12/16/24  0546 12/15/24  0528 12/14/24  0453   SODIUM 139 139 133* 135* 134*   POTASSIUM 3.6 4.3 4.7 5.4* 4.7   CHLORIDE 106 100 100 103 100   CO2 21.0* 23.0 22.9 23.0 17.0*   ANION GAP 12.0 16.0* 10.1 9.0 17.0*   BUN 9 13 15 10 12   CREATININE 0.54* 0.58 0.69 0.54* 0.69   EGFR 103.0 101.2 97.1 103.0 97.1   GLUCOSE 89 92 159* 125* 94   CALCIUM 8.0* 8.9 8.4* 7.8* 8.1*   MAGNESIUM  --   --   --  2.1 1.4*   PHOSPHORUS  --   --   --   --  2.8   TOTAL PROTEIN 6.1 7.0 6.3 5.9* 6.5   ALBUMIN 3.0* 3.4* 3.0* 2.7* 2.8*   GLOBULIN 3.1 3.6 3.3 3.2 3.7   ALT (SGPT) 29 38* 33 39* 41*   AST (SGOT) 37* 52* 52* 96* 128*   BILIRUBIN 1.1 1.1 1.0 1.2 1.8*   ALK PHOS 640* 811* 688* 670* 691*     Lab Results   Component Value Date    IRON 62 12/17/2024    LABIRON 27 12/17/2024    TRANSFERRIN 152 (L) 12/17/2024    TIBC 226 (L) 12/17/2024    FERRITIN 350.00 (H) 12/17/2024     Lab Results   Component Value Date    FOLATE 11.20 12/17/2024     Lab Results   Component Value Date    [id number] 1,257 (H) 12/16/2024       IMAGING:  CT Angiogram Chest (12/17/2024 11:18)     CT Abdomen Pelvis With Contrast (12/14/2024 21:51)       Assessment & Plan   Patient is a pleasant 64-year-old female with:     # Abdominal pain, nausea and vomiting:  Likely secondary to underlying diffuse mesenteric, liver and lymph node metastasis, made worse by recent administration of Vectibix on 12/10/2024.  Will start IV dexamethasone 4 mg every  8 hours to help with symptoms  Patient is unable to take p.o. morphine for pain relief.  Will discontinue morphine and switched to fentanyl patch 50 mcg every 72 hours.    12/15/2024: Pain better controlled with fentanyl patch 50 mcg every 72 hours and IV Dilaudid 1 mg every 2 hours as needed.  Continue current regimen.  Will transition to p.o. [place] at discharge.   Continue IV dexamethasone 4 mg every 8 hours to help with N/V and abdominal distention.  Will switch to twice daily dose tomorrow if symptoms remain well-controlled.   Continue IV fluids, IV Zofran and Compazine and electrolyte replacement  12/16/2024 slightly improved symptoms.  Abdomen pain is controlled better with fentanyl patch 50 mcg/hr.     # Metastatic colorectal cancer, BRAF V600E mutated:  Has diffuse mets to the mesentery, brain, lungs and thoracic spinal cord s/p diverting loop sigmoid colostomy, radiation to the brain and spinal cord lesions   She recently progressed on frontline therapy with FOLFOX and received first dose of second line Vectibix infusion/oral bravtovi on 12/10/2024   Advised to continue p.o. Braftovi 300 mg daily while inpatient  12/15/2024: Reviewed the CT A/P findings performed yesterday which shows significant and rapid disease progression in last 2-3 weeks.  Reviewed poor prognosis and try to discuss goals of care.  Patient appears not ready for this discussion yet.  Patient has a follow-up appointment with Dr. Saunders on 12/23/2024 at 1 PM for next cycle of Vectibix.   12/17/2024 patient reports some chest pain.  CT of the chest showed disease progression.  There was no true meaningful pulmonary emboli, instead in the distal small pulmonary arteries look like tumor thrombus according to radiologist.  No need for anticoagulation.  Her chest pain is due to likely progressive metastatic cancer.  12/18/2024 discussed with the patient, encouraged her to keep appointment for Vectibix treatment 12/23/2024.    # Anxiety:   12/15:  Will administer IV Ativan 1 mg dose today.  Continue p.o. Ativan as needed  12/16/2024 family members  and daughter reports patient become more sedated with confusion after Ativan IV.  Also received 1 dose of oral this morning.   Ativan has already been discontinued at the time of seeing patient.  12/17/2024 patient received another dose of IV Ativan 1 mg and if she had some confusion and drowsiness afterwards.  I discussed with Dr. Ko, may consider try Xanax every 8 hours.   is agreeable.  12/18/2024 still has intermittent confusion.  Overall improved.    *.  Anemia.   Most likely related to metastatic colon cancer and chemotherapy.    Supratherapeutic B12 level 1257 and Hgb 10.0 on 12/16/2024.  Will check iron status ferritin and folate.  Lab study on 12/17/2024 reported normal folate, slightly supratherapeutic B12 level, in the normal iron saturation 27% with ferritin 350.  No evidence for deficiency.  Her anemia is due to metastatic colon cancer and the chemotherapy.  12/18/2024 stable anemia Hb 10.7.     # Hypokalemia: Replacement per primary    # Abnormal LFTs: Liver metastasis related.  Improving.     Recommendations:  -Not tolerating Ativan.   -Continue low-dose Xanax 0.5 mg oral every 8 hours for anxiety.  -Continue IV antiemetics and electrolyte replacement per primary  - IV dexamethasone 4 mg every 12 hours for her metastatic brain lesion.  -Continue fentanyl patch 50 mcg change every 72 hours.    -Continue IV Dilaudid 1 mg every 2 hours as needed  -Continue p.o. Braftovi as inpatient for her metastatic colon cancer  -Monitor CBC tomorrow morning.   -Possibly discharge in the next couple of days.   -encouraged her to keep appointment for Vectibix treatment 12/23/2024.    Discussed with A Dr. Ko.     Discussed with the patient mostly her eldest sister at bedside.    I also spoke with nursing staff.    We will sign off.  Please call if having questions.    DANIELA MARTINEZ M.D., Ph.D.

## 2024-12-18 NOTE — PROGRESS NOTES
"Nutrition Services    Patient Name:  Ely Sims  YOB: 1960  MRN: [id number]  Admit Date:  12/13/2024    Assessment Date:  12/18/24    NUTRITION SCREENING      Reason for Encounter Follow-up protocol   Diagnosis/Problem 63 yo female with metastatic colon cancer to the brain ans spine adm for management of severe pain, N/V       PO Diet Diet: Regular/House; Fluid Consistency: Thin (IDDSI 0)   Supplements    PO Intake % 50-75% meals       Medications MAR reviewed by RD   Labs  Listed below, reviewed   Physical Findings Awake, alert, seems to have a delay in answering questions   GI Function + output 12/18   Skin Status intact       Height  Weight  BMI  Weight Trend     Height: 170.2 cm (67.01\")  Weight: 61.5 kg (135 lb 9.3 oz) (12/17/24 2306)  Body mass index is 21.23 kg/m².  Loss       Nutrition Problem (PES) Nutrition appropriate for condition at this time as evidence by disease progression and treatments.       Intervention/Plan Will continue Boost Breeze supplements to help aid po intake.    RD to follow up per protocol.     Results from last 7 days   Lab Units 12/18/24  0749 12/17/24  0147 12/16/24  0546   SODIUM mmol/L 139 139 133*   POTASSIUM mmol/L 3.6 4.3 4.7   CHLORIDE mmol/L 106 100 100   CO2 mmol/L 21.0* 23.0 22.9   BUN mg/dL 9 13 15   CREATININE mg/dL 0.54* 0.58 0.69   CALCIUM mg/dL 8.0* 8.9 8.4*   BILIRUBIN mg/dL 1.1 1.1 1.0   ALK PHOS U/L 640* 811* 688*   ALT (SGPT) U/L 29 38* 33   AST (SGOT) U/L 37* 52* 52*   GLUCOSE mg/dL 89 92 159*     Results from last 7 days   Lab Units 12/18/24  0749 12/16/24  0546 12/15/24  0528 12/14/24  0453   MAGNESIUM mg/dL  --   --  2.1 1.4*   PHOSPHORUS mg/dL  --   --   --  2.8   HEMOGLOBIN g/dL 10.7*   < > 9.8* 12.0   HEMATOCRIT % 32.1*   < > 30.6* 37.3    < > = values in this interval not displayed.     Lab Results   Component Value Date    HGBA1C 5.50 08/24/2024         Electronically signed by:  Uli Aguilar RD  12/18/24 15:36 EST  "

## 2024-12-18 NOTE — PLAN OF CARE
Goal Outcome Evaluation:         Patient alert and oriented to self and partially to situation. See MAR for medications administered this shift. No s/s of scute distress noted. Patient is much more calm and was able to relax in bed. Patient ambulated around the unit with sitter. Family at bedside. Plan of care is ongoing.

## 2024-12-18 NOTE — PROGRESS NOTES
Infectious Diseases Progress Note    Estefania Jacobs MD     [facility]  [place]: 4 days  Patient Identification:  Name: Ely Sims  Age: 64 y.o.  Sex: female  :  1960  MRN: [id number]         Primary Care Physician: Provider, No Known  Requesting physician: Dr. Diehl  Date of consultation 12/15/2024  Reason for consultation: Leukocytosis  Subjective: Denies any fever and chills.  Up and about and walking in the hallways.  Interval History: See admission history and physical performed as cross cover for internal medicine service on 2024.  Since then patient has been seen by hematology oncology service as well as gastroenterologist service and it appears that her current worsening of nausea vomiting and abdominal pain is likely due to progression of her underlying malignancy made worse by the administration Vectibix.  Steroid has been initiated with instruction to continue her Braftovi.  GI service recommend supportive care.    Objective:    Scheduled Meds:buPROPion XL, 300 mg, Oral, QAM  fentaNYL, 1 patch, Transdermal, Q72H   And  Check Fentanyl Patch Placement, 1 each, Not Applicable, Q12H  [START ON 2024] dexAMETHasone, 4 mg, Intravenous, Q12H  encorafenib, 300 mg, Oral, Daily  polyethylene glycol, 17 g, Oral, Daily  sennosides-docusate, 2 tablet, Oral, Daily  sodium chloride, 10 mL, Intravenous, Q12H  sodium chloride, 10 mL, Intravenous, Q12H      Continuous Infusions:     Vital signs in last 24 hours:  Temp:  [97.5 °F (36.4 °C)-98.1 °F (36.7 °C)] 97.7 °F (36.5 °C)  Heart Rate:  [102-143] 113  Resp:  [16-20] 16  BP: (139-184)/() 146/108    Intake/Output:    Intake/Output Summary (Last 24 hours) at 2024  Last data filed at 2024 0800  Gross per 24 hour   Intake 240 ml   Output --   Net 240 ml       Exam:  BP (!) 146/108 (BP Location: Right arm, Patient Position: Sitting)   Pulse 113   Temp 97.7 °F (36.5 °C) (Oral)   Resp 16   Wt 61.5 kg (135 lb 9.3 oz)    SpO2 97%   BMI 21.24 kg/m²   Patient is examined using the personal protective equipment as per guidelines from infection control for this particular patient as enacted.  Hand washing was performed before and after patient interaction.  General Appearance:  Awake alert and ill-appearing currently being assisted in walking around the hallways by the family members, gets tired easily and appears uncomfortable                          Head:    Normocephalic, without obvious abnormality, atraumatic                           Eyes:    PERRL, conjunctivae/corneas clear, EOM's intact, both eyes                         Throat: Dry oral mucosa                           Neck:   Supple, symmetrical, trachea midline, no JVD                         Lungs:    Clear to auscultation bilaterally, respirations unlabored                 Chest Wall:    No tenderness or deformity                          Heart:  S1-S2 regular                  Abdomen:   Generalized abdominal tenderness but soft no guarding rigidity or rebound noted                 Extremities:   Extremities normal, atraumatic, no cyanosis or edema                        Pulses:   Pulses palpable in all extremities                            Skin:   Skin is warm and dry,  no rashes or palpable lesions                  Neurologic: Alert and oriented x 3       Data Review:    I reviewed the patient's new clinical results.  Results from last 7 days   Lab Units 12/17/24  0147 12/16/24  0546 12/15/24  0528 12/14/24  0453 12/13/24  1334   WBC 10*3/mm3 16.66* 16.37* 14.92* 20.33* 18.44*   HEMOGLOBIN g/dL 11.2* 10.0* 9.8* 12.0 11.7*   PLATELETS 10*3/mm3 226 210 190 241 289     Results from last 7 days   Lab Units 12/17/24 0147 12/16/24 0546 12/15/24  0528 12/14/24  0453 12/13/24  1334   SODIUM mmol/L 139 133* 135* 134* 136   POTASSIUM mmol/L 4.3 4.7 5.4* 4.7 2.9*   CHLORIDE mmol/L 100 100 103 100 93*   CO2 mmol/L 23.0 22.9 23.0 17.0* 27.6   BUN mg/dL 13 15 10 12 13    CREATININE mg/dL 0.58 0.69 0.54* 0.69 0.69   CALCIUM mg/dL 8.9 8.4* 7.8* 8.1* 8.4*   GLUCOSE mg/dL 92 159* 125* 94 126*     Microbiology Results (last 10 days)       Procedure Component Value - Date/Time    Blood Culture - Blood, Arm, Left [[id number]]  (Normal) Collected: 12/14/24 1200    Lab Status: Preliminary result Specimen: Blood from Arm, Left Updated: 12/17/24 1231     Blood Culture No growth at 3 days    Blood Culture - Blood, Blood, Central Line [[id number]]  (Normal) Collected: 12/14/24 0954    Lab Status: Preliminary result Specimen: Blood, Central Line Updated: 12/17/24 1031     Blood Culture No growth at 3 days              Assessment:    Abdominal pain    Colon cancer metastasized to brain    Anemia    Rectal adenocarcinoma    Dehydration    Hypokalemia    Abnormal LFTs    Severe protein-calorie malnutrition    Anxiety  Leukocytosis-multifactorial including likely due to:  progression of underlying malignancy versus  steroid use.  She is at risk of infectious process such as infected Mediport as well as common causes of infection that could occur to anybody regardless of her malignancy and its progression-and need to be closely monitored for pneumonia, UTI, phlebitis at the IV site, evolving C. difficile infection etc.    Recommendations/discussion:  At this juncture agree with supportive care and closely monitor her progress under the direction of primary team and hematology oncology service.  Patient will require exhaustive review of system to identify above possibilities for evolving line infection but low threshold to perform septic workup such as blood cultures urinalysis urine cultures and repeat chest x-ray if she spikes fever.  In the interim continue to observe off antibiotics.  Management of other issues per primary team.  Thank you very much for letting me be the part of your patient care please see above impression and recommendations  Estefania Jacobs MD  12/17/2024  21:22 EST    Parts of  this note may be an electronic transcription/translation of spoken language to printed text using the Dragon dictation system.

## 2024-12-18 NOTE — PROGRESS NOTES
[facility]     LOS: 5 days     Subjective:    CC:No chief complaint on file.    DIET:  Diet Order   Procedures    Diet: Regular/House; Fluid Consistency: Thin (IDDSI 0)       Objective:    Vital Signs:  Temp:  [97.3 °F (36.3 °C)-98.2 °F (36.8 °C)] 98.1 °F (36.7 °C)  Heart Rate:  [105-127] 121  Resp:  [16-20] 20  BP: (109-178)/() 140/85    SpO2:  [93 %-99 %] 96 %  on   ;   Device (Oxygen Therapy): room air  Body mass index is 21.23 kg/m².    Physical Exam  General Patient is awake alert, seems slightly confused but follows commands.  Had previously been ambulating in the [place].  We asked her to try and rest morning and she has  Heart is tachycardic, rhythm is normal, no murmurs rubs gallops  Lungs are clear to auscultation bilaterally no wheezes rhonchi  Abdomen slight tenderness    Results Review:    Glucose   Date Value Ref Range Status   12/18/2024 89 65 - 99 mg/dL Final   12/17/2024 92 65 - 99 mg/dL Final   12/16/2024 159 (H) 65 - 99 mg/dL Final     Results from last 7 days   Lab Units 12/18/24  0749   WBC 10*3/mm3 16.72*   HEMOGLOBIN g/dL 10.7*   HEMATOCRIT % 32.1*   PLATELETS 10*3/mm3 184     Results from last 7 days   Lab Units 12/18/24  0749   SODIUM mmol/L 139   POTASSIUM mmol/L 3.6   CHLORIDE mmol/L 106   CO2 mmol/L 21.0*   BUN mg/dL 9   CREATININE mg/dL 0.54*   CALCIUM mg/dL 8.0*   BILIRUBIN mg/dL 1.1   ALK PHOS U/L 640*   ALT (SGPT) U/L 29   AST (SGOT) U/L 37*   GLUCOSE mg/dL 89         Results from last 7 days   Lab Units 12/15/24  0528   MAGNESIUM mg/dL 2.1     Results from last 7 days   Lab Units 12/17/24  0147   HSTROP T ng/L 9     Cultures:  Blood Culture   Date Value Ref Range Status   12/14/2024 No growth at 3 days  Preliminary   12/14/2024 No growth at 3 days  Preliminary       I have reviewed daily medications and changes in CPOE    Scheduled meds  ALPRAZolam, 0.5 mg, Oral, TID  amLODIPine, 10 mg, Oral, Q24H  buPROPion XL, 300 mg, Oral, QAM  fentaNYL, 1 patch,  Transdermal, Q72H   And  Check Fentanyl Patch Placement, 1 each, Not Applicable, Q12H  dexAMETHasone, 4 mg, Intravenous, Q12H  encorafenib, 300 mg, Oral, Daily  melatonin, 5 mg, Oral, Nightly  polyethylene glycol, 17 g, Oral, Daily  sennosides-docusate, 2 tablet, Oral, Daily  sodium chloride, 10 mL, Intravenous, Q12H  sodium chloride, 10 mL, Intravenous, Q12H           PRN meds    Calcium Replacement - Follow Nurse / BPA Driven Protocol    heparin    HYDROmorphone **AND** naloxone    lidocaine    LORazepam    Magnesium Standard Dose Replacement - Follow Nurse / BPA Driven Protocol    ondansetron    Phosphorus Replacement - Follow Nurse / BPA Driven Protocol    Potassium Replacement - Follow Nurse / BPA Driven Protocol    sodium chloride    sodium chloride    sodium chloride    sodium chloride    sodium chloride    ziprasidone        Abdominal pain    Colon cancer metastasized to brain    Anemia    Rectal adenocarcinoma    Dehydration    Hypokalemia    Abnormal LFTs    Severe protein-calorie malnutrition    Anxiety        Assessment/Plan:      64 y.o. female admitted with Abdominal pain.     Assessment plan  1.  Worsening abdominal pain, in the setting of underlying colorectal malignancy, diverting colostomy and unable to keep anything down, with nausea and vomiting,   -pain is fairly well controlled  -she is very anxious  -tachycardia is better     2.  Progressive colorectal malignancy with metastasis, management based on oncology recommendations.  -d/w Oncology and continue with treatment     3.  Leukocytosis, no acute signs of infection at this point of time.  Continue to monitor closely.     4.  Hyperkalemia, stop scheduled potassium.     5.  Delirium likely related to meds, pain, steroids- slightly better today than yesterday    6.  Tachycardia-suspicion for DVT/PE low as her tachycardia is improved with better pain medication   -CT angio unrevealing for pulmonary embolism but does have possible tumor  emboli    7. HTN- added norvasc 10 after discussion with cardiology    8.  CODE STATUS is full code.  Further plans based on hospital course.      Cesar Ko MD  12/18/24  18:38 EST

## 2024-12-18 NOTE — TELEPHONE ENCOUNTER
Provider: Dr. Saunders   Caller: Annette with [facility]  Relationship to Patient: 5  Call Back Phone Number: [phone]  Reason for Call: Cristi is needing a verbal from [facility] for Nursing and PT, pt has no PCP

## 2024-12-19 LAB
ALBUMIN SERPL-MCNC: 3.2 G/DL (ref 3.5–5.2)
ALBUMIN/GLOB SERPL: 0.8 G/DL
ALP SERPL-CCNC: 646 U/L (ref 39–117)
ALT SERPL W P-5'-P-CCNC: 30 U/L (ref 1–33)
ANION GAP SERPL CALCULATED.3IONS-SCNC: 7.2 MMOL/L (ref 5–15)
AST SERPL-CCNC: 37 U/L (ref 1–32)
BACTERIA SPEC AEROBE CULT: NORMAL
BACTERIA SPEC AEROBE CULT: NORMAL
BASOPHILS # BLD AUTO: 0.03 10*3/MM3 (ref 0–0.2)
BASOPHILS NFR BLD AUTO: 0.2 % (ref 0–1.5)
BILIRUB SERPL-MCNC: 1 MG/DL (ref 0–1.2)
BUN SERPL-MCNC: 10 MG/DL (ref 8–23)
BUN/CREAT SERPL: 16.1 (ref 7–25)
CALCIUM SPEC-SCNC: 9.5 MG/DL (ref 8.6–10.5)
CHLORIDE SERPL-SCNC: 102 MMOL/L (ref 98–107)
CO2 SERPL-SCNC: 23.8 MMOL/L (ref 22–29)
CREAT SERPL-MCNC: 0.62 MG/DL (ref 0.57–1)
DEPRECATED RDW RBC AUTO: 57.1 FL (ref 37–54)
EGFRCR SERPLBLD CKD-EPI 2021: 99.6 ML/MIN/1.73
EOSINOPHIL # BLD AUTO: 0 10*3/MM3 (ref 0–0.4)
EOSINOPHIL NFR BLD AUTO: 0 % (ref 0.3–6.2)
ERYTHROCYTE [DISTWIDTH] IN BLOOD BY AUTOMATED COUNT: 15.7 % (ref 12.3–15.4)
GLOBULIN UR ELPH-MCNC: 3.9 GM/DL
GLUCOSE SERPL-MCNC: 162 MG/DL (ref 65–99)
HCT VFR BLD AUTO: 33.8 % (ref 34–46.6)
HGB BLD-MCNC: 10.6 G/DL (ref 12–15.9)
IMM GRANULOCYTES # BLD AUTO: 0.32 10*3/MM3 (ref 0–0.05)
IMM GRANULOCYTES NFR BLD AUTO: 2.3 % (ref 0–0.5)
LYMPHOCYTES # BLD AUTO: 0.67 10*3/MM3 (ref 0.7–3.1)
LYMPHOCYTES NFR BLD AUTO: 4.8 % (ref 19.6–45.3)
MCH RBC QN AUTO: 31.2 PG (ref 26.6–33)
MCHC RBC AUTO-ENTMCNC: 31.4 G/DL (ref 31.5–35.7)
MCV RBC AUTO: 99.4 FL (ref 79–97)
MONOCYTES # BLD AUTO: 1.67 10*3/MM3 (ref 0.1–0.9)
MONOCYTES NFR BLD AUTO: 12 % (ref 5–12)
NEUTROPHILS NFR BLD AUTO: 11.26 10*3/MM3 (ref 1.7–7)
NEUTROPHILS NFR BLD AUTO: 80.7 % (ref 42.7–76)
NRBC BLD AUTO-RTO: 0 /100 WBC (ref 0–0.2)
PLATELET # BLD AUTO: 197 10*3/MM3 (ref 140–450)
PMV BLD AUTO: 11.3 FL (ref 6–12)
POTASSIUM SERPL-SCNC: 4.5 MMOL/L (ref 3.5–5.2)
PROT SERPL-MCNC: 7.1 G/DL (ref 6–8.5)
RBC # BLD AUTO: 3.4 10*6/MM3 (ref 3.77–5.28)
SODIUM SERPL-SCNC: 133 MMOL/L (ref 136–145)
WBC NRBC COR # BLD AUTO: 13.95 10*3/MM3 (ref 3.4–10.8)

## 2024-12-19 PROCEDURE — 99232 SBSQ HOSP IP/OBS MODERATE 35: CPT | Performed by: STUDENT IN AN ORGANIZED HEALTH CARE EDUCATION/TRAINING PROGRAM

## 2024-12-19 PROCEDURE — 25010000002 ZIPRASIDONE MESYLATE PER 10 MG: Performed by: PSYCHIATRY & NEUROLOGY

## 2024-12-19 PROCEDURE — 25010000002 DEXAMETHASONE PER 1 MG: Performed by: HOSPITALIST

## 2024-12-19 PROCEDURE — 25010000002 HEPARIN LOCK FLUSH PER 10 UNITS: Performed by: INTERNAL MEDICINE

## 2024-12-19 PROCEDURE — 85025 COMPLETE CBC W/AUTO DIFF WBC: CPT | Performed by: INTERNAL MEDICINE

## 2024-12-19 PROCEDURE — 63710000001 DEXAMETHASONE PER 0.25 MG: Performed by: HOSPITALIST

## 2024-12-19 PROCEDURE — 80053 COMPREHEN METABOLIC PANEL: CPT | Performed by: INTERNAL MEDICINE

## 2024-12-19 RX ORDER — HYDROCODONE BITARTRATE AND ACETAMINOPHEN 5; 325 MG/1; MG/1
1 TABLET ORAL EVERY 6 HOURS PRN
Status: DISCONTINUED | OUTPATIENT
Start: 2024-12-19 | End: 2024-12-20

## 2024-12-19 RX ORDER — ACETAMINOPHEN 325 MG/1
650 TABLET ORAL EVERY 6 HOURS PRN
Status: DISCONTINUED | OUTPATIENT
Start: 2024-12-19 | End: 2024-12-20 | Stop reason: HOSPADM

## 2024-12-19 RX ORDER — BUPROPION HYDROCHLORIDE 300 MG/1
300 TABLET ORAL DAILY
Status: DISCONTINUED | OUTPATIENT
Start: 2024-12-19 | End: 2024-12-20 | Stop reason: HOSPADM

## 2024-12-19 RX ORDER — ZIPRASIDONE HYDROCHLORIDE 20 MG/1
20 CAPSULE ORAL 3 TIMES DAILY PRN
Status: DISCONTINUED | OUTPATIENT
Start: 2024-12-19 | End: 2024-12-20 | Stop reason: HOSPADM

## 2024-12-19 RX ORDER — DEXAMETHASONE 4 MG/1
4 TABLET ORAL 2 TIMES DAILY
Status: DISCONTINUED | OUTPATIENT
Start: 2024-12-19 | End: 2024-12-20 | Stop reason: HOSPADM

## 2024-12-19 RX ORDER — LOSARTAN POTASSIUM 50 MG/1
50 TABLET ORAL DAILY
Status: DISCONTINUED | OUTPATIENT
Start: 2024-12-19 | End: 2024-12-20 | Stop reason: HOSPADM

## 2024-12-19 RX ADMIN — ZIPRASIDONE MESYLATE 20 MG: 20 INJECTION, POWDER, LYOPHILIZED, FOR SOLUTION INTRAMUSCULAR at 03:13

## 2024-12-19 RX ADMIN — ALPRAZOLAM 0.5 MG: 0.5 TABLET ORAL at 20:46

## 2024-12-19 RX ADMIN — ALPRAZOLAM 0.5 MG: 0.5 TABLET ORAL at 15:18

## 2024-12-19 RX ADMIN — Medication 10 ML: at 08:42

## 2024-12-19 RX ADMIN — POTASSIUM CHLORIDE 40 MEQ: 750 TABLET, EXTENDED RELEASE ORAL at 03:12

## 2024-12-19 RX ADMIN — HYDROCODONE BITARTRATE AND ACETAMINOPHEN 1 TABLET: 5; 325 TABLET ORAL at 23:34

## 2024-12-19 RX ADMIN — HYDROCODONE BITARTRATE AND ACETAMINOPHEN 1 TABLET: 5; 325 TABLET ORAL at 13:24

## 2024-12-19 RX ADMIN — ALPRAZOLAM 0.5 MG: 0.5 TABLET ORAL at 08:42

## 2024-12-19 RX ADMIN — LOSARTAN POTASSIUM 50 MG: 50 TABLET, FILM COATED ORAL at 13:24

## 2024-12-19 RX ADMIN — SENNOSIDES AND DOCUSATE SODIUM 2 TABLET: 50; 8.6 TABLET ORAL at 08:42

## 2024-12-19 RX ADMIN — DEXAMETHASONE SODIUM PHOSPHATE 4 MG: 4 INJECTION, SOLUTION INTRAMUSCULAR; INTRAVENOUS at 06:44

## 2024-12-19 RX ADMIN — AMLODIPINE BESYLATE 10 MG: 10 TABLET ORAL at 08:42

## 2024-12-19 RX ADMIN — BUPROPION HYDROCHLORIDE 300 MG: 300 TABLET, EXTENDED RELEASE ORAL at 06:44

## 2024-12-19 RX ADMIN — POLYETHYLENE GLYCOL 3350 17 G: 17 POWDER, FOR SOLUTION ORAL at 08:42

## 2024-12-19 RX ADMIN — Medication 5 MG: at 20:46

## 2024-12-19 RX ADMIN — DEXAMETHASONE 4 MG: 4 TABLET ORAL at 17:05

## 2024-12-19 RX ADMIN — HEPARIN 500 UNITS: 100 SYRINGE at 13:24

## 2024-12-19 RX ADMIN — POTASSIUM CHLORIDE 40 MEQ: 750 TABLET, EXTENDED RELEASE ORAL at 01:06

## 2024-12-19 NOTE — PROGRESS NOTES
[facility]     LOS: 6 days     Subjective:    CC:No chief complaint on file.    DIET:  Diet Order   Procedures    Diet: Regular/House; Fluid Consistency: Thin (IDDSI 0)       Objective:    Vital Signs:  Temp:  [97.3 °F (36.3 °C)-98.1 °F (36.7 °C)] 97.9 °F (36.6 °C)  Heart Rate:  [105-122] 111  Resp:  [16-20] 16  BP: (109-167)/(68-98) 167/96    SpO2:  [96 %-100 %] 96 %  on   ;   Device (Oxygen Therapy): room air  Body mass index is 21.23 kg/m².    Physical Exam  General Patient is awake alert, doing much better, no agitation    Heart is tachycardic, rhythm is normal, no murmurs rubs gallops  Lungs are clear to auscultation bilaterally no wheezes rhonchi  Abdomen slight tenderness    Results Review:    Glucose   Date Value Ref Range Status   12/19/2024 162 (H) 65 - 99 mg/dL Final   12/18/2024 89 65 - 99 mg/dL Final   12/17/2024 92 65 - 99 mg/dL Final     Results from last 7 days   Lab Units 12/19/24  0513   WBC 10*3/mm3 13.95*   HEMOGLOBIN g/dL 10.6*   HEMATOCRIT % 33.8*   PLATELETS 10*3/mm3 197     Results from last 7 days   Lab Units 12/19/24  0513   SODIUM mmol/L 133*   POTASSIUM mmol/L 4.5   CHLORIDE mmol/L 102   CO2 mmol/L 23.8   BUN mg/dL 10   CREATININE mg/dL 0.62   CALCIUM mg/dL 9.5   BILIRUBIN mg/dL 1.0   ALK PHOS U/L 646*   ALT (SGPT) U/L 30   AST (SGOT) U/L 37*   GLUCOSE mg/dL 162*         Results from last 7 days   Lab Units 12/15/24  0528   MAGNESIUM mg/dL 2.1     Results from last 7 days   Lab Units 12/17/24  0147   HSTROP T ng/L 9     Cultures:  Blood Culture   Date Value Ref Range Status   12/14/2024 No growth at 3 days  Preliminary   12/14/2024 No growth at 3 days  Preliminary       I have reviewed daily medications and changes in CPOE    Scheduled meds  ALPRAZolam, 0.5 mg, Oral, TID  amLODIPine, 10 mg, Oral, Q24H  buPROPion XL, 300 mg, Oral, Daily  fentaNYL, 1 patch, Transdermal, Q72H   And  Check Fentanyl Patch Placement, 1 each, Not Applicable, Q12H  dexAMETHasone, 4  mg, Intravenous, Q12H  encorafenib, 300 mg, Oral, Daily  melatonin, 5 mg, Oral, Nightly  polyethylene glycol, 17 g, Oral, Daily  sennosides-docusate, 2 tablet, Oral, Daily  sodium chloride, 10 mL, Intravenous, Q12H  sodium chloride, 10 mL, Intravenous, Q12H           PRN meds    Calcium Replacement - Follow Nurse / BPA Driven Protocol    heparin    lidocaine    Magnesium Standard Dose Replacement - Follow Nurse / BPA Driven Protocol    [] HYDROmorphone **AND** naloxone    ondansetron    Phosphorus Replacement - Follow Nurse / BPA Driven Protocol    Potassium Replacement - Follow Nurse / BPA Driven Protocol    sodium chloride    sodium chloride    sodium chloride    sodium chloride    sodium chloride    ziprasidone        Abdominal pain    Colon cancer metastasized to brain    Anemia    Rectal adenocarcinoma    Dehydration    Hypokalemia    Abnormal LFTs    Severe protein-calorie malnutrition    Anxiety        Assessment/Plan:      64 y.o. female admitted with Abdominal pain.     Assessment plan  1.  Worsening abdominal pain, in the setting of underlying colorectal malignancy, diverting colostomy and unable to keep anything down, with nausea and vomiting,   -pain is controlled  -anxiety is better     2.  Progressive colorectal malignancy with metastasis, management based on oncology recommendations.  -d/w Oncology and continue with treatment     3.  Leukocytosis, no acute signs of infection at this point of time.  Continue to monitor closely.     4.  Hyperkalemia, stop scheduled potassium.     5.  Delirium  -geodon last night  -5 hours sleep last night    6.  Tachycardia-suspicion for DVT/PE low as her tachycardia is improved with better pain medication   -CT angio unrevealing for pulmonary embolism but does have possible tumor emboli    7. HTN- norvasc 10     8.  CODE STATUS is full code.  Further plans based on hospital course.      Cesar Ko MD  24  11:46 EST

## 2024-12-19 NOTE — PROGRESS NOTES
"    Patient Name: Ely Sims  :1960  64 y.o.      Patient Care Team:  Provider, No Known as PCP - General  Terrie Crenshaw MD as Consulting Physician (Radiation Oncology)  Bull Doty MD as Surgeon (Neurosurgery)  Saturnino Chester MD as Referring Physician (General Surgery)  lEieser Saunders MD as Consulting Physician (Hematology and Oncology)  Debbie Epps RD as Dietitian (Nutrition)    Chief Complaint:   Abdominal pain, tachycardia, tumor emboli    Interval History:   She feels better, her pain is better.    Objective   Vital Signs  Temp:  [97.3 °F (36.3 °C)-98.1 °F (36.7 °C)] 97.9 °F (36.6 °C)  Heart Rate:  [105-122] 122  Resp:  [16-20] 16  BP: (109-152)/(68-98) 152/95    Intake/Output Summary (Last 24 hours) at 2024 0811  Last data filed at 2024 0154  Gross per 24 hour   Intake 930 ml   Output 50 ml   Net 880 ml     Flowsheet Rows      Flowsheet Row First Filed Value   Admission Height 170.2 cm (67.01\") Documented at 2024 2306   Admission Weight 61.5 kg (135 lb 9.3 oz) Documented at 2024 1714            GEN: no distress, alert and oriented  HEENT: NACT, EOMI, moist mucous membranes  Lungs: CTAB, no wheezes, rales or rhonchi  CV: tachycardic rate, regular rhythm, normal S1, S2, no murmurs, +2 radial pulses b/l, no carotid bruit  Abdomen: soft, nontender, nondistended, NABS, + ostomy  Extremities: 2-3+ edema  Skin: no rash, warm, dry  Heme/Lymph: no bruising  Psych: organized thought, normal behavior and affect    Results Review:    Results from last 7 days   Lab Units 24  0513   SODIUM mmol/L 133*   POTASSIUM mmol/L 4.5   CHLORIDE mmol/L 102   CO2 mmol/L 23.8   BUN mg/dL 10   CREATININE mg/dL 0.62   GLUCOSE mg/dL 162*   CALCIUM mg/dL 9.5     Results from last 7 days   Lab Units 24  0147   HSTROP T ng/L 9     Results from last 7 days   Lab Units 24  0513   WBC 10*3/mm3 13.95*   HEMOGLOBIN g/dL 10.6*   HEMATOCRIT % 33.8*   PLATELETS 10*3/mm3 197 "         Results from last 7 days   Lab Units 12/15/24  0528   MAGNESIUM mg/dL 2.1                   Medication Review:   ALPRAZolam, 0.5 mg, Oral, TID  amLODIPine, 10 mg, Oral, Q24H  buPROPion XL, 300 mg, Oral, QAM  fentaNYL, 1 patch, Transdermal, Q72H   And  Check Fentanyl Patch Placement, 1 each, Not Applicable, Q12H  dexAMETHasone, 4 mg, Intravenous, Q12H  encorafenib, 300 mg, Oral, Daily  melatonin, 5 mg, Oral, Nightly  polyethylene glycol, 17 g, Oral, Daily  sennosides-docusate, 2 tablet, Oral, Daily  sodium chloride, 10 mL, Intravenous, Q12H  sodium chloride, 10 mL, Intravenous, Q12H              Assessment & Plan   #Chest pain, improved  #SOB  #Tumor emboli  #sinus tachycardia  Severe hypertension  #AMS  #metastatic colon CA     64-year-old woman with metastatic colon cancer to the brain and spine requiring craniotomy, diverting loop sigmoid colostomy and port placement and chemotherapy and radiation who was admitted on 12/13/2024 for further management of severe pain and worsening nausea and vomiting.  Her pain is believed to be secondary to diffuse metastatic disease and she has been started on steroids in addition to narcotics for pain control.  Patient was noted to be altered over the last 2 days and also complained of chest pain so we have been consulted for further management.  Her blood pressure has also been intermittently elevated.     CTA chest with scattered filling defects in the distal subsegmental pulmonary arteries believed to be secondary to tumor emboli    EKG with sinus tachycardia    Hypertension likely 2/2 steroid initiation.   Tachycardia multifactorial, would initially treat the hypertension with non AVN blocking agents.    - Continue amlodipine 10 mg daily  - Start losartan 50 mg daily    Corey Fenton MD, [facility], [facility]  12/19/24  08:11 EST

## 2024-12-19 NOTE — PROGRESS NOTES
Infectious Diseases Progress Note    Estefania Jacobs MD     [facility]  [place]: 6 days  Patient Identification:  Name: Ely Sims  Age: 64 y.o.  Sex: female  :  1960  MRN: [id number]         Primary Care Physician: Provider, No Known  Requesting physician: Dr. Diehl  Date of consultation 12/15/2024  Reason for consultation: Leukocytosis  Subjective: Had an uneventful night denies any complaints.  Denies any fever and chills.  Interval History: See admission history and physical performed as cross cover for internal medicine service on 2024.  Since then patient has been seen by hematology oncology service as well as gastroenterologist service and it appears that her current worsening of nausea vomiting and abdominal pain is likely due to progression of her underlying malignancy made worse by the administration Vectibix.  Steroid has been initiated with instruction to continue her Braftovi.  GI service recommend supportive care.    Objective:    Scheduled Meds:ALPRAZolam, 0.5 mg, Oral, TID  amLODIPine, 10 mg, Oral, Q24H  buPROPion XL, 300 mg, Oral, QAM  fentaNYL, 1 patch, Transdermal, Q72H   And  Check Fentanyl Patch Placement, 1 each, Not Applicable, Q12H  dexAMETHasone, 4 mg, Intravenous, Q12H  encorafenib, 300 mg, Oral, Daily  melatonin, 5 mg, Oral, Nightly  polyethylene glycol, 17 g, Oral, Daily  sennosides-docusate, 2 tablet, Oral, Daily  sodium chloride, 10 mL, Intravenous, Q12H  sodium chloride, 10 mL, Intravenous, Q12H      Continuous Infusions:     Vital signs in last 24 hours:  Temp:  [97.3 °F (36.3 °C)-98.2 °F (36.8 °C)] 97.9 °F (36.6 °C)  Heart Rate:  [105-127] 122  Resp:  [16-20] 16  BP: (109-163)/(68-98) 152/95    Intake/Output:    Intake/Output Summary (Last 24 hours) at 2024 0700  Last data filed at 2024 0154  Gross per 24 hour   Intake 930 ml   Output 50 ml   Net 880 ml       Exam:  /95 (BP Location: Right arm, Patient Position: Lying)   Pulse (!) 122    "Temp 97.9 °F (36.6 °C) (Oral)   Resp 16   Ht 170.2 cm (67.01\")   Wt 61.5 kg (135 lb 9.3 oz)   SpO2 100%   BMI 21.23 kg/m²   Patient is examined using the personal protective equipment as per guidelines from infection control for this particular patient as enacted.  Hand washing was performed before and after patient interaction.  General Appearance:  Awake alert and ill-appearing currently being assisted in walking around the hallways by the family members, gets tired easily and appears uncomfortable                          Head:    Normocephalic, without obvious abnormality, atraumatic                           Eyes:    PERRL, conjunctivae/corneas clear, EOM's intact, both eyes                         Throat: Dry oral mucosa                           Neck:   Supple, symmetrical, trachea midline, no JVD                         Lungs:    Clear to auscultation bilaterally, respirations unlabored                 Chest Wall:    No tenderness or deformity                          Heart:  S1-S2 regular                  Abdomen:   Generalized abdominal tenderness but soft no guarding rigidity or rebound noted                 Extremities:   Extremities normal, atraumatic, no cyanosis or edema                        Pulses:   Pulses palpable in all extremities                            Skin:   Skin is warm and dry,  no rashes or palpable lesions                  Neurologic: Alert and oriented x 3       Data Review:    I reviewed the patient's new clinical results.  Results from last 7 days   Lab Units 12/19/24  0513 12/18/24 0749 12/17/24 0147 12/16/24 0546 12/15/24  0528 12/14/24  0453 12/13/24  1334   WBC 10*3/mm3 13.95* 16.72* 16.66* 16.37* 14.92* 20.33* 18.44*   HEMOGLOBIN g/dL 10.6* 10.7* 11.2* 10.0* 9.8* 12.0 11.7*   PLATELETS 10*3/mm3 197 184 226 210 190 241 289     Results from last 7 days   Lab Units 12/19/24  0513 12/18/24  1815 12/18/24  0749 12/17/24  0147 12/16/24  0546 12/15/24  0528 12/14/24  0453 " 12/13/24  1334   SODIUM mmol/L 133*  --  139 139 133* 135* 134* 136   POTASSIUM mmol/L 4.5 3.3* 3.6 4.3 4.7 5.4* 4.7 2.9*   CHLORIDE mmol/L 102  --  106 100 100 103 100 93*   CO2 mmol/L 23.8  --  21.0* 23.0 22.9 23.0 17.0* 27.6   BUN mg/dL 10  --  9 13 15 10 12 13   CREATININE mg/dL 0.62  --  0.54* 0.58 0.69 0.54* 0.69 0.69   CALCIUM mg/dL 9.5  --  8.0* 8.9 8.4* 7.8* 8.1* 8.4*   GLUCOSE mg/dL 162*  --  89 92 159* 125* 94 126*     Microbiology Results (last 10 days)       Procedure Component Value - Date/Time    Blood Culture - Blood, Arm, Left [[id number]]  (Normal) Collected: 12/14/24 1200    Lab Status: Preliminary result Specimen: Blood from Arm, Left Updated: 12/18/24 1231     Blood Culture No growth at 4 days    Blood Culture - Blood, Blood, Central Line [[id number]]  (Normal) Collected: 12/14/24 0954    Lab Status: Preliminary result Specimen: Blood, Central Line Updated: 12/18/24 1031     Blood Culture No growth at 4 days              Assessment:    Abdominal pain    Colon cancer metastasized to brain    Anemia    Rectal adenocarcinoma    Dehydration    Hypokalemia    Abnormal LFTs    Severe protein-calorie malnutrition    Anxiety  Leukocytosis-multifactorial including likely due to:  progression of underlying malignancy versus  steroid use.  She is at risk of infectious process such as infected Mediport as well as common causes of infection that could occur to anybody regardless of her malignancy and its progression-and need to be closely monitored for pneumonia, UTI, phlebitis at the IV site, evolving C. difficile infection etc.    Recommendations/discussion:  At this juncture agree with supportive care and closely monitor her progress under the direction of primary team and hematology oncology service.  Patient will require exhaustive review of system to identify above possibilities for evolving line infection but low threshold to perform septic workup such as blood cultures urinalysis urine cultures and  repeat chest x-ray if she spikes fever.  In the interim continue to observe off antibiotics.  Management of other issues per primary team.  Thank you very much for letting me be the part of your patient care please see above impression and recommendations  Estefania Jacobs MD  12/19/2024  07:00 EST    Parts of this note may be an electronic transcription/translation of spoken language to printed text using the Dragon dictation system.

## 2024-12-19 NOTE — CONSULTS
IDENTIFYING INFORMATION: The patient is a 64-year-old white female admitted complaining of abdominal pain subsequently found to be suffering from colon cancer with extensive metastasis.  She is seen by psychiatry related to increasing confusion    CHIEF COMPLAINT: None given    INFORMANT: Patient, sister and chart    RELIABILITY: Fair    HISTORY OF PRESENT ILLNESS: The patient is a 64-year-old white female who was admitted on 12/13/2024 with complaints of abdominal pain.  Subsequent workup at revealed poorly differentiated colon cancer with extensive mets to spine and brain.  She underwent surgery for resection of a cerebellar metastatic rectal mass in August of this year.  The patient has become increasingly confused and has had episodes of agitation since hospitalization.  She has no prior history of inpatient psychiatric care but has been followed for several years by Dr. Gilbert Terrazas.  She is prescribed Wellbutrin  mg daily related to history of depression.  There is no reported use of alcohol to back or street drugs.  When seen today, the patient exhibiting some clouding of consciousness.  X 2 review.  She does complains of some loss of appetite secondary to nausea.    PAST PSYCHIATRIC HISTORY: As noted previously, the patient has been followed by Dr. Gilbert Terrazas in the past.  She has never been psychiatric hospitalized.    PAST MEDICAL HISTORY: History of rectal and colon carcinoma with metastasis    MEDICATIONS:   Current Facility-Administered Medications   Medication Dose Route Frequency Provider Last Rate Last Admin    ALPRAZolam (XANAX) tablet 0.5 mg  0.5 mg Oral TID Cesar Ko MD   0.5 mg at 12/19/24 0842    amLODIPine (NORVASC) tablet 10 mg  10 mg Oral Q24H Cesar Ko MD   10 mg at 12/19/24 0842    Calcium Replacement - Follow Nurse / BPA Driven Protocol   Not Applicable PRN Estefania Jacobs MD        fentaNYL (DURAGESIC) 50 MCG/HR patch 1 patch  1 patch Transdermal Q72H Maikel  Cesar AGUIRRE MD   1 patch at 12/17/24 1417    And    Check Fentanyl Patch Placement  1 each Not Applicable Q12H Cesar Ko MD        dexAMETHasone (DECADRON) injection 4 mg  4 mg Intravenous Q12H Cesar Ko MD   4 mg at 12/19/24 0644    encorafenib (BRAFTOVI) capsule 300 mg  300 mg Oral Daily Cesar Ko MD   300 mg at 12/19/24 0844    heparin injection 500 Units  5 mL Intravenous PRN Les Diehl MD        lidocaine (LMX) 4 % cream   Topical PRN Estefania Jacobs MD        Magnesium Standard Dose Replacement - Follow Nurse / BPA Driven Protocol   Not Applicable PRN Estefania Jacobs MD        melatonin tablet 5 mg  5 mg Oral Nightly Cesar Ko MD        naloxone (NARCAN) injection 0.4 mg  0.4 mg Intravenous Q5 Min PRN Estefania Jacobs MD        ondansetron (ZOFRAN) injection 4 mg  4 mg Intravenous Q6H PRN Estefania Jacobs MD        Phosphorus Replacement - Follow Nurse / BPA Driven Protocol   Not Applicable PRN Estefania Jacobs MD        polyethylene glycol (MIRALAX) packet 17 g  17 g Oral Daily Estefania Jacobs MD   17 g at 12/19/24 0842    Potassium Replacement - Follow Nurse / BPA Driven Protocol   Not Applicable PRN Estefania Jacobs MD        sennosides-docusate (PERICOLACE) 8.6-50 MG per tablet 2 tablet  2 tablet Oral Daily Estefania Jacobs MD   2 tablet at 12/19/24 0842    sodium chloride 0.9 % flush 10 mL  10 mL Intravenous Q12H Estefania Jacobs MD   10 mL at 12/19/24 0842    sodium chloride 0.9 % flush 10 mL  10 mL Intravenous PRN Estefania Jacobs MD        sodium chloride 0.9 % flush 10 mL  10 mL Intravenous Q12H Les Diehl MD   10 mL at 12/19/24 0842    sodium chloride 0.9 % flush 10 mL  10 mL Intravenous PRN Les Diehl MD        sodium chloride 0.9 % flush 20 mL  20 mL Intravenous PRN Les Diehl MD        sodium chloride 0.9 % infusion 40 mL  40 mL Intravenous PRN Estefania Jacobs MD        sodium chloride 0.9 % infusion 40 mL  40 mL Intravenous PRN Les Diehl MD         ziprasidone (GEODON) injection 20 mg  20 mg Intramuscular Q6H PRN Tricia Smith MD   20 mg at 12/19/24 8020         ALLERGIES: None    FAMILY HISTORY: Not obtained    SOCIAL HISTORY: No reported use of alcohol, tobacco, or street drugs    MENTAL STATUS EXAM: The patient is a well-developed large white female appearing her stated age.  She is awake and alert and oriented x 2.  Her mood is somewhat expansive her affect labile.  Speech is generally relevant and coherent.  She does not comply with formal testing of memory and cognition.  She denies current suicidal or homicidal ideation or any psychotic symptoms.  Her judgment and insight appear to be somewhat impaired.    ASSETS/LIABILITIES: Supportive family/health issues    DIAGNOSTIC IMPRESSION: Metabolic encephalopathy, depressive disorder unspecified, medical problems as noted previously    PLAN: The etiology of the patient's encephalopathy may be related to recurrence of brain metastasis, steroid related or due to some other cause.  I had initially considered discontinuing the patient's Wellbutrin given her history of craniotomy in August of this year, but have noted that she is on no anticonvulsant medication so this medication will be continued.  Family reports that Geodon has been effective in addressing symptoms of agitation.  For now I will leave it on an as needed basis.  Family does report a history of adverse response to Ativan and I have discontinued this medication.  I will continue to follow with you.

## 2024-12-19 NOTE — PLAN OF CARE
Goal Outcome Evaluation:  Plan of Care Reviewed With: patient, family           Answered all orientation questions appropriately but seems disoriented to situation, IV steroids switched to PO, deaccessed port, losartan started for HTN, family at bedside, sitter discontinued, fentanyl patch on left upper arm, prn norco given x1

## 2024-12-19 NOTE — PLAN OF CARE
Goal Outcome Evaluation:    Patient alert and oriented, interacted with family, and was just more appropriate overall. Pt was easier to redirect, if needed. See MAR for medications administered this shift. No s/s of acute distress noted. Patient ambulated around the unit multiple times throughout the night with sitter and family.VSS, room air, a.m. labs drawn and sent. Plan of care is ongoing.

## 2024-12-20 ENCOUNTER — DOCUMENTATION (OUTPATIENT)
Dept: HOME HEALTH SERVICES | Facility: HOME HEALTHCARE | Age: 64
End: 2024-12-20
Payer: COMMERCIAL

## 2024-12-20 ENCOUNTER — HOME HEALTH ADMISSION (OUTPATIENT)
Dept: HOME HEALTH SERVICES | Facility: HOME HEALTHCARE | Age: 64
End: 2024-12-20
Payer: COMMERCIAL

## 2024-12-20 ENCOUNTER — TRANSCRIBE ORDERS (OUTPATIENT)
Dept: HOME HEALTH SERVICES | Facility: HOME HEALTHCARE | Age: 64
End: 2024-12-20
Payer: COMMERCIAL

## 2024-12-20 ENCOUNTER — READMISSION MANAGEMENT (OUTPATIENT)
Dept: CALL CENTER | Facility: HOSPITAL | Age: 64
End: 2024-12-20
Payer: COMMERCIAL

## 2024-12-20 VITALS
TEMPERATURE: 97.7 F | BODY MASS INDEX: 21.28 KG/M2 | HEART RATE: 115 BPM | WEIGHT: 135.58 LBS | DIASTOLIC BLOOD PRESSURE: 93 MMHG | OXYGEN SATURATION: 98 % | SYSTOLIC BLOOD PRESSURE: 148 MMHG | RESPIRATION RATE: 18 BRPM | HEIGHT: 67 IN

## 2024-12-20 DIAGNOSIS — C79.31 COLON CANCER METASTASIZED TO BRAIN: Primary | ICD-10-CM

## 2024-12-20 DIAGNOSIS — C18.9 COLON CANCER METASTASIZED TO BRAIN: Primary | ICD-10-CM

## 2024-12-20 LAB
BASOPHILS # BLD AUTO: 0.05 10*3/MM3 (ref 0–0.2)
BASOPHILS NFR BLD AUTO: 0.3 % (ref 0–1.5)
DEPRECATED RDW RBC AUTO: 54.7 FL (ref 37–54)
EOSINOPHIL # BLD AUTO: 0.01 10*3/MM3 (ref 0–0.4)
EOSINOPHIL NFR BLD AUTO: 0.1 % (ref 0.3–6.2)
ERYTHROCYTE [DISTWIDTH] IN BLOOD BY AUTOMATED COUNT: 15.8 % (ref 12.3–15.4)
HCT VFR BLD AUTO: 35.9 % (ref 34–46.6)
HGB BLD-MCNC: 12.2 G/DL (ref 12–15.9)
IMM GRANULOCYTES # BLD AUTO: 0.31 10*3/MM3 (ref 0–0.05)
IMM GRANULOCYTES NFR BLD AUTO: 2 % (ref 0–0.5)
LYMPHOCYTES # BLD AUTO: 1.21 10*3/MM3 (ref 0.7–3.1)
LYMPHOCYTES NFR BLD AUTO: 7.7 % (ref 19.6–45.3)
MCH RBC QN AUTO: 32.5 PG (ref 26.6–33)
MCHC RBC AUTO-ENTMCNC: 34 G/DL (ref 31.5–35.7)
MCV RBC AUTO: 95.7 FL (ref 79–97)
MONOCYTES # BLD AUTO: 2.05 10*3/MM3 (ref 0.1–0.9)
MONOCYTES NFR BLD AUTO: 13 % (ref 5–12)
NEUTROPHILS NFR BLD AUTO: 12.15 10*3/MM3 (ref 1.7–7)
NEUTROPHILS NFR BLD AUTO: 76.9 % (ref 42.7–76)
NRBC BLD AUTO-RTO: 0 /100 WBC (ref 0–0.2)
PLATELET # BLD AUTO: 277 10*3/MM3 (ref 140–450)
PMV BLD AUTO: 10.6 FL (ref 6–12)
RBC # BLD AUTO: 3.75 10*6/MM3 (ref 3.77–5.28)
WBC NRBC COR # BLD AUTO: 15.78 10*3/MM3 (ref 3.4–10.8)

## 2024-12-20 PROCEDURE — 63710000001 DEXAMETHASONE PER 0.25 MG: Performed by: HOSPITALIST

## 2024-12-20 PROCEDURE — 85025 COMPLETE CBC W/AUTO DIFF WBC: CPT | Performed by: INTERNAL MEDICINE

## 2024-12-20 RX ORDER — FUROSEMIDE 40 MG/1
40 TABLET ORAL DAILY
Status: DISCONTINUED | OUTPATIENT
Start: 2024-12-20 | End: 2024-12-20 | Stop reason: HOSPADM

## 2024-12-20 RX ORDER — HYDROCODONE BITARTRATE AND ACETAMINOPHEN 10; 325 MG/1; MG/1
1 TABLET ORAL EVERY 6 HOURS PRN
Status: DISCONTINUED | OUTPATIENT
Start: 2024-12-20 | End: 2024-12-20 | Stop reason: HOSPADM

## 2024-12-20 RX ORDER — FUROSEMIDE 40 MG/1
40 TABLET ORAL DAILY
Qty: 30 TABLET | Refills: 0 | Status: SHIPPED | OUTPATIENT
Start: 2024-12-20

## 2024-12-20 RX ORDER — POTASSIUM CHLORIDE 750 MG/1
20 TABLET, EXTENDED RELEASE ORAL 2 TIMES DAILY
Qty: 60 TABLET | Refills: 0 | Status: SHIPPED | OUTPATIENT
Start: 2024-12-20

## 2024-12-20 RX ORDER — LOSARTAN POTASSIUM 50 MG/1
50 TABLET ORAL DAILY
Qty: 30 TABLET | Refills: 0 | Status: SHIPPED | OUTPATIENT
Start: 2024-12-21

## 2024-12-20 RX ORDER — DEXAMETHASONE 4 MG/1
4 TABLET ORAL 2 TIMES DAILY
Qty: 60 TABLET | Refills: 0 | Status: SHIPPED | OUTPATIENT
Start: 2024-12-20

## 2024-12-20 RX ORDER — FENTANYL 50 UG/1
1 PATCH TRANSDERMAL
Qty: 10 PATCH | Refills: 0 | Status: SHIPPED | OUTPATIENT
Start: 2024-12-20

## 2024-12-20 RX ORDER — FENTANYL 50 UG/1
1 PATCH TRANSDERMAL
Status: DISCONTINUED | OUTPATIENT
Start: 2024-12-20 | End: 2024-12-20 | Stop reason: HOSPADM

## 2024-12-20 RX ORDER — AMLODIPINE BESYLATE 10 MG/1
10 TABLET ORAL
Qty: 30 TABLET | Refills: 0 | Status: SHIPPED | OUTPATIENT
Start: 2024-12-21

## 2024-12-20 RX ADMIN — ALPRAZOLAM 0.5 MG: 0.5 TABLET ORAL at 08:40

## 2024-12-20 RX ADMIN — ALPRAZOLAM 0.5 MG: 0.5 TABLET ORAL at 14:23

## 2024-12-20 RX ADMIN — AMLODIPINE BESYLATE 10 MG: 10 TABLET ORAL at 08:40

## 2024-12-20 RX ADMIN — SENNOSIDES AND DOCUSATE SODIUM 2 TABLET: 50; 8.6 TABLET ORAL at 08:40

## 2024-12-20 RX ADMIN — FENTANYL 1 PATCH: 50 PATCH TRANSDERMAL at 14:23

## 2024-12-20 RX ADMIN — LOSARTAN POTASSIUM 50 MG: 50 TABLET, FILM COATED ORAL at 08:40

## 2024-12-20 RX ADMIN — BUPROPION HYDROCHLORIDE 300 MG: 300 TABLET, EXTENDED RELEASE ORAL at 08:40

## 2024-12-20 RX ADMIN — HYDROCODONE BITARTRATE AND ACETAMINOPHEN 1 TABLET: 5; 325 TABLET ORAL at 10:24

## 2024-12-20 RX ADMIN — DEXAMETHASONE 4 MG: 4 TABLET ORAL at 08:39

## 2024-12-20 RX ADMIN — HYDROCODONE BITARTRATE AND ACETAMINOPHEN 1 TABLET: 10; 325 TABLET ORAL at 11:24

## 2024-12-20 RX ADMIN — FUROSEMIDE 40 MG: 40 TABLET ORAL at 14:23

## 2024-12-20 RX ADMIN — POLYETHYLENE GLYCOL 3350 17 G: 17 POWDER, FOR SOLUTION ORAL at 08:39

## 2024-12-20 RX ADMIN — HYDROCODONE BITARTRATE AND ACETAMINOPHEN 1 TABLET: 5; 325 TABLET ORAL at 04:35

## 2024-12-20 NOTE — PROGRESS NOTES
Patient is discharging today. Spoke to son Elia / LUKASZ who is agreeable to home health and has no current home health. Face sheet is correct and please call Elia - [phone] or partner Ubaldo- [phone] for visit scheduling. Will transcribe home health orders per verbal order Anjelica/ Dr Saunders for SN and PT. Thank you!

## 2024-12-20 NOTE — CONSULTS
Please see inpatient psychiatrist, Dr. White, consult dated 12/19/24; he is following this patient.

## 2024-12-20 NOTE — PLAN OF CARE
Goal Outcome Evaluation:      Patient resting in bed at this time, family at bedside. VSS. Room air, up at lb. See MAR for c/o pain. Fentanyl patch in place on left arm. Plan of care is ongoing.

## 2024-12-20 NOTE — CASE MANAGEMENT/SOCIAL WORK
Case Management Discharge Note      Final Note: Plan home with [facility] and 24/7 family support. Family to transport.         Selected Continued Care - Admitted Since 12/13/2024       Destination    No services have been selected for the patient.                Durable Medical Equipment    No services have been selected for the patient.                Dialysis/Infusion    No services have been selected for the patient.                Home Medical Care       Service Provider Services Address Phone Fax Patient Preferred    [facility], Home Nursing, Home Rehabilitation [address] [place] [address] [address] [id number] [phone] --              Therapy    No services have been selected for the patient.                Community Resources    No services have been selected for the patient.                Community & DME    No services have been selected for the patient.                    Selected Continued Care - Episodes Includes continued care and service providers with selected services from the active episodes listed below      Oncology- External Fill Episode start date: 12/5/2024   There are no active outsourced providers for this episode.                      Final Discharge Disposition Code: 06 - home with home health care

## 2024-12-20 NOTE — DISCHARGE SUMMARY
[facility]  [phone]    DISCHARGE SUMMARY  [facility]    Patient Identification:  Name: Ely Sims  Age: 64 y.o.  Sex: female  :  1960  MRN: [id number]  Primary Care Physician: Provider, No Known    Admit date: 2024  Discharge date and time: 2024     Discharge Diagnoses:  Abdominal pain    Colon cancer metastasized to brain    Anemia    Rectal adenocarcinoma    Dehydration    Hypokalemia    Abnormal LFTs    Severe protein-calorie malnutrition    Anxiety       History of present illness from H&P:    Patient is a 64-year-old female who has complicated past medical history including history of metastatic colon cancer with mets to brain requiring craniotomy for posterior fossa mass removal from the right cerebellar hemisphere and was found to have poorly differentiated adenocarcinoma of colorectal origin.  CT scan of the abdomen pelvis at that time of initial evaluation showed thickening of the colon and surrounding mesenteric tissue involvement consistent with primary colon cancer.  Patient had colonoscopy which confirmed the presence of poorly differentiated carcinoma with neuroendocrine features and afterwards she was noted to have thoracic spinal cord metastatic disease.  Patient was seen by general surgery service and underwent diverting loop sigmoid colostomy and port placement.  Patient was subsequently started on chemotherapy as well as radiation therapy to her brain and spine.  Patient received 4 cycles of chemotherapy since the diagnosis until 2024 and subsequently a repeat CAT scan performed on 2024 shows progression of the disease.  Patient was started on modified regimen on 12/10/2024 consisting of panitumumab and encorafenib.  She had a follow-up visit today after the first treatment and noted to have severe pain and worsening nausea and vomiting and unable to keep anything down.  Blood work done today showed  potassium of 2.9 and white blood cell count of 18,000.  Patient was having some chills.  Because of her abdominal discomfort since introduction of new chemo immuno regimen and unable to keep anything down patient was admitted directly for further care and management.  After receiving IV fluids and pain medications patient is feeling somewhat better and antiemetics patient is currently feeling better and being visited by family members at the bedside.     Hospital Course:       64 y.o. female admitted with Abdominal pain.     1.  Worsening abdominal pain, in the setting of underlying colorectal malignancy, diverting colostomy and unable to keep anything down, with nausea and vomiting,   -Pain has been controlled here in the hospital with fentanyl and oral pain medication.  New prescription for fentanyl has been ordered.  -anxiety is currently better controlled.  -Patient will be following up with oncology on Monday.    2.  Progressive colorectal malignancy with metastasis, management based on oncology recommendations.  -d/w Oncology and continue with treatment     3.  Leukocytosis, no acute signs of infection at this point of time.  She will be following up with CBC on Monday    4.  Hypertension with lower extremity edema-has been started on Norvasc and losartan.  She is noted to have significant lower extremity edema is starting to weep.  We are going to start the patient on Lasix 40 mg and add 20 mg equivalents of potassium.  She will need to follow-up with his BMP on Monday.  I discussed with the patient about possibly staying in the hospital the next day or 2 to monitor kidney function but she is having a very tough time sleeping.  She she is very uncomfortable in the bed.  She has tremendous support from family who will help her outpatient.  Also recommended lymphedema wraps for her lower extremities.     5.  Delirium  -Has improved dramatically the last couple nights, the night before she did her Ashwini Geodon  but none last night     6.  Tachycardia-she had a CT that did reveal emboli related to tumor but no pulmonary embolism.     Overall patient is feeling much better doing much better still tachycardic still with massive edema in her lower extremities, her TSH is noted to be 6.  Metastatic disease is worsening.  Weighing benefits of staying in the hospital versus benefits from home and the patient is extremely anxious about being discharged plan is for discharge.  Discussed with oncology yesterday and cardiology today.    The patient was seen and examined on the day of discharge.    Consults:   Consults       Date and Time Order Name Status Description    12/18/2024  4:38 PM Inpatient Psychiatrist Consult Completed     12/16/2024  3:32 PM Inpatient Cardiology Consult Completed     12/16/2024 12:37 PM Inpatient Neurology Consult General Completed     12/14/2024  3:45 PM Inpatient Infectious Diseases Consult Completed     12/14/2024  3:44 PM Inpatient Gastroenterology Consult Completed     12/13/2024  6:42 PM Inpatient Hematology & Oncology Consult Completed             Results from last 7 days   Lab Units 12/20/24  0609   WBC 10*3/mm3 15.78*   HEMOGLOBIN g/dL 12.2   HEMATOCRIT % 35.9   PLATELETS 10*3/mm3 277       Results from last 7 days   Lab Units 12/19/24  0513   SODIUM mmol/L 133*   POTASSIUM mmol/L 4.5   CHLORIDE mmol/L 102   CO2 mmol/L 23.8   BUN mg/dL 10   CREATININE mg/dL 0.62   GLUCOSE mg/dL 162*   CALCIUM mg/dL 9.5       Significant Diagnostic Studies:   WBC   Date Value Ref Range Status   12/20/2024 15.78 (H) 3.40 - 10.80 10*3/mm3 Final     Hemoglobin   Date Value Ref Range Status   12/20/2024 12.2 12.0 - 15.9 g/dL Final     Hematocrit   Date Value Ref Range Status   12/20/2024 35.9 34.0 - 46.6 % Final     Platelets   Date Value Ref Range Status   12/20/2024 277 140 - 450 10*3/mm3 Final     Sodium   Date Value Ref Range Status   12/19/2024 133 (L) 136 - 145 mmol/L Final     Potassium   Date Value Ref Range  "Status   12/19/2024 4.5 3.5 - 5.2 mmol/L Final     Chloride   Date Value Ref Range Status   12/19/2024 102 98 - 107 mmol/L Final     CO2   Date Value Ref Range Status   12/19/2024 23.8 22.0 - 29.0 mmol/L Final     BUN   Date Value Ref Range Status   12/19/2024 10 8 - 23 mg/dL Final     Creatinine   Date Value Ref Range Status   12/19/2024 0.62 0.57 - 1.00 mg/dL Final     Glucose   Date Value Ref Range Status   12/19/2024 162 (H) 65 - 99 mg/dL Final     Calcium   Date Value Ref Range Status   12/19/2024 9.5 8.6 - 10.5 mg/dL Final     AST (SGOT)   Date Value Ref Range Status   12/19/2024 37 (H) 1 - 32 U/L Final     ALT (SGPT)   Date Value Ref Range Status   12/19/2024 30 1 - 33 U/L Final     Alkaline Phosphatase   Date Value Ref Range Status   12/19/2024 646 (H) 39 - 117 U/L Final     No results found for: \"APTT\", \"INR\"  No results found for: \"COLORU\", \"CLARITYU\", \"SPECGRAV\", \"PHUR\", \"PROTEINUR\", \"GLUCOSEU\", \"KETONESU\", \"BLOODU\", \"NITRITE\", \"LEUKOCYTESUR\", \"BILIRUBINUR\", \"UROBILINOGEN\", \"RBCUA\", \"WBCUA\", \"BACTERIA\", \"UACOMMENT\"  No results found for: \"TROPONINT\", \"TROPONINI\", \"BNP\"  No components found for: \"HGBA1C;2\"  No components found for: \"TSH;2\"    Imaging Results (All)       Procedure Component Value Units Date/Time    CT Angiogram Chest [[id number]] Collected: 12/17/24 1200     Updated: 12/17/24 1226    Narrative:      CT ANGIOGRAM CHEST-     INDICATION: Rule out pulmonary embolism.     COMPARISON: CT chest November 26, 2024     TECHNIQUE:  CTA chest with IV contrast. Coronal and sagittal reformats. Three  dimensional reconstructions. Radiation dose reduction techniques were  utilized, including automated exposure control and exposure modulation  based on body size.     FINDINGS:      Pulmonary arteries: Streak artifact from contrast in the venous system.  No central pulmonary embolism. Few small subsegmental filling defects  seen eccentric along the vessels that are most likely secondary to tumor  emboli. For " example, small filling defect seen in the medial basilar  segment of the right lower lobe along a subsegmental pulmonary artery,  series 4, axial mage 81, favored tumor embolus. For example, small  filling defect in a subsegmental pulmonary artery in the apical left  upper lobe, series 4, axial mage 37, favored tumor embolus.     Chest wall: Bilateral breast implants. No lymphadenopathy. Left-sided  Port-A-Cath with the catheter in the superior vena cava.     Mediastinum: Coronary artery atherosclerotic calcification. Heart is  normal in size. No pericardial effusion. Mediastinal and hilar  lymphadenopathy. For example, right paratracheal lymph node, series 4,  axial mage 48, measures 1.5 cm, previously 1.0 cm. For example, AP  window lymph node, series 4, axial mage 47, measures 1.0 cm, previously  0.7 cm. For example, left anterior hilar lymph node, series 4, axial  image 60, measures 1.4 cm, previously 0.8 cm. Additional mediastinal and  bilateral hilar lymph nodes that are enlarged and increased in size in  the interval.     Lung/pleura: No effusions. Patent central airways. Innumerable bilateral  pulmonary nodules, in a random distribution, consistent with pulmonary  metastases. For example, solid pulmonary nodule in the apical right  upper lobe, series 5, axial mage 28, measures 7 mm, previously 5 mm. For  example, solid pulmonary nodule in the right middle lobe, series 5,  axial mage 74, measures 7 mm, previously 5 mm. For example, solid  pulmonary nodule in the lingula, series 5, axial mage 83, measures 6 mm,  previously 3 mm. Innumerable additional solid pulmonary nodules that  have increased in size in the interval. Small groundglass opacity in the  anterior left upper lobe, series 5, axial mage 37. Additional  groundglass opacities in the anterior segment left upper lobe, series 5,  axial mage 53.     Upper abdomen: Innumerable hypoattenuating liver masses, suboptimally  visualized on this phase of  imaging. Upper abdominal lymphadenopathy.  For example, periportal/gastrohepatic ligament lymphadenopathy, series  4, axial mage 151, measures 1.4 cm, previously 1.0 cm.     Osseous structures: Spondylosis/degenerative disc disease seen at T8/T9.       Impression:         1. Progression in metastatic disease with significant increase in size  of pulmonary metastasis and significant increase in size of mediastinal  and hilar charlie metastatic disease and upper abdominal charlie metastatic  disease. Diffuse liver metastases also present, suboptimally evaluated  on this phase of imaging.  2. No central pulmonary embolism. Scattered tiny filling defects seen in  distal subsegmental pulmonary arteries, which are favored to be  secondary to tumor emboli.  3. Small groundglass opacities seen in the anterior segment left upper  lobe, may be infectious or inflammatory.     This report was finalized on 12/17/2024 12:23 PM by Dr. Will Wong M.D on Workstation: [id number]       CT Abdomen Pelvis With Contrast [[id number]] Collected: 12/14/24 2330     Updated: 12/14/24 2330    Narrative:        Patient: MELONY TAMAYO  Time Out: 23:29  Exam(s): CT ABDOMEN + PELVIS With Contrast IV Amt: 85ml isovue 300    EXAM:    CT Abdomen and Pelvis With Intravenous Contrast    CLINICAL HISTORY:      Worsening abdominal pain in the setting of known malignancy and   recent immunotherapy on 12 10 2024.    TECHNIQUE:    Axial computed tomography images of the abdomen and pelvis with   intravenous contrast.  CTDI is 8.68 mGy and DLP is 440 mGy-cm.  This CT   exam was performed according to the principle of ALARA (As Low As   Reasonably Achievable) by using one or more of the following dose   reduction techniques: automated exposure control, adjustment of the mA   and or kV according to patient size, and or use of iterative   reconstruction technique.    COMPARISON:    CT abdomen and pelvis dated 11 26 2024    FINDINGS:    Lung bases:  Marked  interval progression of extensive pulmonary   metastatic disease throughout the lung bases compared to the prior   examination.  No consolidation.     ABDOMEN:    Liver:  There is marked interval progression of extensive metastatic   disease throughout the liver with suspected near confluent parenchymal   replacement noted posteriorly and medially throughout the right lobe and   about the periportal region with interval enlargement of the liver, now   with relative hypertrophy of the left lobe.  There is developing lobular   contours of the anterior aspect of the liver.    Gallbladder and bile ducts:  The gallbladder is predominantly   decompressed with only mild fluid internally.  The gallbladder wall is   slightly prominent.  There is pericholecystic fat stranding.  No biliary   dilatation.    Pancreas:  Unremarkable.  No mass.  No ductal dilation.    Spleen:  Unremarkable.  No splenomegaly.    Adrenals:  Unremarkable.  No mass.    Kidneys and ureters:  Unremarkable.  No solid mass.  No hydronephrosis.    Stomach and bowel:  No evidence for bowel obstruction.  The left sided   diverting colostomy is again noted.  There is similar asymmetric mucosal   thickening involving the left rectosigmoid wall with confluent soft   tissue extending into the adjacent perirectal fat and regional   vasculature.  Adjacent nodular changes are presumed localized   lymphadenopathy, more prominent from the previous examination.     PELVIS:    Appendix:  No findings to suggest acute appendicitis.    Bladder:  Unremarkable.  No mass.    Reproductive:  Unremarkable as visualized.     ABDOMEN and PELVIS:    Intraperitoneal space:  Unremarkable.  No free air.  No significant   fluid collection.    Bones joints:  No acute fracture.  No dislocation.    Soft tissues:  See above.  Mild fat stranding in the pelvis may   represent developing mild anasarca.    Vasculature:  Unremarkable.  No abdominal aortic aneurysm.    Lymph nodes:  Interval  "progression of extensive lymphadenopathy in the   gastrohepatic ligament, periportal and portal caval regions.  There is   also aortocaval lymph nodes now identified which are borderline to mildly   enlarged.    IMPRESSION:       1.  Marked interval progression of extensive pulmonary metastatic disease   throughout the lung bases compared to the prior examination.  2.  There is marked interval progression of extensive metastatic disease   throughout the liver with suspected near confluent parenchymal   replacement noted posteriorly and medially throughout the right lobe and   about the periportal region with interval enlargement of the liver, now   with relative hypertrophy of the left lobe.  There is developing lobular   contours of the anterior aspect of the liver.  Please correlate with   hepatic laboratory findings for potential developing hepatic   insufficiency.  3.  Interval progression of extensive lymphadenopathy in the   gastrohepatic ligament, periportal and portal caval regions.  There is   also aortocaval lymph nodes now identified which are borderline to mildly   enlarged.  4.  The gallbladder is predominantly decompressed with only mild fluid   internally.  The gallbladder wall is slightly prominent.  There is   pericholecystic fat stranding.  Favor changes related to the liver rather   than acute cholecystitis.  No biliary dilatation.  5.  No evidence for bowel obstruction.  The left sided diverting   colostomy is again noted.  There is similar asymmetric mucosal thickening   involving the left rectosigmoid wall with confluent soft tissue extending   into the adjacent perirectal fat and regional vasculature.  Adjacent   nodular changes are presumed localized lymphadenopathy, more prominent   from the previous examination.      Impression:          Electronically signed by Jean Woodward MD on 12-14-24 at 2322        No results found for: \"SITE\", \"ALLENTEST\", \"PHART\", \"UQY3HMG\", \"PO2ART\", \"YIW9KYJ\", " "\"BASEEXCESS\", \"T2IYRSBW\", \"HGBBG\", \"HCTABG\", \"OXYHEMOGLOBI\", \"METHHGBN\", \"CARBOXYHGB\", \"CO2CT\", \"BAROMETRIC\", \"MODALITY\", \"FIO2\"       Discharge Medications        New Medications        Instructions Start Date   amLODIPine 10 MG tablet  Commonly known as: NORVASC   10 mg, Oral, Every 24 Hours Scheduled   Start Date: December 21, 2024     dexAMETHasone 4 MG tablet  Commonly known as: DECADRON   4 mg, Oral, 2 Times Daily      fentaNYL 50 MCG/HR patch  Commonly known as: DURAGESIC   1 patch, Transdermal, Every 72 Hours      furosemide 40 MG tablet  Commonly known as: LASIX   40 mg, Oral, Daily      losartan 50 MG tablet  Commonly known as: COZAAR   50 mg, Oral, Daily   Start Date: December 21, 2024     melatonin 5 MG tablet tablet   5 mg, Oral, Nightly             Changes to Medications        Instructions Start Date   buPROPion  MG 24 hr tablet  Commonly known as: WELLBUTRIN XL   2 tablets, Oral, Every Morning      HYDROcodone-acetaminophen  MG per tablet  Commonly known as: NORCO  What changed: Another medication with the same name was removed. Continue taking this medication, and follow the directions you see here.   1 tablet, Oral, Every 6 Hours PRN      potassium chloride 10 MEQ CR tablet  What changed: when to take this   20 mEq, Oral, 2 Times Daily             Continue These Medications        Instructions Start Date   encorafenib 75 MG capsule  Commonly known as: BRAFTOVI   300 mg, Oral, Daily      polyethylene glycol 17 g packet  Commonly known as: MIRALAX   17 g, Oral, Daily      sennosides-docusate 8.6-50 MG per tablet  Commonly known as: PERICOLACE   2 tablets, Oral, Daily             Stop These Medications      amphetamine-dextroamphetamine 20 MG tablet  Commonly known as: ADDERALL     atorvastatin 10 MG tablet  Commonly known as: LIPITOR     lidocaine-prilocaine 2.5-2.5 % cream  Commonly known as: EMLA     LORazepam 0.5 MG tablet  Commonly known as: ATIVAN     Morphine 15 MG 12 hr " tablet  Commonly known as: MS CONTIN     prochlorperazine 10 MG tablet  Commonly known as: COMPAZINE                Patient Instructions:       Future Appointments   Date Time Provider Department Center   12/23/2024  1:00 PM [facility] CBC [facility]RE PORT CHAIR  [facility]   12/23/2024  1:20 PM Elieser Saunders MD [facility] CBC [facility]   12/23/2024  1:45 PM CHAIR  KELTON Harding MD  [facility]   1/24/2025 12:45 PM [facility] MRI 1 (3T)  [facility] MRI [facility]   1/24/2025  1:45 PM [facility] MRI 1 (3T)  [facility] MRI [facility]   1/30/2025  8:30 AM Bull Doty MD [facility] NS [facility]   2/3/2025 10:00 AM Terrie Crenshaw MD [facility] RO [facility]RESG None   3/4/2025  1:00 PM Saturnino Chester MD [facility] GS SALOU [facility]         Contact information for follow-up providers       Provider, No Known .    Contact information:  [facility] [address] [phone]                       Contact information for after-discharge care       Home Medical Care       [facility] .    Services: Home Health Services, Home Nursing, Home Rehabilitation  Contact information:  [address]  [facility] [address] [phone]                                   Discharge Order (From admission, onward)       Start     Ordered    12/20/24 1339  Discharge patient  Once        Expected Discharge Date: 12/20/24   Discharge Disposition: Home or Self Care   Physician of Record for Attribution - Please select from Treatment Team: BRETT ABDI [[id number]]   Review needed by CMO to determine Physician of Record: No      Question Answer Comment   Physician of Record for Attribution - Please select from Treatment Team BRETT ABDI    Review needed by CMO to determine Physician of Record No        12/20/24 1346                    No active diet order        Discharge instructions:  cbc and cmp on Monday at the [facility]        Total time spent discharging patient including evaluation, post hospitalization follow up,  medication and post  hospitalization instructions and education, total time exceeds 30 minutes.    Signed:  Cesar Ko MD  12/20/2024  18:58 EST

## 2024-12-20 NOTE — PROGRESS NOTES
The patient seems less confused when seen today.  She is oriented x 2 and is able to identify that [place] is near.  She does not seem nearly as encephalopathic as yesterday.  For now, I will make no other changes in her psychotropic medication regimen.

## 2024-12-20 NOTE — NURSING NOTE
"   12/20/24 1031   Colostomy LLQ   No Placement date: If unknown, DO NOT use \"Add Comment\" note or Placement time: If unknown, DO NOT use \"Add Comment\" note found.   Location: LLQ   Stomal Appliance 2 piece;Clean;Dry;Intact;Changed;Drainable   Stoma Appearance round;moist;red   Peristomal Assessment Clean;Intact   Accessories/Skin Care convex wafer;cleansed with water;skin barrier ring   Stoma Function flatus;stool   Stool Color brown   Stool Consistency loose   Treatment Bag change;Site care   Output (mL) 100 mL     WOCN: ostomy appliance changed. Xtra supplies brought to room. Patient is very restless.  Placed in 2 3/4 convex 2 piece skyler with ring. Will plan to change 2 times per week.   "

## 2024-12-20 NOTE — CASE MANAGEMENT/SOCIAL WORK
Continued Stay Note  [facility]     Patient Name: Ely Sims  MRN: [id number]  Today's Date: 12/20/2024    Admit Date: 12/13/2024    Plan: Home with [facility] and 24/7 family support. Family to transport.   Discharge Plan       Row Name 12/20/24 1314       Plan    Plan Home with [facility] and 24/7 family support. Family to transport.    Patient/Family in Agreement with Plan yes    Plan Comments [facility] accepted pt. CCP followed up with pt and pt's significant other/Ubaldo regarding DC plan. Pt and Ubaldo notified [facility] accepted pt. Ubaldo confirms plan is for pt to return home with [facility]. Pt will have 24/7 family support. Family will transport at DC. . RLutes RN/CCP                   Discharge Codes    No documentation.                 Expected Discharge Date and Time       Expected Discharge Date Expected Discharge Time    Dec 20, 2024               Corinne Baez RN

## 2024-12-20 NOTE — PROGRESS NOTES
"[facility] GROUP OUTPATIENT FOLLOW UP CLINIC VISIT    REASON FOR FOLLOW-UP:    Metastatic colon cancer     HISTORY OF PRESENT ILLNESS:    Ely Sims is a 64 y.o. female who returns for follow up    Recent admission with worsening abdominal pain. She was discharged on 12/20.  She became sedated during the hospitalization secondary to medications.    She was doing pretty well at that time.  Unfortunately, her fentanyl patch fell off.  This was replaced yesterday but she has been having significantly more pain over the past couple of days.  For some reason discharge instructions from the hospital stated to stop Compazine.  Therefore, she has not been taking any antiemetics and she is very nauseated today.  Her appetite is poor.  She still has ostomy output.    PHYSICAL EXAMINATION:  Vitals:    12/23/24 1354   BP: 134/92   Pulse: (!) 121   Resp: 16   Temp: 97.9 °F (36.6 °C)   TempSrc: Oral   SpO2: 98%   Weight: 56.5 kg (124 lb 9.6 oz)   Height: 170.2 cm (67.01\")   PainSc:   9   PainLoc: Abdomen       General: Alert and oriented.  Looks miserable due to pain and nausea today  Skin:  Warm and dry, no visible rash  HEENT:  clear, moist. No visible sores.   Chest:  Normal respiratory effort.   Abdomen: Ostomy present.    Extremities:  No visible clubbing, cyanosis, or edema  Neuro/psych:  Grossly nonfocal.  Normal mood and affect.      DIAGNOSTIC DATA:  Magnesium (12/23/2024 13:20)  Comprehensive metabolic panel (12/23/2024 13:20)  CBC and Differential (12/23/2024 13:20)    IMAGING:    None reviewed     ASSESSMENT:  This is a 64 y.o. female with:    *Metastatic colon cancer  The patient presented on 8/19/2022 initially to urgent care with headache, nausea, vomiting, diarrhea.  She was advised to go to the ED from urgent care.  CT head 8/19/2024 showed some areas of increased attenuation over the right cerebral hemisphere and left temporal lobe concerning for metastatic disease with edema with some mass effect upon " the fourth ventricle.  MRI brain 8/20/2024 with a lesion at the right cerebellar hemisphere measuring 2.2 cm, lesion within the left temporal lobe measuring 9 mm, 1.0 cm lesion at the left cerebellar vermis, all with surrounding edema.  There was some mass effect upon the fourth ventricle with mild prominence of the lateral and third ventricles.  8/20/2024: CT chest abdomen and pelvis with no evidence of pulmonary metastatic disease.  There was some asymmetric wall thickening in the distal sigmoid colon with surrounding mesenteric soft tissue nodularity concerning for primary colon cancer.  A sclerotic lesion of the spinous process of T1 was said to be consistent with a bone island or other benign lesion.  8/21/2024: Posterior fossa craniectomy with gross total removal of a right cerebellar hemisphere metastasis Dr. Bull Doty.  Pathology consistent with metastatic poorly differentiated adenocarcinoma favoring colorectal origin.  MSI testing is pending.  Tempus with a BRAF V600E mutation.  TMB 4.7 mutations per megabase.  Microsatellite stable.  8/25/2024: Bone scan with no obvious metastatic disease.  8/25/2024: Colonoscopy by Dr. Kaminski shows an infiltrative completely obstructing large mass found at the proximal rectum.  Pathology with invasive poorly differentiated carcinoma with neuroendocrine features. MSI studies pending.     CEA 2.10.  8/26/2024: MRI lumbar spine with no obvious metastatic disease in the lumbar spine.  However, there is a 3 mm enhancing lesion in the visualized lower thoracic spinal cord that could represent a spinal cord metastasis.  Cervical and thoracic spine MRI requested..  8/27/24: diverting loop sigmoid colostomy and port placement by Dr. Chester  8/28/2024: MRI C and T-spine addendum made with a small 3 mm met to the spinal cord at T12  Completed radiation to the brain and spine on 9/24/24  PET scan 9/12/24 with no metastatic disease. FDG avid mass at the high rectum.  10/22/2024:  Proceed with Cycle 2 FOLFOX. Will omit 5 FU bolus due to neutropenia. Future treatment plans adjusted. In regards to lower abdominal cramping which occurs at night, advised patient to discuss with Dr. Chester (surgeon).   11/5/2024: Proceed with Cycle 3 FOLFOX with 5 FU bolus omitted. WBC 4.05 and . Will give a dose of tylenol today while in the infusion area for side/back pain. Instructed her to reach out if the pain/soreness does not subside.   11/19/2024: She presents for cycle #4 of therapy.  Therapy has been well-tolerated.  However, she is having worsening abdominal pain as well as some nausea and vomiting this morning.  See below.  11/26/2024: CT imaging with significant progression of disease  With progression, cetuximab or panitumumab plus encorafenib given the presence of a BRAF V600 E mutation.  Plan panitumumab due to ease of administration every 2 weeks and potential for fewer adverse effects compared to cetuximab. Encorafenib is 300 mg daily.  Panitumumab is 6 mg/kg every 14 days.  Plan treatment until disease progression or unacceptable toxicity.  Treatment is palliative and she understands this.  We will plan to initiate therapy very quickly given significant pression of disease.  C1 D1 panitumumab and encorafenib 12/10/2024  12/13/2024: seen for triage visit concerning severe pain and uncontrolled nausea/vomiting.  She has not been able to keep down any food or drink for the last 3 days.  Has been able to keep down some medications.  Has been utilizing Compazine every 6 hours without any relief.  As far as her pain she is continued MS Contin 15 mg every 8 hours and Norco 10/325 every 6 hours, despite this her pain has continued to worsen.  Her WBC is elevated at 18.44, and she reports developing  chills a few hours ago.  Her bilirubin has also increase dto 2.4.  She will require admission through [facility].  Subsequent admission.  Pain was better when she left the hospital.     *Mild normocytic  anemia  Hemoglobin 12.9    *Neutropenia secondary to chemotherapy  Now with leukocytosis with neutrophilia, reactive    *Abdominal pain  We have started sustained-release morphine 15 mg every 8 hours.  We had intended her for her to take hydrocodone also as needed but she has not been doing this thinking that morphine replaced the hydrocodone.  She was encouraged to take hydrocodone as needed as well.  12/13/2024: Patient is having severe uncontrolled pain.  She has been taking MS Contin 15 mg every 8 hours and Norco 10/325 every 6 hours.  Despite this her pain has continued to worsen.  She will require direct admission through [facility] for further management.  Started Fentanyl patch inpatient.  This along with hydrocodone 10 mg tablets improved her pain.  She just has hydrocodone 5 mg tablets at home.  Unfortunately, her fentanyl patch fell off on Saturday and she has been having more pain and spite of replacement of a new patch yesterday.    *Uncontrolled nausea/vomiting  Compazine was helping at home.  She has not been taking it since leaving the hospital since she apparently was told she was not supposed to take it.  It is unclear why this was stopped.  Records from the hospital do not really discuss her nausea and vomiting and what medication was used for this.  It appears that she was on a scopolamine patch at 1 point but this may have been stopped secondary to altered mental status.  She was also receiving lorazepam which was also stopped due to sedation.    *She had significant lower extremity edema while hospitalized.  This improved with Lasix.    *Hypokalemia-potassium normalized at 4.5    PLAN:   C2 Vectibix, administered every two weeks  Continue encorafenib (Braftovi) 300 mg daily  IV morphine, IV antiemetics in the infusion room today  Resume Compazine at home.  She has a supply.  Also, prescription for ondansetron ODT electronically sent to her pharmacy  Consider a Sancuso patch  Prescription for  hydrocodone/acetaminophen 10 mg tablets electronically sent to her pharmacy.  I encouraged her to stay ahead of the pain.  Continue fentanyl 50 mcg patches  She continues losartan, Norvasc, Lasix for hypertension  She continues dexamethasone 4 mg twice daily  Brain imaging if nausea persists, otherwise scheduled on 1/24  I advised her, her , her mother to call the physician on-call over the next couple of days if she is not doing well  APRN visits in 2, 4 weeks.  I will see her in 6 weeks.  I advised her today of her malignancy does not respond to current therapy and if her performance status declines, end-of-life care will be appropriate.  She, her , and her mother voiced understanding.    I spent 45 minutes in this visit today reviewing her record, communicating with staff, communicating with the patient, her , her mother, placing orders, documenting the encounter.

## 2024-12-20 NOTE — PROGRESS NOTES
Infectious Diseases Progress Note    Estefania Jacobs MD     [facility]  Los: 7 days  Patient Identification:  Name: Ely Sims  Age: 64 y.o.  Sex: female  :  1960  MRN: [id number]         Primary Care Physician: Provider, No Known  Requesting physician: Dr. Diehl  Date of consultation 12/15/2024  Reason for consultation: Leukocytosis  Subjective: Feeling much better and still have good appetite and denies any fever and wants to go home.  Interval History: See admission history and physical performed as cross cover for internal medicine service on 2024.  Since then patient has been seen by hematology oncology service as well as gastroenterologist service and it appears that her current worsening of nausea vomiting and abdominal pain is likely due to progression of her underlying malignancy made worse by the administration Vectibix.  Steroid has been initiated with instruction to continue her Braftovi.  GI service recommend supportive care.    Objective:    Scheduled Meds:ALPRAZolam, 0.5 mg, Oral, TID  amLODIPine, 10 mg, Oral, Q24H  buPROPion XL, 300 mg, Oral, Daily  fentaNYL, 1 patch, Transdermal, Q72H   And  Check Fentanyl Patch Placement, 1 each, Not Applicable, Q12H  dexAMETHasone, 4 mg, Oral, BID  encorafenib, 300 mg, Oral, Daily  losartan, 50 mg, Oral, Daily  melatonin, 5 mg, Oral, Nightly  polyethylene glycol, 17 g, Oral, Daily  sennosides-docusate, 2 tablet, Oral, Daily  sodium chloride, 10 mL, Intravenous, Q12H  sodium chloride, 10 mL, Intravenous, Q12H      Continuous Infusions:     Vital signs in last 24 hours:  Temp:  [97.7 °F (36.5 °C)] 97.7 °F (36.5 °C)  Heart Rate:  [121-124] 122  Resp:  [18] 18  BP: (136-157)/(92-96) 149/96    Intake/Output:    Intake/Output Summary (Last 24 hours) at 2024 0926  Last data filed at 2024 0415  Gross per 24 hour   Intake 210 ml   Output --   Net 210 ml       Exam:  /96 (BP Location: Left arm, Patient Position: Sitting)    "Pulse (!) 122   Temp 97.7 °F (36.5 °C) (Oral)   Resp 18   Ht 170.2 cm (67.01\")   Wt 61.5 kg (135 lb 9.3 oz)   SpO2 99%   BMI 21.23 kg/m²   Patient is examined using the personal protective equipment as per guidelines from infection control for this particular patient as enacted.  Hand washing was performed before and after patient interaction.  General Appearance:  Awake alert and ill-appearing currently being assisted in walking around the hallways by the family members, gets tired easily and appears uncomfortable                          Head:    Normocephalic, without obvious abnormality, atraumatic                           Eyes:    PERRL, conjunctivae/corneas clear, EOM's intact, both eyes                         Throat: Dry oral mucosa                           Neck:   Supple, symmetrical, trachea midline, no JVD                         Lungs:    Clear to auscultation bilaterally, respirations unlabored                 Chest Wall:    No tenderness or deformity                          Heart:  S1-S2 regular                  Abdomen:   Generalized abdominal tenderness but soft no guarding rigidity or rebound noted                 Extremities:   Extremities normal, atraumatic, no cyanosis or edema                        Pulses:   Pulses palpable in all extremities                            Skin:   Skin is warm and dry,  no rashes or palpable lesions                  Neurologic: Alert and oriented x 3       Data Review:    I reviewed the patient's new clinical results.  Results from last 7 days   Lab Units 12/20/24  0609 12/19/24  0513 12/18/24  0749 12/17/24  0147 12/16/24  0546 12/15/24  0528 12/14/24  0453   WBC 10*3/mm3 15.78* 13.95* 16.72* 16.66* 16.37* 14.92* 20.33*   HEMOGLOBIN g/dL 12.2 10.6* 10.7* 11.2* 10.0* 9.8* 12.0   PLATELETS 10*3/mm3 277 197 184 226 210 190 241     Results from last 7 days   Lab Units 12/19/24  0513 12/18/24  1815 12/18/24  0749 12/17/24  0147 12/16/24  0546 12/15/24  0528 " 12/14/24  0453 12/13/24  1334   SODIUM mmol/L 133*  --  139 139 133* 135* 134* 136   POTASSIUM mmol/L 4.5 3.3* 3.6 4.3 4.7 5.4* 4.7 2.9*   CHLORIDE mmol/L 102  --  106 100 100 103 100 93*   CO2 mmol/L 23.8  --  21.0* 23.0 22.9 23.0 17.0* 27.6   BUN mg/dL 10  --  9 13 15 10 12 13   CREATININE mg/dL 0.62  --  0.54* 0.58 0.69 0.54* 0.69 0.69   CALCIUM mg/dL 9.5  --  8.0* 8.9 8.4* 7.8* 8.1* 8.4*   GLUCOSE mg/dL 162*  --  89 92 159* 125* 94 126*     Microbiology Results (last 10 days)       Procedure Component Value - Date/Time    Blood Culture - Blood, Arm, Left [[id number]]  (Normal) Collected: 12/14/24 1200    Lab Status: Final result Specimen: Blood from Arm, Left Updated: 12/19/24 1230     Blood Culture No growth at 5 days    Blood Culture - Blood, Blood, Central Line [[id number]]  (Normal) Collected: 12/14/24 0954    Lab Status: Final result Specimen: Blood, Central Line Updated: 12/19/24 1030     Blood Culture No growth at 5 days              Assessment:    Abdominal pain    Colon cancer metastasized to brain    Anemia    Rectal adenocarcinoma    Dehydration    Hypokalemia    Abnormal LFTs    Severe protein-calorie malnutrition    Anxiety  Leukocytosis-multifactorial including likely due to:  progression of underlying malignancy versus  steroid use.  She is at risk of infectious process such as infected Mediport as well as common causes of infection that could occur to anybody regardless of her malignancy and its progression-and need to be closely monitored for pneumonia, UTI, phlebitis at the IV site, evolving C. difficile infection etc.    Recommendations/discussion:  At this juncture agree with supportive care and closely monitor her progress under the direction of primary team and hematology oncology service.  Patient will require exhaustive review of system to identify above possibilities for evolving line infection but low threshold to perform septic workup such as blood cultures urinalysis urine cultures and  repeat chest x-ray if she spikes fever.  In the interim continue to observe off antibiotics.  Management of other issues per primary team.  Thank you very much for letting me be the part of your patient care please see above impression and recommendations  Estefania Jacobs MD  12/20/2024  09:47 EST    Parts of this note may be an electronic transcription/translation of spoken language to printed text using the Dragon dictation system.

## 2024-12-21 ENCOUNTER — HOME CARE VISIT (OUTPATIENT)
Dept: HOME HEALTH SERVICES | Facility: HOME HEALTHCARE | Age: 64
End: 2024-12-21
Payer: COMMERCIAL

## 2024-12-21 PROCEDURE — G0299 HHS/HOSPICE OF RN EA 15 MIN: HCPCS

## 2024-12-21 NOTE — OUTREACH NOTE
Prep Survey      Flowsheet Row Responses   [facility] facility patient discharged from? [facility]   Is LACE score < 7 ? No   Eligibility Readm Mgmt   Discharge diagnosis Abdominal pain    Colon cancer metastasized to brain   Does the patient have one of the following disease processes/diagnoses(primary or secondary)? Other   Does the patient have Home health ordered? Yes   What is the Home health agency?  [facility]  [place]   Is there a DME ordered? No   Prep survey completed? Yes            JULISSA NG - Registered Nurse

## 2024-12-21 NOTE — Clinical Note
"SOC Note    RE  Ely Sims     60    Home Health ordered for: disciplines:  SN, PT     Reason for Hosp/Primary Dx/Co-morbidities:  Neoplasm related pain (acute) (chronic), malignant neoplasm   Focus of Care: pain control teaching, ostomy assessment and teaching, skin care teaching, medication teaching, safety teaching.     Patient's goal(s):  \"I would like to stay at home and not end up back in the hospital because of the pain\"     Current Functional status/mobility/DME:  Ambulates with assist of one for safety     HB status/Living Arrangements: Lives in clean, well kept home with 4 steps into home.  Also stays with sister during the day at times so she is not alone, and boyfriend is there when not working.     Skin Integrity/wound status:  No skin issues at this time.  Patient has stoma, which is is knowledgebale of caring for.     Code Status: Full     Fall Risk/Safety concerns: High risk for falls due to weakness.     Educated on Emergency Plan, steps to take prior to going to the ER and when to Call Home Health First:  Yes.  On call nurse number written on front of folder, as well as magnet.  Patient shown all placed number is listed in folder and packet      Medication issues/Concerns: Obtained all meds and meds in home.      Additional Problems/Concerns: none at this time.     SDOH Barriers (i.e. caregiver concerns, social isolation, transportation, food insecurity, environment, income etc.)/Need for MSW:not at this time.     Very nice 63 yo female with hx cancer.  Patient has stoma, and is able to manage device indep with assist from boyfriend when she is weak/not feeling well.  Patient will need supplies ordered while she is HH patient.  Patient and spouse following chemo safety precautions in home.  Patient is very weak, boyfriend is cooking and picking up any foods patient wants to try to get her appetite improved.  Patient drinking ample fluids.  Pain fairly well controlled at this time with " current pain interventions.  Teaching done on infection prevention, new/changed meds, appliance and stoma needs.  Put patient in for twice weekly visits for stoma assessment, stoma appliance changes, safety, disease mgmt

## 2024-12-23 ENCOUNTER — INFUSION (OUTPATIENT)
Dept: ONCOLOGY | Facility: HOSPITAL | Age: 64
End: 2024-12-23
Payer: COMMERCIAL

## 2024-12-23 ENCOUNTER — OFFICE VISIT (OUTPATIENT)
Dept: ONCOLOGY | Facility: CLINIC | Age: 64
End: 2024-12-23
Payer: COMMERCIAL

## 2024-12-23 ENCOUNTER — SPECIALTY PHARMACY (OUTPATIENT)
Dept: PHARMACY | Facility: HOSPITAL | Age: 64
End: 2024-12-23
Payer: COMMERCIAL

## 2024-12-23 ENCOUNTER — HOME CARE VISIT (OUTPATIENT)
Dept: HOME HEALTH SERVICES | Facility: HOME HEALTHCARE | Age: 64
End: 2024-12-23
Payer: COMMERCIAL

## 2024-12-23 VITALS
SYSTOLIC BLOOD PRESSURE: 134 MMHG | DIASTOLIC BLOOD PRESSURE: 92 MMHG | OXYGEN SATURATION: 98 % | WEIGHT: 124.6 LBS | HEIGHT: 67 IN | HEART RATE: 121 BPM | RESPIRATION RATE: 16 BRPM | TEMPERATURE: 97.9 F | BODY MASS INDEX: 19.56 KG/M2

## 2024-12-23 DIAGNOSIS — C18.9 COLON CANCER METASTASIZED TO BRAIN: Primary | ICD-10-CM

## 2024-12-23 DIAGNOSIS — C18.9 COLON CANCER METASTASIZED TO BRAIN: ICD-10-CM

## 2024-12-23 DIAGNOSIS — C79.31 COLON CANCER METASTASIZED TO BRAIN: ICD-10-CM

## 2024-12-23 DIAGNOSIS — C20 RECTAL ADENOCARCINOMA: ICD-10-CM

## 2024-12-23 DIAGNOSIS — C79.31 COLON CANCER METASTASIZED TO BRAIN: Primary | ICD-10-CM

## 2024-12-23 DIAGNOSIS — K63.89 COLONIC MASS: ICD-10-CM

## 2024-12-23 DIAGNOSIS — G89.3 CANCER ASSOCIATED PAIN: ICD-10-CM

## 2024-12-23 DIAGNOSIS — C20 RECTAL ADENOCARCINOMA: Primary | ICD-10-CM

## 2024-12-23 LAB
ALBUMIN SERPL-MCNC: 3.6 G/DL (ref 3.5–5.2)
ALBUMIN/GLOB SERPL: 0.9 G/DL
ALP SERPL-CCNC: 596 U/L (ref 39–117)
ALT SERPL W P-5'-P-CCNC: 31 U/L (ref 1–33)
ANION GAP SERPL CALCULATED.3IONS-SCNC: 15.4 MMOL/L (ref 5–15)
AST SERPL-CCNC: 51 U/L (ref 1–32)
BASOPHILS # BLD AUTO: 0.02 10*3/MM3 (ref 0–0.2)
BASOPHILS NFR BLD AUTO: 0.1 % (ref 0–1.5)
BILIRUB SERPL-MCNC: 1.2 MG/DL (ref 0–1.2)
BUN SERPL-MCNC: 17 MG/DL (ref 8–23)
BUN/CREAT SERPL: 16.7 (ref 7–25)
CALCIUM SPEC-SCNC: 10.5 MG/DL (ref 8.6–10.5)
CHLORIDE SERPL-SCNC: 95 MMOL/L (ref 98–107)
CO2 SERPL-SCNC: 22.6 MMOL/L (ref 22–29)
CREAT SERPL-MCNC: 1.02 MG/DL (ref 0.57–1)
DEPRECATED RDW RBC AUTO: 66 FL (ref 37–54)
EGFRCR SERPLBLD CKD-EPI 2021: 61.6 ML/MIN/1.73
EOSINOPHIL # BLD AUTO: 0.01 10*3/MM3 (ref 0–0.4)
EOSINOPHIL NFR BLD AUTO: 0.1 % (ref 0.3–6.2)
ERYTHROCYTE [DISTWIDTH] IN BLOOD BY AUTOMATED COUNT: 18.3 % (ref 12.3–15.4)
GLOBULIN UR ELPH-MCNC: 4.2 GM/DL
GLUCOSE SERPL-MCNC: 145 MG/DL (ref 65–99)
HCT VFR BLD AUTO: 39.6 % (ref 34–46.6)
HGB BLD-MCNC: 12.9 G/DL (ref 12–15.9)
IMM GRANULOCYTES # BLD AUTO: 0.2 10*3/MM3 (ref 0–0.05)
IMM GRANULOCYTES NFR BLD AUTO: 1.3 % (ref 0–0.5)
LYMPHOCYTES # BLD AUTO: 0.82 10*3/MM3 (ref 0.7–3.1)
LYMPHOCYTES NFR BLD AUTO: 5.3 % (ref 19.6–45.3)
MAGNESIUM SERPL-MCNC: 2 MG/DL (ref 1.6–2.4)
MCH RBC QN AUTO: 32.3 PG (ref 26.6–33)
MCHC RBC AUTO-ENTMCNC: 32.6 G/DL (ref 31.5–35.7)
MCV RBC AUTO: 99.2 FL (ref 79–97)
MONOCYTES # BLD AUTO: 1.56 10*3/MM3 (ref 0.1–0.9)
MONOCYTES NFR BLD AUTO: 10.1 % (ref 5–12)
NEUTROPHILS NFR BLD AUTO: 12.83 10*3/MM3 (ref 1.7–7)
NEUTROPHILS NFR BLD AUTO: 83.1 % (ref 42.7–76)
NRBC BLD AUTO-RTO: 0 /100 WBC (ref 0–0.2)
PLATELET # BLD AUTO: 328 10*3/MM3 (ref 140–450)
PMV BLD AUTO: 9.9 FL (ref 6–12)
POTASSIUM SERPL-SCNC: 4.5 MMOL/L (ref 3.5–5.2)
PROT SERPL-MCNC: 7.8 G/DL (ref 6–8.5)
RBC # BLD AUTO: 3.99 10*6/MM3 (ref 3.77–5.28)
SODIUM SERPL-SCNC: 133 MMOL/L (ref 136–145)
WBC NRBC COR # BLD AUTO: 15.44 10*3/MM3 (ref 3.4–10.8)

## 2024-12-23 PROCEDURE — 25010000002 MORPHINE SULFATE (PF) 2 MG/ML SOLUTION: Performed by: INTERNAL MEDICINE

## 2024-12-23 PROCEDURE — 25010000002 PROCHLORPERAZINE 10 MG/2ML SOLUTION: Performed by: INTERNAL MEDICINE

## 2024-12-23 PROCEDURE — 25010000002 ONDANSETRON PER 1 MG: Performed by: INTERNAL MEDICINE

## 2024-12-23 PROCEDURE — 25010000002 PANITUMUMAB 400 MG/20ML SOLUTION 20 ML VIAL: Performed by: INTERNAL MEDICINE

## 2024-12-23 PROCEDURE — 83735 ASSAY OF MAGNESIUM: CPT

## 2024-12-23 PROCEDURE — 96413 CHEMO IV INFUSION 1 HR: CPT

## 2024-12-23 PROCEDURE — 80053 COMPREHEN METABOLIC PANEL: CPT

## 2024-12-23 PROCEDURE — 96375 TX/PRO/DX INJ NEW DRUG ADDON: CPT

## 2024-12-23 PROCEDURE — 96376 TX/PRO/DX INJ SAME DRUG ADON: CPT

## 2024-12-23 PROCEDURE — 99215 OFFICE O/P EST HI 40 MIN: CPT | Performed by: INTERNAL MEDICINE

## 2024-12-23 PROCEDURE — 85025 COMPLETE CBC W/AUTO DIFF WBC: CPT

## 2024-12-23 RX ORDER — FAMOTIDINE 10 MG/ML
20 INJECTION, SOLUTION INTRAVENOUS AS NEEDED
Status: CANCELLED | OUTPATIENT
Start: 2024-12-23

## 2024-12-23 RX ORDER — MORPHINE SULFATE 2 MG/ML
2 INJECTION, SOLUTION INTRAMUSCULAR; INTRAVENOUS ONCE
Status: COMPLETED | OUTPATIENT
Start: 2024-12-23 | End: 2024-12-23

## 2024-12-23 RX ORDER — DIPHENHYDRAMINE HYDROCHLORIDE 50 MG/ML
50 INJECTION INTRAMUSCULAR; INTRAVENOUS AS NEEDED
Status: CANCELLED | OUTPATIENT
Start: 2024-12-23

## 2024-12-23 RX ORDER — SODIUM CHLORIDE 9 MG/ML
20 INJECTION, SOLUTION INTRAVENOUS ONCE
Status: CANCELLED | OUTPATIENT
Start: 2024-12-23

## 2024-12-23 RX ORDER — PROCHLORPERAZINE EDISYLATE 5 MG/ML
10 INJECTION INTRAMUSCULAR; INTRAVENOUS ONCE
Status: CANCELLED
Start: 2024-12-23 | End: 2024-12-23

## 2024-12-23 RX ORDER — ONDANSETRON 8 MG/1
8 TABLET, ORALLY DISINTEGRATING ORAL EVERY 8 HOURS PRN
Qty: 60 TABLET | Refills: 5 | Status: SHIPPED | OUTPATIENT
Start: 2024-12-23

## 2024-12-23 RX ORDER — DIPHENHYDRAMINE HYDROCHLORIDE 50 MG/ML
50 INJECTION INTRAMUSCULAR; INTRAVENOUS AS NEEDED
OUTPATIENT
Start: 2025-01-06

## 2024-12-23 RX ORDER — HYDROCORTISONE SODIUM SUCCINATE 100 MG/2ML
100 INJECTION INTRAMUSCULAR; INTRAVENOUS AS NEEDED
Status: CANCELLED | OUTPATIENT
Start: 2024-12-23

## 2024-12-23 RX ORDER — FAMOTIDINE 10 MG/ML
20 INJECTION, SOLUTION INTRAVENOUS AS NEEDED
OUTPATIENT
Start: 2025-01-06

## 2024-12-23 RX ORDER — HYDROCORTISONE SODIUM SUCCINATE 100 MG/2ML
100 INJECTION INTRAMUSCULAR; INTRAVENOUS AS NEEDED
OUTPATIENT
Start: 2025-01-06

## 2024-12-23 RX ORDER — HYDROCODONE BITARTRATE AND ACETAMINOPHEN 10; 325 MG/1; MG/1
1 TABLET ORAL EVERY 4 HOURS PRN
Qty: 120 TABLET | Refills: 0 | Status: SHIPPED | OUTPATIENT
Start: 2024-12-23

## 2024-12-23 RX ORDER — PROCHLORPERAZINE EDISYLATE 5 MG/ML
10 INJECTION INTRAMUSCULAR; INTRAVENOUS ONCE
Status: COMPLETED | OUTPATIENT
Start: 2024-12-23 | End: 2024-12-23

## 2024-12-23 RX ORDER — SODIUM CHLORIDE 9 MG/ML
20 INJECTION, SOLUTION INTRAVENOUS ONCE
OUTPATIENT
Start: 2025-01-06

## 2024-12-23 RX ORDER — ONDANSETRON 2 MG/ML
8 INJECTION INTRAMUSCULAR; INTRAVENOUS ONCE
Status: COMPLETED | OUTPATIENT
Start: 2024-12-23 | End: 2024-12-23

## 2024-12-23 RX ADMIN — MORPHINE SULFATE 2 MG: 2 INJECTION, SOLUTION INTRAMUSCULAR; INTRAVENOUS at 14:54

## 2024-12-23 RX ADMIN — ONDANSETRON 8 MG: 2 INJECTION INTRAMUSCULAR; INTRAVENOUS at 15:58

## 2024-12-23 RX ADMIN — MORPHINE SULFATE 2 MG: 2 INJECTION, SOLUTION INTRAMUSCULAR; INTRAVENOUS at 15:58

## 2024-12-23 RX ADMIN — PROCHLORPERAZINE EDISYLATE 10 MG: 5 INJECTION INTRAMUSCULAR; INTRAVENOUS at 15:00

## 2024-12-23 RX ADMIN — PANITUMUMAB 340 MG: 400 SOLUTION INTRAVENOUS at 15:38

## 2024-12-23 NOTE — PROGRESS NOTES
Specialty Pharmacy Patient Management Program  Per Protocol Prescription Order or Refill       Requested Prescriptions     Signed Prescriptions Disp Refills    encorafenib (BRAFTOVI) 75 MG capsule 120 capsule 0     Sig: Take 4 capsules by mouth Daily. Indications: Cancer of the Colon     Prescription orders above were sent to  [facility]  per Collaborative Care Agreement Protocol.     Completed independent double check on medication order/RX.    Jacki Esquivel Rph, BCOP  Clinical Specialty Pharmacist, Oncology  12/23/2024  14:36 EST

## 2024-12-23 NOTE — PROGRESS NOTES
Specialty Pharmacy Patient Management Program  Per Protocol Prescription Order or Refill     Ely Sims is a 64 y.o. female with colon cancer was seen 12/23/24 by Dr. Saunders. Per provider dictation, no changes to oral oncology regimen  encorafenib (Braftovi) 300 mg daily .  Labs Review: The CMP and CBC from 12/23/24 have been reviewed. No dose adjustments are needed for the oral specialty medication(s) based on the labs.    Patient will be filling or currently fills medications at  [facility]  and is enrolled in the Patient Management Program.    Requested Prescriptions     Signed Prescriptions Disp Refills    encorafenib (BRAFTOVI) 75 MG capsule 120 capsule 0     Sig: Take 4 capsules by mouth Daily. Indications: Cancer of the Colon     Prescription orders above were sent to the pharmacy per Collaborative Care Agreement Protocol.     Last Office Visit: 12/23/24  Next Office Visit: MARINA Aaron, PharmD, BCOP  Clinical Specialty Pharmacist, Oncology  12/23/2024  14:32 EST

## 2024-12-26 ENCOUNTER — TELEPHONE (OUTPATIENT)
Dept: ONCOLOGY | Facility: CLINIC | Age: 64
End: 2024-12-26
Payer: COMMERCIAL

## 2024-12-26 DIAGNOSIS — R45.89 ANXIETY ABOUT HEALTH: Primary | ICD-10-CM

## 2024-12-26 RX ORDER — ALPRAZOLAM 0.25 MG/1
0.25 TABLET ORAL EVERY 8 HOURS PRN
Qty: 30 TABLET | Refills: 0 | Status: SHIPPED | OUTPATIENT
Start: 2024-12-26

## 2024-12-26 NOTE — TELEPHONE ENCOUNTER
"I called patient, she said that she has the \"jitters\" and feeling anxious about just everything. She said that it started yesterday and she said that it happened out of the blue. She states that she needs something to calm her down.  "

## 2024-12-26 NOTE — TELEPHONE ENCOUNTER
I called patient back, let her know per Dr. Saunders that we can send her in xanax .25 mg Q8h. Pt v.u

## 2024-12-26 NOTE — TELEPHONE ENCOUNTER
Caller: Ely Sims    Relationship: Self    Best call back number: [phone]    Who are you requesting to speak with (clinical staff, provider,  specific staff member): CLINICAL    What was the call regarding: PT IS ASKING IF DR CIFUENTES CAN CALL IN SOMETHING FOR HER ANXIETY.  STATES SHE HAS BEEN PACING THE FLOOR SINCE LAST NIGHT    [address] [phone] [facility] [phone] FX

## 2024-12-27 ENCOUNTER — HOME CARE VISIT (OUTPATIENT)
Dept: HOME HEALTH SERVICES | Facility: HOME HEALTHCARE | Age: 64
End: 2024-12-27
Payer: COMMERCIAL

## 2024-12-27 ENCOUNTER — SPECIALTY PHARMACY (OUTPATIENT)
Dept: PHARMACY | Facility: HOSPITAL | Age: 64
End: 2024-12-27
Payer: COMMERCIAL

## 2024-12-27 VITALS
RESPIRATION RATE: 18 BRPM | HEART RATE: 78 BPM | TEMPERATURE: 98.8 F | SYSTOLIC BLOOD PRESSURE: 126 MMHG | OXYGEN SATURATION: 97 % | DIASTOLIC BLOOD PRESSURE: 72 MMHG

## 2024-12-27 PROCEDURE — G0299 HHS/HOSPICE OF RN EA 15 MIN: HCPCS

## 2024-12-27 NOTE — Clinical Note
Patient missed a PT eval visit from [facility] on 12/27/24.     Reason: Patient refused services        For your records only.   Per CMS Guidance, MD must be notified of missed/cancelled visits; therefore the prescribed frequency was not met.

## 2024-12-27 NOTE — PROGRESS NOTES
I have received Braftovi from [facility] and placed it in locked storage inside the [facility] office at [facility].     Xenia Mckeon, Pharmacy Technician  12/27/24  15:45 EST

## 2024-12-27 NOTE — HOME HEALTH
Spoke with her son on 12/26 to schedule home PT and he stated his mom did not want a visit on 12/27.  He indicated that she may not want therapy at all.  Decision made to have the nurse decide if PT is still needed and reorder it as needed.

## 2024-12-30 ENCOUNTER — SPECIALTY PHARMACY (OUTPATIENT)
Dept: PHARMACY | Facility: HOSPITAL | Age: 64
End: 2024-12-30
Payer: COMMERCIAL

## 2024-12-30 ENCOUNTER — TELEPHONE (OUTPATIENT)
Dept: ONCOLOGY | Facility: CLINIC | Age: 64
End: 2024-12-30
Payer: COMMERCIAL

## 2024-12-30 NOTE — PROGRESS NOTES
Specialty Pharmacy Patient Management Program  Clinical Outreach     I called Ely Sims on 12/30/2024 10:09 EST who is enrolled in the Oncology Patient Management program offered by [facility].  Patient did not answer today. I left a voice message with my call back number, [phone].    Jillian Paz, PharmD, [facility]  Clinical Specialty Pharmacist, Oncology  12/30/2024  10:09 EST

## 2024-12-30 NOTE — TELEPHONE ENCOUNTER
Caller: Ubaldo Case    Relationship: Emergency Contact    Best call back number:     [phone]     What is the best time to reach you: ASAP    Who are you requesting to speak with (clinical staff, provider,  specific staff member): CLINICAL    What was the call regarding: PT WILL BE OUT OF MEDICATION (Braftovi) WEDNESDAY AND THEY ARE TRYING TO FIGURE OUT WHERE THIS IS AT IN REGARDS TO HER REFILL.    Is it okay if the provider responds through MyChart: NO - PLEASE

## 2024-12-31 ENCOUNTER — HOME CARE VISIT (OUTPATIENT)
Dept: HOME HEALTH SERVICES | Facility: HOME HEALTHCARE | Age: 64
End: 2024-12-31
Payer: COMMERCIAL

## 2024-12-31 ENCOUNTER — SPECIALTY PHARMACY (OUTPATIENT)
Dept: PHARMACY | Facility: HOSPITAL | Age: 64
End: 2024-12-31
Payer: COMMERCIAL

## 2024-12-31 NOTE — TELEPHONE ENCOUNTER
HUB call from Ubaldo Case forwarded to me-He was inquiring on pts Braftovi refill.    [facility] Pharmacist has spoken with Ubaldo since he left a  on [facility] Pharmacist line.    Xenia Mckeon, Pharmacy Technician  12/31/24  08:29 EST

## 2024-12-31 NOTE — PROGRESS NOTES
Ms. Sims's Braftovi was picked up from the [facility] Office today.      Anaya Darnell - Care Coordinator   12/31/2024  12:16 EST

## 2025-01-03 ENCOUNTER — HOME CARE VISIT (OUTPATIENT)
Dept: HOME HEALTH SERVICES | Facility: HOME HEALTHCARE | Age: 65
End: 2025-01-03
Payer: COMMERCIAL

## 2025-01-03 ENCOUNTER — TELEPHONE (OUTPATIENT)
Dept: ONCOLOGY | Facility: CLINIC | Age: 65
End: 2025-01-03
Payer: COMMERCIAL

## 2025-01-03 VITALS
DIASTOLIC BLOOD PRESSURE: 80 MMHG | TEMPERATURE: 96.5 F | RESPIRATION RATE: 18 BRPM | HEART RATE: 86 BPM | OXYGEN SATURATION: 99 % | SYSTOLIC BLOOD PRESSURE: 110 MMHG

## 2025-01-03 PROCEDURE — G0299 HHS/HOSPICE OF RN EA 15 MIN: HCPCS

## 2025-01-03 NOTE — TELEPHONE ENCOUNTER
I called patient, told them we are going to send in Rissa's magic mouthwash to help with the sore throat/white patches and instructed them to take the Zofran under the tongue to dissolve instead of swallowing it. Instructed them to try to increase her fluid intake and when the nausea gets under control to try increase eating. Ubaldo jackman/shelley

## 2025-01-03 NOTE — TELEPHONE ENCOUNTER
I called Ubaldo, said that she is not sleeping well due to having some nausea and vomiting. Said she is not eating due to having a sore throat, a cough, and is having white sputum when she throws up. I asked the  to look in her throat and he says it is white in the back of her throat. Patient has no fevers. I asked the patient if she has been taking her zofran and Ubaldo states she tires but its hard to swallow. I told him the pill is supposed to dissolve under her tongue. Ubaldo states he didn't know that and they will try this.

## 2025-01-06 NOTE — HOME HEALTH
Reviewed possible disorientation effects from pain medications.  Reviewed how taking melatonin and benzo may help with adhering to sleep schedule as patient is having trouble sleeping, can take half dose if concerned about side effects.  Discussed increasing water intake and nutritonal intake as well as taking zofran sublingually rather than PO to hopefully increase effectiveness and keep food and water down.  CP assessment WNL.  SO is going to discuss with MD about taking patient off of lasix and potassium as there does not appear to be much of a need anymore.

## 2025-01-07 ENCOUNTER — DOCUMENTATION (OUTPATIENT)
Dept: HOME HEALTH SERVICES | Facility: HOME HEALTHCARE | Age: 65
End: 2025-01-07
Payer: COMMERCIAL

## 2025-01-07 ENCOUNTER — OFFICE VISIT (OUTPATIENT)
Dept: ONCOLOGY | Facility: CLINIC | Age: 65
End: 2025-01-07
Payer: COMMERCIAL

## 2025-01-07 ENCOUNTER — DOCUMENTATION (OUTPATIENT)
Dept: OTHER | Facility: HOSPITAL | Age: 65
End: 2025-01-07
Payer: COMMERCIAL

## 2025-01-07 ENCOUNTER — INFUSION (OUTPATIENT)
Dept: ONCOLOGY | Facility: HOSPITAL | Age: 65
End: 2025-01-07
Payer: COMMERCIAL

## 2025-01-07 ENCOUNTER — READMISSION MANAGEMENT (OUTPATIENT)
Dept: CALL CENTER | Facility: HOSPITAL | Age: 65
End: 2025-01-07
Payer: COMMERCIAL

## 2025-01-07 ENCOUNTER — HOSPITAL ENCOUNTER (INPATIENT)
Facility: HOSPITAL | Age: 65
LOS: 10 days | Discharge: HOSPICE | DRG: 682 | End: 2025-01-17
Attending: EMERGENCY MEDICINE | Admitting: INTERNAL MEDICINE
Payer: COMMERCIAL

## 2025-01-07 ENCOUNTER — HOME CARE VISIT (OUTPATIENT)
Dept: HOME HEALTH SERVICES | Facility: HOME HEALTHCARE | Age: 65
End: 2025-01-07
Payer: COMMERCIAL

## 2025-01-07 ENCOUNTER — APPOINTMENT (OUTPATIENT)
Dept: ONCOLOGY | Facility: HOSPITAL | Age: 65
End: 2025-01-07
Payer: COMMERCIAL

## 2025-01-07 VITALS
BODY MASS INDEX: 17.52 KG/M2 | HEIGHT: 67 IN | HEART RATE: 104 BPM | DIASTOLIC BLOOD PRESSURE: 72 MMHG | WEIGHT: 111.6 LBS | RESPIRATION RATE: 17 BRPM | SYSTOLIC BLOOD PRESSURE: 104 MMHG | OXYGEN SATURATION: 95 % | TEMPERATURE: 97.1 F

## 2025-01-07 DIAGNOSIS — C20 RECTAL ADENOCARCINOMA: ICD-10-CM

## 2025-01-07 DIAGNOSIS — C18.9 COLON CANCER METASTASIZED TO BRAIN: Primary | ICD-10-CM

## 2025-01-07 DIAGNOSIS — Z78.9 DECREASED ACTIVITIES OF DAILY LIVING (ADL): ICD-10-CM

## 2025-01-07 DIAGNOSIS — C79.31 COLON CANCER METASTASIZED TO BRAIN: Primary | ICD-10-CM

## 2025-01-07 DIAGNOSIS — K63.89 COLONIC MASS: ICD-10-CM

## 2025-01-07 DIAGNOSIS — C18.9 COLON CANCER METASTASIZED TO BRAIN: ICD-10-CM

## 2025-01-07 DIAGNOSIS — N17.0 ACUTE KIDNEY INJURY (AKI) WITH ACUTE TUBULAR NECROSIS (ATN): ICD-10-CM

## 2025-01-07 DIAGNOSIS — E86.9 VOLUME DEPLETION: Primary | ICD-10-CM

## 2025-01-07 DIAGNOSIS — C79.31 COLON CANCER METASTASIZED TO BRAIN: ICD-10-CM

## 2025-01-07 DIAGNOSIS — N17.9 AKI (ACUTE KIDNEY INJURY): ICD-10-CM

## 2025-01-07 DIAGNOSIS — E87.5 HYPERKALEMIA: ICD-10-CM

## 2025-01-07 DIAGNOSIS — G89.3 CANCER RELATED PAIN: ICD-10-CM

## 2025-01-07 DIAGNOSIS — C79.9 METASTATIC MALIGNANT NEOPLASM, UNSPECIFIED SITE: ICD-10-CM

## 2025-01-07 DIAGNOSIS — R41.0 DELIRIUM: ICD-10-CM

## 2025-01-07 LAB
ALBUMIN SERPL-MCNC: 3.5 G/DL (ref 3.5–5.2)
ALBUMIN SERPL-MCNC: 3.7 G/DL (ref 3.5–5.2)
ALBUMIN/GLOB SERPL: 1 G/DL
ALP SERPL-CCNC: 861 U/L (ref 39–117)
ALT SERPL W P-5'-P-CCNC: 30 U/L (ref 1–33)
ANION GAP SERPL CALCULATED.3IONS-SCNC: 14.4 MMOL/L (ref 5–15)
ANION GAP SERPL CALCULATED.3IONS-SCNC: 18.1 MMOL/L (ref 5–15)
AST SERPL-CCNC: 54 U/L (ref 1–32)
ATMOSPHERIC PRESS: 760.4 MMHG
B PARAPERT DNA SPEC QL NAA+PROBE: NOT DETECTED
B PERT DNA SPEC QL NAA+PROBE: NOT DETECTED
BACTERIA UR QL AUTO: NORMAL /HPF
BASE EXCESS BLDV CALC-SCNC: -5.6 MMOL/L (ref -2–2)
BASOPHILS # BLD AUTO: 0.04 10*3/MM3 (ref 0–0.2)
BASOPHILS NFR BLD AUTO: 0.3 % (ref 0–1.5)
BDY SITE: ABNORMAL
BILIRUB SERPL-MCNC: 0.9 MG/DL (ref 0–1.2)
BILIRUB UR QL STRIP: NEGATIVE
BUN SERPL-MCNC: 67 MG/DL (ref 8–23)
BUN SERPL-MCNC: 70 MG/DL (ref 8–23)
BUN/CREAT SERPL: 20.4 (ref 7–25)
BUN/CREAT SERPL: 21.5 (ref 7–25)
C PNEUM DNA NPH QL NAA+NON-PROBE: NOT DETECTED
CALCIUM SPEC-SCNC: 10.4 MG/DL (ref 8.6–10.5)
CALCIUM SPEC-SCNC: 9.5 MG/DL (ref 8.6–10.5)
CHLORIDE SERPL-SCNC: 95 MMOL/L (ref 98–107)
CHLORIDE SERPL-SCNC: 96 MMOL/L (ref 98–107)
CLARITY UR: ABNORMAL
CO2 BLDA-SCNC: 17.7 MMOL/L (ref 23–27)
CO2 SERPL-SCNC: 16.9 MMOL/L (ref 22–29)
CO2 SERPL-SCNC: 22.6 MMOL/L (ref 22–29)
COLOR UR: YELLOW
CREAT SERPL-MCNC: 3.11 MG/DL (ref 0.57–1)
CREAT SERPL-MCNC: 3.43 MG/DL (ref 0.57–1)
CREAT UR-MCNC: 69.2 MG/DL
D-LACTATE SERPL-SCNC: 1.8 MMOL/L (ref 0.5–2)
DEPRECATED RDW RBC AUTO: 67.2 FL (ref 37–54)
DEVICE COMMENT: ABNORMAL
EGFRCR SERPLBLD CKD-EPI 2021: 14.4 ML/MIN/1.73
EGFRCR SERPLBLD CKD-EPI 2021: 16.2 ML/MIN/1.73
EOSINOPHIL # BLD AUTO: 0.01 10*3/MM3 (ref 0–0.4)
EOSINOPHIL NFR BLD AUTO: 0.1 % (ref 0.3–6.2)
ERYTHROCYTE [DISTWIDTH] IN BLOOD BY AUTOMATED COUNT: 18.9 % (ref 12.3–15.4)
FLUAV SUBTYP SPEC NAA+PROBE: NOT DETECTED
FLUBV RNA ISLT QL NAA+PROBE: NOT DETECTED
GLOBULIN UR ELPH-MCNC: 3.6 GM/DL
GLUCOSE BLDC GLUCOMTR-MCNC: 123 MG/DL (ref 70–130)
GLUCOSE SERPL-MCNC: 118 MG/DL (ref 65–99)
GLUCOSE SERPL-MCNC: 122 MG/DL (ref 65–99)
GLUCOSE UR STRIP-MCNC: ABNORMAL MG/DL
HADV DNA SPEC NAA+PROBE: NOT DETECTED
HCO3 BLDV-SCNC: 16.9 MMOL/L (ref 22–28)
HCOV 229E RNA SPEC QL NAA+PROBE: NOT DETECTED
HCOV HKU1 RNA SPEC QL NAA+PROBE: NOT DETECTED
HCOV NL63 RNA SPEC QL NAA+PROBE: NOT DETECTED
HCOV OC43 RNA SPEC QL NAA+PROBE: NOT DETECTED
HCT VFR BLD AUTO: 43.7 % (ref 34–46.6)
HGB BLD-MCNC: 14.5 G/DL (ref 12–15.9)
HGB UR QL STRIP.AUTO: NEGATIVE
HMPV RNA NPH QL NAA+NON-PROBE: NOT DETECTED
HPIV1 RNA ISLT QL NAA+PROBE: NOT DETECTED
HPIV2 RNA SPEC QL NAA+PROBE: NOT DETECTED
HPIV3 RNA NPH QL NAA+PROBE: NOT DETECTED
HPIV4 P GENE NPH QL NAA+PROBE: NOT DETECTED
HYALINE CASTS UR QL AUTO: NORMAL /LPF
IMM GRANULOCYTES # BLD AUTO: 0.11 10*3/MM3 (ref 0–0.05)
IMM GRANULOCYTES NFR BLD AUTO: 0.7 % (ref 0–0.5)
KETONES UR QL STRIP: NEGATIVE
LEUKOCYTE ESTERASE UR QL STRIP.AUTO: NEGATIVE
LYMPHOCYTES # BLD AUTO: 0.54 10*3/MM3 (ref 0.7–3.1)
LYMPHOCYTES NFR BLD AUTO: 3.5 % (ref 19.6–45.3)
M PNEUMO IGG SER IA-ACNC: NOT DETECTED
MAGNESIUM SERPL-MCNC: 2.6 MG/DL (ref 1.6–2.4)
MCH RBC QN AUTO: 32.2 PG (ref 26.6–33)
MCHC RBC AUTO-ENTMCNC: 33.2 G/DL (ref 31.5–35.7)
MCV RBC AUTO: 97.1 FL (ref 79–97)
MODALITY: ABNORMAL
MONOCYTES # BLD AUTO: 1.36 10*3/MM3 (ref 0.1–0.9)
MONOCYTES NFR BLD AUTO: 8.8 % (ref 5–12)
NEUTROPHILS NFR BLD AUTO: 13.32 10*3/MM3 (ref 1.7–7)
NEUTROPHILS NFR BLD AUTO: 86.6 % (ref 42.7–76)
NITRITE UR QL STRIP: NEGATIVE
NRBC BLD AUTO-RTO: 0 /100 WBC (ref 0–0.2)
PCO2 BLDV: 26.2 MM HG (ref 41–51)
PH BLDV: 7.42 PH UNITS (ref 7.31–7.41)
PH UR STRIP.AUTO: 5.5 [PH] (ref 5–8)
PHOSPHATE SERPL-MCNC: 4.4 MG/DL (ref 2.5–4.5)
PLATELET # BLD AUTO: 184 10*3/MM3 (ref 140–450)
PMV BLD AUTO: 10.2 FL (ref 6–12)
PO2 BLDV: 60.4 MM HG (ref 35–45)
POTASSIUM SERPL-SCNC: 5.1 MMOL/L (ref 3.5–5.2)
POTASSIUM SERPL-SCNC: 6.2 MMOL/L (ref 3.5–5.2)
PROCALCITONIN SERPL-MCNC: 1 NG/ML (ref 0–0.25)
PROT ?TM UR-MCNC: 40.8 MG/DL
PROT SERPL-MCNC: 7.3 G/DL (ref 6–8.5)
PROT UR QL STRIP: ABNORMAL
PROT/CREAT UR: 589.6 MG/G CREA (ref 0–200)
QT INTERVAL: 341 MS
QTC INTERVAL: 440 MS
RBC # BLD AUTO: 4.5 10*6/MM3 (ref 3.77–5.28)
RBC # UR STRIP: NORMAL /HPF
REF LAB TEST METHOD: NORMAL
RHINOVIRUS RNA SPEC NAA+PROBE: NOT DETECTED
RSV RNA NPH QL NAA+NON-PROBE: NOT DETECTED
SAO2 % BLDCOV: 91.8 % (ref 45–75)
SARS-COV-2 RNA NPH QL NAA+NON-PROBE: NOT DETECTED
SODIUM SERPL-SCNC: 130 MMOL/L (ref 136–145)
SODIUM SERPL-SCNC: 133 MMOL/L (ref 136–145)
SODIUM UR-SCNC: <20 MMOL/L
SP GR UR STRIP: 1.02 (ref 1–1.03)
SQUAMOUS #/AREA URNS HPF: NORMAL /HPF
UROBILINOGEN UR QL STRIP: ABNORMAL
WBC # UR STRIP: NORMAL /HPF
WBC NRBC COR # BLD AUTO: 15.38 10*3/MM3 (ref 3.4–10.8)

## 2025-01-07 PROCEDURE — 51798 US URINE CAPACITY MEASURE: CPT

## 2025-01-07 PROCEDURE — 93010 ELECTROCARDIOGRAM REPORT: CPT | Performed by: INTERNAL MEDICINE

## 2025-01-07 PROCEDURE — 63710000001 INSULIN REGULAR HUMAN PER 5 UNITS: Performed by: EMERGENCY MEDICINE

## 2025-01-07 PROCEDURE — P9612 CATHETERIZE FOR URINE SPEC: HCPCS

## 2025-01-07 PROCEDURE — 84145 PROCALCITONIN (PCT): CPT | Performed by: EMERGENCY MEDICINE

## 2025-01-07 PROCEDURE — 83735 ASSAY OF MAGNESIUM: CPT

## 2025-01-07 PROCEDURE — 81001 URINALYSIS AUTO W/SCOPE: CPT | Performed by: EMERGENCY MEDICINE

## 2025-01-07 PROCEDURE — 36591 DRAW BLOOD OFF VENOUS DEVICE: CPT

## 2025-01-07 PROCEDURE — 84300 ASSAY OF URINE SODIUM: CPT

## 2025-01-07 PROCEDURE — 82803 BLOOD GASES ANY COMBINATION: CPT | Performed by: EMERGENCY MEDICINE

## 2025-01-07 PROCEDURE — 99285 EMERGENCY DEPT VISIT HI MDM: CPT

## 2025-01-07 PROCEDURE — 0202U NFCT DS 22 TRGT SARS-COV-2: CPT | Performed by: EMERGENCY MEDICINE

## 2025-01-07 PROCEDURE — 25810000003 SODIUM CHLORIDE 0.9 % SOLUTION: Performed by: EMERGENCY MEDICINE

## 2025-01-07 PROCEDURE — 25010000002 BUMETANIDE PER 0.5 MG: Performed by: INTERNAL MEDICINE

## 2025-01-07 PROCEDURE — 93005 ELECTROCARDIOGRAM TRACING: CPT | Performed by: EMERGENCY MEDICINE

## 2025-01-07 PROCEDURE — 83605 ASSAY OF LACTIC ACID: CPT | Performed by: EMERGENCY MEDICINE

## 2025-01-07 PROCEDURE — 84100 ASSAY OF PHOSPHORUS: CPT

## 2025-01-07 PROCEDURE — 80053 COMPREHEN METABOLIC PANEL: CPT

## 2025-01-07 PROCEDURE — 82570 ASSAY OF URINE CREATININE: CPT

## 2025-01-07 PROCEDURE — 85025 COMPLETE CBC W/AUTO DIFF WBC: CPT

## 2025-01-07 PROCEDURE — 84156 ASSAY OF PROTEIN URINE: CPT

## 2025-01-07 PROCEDURE — 82948 REAGENT STRIP/BLOOD GLUCOSE: CPT

## 2025-01-07 RX ORDER — ONDANSETRON 2 MG/ML
4 INJECTION INTRAMUSCULAR; INTRAVENOUS EVERY 6 HOURS PRN
Status: DISCONTINUED | OUTPATIENT
Start: 2025-01-07 | End: 2025-01-08

## 2025-01-07 RX ORDER — ALPRAZOLAM 0.25 MG/1
0.25 TABLET ORAL EVERY 8 HOURS PRN
Status: DISCONTINUED | OUTPATIENT
Start: 2025-01-07 | End: 2025-01-09

## 2025-01-07 RX ORDER — HYDROCODONE BITARTRATE AND ACETAMINOPHEN 10; 325 MG/1; MG/1
1 TABLET ORAL EVERY 4 HOURS PRN
Status: DISCONTINUED | OUTPATIENT
Start: 2025-01-07 | End: 2025-01-17 | Stop reason: HOSPADM

## 2025-01-07 RX ORDER — ACETAMINOPHEN 160 MG/5ML
650 SOLUTION ORAL EVERY 4 HOURS PRN
Status: DISCONTINUED | OUTPATIENT
Start: 2025-01-07 | End: 2025-01-17 | Stop reason: HOSPADM

## 2025-01-07 RX ORDER — SODIUM CHLORIDE 0.9 % (FLUSH) 0.9 %
10 SYRINGE (ML) INJECTION EVERY 12 HOURS SCHEDULED
Status: DISCONTINUED | OUTPATIENT
Start: 2025-01-07 | End: 2025-01-17 | Stop reason: HOSPADM

## 2025-01-07 RX ORDER — DEXTROSE MONOHYDRATE 25 G/50ML
25 INJECTION, SOLUTION INTRAVENOUS ONCE
Status: COMPLETED | OUTPATIENT
Start: 2025-01-07 | End: 2025-01-07

## 2025-01-07 RX ORDER — SODIUM CHLORIDE 9 MG/ML
100 INJECTION, SOLUTION INTRAVENOUS CONTINUOUS
Status: DISCONTINUED | OUTPATIENT
Start: 2025-01-07 | End: 2025-01-08

## 2025-01-07 RX ORDER — ACETAMINOPHEN 325 MG/1
650 TABLET ORAL EVERY 4 HOURS PRN
Status: DISCONTINUED | OUTPATIENT
Start: 2025-01-07 | End: 2025-01-17 | Stop reason: HOSPADM

## 2025-01-07 RX ORDER — SODIUM CHLORIDE 0.9 % (FLUSH) 0.9 %
10 SYRINGE (ML) INJECTION AS NEEDED
Status: DISCONTINUED | OUTPATIENT
Start: 2025-01-07 | End: 2025-01-17 | Stop reason: HOSPADM

## 2025-01-07 RX ORDER — BUMETANIDE 0.25 MG/ML
2.5 INJECTION, SOLUTION INTRAMUSCULAR; INTRAVENOUS ONCE
Status: COMPLETED | OUTPATIENT
Start: 2025-01-07 | End: 2025-01-07

## 2025-01-07 RX ORDER — POLYETHYLENE GLYCOL 3350 17 G/17G
17 POWDER, FOR SOLUTION ORAL DAILY
Status: DISCONTINUED | OUTPATIENT
Start: 2025-01-07 | End: 2025-01-17 | Stop reason: HOSPADM

## 2025-01-07 RX ORDER — ONDANSETRON 4 MG/1
8 TABLET, ORALLY DISINTEGRATING ORAL EVERY 8 HOURS PRN
Status: DISCONTINUED | OUTPATIENT
Start: 2025-01-07 | End: 2025-01-17 | Stop reason: HOSPADM

## 2025-01-07 RX ORDER — ACETAMINOPHEN 650 MG/1
650 SUPPOSITORY RECTAL EVERY 4 HOURS PRN
Status: DISCONTINUED | OUTPATIENT
Start: 2025-01-07 | End: 2025-01-17 | Stop reason: HOSPADM

## 2025-01-07 RX ORDER — FENTANYL 50 UG/1
1 PATCH TRANSDERMAL
Status: DISCONTINUED | OUTPATIENT
Start: 2025-01-07 | End: 2025-01-08

## 2025-01-07 RX ORDER — AMOXICILLIN 250 MG
2 CAPSULE ORAL DAILY
Status: DISCONTINUED | OUTPATIENT
Start: 2025-01-07 | End: 2025-01-17 | Stop reason: HOSPADM

## 2025-01-07 RX ORDER — BUPROPION HYDROCHLORIDE 150 MG/1
150 TABLET ORAL EVERY MORNING
Status: DISCONTINUED | OUTPATIENT
Start: 2025-01-08 | End: 2025-01-17 | Stop reason: HOSPADM

## 2025-01-07 RX ORDER — SODIUM CHLORIDE 9 MG/ML
40 INJECTION, SOLUTION INTRAVENOUS AS NEEDED
Status: DISCONTINUED | OUTPATIENT
Start: 2025-01-07 | End: 2025-01-17 | Stop reason: HOSPADM

## 2025-01-07 RX ORDER — AMLODIPINE BESYLATE 2.5 MG/1
10 TABLET ORAL
Status: DISCONTINUED | OUTPATIENT
Start: 2025-01-08 | End: 2025-01-07

## 2025-01-07 RX ADMIN — SODIUM BICARBONATE: 84 INJECTION, SOLUTION INTRAVENOUS at 14:48

## 2025-01-07 RX ADMIN — SODIUM BICARBONATE 50 MEQ: 84 INJECTION INTRAVENOUS at 13:39

## 2025-01-07 RX ADMIN — SODIUM ZIRCONIUM CYCLOSILICATE 10 G: 10 POWDER, FOR SUSPENSION ORAL at 13:35

## 2025-01-07 RX ADMIN — DEXTROSE MONOHYDRATE 25 G: 25 INJECTION, SOLUTION INTRAVENOUS at 13:04

## 2025-01-07 RX ADMIN — BUMETANIDE 2.5 MG: 0.25 INJECTION INTRAMUSCULAR; INTRAVENOUS at 14:19

## 2025-01-07 RX ADMIN — INSULIN HUMAN 10 UNITS: 100 INJECTION, SOLUTION PARENTERAL at 13:04

## 2025-01-07 RX ADMIN — HYDROCODONE BITARTRATE AND ACETAMINOPHEN 1 TABLET: 10; 325 TABLET ORAL at 18:53

## 2025-01-07 RX ADMIN — SODIUM CHLORIDE 1000 ML: 9 INJECTION, SOLUTION INTRAVENOUS at 13:00

## 2025-01-07 NOTE — ED PROVIDER NOTES
EMERGENCY DEPARTMENT ENCOUNTER  Room Number:  12/12  PCP: Provider, No Known  Independent Historians: Patient and son      HPI:  Chief Complaint: had concerns including Altered Mental Status.     A complete HPI/ROS/PMH/PSH/SH/FH are unobtainable due to: Altered Mental Status    Chronic or social conditions impacting patient care (Social Determinants of Health): None      Context: Ely Sims is a 64 y.o. female with a medical history of metastatic colon cancer currently undergoing chemotherapy, hypertension and protein calorie malnutrition who presents to the ED c/o acute progressive generalized weakness and confusion.  Patient is unable to provide any significant elements of history and the son is a very limited historian himself.  Patient had a recent admission in December for volume depletion and confusion.  Patient reports ongoing abdominal pain and poor appetite.      Review of prior external notes (non-ED) -and- Review of prior external test results outside of this encounter:  Oncology office note 1/7/2025 reviewed: Patient followed for metastatic colon cancer, neutropenia secondary to chemotherapy and anemia  Labs from today revealed patient has hyperkalemia 6.2, CO2 of 16.9, BUN 70 and creatinine 3.43 with magnesium 2.6.  ===========================  Hospital discharge summary 12/20/2024 reviewed: Patient admitted for worsening abdominal pain, progressive colorectal malignancy with mets including to the brain, hypertension, delirium, tachycardia    PAST MEDICAL HISTORY  Active Ambulatory Problems     Diagnosis Date Noted    Colon cancer metastasized to brain 08/19/2024    Colonic mass 08/23/2024    Anemia 08/23/2024    Hyperglycemia 08/23/2024    Hypertension 08/23/2024    Rectal adenocarcinoma 08/29/2024    Encounter for adjustment or management of vascular access device 08/29/2024    Dehydration 12/13/2024    Abdominal pain 12/13/2024    Hypokalemia 12/13/2024    Abnormal LFTs 12/13/2024    Severe  protein-calorie malnutrition 12/15/2024    Anxiety 12/16/2024     Resolved Ambulatory Problems     Diagnosis Date Noted    Compression of brain 08/22/2024     Past Medical History:   Diagnosis Date    Metastasis to brain     Rectal carcinoma 2024    Seasonal allergies          PAST SURGICAL HISTORY  Past Surgical History:   Procedure Laterality Date    COLONOSCOPY N/A 08/25/2024    Procedure: COLONOSCOPY to 20cm with cold biopsies and needle tattoo;  Surgeon: Shadi Kaminski MD;  Location: [facility] ENDOSCOPY;  Service: Gastroenterology;  Laterality: N/A;  pre: abnormal imaging  post: poor prep, obstructing colon mass at 20cm, hemorrhoids    COLOSTOMY N/A 08/27/2024    Procedure: COLOSTOMY LAPAROSCOPIC;  Surgeon: Saturnino Chester MD;  Location: [facility] MAIN OR;  Service: General;  Laterality: N/A;    CRANIOTOMY N/A 08/21/2024    Procedure: Posterior fossa craniotomy for tumor using stereotactic guidance;  Surgeon: Bull Doty MD;  Location: [facility] MAIN OR;  Service: Neurosurgery;  Laterality: N/A;    ENDOMETRIAL ABLATION      TUBAL ABDOMINAL LIGATION      VENOUS ACCESS DEVICE (PORT) INSERTION Right 08/27/2024    Procedure: INSERTION VENOUS ACCESS DEVICE;  Surgeon: Saturnino Chester MD;  Location: [facility] MAIN OR;  Service: General;  Laterality: Right;         FAMILY HISTORY  Family History   Problem Relation Age of Onset    Heart disease Mother     Colon cancer Sister          SOCIAL HISTORY  Social History     Socioeconomic History    Marital status: Single   Tobacco Use    Smoking status: Former     Types: Cigarettes    Smokeless tobacco: Former   Vaping Use    Vaping status: Never Used   Substance and Sexual Activity    Alcohol use: Yes    Drug use: Defer    Sexual activity: Defer     Partners: Male         ALLERGIES  Patient has no known allergies.      REVIEW OF SYSTEMS  Review of Systems  Included in HPI  All systems reviewed and negative except for those discussed in HPI.      PHYSICAL  EXAM    I have reviewed the triage vital signs and nursing notes.    ED Triage Vitals   Temp Heart Rate Resp BP SpO2   01/07/25 1226 01/07/25 1226 01/07/25 1226 01/07/25 1232 01/07/25 1226   96.8 °F (36 °C) 105 16 125/96 96 %      Temp src Heart Rate Source Patient Position BP Location FiO2 (%)   01/07/25 1226 01/07/25 1226 -- -- --   Tympanic Monitor          Physical Exam    Physical Exam   Constitutional: No distress but cachectic, thin frail and acute on chronically ill-appearing.  Rather confused and a limited historian.  HENT:  Head: Normocephalic and atraumatic.   Oropharynx: Mucous membranes are quite dry  Eyes: . No scleral icterus. No conjunctival pallor.  Neck: Normal range of motion. Neck supple.   Cardiovascular: Pink warm and well perfused throughout.    Pulmonary/Chest: No respiratory distress.  No tachypnea or increased work of breathing appreciated.    Abdominal: Soft. There is discomfort with palpation without rigidity or rebound.  Musculoskeletal: Moves all extremities equally.    Neurological: Alert and oriented to self and son.  Tangential speech who is very limited historian.  Moving all extremities equally but quite weakly.  Skin: Skin is pink, warm, and dry.   Psychiatric: Mood and affect normal.   Nursing note and vitals reviewed.             LAB RESULTS  Recent Results (from the past 24 hours)   Comprehensive metabolic panel    Collection Time: 01/07/25 10:39 AM    Specimen: Blood   Result Value Ref Range    Glucose 118 (H) 65 - 99 mg/dL    BUN 70 (H) 8 - 23 mg/dL    Creatinine 3.43 (C) 0.57 - 1.00 mg/dL    Sodium 130 (L) 136 - 145 mmol/L    Potassium 6.2 (C) 3.5 - 5.2 mmol/L    Chloride 95 (L) 98 - 107 mmol/L    CO2 16.9 (L) 22.0 - 29.0 mmol/L    Calcium 10.4 8.6 - 10.5 mg/dL    Total Protein 7.3 6.0 - 8.5 g/dL    Albumin 3.7 3.5 - 5.2 g/dL    ALT (SGPT) 30 1 - 33 U/L    AST (SGOT) 54 (H) 1 - 32 U/L    Alkaline Phosphatase 861 (H) 39 - 117 U/L    Total Bilirubin 0.9 0.0 - 1.2 mg/dL     Globulin 3.6 gm/dL    A/G Ratio 1.0 g/dL    BUN/Creatinine Ratio 20.4 7.0 - 25.0    Anion Gap 18.1 (H) 5.0 - 15.0 mmol/L    eGFR 14.4 (L) >60.0 mL/min/1.73   Magnesium    Collection Time: 01/07/25 10:39 AM    Specimen: Blood   Result Value Ref Range    Magnesium 2.6 (H) 1.6 - 2.4 mg/dL   CBC Auto Differential    Collection Time: 01/07/25 10:39 AM    Specimen: Blood   Result Value Ref Range    WBC 15.38 (H) 3.40 - 10.80 10*3/mm3    RBC 4.50 3.77 - 5.28 10*6/mm3    Hemoglobin 14.5 12.0 - 15.9 g/dL    Hematocrit 43.7 34.0 - 46.6 %    MCV 97.1 (H) 79.0 - 97.0 fL    MCH 32.2 26.6 - 33.0 pg    MCHC 33.2 31.5 - 35.7 g/dL    RDW 18.9 (H) 12.3 - 15.4 %    RDW-SD 67.2 (H) 37.0 - 54.0 fl    MPV 10.2 6.0 - 12.0 fL    Platelets 184 140 - 450 10*3/mm3    Neutrophil % 86.6 (H) 42.7 - 76.0 %    Lymphocyte % 3.5 (L) 19.6 - 45.3 %    Monocyte % 8.8 5.0 - 12.0 %    Eosinophil % 0.1 (L) 0.3 - 6.2 %    Basophil % 0.3 0.0 - 1.5 %    Immature Grans % 0.7 (H) 0.0 - 0.5 %    Neutrophils, Absolute 13.32 (H) 1.70 - 7.00 10*3/mm3    Lymphocytes, Absolute 0.54 (L) 0.70 - 3.10 10*3/mm3    Monocytes, Absolute 1.36 (H) 0.10 - 0.90 10*3/mm3    Eosinophils, Absolute 0.01 0.00 - 0.40 10*3/mm3    Basophils, Absolute 0.04 0.00 - 0.20 10*3/mm3    Immature Grans, Absolute 0.11 (H) 0.00 - 0.05 10*3/mm3    nRBC 0.0 0.0 - 0.2 /100 WBC   ECG 12 Lead Electrolyte Imbalance    Collection Time: 01/07/25 12:53 PM   Result Value Ref Range    QT Interval 341 ms    QTC Interval 440 ms   POC Glucose Once    Collection Time: 01/07/25 12:59 PM    Specimen: Blood   Result Value Ref Range    Glucose 123 70 - 130 mg/dL   Blood Gas, Venous -    Collection Time: 01/07/25  1:08 PM    Specimen: Venous Blood   Result Value Ref Range    Site RA     pH, Venous 7.417 (H) 7.310 - 7.410 pH Units    pCO2, Venous 26.2 (L) 41.0 - 51.0 mm Hg    pO2, Venous 60.4 (H) 35.0 - 45.0 mm Hg    HCO3, Venous 16.9 (L) 22.0 - 28.0 mmol/L    Base Excess, Venous -5.6 (L) -2.0 - 2.0 mmol/L    O2  Saturation, Venous 91.8 (H) 45.0 - 75.0 %    CO2 Content 17.7 (L) 23 - 27 mmol/L    Barometric Pressure for Blood Gas 760.4000 mmHg    Modality Room Air     Device Comment spo2 [id number]          RADIOLOGY  No Radiology Exams Resulted Within Past 24 Hours      MEDICATIONS GIVEN IN ER  Medications   sodium bicarbonate injection 8.4% 50 mEq (has no administration in time range)   dextrose (D50W) (25 g/50 mL) IV injection 25 g (25 g Intravenous Given 1/7/25 1304)   insulin regular (humuLIN R,novoLIN R) injection 10 Units (10 Units Intravenous Given 1/7/25 1304)   sodium chloride 0.9 % bolus 1,000 mL (1,000 mL Intravenous New Bag 1/7/25 1300)   sodium zirconium cyclosilicate (LOKELMA) packet 10 g (10 g Oral Given 1/7/25 1335)         ORDERS PLACED DURING THIS VISIT:  Orders Placed This Encounter   Procedures    Respiratory Panel PCR w/COVID-19(SARS-CoV-2) AMADO/MAMIE/TABITHA/PAD/COR/SHAUN In-House, NP Swab in UTM/VTM, 2 HR TAT - Swab, Nasopharynx    Urinalysis With Microscopic If Indicated (No Culture) - Urine, Catheter    Blood Gas, Venous -    Procalcitonin    Lactic Acid, Plasma    Bladder scan    Monitor Blood Pressure    Continuous Pulse Oximetry    Nephrology (on -call MD unless specified)    [facility] (on-call MD unless specified) Details    POC Glucose STAT    POC Glucose Once    ECG 12 Lead Electrolyte Imbalance    Inpatient Admission         OUTPATIENT MEDICATION MANAGEMENT:  Current Facility-Administered Medications Ordered in Epic   Medication Dose Route Frequency Provider Last Rate Last Admin    sodium bicarbonate injection 8.4% 50 mEq  50 mEq Intravenous Once Curt Klein MD         Current Outpatient Medications Ordered in Epic   Medication Sig Dispense Refill    ALPRAZolam (Xanax) 0.25 MG tablet Take 1 tablet by mouth Every 8 (Eight) Hours As Needed for Anxiety. Indications: Feeling Anxious 30 tablet 0    amLODIPine (NORVASC) 10 MG tablet Take 1 tablet by mouth Daily. 30 tablet 0    buPROPion XL (WELLBUTRIN XL) 150 MG 24 hr  tablet Take 2 tablets by mouth Every Morning. Indications: Attention Deficit Hyperactivity Disorder      dexAMETHasone (DECADRON) 4 MG tablet Take 1 tablet by mouth 2 (Two) Times a Day. 60 tablet 0    Diphenhydramine-Aluminum-Magnesium-Simethicone-Lidocaine-Nystatin Swish and spit 5 mL Every 4 (Four) Hours As Needed (mucositis). Recipe: Benadryl Elixir 60cc, Maalox 200 cc, Viscous Lidocaine 30cc, Nystatin 120cc  Indications: Mucous Membrane Inflammation 410 mL 1    encorafenib (BRAFTOVI) 75 MG capsule Take 4 capsules by mouth Daily. Indications: Cancer of the Colon 120 capsule 0    fentaNYL (DURAGESIC) 50 MCG/HR patch Place 1 patch on the skin as directed by provider Every 72 (Seventy-Two) Hours. 10 patch 0    furosemide (LASIX) 40 MG tablet Take 1 tablet by mouth Daily. 30 tablet 0    HYDROcodone-acetaminophen (NORCO)  MG per tablet Take 1 tablet by mouth Every 4 (Four) Hours As Needed for Moderate Pain. Indications: Pain 120 tablet 0    losartan (COZAAR) 50 MG tablet Take 1 tablet by mouth Daily. 30 tablet 0    melatonin 5 MG tablet tablet Take 1 tablet by mouth Every Night. 30 each 0    ondansetron ODT (ZOFRAN-ODT) 8 MG disintegrating tablet Take 1 tablet by mouth Every 8 (Eight) Hours As Needed for Nausea or Vomiting. Indications: Nausea and Vomiting caused by Cancer Chemotherapy 60 tablet 5    polyethylene glycol (MIRALAX) 17 g packet Take 17 g by mouth Daily. 90 each 2    potassium chloride 10 MEQ CR tablet Take 2 tablets by mouth 2 (Two) Times a Day. 60 tablet 0    sennosides-docusate (senna-docusate sodium) 8.6-50 MG per tablet Take 2 tablets by mouth Daily. 90 tablet 2         PROCEDURES  Procedures      Total critical care time: Approximately 35 minutes    Due to a high probability of clinically significant, life threatening deterioration, the patient required my highest level of preparedness to intervene emergently and I personally spent this critical care time directly and personally managing the  patient. This critical care time included obtaining a history; examining the patient; vital sign monitoring; ordering and review of studies; arranging urgent treatment with development of a management plan; evaluation of patient's response to treatment; frequent reassessment; and, discussions with other providers.    This critical care time was performed to assess and manage the high probability of imminent, life-threatening deterioration that could result in multi-organ failure. It was exclusive of separately billable procedures and treating other patients and teaching time.    Please see MDM section and the rest of the note for further information on patient assessment and treatment.        PROGRESS, DATA ANALYSIS, CONSULTS, AND MEDICAL DECISION MAKING  All labs have been independently interpreted by me.  All radiology studies have been reviewed by me. All EKG's have been independently viewed and interpreted by me.  Discussion below represents my analysis of pertinent findings related to patient's condition, differential diagnosis, treatment plan and final disposition.    Differential diagnosis:   My differential diagnosis includes but is not limited to generalized weakness, electrolyte abnormality, CVA, TIA, Bell's palsy, acute MI, GI bleed, urinary tract infection, systemic infections including sepsis, alcohol abuse, drug abuse including prescription and street drug.      Clinical Scores:                  ED Course as of 01/07/25 1336   Tue Jan 07, 2025   1245 Acute on chronically ill patient has significant electrolyte abnormalities concerning for severe dehydration and the patient is at high risk for cardiac dysrhythmia.  EKG as well as additional labs been ordered.  IV fluids, EKG, insulin and dextrose have also been ordered.  Patient to medicine service with nephrology consult. [RS]   1258 EKG           EKG time: 1253  Rhythm/Rate: Sinus tachycardia, 100  P waves and ND: SILVA within normal limits with  prominent P waves  QRS, axis: Narrow complex  ST and T waves: No STEMI; QTc within normal limits    Interpreted Contemporaneously by me, independently viewed  Comparison: 12/17/2024   [RS]   1312 CONSULT        Provider: Dr. Reynaldo WEI    Discussion: Reviewed patient history, ED presentation and evaluation.  Agreeable to accept patient for full admission.  Aware of pending nephrology consult.  Would recommend move forward with Lokelma.    Agreeable c treatment and planned disposition.         [RS]   9671 CONSULT        Provider: Dr. Skinny Harding Nephj luis    Discussion: Patient history, ED presentation evaluation.  He would like me to go forward with some sodium bicarbonate at this time and he will add additional studies thereafter. agreeable to consult    Agreeable c treatment and planned disposition.         [RS]      ED Course User Index  [RS] Curt Klein MD         Prescription drug monitoring program review:     AS OF 13:36 EST VITALS:    BP - 128/89  HR - 105  TEMP - 96.8 °F (36 °C) (Tympanic)  O2 SATS - 98%    COMPLEXITY OF CARE  The patient requires admission.      DIAGNOSIS  Final diagnoses:   Volume depletion   Acute kidney injury (ALBER) with acute tubular necrosis (ATN)   Hyperkalemia   Metastatic malignant neoplasm, unspecified site   Delirium         DISPOSITION  ED Disposition       ED Disposition   Decision to Admit    Condition   --    Comment   Level of Care: Telemetry [5]   Diagnosis: Acute kidney injury (ALBER) with acute tubular necrosis (ATN) [[id number]]   Admitting Physician: JUAN M JIMENEZ [[id number]]   Bed Request Comments: Oncology for possible though the patient will require cardiac monitoring   Certification: I Certify That Inpatient Hospital Services Are Medically Necessary For Greater Than 2 Midnights                    ADMISSION    Discussed treatment plan and reason for admission with pt/family and admitting physician.  Pt/family voiced understanding of the plan for admission for further  testing/treatment as needed.       Please note that portions of this document were completed with a voice recognition program.    Note Disclaimer: At [facility], we believe that sharing information builds trust and better relationships. You are receiving this note because you recently visited [facility]. It is possible you will see health information before a provider has talked with you about it. This kind of information can be easy to misunderstand. To help you fully understand what it means for your health, we urge you to discuss this note with your provider.           Curt Klein MD  01/07/25 3247

## 2025-01-07 NOTE — H&P
Internal medicine history and physical  INTERNAL MEDICINE   [facility]       Patient Identification:  Name: Ely Sims  Age: 64 y.o.  Sex: female  :  1960  MRN: [id number]                   Primary Care Physician: Provider, No Known                               Date of admission:2025    Chief Complaint: Sent from [facility] office for abnormal lab dehydration and malnutrition.    History of Present Illness:   Patient is a 64-year-old female with metastatic colon cancer with mets to her brain since 2022 currently receiving treatment under the direction of her oncologist and was hospitalized for 7 days with similar symptoms of severe pain uncontrolled nausea vomiting and dehydration.  Since her discharge from the hospital patient has progressively declined again and today she presented to [facility] office for her third cycle of immunotherapy therapy but was noted to be doing very poorly with ongoing nausea vomiting decreased intake and difficulty swallowing.  Despite taking her medications including Zofran and Magic mouthwash her symptoms are getting worse.  She was also having worsening abdominal pain despite fentanyl patch and increasing the dose of Norco.  Lab work performed earlier today shows white blood cell count of 15,000 sodium 130 potassium of 6.2 creatinine of 3.43 and alkaline phosphatase of 861.  Because of these parameters patient was sent to the emergency room where she had an emergent hyperkalemia treatment and also IV fluids administered.  Patient is being admitted for further care.  According to family members at bedside after fluid resuscitation and treatment in the emergency room though she is still feels poorly but looks significantly better.      Past Medical History:  Past Medical History:   Diagnosis Date    Metastasis to brain     Rectal carcinoma     Seasonal allergies      Past Surgical History:  Past Surgical History:   Procedure Laterality Date    COLONOSCOPY  N/A 08/25/2024    Procedure: COLONOSCOPY to 20cm with cold biopsies and needle tattoo;  Surgeon: Shadi Kaminski MD;  Location: [facility] ENDOSCOPY;  Service: Gastroenterology;  Laterality: N/A;  pre: abnormal imaging  post: poor prep, obstructing colon mass at 20cm, hemorrhoids    COLOSTOMY N/A 08/27/2024    Procedure: COLOSTOMY LAPAROSCOPIC;  Surgeon: Saturnino Chester MD;  Location: [facility] MAIN OR;  Service: General;  Laterality: N/A;    CRANIOTOMY N/A 08/21/2024    Procedure: Posterior fossa craniotomy for tumor using stereotactic guidance;  Surgeon: Bull Doty MD;  Location: [facility] MAIN OR;  Service: Neurosurgery;  Laterality: N/A;    ENDOMETRIAL ABLATION      TUBAL ABDOMINAL LIGATION      VENOUS ACCESS DEVICE (PORT) INSERTION Right 08/27/2024    Procedure: INSERTION VENOUS ACCESS DEVICE;  Surgeon: Saturnino Chester MD;  Location: [facility] MAIN OR;  Service: General;  Laterality: Right;      Home Meds:  (Not in a hospital admission)    Current Meds:     Current Facility-Administered Medications:     bumetanide (BUMEX) injection 2.5 mg, 2.5 mg, Intravenous, Once, Patrick Babb MD    Current Outpatient Medications:     ALPRAZolam (Xanax) 0.25 MG tablet, Take 1 tablet by mouth Every 8 (Eight) Hours As Needed for Anxiety. Indications: Feeling Anxious, Disp: 30 tablet, Rfl: 0    amLODIPine (NORVASC) 10 MG tablet, Take 1 tablet by mouth Daily., Disp: 30 tablet, Rfl: 0    buPROPion XL (WELLBUTRIN XL) 150 MG 24 hr tablet, Take 2 tablets by mouth Every Morning. Indications: Attention Deficit Hyperactivity Disorder, Disp: , Rfl:     dexAMETHasone (DECADRON) 4 MG tablet, Take 1 tablet by mouth 2 (Two) Times a Day., Disp: 60 tablet, Rfl: 0    Diphenhydramine-Aluminum-Magnesium-Simethicone-Lidocaine-Nystatin, Swish and spit 5 mL Every 4 (Four) Hours As Needed (mucositis). Recipe: Benadryl Elixir 60cc, Maalox 200 cc, Viscous Lidocaine 30cc, Nystatin 120cc  Indications: Mucous Membrane Inflammation,  Disp: 410 mL, Rfl: 1    encorafenib (BRAFTOVI) 75 MG capsule, Take 4 capsules by mouth Daily. Indications: Cancer of the Colon, Disp: 120 capsule, Rfl: 0    fentaNYL (DURAGESIC) 50 MCG/HR patch, Place 1 patch on the skin as directed by provider Every 72 (Seventy-Two) Hours., Disp: 10 patch, Rfl: 0    furosemide (LASIX) 40 MG tablet, Take 1 tablet by mouth Daily., Disp: 30 tablet, Rfl: 0    HYDROcodone-acetaminophen (NORCO)  MG per tablet, Take 1 tablet by mouth Every 4 (Four) Hours As Needed for Moderate Pain. Indications: Pain, Disp: 120 tablet, Rfl: 0    losartan (COZAAR) 50 MG tablet, Take 1 tablet by mouth Daily., Disp: 30 tablet, Rfl: 0    melatonin 5 MG tablet tablet, Take 1 tablet by mouth Every Night., Disp: 30 each, Rfl: 0    ondansetron ODT (ZOFRAN-ODT) 8 MG disintegrating tablet, Take 1 tablet by mouth Every 8 (Eight) Hours As Needed for Nausea or Vomiting. Indications: Nausea and Vomiting caused by Cancer Chemotherapy, Disp: 60 tablet, Rfl: 5    polyethylene glycol (MIRALAX) 17 g packet, Take 17 g by mouth Daily., Disp: 90 each, Rfl: 2    potassium chloride 10 MEQ CR tablet, Take 2 tablets by mouth 2 (Two) Times a Day., Disp: 60 tablet, Rfl: 0    sennosides-docusate (senna-docusate sodium) 8.6-50 MG per tablet, Take 2 tablets by mouth Daily., Disp: 90 tablet, Rfl: 2  Allergies:  No Known Allergies  Social History:   Social History     Tobacco Use    Smoking status: Former     Types: Cigarettes    Smokeless tobacco: Former   Substance Use Topics    Alcohol use: Yes      Family History:  Family History   Problem Relation Age of Onset    Heart disease Mother     Colon cancer Sister           Review of Systems  See history of present illness and past medical history.  She has done well and does not want to talk.      Vitals:   /89   Pulse 105   Temp 96.8 °F (36 °C) (Tympanic)   Resp 16   SpO2 98%   I/O: No intake or output data in the 24 hours ending 01/07/25 1341  Exam:  Patient is examined  using the personal protective equipment as per guidelines from infection control for this particular patient as enacted.  Hand washing was performed before and after patient interaction.  General Appearance:  Awake emaciated ill-appearing female sitting at the edge of the bed with a family member at the bedside   Head:    Normocephalic, without obvious abnormality, atraumatic   Eyes:    PERRL, conjunctiva/corneas clear, EOM's intact, both eyes   Ears:    Normal external ear canals, both ears   Nose:   Nares normal, septum midline, mucosa normal, no drainage    or sinus tenderness   Throat: Dry oral mucosa with oral thrush   Neck: Supple   Back:     Symmetric, no curvature, ROM normal, no CVA tenderness   Lungs:     Clear to auscultation bilaterally, respirations unlabored   Chest Wall:    No tenderness or deformity    Heart:  S1-S2 tachycardic   Abdomen:   Soft mild generalized tenderness, ostomy present   Extremities: Trace edema   Pulses:   Pulses palpable in all extremities; symmetric all extremities   Skin: Skin changes noted   Neurologic: More awake and interactive compared to a few hours ago       Data Review:      I reviewed the patient's new clinical results.  Results from last 7 days   Lab Units 01/07/25  1039   WBC 10*3/mm3 15.38*   HEMOGLOBIN g/dL 14.5   PLATELETS 10*3/mm3 184     Results from last 7 days   Lab Units 01/07/25  1039   SODIUM mmol/L 130*   POTASSIUM mmol/L 6.2*   CHLORIDE mmol/L 95*   CO2 mmol/L 16.9*   BUN mg/dL 70*   CREATININE mg/dL 3.43*   CALCIUM mg/dL 10.4   GLUCOSE mg/dL 118*     No radiology results for the last day  ECG 12 Lead Electrolyte Imbalance   Final Result   HEART KVFG=766  bpm   RR Uzsebsyo=414  ms   NJ Dimjwpug=195  ms   P Horizontal Axis=-13  deg   P Front Axis=73  deg   QRSD Interval=90  ms   QT Fdoqtnfs=014  ms   QVyF=552  ms   QRS Axis=19  deg   T Wave Axis=56  deg   - ABNORMAL ECG -   Sinus tachycardia   Biatrial enlargement   No change from previous tracing    Electronically Signed By: Elliott Lund ([facility]) 2025-01-07 14:20:46   Date and Time of Study:2025-01-07 12:53:00      Telemetry Scan   Final Result      Telemetry Scan   Final Result        Microbiology Results (last 10 days)       Procedure Component Value - Date/Time    Respiratory Panel PCR w/COVID-19(SARS-CoV-2) [facility] In-House, NP Swab in UTM/VTM, 2 HR TAT - Swab, Nasopharynx [[id number]]  (Normal) Collected: 01/07/25 1259    Lab Status: Final result Specimen: Swab from Nasopharynx Updated: 01/07/25 1406     ADENOVIRUS, PCR Not Detected     Coronavirus 229E Not Detected     Coronavirus HKU1 Not Detected     Coronavirus NL63 Not Detected     Coronavirus OC43 Not Detected     COVID19 Not Detected     Human Metapneumovirus Not Detected     Human Rhinovirus/Enterovirus Not Detected     Influenza A PCR Not Detected     Influenza B PCR Not Detected     Parainfluenza Virus 1 Not Detected     Parainfluenza Virus 2 Not Detected     Parainfluenza Virus 3 Not Detected     Parainfluenza Virus 4 Not Detected     RSV, PCR Not Detected     Bordetella pertussis pcr Not Detected     Bordetella parapertussis PCR Not Detected     Chlamydophila pneumoniae PCR Not Detected     Mycoplasma pneumo by PCR Not Detected    Narrative:      In the setting of a positive respiratory panel with a viral infection PLUS a negative procalcitonin without other underlying concern for bacterial infection, consider observing off antibiotics or discontinuation of antibiotics and continue supportive care. If the respiratory panel is positive for atypical bacterial infection (Bordetella pertussis, Chlamydophila pneumoniae, or Mycoplasma pneumoniae), consider antibiotic de-escalation to target atypical bacterial infection.            Assessment:  Active Hospital Problems    Diagnosis  POA    **Acute kidney injury (ALBER) with acute tubular necrosis (ATN) [N17.0]  Yes    Hyperkalemia [E87.5]  Yes    Anxiety [F41.9]  Yes    Severe  protein-calorie malnutrition [E43]  Yes    Abdominal pain [R10.9]  Yes    Dehydration [E86.0]  Yes    Hypertension [I10]  Yes    Colon cancer metastasized to brain [C18.9, C79.31]  Yes       Medical decision making/care plan: See admitting  Acute kidney injury-multifactorial including decreased intake, ongoing use of diuretics-plan is to hold diuretics avoid nephrotoxic agent continue with IV fluids and monitor.  Hyperkalemia with metabolic acidosis likely due to acute kidney injury and potassium supplement-patient has received aggressive treatment for hyperkalemia including IV D50, bicarb, calcium gluconate and has received a dose of Lokelma-recheck BMP in 6 hours and monitor.  Continue with IV fluids.  Severe protein calorie malnutrition multifactorial including underlying malignancy and effective treatment of chemotherapy-nutrition consultation and continue with IV fluids.  Metastatic colon cancer with mets to brain-currently on her third cycle of chemotherapy on hold will consult hematology oncology.  In passing patient's family members were having conversation about when to know to stop or continue to pursue aggressive treatment? this query is directed to the hematology oncology service.  Cancer-related pain-continue current regimen and monitor.  Oral thrush with mucositis-continue with nystatin and Magic mouthwash.    Estefania Jacobs MD   1/7/2025  13:41 EST    Parts of this note may be an electronic transcription/translation of spoken language to printed text using the Dragon dictation system.

## 2025-01-07 NOTE — OUTREACH NOTE
Medical Week 2 Survey      Flowsheet Row Responses   [facility] patient discharged from? [place]   Does the patient have one of the following disease processes/diagnoses(primary or secondary)? Other   Week 2 attempt successful? No   Unsuccessful attempts Attempt 1   Revoke Other transitional program            Margaret CAAL - Licensed Nurse

## 2025-01-07 NOTE — CONSULTS
"  [facility] Consult Note      Patient Name: Ely Sims  : 1960  MRN: [id number]  Primary Care Physician:  Provider, No Known  Referring Physician: Curt Klein MD  Date of admission: 2025    Subjective     Reason for Consult: ALBER    HPI:   Ely Sims is a 64 y.o. female with normal kidney function at baseline, metastatic colon cancer on active treatment, and hypertension, presented to the hospital from oncology office due to ongoing nausea/poor p.o. intake, and abnormal labs (SCr 3.4, K6.2).    Upon arrival, BP acceptable 120s/90s, HR 100s (sinus rhythm), UA bland.  Patient was given 1 L saline bolus, Lokelma, insulin, and bicarb push.     Patient is very somnolent and drowsy during examination, unable to provide any reliable medical history, ROS is obtained from amily at bedside:  Patient has been having very poor p.o. intake for the past few weeks, appropriately worsened in the past 2 days due to nausea/dysphagia/\"burning in the throat\"; cycle 3 panitumumab and encorafenib were held.  From 2024 to 2024, patient was admitted for abdominal pain, hypokalemia, and fluid retention, was discharged home with Lasix 40 mg daily and KCl 20 mEq BID.  Has been taking fentanyl patch and hydrocodone every 4 hours at home for abdominal pain; also on Cozaar at home.    Most of the information is given by daughter at bedside , patient somonolent    Review of Systems:   14 point review of systems is otherwise negative except for mentioned above on HPI    Personal History     Past Medical History:   Diagnosis Date    Metastasis to brain     Rectal carcinoma     Seasonal allergies        Past Surgical History:   Procedure Laterality Date    COLONOSCOPY N/A 2024    Procedure: COLONOSCOPY to 20cm with cold biopsies and needle tattoo;  Surgeon: Shadi Kaminski MD;  Location: [facility] ENDOSCOPY;  Service: Gastroenterology;  Laterality: N/A;  pre: abnormal imaging  post: poor " prep, obstructing colon mass at 20cm, hemorrhoids    COLOSTOMY N/A 08/27/2024    Procedure: COLOSTOMY LAPAROSCOPIC;  Surgeon: Saturnino Chester MD;  Location:  [facility] MAIN OR;  Service: General;  Laterality: N/A;    CRANIOTOMY N/A 08/21/2024    Procedure: Posterior fossa craniotomy for tumor using stereotactic guidance;  Surgeon: Bull Doty MD;  Location:  [facility] MAIN OR;  Service: Neurosurgery;  Laterality: N/A;    ENDOMETRIAL ABLATION      TUBAL ABDOMINAL LIGATION      VENOUS ACCESS DEVICE (PORT) INSERTION Right 08/27/2024    Procedure: INSERTION VENOUS ACCESS DEVICE;  Surgeon: Saturnino Chester MD;  Location:  [facility] MAIN OR;  Service: General;  Laterality: Right;       Family History: family history includes Colon cancer in her sister; Heart disease in her mother.    Social History:  reports that she has quit smoking. Her smoking use included cigarettes. She has quit using smokeless tobacco. She reports current alcohol use. Drug use questions deferred to the physician.    Home Medications:  Prior to Admission medications    Medication Sig Start Date End Date Taking? Authorizing Provider   ALPRAZolam (Xanax) 0.25 MG tablet Take 1 tablet by mouth Every 8 (Eight) Hours As Needed for Anxiety. Indications: Feeling Anxious 12/26/24   Elieser Saunders MD   amLODIPine (NORVASC) 10 MG tablet Take 1 tablet by mouth Daily. 12/21/24   Cesar Ko MD   buPROPion XL (WELLBUTRIN XL) 150 MG 24 hr tablet Take 2 tablets by mouth Every Morning. Indications: Attention Deficit Hyperactivity Disorder 8/19/24   Provider, MD Columba   dexAMETHasone (DECADRON) 4 MG tablet Take 1 tablet by mouth 2 (Two) Times a Day. 12/20/24   Cesar Ko MD   Diphenhydramine-Aluminum-Magnesium-Simethicone-Lidocaine-Nystatin Swish and spit 5 mL Every 4 (Four) Hours As Needed (mucositis). Recipe: Benadryl Elixir 60cc, Maalox 200 cc, Viscous Lidocaine 30cc, Nystatin 120cc  Indications: Mucous Membrane Inflammation 1/3/25    Marisela Garza APRN   encorafenib (BRAFTOVI) 75 MG capsule Take 4 capsules by mouth Daily. Indications: Cancer of the Colon 12/23/24   Elieser Saunders MD   fentaNYL (DURAGESIC) 50 MCG/HR patch Place 1 patch on the skin as directed by provider Every 72 (Seventy-Two) Hours. 12/20/24   Ceasr Ko MD   furosemide (LASIX) 40 MG tablet Take 1 tablet by mouth Daily. 12/20/24   Cesar Ko MD   HYDROcodone-acetaminophen (NORCO)  MG per tablet Take 1 tablet by mouth Every 4 (Four) Hours As Needed for Moderate Pain. Indications: Pain 12/23/24   Elieser Saunders MD   losartan (COZAAR) 50 MG tablet Take 1 tablet by mouth Daily. 12/21/24   Cesar Ko MD   melatonin 5 MG tablet tablet Take 1 tablet by mouth Every Night. 12/20/24   Cesar Ko MD   ondansetron ODT (ZOFRAN-ODT) 8 MG disintegrating tablet Take 1 tablet by mouth Every 8 (Eight) Hours As Needed for Nausea or Vomiting. Indications: Nausea and Vomiting caused by Cancer Chemotherapy 12/23/24   Elieser Saunders MD   polyethylene glycol (MIRALAX) 17 g packet Take 17 g by mouth Daily. 11/1/24   Saturnino Chester MD   potassium chloride 10 MEQ CR tablet Take 2 tablets by mouth 2 (Two) Times a Day. 12/20/24   Cesar Ko MD   sennosides-docusate (senna-docusate sodium) 8.6-50 MG per tablet Take 2 tablets by mouth Daily. 11/1/24   Saturnino Chester MD       Allergies:  No Known Allergies    Objective     Vitals:   Temp:  [96.8 °F (36 °C)-98.4 °F (36.9 °C)] 97.7 °F (36.5 °C)  Heart Rate:  [] 115  Resp:  [16-17] 16  BP: (104-128)/() 113/102  No intake or output data in the 24 hours ending 01/07/25 1925    Physical Exam:   Constitutional: Awake, chronically ill, thin, no acute distress.  HEENT: Sclera anicteric, no conjunctival injection  Neck: Supple, no JVD  Respiratory: Diminished, nonlabored respiration  Cardiovascular: Tachycardia, RRR, 2/6 murmurs, no rubs, no carotid bruit  Gastrointestinal: BS +, abdomen is  soft, nontender and nondistended  : No palpable bladder  Musculoskeletal: No edema, no clubbing or cyanosis  Psychiatric: Somnolent  Neurologic: Drowsy, lethargic  Skin: Warm and dry       Scheduled Meds:     [START ON 1/8/2025] buPROPion XL, 150 mg, Oral, QAM  fentaNYL, 1 patch, Transdermal, Q72H   And  [START ON 1/8/2025] Check Fentanyl Patch Placement, 1 each, Not Applicable, Q12H  melatonin, 5 mg, Oral, Nightly  polyethylene glycol, 17 g, Oral, Daily  sennosides-docusate, 2 tablet, Oral, Daily  sodium chloride, 10 mL, Intravenous, Q12H      IV Meds:   sodium chloride 0.45 % 900 mL with sodium bicarbonate 8.4 % 100 mEq infusion, , Last Rate: 100 mL/hr at 01/07/25 1448  sodium chloride, 100 mL/hr        Results Reviewed:   I have personally reviewed the results from the time of this admission to 1/7/2025 19:25 EST     Lab Results   Component Value Date    GLUCOSE 122 (H) 01/07/2025    CALCIUM 9.5 01/07/2025     (L) 01/07/2025    K 5.1 01/07/2025    CO2 22.6 01/07/2025    CL 96 (L) 01/07/2025    BUN 67 (H) 01/07/2025    CREATININE 3.11 (H) 01/07/2025    BCR 21.5 01/07/2025    ANIONGAP 14.4 01/07/2025      Lab Results   Component Value Date    MG 2.6 (H) 01/07/2025    PHOS 4.4 01/07/2025    ALBUMIN 3.5 01/07/2025           Assessment / Plan       Acute kidney injury (ALBER) with acute tubular necrosis (ATN)    Colon cancer metastasized to brain    Hypertension    Dehydration    Abdominal pain    Severe protein-calorie malnutrition    Anxiety    Hyperkalemia      ASSESSMENT:  ALBER, likely with progression to ATN.  Normal kidney function at baseline, today SCr 3.43, multifactorial: Prerenal azotemia due to poor PO intake secondary to nausea and dysphagia, concurrent diuretic use, and impaired renal autoregulation with ARB.  Hypovolemic by exam  Hyperkalemia (with previous hypokalemia), secondary to KCl supplements, ALBER, metabolic acidosis, and likely starvation ketosis.  Received insulin, bicarb push, and Lokelma  in the ER  AGMA (d/t ALBER and GI losses) and respiratory alkalosis  Hyponatremia, likely hypovolemic, sodium 130  Metastatic colon cancer to the brain, on panitumumab and encorafenib, held by oncology due to severe nausea/poor PO intake  Ongoing nausea/vomiting, followed by primary team  Hypertension, BP well-controlled  Malnutrition with significant weight loss  Anemia/leukocytosis, secondary to chemotherapy, followed by oncology outpatient  Ongoing abdominal pain, not well-controlled despite fentanyl patch and hydrocodone outpatient, followed by primary team  Recent volume overload (hospitalized from 12/13/2024 to 12/30/2024), patient was discharged home with Lasix 40 mg daily  Transaminitis, followed by primary team    PLAN:  Hold ARB and potassium supplements  Will give IV Bumex 2.5 mg once to shift potassium  Start bicarb drip at 100 mEq with half-normal saline at 100 mL/h  Recheck renal panel at 1600  Surveillance labs    Thank you for involving us in the care of Ely Sims.  Please feel free to call with any questions.    Patrick Babb MD  01/07/25  19:25 RUST    [facility]  [phone]      Please note that portions of this note were completed with a voice recognition program.

## 2025-01-07 NOTE — PROGRESS NOTES
Patient is Current with [facility]. We will follow for discharge plans and HH needs.

## 2025-01-07 NOTE — PROGRESS NOTES
"[facility] GROUP OUTPATIENT FOLLOW UP CLINIC VISIT    REASON FOR FOLLOW-UP:    Metastatic colon cancer     HISTORY OF PRESENT ILLNESS:    Ely Sims is a 64 y.o. female who returns for follow up    Patient is seen today in follow-up accompanied by her longtime boyfriend and her son.  She is here for consideration of cycle 3 panitumumab.  Patient is also continued on oral encorafenib 300 mg daily.    Unfortunately she is doing quite poorly today.  She was hospitalized just before [place] at that time with low potassium and fluid retention.  She was discharged on Lasix 40 mg daily and potassium 20 mEq twice daily.  She now has evidence today of acute kidney injury with BUN of 70, creatinine 3.4, potassium of 6.2.  This is compounded by the fact that she is malnourished, having very little to eat or drink in the last week.  She has struggled with ongoing nausea.  Family notes she has had difficulty swallowing, reporting significant pain in her throat.  This is despite us sending in dissolvable Zofran tablets and Magic mouthwash.  She has evidence of oral candidiasis in her mouth and extremely dry mouth currently.    Additionally patient has ongoing abdominal pain despite fentanyl 50 mcg patch.  She is taking additional Norco 10/325 roughly every 4 hours.  Patient is unable to keep her eyes open talking to me today and is having difficulty telling me who the family members are that accompany her today.    We discussed with her multitude of problems as outlined above, she needs to be transferred to the ED at this time for proper medical management.  I have spoken with her oncologist, Dr. Saunders, who is rounding in the hospital this week and will see her inpatient.      PHYSICAL EXAMINATION:  Vitals:    01/07/25 1109   BP: 104/72   Pulse: 104   Resp: 17   Temp: 97.1 °F (36.2 °C)   TempSrc: Oral   SpO2: 95%   Weight: 50.6 kg (111 lb 9.6 oz)   Height: 170.2 cm (67.01\")   PainSc: 0-No pain       General: Somnolent. " Seated in wheelchair, appearing extremely weak.    Skin:  Warm and dry, no visible rash  HEENT: Extremely dry, red, oral candidiasis in posterior oropharynx.  Chest:  Normal respiratory effort.  Lungs clear to auscultation  Cardiac: Tachycardic  Abdomen: Ostomy present.    Extremities: Trace edema of the ankles.  Otherwise no lower extremity edema.  Neuro/psych: Global weakness.  Unable to stand.  Unable to answer questions appropriately.      DIAGNOSTIC DATA:   Results from last 7 days   Lab Units 01/07/25  1039   WBC 10*3/mm3 15.38*   NEUTROS ABS 10*3/mm3 13.32*   HEMOGLOBIN g/dL 14.5   HEMATOCRIT % 43.7   PLATELETS 10*3/mm3 184     Results from last 7 days   Lab Units 01/07/25  1039   SODIUM mmol/L 130*   POTASSIUM mmol/L 6.2*   CHLORIDE mmol/L 95*   CO2 mmol/L 16.9*   BUN mg/dL 70*   CREATININE mg/dL 3.43*   CALCIUM mg/dL 10.4   ALBUMIN g/dL 3.7   BILIRUBIN mg/dL 0.9   ALK PHOS U/L 861*   ALT (SGPT) U/L 30   AST (SGOT) U/L 54*   GLUCOSE mg/dL 118*   MAGNESIUM mg/dL 2.6*               IMAGING:    None reviewed     ASSESSMENT:  This is a 64 y.o. female with:    *Metastatic colon cancer  The patient presented on 8/19/2022 initially to urgent care with headache, nausea, vomiting, diarrhea.  She was advised to go to the ED from urgent care.  CT head 8/19/2024 showed some areas of increased attenuation over the right cerebral hemisphere and left temporal lobe concerning for metastatic disease with edema with some mass effect upon the fourth ventricle.  MRI brain 8/20/2024 with a lesion at the right cerebellar hemisphere measuring 2.2 cm, lesion within the left temporal lobe measuring 9 mm, 1.0 cm lesion at the left cerebellar vermis, all with surrounding edema.  There was some mass effect upon the fourth ventricle with mild prominence of the lateral and third ventricles.  8/20/2024: CT chest abdomen and pelvis with no evidence of pulmonary metastatic disease.  There was some asymmetric wall thickening in the distal  sigmoid colon with surrounding mesenteric soft tissue nodularity concerning for primary colon cancer.  A sclerotic lesion of the spinous process of T1 was said to be consistent with a bone island or other benign lesion.  8/21/2024: Posterior fossa craniectomy with gross total removal of a right cerebellar hemisphere metastasis Dr. Bull Doty.  Pathology consistent with metastatic poorly differentiated adenocarcinoma favoring colorectal origin.  MSI testing is pending.  [place]us with a BRAF V600E mutation.  TMB 4.7 mutations per megabase.  Microsatellite stable.  8/25/2024: Bone scan with no obvious metastatic disease.  8/25/2024: Colonoscopy by Dr. Kaminski shows an infiltrative completely obstructing large mass found at the proximal rectum.  Pathology with invasive poorly differentiated carcinoma with neuroendocrine features. MSI studies pending.     CEA 2.10.  8/26/2024: MRI lumbar spine with no obvious metastatic disease in the lumbar spine.  However, there is a 3 mm enhancing lesion in the visualized lower thoracic spinal cord that could represent a spinal cord metastasis.  Cervical and thoracic spine MRI requested..  8/27/24: diverting loop sigmoid colostomy and port placement by Dr. hCester  8/28/2024: MRI C and T-spine addendum made with a small 3 mm met to the spinal cord at T12  Completed radiation to the brain and spine on 9/24/24  PET scan 9/12/24 with no metastatic disease. FDG avid mass at the high rectum.  10/22/2024: Proceed with Cycle 2 FOLFOX. Will omit 5 FU bolus due to neutropenia. Future treatment plans adjusted. In regards to lower abdominal cramping which occurs at night, advised patient to discuss with Dr. Chester (surgeon).   11/5/2024: Proceed with Cycle 3 FOLFOX with 5 FU bolus omitted. WBC 4.05 and . Will give a dose of tylenol today while in the infusion area for side/back pain. Instructed her to reach out if the pain/soreness does not subside.   11/19/2024: She presents for cycle #4 of  therapy.  Therapy has been well-tolerated.  However, she is having worsening abdominal pain as well as some nausea and vomiting this morning.  See below.  11/26/2024: CT imaging with significant progression of disease  With progression, cetuximab or panitumumab plus encorafenib given the presence of a BRAF V600 E mutation.  Plan panitumumab due to ease of administration every 2 weeks and potential for fewer adverse effects compared to cetuximab. Encorafenib is 300 mg daily.  Panitumumab is 6 mg/kg every 14 days.  Plan treatment until disease progression or unacceptable toxicity.  Treatment is palliative and she understands this.  We will plan to initiate therapy very quickly given significant pression of disease.  C1 D1 panitumumab and encorafenib 12/10/2024  12/13/2024: seen for triage visit concerning severe pain and uncontrolled nausea/vomiting.  She has not been able to keep down any food or drink for the last 3 days.  Has been able to keep down some medications.  Has been utilizing Compazine every 6 hours without any relief.  As far as her pain she is continued MS Contin 15 mg every 8 hours and Norco 10/325 every 6 hours, despite this her pain has continued to worsen.  Her WBC is elevated at 18.44, and she reports developing  chills a few hours ago.  Her bilirubin has also increase dto 2.4.  She will require admission through [facility]  Subsequent admission.  Pain was better when she left the hospital.  1/6/2025: Hold cycle 3 panitumumab.  Hold encorafenib.  Patient will require hospitalization as further detailed below.     *Mild normocytic anemia  Hemoglobin 14.5    *Neutropenia secondary to chemotherapy  Now with leukocytosis with neutrophilia, reactive    *Abdominal pain  We have started sustained-release morphine 15 mg every 8 hours.  We had intended her for her to take hydrocodone also as needed but she has not been doing this thinking that morphine replaced the hydrocodone.  She was encouraged to take  hydrocodone as needed as well.  12/13/2024: Patient is having severe uncontrolled pain.  She has been taking MS Contin 15 mg every 8 hours and Norco 10/325 every 6 hours.  Despite this her pain has continued to worsen.  She will require direct admission through [facility] for further management.  Started Fentanyl patch inpatient.  This along with hydrocodone 10 mg tablets improved her pain.  She just has hydrocodone 5 mg tablets at home.  Unfortunately, her fentanyl patch fell off on Saturday and she has been having more pain and spite of replacement of a new patch yesterday.  1/7/2025: Continues with lower abdominal pain.  Currently utilizing fentanyl 50 mcg patch and Norco 10/325 every 4 hours.  She is extremely somnolent.    *Uncontrolled nausea/vomiting  Compazine was helping at home.  She has not been taking it since leaving the hospital since she apparently was told she was not supposed to take it.  It is unclear why this was stopped.  Records from the hospital do not really discuss her nausea and vomiting and what medication was used for this.  It appears that she was on a scopolamine patch at 1 point but this may have been stopped secondary to altered mental status.  She was also receiving lorazepam which was also stopped due to sedation.  1/7/2025 now utilizing Zofran 8 mg dissolvable tablets.  Continues with poor oral intake and extreme weight loss of 11 pounds in the last 2 weeks, over 20 pounds in the last month.    *Previous lower extremity edema while hospitalized 12/2024.    He was discharged home on Lasix 40 mg daily  1/7/2025 patient is now extremely dry with acute kidney injury, BUN 70, creatinine 3.4.  She has continued Lasix up until yesterday.  Has not had her dose today per her boyfriend's report.    *Previous hypokalemia  Discharged home 12/20/2024 potassium chloride 20 mEq twice daily.  1/7/2025 Now with evidence of hyperkalemia with potassium of 6.2    PLAN:   Hold C3 Vectibix  Hold encorafenib  (Braftovi) 300 mg daily  Patient will be transferred to the ED for further medical management of ALBER, hyperkalemia, abdominal pain, and extreme weakness, with expected hospitalization after she is stabilized.  I have discussed the patient's case with Dr. Saunders, her oncologist, who is rounding inpatient this week.  He is in agreement with this plan.    Patient's son and her longtime boyfriend present for entire visit today (her daughter arrived at the end of the visit). I did explain to both of them the grave nature of her current situation.  It is unclear if she will be able to receive further treatment based on her current performance status.    This patient is on high risk drug therapy requiring intensive monitoring for toxicity.      I spent 75 minutes caring for Ely on this date of service. This time includes time spent by me in the following activities: preparing for the visit, reviewing tests, performing a medically appropriate examination and/or evaluation, counseling and educating the patient/family/caregiver, referring and communicating with other health care professionals, documenting information in the medical record, independently interpreting results and communicating that information with the patient/family/caregiver, care coordination, obtaining a separately obtained history, and reviewing a separately obtained history

## 2025-01-07 NOTE — ED NOTES
Nursing report ED to floor  Ely Sims  64 y.o.  female    HPI :  HPI  Stated Reason for Visit: abnormal labs    Chief Complaint  Chief Complaint   Patient presents with    Altered Mental Status       Admitting doctor:   Estefania Jacobs MD    Admitting diagnosis:   The primary encounter diagnosis was Volume depletion. Diagnoses of Acute kidney injury (ALBER) with acute tubular necrosis (ATN), Hyperkalemia, Metastatic malignant neoplasm, unspecified site, and Delirium were also pertinent to this visit.    Code status:   Current Code Status       Date Active Code Status Order ID Comments User Context       1/7/2025 1345 CPR (Attempt to Resuscitate) [id number]  Estefania Jacobs MD ED        Question Answer    Code Status (Patient has no pulse and is not breathing) CPR (Attempt to Resuscitate)    Medical Interventions (Patient has pulse or is breathing) Full Support                    Allergies:   Patient has no known allergies.    Isolation:   No active isolations    Intake and Output  No intake or output data in the 24 hours ending 01/07/25 1454    Weight:   There were no vitals filed for this visit.    Most recent vitals:   Vitals:    01/07/25 1226 01/07/25 1232 01/07/25 1300   BP:  125/96 128/89   Pulse: 105  105   Resp: 16     Temp: 96.8 °F (36 °C)     TempSrc: Tympanic     SpO2: 96%  98%       Active LDAs/IV Access:   Lines, Drains & Airways       Active LDAs       Name Placement date Placement time Site Days    Colostomy LLQ --  --  LLQ  --    Single Lumen Implantable Port Left Chest --  --  Chest  --                    Labs (abnormal labs have a star):   Labs Reviewed   URINALYSIS W/ MICROSCOPIC IF INDICATED (NO CULTURE) - Abnormal; Notable for the following components:       Result Value    Appearance, UA Cloudy (*)     Glucose,  mg/dL (Trace) (*)     Protein, UA 30 mg/dL (1+) (*)     All other components within normal limits   BLOOD GAS, VENOUS - Abnormal; Notable for the following components:    pH, Venous  "7.417 (*)     pCO2, Venous 26.2 (*)     pO2, Venous 60.4 (*)     HCO3, Venous 16.9 (*)     Base Excess, Venous -5.6 (*)     O2 Saturation, Venous 91.8 (*)     CO2 Content 17.7 (*)     All other components within normal limits   PROCALCITONIN - Abnormal; Notable for the following components:    Procalcitonin 1.00 (*)     All other components within normal limits    Narrative:     As a Marker for Sepsis (Non-Neonates):    1. <0.5 ng/mL represents a low risk of severe sepsis and/or septic shock.  2. >2 ng/mL represents a high risk of severe sepsis and/or septic shock.    As a Marker for Lower Respiratory Tract Infections that require antibiotic therapy:    PCT on Admission    Antibiotic Therapy       6-12 Hrs later    >0.5                Strongly Recommended  >0.25 - <0.5        Recommended   0.1 - 0.25          Discouraged              Remeasure/reassess PCT  <0.1                Strongly Discouraged     Remeasure/reassess PCT    As 28 day mortality risk marker: \"Change in Procalcitonin Result\" (>80% or <=80%) if Day 0 (or Day 1) and Day 4 values are available. Refer to http://www.[email or web address]-pct-calculator.com    Change in PCT <=80%  A decrease of PCT levels below or equal to 80% defines a positive change in PCT test result representing a higher risk for 28-day all-cause mortality of patients diagnosed with severe sepsis for septic shock.    Change in PCT >80%  A decrease of PCT levels of more than 80% defines a negative change in PCT result representing a lower risk for 28-day all-cause mortality of patients diagnosed with severe sepsis or septic shock.      RESPIRATORY PANEL PCR W/ COVID-19 (SARS-COV-2), NP SWAB IN UTM/VTP, 2 HR TAT - Normal    Narrative:     In the setting of a positive respiratory panel with a viral infection PLUS a negative procalcitonin without other underlying concern for bacterial infection, consider observing off antibiotics or discontinuation of antibiotics and continue supportive care. If the " respiratory panel is positive for atypical bacterial infection (Bordetella pertussis, Chlamydophila pneumoniae, or Mycoplasma pneumoniae), consider antibiotic de-escalation to target atypical bacterial infection.   LACTIC ACID, PLASMA - Normal   POCT GLUCOSE FINGERSTICK - Normal   URINALYSIS, MICROSCOPIC ONLY   PROTEIN / CREATININE RATIO, URINE   SODIUM, URINE, RANDOM   POCT GLUCOSE FINGERSTICK       EKG:   ECG 12 Lead Electrolyte Imbalance   Final Result   HEART ASRB=469  bpm   RR Fyovjfld=284  ms   NJ Orvbgumz=442  ms   P Horizontal Axis=-13  deg   P Front Axis=73  deg   QRSD Interval=90  ms   QT Iwlraiuk=020  ms   OTkX=259  ms   QRS Axis=19  deg   T Wave Axis=56  deg   - ABNORMAL ECG -   Sinus tachycardia   Biatrial enlargement   No change from previous tracing   Electronically Signed By: Elliott Lund ([facility]) 2025-01-07 14:20:46   Date and Time of Study:2025-01-07 12:53:00          Meds given in ED:   Medications   sodium chloride 0.45 % 900 mL with sodium bicarbonate 8.4 % 100 mEq infusion ( Intravenous New Bag 1/7/25 1448)   dextrose (D50W) (25 g/50 mL) IV injection 25 g (25 g Intravenous Given 1/7/25 1304)   insulin regular (humuLIN R,novoLIN R) injection 10 Units (10 Units Intravenous Given 1/7/25 1304)   sodium chloride 0.9 % bolus 1,000 mL (0 mL Intravenous Stopped 1/7/25 1454)   sodium zirconium cyclosilicate (LOKELMA) packet 10 g (10 g Oral Given 1/7/25 1335)   sodium bicarbonate injection 8.4% 50 mEq (50 mEq Intravenous Given 1/7/25 1339)   bumetanide (BUMEX) injection 2.5 mg (2.5 mg Intravenous Given 1/7/25 1419)       Imaging results:  No radiology results for the last day    Ambulatory status:   - x1    Social issues:   Social History     Socioeconomic History    Marital status: Single   Tobacco Use    Smoking status: Former     Types: Cigarettes    Smokeless tobacco: Former   Vaping Use    Vaping status: Never Used   Substance and Sexual Activity    Alcohol use: Yes    Drug use: Defer     Sexual activity: Defer     Partners: Male       Peripheral Neurovascular  Peripheral Neurovascular (Adult)  Peripheral Neurovascular WDL: WDL    Neuro Cognitive  Neuro Cognitive (Adult)  Cognitive/Neuro/Behavioral WDL: WDL    Learning  Learning Assessment  Learning Readiness and Ability: cognitive limitation noted    Respiratory  Respiratory  Airway WDL: WDL  Respiratory WDL  Respiratory WDL: WDL    Abdominal Pain       Pain Assessments  Pain (Adult)  (0-10) Pain Rating: Rest: 4    NIH Stroke Scale       Malvin Sanchez RN  01/07/25 14:54 EST

## 2025-01-07 NOTE — ED NOTES
Pt arrives from CBC office for abnormal lab, dehydration, and malnutrition. Pt has metastatic colon cancer.

## 2025-01-07 NOTE — PROGRESS NOTES
OUTPATIENT ONCOLOGY NUTRITION     Patient Name: Ely Sims  YOB: 1960  MRN: [id number]  Assessment Date: 1/7/2025    COMMENTS: Chart review.   Was planning to follow up in infusion today but patient transferred to the hospital and treatment held.   She was hospitalized 12/13 through 12/20 with low potassium and fluid retention. She has struggled with po intake and nausea.   She has had little to eat or drink in the last week per APRN notes today. Continues to have lower abdominal pain. She has lost over 20 lb in the last month, 11 lbs in the last 2 weeks.     Will follow for plan of care.          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, S/P Diverting Colostomy   Treatment Plan Chemotherapy, Radiation held today       Encounter Information        Nutrition/Diet History:  Poor po intake   Oral Nutrition Supplements:    Factors/Symptoms Affecting Intake: Pain in abd   Comments:      Medical/Surgical History Past Medical History:   Diagnosis Date    Metastasis to brain     Rectal carcinoma 2024    Seasonal allergies        Past Surgical History:   Procedure Laterality Date    COLONOSCOPY N/A 08/25/2024    Procedure: COLONOSCOPY to 20cm with cold biopsies and needle tattoo;  Surgeon: Shadi Kaminski MD;  Location: [facility] ENDOSCOPY;  Service: Gastroenterology;  Laterality: N/A;  pre: abnormal imaging  post: poor prep, obstructing colon mass at 20cm, hemorrhoids    COLOSTOMY N/A 08/27/2024    Procedure: COLOSTOMY LAPAROSCOPIC;  Surgeon: Saturnino Chester MD;  Location: [facility] OR;  Service: General;  Laterality: N/A;    CRANIOTOMY N/A 08/21/2024    Procedure: Posterior fossa craniotomy for tumor using stereotactic guidance;  Surgeon: Bull Doty MD;  Location: [facility] MAIN OR;  Service: Neurosurgery;  Laterality: N/A;    ENDOMETRIAL ABLATION      TUBAL ABDOMINAL LIGATION      VENOUS ACCESS DEVICE (PORT) INSERTION Right 08/27/2024     "Procedure: INSERTION VENOUS ACCESS DEVICE;  Surgeon: Saturnino Chester MD;  Location: [facility];  Service: General;  Laterality: Right;               Anthropometrics        Current Height Ht Readings from Last 1 Encounters:   01/07/25 170.2 cm (67.01\")      Current Weight Wt Readings from Last 1 Encounters:   01/07/25 50.6 kg (111 lb 9.6 oz)      Weight History Wt Readings from Last 30 Encounters:   01/07/25 1109 50.6 kg (111 lb 9.6 oz)   12/23/24 1354 56.5 kg (124 lb 9.6 oz)   12/17/24 2306 61.5 kg (135 lb 9.3 oz)   12/13/24 1714 61.5 kg (135 lb 9.3 oz)   12/13/24 1329 60.6 kg (133 lb 9.6 oz)   12/10/24 0901 62.5 kg (137 lb 12.8 oz)   12/04/24 0943 61.6 kg (135 lb 12.8 oz)   12/04/24 0942 61.6 kg (135 lb 12.8 oz)   12/03/24 0845 62.2 kg (137 lb 3.2 oz)   11/19/24 0848 63.7 kg (140 lb 8 oz)   11/05/24 0838 65.6 kg (144 lb 9.6 oz)   11/01/24 0906 65.8 kg (145 lb)   10/22/24 0919 67.6 kg (149 lb)   10/08/24 0903 68.6 kg (151 lb 3.2 oz)   09/25/24 0828 65.3 kg (144 lb)   09/17/24 1159 65.5 kg (144 lb 8 oz)   09/03/24 1318 67.1 kg (148 lb)   09/03/24 0904 66.9 kg (147 lb 6.4 oz)   08/25/24 0500 72 kg (158 lb 11.7 oz)   08/21/24 0513 65.3 kg (143 lb 15.4 oz)   08/21/24 0200 65.3 kg (143 lb 15.4 oz)   08/20/24 0648 65.1 kg (143 lb 8.3 oz)   08/20/24 0505 65.1 kg (143 lb 8.3 oz)   08/19/24 2017 65.1 kg (143 lb 8.3 oz)   08/19/24 1425 65.8 kg (145 lb)          Medications           Current medications: ALPRAZolam, Diphenhydramine-Aluminum-Magnesium-Simethicone-Lidocaine-Nystatin, HYDROcodone-acetaminophen, amLODIPine, buPROPion XL, dexAMETHasone, encorafenib, fentaNYL, furosemide, losartan, melatonin, ondansetron ODT, polyethylene glycol, potassium chloride, and sennosides-docusate                 Tests/Procedures        Tests/Procedures Chemotherapy, CT     Labs       Pertinent Labs    Results from last 7 days   Lab Units 01/07/25  1039   SODIUM mmol/L 130*   POTASSIUM mmol/L 6.2*   CHLORIDE mmol/L 95*   CO2 " mmol/L 16.9*   BUN mg/dL 70*   CREATININE mg/dL 3.43*   CALCIUM mg/dL 10.4   BILIRUBIN mg/dL 0.9   ALK PHOS U/L 861*   ALT (SGPT) U/L 30   AST (SGOT) U/L 54*   GLUCOSE mg/dL 118*     Results from last 7 days   Lab Units 01/07/25  1039   MAGNESIUM mg/dL 2.6*   HEMOGLOBIN g/dL 14.5   HEMATOCRIT % 43.7   WBC 10*3/mm3 15.38*   ALBUMIN g/dL 3.7     Results from last 7 days   Lab Units 01/07/25  1039   PLATELETS 10*3/mm3 184     COVID19   Date Value Ref Range Status   08/19/2024 Not Detected Not Detected - Ref. Range Final     Lab Results   Component Value Date    HGBA1C 5.50 08/24/2024          Physical Findings        Physical Appearance loss of muscle mass, loss of subcutaneous fat, underweight     Edema  not assessed   Gastrointestinal colostomy   Tubes/Lines/Drains Implantable Port   Oral/Mouth Cavity WNL    Skin Intact     NUTRITION INTERVENTION / PLAN OF CARE        Monitor/Evaluation Will follow for plan of care           Electronically signed by:  Berenice Minaya RD, LD  01/07/25 13:28 EST

## 2025-01-08 ENCOUNTER — HOME CARE VISIT (OUTPATIENT)
Dept: HOME HEALTH SERVICES | Facility: HOME HEALTHCARE | Age: 65
End: 2025-01-08
Payer: COMMERCIAL

## 2025-01-08 ENCOUNTER — APPOINTMENT (OUTPATIENT)
Dept: GENERAL RADIOLOGY | Facility: HOSPITAL | Age: 65
DRG: 682 | End: 2025-01-08
Payer: COMMERCIAL

## 2025-01-08 LAB
ALBUMIN SERPL-MCNC: 3.5 G/DL (ref 3.5–5.2)
ALBUMIN/GLOB SERPL: 1 G/DL
ALP SERPL-CCNC: 970 U/L (ref 39–117)
ALT SERPL W P-5'-P-CCNC: 35 U/L (ref 1–33)
ANION GAP SERPL CALCULATED.3IONS-SCNC: 12 MMOL/L (ref 5–15)
AST SERPL-CCNC: 50 U/L (ref 1–32)
BASOPHILS # BLD AUTO: 0.02 10*3/MM3 (ref 0–0.2)
BASOPHILS NFR BLD AUTO: 0.2 % (ref 0–1.5)
BILIRUB SERPL-MCNC: 0.8 MG/DL (ref 0–1.2)
BUN SERPL-MCNC: 58 MG/DL (ref 8–23)
BUN/CREAT SERPL: 24 (ref 7–25)
CALCIUM SPEC-SCNC: 9.4 MG/DL (ref 8.6–10.5)
CHLORIDE SERPL-SCNC: 95 MMOL/L (ref 98–107)
CO2 SERPL-SCNC: 27 MMOL/L (ref 22–29)
CORTIS SERPL-MCNC: 33.4 MCG/DL
CREAT SERPL-MCNC: 2.42 MG/DL (ref 0.57–1)
DEPRECATED RDW RBC AUTO: 59.6 FL (ref 37–54)
EGFRCR SERPLBLD CKD-EPI 2021: 21.8 ML/MIN/1.73
EOSINOPHIL # BLD AUTO: 0.02 10*3/MM3 (ref 0–0.4)
EOSINOPHIL NFR BLD AUTO: 0.2 % (ref 0.3–6.2)
ERYTHROCYTE [DISTWIDTH] IN BLOOD BY AUTOMATED COUNT: 17.1 % (ref 12.3–15.4)
GLOBULIN UR ELPH-MCNC: 3.4 GM/DL
GLUCOSE SERPL-MCNC: 101 MG/DL (ref 65–99)
HCT VFR BLD AUTO: 44.4 % (ref 34–46.6)
HGB BLD-MCNC: 14.8 G/DL (ref 12–15.9)
IMM GRANULOCYTES # BLD AUTO: 0.08 10*3/MM3 (ref 0–0.05)
IMM GRANULOCYTES NFR BLD AUTO: 0.6 % (ref 0–0.5)
LYMPHOCYTES # BLD AUTO: 0.77 10*3/MM3 (ref 0.7–3.1)
LYMPHOCYTES NFR BLD AUTO: 6.2 % (ref 19.6–45.3)
MAGNESIUM SERPL-MCNC: 2.4 MG/DL (ref 1.6–2.4)
MCH RBC QN AUTO: 32 PG (ref 26.6–33)
MCHC RBC AUTO-ENTMCNC: 33.3 G/DL (ref 31.5–35.7)
MCV RBC AUTO: 95.9 FL (ref 79–97)
MONOCYTES # BLD AUTO: 1.02 10*3/MM3 (ref 0.1–0.9)
MONOCYTES NFR BLD AUTO: 8.3 % (ref 5–12)
NEUTROPHILS NFR BLD AUTO: 10.45 10*3/MM3 (ref 1.7–7)
NEUTROPHILS NFR BLD AUTO: 84.5 % (ref 42.7–76)
NRBC BLD AUTO-RTO: 0 /100 WBC (ref 0–0.2)
PHOSPHATE SERPL-MCNC: 3.1 MG/DL (ref 2.5–4.5)
PLATELET # BLD AUTO: 149 10*3/MM3 (ref 140–450)
PMV BLD AUTO: 9.3 FL (ref 6–12)
POTASSIUM SERPL-SCNC: 3.9 MMOL/L (ref 3.5–5.2)
PROT SERPL-MCNC: 6.9 G/DL (ref 6–8.5)
RBC # BLD AUTO: 4.63 10*6/MM3 (ref 3.77–5.28)
SODIUM SERPL-SCNC: 134 MMOL/L (ref 136–145)
TSH SERPL DL<=0.05 MIU/L-ACNC: 11.3 UIU/ML (ref 0.27–4.2)
URATE SERPL-MCNC: 16.7 MG/DL (ref 2.4–5.7)
WBC NRBC COR # BLD AUTO: 12.36 10*3/MM3 (ref 3.4–10.8)

## 2025-01-08 PROCEDURE — 71045 X-RAY EXAM CHEST 1 VIEW: CPT

## 2025-01-08 PROCEDURE — 82533 TOTAL CORTISOL: CPT | Performed by: HOSPITALIST

## 2025-01-08 PROCEDURE — 63710000001 DEXAMETHASONE PER 0.25 MG: Performed by: HOSPITALIST

## 2025-01-08 PROCEDURE — 84100 ASSAY OF PHOSPHORUS: CPT

## 2025-01-08 PROCEDURE — 97165 OT EVAL LOW COMPLEX 30 MIN: CPT

## 2025-01-08 PROCEDURE — 25010000002 FLUCONAZOLE PER 200 MG: Performed by: HOSPITALIST

## 2025-01-08 PROCEDURE — 36415 COLL VENOUS BLD VENIPUNCTURE: CPT

## 2025-01-08 PROCEDURE — 84550 ASSAY OF BLOOD/URIC ACID: CPT

## 2025-01-08 PROCEDURE — 97530 THERAPEUTIC ACTIVITIES: CPT

## 2025-01-08 PROCEDURE — 80053 COMPREHEN METABOLIC PANEL: CPT

## 2025-01-08 PROCEDURE — 83735 ASSAY OF MAGNESIUM: CPT

## 2025-01-08 PROCEDURE — 85025 COMPLETE CBC W/AUTO DIFF WBC: CPT

## 2025-01-08 PROCEDURE — 84443 ASSAY THYROID STIM HORMONE: CPT | Performed by: INTERNAL MEDICINE

## 2025-01-08 PROCEDURE — 99223 1ST HOSP IP/OBS HIGH 75: CPT | Performed by: INTERNAL MEDICINE

## 2025-01-08 RX ORDER — SODIUM CHLORIDE 0.9 % (FLUSH) 0.9 %
20 SYRINGE (ML) INJECTION AS NEEDED
Status: DISCONTINUED | OUTPATIENT
Start: 2025-01-08 | End: 2025-01-17 | Stop reason: HOSPADM

## 2025-01-08 RX ORDER — SODIUM CHLORIDE 0.9 % (FLUSH) 0.9 %
10 SYRINGE (ML) INJECTION AS NEEDED
Status: DISCONTINUED | OUTPATIENT
Start: 2025-01-08 | End: 2025-01-17 | Stop reason: HOSPADM

## 2025-01-08 RX ORDER — HEPARIN SODIUM (PORCINE) LOCK FLUSH IV SOLN 100 UNIT/ML 100 UNIT/ML
5 SOLUTION INTRAVENOUS AS NEEDED
Status: DISCONTINUED | OUTPATIENT
Start: 2025-01-08 | End: 2025-01-17 | Stop reason: HOSPADM

## 2025-01-08 RX ORDER — FLUCONAZOLE 2 MG/ML
200 INJECTION, SOLUTION INTRAVENOUS EVERY 24 HOURS
Status: DISCONTINUED | OUTPATIENT
Start: 2025-01-08 | End: 2025-01-17 | Stop reason: HOSPADM

## 2025-01-08 RX ORDER — SODIUM CHLORIDE 0.9 % (FLUSH) 0.9 %
10 SYRINGE (ML) INJECTION EVERY 12 HOURS SCHEDULED
Status: DISCONTINUED | OUTPATIENT
Start: 2025-01-08 | End: 2025-01-17 | Stop reason: HOSPADM

## 2025-01-08 RX ORDER — SODIUM CHLORIDE 9 MG/ML
40 INJECTION, SOLUTION INTRAVENOUS AS NEEDED
Status: DISCONTINUED | OUTPATIENT
Start: 2025-01-08 | End: 2025-01-17 | Stop reason: HOSPADM

## 2025-01-08 RX ORDER — FENTANYL 75 UG/1
1 PATCH TRANSDERMAL
Status: COMPLETED | OUTPATIENT
Start: 2025-01-08 | End: 2025-01-14

## 2025-01-08 RX ORDER — MORPHINE SULFATE 2 MG/ML
2 INJECTION, SOLUTION INTRAMUSCULAR; INTRAVENOUS EVERY 4 HOURS PRN
Status: DISCONTINUED | OUTPATIENT
Start: 2025-01-08 | End: 2025-01-17 | Stop reason: HOSPADM

## 2025-01-08 RX ORDER — DEXAMETHASONE 4 MG/1
4 TABLET ORAL 2 TIMES DAILY
Status: DISCONTINUED | OUTPATIENT
Start: 2025-01-08 | End: 2025-01-17 | Stop reason: HOSPADM

## 2025-01-08 RX ORDER — TRAZODONE HYDROCHLORIDE 50 MG/1
12.5 TABLET, FILM COATED ORAL NIGHTLY
Status: DISCONTINUED | OUTPATIENT
Start: 2025-01-08 | End: 2025-01-15

## 2025-01-08 RX ADMIN — SODIUM BICARBONATE: 84 INJECTION, SOLUTION INTRAVENOUS at 14:12

## 2025-01-08 RX ADMIN — FLUCONAZOLE IN SODIUM CHLORIDE 200 MG: 2 INJECTION, SOLUTION INTRAVENOUS at 14:11

## 2025-01-08 RX ADMIN — Medication 10 ML: at 00:37

## 2025-01-08 RX ADMIN — Medication 5 MG: at 00:37

## 2025-01-08 RX ADMIN — HYDROCODONE BITARTRATE AND ACETAMINOPHEN 1 TABLET: 10; 325 TABLET ORAL at 17:29

## 2025-01-08 RX ADMIN — Medication 10 ML: at 10:59

## 2025-01-08 RX ADMIN — SENNOSIDES AND DOCUSATE SODIUM 2 TABLET: 50; 8.6 TABLET ORAL at 09:11

## 2025-01-08 RX ADMIN — BUPROPION HYDROCHLORIDE 150 MG: 150 TABLET, EXTENDED RELEASE ORAL at 09:11

## 2025-01-08 RX ADMIN — DEXAMETHASONE 4 MG: 4 TABLET ORAL at 16:18

## 2025-01-08 RX ADMIN — ANORECTAL OINTMENT 1 APPLICATION: 15.7; .44; 24; 20.6 OINTMENT TOPICAL at 16:18

## 2025-01-08 RX ADMIN — POLYETHYLENE GLYCOL 3350 17 G: 17 POWDER, FOR SOLUTION ORAL at 09:11

## 2025-01-08 RX ADMIN — SODIUM BICARBONATE: 84 INJECTION, SOLUTION INTRAVENOUS at 01:09

## 2025-01-08 RX ADMIN — HYDROCODONE BITARTRATE AND ACETAMINOPHEN 1 TABLET: 10; 325 TABLET ORAL at 10:58

## 2025-01-08 RX ADMIN — FENTANYL TRANSDERMAL 1 PATCH: 75 PATCH, EXTENDED RELEASE TRANSDERMAL at 16:19

## 2025-01-08 NOTE — PROGRESS NOTES
Nutrition Services    Patient Name:  Ely Sims  YOB: 1960  MRN: [id number]  Admit Date:  1/7/2025  Assessment Date:  01/08/25    Summary: Nutrition screen  This is a 65 yo female with ALBER due to dehydration and poor po intake. She has a hx of metastatic colon cancer  Spoke with family who reports poor po intake and wt loss 12lbs x 2 weeks, and 20lbs x 1 month.     Patient meets ASPEN/AND criteria for nutrition diagnosis of severe malnutrition of chronic illness based on: NFPE, wt loss, inadequate po intake.    Plan/Recommendation  Good po intake encouraged  Supplements to be offered daily  RD to follow    CLINICAL NUTRITION ASSESSMENT      Reason for Assessment BMI, MST score 2+, Nurse Admission Screen     Diagnosis/Problem   Dehydration, metastatic colon cancer with mets to brain, N/V/abd pain   Medical/Surgical History Past Medical History:   Diagnosis Date    Metastasis to brain     Rectal carcinoma 2024    Seasonal allergies        Past Surgical History:   Procedure Laterality Date    COLONOSCOPY N/A 08/25/2024    Procedure: COLONOSCOPY to 20cm with cold biopsies and needle tattoo;  Surgeon: Shadi Kaminski MD;  Location: [facility] ENDOSCOPY;  Service: Gastroenterology;  Laterality: N/A;  pre: abnormal imaging  post: poor prep, obstructing colon mass at 20cm, hemorrhoids    COLOSTOMY N/A 08/27/2024    Procedure: COLOSTOMY LAPAROSCOPIC;  Surgeon: Saturnino Chester MD;  Location: [facility] OR;  Service: General;  Laterality: N/A;    CRANIOTOMY N/A 08/21/2024    Procedure: Posterior fossa craniotomy for tumor using stereotactic guidance;  Surgeon: Bull Doty MD;  Location: [facility] OR;  Service: Neurosurgery;  Laterality: N/A;    ENDOMETRIAL ABLATION      TUBAL ABDOMINAL LIGATION      VENOUS ACCESS DEVICE (PORT) INSERTION Right 08/27/2024    Procedure: INSERTION VENOUS ACCESS DEVICE;  Surgeon: Saturnino Chester MD;  Location: [facility] OR;  Service: General;  Laterality:  "Right;        Anthropometrics        Current Height  Current Weight  BMI kg/m2 Height: 170.2 cm (67.01\")  Weight: 51.3 kg (113 lb 3.2 oz) (01/07/25 1557)  Body mass index is 17.73 kg/m².   Adjusted BMI (if applicable)    BMI Category Underweight (18.4 or below)   Ideal Body Weight (IBW) 135lb   Usual Body Weight (UBW) 140's   Weight Trend Loss   Weight History Wt Readings from Last 30 Encounters:   01/07/25 1557 51.3 kg (113 lb 3.2 oz)   01/07/25 1109 50.6 kg (111 lb 9.6 oz)   12/23/24 1354 56.5 kg (124 lb 9.6 oz)   12/17/24 2306 61.5 kg (135 lb 9.3 oz)   12/13/24 1714 61.5 kg (135 lb 9.3 oz)   12/13/24 1329 60.6 kg (133 lb 9.6 oz)   12/10/24 0901 62.5 kg (137 lb 12.8 oz)   12/04/24 0943 61.6 kg (135 lb 12.8 oz)   12/04/24 0942 61.6 kg (135 lb 12.8 oz)   12/03/24 0845 62.2 kg (137 lb 3.2 oz)   11/19/24 0848 63.7 kg (140 lb 8 oz)   11/05/24 0838 65.6 kg (144 lb 9.6 oz)   11/01/24 0906 65.8 kg (145 lb)   10/22/24 0919 67.6 kg (149 lb)   10/08/24 0903 68.6 kg (151 lb 3.2 oz)   09/25/24 0828 65.3 kg (144 lb)   09/17/24 1159 65.5 kg (144 lb 8 oz)   09/03/24 1318 67.1 kg (148 lb)   09/03/24 0904 66.9 kg (147 lb 6.4 oz)   08/25/24 0500 72 kg (158 lb 11.7 oz)   08/21/24 0513 65.3 kg (143 lb 15.4 oz)   08/21/24 0200 65.3 kg (143 lb 15.4 oz)   08/20/24 0648 65.1 kg (143 lb 8.3 oz)   08/20/24 0505 65.1 kg (143 lb 8.3 oz)   08/19/24 2017 65.1 kg (143 lb 8.3 oz)   08/19/24 1425 65.8 kg (145 lb)      --  Estimated/Assessed Needs        Current Weight  Weight: 51.3 kg (113 lb 3.2 oz) (01/07/25 1557)       Energy Requirements    Weight for Calculation 51.3 kg   Method for Estimation  35 kcal/kg   EST Needs (kcal/day) 1800       Protein Requirements    Weight for Calculation 51.3 kg   EST Protein Needs (g/kg) 1.5 gm/kg   EST Daily Needs (g/day) 77       Fluid Requirements     Method for Estimation 1 mL/kcal    EST Needs (mL/day)      Labs       Pertinent Labs    Results from last 7 days   Lab Units 01/08/25  0523 01/07/25  1800 " 01/07/25  1039   SODIUM mmol/L 134* 133* 130*   POTASSIUM mmol/L 3.9 5.1 6.2*   CHLORIDE mmol/L 95* 96* 95*   CO2 mmol/L 27.0 22.6 16.9*   BUN mg/dL 58* 67* 70*   CREATININE mg/dL 2.42* 3.11* 3.43*   CALCIUM mg/dL 9.4 9.5 10.4   BILIRUBIN mg/dL 0.8  --  0.9   ALK PHOS U/L 970*  --  861*   ALT (SGPT) U/L 35*  --  30   AST (SGOT) U/L 50*  --  54*   GLUCOSE mg/dL 101* 122* 118*     Results from last 7 days   Lab Units 01/08/25  0523 01/07/25  1803 01/07/25  1039   MAGNESIUM mg/dL 2.4  --  2.6*   PHOSPHORUS mg/dL 3.1   < >  --    HEMOGLOBIN g/dL 14.8  --  14.5   HEMATOCRIT % 44.4  --  43.7   WBC 10*3/mm3 12.36*  --  15.38*   ALBUMIN g/dL 3.5   < > 3.7    < > = values in this interval not displayed.     Results from last 7 days   Lab Units 01/08/25  0523 01/07/25  1039   PLATELETS 10*3/mm3 149 184     COVID19   Date Value Ref Range Status   01/07/2025 Not Detected Not Detected - Ref. Range Final     Lab Results   Component Value Date    HGBA1C 5.50 08/24/2024          Medications           Scheduled Medications buPROPion XL, 150 mg, Oral, QAM  fentaNYL, 1 patch, Transdermal, Q72H   And  [START ON 1/9/2025] Check Fentanyl Patch Placement, 1 each, Not Applicable, Q12H  dexAMETHasone, 4 mg, Oral, BID  fluconazole, 200 mg, Intravenous, Q24H  melatonin, 5 mg, Oral, Nightly  Menthol-Zinc Oxide, 1 Application, Topical, Q12H  polyethylene glycol, 17 g, Oral, Daily  sennosides-docusate, 2 tablet, Oral, Daily  sodium chloride, 10 mL, Intravenous, Q12H  sodium chloride, 10 mL, Intravenous, Q12H  traZODone, 12.5 mg, Oral, Nightly       Infusions sodium chloride 0.45 % 900 mL with sodium bicarbonate 8.4 % 100 mEq infusion, , Last Rate: 100 mL/hr at 01/08/25 1412       PRN Medications   acetaminophen **OR** acetaminophen **OR** acetaminophen    ALPRAZolam    heparin    HYDROcodone-acetaminophen    Morphine    ondansetron ODT    sodium chloride    sodium chloride    sodium chloride    sodium chloride    sodium chloride     Physical  Findings          General Findings confused, disoriented, loss of muscle mass, loss of subcutaneous fat   Oral/Mouth Cavity thrush   Edema  3+ (moderate)   Gastrointestinal last bowel movement: unknown   Skin  other: left gluteal red   Tubes/Drains/Lines colostomy, implantable port   NFPE See Malnutrition Severity Assessment, Date Completed: 1/8   --  Malnutrition Severity Assessment      Patient meets criteria for : Severe Malnutrition  Malnutrition Type (Last 8 Hours)       Malnutrition Severity Assessment       Row Name 01/08/25 1548       Malnutrition Severity Assessment    Malnutrition Type Chronic Disease - Related Malnutrition      Row Name 01/08/25 1548       Insufficient Energy Intake     Insufficient Energy Intake Findings Severe    Insufficient Energy Intake  <75% of est. energy requirement for > or equal to 1 month      Row Name 01/08/25 1548       Unintentional Weight Loss     Unintentional Weight Loss Findings Severe    Unintentional Weight Loss  Weight loss greater than 5% in one month      Row Name 01/08/25 1548       Muscle Loss    [place] Severe - deep hollowing/scooping, lack of muscle to touch, facial bones well defined    Clavicle Bone Region Moderate - some protrusion in females, visible in males    Acromion Bone Region Moderate - acromion may slightly protrude    Dorsal Hand Region Severe - prominent depression      Row Name 01/08/25 1548       Fat Loss    Orbital Region  Severe - pronounced hollowness/depression, dark circles, loose saggy skin    Upper Arm Region Severe - mostly skin, very little space between folds, fingers touch    Thoracic & Lumbar Region Moderate - ribs visible with mild depressions, iliac crest somewhat prominent      Row Name 01/08/25 1548       Fluid Accumulation (Edema)    Fluid Accumulation  Severe equals 3+ or 4+ pitting edema      Row Name 01/08/25 1548       Criteria Met (Must meet criteria for severity in at least 2 of these categories: M Wasting, Fat Loss,  Fluid, Secondary Signs, Wt. Status, Intake)    Patient meets criteria for  Severe Malnutrition                       Current Nutrition Orders & Evaluation of Intake       Oral Nutrition     Food Allergies NKFA   Current PO Diet Diet: Regular/House; Fluid Consistency: Thin (IDDSI 0)   Supplement n/a   PO Evaluation     % PO Intake 25%    Factors Affecting Intake: abdominal pain, decreased appetite, early satiety , nausea, vomiting   --  PES STATEMENT / NUTRITION DIAGNOSIS      Nutrition Dx Problem  Problem: Malnutrition (severe) and Inadequate Oral Intake  Etiology: Medical Diagnosis - ALBER, dehydration    Signs/Symptoms: NPO, NFPE Results, BMI, Unintended Weight Change, and Report/Observation     NUTRITION INTERVENTION / PLAN OF CARE      Intervention Goal(s) Reduce/improve symptoms, Disease management/therapy, Tolerate PO , Increase intake, Appropriate weight gain, and PO intake goal %: 75+         RD Intervention/Action Interview for preferences, Encourage intake, Continue to monitor, Care plan reviewed, and Recommend/order: supplements   --      Prescription/Orders:       PO Diet       Supplements Boost      Enteral Nutrition       Parenteral Nutrition    New Prescription Ordered? Yes   --      Monitor/Evaluation Per protocol, I&O, PO intake, Supplement intake, Pertinent labs, Weight, Skin status, GI status, Symptoms   Discharge Plan/Needs Pending clinical course   --    RD to follow per protocol.      Electronically signed by:  Milla Brady RD  01/08/25 15:40 EST

## 2025-01-08 NOTE — CASE MANAGEMENT/SOCIAL WORK
Discharge Planning Assessment  [facility]     Patient Name: Ely Sims  MRN: [id number]  Today's Date: 1/8/2025    Admit Date: 1/7/2025    Plan: CCP following for needs.  CARLOS Otoole RN   Discharge Needs Assessment       Row Name 01/08/25 1025       Living Environment    People in Home significant other    Name(s) of People in Home Significant Other ( Ubaldo Case [phone])    Current Living Arrangements home    Potentially Unsafe Housing Conditions none    In the past 12 months has the electric, gas, oil, or water company threatened to shut off services in your home? No    Primary Care Provided by self;spouse/significant other    Provides Primary Care For no one, unable/limited ability to care for self    Family Caregiver if Needed child(ken), adult;spouse;sibling(s)    Family Caregiver Names Significant Other ( Ubaldo Case [phone]), Son  (Elia Cadena [phone]), Daughter (Tiffany Cadena [phone]) and Sister ( Aziza Yun)    Quality of Family Relationships helpful;involved;supportive    Able to Return to Prior Arrangements yes    Living Arrangement Comments Pt lives with her Significant Other ( Ubaldo Case [phone]) in a single story house.       Resource/Environmental Concerns    Resource/Environmental Concerns none    Transportation Concerns none       Transportation Needs    In the past 12 months, has lack of transportation kept you from medical appointments or from getting medications? no       Food Insecurity    Within the past 12 months, you worried that your food would run out before you got the money to buy more. Never true    Within the past 12 months, the food you bought just didn't last and you didn't have money to get more. Never true       Transition Planning    Patient/Family Anticipates Transition to home with family    Patient/Family Anticipated Services at Transition none    Transportation Anticipated family or friend will provide       Discharge Needs Assessment    Equipment  Currently Used at Home colostomy/ostomy supplies;walker, rolling    Concerns to be Addressed adjustment to diagnosis/illness;basic needs    Anticipated Changes Related to Illness none    Equipment Needed After Discharge colostomy/ostomy supplies;walker, rolling                   Discharge Plan       Row Name 01/08/25 1030       Plan    Plan CCP following for needs.  CARLOS Otoole RN    Patient/Family in Agreement with Plan yes    Plan Comments FACE SHEET VERIFIED.  Spoke with pt and pt's son (Elia Cadena [phone]), daughter (Tiffany Cadena [phone]) and sister ( Aziza Yun) at bedside. Pt does not have a PCP .  Pt sees Dr. Saunders at [facility].  Pt lives with her Significant Other ( Ubaldo Case [phone]) in a single story house. Pt is independent with ADLs.  Pt has a rolling walker for home use if needed. Pt gets her prescriptions at [facility] on [address].  Pt denies any issues affording medications. Pt is current with [facility].  Leigha ([phone]) will follow. Pt has not been in SNF.  Pt has been declining with not eating since Dec. CCP following for needs.  CARLOS Otoole RN                  Continued Care and Services - Admitted Since 1/7/2025       Home Medical Care       Service Provider Request Status Services Address Phone Fax Patient Preferred    [facility] Care Pending - Request Sent -- [facility] [address] [facility] [address] [phone] --                  Selected Continued Care - Episodes Includes continued care and service providers with selected services from the active episodes listed below      Oncology- External Fill Episode start date: 12/5/2024   There are no active outsourced providers for this episode.                 Selected Continued Care - Prior Encounters Includes continued care and service providers with selected services from prior encounters from 10/9/2024 to 1/8/2025      Discharged on 12/20/2024 Admission date: 12/13/2024 - Discharge disposition: Home-Health  Care c      Home Medical Care       Service Provider Services Address Phone Fax Patient Preferred    [facility], Home Nursing, Home Rehabilitation [address] [place], [place] [id number] [address] [phone] --                             Demographic Summary       Row Name 01/08/25 1021       General Information    Admission Type inpatient    Arrived From emergency department    Referral Source admission list    Reason for Consult decision-making    Preferred Language English                   Functional Status       Row Name 01/08/25 1025       Functional Status    Usual Activity Tolerance moderate    Current Activity Tolerance fair       Functional Status, IADL    Medications assistive person    Meal Preparation assistive person    Housekeeping completely dependent    Laundry completely dependent    Shopping completely dependent       Mental Status    General Appearance WDL WDL                   Psychosocial    No documentation.                  Abuse/Neglect    No documentation.                  Legal    No documentation.                  Substance Abuse    No documentation.                  Patient Forms    No documentation.                     Fidelina Otoole RN

## 2025-01-08 NOTE — CASE COMMUNICATION
Patient missed a SN visit from [facility] on 1/7/25.     Reason: Patient impatient hospital      For your records only.   Per CMS Guidance, MD must be notified of missed/cancelled visits; therefore the prescribed frequency was not met.

## 2025-01-08 NOTE — PLAN OF CARE
Goal Outcome Evaluation:     Patient able to answer orientation questions correctly but has some obvious confusion. Frequently trying to get out of bed and chair. Family remains at bedside helping reorient patient. Family requesting a palliative care consult. Patient with noticeable cough after swallowing, SLP consulted. Plan of care ongoing.

## 2025-01-08 NOTE — PLAN OF CARE
Goal Outcome Evaluation:              Outcome Evaluation: Patient  restless most  of the  shift,  want  to  leave.    Unable  to  go  to  sleep inspite  being  drowsy  and with  melatonin.  Family  at bedside.   Bicarb drip  infusing.   Denies  pain.  No    stool  on  colostomy  bag.  Room  air.  SR/ST on the   monitor.  Nursing  will  continue  to monitor.

## 2025-01-08 NOTE — PLAN OF CARE
Goal Outcome Evaluation:  Plan of Care Reviewed With: patient, significant other        Progress: no change  Outcome Evaluation: Patient is 64 year old female admitted to [facility] on 1/7/2025 with reports of worsening abdominal pain, nausea, vomiting, and decreased PO intake. Patient PMH significant for metastatic colon cancer with progressive decline.  At baseline, patient reports she is independent with ADLs, and does not use a device for functional mobility. However, has been using a RW last two weeks secondary to three falls at home. Today, patient requires CGA with HHA for all transfers and mobility, and min A for LB ADLs. Patient demonstrates severe generalized weakness, UEs 3/5.  Patient presents with decline from baseline status, demonstrating deficits related to balance, strength, tolerance, trasnfers and mobility that impede patient independence with activities of daily living. Patient would benefit from skilled Occupational Therapy intervention to maximize patient safety and promote return to baseline independence. Recommend OP therapy services at time of discharge from hospital setting.    Anticipated Discharge Disposition (OT): home with outpatient therapy services

## 2025-01-08 NOTE — CONSULTS
[facility] INITIAL INPATIENT CONSULTATION NOTE    REASON FOR CONSULTATION:    Metastatic colon cancer    HISTORY OF PRESENT ILLNESS:  Ely Sims is a 64 y.o. female who we are asked to see today in consultation for metastatic colon cancer.    She is a patient of mine in the office.  She had significant progression of disease on first-line palliative chemotherapy and now is on treatment with Vectibix and Braftovi.  She has not tolerated this very well.  As an outpatient she had requested anxiety medications so she was prescribed a benzodiazepine.  She has been on a diuretic for edema.  She presented to the office yesterday with some confusion and did not look good.  Labs showed acute kidney injury with hyperkalemia.  She was sent to the emergency department and is now subsequently admitted.    She remains drowsy but her pain is adequately controlled.    Past Medical History:   Diagnosis Date    Metastasis to brain     Rectal carcinoma 2024    Seasonal allergies        Past Surgical History:   Procedure Laterality Date    COLONOSCOPY N/A 08/25/2024    Procedure: COLONOSCOPY to 20cm with cold biopsies and needle tattoo;  Surgeon: Shadi Kaminski MD;  Location: [facility] ENDOSCOPY;  Service: Gastroenterology;  Laterality: N/A;  pre: abnormal imaging  post: poor prep, obstructing colon mass at 20cm, hemorrhoids    COLOSTOMY N/A 08/27/2024    Procedure: COLOSTOMY LAPAROSCOPIC;  Surgeon: Saturnino Chester MD;  Location: [facility] OR;  Service: General;  Laterality: N/A;    CRANIOTOMY N/A 08/21/2024    Procedure: Posterior fossa craniotomy for tumor using stereotactic guidance;  Surgeon: Bull Doty MD;  Location: [facility] OR;  Service: Neurosurgery;  Laterality: N/A;    ENDOMETRIAL ABLATION      TUBAL ABDOMINAL LIGATION      VENOUS ACCESS DEVICE (PORT) INSERTION Right 08/27/2024    Procedure: INSERTION VENOUS ACCESS DEVICE;  Surgeon: Saturnino Chester MD;  Location: [facility] OR;   "Service: General;  Laterality: Right;       SOCIAL HISTORY:   reports that she has quit smoking. Her smoking use included cigarettes. She has quit using smokeless tobacco. She reports current alcohol use. Drug use questions deferred to the physician.    FAMILY HISTORY:  family history includes Colon cancer in her sister; Heart disease in her mother.    ALLERGIES:  No Known Allergies    MEDICATIONS:  As listed in the electronic medical record.    Review of Systems   Constitutional:  Positive for fatigue.   Gastrointestinal:  Positive for abdominal pain.   Musculoskeletal:  Positive for back pain.   Psychiatric/Behavioral:  Positive for confusion.    All other systems reviewed and are negative.      Vitals:    01/07/25 2100 01/07/25 2329 01/08/25 0717 01/08/25 1228   BP:  126/84 121/83 134/86   BP Location:   Right arm Left arm   Patient Position:   Sitting Lying   Pulse:  107 109 93   Resp:   16 16   Temp:  98.1 °F (36.7 °C) 97.7 °F (36.5 °C) 97.9 °F (36.6 °C)   TempSrc:   Oral Oral   SpO2:  100%     Weight:       Height: 170.2 cm (67.01\")          Drowsy, chronically ill-appearing.  Answer some questions appropriately but is nodding off.  Mucositis versus thrush visible on the posterior buccal mucosa bilaterally.  Mediport in place.  Lungs are clear to auscultation bilaterally.  Heart is mildly tachycardic.  Extremities were 1 well-perfused.  There is a bandage on the distal left lower extremity.  She is sitting on the edge of the bed.  Abdomen not examined.    DIAGNOSTIC DATA:  Results from last 7 days   Lab Units 01/08/25  0523   WBC 10*3/mm3 12.36*   HEMOGLOBIN g/dL 14.8   HEMATOCRIT % 44.4   PLATELETS 10*3/mm3 149     Lab Results   Component Value Date    NEUTROABS 10.45 (H) 01/08/2025     Results from last 7 days   Lab Units 01/08/25  0523   SODIUM mmol/L 134*   POTASSIUM mmol/L 3.9   CHLORIDE mmol/L 95*   CO2 mmol/L 27.0   BUN mg/dL 58*   CREATININE mg/dL 2.42*   GLUCOSE mg/dL 101*   CALCIUM mg/dL 9.4       "   Results from last 7 days   Lab Units 01/08/25  0523   MAGNESIUM mg/dL 2.4       IMAGING:    XR Chest 1 View (01/08/2025 13:23)     Chest x-ray reviewed.  Mediport present.  Pulmonary nodules, most not well-visualized.    ASSESSMENT:  This is a 64 y.o. female with:    *Metastatic colon cancer  The patient presented on 8/19/2022 initially to urgent care with headache, nausea, vomiting, diarrhea.  She was advised to go to the ED from urgent care.  CT head 8/19/2024 showed some areas of increased attenuation over the right cerebral hemisphere and left temporal lobe concerning for metastatic disease with edema with some mass effect upon the fourth ventricle.  MRI brain 8/20/2024 with a lesion at the right cerebellar hemisphere measuring 2.2 cm, lesion within the left temporal lobe measuring 9 mm, 1.0 cm lesion at the left cerebellar vermis, all with surrounding edema.  There was some mass effect upon the fourth ventricle with mild prominence of the lateral and third ventricles.  8/20/2024: CT chest abdomen and pelvis with no evidence of pulmonary metastatic disease.  There was some asymmetric wall thickening in the distal sigmoid colon with surrounding mesenteric soft tissue nodularity concerning for primary colon cancer.  A sclerotic lesion of the spinous process of T1 was said to be consistent with a bone island or other benign lesion.  8/21/2024: Posterior fossa craniectomy with gross total removal of a right cerebellar hemisphere metastasis Dr. Bull Doty.  Pathology consistent with metastatic poorly differentiated adenocarcinoma favoring colorectal origin.  MSI testing is pending.  Tempus with a BRAF V600E mutation.  TMB 4.7 mutations per megabase.  Microsatellite stable.  8/25/2024: Bone scan with no obvious metastatic disease.  8/25/2024: Colonoscopy by Dr. Kaminski shows an infiltrative completely obstructing large mass found at the proximal rectum.  Pathology with invasive poorly differentiated carcinoma with  neuroendocrine features. MSI studies pending.     CEA 2.10.  8/26/2024: MRI lumbar spine with no obvious metastatic disease in the lumbar spine.  However, there is a 3 mm enhancing lesion in the visualized lower thoracic spinal cord that could represent a spinal cord metastasis.  Cervical and thoracic spine MRI requested..  8/27/24: diverting loop sigmoid colostomy and port placement by Dr. Chester  8/28/2024: MRI C and T-spine addendum made with a small 3 mm met to the spinal cord at T12  Completed radiation to the brain and spine on 9/24/24  PET scan 9/12/24 with no metastatic disease. FDG avid mass at the high rectum.  10/22/2024: Proceed with Cycle 2 FOLFOX. Will omit 5 FU bolus due to neutropenia. Future treatment plans adjusted. In regards to lower abdominal cramping which occurs at night, advised patient to discuss with Dr. Chester (surgeon).   11/5/2024: Proceed with Cycle 3 FOLFOX with 5 FU bolus omitted. WBC 4.05 and . Will give a dose of tylenol today while in the infusion area for side/back pain. Instructed her to reach out if the pain/soreness does not subside.   11/19/2024: She presents for cycle #4 of therapy.  Therapy has been well-tolerated.  However, she is having worsening abdominal pain as well as some nausea and vomiting this morning.  See below.  11/26/2024: CT imaging with significant progression of disease  With progression, cetuximab or panitumumab plus encorafenib given the presence of a BRAF V600 E mutation.  Plan panitumumab due to ease of administration every 2 weeks and potential for fewer adverse effects compared to cetuximab. Encorafenib is 300 mg daily.  Panitumumab is 6 mg/kg every 14 days.  Plan treatment until disease progression or unacceptable toxicity.  Treatment is palliative and she understands this.  We will plan to initiate therapy very quickly given significant pression of disease.  C1 D1 panitumumab and encorafenib 12/10/2024  12/13/2024: seen for triage visit  concerning severe pain and uncontrolled nausea/vomiting.  She has not been able to keep down any food or drink for the last 3 days.  Has been able to keep down some medications.  Has been utilizing Compazine every 6 hours without any relief.  As far as her pain she is continued MS Contin 15 mg every 8 hours and Norco 10/325 every 6 hours, despite this her pain has continued to worsen.  Her WBC is elevated at 18.44, and she reports developing  chills a few hours ago.  Her bilirubin has also increase dto 2.4.  She will require admission through A.  Subsequent admission.  Pain was better when she left the hospital.  1/7/2025: She presented to the office looking very bad with confusion, significant weakness.  She was admitted to the hospital.     *Mild normocytic anemia  Hemoglobin 14.8       *Abdominal pain  We have started sustained-release morphine 15 mg every 8 hours.  We had intended her for her to take hydrocodone also as needed but she has not been doing this thinking that morphine replaced the hydrocodone.  She was encouraged to take hydrocodone as needed as well.  12/13/2024: Patient is having severe uncontrolled pain.  She has been taking MS Contin 15 mg every 8 hours and Norco 10/325 every 6 hours.  Despite this her pain has continued to worsen.  She will require direct admission through A for further management.  Started Fentanyl patch inpatient.  This along with hydrocodone 10 mg tablets improved her pain.  She just has hydrocodone 5 mg tablets at home.  Unfortunately, her fentanyl patch fell off on Saturday and she has been having more pain and spite of replacement of a new patch yesterday.  1/7/2025: Continues with lower abdominal pain.  Currently utilizing fentanyl 50 mcg patch and Norco 10/325 every 4 hours.  She is extremely somnolent.     *Uncontrolled nausea/vomiting  Compazine was helping at home.  She has not been taking it since leaving the hospital since she apparently was told she was not  supposed to take it.  It is unclear why this was stopped.  Records from the hospital do not really discuss her nausea and vomiting and what medication was used for this.  It appears that she was on a scopolamine patch at 1 point but this may have been stopped secondary to altered mental status.  She was also receiving lorazepam which was also stopped due to sedation.  1/7/2025 now utilizing Zofran 8 mg dissolvable tablets.  Continues with poor oral intake and extreme weight loss of 11 pounds in the last 2 weeks, over 20 pounds in the last month.     *Previous lower extremity edema while hospitalized 12/2024.    He was discharged home on Lasix 40 mg daily  1/7/2025 patient is now extremely dry with acute kidney injury, BUN 70, creatinine 3.4.  She has continued Lasix up until yesterday.  Has not had her dose today per her boyfriend's report.     *History of hypokalemia, now with hyperkalemia  Discharged home 12/20/2024 potassium chloride 20 mEq twice daily.  1/7/2025 Now with evidence of hyperkalemia with potassium of 6.2, improved at 3.9, from 5.1    *Acute kidney injury likely secondary to volume depletion  Creatinine 2.42, from 3.11, from 3.43, from 1.02    *Hyponatremia  Sodium 134, from 133, from 130    RECOMMENDATIONS/PLAN:   Unfortunately, she has significant progression of disease on first-line palliative chemotherapy.  She now has not tolerated second line therapy with panitumumab and encorafenib.  She has very poor performance status.    I think right now it is reasonable to try to reverse what may be reversible with treating the thrush/mucositis with fluconazole and continuing IV fluids to improve her renal function.  I do not believe that she will improve enough to tolerate any more therapy and she does not seem to be inclined to proceed with any further cancer directed therapy at this point.  She and her children have expressed a desire for palliative care.  This is absolutely very reasonable.  The  palliative team consult has already been placed.  Her mother understands this.  Her boyfriend wants her to improve to the point where she may be able to take further therapy.  I advised them today that we should plan for the worst and hope for the best and if she does improve to the point where more treatment is reasonable and this can certainly be considered although I doubt this will be possible.  Long discussion with the patient, her boyfriend, and her mother at the bedside today.  Continue wound care  Daily labs  We would need CT imaging with contrast to see if she has had any improvement in metastatic disease in the liver with the therapy that she has been receiving.  Obviously we cannot give IV contrast at this point.  Full code right now.  I did not discuss this with her today.  She is very drowsy.  Hopefully she can be made DNR after the palliative care discussion.    Elieser Saunders MD

## 2025-01-08 NOTE — PLAN OF CARE
Problem: Malnutrition  Goal: Improved Nutritional Intake  Outcome: Progressing     Problem: Oral Intake Inadequate  Goal: Improved Oral Intake  Outcome: Progressing   Goal Outcome Evaluation:         Good po intake encouraged  Supplements added  RD to follow

## 2025-01-08 NOTE — PLAN OF CARE
Goal Outcome Evaluation:  Plan of Care Reviewed With: patient           Outcome Evaluation: Admitted to room 659 from ED. Na Bicarb infusing. F/C inserted per MD orders and returned clear light yellow urine. . Colostomy bag empty. Medicated for abd pain. Room air. Telemetry .

## 2025-01-08 NOTE — PAYOR COMM NOTE
"Ely Sims (64 y.o. Female)        PLEASE SEE ATTACHED FOR  INPT AUTH.     REF#[id number]    PLEASE CALL  OR  [phone]    THANK YOU    LUISA KISHA GIL [facility]   Date of Birth   1960    Social Security Number       Address   [address] [address]    Home Phone   [phone]    MRN   [id number]       M[employer]on   Evangelical    Marital Status   Single                            Admission Date   1/7/25    Admission Type   Emergency    Admitting Provider   Estefania Jacobs MD    Attending Provider   Frank Karimi MD    Department, Room/Bed   [address], E[address]/1       Discharge Date       Discharge Disposition       Discharge Destination                                 Attending Provider: Frank Karimi MD    Allergies: No Known Allergies    Isolation: None   Infection: None   Code Status: CPR    Ht: 170.2 cm (67.01\")   Wt: 51.3 kg (113 lb 3.2 oz)    Admission Cmt: None   Principal Problem: Acute kidney injury (ALBER) with acute tubular necrosis (ATN) [N17.0]                   Active Insurance as of 1/7/2025       Primary Coverage       Payor Plan Insurance Group Employer/Plan Group    [facility] [id number]       Payor Plan Address Payor Plan Phone Number Payor Plan Fax Number Effective Dates    [address]   1/1/2024 - None Entered    [facility] [id number]         Subscriber Name Subscriber Birth Date Member ID       ELY SIMS 1960 [id number]                     Emergency Contacts        (Rel.) Home Phone Work Phone Mobile Phone    Ubaldo Csae (Partner) -- -- [phone]    Elia CadenaA -- -- [phone]    CAMILLEDON (Sister) -- -- [phone]    DOMINIQUECARL (Daughter) -- -- [phone]              [place]: [facility] [id number]  Tax ID [id number]  History & Physical    No notes of this type exist for this encounter.          Emergency Department Notes       Curt Klein MD at 01/07/25 1236  "          EMERGENCY DEPARTMENT ENCOUNTER  Room Number:  12/12  PCP: Provider, No Known  Independent Historians: Patient and son      HPI:  Chief Complaint: had concerns including Altered Mental Status.     A complete HPI/ROS/PMH/PSH/SH/FH are unobtainable due to: Altered Mental Status    Chronic or social conditions impacting patient care (Social Determinants of Health): None      Context: Ely Sims is a 64 y.o. female with a medical history of metastatic colon cancer currently undergoing chemotherapy, hypertension and protein calorie malnutrition who presents to the ED c/o acute progressive generalized weakness and confusion.  Patient is unable to provide any significant elements of history and the son is a very limited historian himself.  Patient had a recent admission in December for volume depletion and confusion.  Patient reports ongoing abdominal pain and poor appetite.      Review of prior external notes (non-ED) -and- Review of prior external test results outside of this encounter:  Oncology office note 1/7/2025 reviewed: Patient followed for metastatic colon cancer, neutropenia secondary to chemotherapy and anemia  Labs from today revealed patient has hyperkalemia 6.2, CO2 of 16.9, BUN 70 and creatinine 3.43 with magnesium 2.6.  ===========================  Hospital discharge summary 12/20/2024 reviewed: Patient admitted for worsening abdominal pain, progressive colorectal malignancy with mets including to the brain, hypertension, delirium, tachycardia    PAST MEDICAL HISTORY  Active Ambulatory Problems     Diagnosis Date Noted    Colon cancer metastasized to brain 08/19/2024    Colonic mass 08/23/2024    Anemia 08/23/2024    Hyperglycemia 08/23/2024    Hypertension 08/23/2024    Rectal adenocarcinoma 08/29/2024    Encounter for adjustment or management of vascular access device 08/29/2024    Dehydration 12/13/2024    Abdominal pain 12/13/2024    Hypokalemia 12/13/2024    Abnormal LFTs 12/13/2024     Severe protein-calorie malnutrition 12/15/2024    Anxiety 12/16/2024     Resolved Ambulatory Problems     Diagnosis Date Noted    Compression of brain 08/22/2024     Past Medical History:   Diagnosis Date    Metastasis to brain     Rectal carcinoma 2024    Seasonal allergies          PAST SURGICAL HISTORY  Past Surgical History:   Procedure Laterality Date    COLONOSCOPY N/A 08/25/2024    Procedure: COLONOSCOPY to 20cm with cold biopsies and needle tattoo;  Surgeon: Shadi Kaminski MD;  Location: [facility] ENDOSCOPY;  Service: Gastroenterology;  Laterality: N/A;  pre: abnormal imaging  post: poor prep, obstructing colon mass at 20cm, hemorrhoids    COLOSTOMY N/A 08/27/2024    Procedure: COLOSTOMY LAPAROSCOPIC;  Surgeon: Saturnino Chester MD;  Location: [facility] MAIN OR;  Service: General;  Laterality: N/A;    CRANIOTOMY N/A 08/21/2024    Procedure: Posterior fossa craniotomy for tumor using stereotactic guidance;  Surgeon: Bull Doty MD;  Location: [facility] MAIN OR;  Service: Neurosurgery;  Laterality: N/A;    ENDOMETRIAL ABLATION      TUBAL ABDOMINAL LIGATION      VENOUS ACCESS DEVICE (PORT) INSERTION Right 08/27/2024    Procedure: INSERTION VENOUS ACCESS DEVICE;  Surgeon: Saturnino Chester MD;  Location: [facility] MAIN OR;  Service: General;  Laterality: Right;         FAMILY HISTORY  Family History   Problem Relation Age of Onset    Heart disease Mother     Colon cancer Sister          SOCIAL HISTORY  Social History     Socioeconomic History    Marital status: Single   Tobacco Use    Smoking status: Former     Types: Cigarettes    Smokeless tobacco: Former   Vaping Use    Vaping status: Never Used   Substance and Sexual Activity    Alcohol use: Yes    Drug use: Defer    Sexual activity: Defer     Partners: Male         ALLERGIES  Patient has no known allergies.      REVIEW OF SYSTEMS  Review of Systems  Included in HPI  All systems reviewed and negative except for those discussed in  HPI.      PHYSICAL EXAM    I have reviewed the triage vital signs and nursing notes.    ED Triage Vitals   Temp Heart Rate Resp BP SpO2   01/07/25 1226 01/07/25 1226 01/07/25 1226 01/07/25 1232 01/07/25 1226   96.8 °F (36 °C) 105 16 125/96 96 %      Temp src Heart Rate Source Patient Position BP Location FiO2 (%)   01/07/25 1226 01/07/25 1226 -- -- --   Tympanic Monitor          Physical Exam    Physical Exam   Constitutional: No distress but cachectic, thin frail and acute on chronically ill-appearing.  Rather confused and a limited historian.  HENT:  Head: Normocephalic and atraumatic.   Oropharynx: Mucous membranes are quite dry  Eyes: . No scleral icterus. No conjunctival pallor.  Neck: Normal range of motion. Neck supple.   Cardiovascular: Pink warm and well perfused throughout.    Pulmonary/Chest: No respiratory distress.  No tachypnea or increased work of breathing appreciated.    Abdominal: Soft. There is discomfort with palpation without rigidity or rebound.  Musculoskeletal: Moves all extremities equally.    Neurological: Alert and oriented to self and son.  Tangential speech who is very limited historian.  Moving all extremities equally but quite weakly.  Skin: Skin is pink, warm, and dry.   Psychiatric: Mood and affect normal.   Nursing note and vitals reviewed.             LAB RESULTS  Recent Results (from the past 24 hours)   Comprehensive metabolic panel    Collection Time: 01/07/25 10:39 AM    Specimen: Blood   Result Value Ref Range    Glucose 118 (H) 65 - 99 mg/dL    BUN 70 (H) 8 - 23 mg/dL    Creatinine 3.43 (C) 0.57 - 1.00 mg/dL    Sodium 130 (L) 136 - 145 mmol/L    Potassium 6.2 (C) 3.5 - 5.2 mmol/L    Chloride 95 (L) 98 - 107 mmol/L    CO2 16.9 (L) 22.0 - 29.0 mmol/L    Calcium 10.4 8.6 - 10.5 mg/dL    Total Protein 7.3 6.0 - 8.5 g/dL    Albumin 3.7 3.5 - 5.2 g/dL    ALT (SGPT) 30 1 - 33 U/L    AST (SGOT) 54 (H) 1 - 32 U/L    Alkaline Phosphatase 861 (H) 39 - 117 U/L    Total Bilirubin 0.9 0.0  - 1.2 mg/dL    Globulin 3.6 gm/dL    A/G Ratio 1.0 g/dL    BUN/Creatinine Ratio 20.4 7.0 - 25.0    Anion Gap 18.1 (H) 5.0 - 15.0 mmol/L    eGFR 14.4 (L) >60.0 mL/min/1.73   Magnesium    Collection Time: 01/07/25 10:39 AM    Specimen: Blood   Result Value Ref Range    Magnesium 2.6 (H) 1.6 - 2.4 mg/dL   CBC Auto Differential    Collection Time: 01/07/25 10:39 AM    Specimen: Blood   Result Value Ref Range    WBC 15.38 (H) 3.40 - 10.80 10*3/mm3    RBC 4.50 3.77 - 5.28 10*6/mm3    Hemoglobin 14.5 12.0 - 15.9 g/dL    Hematocrit 43.7 34.0 - 46.6 %    MCV 97.1 (H) 79.0 - 97.0 fL    MCH 32.2 26.6 - 33.0 pg    MCHC 33.2 31.5 - 35.7 g/dL    RDW 18.9 (H) 12.3 - 15.4 %    RDW-SD 67.2 (H) 37.0 - 54.0 fl    MPV 10.2 6.0 - 12.0 fL    Platelets 184 140 - 450 10*3/mm3    Neutrophil % 86.6 (H) 42.7 - 76.0 %    Lymphocyte % 3.5 (L) 19.6 - 45.3 %    Monocyte % 8.8 5.0 - 12.0 %    Eosinophil % 0.1 (L) 0.3 - 6.2 %    Basophil % 0.3 0.0 - 1.5 %    Immature Grans % 0.7 (H) 0.0 - 0.5 %    Neutrophils, Absolute 13.32 (H) 1.70 - 7.00 10*3/mm3    Lymphocytes, Absolute 0.54 (L) 0.70 - 3.10 10*3/mm3    Monocytes, Absolute 1.36 (H) 0.10 - 0.90 10*3/mm3    Eosinophils, Absolute 0.01 0.00 - 0.40 10*3/mm3    Basophils, Absolute 0.04 0.00 - 0.20 10*3/mm3    Immature Grans, Absolute 0.11 (H) 0.00 - 0.05 10*3/mm3    nRBC 0.0 0.0 - 0.2 /100 WBC   ECG 12 Lead Electrolyte Imbalance    Collection Time: 01/07/25 12:53 PM   Result Value Ref Range    QT Interval 341 ms    QTC Interval 440 ms   POC Glucose Once    Collection Time: 01/07/25 12:59 PM    Specimen: Blood   Result Value Ref Range    Glucose 123 70 - 130 mg/dL   Blood Gas, Venous -    Collection Time: 01/07/25  1:08 PM    Specimen: Venous Blood   Result Value Ref Range    Site RA     pH, Venous 7.417 (H) 7.310 - 7.410 pH Units    pCO2, Venous 26.2 (L) 41.0 - 51.0 mm Hg    pO2, Venous 60.4 (H) 35.0 - 45.0 mm Hg    HCO3, Venous 16.9 (L) 22.0 - 28.0 mmol/L    Base Excess, Venous -5.6 (L) -2.0 - 2.0  mmol/L    O2 Saturation, Venous 91.8 (H) 45.0 - 75.0 %    CO2 Content 17.7 (L) 23 - 27 mmol/L    Barometric Pressure for Blood Gas 760.4000 mmHg    Modality Room Air     Device Comment spo2 97          RADIOLOGY  No Radiology Exams Resulted Within Past 24 Hours      MEDICATIONS GIVEN IN ER  Medications   sodium bicarbonate injection 8.4% 50 mEq (has no administration in time range)   dextrose (D50W) (25 g/50 mL) IV injection 25 g (25 g Intravenous Given 1/7/25 1304)   insulin regular (humuLIN R,novoLIN R) injection 10 Units (10 Units Intravenous Given 1/7/25 1304)   sodium chloride 0.9 % bolus 1,000 mL (1,000 mL Intravenous New Bag 1/7/25 1300)   sodium zirconium cyclosilicate (LOKELMA) packet 10 g (10 g Oral Given 1/7/25 1335)         ORDERS PLACED DURING THIS VISIT:  Orders Placed This Encounter   Procedures    Respiratory Panel PCR w/COVID-19(SARS-CoV-2) AMADO/MAMIE/TABITHA/PAD/COR/SHAUN In-House, NP Swab in UTM/VTM, 2 HR TAT - Swab, Nasopharynx    Urinalysis With Microscopic If Indicated (No Culture) - Urine, Catheter    Blood Gas, Venous -    Procalcitonin    Lactic Acid, Plasma    Bladder scan    Monitor Blood Pressure    Continuous Pulse Oximetry    Nephrology (on -call MD unless specified)    [facility] (on-call MD unless specified) Details    POC Glucose STAT    POC Glucose Once    ECG 12 Lead Electrolyte Imbalance    Inpatient Admission         OUTPATIENT MEDICATION MANAGEMENT:  Current Facility-Administered Medications Ordered in Epic   Medication Dose Route Frequency Provider Last Rate Last Admin    sodium bicarbonate injection 8.4% 50 mEq  50 mEq Intravenous Once Curt Klein MD         Current Outpatient Medications Ordered in Epic   Medication Sig Dispense Refill    ALPRAZolam (Xanax) 0.25 MG tablet Take 1 tablet by mouth Every 8 (Eight) Hours As Needed for Anxiety. Indications: Feeling Anxious 30 tablet 0    amLODIPine (NORVASC) 10 MG tablet Take 1 tablet by mouth Daily. 30 tablet 0    buPROPion XL (WELLBUTRIN XL)  150 MG 24 hr tablet Take 2 tablets by mouth Every Morning. Indications: Attention Deficit Hyperactivity Disorder      dexAMETHasone (DECADRON) 4 MG tablet Take 1 tablet by mouth 2 (Two) Times a Day. 60 tablet 0    Diphenhydramine-Aluminum-Magnesium-Simethicone-Lidocaine-Nystatin Swish and spit 5 mL Every 4 (Four) Hours As Needed (mucositis). Recipe: Benadryl Elixir 60cc, Maalox 200 cc, Viscous Lidocaine 30cc, Nystatin 120cc  Indications: Mucous Membrane Inflammation 410 mL 1    encorafenib (BRAFTOVI) 75 MG capsule Take 4 capsules by mouth Daily. Indications: Cancer of the Colon 120 capsule 0    fentaNYL (DURAGESIC) 50 MCG/HR patch Place 1 patch on the skin as directed by provider Every 72 (Seventy-Two) Hours. 10 patch 0    furosemide (LASIX) 40 MG tablet Take 1 tablet by mouth Daily. 30 tablet 0    HYDROcodone-acetaminophen (NORCO)  MG per tablet Take 1 tablet by mouth Every 4 (Four) Hours As Needed for Moderate Pain. Indications: Pain 120 tablet 0    losartan (COZAAR) 50 MG tablet Take 1 tablet by mouth Daily. 30 tablet 0    melatonin 5 MG tablet tablet Take 1 tablet by mouth Every Night. 30 each 0    ondansetron ODT (ZOFRAN-ODT) 8 MG disintegrating tablet Take 1 tablet by mouth Every 8 (Eight) Hours As Needed for Nausea or Vomiting. Indications: Nausea and Vomiting caused by Cancer Chemotherapy 60 tablet 5    polyethylene glycol (MIRALAX) 17 g packet Take 17 g by mouth Daily. 90 each 2    potassium chloride 10 MEQ CR tablet Take 2 tablets by mouth 2 (Two) Times a Day. 60 tablet 0    sennosides-docusate (senna-docusate sodium) 8.6-50 MG per tablet Take 2 tablets by mouth Daily. 90 tablet 2         PROCEDURES  Procedures      Total critical care time: Approximately 35 minutes    Due to a high probability of clinically significant, life threatening deterioration, the patient required my highest level of preparedness to intervene emergently and I personally spent this critical care time directly and personally  managing the patient. This critical care time included obtaining a history; examining the patient; vital sign monitoring; ordering and review of studies; arranging urgent treatment with development of a management plan; evaluation of patient's response to treatment; frequent reassessment; and, discussions with other providers.    This critical care time was performed to assess and manage the high probability of imminent, life-threatening deterioration that could result in multi-organ failure. It was exclusive of separately billable procedures and treating other patients and teaching time.    Please see MDM section and the rest of the note for further information on patient assessment and treatment.        PROGRESS, DATA ANALYSIS, CONSULTS, AND MEDICAL DECISION MAKING  All labs have been independently interpreted by me.  All radiology studies have been reviewed by me. All EKG's have been independently viewed and interpreted by me.  Discussion below represents my analysis of pertinent findings related to patient's condition, differential diagnosis, treatment plan and final disposition.    Differential diagnosis:   My differential diagnosis includes but is not limited to generalized weakness, electrolyte abnormality, CVA, TIA, Bell's palsy, acute MI, GI bleed, urinary tract infection, systemic infections including sepsis, alcohol abuse, drug abuse including prescription and street drug.      Clinical Scores:                  ED Course as of 01/07/25 1336   Tue Jan 07, 2025   1245 Acute on chronically ill patient has significant electrolyte abnormalities concerning for severe dehydration and the patient is at high risk for cardiac dysrhythmia.  EKG as well as additional labs been ordered.  IV fluids, EKG, insulin and dextrose have also been ordered.  Patient to medicine service with nephrology consult. [RS]   1258 EKG           EKG time: 1253  Rhythm/Rate: Sinus tachycardia, 100  P waves and UT: SILVA within normal limits  with prominent P waves  QRS, axis: Narrow complex  ST and T waves: No STEMI; QTc within normal limits    Interpreted Contemporaneously by me, independently viewed  Comparison: 12/17/2024   [RS]   1312 CONSULT        Provider: Dr. Reynaldo WEI    Discussion: Reviewed patient history, ED presentation and evaluation.  Agreeable to accept patient for full admission.  Aware of pending nephrology consult.  Would recommend move forward with Lokelma.    Agreeable c treatment and planned disposition.         [RS]   8258 CONSULT        Provider: Dr. Skinny Harding Nephj luis    Discussion: Patient history, ED presentation evaluation.  He would like me to go forward with some sodium bicarbonate at this time and he will add additional studies thereafter. agreeable to consult    Agreeable c treatment and planned disposition.         [RS]      ED Course User Index  [RS] Curt Klein MD         Prescription drug monitoring program review:     AS OF 13:36 EST VITALS:    BP - 128/89  HR - 105  TEMP - 96.8 °F (36 °C) (Tympanic)  O2 SATS - 98%    COMPLEXITY OF CARE  The patient requires admission.      DIAGNOSIS  Final diagnoses:   Volume depletion   Acute kidney injury (ALBER) with acute tubular necrosis (ATN)   Hyperkalemia   Metastatic malignant neoplasm, unspecified site   Delirium         DISPOSITION  ED Disposition       ED Disposition   Decision to Admit    Condition   --    Comment   Level of Care: Telemetry [5]   Diagnosis: Acute kidney injury (ALBER) with acute tubular necrosis (ATN) [[id number]]   Admitting Physician: JUAN M JIMENEZ [[id number]]   Bed Request Comments: Oncology for possible though the patient will require cardiac monitoring   Certification: I Certify That Inpatient Hospital Services Are Medically Necessary For Greater Than 2 Midnights                    ADMISSION    Discussed treatment plan and reason for admission with pt/family and admitting physician.  Pt/family voiced understanding of the plan for admission for further  testing/treatment as needed.       Please note that portions of this document were completed with a voice recognition program.    Note Disclaimer: At [facility], we believe that sharing information builds trust and better relationships. You are receiving this note because you recently visited [facility]. It is possible you will see health information before a provider has talked with you about it. This kind of information can be easy to misunderstand. To help you fully understand what it means for your health, we urge you to discuss this note with your provider.           Curt Klein MD  01/07/25 1336      Electronically signed by Curt Klein MD at 01/07/25 1336       Tiffany Khan, RN at 01/07/25 1225          Pt arrives from [facility] office for abnormal lab, dehydration, and malnutrition. Pt has metastatic colon cancer.     Electronically signed by Tiffany Khan, RN at 01/07/25 1226       Oxygen Therapy (since admission)       Date/Time SpO2 Device (Oxygen Therapy) Flow (L/min) (Oxygen Therapy) Oxygen Concentration (%) ETCO2 (mmHg)    01/07/25 2329 100 -- -- -- --    01/07/25 2058 -- room air -- -- --    01/07/25 1920 100 room air -- -- --    01/07/25 1620 -- room air -- -- --    01/07/25 1557 98 room air -- -- --    01/07/25 1501 100 -- -- -- --    01/07/25 1300 98 -- -- -- --    01/07/25 1226 96 room air -- -- --          Intake & Output (last 2 days)         01/06 0701  01/07 0700 01/07 0701 01/08 0700 01/08 0701  01/09 0700    P.O.  60 358    Total Intake(mL/kg)  60 (1.2) 358 (7)    Urine (mL/kg/hr)  525     Total Output  525     Net  -465 +358                 Lines, Drains & Airways       Active LDAs       Name Placement date Placement time Site Days    Colostomy LLQ --  --  LLQ  --    Single Lumen Implantable Port Left Chest --  --  Chest  --              Inactive LDAs       Name Placement date Placement time Removal date Removal time Site Days    [REMOVED] Urethral Catheter Silicone 18  Fr. 01/07/25  1730  01/08/25  1125  -- less than 1                  Medication Administration Report for Ely Sims as of 1/7/25 through 1/8/25     Legend:    Given Hold Not Given Due Canceled Entry Other Actions    Time Time (Time) Time Time-Action         Discontinued     Completed     Future     MAR Hold     Linked             Medications 01/07/25 01/08/25      acetaminophen (TYLENOL) tablet 650 mg  Dose: 650 mg  Freq: Every 4 Hours PRN Route: PO  PRN Reason: Mild Pain  Start: 01/07/25 1634     Admin Instructions:   If given for fever, use fever parameter: fever greater than 100.4 °F  Based on patient request - if ordered for moderate or severe pain, provider allows for administration of a medication prescribed for a lower pain scale.    Do not exceed 4 grams of acetaminophen in a 24 hr period. Max dose of 2gm for AST/ALT greater than 120 units/L.    If given for pain, use the following pain scale:   Mild Pain = Pain Score of 1-3, CPOT 1-2  Moderate Pain = Pain Score of 4-6, CPOT 3-4  Severe Pain = Pain Score of 7-10, CPOT 5-8          Or   acetaminophen (TYLENOL) 160 MG/5ML oral solution 650 mg  Dose: 650 mg  Freq: Every 4 Hours PRN Route: PO  PRN Reason: Mild Pain  Start: 01/07/25 1634     Admin Instructions:   If given for fever, use fever parameter: fever greater than 100.4 °F  Based on patient request - if ordered for moderate or severe pain, provider allows for administration of a medication prescribed for a lower pain scale.    Do not exceed 4 grams of acetaminophen in a 24 hr period. Max dose of 2gm for AST/ALT greater than 120 units/L.    If given for pain, use the following pain scale:   Mild Pain = Pain Score of 1-3, CPOT 1-2  Moderate Pain = Pain Score of 4-6, CPOT 3-4  Severe Pain = Pain Score of 7-10, CPOT 5-8          Or   acetaminophen (TYLENOL) suppository 650 mg  Dose: 650 mg  Freq: Every 4 Hours PRN Route: RE  PRN Reason: Mild Pain  Start: 01/07/25 1634     Admin Instructions:   If given for  fever, use fever parameter: fever greater than 100.4 °F  Based on patient request - if ordered for moderate or severe pain, provider allows for administration of a medication prescribed for a lower pain scale.    Do not exceed 4 grams of acetaminophen in a 24 hr period. Max dose of 2gm for AST/ALT greater than 120 units/L.    If given for pain, use the following pain scale:   Mild Pain = Pain Score of 1-3, CPOT 1-2  Moderate Pain = Pain Score of 4-6, CPOT 3-4  Severe Pain = Pain Score of 7-10, CPOT 5-8          ALPRAZolam (XANAX) tablet 0.25 mg  Dose: 0.25 mg  Freq: Every 8 Hours PRN Route: PO  PRN Reason: Anxiety  Indications of Use: ANXIETY  Start: 01/07/25 1634     Admin Instructions:   Caution: Look alike/sound alike drug alert.   Avoid grapefruit juice          heparin injection 500 Units  Dose: 5 mL  Freq: As Needed Route: IV  PRN Reason: Line Care  PRN Comment: Prior to De-Accessing Port  Start: 01/08/25 0830          HYDROcodone-acetaminophen (NORCO)  MG per tablet 1 tablet  Dose: 1 tablet  Freq: Every 4 Hours PRN Route: PO  PRN Reason: Moderate Pain  Indications of Use: PAIN  Start: 01/07/25 1634     Admin Instructions:   Based on patient request - if ordered for moderate or severe pain, provider allows for administration of a medication prescribed for a lower pain scale.  [LEWIS]    Do not exceed 4 grams of acetaminophen in a 24 hr period. Max dose of 2gm for AST/ALT greater than 120 units/L        If given for pain, use the following pain scale:   Mild Pain = Pain Score of 1-3, CPOT 1-2  Moderate Pain = Pain Score of 4-6, CPOT 3-4  Severe Pain = Pain Score of 7-10, CPOT 5-8      1853-Given            1058-Given              morphine injection 2 mg  Dose: 2 mg  Freq: Every 4 Hours PRN Route: IV  PRN Reason: Severe Pain  Start: 01/08/25 1045 End: 01/18/25 1044     Admin Instructions:   If given for pain, use the following pain scale:  Mild Pain = Pain Score of 1-3, CPOT 1-2  Moderate Pain = Pain Score  "of 4-6, CPOT 3-4  Severe Pain = Pain Score of 7-10, CPOT 5-8          ondansetron ODT (ZOFRAN-ODT) disintegrating tablet 8 mg  Dose: 8 mg  Freq: Every 8 Hours PRN Route: PO  PRN Reasons: Nausea,Vomiting  Indications of Use: CANCER CHEMOTHERAPY-INDUCED NAUSEA AND VOMITING  Start: 01/07/25 1634     Admin Instructions:   \"If multiple N/V medications ordered, use in the following order: Ondansetron, Prochlorperazine, Promethazine. Use PO unless patient refuses or patient unable to swallow.\"  Place on tongue and allow to dissolve.          sodium chloride 0.9 % flush 10 mL  Dose: 10 mL  Freq: As Needed Route: IV  PRN Reason: Line Care  PRN Comment: After Medication Administration & Prior to De-Accessing Port  Start: 01/08/25 0830          sodium chloride 0.9 % flush 10 mL  Dose: 10 mL  Freq: As Needed Route: IV  PRN Reason: Line Care  Start: 01/07/25 1634          sodium chloride 0.9 % flush 20 mL  Dose: 20 mL  Freq: As Needed Route: IV  PRN Reason: Line Care  PRN Comment: After Blood Draws or Blood Product Administration  Start: 01/08/25 0830          sodium chloride 0.9 % infusion 40 mL  Dose: 40 mL  Freq: As Needed Route: IV  PRN Reason: Line Care  Start: 01/08/25 0830     Admin Instructions:   Following administration of an IV intermittent medication, flush line with 40mL NS at 100mL/hr.          sodium chloride 0.9 % infusion 40 mL  Dose: 40 mL  Freq: As Needed Route: IV  PRN Reason: Line Care  Start: 01/07/25 1634     Admin Instructions:   Following administration of an IV intermittent medication, flush line with 40mL NS at 100mL/hr.          Discontinued Medications  Medications 01/07/25 01/08/25      ondansetron (ZOFRAN) injection 4 mg  Dose: 4 mg  Freq: Every 6 Hours PRN Route: IV  PRN Reasons: Nausea,Vomiting  Start: 01/07/25 1634 End: 01/08/25 1051     Admin Instructions:   If BOTH ondansetron (ZOFRAN) and promethazine (PHENERGAN) are ordered use ondansetron first and THEN promethazine IF ondansetron is " ineffective.                      Blood Administration Record (From admission, onward)      None          Operative/Procedure Notes (all)    No notes of this type exist for this encounter.          Physician Progress Notes (all)        Frank Karimi MD at 25 1027            Name: Ely Sims ADMIT: 2025   : 1960  PCP: Provider, No Known    MRN: [id number] LOS: 1 days   AGE/SEX: 64 y.o. female  ROOM: [facility]     Subjective   Subjective   Not speaking much. Sitting on the side of the bed. Family members at bedside (x 3) states she ate a little bit drank a little bit. She is complaining of sore throat. No mouth pain. Family reports mentally she is 50-50. Did not sleep last night.    Objective   Objective   Vital Signs  Temp:  [96.8 °F (36 °C)-98.4 °F (36.9 °C)] 97.7 °F (36.5 °C)  Heart Rate:  [] 109  Resp:  [16-17] 16  BP: (104-128)/() 121/83  SpO2:  [95 %-100 %] 100 %  on   ;   Device (Oxygen Therapy): room air  Body mass index is 17.73 kg/m².    Physical Exam  Constitutional:       General: She is not in acute distress.     Appearance: She is underweight. She is ill-appearing (frail). She is not toxic-appearing.   HENT:      Head: Normocephalic and atraumatic.      Mouth/Throat:      Mouth: Mucous membranes are dry.   Cardiovascular:      Rate and Rhythm: Normal rate and regular rhythm.   Pulmonary:      Effort: Pulmonary effort is normal. No respiratory distress.      Breath sounds: No wheezing or rhonchi.   Abdominal:      Palpations: Abdomen is soft.      Tenderness: There is no abdominal tenderness. There is no guarding or rebound.   Musculoskeletal:         General: No swelling.   Skin:     General: Skin is warm and dry.     Results Review  I reviewed the patient's new clinical results.  Results from last 7 days   Lab Units 25  0523 25  1039   WBC 10*3/mm3 12.36* 15.38*   HEMOGLOBIN g/dL 14.8 14.5   PLATELETS 10*3/mm3 149 184     Results from last 7 days   Lab Units  01/08/25 0523 01/07/25  1803 01/07/25  1039   SODIUM mmol/L 134* 133* 130*   POTASSIUM mmol/L 3.9 5.1 6.2*   CHLORIDE mmol/L 95* 96* 95*   CO2 mmol/L 27.0 22.6 16.9*   BUN mg/dL 58* 67* 70*   CREATININE mg/dL 2.42* 3.11* 3.43*   GLUCOSE mg/dL 101* 122* 118*     Lab Results   Component Value Date    ANIONGAP 12.0 01/08/2025     Estimated Creatinine Clearance: 19 mL/min (A) (by C-G formula based on SCr of 2.42 mg/dL (H)).   Lab Results   Component Value Date    EGFR 21.8 (L) 01/08/2025     Results from last 7 days   Lab Units 01/08/25 0523 01/07/25 1803 01/07/25  1039   ALBUMIN g/dL 3.5 3.5 3.7   BILIRUBIN mg/dL 0.8  --  0.9   ALK PHOS U/L 970*  --  861*   AST (SGOT) U/L 50*  --  54*   ALT (SGPT) U/L 35*  --  30     Results from last 7 days   Lab Units 01/08/25 0523 01/07/25 1803 01/07/25  1039   CALCIUM mg/dL 9.4 9.5 10.4   ALBUMIN g/dL 3.5 3.5 3.7   MAGNESIUM mg/dL 2.4  --  2.6*   PHOSPHORUS mg/dL 3.1 4.4  --      Results from last 7 days   Lab Units 01/07/25  1258   PROCALCITONIN ng/mL 1.00*   LACTATE mmol/L 1.8     Glucose   Date/Time Value Ref Range Status   01/07/2025 1259 123 70 - 130 mg/dL Final       No radiology results for the last day    Scheduled Meds  buPROPion XL, 150 mg, Oral, QAM  fentaNYL, 1 patch, Transdermal, Q72H   And  Check Fentanyl Patch Placement, 1 each, Not Applicable, Q12H  melatonin, 5 mg, Oral, Nightly  polyethylene glycol, 17 g, Oral, Daily  sennosides-docusate, 2 tablet, Oral, Daily  sodium chloride, 10 mL, Intravenous, Q12H  sodium chloride, 10 mL, Intravenous, Q12H    Continuous Infusions  sodium chloride 0.45 % 900 mL with sodium bicarbonate 8.4 % 100 mEq infusion, , Last Rate: 100 mL/hr at 01/08/25 0109    PRN Meds    acetaminophen **OR** acetaminophen **OR** acetaminophen    ALPRAZolam    heparin    HYDROcodone-acetaminophen    ondansetron    ondansetron ODT    sodium chloride    sodium chloride    sodium chloride    sodium chloride    sodium chloride    sodium chloride 0.45 %  900 mL with sodium bicarbonate 8.4 % 100 mEq infusion, , Last Rate: 100 mL/hr at 01/08/25 0109    Diet  Diet: Cardiac; Healthy Heart (2-3 Na+); Fluid Consistency: Thin (IDDSI 0)      Assessment/Plan     Active Hospital Problems    Diagnosis  POA    **Acute kidney injury (ALBER) with acute tubular necrosis (ATN) [N17.0]  Yes    Hyperkalemia [E87.5]  Yes    Anxiety [F41.9]  Yes    Severe protein-calorie malnutrition [E43]  Yes    Abdominal pain [R10.9]  Yes    Dehydration [E86.0]  Yes    Hypertension [I10]  Yes    Colon cancer metastasized to brain [C18.9, C79.31]  Yes      Resolved Hospital Problems   No resolved problems to display.     Patient is a 64 y.o. female metastatic colon cancer and progressive decline. Nausea and vomiting decreased intake difficulty swallowing. Worsening abdominal pain.    ALBER  Hyperkalemia  Metabolic acidosis  Likely prerenal due to dehydration and decreased p.o. intake (nausea, dysphagia) and diuretics  Creatinine improved with IV fluids   ARB and K held. Received diuretics for hyperkalemia  IVF with bicarb per nephrology    Metastatic colon cancer   Ongoing abdominal pain  Had a CT last month with progression of pulmonary mets, liver mets, lymphadenopathy  Increase fentanyl. Continue pain medications (+prn morphine), bowel medications  Continue steroids. If not able to take PO change to IV   Oncology consulted    Underweight, malnutrition  Patient would not want feeding tube. Family present confirms  Add on cortisol to this am labs (on steroids)    Elevated TSH  6.180 3 weeks ago, now 11.30.   Difficult to interpret with illness and symptoms not specifically hypothyroid    Elevated procalcitonin  No fever  Due to malignancy? ALBER?  Will check blood cultures and chest XR    Possible thrush  Dysphagia  IV diflucan empirically treat esophageal candidiasis (painful swallowing being outpatient treated for thrush)    Insomnia   try small dose of trazodone for sleep    DVT  "prophylaxis  SCDs    Discharge  TBD. Will start looking at SNF depending on goals of care. Briefly mentioned palliative care. Family to discuss with oncology  Expected discharge date/ time has not been documented.    Discussed with patient, family, and nursing staff    Frank Karimi MD  [facility]  25 10:27 EST    Electronically signed by Frank Karimi MD at 25 1055          Consult Notes (all)        Patrick Babb MD at 25 1411        Consult Orders    1. Nephrology (on -call MD unless specified) [[id number]] ordered by Estefania Jacobs MD at 25 1336                   [facility] Consult Note      Patient Name: Ely Sims  : 1960  MRN: [id number]  Primary Care Physician:  Provider, No Known  Referring Physician: Curt Klein MD  Date of admission: 2025    Subjective     Reason for Consult: ALBER    HPI:   Ely Sims is a 64 y.o. female with normal kidney function at baseline, metastatic colon cancer on active treatment, and hypertension, presented to the hospital from oncology office due to ongoing nausea/poor p.o. intake, and abnormal labs (SCr 3.4, K6.2).    Upon arrival, BP acceptable 120s/90s, HR 100s (sinus rhythm), UA bland.  Patient was given 1 L saline bolus, Lokelma, insulin, and bicarb push.     Patient is very somnolent and drowsy during examination, unable to provide any reliable medical history, ROS is obtained from amily at bedside:  Patient has been having very poor p.o. intake for the past few weeks, appropriately worsened in the past 2 days due to nausea/dysphagia/\"burning in the throat\"; cycle 3 panitumumab and encorafenib were held.  From 2024 to 2024, patient was admitted for abdominal pain, hypokalemia, and fluid retention, was discharged home with Lasix 40 mg daily and KCl 20 mEq BID.  Has been taking fentanyl patch and hydrocodone every 4 hours at home for abdominal pain; also on Cozaar " at home.    Most of the information is given by daughter at bedside , patient somonolent    Review of Systems:   14 point review of systems is otherwise negative except for mentioned above on HPI    Personal History     Past Medical History:   Diagnosis Date    Metastasis to brain     Rectal carcinoma 2024    Seasonal allergies        Past Surgical History:   Procedure Laterality Date    COLONOSCOPY N/A 08/25/2024    Procedure: COLONOSCOPY to 20cm with cold biopsies and needle tattoo;  Surgeon: Shadi Kaminski MD;  Location: [facility] ENDOSCOPY;  Service: Gastroenterology;  Laterality: N/A;  pre: abnormal imaging  post: poor prep, obstructing colon mass at 20cm, hemorrhoids    COLOSTOMY N/A 08/27/2024    Procedure: COLOSTOMY LAPAROSCOPIC;  Surgeon: Saturnino Chester MD;  Location: [facility] MAIN OR;  Service: General;  Laterality: N/A;    CRANIOTOMY N/A 08/21/2024    Procedure: Posterior fossa craniotomy for tumor using stereotactic guidance;  Surgeon: Bull Doty MD;  Location: [facility] OR;  Service: Neurosurgery;  Laterality: N/A;    ENDOMETRIAL ABLATION      TUBAL ABDOMINAL LIGATION      VENOUS ACCESS DEVICE (PORT) INSERTION Right 08/27/2024    Procedure: INSERTION VENOUS ACCESS DEVICE;  Surgeon: Saturnino Chester MD;  Location: [facility] OR;  Service: General;  Laterality: Right;       Family History: family history includes Colon cancer in her sister; Heart disease in her mother.    Social History:  reports that she has quit smoking. Her smoking use included cigarettes. She has quit using smokeless tobacco. She reports current alcohol use. Drug use questions deferred to the physician.    Home Medications:  Prior to Admission medications    Medication Sig Start Date End Date Taking? Authorizing Provider   ALPRAZolam (Xanax) 0.25 MG tablet Take 1 tablet by mouth Every 8 (Eight) Hours As Needed for Anxiety. Indications: Feeling Anxious 12/26/24   Elieser Saunders MD   amLODIPine (NORVASC) 10 MG  tablet Take 1 tablet by mouth Daily. 12/21/24   Cesar Ko MD   buPROPion XL (WELLBUTRIN XL) 150 MG 24 hr tablet Take 2 tablets by mouth Every Morning. Indications: Attention Deficit Hyperactivity Disorder 8/19/24   ProviderColumba MD   dexAMETHasone (DECADRON) 4 MG tablet Take 1 tablet by mouth 2 (Two) Times a Day. 12/20/24   Cesar Ko MD   Diphenhydramine-Aluminum-Magnesium-Simethicone-Lidocaine-Nystatin Swish and spit 5 mL Every 4 (Four) Hours As Needed (mucositis). Recipe: Benadryl Elixir 60cc, Maalox 200 cc, Viscous Lidocaine 30cc, Nystatin 120cc  Indications: Mucous Membrane Inflammation 1/3/25   Marisela Garza APRN   encorafenib (BRAFTOVI) 75 MG capsule Take 4 capsules by mouth Daily. Indications: Cancer of the Colon 12/23/24   Elieser Saunders MD   fentaNYL (DURAGESIC) 50 MCG/HR patch Place 1 patch on the skin as directed by provider Every 72 (Seventy-Two) Hours. 12/20/24   Cesar Ko MD   furosemide (LASIX) 40 MG tablet Take 1 tablet by mouth Daily. 12/20/24   Cesar Ko MD   HYDROcodone-acetaminophen (NORCO)  MG per tablet Take 1 tablet by mouth Every 4 (Four) Hours As Needed for Moderate Pain. Indications: Pain 12/23/24   Elieser Saunders MD   losartan (COZAAR) 50 MG tablet Take 1 tablet by mouth Daily. 12/21/24   Cesar Ko MD   melatonin 5 MG tablet tablet Take 1 tablet by mouth Every Night. 12/20/24   Cesar Ko MD   ondansetron ODT (ZOFRAN-ODT) 8 MG disintegrating tablet Take 1 tablet by mouth Every 8 (Eight) Hours As Needed for Nausea or Vomiting. Indications: Nausea and Vomiting caused by Cancer Chemotherapy 12/23/24   Elieser Saunders MD   polyethylene glycol (MIRALAX) 17 g packet Take 17 g by mouth Daily. 11/1/24   Saturnino Chester MD   potassium chloride 10 MEQ CR tablet Take 2 tablets by mouth 2 (Two) Times a Day. 12/20/24   Cesar Ko MD   sennosides-docusate (senna-docusate sodium) 8.6-50 MG per tablet Take 2 tablets by  mouth Daily. 11/1/24   Saturnino Chester MD       Allergies:  No Known Allergies    Objective     Vitals:   Temp:  [96.8 °F (36 °C)-98.4 °F (36.9 °C)] 97.7 °F (36.5 °C)  Heart Rate:  [] 115  Resp:  [16-17] 16  BP: (104-128)/() 113/102  No intake or output data in the 24 hours ending 01/07/25 1925    Physical Exam:   Constitutional: Awake, chronically ill, thin, no acute distress.  HEENT: Sclera anicteric, no conjunctival injection  Neck: Supple, no JVD  Respiratory: Diminished, nonlabored respiration  Cardiovascular: Tachycardia, RRR, 2/6 murmurs, no rubs, no carotid bruit  Gastrointestinal: BS +, abdomen is soft, nontender and nondistended  : No palpable bladder  Musculoskeletal: No edema, no clubbing or cyanosis  Psychiatric: Somnolent  Neurologic: Drowsy, lethargic  Skin: Warm and dry       Scheduled Meds:     [START ON 1/8/2025] buPROPion XL, 150 mg, Oral, QAM  fentaNYL, 1 patch, Transdermal, Q72H   And  [START ON 1/8/2025] Check Fentanyl Patch Placement, 1 each, Not Applicable, Q12H  melatonin, 5 mg, Oral, Nightly  polyethylene glycol, 17 g, Oral, Daily  sennosides-docusate, 2 tablet, Oral, Daily  sodium chloride, 10 mL, Intravenous, Q12H      IV Meds:   sodium chloride 0.45 % 900 mL with sodium bicarbonate 8.4 % 100 mEq infusion, , Last Rate: 100 mL/hr at 01/07/25 1448  sodium chloride, 100 mL/hr        Results Reviewed:   I have personally reviewed the results from the time of this admission to 1/7/2025 19:25 EST     Lab Results   Component Value Date    GLUCOSE 122 (H) 01/07/2025    CALCIUM 9.5 01/07/2025     (L) 01/07/2025    K 5.1 01/07/2025    CO2 22.6 01/07/2025    CL 96 (L) 01/07/2025    BUN 67 (H) 01/07/2025    CREATININE 3.11 (H) 01/07/2025    BCR 21.5 01/07/2025    ANIONGAP 14.4 01/07/2025      Lab Results   Component Value Date    MG 2.6 (H) 01/07/2025    PHOS 4.4 01/07/2025    ALBUMIN 3.5 01/07/2025           Assessment / Plan       Acute kidney injury (ALBER) with acute  tubular necrosis (ATN)    Colon cancer metastasized to brain    Hypertension    Dehydration    Abdominal pain    Severe protein-calorie malnutrition    Anxiety    Hyperkalemia      ASSESSMENT:  ALBER, likely with progression to ATN.  Normal kidney function at baseline, today SCr 3.43, multifactorial: Prerenal azotemia due to poor PO intake secondary to nausea and dysphagia, concurrent diuretic use, and impaired renal autoregulation with ARB.  Hypovolemic by exam  Hyperkalemia (with previous hypokalemia), secondary to KCl supplements, ALBER, metabolic acidosis, and likely starvation ketosis.  Received insulin, bicarb push, and Lokelma in the ER  AGMA (d/t ALBER and GI losses) and respiratory alkalosis  Hyponatremia, likely hypovolemic, sodium 130  Metastatic colon cancer to the brain, on panitumumab and encorafenib, held by oncology due to severe nausea/poor PO intake  Ongoing nausea/vomiting, followed by primary team  Hypertension, BP well-controlled  Malnutrition with significant weight loss  Anemia/leukocytosis, secondary to chemotherapy, followed by oncology outpatient  Ongoing abdominal pain, not well-controlled despite fentanyl patch and hydrocodone outpatient, followed by primary team  Recent volume overload (hospitalized from 12/13/2024 to 12/30/2024), patient was discharged home with Lasix 40 mg daily  Transaminitis, followed by primary team    PLAN:  Hold ARB and potassium supplements  Will give IV Bumex 2.5 mg once to shift potassium  Start bicarb drip at 100 mEq with half-normal saline at 100 mL/h  Recheck renal panel at 1600  Surveillance labs    Thank you for involving us in the care of Ely Sims.  Please feel free to call with any questions.    Patrick Babb MD  01/07/25  19:25 [facility]    [facility]  [phone]      Please note that portions of this note were completed with a voice recognition program.    Electronically signed by Patrick Babb MD at 01/07/25 5517                  Asha Thompson APRN   Nurse Practitioner  Specialty: Nurse Practitioner     Progress Notes     Signed     Encounter Date: 1/7/2025   Related encounter: Office Visit from 1/7/2025 in [facility] HEMATOLOGY & ONCOLOGY with Asha Thompson APRN     Signed       Expand All Collapse All    [facility] OUTPATIENT FOLLOW UP CLINIC VISIT     REASON FOR FOLLOW-UP:    Metastatic colon cancer      HISTORY OF PRESENT ILLNESS:    Ely Sims is a 64 y.o. female who returns for follow up     Patient is seen today in follow-up accompanied by her longtime boyfriend and her son.  She is here for consideration of cycle 3 panitumumab.  Patient is also continued on oral encorafenib 300 mg daily.     Unfortunately she is doing quite poorly today.  She was hospitalized just before [place] at that time with low potassium and fluid retention.  She was discharged on Lasix 40 mg daily and potassium 20 mEq twice daily.  She now has evidence today of acute kidney injury with BUN of 70, creatinine 3.4, potassium of 6.2.  This is compounded by the fact that she is malnourished, having very little to eat or drink in the last week.  She has struggled with ongoing nausea.  Family notes she has had difficulty swallowing, reporting significant pain in her throat.  This is despite us sending in dissolvable Zofran tablets and Magic mouthwash.  She has evidence of oral candidiasis in her mouth and extremely dry mouth currently.     Additionally patient has ongoing abdominal pain despite fentanyl 50 mcg patch.  She is taking additional Norco 10/325 roughly every 4 hours.  Patient is unable to keep her eyes open talking to me today and is having difficulty telling me who the family members are that accompany her today.     We discussed with her multitude of problems as outlined above, she needs to be transferred to the ED at this time for proper medical management.  I have spoken with her  "oncologist, Dr. Saunders, who is rounding in the hospital this week and will see her inpatient.        PHYSICAL EXAMINATION:  Vitals       Vitals:     01/07/25 1109   BP: 104/72   Pulse: 104   Resp: 17   Temp: 97.1 °F (36.2 °C)   TempSrc: Oral   SpO2: 95%   Weight: 50.6 kg (111 lb 9.6 oz)   Height: 170.2 cm (67.01\")   PainSc: 0-No pain            General: Somnolent. Seated in wheelchair, appearing extremely weak.    Skin:  Warm and dry, no visible rash  HEENT: Extremely dry, red, oral candidiasis in posterior oropharynx.  Chest:  Normal respiratory effort.  Lungs clear to auscultation  Cardiac: Tachycardic  Abdomen: Ostomy present.    Extremities: Trace edema of the ankles.  Otherwise no lower extremity edema.  Neuro/psych: Global weakness.  Unable to stand.  Unable to answer questions appropriately.        DIAGNOSTIC DATA:        Results from last 7 days   Lab Units 01/07/25  1039   WBC 10*3/mm3 15.38*   NEUTROS ABS 10*3/mm3 13.32*   HEMOGLOBIN g/dL 14.5   HEMATOCRIT % 43.7   PLATELETS 10*3/mm3 184           Results from last 7 days   Lab Units 01/07/25  1039   SODIUM mmol/L 130*   POTASSIUM mmol/L 6.2*   CHLORIDE mmol/L 95*   CO2 mmol/L 16.9*   BUN mg/dL 70*   CREATININE mg/dL 3.43*   CALCIUM mg/dL 10.4   ALBUMIN g/dL 3.7   BILIRUBIN mg/dL 0.9   ALK PHOS U/L 861*   ALT (SGPT) U/L 30   AST (SGOT) U/L 54*   GLUCOSE mg/dL 118*   MAGNESIUM mg/dL 2.6*                   IMAGING:    None reviewed     ASSESSMENT:  This is a 64 y.o. female with:     *Metastatic colon cancer  The patient presented on 8/19/2022 initially to urgent care with headache, nausea, vomiting, diarrhea.  She was advised to go to the ED from urgent care.  CT head 8/19/2024 showed some areas of increased attenuation over the right cerebral hemisphere and left temporal lobe concerning for metastatic disease with edema with some mass effect upon the fourth ventricle.  MRI brain 8/20/2024 with a lesion at the right cerebellar hemisphere measuring 2.2 cm, " lesion within the left temporal lobe measuring 9 mm, 1.0 cm lesion at the left cerebellar vermis, all with surrounding edema.  There was some mass effect upon the fourth ventricle with mild prominence of the lateral and third ventricles.  8/20/2024: CT chest abdomen and pelvis with no evidence of pulmonary metastatic disease.  There was some asymmetric wall thickening in the distal sigmoid colon with surrounding mesenteric soft tissue nodularity concerning for primary colon cancer.  A sclerotic lesion of the spinous process of T1 was said to be consistent with a bone island or other benign lesion.  8/21/2024: Posterior fossa craniectomy with gross total removal of a right cerebellar hemisphere metastasis Dr. Bull Doty.  Pathology consistent with metastatic poorly differentiated adenocarcinoma favoring colorectal origin.  MSI testing is pending.  [place] with a BRAF V600E mutation.  TMB 4.7 mutations per megabase.  Microsatellite stable.  8/25/2024: Bone scan with no obvious metastatic disease.  8/25/2024: Colonoscopy by Dr. Kaminski shows an infiltrative completely obstructing large mass found at the proximal rectum.  Pathology with invasive poorly differentiated carcinoma with neuroendocrine features. MSI studies pending.     CEA 2.10.  8/26/2024: MRI lumbar spine with no obvious metastatic disease in the lumbar spine.  However, there is a 3 mm enhancing lesion in the visualized lower thoracic spinal cord that could represent a spinal cord metastasis.  Cervical and thoracic spine MRI requested..  8/27/24: diverting loop sigmoid colostomy and port placement by Dr. Chester  8/28/2024: MRI C and T-spine addendum made with a small 3 mm met to the spinal cord at T12  Completed radiation to the brain and spine on 9/24/24  PET scan 9/12/24 with no metastatic disease. FDG avid mass at the high rectum.  10/22/2024: Proceed with Cycle 2 FOLFOX. Will omit 5 FU bolus due to neutropenia. Future treatment plans adjusted. In  regards to lower abdominal cramping which occurs at night, advised patient to discuss with Dr. Chester (surgeon).   11/5/2024: Proceed with Cycle 3 FOLFOX with 5 FU bolus omitted. WBC 4.05 and . Will give a dose of tylenol today while in the infusion area for side/back pain. Instructed her to reach out if the pain/soreness does not subside.   11/19/2024: She presents for cycle #4 of therapy.  Therapy has been well-tolerated.  However, she is having worsening abdominal pain as well as some nausea and vomiting this morning.  See below.  11/26/2024: CT imaging with significant progression of disease  With progression, cetuximab or panitumumab plus encorafenib given the presence of a BRAF V600 E mutation.  Plan panitumumab due to ease of administration every 2 weeks and potential for fewer adverse effects compared to cetuximab. Encorafenib is 300 mg daily.  Panitumumab is 6 mg/kg every 14 days.  Plan treatment until disease progression or unacceptable toxicity.  Treatment is palliative and she understands this.  We will plan to initiate therapy very quickly given significant pression of disease.  C1 D1 panitumumab and encorafenib 12/10/2024  12/13/2024: seen for triage visit concerning severe pain and uncontrolled nausea/vomiting.  She has not been able to keep down any food or drink for the last 3 days.  Has been able to keep down some medications.  Has been utilizing Compazine every 6 hours without any relief.  As far as her pain she is continued MS Contin 15 mg every 8 hours and Norco 10/325 every 6 hours, despite this her pain has continued to worsen.  Her WBC is elevated at 18.44, and she reports developing  chills a few hours ago.  Her bilirubin has also increase dto 2.4.  She will require admission through A.  Subsequent admission.  Pain was better when she left the hospital.  1/6/2025: Hold cycle 3 panitumumab.  Hold encorafenib.  Patient will require hospitalization as further detailed below.     *Mild  normocytic anemia  Hemoglobin 14.5     *Neutropenia secondary to chemotherapy  Now with leukocytosis with neutrophilia, reactive     *Abdominal pain  We have started sustained-release morphine 15 mg every 8 hours.  We had intended her for her to take hydrocodone also as needed but she has not been doing this thinking that morphine replaced the hydrocodone.  She was encouraged to take hydrocodone as needed as well.  12/13/2024: Patient is having severe uncontrolled pain.  She has been taking MS Contin 15 mg every 8 hours and Norco 10/325 every 6 hours.  Despite this her pain has continued to worsen.  She will require direct admission through [facility] for further management.  Started Fentanyl patch inpatient.  This along with hydrocodone 10 mg tablets improved her pain.  She just has hydrocodone 5 mg tablets at home.  Unfortunately, her fentanyl patch fell off on Saturday and she has been having more pain and spite of replacement of a new patch yesterday.  1/7/2025: Continues with lower abdominal pain.  Currently utilizing fentanyl 50 mcg patch and Norco 10/325 every 4 hours.  She is extremely somnolent.     *Uncontrolled nausea/vomiting  Compazine was helping at home.  She has not been taking it since leaving the hospital since she apparently was told she was not supposed to take it.  It is unclear why this was stopped.  Records from the hospital do not really discuss her nausea and vomiting and what medication was used for this.  It appears that she was on a scopolamine patch at 1 point but this may have been stopped secondary to altered mental status.  She was also receiving lorazepam which was also stopped due to sedation.  1/7/2025 now utilizing Zofran 8 mg dissolvable tablets.  Continues with poor oral intake and extreme weight loss of 11 pounds in the last 2 weeks, over 20 pounds in the last month.     *Previous lower extremity edema while hospitalized 12/2024.    He was discharged home on Lasix 40 mg  daily  1/7/2025 patient is now extremely dry with acute kidney injury, BUN 70, creatinine 3.4.  She has continued Lasix up until yesterday.  Has not had her dose today per her boyfriend's report.     *Previous hypokalemia  Discharged home 12/20/2024 potassium chloride 20 mEq twice daily.  1/7/2025 Now with evidence of hyperkalemia with potassium of 6.2     PLAN:   Hold C3 Vectibix  Hold encorafenib (Braftovi) 300 mg daily  Patient will be transferred to the ED for further medical management of ALBER, hyperkalemia, abdominal pain, and extreme weakness, with expected hospitalization after she is stabilized.  I have discussed the patient's case with Dr. Saunders, her oncologist, who is rounding inpatient this week.  He is in agreement with this plan.     Patient's son and her longtime boyfriend present for entire visit today (her daughter arrived at the end of the visit). I did explain to both of them the grave nature of her current situation.  It is unclear if she will be able to receive further treatment based on her current performance status.     This patient is on high risk drug therapy requiring intensive monitoring for toxicity.       I spent 75 minutes caring for Ely on this date of service. This time includes time spent by me in the following activities: preparing for the visit, reviewing tests, performing a medically appropriate examination and/or evaluation, counseling and educating the patient/family/caregiver, referring and communicating with other health care professionals, documenting information in the medical record, independently interpreting results and communicating that information with the patient/family/caregiver, care coordination, obtaining a separately obtained history, and reviewing a separately obtained history                  Renal Failure, Acute RRG Inpatient Care       Indications Met   Last updated by Claribel David LPN on 1/8/2025 0812     Review Status Created By   Primary Completed  Claribel David LPN      Criteria Review   Renal Failure, Acute RRG Inpatient Care     Overall Determination: Indications Met     Criteria:  [×] Admission is indicated for  1 or more  of the following  [B] [C]:      [×] Acute renal failure (stage 3 acute kidney injury), [B] [C] as indicated by  1 or more  of the following :          [×] Within past 7 days, rise in serum creatinine to 3 times its baseline value or higher      [×] Acute [B] kidney injury (eg, rise in serum creatinine from baseline) requiring inpatient care, as indicated by  1 or more  of the following :          [×] Hemodynamic instability              1/8/2025  8:12 AM                  -- 1/8/2025  8:12 AM by Claribel David LPN --                                            (X) Hemodynamic instability, as indicated by  1 or more  of the following  (1) (2) (3) (4) (5) (6):                      (X) Vital sign abnormality not readily corrected by appropriate treatment, as indicated by  1 or more  of the following  [A]:                      (X) Tachycardia that persists despite appropriate treatment (eg, volume repletion, treatment of pain, treatment of underlying cause)              1/8/2025  8:12 AM                  -- 1/8/2025  8:12 AM by Claribel David LPN --                      -115          [×] Altered mental status              1/8/2025  8:12 AM                  -- 1/8/2025  8:12 AM by Claribel David LPN --                                            (X) Altered mental status (ie, different from baseline), as indicated by  1 or more  of the following  (1) (2) (3) (4):                      (X) Confusional state (eg, disorientation, difficulty following commands, deficit in attention)              1/8/2025  8:12 AM                  -- 1/8/2025  8:12 AM by Claribel David LPN --                      ED c/o acute progressive generalized weakness and confusion          [×] Clinically significant electrolyte abnormality that is severe  (eg, hyperkalemia with severe ECG findings) [E] or persists despite observation care              1/8/2025  8:12 AM                  -- 1/8/2025  8:12 AM by Claribel David LPN --                      POTASSIUM  6.2    MAG 2.6  CO2 16.9

## 2025-01-08 NOTE — SIGNIFICANT NOTE
SLP attempted to see patient for swallow evaluation this AM however family requested to allow patient to sleep. Will f/u 1/9 01/08/25 1325   OTHER   Discipline speech language pathologist

## 2025-01-08 NOTE — THERAPY EVALUATION
Patient Name: Ely Sims  : 1960    MRN: [id number]                              Today's Date: 2025       Admit Date: 2025    Visit Dx:     ICD-10-CM ICD-9-CM   1. Volume depletion  E86.9 276.50   2. Acute kidney injury (ALBER) with acute tubular necrosis (ATN)  N17.0 584.5   3. Hyperkalemia  E87.5 276.7   4. Metastatic malignant neoplasm, unspecified site  C79.9 199.1   5. Delirium  R41.0 780.09   6. Decreased activities of daily living (ADL)  Z78.9 V49.89     Patient Active Problem List   Diagnosis    Colon cancer metastasized to brain    Colonic mass    Anemia    Hyperglycemia    Hypertension    Rectal adenocarcinoma    Encounter for adjustment or management of vascular access device    Dehydration    Abdominal pain    Hypokalemia    Abnormal LFTs    Severe protein-calorie malnutrition    Anxiety    Acute kidney injury (ALBER) with acute tubular necrosis (ATN)    Hyperkalemia     Past Medical History:   Diagnosis Date    Metastasis to brain     Rectal carcinoma     Seasonal allergies      Past Surgical History:   Procedure Laterality Date    COLONOSCOPY N/A 2024    Procedure: COLONOSCOPY to 20cm with cold biopsies and needle tattoo;  Surgeon: Shadi Kaminski MD;  Location: [facility] ENDOSCOPY;  Service: Gastroenterology;  Laterality: N/A;  pre: abnormal imaging  post: poor prep, obstructing colon mass at 20cm, hemorrhoids    COLOSTOMY N/A 2024    Procedure: COLOSTOMY LAPAROSCOPIC;  Surgeon: Saturnino Chester MD;  Location: [facility] MAIN OR;  Service: General;  Laterality: N/A;    CRANIOTOMY N/A 2024    Procedure: Posterior fossa craniotomy for tumor using stereotactic guidance;  Surgeon: Bull Doty MD;  Location: [facility] MAIN OR;  Service: Neurosurgery;  Laterality: N/A;    ENDOMETRIAL ABLATION      TUBAL ABDOMINAL LIGATION      VENOUS ACCESS DEVICE (PORT) INSERTION Right 2024    Procedure: INSERTION VENOUS ACCESS DEVICE;  Surgeon: Saturnino Chester MD;   Location: [facility] MAIN OR;  Service: General;  Laterality: Right;      General Information       Row Name 01/08/25 1458          OT Time and Intention    Subjective Information complains of;weakness;fatigue  -ES     Document Type evaluation  -ES     Mode of Treatment individual therapy;occupational therapy  -ES     Patient Effort good  -ES       Row Name 01/08/25 1458          General Information    Patient Profile Reviewed yes  -ES     Prior Level of Function independent:;ADL's  Patient reports she is independent with ADLs, does require SPV in shower secondary to multiple recent falls. No device at baseline, has been utilizing RW last few weeks. Tub/shower, bathe seated in tub. No home O2 use. x3 falls in last week.  -ES     Existing Precautions/Restrictions fall  -ES     Barriers to Rehab medically complex  -ES       Row Name 01/08/25 1458          Living Environment    People in Home significant other  -ES       Row Name 01/08/25 1458          Home Main Entrance    Number of Stairs, Main Entrance five  -ES       Row Name 01/08/25 1458          Stairs Within Home, Primary    Stairs, Within Home, Primary non essential flight of stairs where laundry room is located. All primary needs on main level, spouse reports he is able to complete laundry for patient.  -ES       Row Name 01/08/25 1458          Cognition    Orientation Status (Cognition) oriented x 3  -ES       Row Name 01/08/25 1458          Safety Issues/Impairments Affecting Functional Mobility    Impairments Affecting Function (Mobility) balance;endurance/activity tolerance;strength  -ES               User Key  (r) = Recorded By, (t) = Taken By, (c) = Cosigned By      Initials Name Provider Type    ES Gabriella Ahumada, OTR/L, CSRS Occupational Therapist                     Mobility/ADL's       Row Name 01/08/25 1501          Bed Mobility    Bed Mobility bed mobility (all) activities  -ES     All Activities, [place] (Bed Mobility) not tested  -ES      Comment, (Bed Mobility) not assessed. Patient met seated edge of bed at OT arrival to room  -ES       Row Name 01/08/25 1501          Transfers    Transfers sit-stand transfer;stand-sit transfer  -ES     Comment, (Transfers) cueing provided for hand placement with ascent/descent to prevent patient falls.  -ES       Row Name 01/08/25 1501          Sit-Stand Transfer    Sit-Stand [place] (Transfers) contact guard;1 person assist  -ES     Assistive Device (Sit-Stand Transfers) other (see comments)  hand held assist x1  -ES       Row Name 01/08/25 1501          Stand-Sit Transfer    Stand-Sit [place] (Transfers) contact guard;1 person assist  -ES     Assistive Device (Stand-Sit Transfers) other (see comments)  hand held assist x1  -ES       Row Name 01/08/25 1501          Functional Mobility    Functional Mobility- Ind. Level contact guard assist;1 person  -ES     Functional Mobility- Device other (see comments)  handheld assist x1  -ES     Functional Mobility- Comment patient performs short distance functional mobility in room around bed to bed side recliner, CGA with handheld assist  -ES       Row Name 01/08/25 1501          Activities of Daily Living    BADL Assessment/Intervention lower body dressing  -ES       Row Name 01/08/25 1501          Lower Body Dressing Assessment/Training    [place] Level (Lower Body Dressing) lower body dressing skills;don;socks;standby assist  -ES     Position (Lower Body Dressing) edge of bed sitting  -ES               User Key  (r) = Recorded By, (t) = Taken By, (c) = Cosigned By      Initials Name Provider Type    Gabriella Wright, OTR/L, CSRS Occupational Therapist                   Obj/Interventions       Row Name 01/08/25 1505          Range of Motion Comprehensive    General Range of Motion bilateral upper extremity ROM WNL  -ES       Row Name 01/08/25 1505          Strength Comprehensive (MMT)    General Manual Muscle Testing (MMT) Assessment upper extremity  strength deficits identified;lower extremity strength deficits identified  -ES     Comment, General Manual Muscle Testing (MMT) Assessment generalized weakness  -ES       Row Name 01/08/25 1505          Upper Extremity (Manual Muscle Testing)    Comment, MMT: Upper Extremity BUEs 3/5  -ES       Row Name 01/08/25 1505          Motor Skills    Motor Skills functional endurance  -ES     Functional Endurance fair minus/poor plus  -ES       Row Name 01/08/25 1505          Balance    Balance Assessment sitting dynamic balance;standing dynamic balance  -ES     Dynamic Sitting Balance standby assist  -ES     Position, Sitting Balance unsupported;sitting edge of bed  -ES     Dynamic Standing Balance contact guard;1-person assist  -ES     Position/Device Used, Standing Balance supported;other (see comments)  hand held assist x1  -ES     Balance Interventions sitting;standing;sit to stand;supported  -ES               User Key  (r) = Recorded By, (t) = Taken By, (c) = Cosigned By      Initials Name Provider Type    ES Gabriella Ahumada, OTR/L, CSRS Occupational Therapist                   Goals/Plan       Row Name 01/08/25 1503          Bed Mobility Goal 1 (OT)    Activity/Assistive Device (Bed Mobility Goal 1, OT) bed mobility activities, all  -ES     [place] Level/Cues Needed (Bed Mobility Goal 1, OT) independent  -ES     Time Frame (Bed Mobility Goal 1, OT) short term goal (STG);2 weeks  -ES     Progress/Outcomes (Bed Mobility Goal 1, OT) new goal  -ES       Row Name 01/08/25 1507          Transfer Goal 1 (OT)    Activity/Assistive Device (Transfer Goal 1, OT) transfers, all  -ES     [place] Level/Cues Needed (Transfer Goal 1, OT) independent  -ES     Time Frame (Transfer Goal 1, OT) short term goal (STG);2 weeks  -ES     Progress/Outcome (Transfer Goal 1, OT) new goal  -ES       Row Name 01/08/25 1505          Bathing Goal 1 (OT)    Activity/Device (Bathing Goal 1, OT) bathing skills, all  -ES     [place]  Level/Cues Needed (Bathing Goal 1, OT) independent  -ES     Time Frame (Bathing Goal 1, OT) short term goal (STG);2 weeks  -ES     Progress/Outcomes (Bathing Goal 1, OT) new goal  -ES       Row Name 01/08/25 1509          Dressing Goal 1 (OT)    Activity/Device (Dressing Goal 1, OT) dressing skills, all  -ES     [place]/Cues Needed (Dressing Goal 1, OT) independent  -ES     Time Frame (Dressing Goal 1, OT) short term goal (STG);2 weeks  -ES     Progress/Outcome (Dressing Goal 1, OT) new goal  -ES       Row Name 01/08/25 1509          Toileting Goal 1 (OT)    Activity/Device (Toileting Goal 1, OT) toileting skills, all  -ES     [place] Level/Cues Needed (Toileting Goal 1, OT) independent  -ES     Time Frame (Toileting Goal 1, OT) short term goal (STG);2 weeks  -ES     Progress/Outcome (Toileting Goal 1, OT) new goal  -ES       Row Name 01/08/25 1509          Grooming Goal 1 (OT)    Activity/Device (Grooming Goal 1, OT) grooming skills, all  -ES     [place] (Grooming Goal 1, OT) independent  -ES     Time Frame (Grooming Goal 1, OT) short term goal (STG);2 weeks  -ES     Progress/Outcome (Grooming Goal 1, OT) new goal  -ES       Row Name 01/08/25 1502          Strength Goal 1 (OT)    Strength Goal 1 (OT) Pt will improve BUE strength to 4/5 muscle grade for increased UE strength required for ADL transfers and task completion  -ES     Time Frame (Strength Goal 1, OT) short term goal (STG);2 weeks  -ES     Progress/Outcome (Strength Goal 1, OT) new goal  -ES       Row Name 01/08/25 1504          Problem Specific Goal 1 (OT)    Problem Specific Goal 1 (OT) Patient will demonstrate fair plus task tolerance in preperation for independent ADLs and mobility at time of discharge  -ES     Time Frame (Problem Specific Goal 1, OT) short term goal (STG);2 weeks  -ES     Progress/Outcome (Problem Specific Goal 1, OT) new goal  -ES       Row Name 01/08/25 1505          Therapy Assessment/Plan (OT)    Planned Therapy  Interventions (OT) activity tolerance training;BADL retraining;functional balance retraining;occupation/activity based interventions;strengthening exercise;transfer/mobility retraining;patient/caregiver education/training  -ES               User Key  (r) = Recorded By, (t) = Taken By, (c) = Cosigned By      Initials Name Provider Type    ES Gabriella Ahumada, OTR/L, CSRS Occupational Therapist                   Clinical Impression       Row Name 01/08/25 1506          Pain Assessment    Pretreatment Pain Rating 5/10  -ES     Posttreatment Pain Rating 6/10  -ES     Pain Location abdomen  -ES     Pain Management Interventions nursing notified  -ES     Pre/Posttreatment Pain Comment patient with complaints of generalized abdominal pain. RN present in room and aware.  -ES       Row Name 01/08/25 1506          Plan of Care Review    Plan of Care Reviewed With patient;significant other  -ES     Progress no change  -ES     Outcome Evaluation Patient is 64 year old female admitted to [facility] on 1/7/2025 with reports of worsening abdominal pain, nausea, vomiting, and decreased PO intake. Patient PMH significant for metastatic colon cancer with progressive decline.  At baseline, patient reports she is independent with ADLs, and does not use a device for functional mobility. However, has been using a RW last two weeks secondary to three falls at home. Today, patient requires CGA with HHA for all transfers and mobility, and min A for LB ADLs. Patient demonstrates severe generalized weakness, UEs 3/5.  Patient presents with decline from baseline status, demonstrating deficits related to balance, strength, tolerance, trasnfers and mobility that impede patient independence with activities of daily living. Patient would benefit from skilled Occupational Therapy intervention to maximize patient safety and promote return to baseline independence. Recommend OP therapy services at time of discharge from hospital setting.  -ES        Row Name 01/08/25 1506          Therapy Assessment/Plan (OT)    Rehab Potential (OT) good  -ES     Criteria for Skilled Therapeutic Interventions Met (OT) yes;meets criteria;skilled treatment is necessary  -ES     Therapy Frequency (OT) 5 times/wk  -ES       Row Name 01/08/25 1506          Therapy Plan Review/Discharge Plan (OT)    Anticipated Discharge Disposition (OT) home with outpatient therapy services  -ES       Row Name 01/08/25 1506          Vital Signs    O2 Delivery Pre Treatment room air  -ES     O2 Delivery Intra Treatment room air  -ES     O2 Delivery Post Treatment room air  -ES       Row Name 01/08/25 1506          Positioning and Restraints    Pre-Treatment Position in bed  -ES     Post Treatment Position chair  -ES     In Chair sitting;encouraged to call for assist;call light within reach;exit alarm on;notified nsg;with family/caregiver  -ES               User Key  (r) = Recorded By, (t) = Taken By, (c) = Cosigned By      Initials Name Provider Type    Gabriella Wright, OTR/L, CSRS Occupational Therapist                   Outcome Measures       Row Name 01/08/25 1510          How much help from another is currently needed...    Putting on and taking off regular lower body clothing? 3  -ES     Bathing (including washing, rinsing, and drying) 3  -ES     Toileting (which includes using toilet bed pan or urinal) 3  -ES     Putting on and taking off regular upper body clothing 4  -ES     Taking care of personal grooming (such as brushing teeth) 4  -ES     Eating meals 4  -ES     AM-PAC 6 Clicks Score (OT) 21  -ES       Row Name 01/08/25 1510          Functional Assessment    Outcome Measure Options AM-PAC 6 Clicks Daily Activity (OT)  -ES               User Key  (r) = Recorded By, (t) = Taken By, (c) = Cosigned By      Initials Name Provider Type    Gabriella Wright, OTR/L, CSRS Occupational Therapist                    Occupational Therapy Education        No education to display                  OT  Recommendation and Plan  Planned Therapy Interventions (OT): activity tolerance training, BADL retraining, functional balance retraining, occupation/activity based interventions, strengthening exercise, transfer/mobility retraining, patient/caregiver education/training  Therapy Frequency (OT): 5 times/wk  Plan of Care Review  Plan of Care Reviewed With: patient, significant other  Progress: no change  Outcome Evaluation: Patient is 64 year old female admitted to [facility] on 1/7/2025 with reports of worsening abdominal pain, nausea, vomiting, and decreased PO intake. Patient PMH significant for metastatic colon cancer with progressive decline.  At baseline, patient reports she is independent with ADLs, and does not use a device for functional mobility. However, has been using a RW last two weeks secondary to three falls at home. Today, patient requires CGA with HHA for all transfers and mobility, and min A for LB ADLs. Patient demonstrates severe generalized weakness, UEs 3/5.  Patient presents with decline from baseline status, demonstrating deficits related to balance, strength, tolerance, trasnfers and mobility that impede patient independence with activities of daily living. Patient would benefit from skilled Occupational Therapy intervention to maximize patient safety and promote return to baseline independence. Recommend OP therapy services at time of discharge from hospital setting.     Time Calculation:   Evaluation Complexity (OT)  Review Occupational Profile/Medical/Therapy History Complexity: expanded/moderate complexity  Assessment, Occupational Performance/Identification of Deficit Complexity: 3-5 performance deficits  Clinical Decision Making Complexity (OT): detailed assessment/moderate complexity  Overall Complexity of Evaluation (OT): moderate complexity     Time Calculation- OT       Row Name 01/08/25 1511             Time Calculation- OT    OT Start Time 1406  -ES      OT Stop Time 1430  -ES       OT Time Calculation (min) 24 min  -ES      Total Timed Code Minutes- OT 14 minute(s)  -ES      OT Received On 01/08/25  -ES      OT - Next Appointment 01/09/25  -ES      OT Goal Re-Cert Due Date 01/22/25  -ES         Timed Charges    22560 - OT Therapeutic Activity Minutes 14  -ES         Untimed Charges    OT Eval/Re-eval Minutes 10  -ES         Total Minutes    Timed Charges Total Minutes 14  -ES      Untimed Charges Total Minutes 10  -ES       Total Minutes 24  -ES                User Key  (r) = Recorded By, (t) = Taken By, (c) = Cosigned By      Initials Name Provider Type    ES Gabriella Ahumada, OTR/L, CSRS Occupational Therapist                  Therapy Charges for Today       Code Description Service Date Service Provider Modifiers Qty    24937381400 HC OT THERAPEUTIC ACT EA 15 MIN 1/8/2025 Gabriella Ahumada, OTR/L, CSRS GO 1    43308301496 HC OT EVAL LOW COMPLEXITY 2 1/8/2025 Gabriella Ahumada OTR/L, CSRS GO 1                 Gabriella Ahumada OTR/L, CSRS  1/8/2025

## 2025-01-08 NOTE — PROGRESS NOTES
[facility] Progress Note      Patient Name: Ely Sims  : 1960  MRN: [id number]  Primary Care Physician:  Provider, No Known  Date of admission: 2025    Subjective     Interval History:   Follow-up acute kidney injury.  IV fluid intake not recorded.  Urine output 525.  Fonseca catheter removed for discomfort this morning.  Voided a small amount since then.  Boyfriend at bedside.  Review of Systems:   As noted above    Objective     Vitals:   Temp:  [97.7 °F (36.5 °C)-98.4 °F (36.9 °C)] 97.9 °F (36.6 °C)  Heart Rate:  [] 93  Resp:  [16] 16  BP: (113-134)/() 134/86    Intake/Output Summary (Last 24 hours) at 2025 1344  Last data filed at 2025 1100  Gross per 24 hour   Intake 418 ml   Output 525 ml   Net -107 ml       Physical Exam:    General: Sitting up in the chair.  Very frail.  Answers yes/no questions.  Neck: Left subclavian port.  Heart: Regular rate and rhythm no S3 or rub, tachycardic  Lungs: Clear to auscultation bilaterally.  Abdomen: Positive bowel sounds firm nontender.  Extremities: No edema  Skin: No rash.  Scheduled Meds:     buPROPion XL, 150 mg, Oral, QAM  fentaNYL, 1 patch, Transdermal, Q72H   And  [START ON 2025] Check Fentanyl Patch Placement, 1 each, Not Applicable, Q12H  dexAMETHasone, 4 mg, Oral, BID  fluconazole, 200 mg, Intravenous, Q24H  melatonin, 5 mg, Oral, Nightly  Menthol-Zinc Oxide, 1 Application, Topical, Q12H  polyethylene glycol, 17 g, Oral, Daily  sennosides-docusate, 2 tablet, Oral, Daily  sodium chloride, 10 mL, Intravenous, Q12H  sodium chloride, 10 mL, Intravenous, Q12H  traZODone, 12.5 mg, Oral, Nightly      IV Meds:   sodium chloride 0.45 % 900 mL with sodium bicarbonate 8.4 % 100 mEq infusion, , Last Rate: 100 mL/hr at 25 0109        Results Reviewed:   I have personally reviewed the results from the time of this admission to 2025 13:44 EST     Results from last 7 days   Lab Units 25  0523  01/07/25  1803 01/07/25  1039   SODIUM mmol/L 134* 133* 130*   POTASSIUM mmol/L 3.9 5.1 6.2*   CHLORIDE mmol/L 95* 96* 95*   CO2 mmol/L 27.0 22.6 16.9*   BUN mg/dL 58* 67* 70*   CREATININE mg/dL 2.42* 3.11* 3.43*   CALCIUM mg/dL 9.4 9.5 10.4   BILIRUBIN mg/dL 0.8  --  0.9   ALK PHOS U/L 970*  --  861*   ALT (SGPT) U/L 35*  --  30   AST (SGOT) U/L 50*  --  54*   GLUCOSE mg/dL 101* 122* 118*       Estimated Creatinine Clearance: 19 mL/min (A) (by C-G formula based on SCr of 2.42 mg/dL (H)).    Results from last 7 days   Lab Units 01/08/25  0523 01/07/25  1803 01/07/25  1039   MAGNESIUM mg/dL 2.4  --  2.6*   PHOSPHORUS mg/dL 3.1 4.4  --        Results from last 7 days   Lab Units 01/08/25  0523   URIC ACID mg/dL 16.7*       Results from last 7 days   Lab Units 01/08/25  0523 01/07/25  1039   WBC 10*3/mm3 12.36* 15.38*   HEMOGLOBIN g/dL 14.8 14.5   PLATELETS 10*3/mm3 149 184             Assessment / Plan     ASSESSMENT:  Acute kidney injury likely due to volume depletion.  Poor p.o. intake in the setting of Lasix and potassium supplementation.  Complicated by hyperkalemia.  Waste products improved today.  Potassium down to 3.9. Continue IVF.  Metastatic colon cancer refractory to therapy.  Progressive disease on last CT scan in December 24.  Discussed with Dr. Saunders.  Hyperuricemia due to volume depletion.  4.  Elevated TSH.  Dr. Karimi addressing.  5.  Elevated transaminases.  6.  Oral thrush mucositis being treated with IV Diflucan.    PLAN:  Continue IV fluids for now.  2.  Discussed with patient and significant other at bedside.  3.  Discussed with Dr. Saunders.    Thank you for involving us in the care of Ely Sims.  Please feel free to call with any questions.    Margaret Pinedo MD  01/08/25  13:44 EST    [facility]  [phone]    Please note that portions of this note were completed with a voice recognition program.

## 2025-01-08 NOTE — CASE MANAGEMENT/SOCIAL WORK
Continued Stay Note  [facility]     Patient Name: Ely Sims  MRN: [id number]  Today's Date: 1/8/2025    Admit Date: 1/7/2025    Plan: CCP following for needs.  CARLOS Otoole RN   Discharge Plan       Row Name 01/08/25 1030       Plan    Plan CCP following for needs.  CARLOS Otoole RN    Patient/Family in Agreement with Plan yes    Plan Comments FACE SHEET VERIFIED.  Spoke with pt and pt's son (Elia Cadena [phone]), daughter (Tiffany Cadena [phone]) and sister ( Aziza Yun) at bedside. Pt does not have a PCP .  Pt sees Dr. Saunders at [facility].  Pt lives with her Significant Other ( Ubaldo Caes [phone]) in a single story house. Pt is independent with ADLs.  Pt has a rolling walker for home use if needed. Pt gets her prescriptions at [facility] on [facility].  Pt denies any issues affording medications. Pt is current with [facility].  Leigha ([id number]) will follow. Pt has not been in SNF.  Pt has been declining with not eating since Dec. CCP following for needs.  CARLOS Otoole RN                   Discharge Codes    No documentation.                       Fidelina Otoole RN

## 2025-01-08 NOTE — NURSING NOTE
Reason for Visit: CWCN: Consultation received to see patient with skin issue on buttocks. Patient does not respond clearly to questions, family and friend at bedside, with a medical history of metastatic colon cancer to brain, currently undergoing chemotherapy, hypertension and protein calorie malnutrition who presented to the ED c/o acute progressive generalized weakness and confusion . Patient is unable to provide any significant elements of history. She had a recent admission in December for volume depletion and confusion, stated abdominal pain.  Upon assessment, excoriated, reddened skin and partial-thickness skin loss was observed on the left buttocks, over soft tissue, no pressure injury. Coccyx intact skin and normal coloration.  Bilateral heels noted red but bleachable.  In addition we were able to see Colostomy. Ostomy appliance remains intact, no leaks, Santi 2-piece, 2 3/4, pouch closed. According to a friend at bedside the appliance was changed yesterday, it is full of supplies, they don't need it.  She was independent but now she needs help.  According to the friend at bedside and floor nurse it is possible that she will be transferred to palliative care.  Treatment Plan/Recommendations: Calmoseptine ointment was ordered to Sacrococcygeal and Coccyx area.  Wound care order and pressure ulcer preventives  measures have been implemented into [facility].   Specialty mattress, Centrella Pro + for adequate pressure redistribution and pressure relief, will be requested, unit secretary will call.  Protective padding should be used to facilitate cushioning to bilateral Heels, they must remain off-load.  She is at risk for further skin problems, is contracted, impaired mobility and is incontinent, repositioning her every two hours and minimizing any skin contact with urine or stool would be beneficial.  Wound Team Follow up Plan: WCN will follow up her 2 time a wk to Ostomy care and  according to her needs.

## 2025-01-08 NOTE — PROGRESS NOTES
Name: Ely Sims ADMIT: 2025   : 1960  PCP: Provider, No Known    MRN: [id number] LOS: 1 days   AGE/SEX: 64 y.o. female  ROOM: [facility]/     Subjective   Subjective   Not speaking much. Sitting on the side of the bed. Family members at bedside (x 3) states she ate a little bit drank a little bit. She is complaining of sore throat. No mouth pain. Family reports mentally she is 50-50. Did not sleep last night.     Objective   Objective   Vital Signs  Temp:  [96.8 °F (36 °C)-98.4 °F (36.9 °C)] 97.7 °F (36.5 °C)  Heart Rate:  [] 109  Resp:  [16-17] 16  BP: (104-128)/() 121/83  SpO2:  [95 %-100 %] 100 %  on   ;   Device (Oxygen Therapy): room air  Body mass index is 17.73 kg/m².    Physical Exam  Constitutional:       General: She is not in acute distress.     Appearance: She is underweight. She is ill-appearing (frail). She is not toxic-appearing.   HENT:      Head: Normocephalic and atraumatic.      Mouth/Throat:      Mouth: Mucous membranes are dry.   Cardiovascular:      Rate and Rhythm: Normal rate and regular rhythm.   Pulmonary:      Effort: Pulmonary effort is normal. No respiratory distress.      Breath sounds: No wheezing or rhonchi.   Abdominal:      Palpations: Abdomen is soft.      Tenderness: There is no abdominal tenderness. There is no guarding or rebound.   Musculoskeletal:         General: No swelling.   Skin:     General: Skin is warm and dry.     Results Review  I reviewed the patient's new clinical results.  Results from last 7 days   Lab Units 25  0523 25  1039   WBC 10*3/mm3 12.36* 15.38*   HEMOGLOBIN g/dL 14.8 14.5   PLATELETS 10*3/mm3 149 184     Results from last 7 days   Lab Units 25  0523 25  1803 25  1039   SODIUM mmol/L 134* 133* 130*   POTASSIUM mmol/L 3.9 5.1 6.2*   CHLORIDE mmol/L 95* 96* 95*   CO2 mmol/L 27.0 22.6 16.9*   BUN mg/dL 58* 67* 70*   CREATININE mg/dL 2.42* 3.11* 3.43*   GLUCOSE mg/dL 101* 122* 118*     Lab Results    Component Value Date    ANIONGAP 12.0 01/08/2025     Estimated Creatinine Clearance: 19 mL/min (A) (by C-G formula based on SCr of 2.42 mg/dL (H)).   Lab Results   Component Value Date    EGFR 21.8 (L) 01/08/2025     Results from last 7 days   Lab Units 01/08/25  0523 01/07/25  1803 01/07/25  1039   ALBUMIN g/dL 3.5 3.5 3.7   BILIRUBIN mg/dL 0.8  --  0.9   ALK PHOS U/L 970*  --  861*   AST (SGOT) U/L 50*  --  54*   ALT (SGPT) U/L 35*  --  30     Results from last 7 days   Lab Units 01/08/25 0523 01/07/25  1803 01/07/25  1039   CALCIUM mg/dL 9.4 9.5 10.4   ALBUMIN g/dL 3.5 3.5 3.7   MAGNESIUM mg/dL 2.4  --  2.6*   PHOSPHORUS mg/dL 3.1 4.4  --      Results from last 7 days   Lab Units 01/07/25  1258   PROCALCITONIN ng/mL 1.00*   LACTATE mmol/L 1.8     Glucose   Date/Time Value Ref Range Status   01/07/2025 1259 123 70 - 130 mg/dL Final       No radiology results for the last day    Scheduled Meds  buPROPion XL, 150 mg, Oral, QAM  fentaNYL, 1 patch, Transdermal, Q72H   And  Check Fentanyl Patch Placement, 1 each, Not Applicable, Q12H  melatonin, 5 mg, Oral, Nightly  polyethylene glycol, 17 g, Oral, Daily  sennosides-docusate, 2 tablet, Oral, Daily  sodium chloride, 10 mL, Intravenous, Q12H  sodium chloride, 10 mL, Intravenous, Q12H    Continuous Infusions  sodium chloride 0.45 % 900 mL with sodium bicarbonate 8.4 % 100 mEq infusion, , Last Rate: 100 mL/hr at 01/08/25 0109    PRN Meds    acetaminophen **OR** acetaminophen **OR** acetaminophen    ALPRAZolam    heparin    HYDROcodone-acetaminophen    ondansetron    ondansetron ODT    sodium chloride    sodium chloride    sodium chloride    sodium chloride    sodium chloride    sodium chloride 0.45 % 900 mL with sodium bicarbonate 8.4 % 100 mEq infusion, , Last Rate: 100 mL/hr at 01/08/25 0109    Diet  Diet: Cardiac; Healthy Heart (2-3 Na+); Fluid Consistency: Thin (IDDSI 0)       Assessment/Plan     Active Hospital Problems    Diagnosis  POA    **Acute kidney injury  (ALBER) with acute tubular necrosis (ATN) [N17.0]  Yes    Hyperkalemia [E87.5]  Yes    Anxiety [F41.9]  Yes    Severe protein-calorie malnutrition [E43]  Yes    Abdominal pain [R10.9]  Yes    Dehydration [E86.0]  Yes    Hypertension [I10]  Yes    Colon cancer metastasized to brain [C18.9, C79.31]  Yes      Resolved Hospital Problems   No resolved problems to display.     Patient is a 64 y.o. female metastatic colon cancer and progressive decline. Nausea and vomiting decreased intake difficulty swallowing. Worsening abdominal pain.    ALBER  Hyperkalemia  Metabolic acidosis  Likely prerenal due to dehydration and decreased p.o. intake (nausea, dysphagia) and diuretics  Creatinine improved with IV fluids   ARB and K held. Received diuretics for hyperkalemia  IVF with bicarb per nephrology    Metastatic colon cancer   Ongoing abdominal pain  Had a CT last month with progression of pulmonary mets, liver mets, lymphadenopathy  Increase fentanyl. Continue pain medications (+prn morphine), bowel medications  Continue steroids. If not able to take PO change to IV   Oncology consulted    Underweight, malnutrition  Patient would not want feeding tube. Family present confirms  Add on cortisol to this am labs (on steroids)    Elevated TSH  6.180 3 weeks ago, now 11.30.   Difficult to interpret with illness and symptoms not specifically hypothyroid    Elevated procalcitonin  No fever  Due to malignancy? ALBER?  Will check blood cultures and chest XR    Possible thrush  Dysphagia  IV diflucan empirically treat esophageal candidiasis (painful swallowing being outpatient treated for thrush)    Insomnia   try small dose of trazodone for sleep    DVT prophylaxis  SCDs    Discharge  TBD. Will start looking at SNF depending on goals of care. Briefly mentioned palliative care. Family to discuss with oncology  Expected discharge date/ time has not been documented.    Discussed with patient, family, and nursing staff    Frank Karimi,  MD [facility]  01/08/25 10:27 EST

## 2025-01-09 LAB
ALBUMIN SERPL-MCNC: 2.9 G/DL (ref 3.5–5.2)
ALBUMIN/GLOB SERPL: 0.7 G/DL
ALP SERPL-CCNC: 1041 U/L (ref 39–117)
ALT SERPL W P-5'-P-CCNC: 38 U/L (ref 1–33)
ANION GAP SERPL CALCULATED.3IONS-SCNC: 12 MMOL/L (ref 5–15)
AST SERPL-CCNC: 60 U/L (ref 1–32)
BILIRUB SERPL-MCNC: 0.9 MG/DL (ref 0–1.2)
BUN SERPL-MCNC: 35 MG/DL (ref 8–23)
BUN/CREAT SERPL: 25.7 (ref 7–25)
CALCIUM SPEC-SCNC: 9.4 MG/DL (ref 8.6–10.5)
CHLORIDE SERPL-SCNC: 94 MMOL/L (ref 98–107)
CO2 SERPL-SCNC: 32 MMOL/L (ref 22–29)
CREAT SERPL-MCNC: 1.36 MG/DL (ref 0.57–1)
DEPRECATED RDW RBC AUTO: 59.1 FL (ref 37–54)
EGFRCR SERPLBLD CKD-EPI 2021: 43.6 ML/MIN/1.73
ERYTHROCYTE [DISTWIDTH] IN BLOOD BY AUTOMATED COUNT: 16.6 % (ref 12.3–15.4)
GLOBULIN UR ELPH-MCNC: 4 GM/DL
GLUCOSE SERPL-MCNC: 123 MG/DL (ref 65–99)
HCT VFR BLD AUTO: 42.6 % (ref 34–46.6)
HGB BLD-MCNC: 13.8 G/DL (ref 12–15.9)
MAGNESIUM SERPL-MCNC: 2.1 MG/DL (ref 1.6–2.4)
MCH RBC QN AUTO: 31.3 PG (ref 26.6–33)
MCHC RBC AUTO-ENTMCNC: 32.4 G/DL (ref 31.5–35.7)
MCV RBC AUTO: 96.6 FL (ref 79–97)
PHOSPHATE SERPL-MCNC: 2.5 MG/DL (ref 2.5–4.5)
PLATELET # BLD AUTO: 156 10*3/MM3 (ref 140–450)
PMV BLD AUTO: 10.3 FL (ref 6–12)
POTASSIUM SERPL-SCNC: 4.4 MMOL/L (ref 3.5–5.2)
PROT SERPL-MCNC: 6.9 G/DL (ref 6–8.5)
RBC # BLD AUTO: 4.41 10*6/MM3 (ref 3.77–5.28)
SODIUM SERPL-SCNC: 138 MMOL/L (ref 136–145)
WBC NRBC COR # BLD AUTO: 9.37 10*3/MM3 (ref 3.4–10.8)

## 2025-01-09 PROCEDURE — 25010000002 FLUCONAZOLE PER 200 MG: Performed by: HOSPITALIST

## 2025-01-09 PROCEDURE — 92610 EVALUATE SWALLOWING FUNCTION: CPT

## 2025-01-09 PROCEDURE — 80053 COMPREHEN METABOLIC PANEL: CPT | Performed by: HOSPITALIST

## 2025-01-09 PROCEDURE — 83735 ASSAY OF MAGNESIUM: CPT | Performed by: HOSPITALIST

## 2025-01-09 PROCEDURE — 84100 ASSAY OF PHOSPHORUS: CPT | Performed by: HOSPITALIST

## 2025-01-09 PROCEDURE — 85027 COMPLETE CBC AUTOMATED: CPT | Performed by: HOSPITALIST

## 2025-01-09 PROCEDURE — 63710000001 DEXAMETHASONE PER 0.25 MG: Performed by: HOSPITALIST

## 2025-01-09 PROCEDURE — 99232 SBSQ HOSP IP/OBS MODERATE 35: CPT | Performed by: INTERNAL MEDICINE

## 2025-01-09 RX ORDER — DIPHENHYDRAMINE HYDROCHLORIDE 50 MG/ML
25 INJECTION INTRAMUSCULAR; INTRAVENOUS EVERY 6 HOURS PRN
Status: DISCONTINUED | OUTPATIENT
Start: 2025-01-09 | End: 2025-01-17 | Stop reason: HOSPADM

## 2025-01-09 RX ORDER — MORPHINE SULFATE 20 MG/ML
20 SOLUTION ORAL
Status: DISCONTINUED | OUTPATIENT
Start: 2025-01-09 | End: 2025-01-17 | Stop reason: HOSPADM

## 2025-01-09 RX ORDER — GLYCOPYRROLATE 0.2 MG/ML
0.2 INJECTION INTRAMUSCULAR; INTRAVENOUS
Status: DISCONTINUED | OUTPATIENT
Start: 2025-01-09 | End: 2025-01-17 | Stop reason: HOSPADM

## 2025-01-09 RX ORDER — LORAZEPAM 2 MG/ML
2 INJECTION INTRAMUSCULAR
Status: ACTIVE | OUTPATIENT
Start: 2025-01-09 | End: 2025-01-14

## 2025-01-09 RX ORDER — LORAZEPAM 2 MG/ML
1 INJECTION INTRAMUSCULAR
Status: ACTIVE | OUTPATIENT
Start: 2025-01-09 | End: 2025-01-14

## 2025-01-09 RX ORDER — LORAZEPAM 2 MG/ML
0.5 INJECTION INTRAMUSCULAR
Status: ACTIVE | OUTPATIENT
Start: 2025-01-09 | End: 2025-01-14

## 2025-01-09 RX ORDER — DIPHENOXYLATE HYDROCHLORIDE AND ATROPINE SULFATE 2.5; .025 MG/1; MG/1
1 TABLET ORAL
Status: DISCONTINUED | OUTPATIENT
Start: 2025-01-09 | End: 2025-01-17 | Stop reason: HOSPADM

## 2025-01-09 RX ORDER — ACETAMINOPHEN 160 MG/5ML
650 SOLUTION ORAL EVERY 4 HOURS PRN
Status: DISCONTINUED | OUTPATIENT
Start: 2025-01-09 | End: 2025-01-17 | Stop reason: HOSPADM

## 2025-01-09 RX ORDER — MORPHINE SULFATE 20 MG/ML
10 SOLUTION ORAL
Status: DISCONTINUED | OUTPATIENT
Start: 2025-01-09 | End: 2025-01-17 | Stop reason: HOSPADM

## 2025-01-09 RX ORDER — ACETAMINOPHEN 325 MG/1
650 TABLET ORAL EVERY 4 HOURS PRN
Status: DISCONTINUED | OUTPATIENT
Start: 2025-01-09 | End: 2025-01-17 | Stop reason: HOSPADM

## 2025-01-09 RX ORDER — LORAZEPAM 2 MG/ML
1 CONCENTRATE ORAL
Status: ACTIVE | OUTPATIENT
Start: 2025-01-09 | End: 2025-01-14

## 2025-01-09 RX ORDER — MORPHINE SULFATE 2 MG/ML
4 INJECTION, SOLUTION INTRAMUSCULAR; INTRAVENOUS
Status: DISCONTINUED | OUTPATIENT
Start: 2025-01-09 | End: 2025-01-17 | Stop reason: HOSPADM

## 2025-01-09 RX ORDER — ACETAMINOPHEN 650 MG/1
650 SUPPOSITORY RECTAL EVERY 4 HOURS PRN
Status: DISCONTINUED | OUTPATIENT
Start: 2025-01-09 | End: 2025-01-17 | Stop reason: HOSPADM

## 2025-01-09 RX ORDER — LORAZEPAM 2 MG/ML
0.5 INJECTION INTRAMUSCULAR
Status: DISPENSED | OUTPATIENT
Start: 2025-01-09 | End: 2025-01-14

## 2025-01-09 RX ORDER — MORPHINE SULFATE 2 MG/ML
2 INJECTION, SOLUTION INTRAMUSCULAR; INTRAVENOUS
Status: DISCONTINUED | OUTPATIENT
Start: 2025-01-09 | End: 2025-01-17 | Stop reason: HOSPADM

## 2025-01-09 RX ORDER — LORAZEPAM 2 MG/ML
2 CONCENTRATE ORAL
Status: ACTIVE | OUTPATIENT
Start: 2025-01-09 | End: 2025-01-14

## 2025-01-09 RX ORDER — GLYCOPYRROLATE 0.2 MG/ML
0.4 INJECTION INTRAMUSCULAR; INTRAVENOUS
Status: DISCONTINUED | OUTPATIENT
Start: 2025-01-09 | End: 2025-01-17 | Stop reason: HOSPADM

## 2025-01-09 RX ORDER — MORPHINE SULFATE 2 MG/ML
6 INJECTION, SOLUTION INTRAMUSCULAR; INTRAVENOUS
Status: DISCONTINUED | OUTPATIENT
Start: 2025-01-09 | End: 2025-01-17 | Stop reason: HOSPADM

## 2025-01-09 RX ORDER — HYDROMORPHONE HYDROCHLORIDE 1 MG/ML
0.5 INJECTION, SOLUTION INTRAMUSCULAR; INTRAVENOUS; SUBCUTANEOUS
Status: DISCONTINUED | OUTPATIENT
Start: 2025-01-09 | End: 2025-01-17 | Stop reason: HOSPADM

## 2025-01-09 RX ORDER — KETOROLAC TROMETHAMINE 15 MG/ML
15 INJECTION, SOLUTION INTRAMUSCULAR; INTRAVENOUS EVERY 6 HOURS PRN
Status: CANCELLED | OUTPATIENT
Start: 2025-01-09 | End: 2025-01-14

## 2025-01-09 RX ORDER — ECHINACEA PURPUREA EXTRACT 125 MG
1 TABLET ORAL AS NEEDED
Status: DISCONTINUED | OUTPATIENT
Start: 2025-01-09 | End: 2025-01-17 | Stop reason: HOSPADM

## 2025-01-09 RX ORDER — LORAZEPAM 2 MG/ML
0.5 CONCENTRATE ORAL
Status: DISPENSED | OUTPATIENT
Start: 2025-01-09 | End: 2025-01-14

## 2025-01-09 RX ORDER — DIPHENHYDRAMINE HCL 25 MG
25 CAPSULE ORAL EVERY 6 HOURS PRN
Status: DISCONTINUED | OUTPATIENT
Start: 2025-01-09 | End: 2025-01-17 | Stop reason: HOSPADM

## 2025-01-09 RX ORDER — MORPHINE SULFATE 20 MG/ML
5 SOLUTION ORAL
Status: DISCONTINUED | OUTPATIENT
Start: 2025-01-09 | End: 2025-01-17 | Stop reason: HOSPADM

## 2025-01-09 RX ADMIN — POLYETHYLENE GLYCOL 3350 17 G: 17 POWDER, FOR SOLUTION ORAL at 08:31

## 2025-01-09 RX ADMIN — DEXAMETHASONE 4 MG: 4 TABLET ORAL at 08:31

## 2025-01-09 RX ADMIN — Medication 5 MG: at 20:32

## 2025-01-09 RX ADMIN — Medication 10 ML: at 01:25

## 2025-01-09 RX ADMIN — HYDROCODONE BITARTRATE AND ACETAMINOPHEN 1 TABLET: 10; 325 TABLET ORAL at 16:01

## 2025-01-09 RX ADMIN — Medication 10 ML: at 08:32

## 2025-01-09 RX ADMIN — DEXAMETHASONE 4 MG: 4 TABLET ORAL at 01:25

## 2025-01-09 RX ADMIN — TRAZODONE HYDROCHLORIDE 12.5 MG: 50 TABLET ORAL at 20:32

## 2025-01-09 RX ADMIN — ANORECTAL OINTMENT 1 APPLICATION: 15.7; .44; 24; 20.6 OINTMENT TOPICAL at 20:32

## 2025-01-09 RX ADMIN — DEXAMETHASONE 4 MG: 4 TABLET ORAL at 20:32

## 2025-01-09 RX ADMIN — Medication 5 MG: at 01:25

## 2025-01-09 RX ADMIN — SENNOSIDES AND DOCUSATE SODIUM 2 TABLET: 50; 8.6 TABLET ORAL at 08:31

## 2025-01-09 RX ADMIN — TRAZODONE HYDROCHLORIDE 12.5 MG: 50 TABLET ORAL at 01:25

## 2025-01-09 RX ADMIN — ANORECTAL OINTMENT 1 APPLICATION: 15.7; .44; 24; 20.6 OINTMENT TOPICAL at 01:25

## 2025-01-09 RX ADMIN — Medication 10 ML: at 20:32

## 2025-01-09 RX ADMIN — FLUCONAZOLE IN SODIUM CHLORIDE 200 MG: 2 INJECTION, SOLUTION INTRAVENOUS at 11:49

## 2025-01-09 RX ADMIN — HYDROCODONE BITARTRATE AND ACETAMINOPHEN 1 TABLET: 10; 325 TABLET ORAL at 11:49

## 2025-01-09 RX ADMIN — Medication 10 ML: at 20:33

## 2025-01-09 RX ADMIN — SODIUM BICARBONATE: 84 INJECTION, SOLUTION INTRAVENOUS at 02:44

## 2025-01-09 RX ADMIN — ANORECTAL OINTMENT 1 APPLICATION: 15.7; .44; 24; 20.6 OINTMENT TOPICAL at 08:32

## 2025-01-09 RX ADMIN — BUPROPION HYDROCHLORIDE 150 MG: 150 TABLET, EXTENDED RELEASE ORAL at 08:31

## 2025-01-09 NOTE — PLAN OF CARE
Goal Outcome Evaluation:     Patient alert but confused. Palliative care order set placed, transfer orders placed. Norco given as needed for abdominal pain. Failed bedside swallow study, plan for video swallow study tomorrow, NPO until then. IVF stopped per neph orders, secure chat and page out d/t NPO status, MD notified awaiting orders. Plan of care ongoing.

## 2025-01-09 NOTE — PROGRESS NOTES
Name: Ely Sims ADMIT: 2025   : 1960  PCP: Provider, No Known    MRN: [id number] LOS: 2 days   AGE/SEX: 64 y.o. female  ROOM: 59/     Subjective   Subjective   Sitting on the side of the bed. She states her throat feels better.     Objective   Objective   Vital Signs  Temp:  [97.7 °F (36.5 °C)-98.2 °F (36.8 °C)] 98.2 °F (36.8 °C)  Heart Rate:  [93-98] 98  Resp:  [16] 16  BP: (119-134)/(84-89) 127/84  SpO2:  [97 %] 97 %  on   ;   Device (Oxygen Therapy): room air  Body mass index is 18.76 kg/m².    Physical Exam  Constitutional:       General: She is not in acute distress.     Appearance: She is underweight. She is ill-appearing (frail). She is not toxic-appearing.   HENT:      Head: Normocephalic and atraumatic.      Mouth/Throat:      Mouth: Mucous membranes are moist.   Cardiovascular:      Rate and Rhythm: Normal rate and regular rhythm.   Pulmonary:      Effort: Pulmonary effort is normal. No respiratory distress.      Breath sounds: No wheezing or rhonchi.   Abdominal:      Palpations: Abdomen is soft.      Tenderness: There is no abdominal tenderness. There is no guarding or rebound.   Musculoskeletal:         General: No swelling.   Skin:     General: Skin is warm and dry.     Results Review  I reviewed the patient's new clinical results.  Results from last 7 days   Lab Units 25  0622 25  0523 25  1039   WBC 10*3/mm3 9.37 12.36* 15.38*   HEMOGLOBIN g/dL 13.8 14.8 14.5   PLATELETS 10*3/mm3 156 149 184     Results from last 7 days   Lab Units 25  0622 25  0523 25  1803 25  1039   SODIUM mmol/L 138 134* 133* 130*   POTASSIUM mmol/L 4.4 3.9 5.1 6.2*   CHLORIDE mmol/L 94* 95* 96* 95*   CO2 mmol/L 32.0* 27.0 22.6 16.9*   BUN mg/dL 35* 58* 67* 70*   CREATININE mg/dL 1.36* 2.42* 3.11* 3.43*   GLUCOSE mg/dL 123* 101* 122* 118*     Lab Results   Component Value Date    ANIONGAP 12.0 2025     Estimated Creatinine Clearance: 35.8 mL/min (A) (by C-G formula  based on SCr of 1.36 mg/dL (H)).   Lab Results   Component Value Date    EGFR 43.6 (L) 01/09/2025     Results from last 7 days   Lab Units 01/09/25  0622 01/08/25 0523 01/07/25 1803 01/07/25  1039   ALBUMIN g/dL 2.9* 3.5 3.5 3.7   BILIRUBIN mg/dL 0.9 0.8  --  0.9   ALK PHOS U/L 1,041* 970*  --  861*   AST (SGOT) U/L 60* 50*  --  54*   ALT (SGPT) U/L 38* 35*  --  30     Results from last 7 days   Lab Units 01/09/25  0622 01/08/25 0523 01/07/25 1803 01/07/25  1039   CALCIUM mg/dL 9.4 9.4 9.5 10.4   ALBUMIN g/dL 2.9* 3.5 3.5 3.7   MAGNESIUM mg/dL 2.1 2.4  --  2.6*   PHOSPHORUS mg/dL 2.5 3.1 4.4  --      Results from last 7 days   Lab Units 01/07/25  1258   PROCALCITONIN ng/mL 1.00*   LACTATE mmol/L 1.8     Glucose   Date/Time Value Ref Range Status   01/07/2025 1259 123 70 - 130 mg/dL Final       No radiology results for the last day    Scheduled Meds  buPROPion XL, 150 mg, Oral, QAM  fentaNYL, 1 patch, Transdermal, Q72H   And  Check Fentanyl Patch Placement, 1 each, Not Applicable, Q12H  dexAMETHasone, 4 mg, Oral, BID  fluconazole, 200 mg, Intravenous, Q24H  melatonin, 5 mg, Oral, Nightly  Menthol-Zinc Oxide, 1 Application, Topical, Q12H  polyethylene glycol, 17 g, Oral, Daily  sennosides-docusate, 2 tablet, Oral, Daily  sodium chloride, 10 mL, Intravenous, Q12H  sodium chloride, 10 mL, Intravenous, Q12H  traZODone, 12.5 mg, Oral, Nightly    Continuous Infusions     PRN Meds    acetaminophen **OR** acetaminophen **OR** acetaminophen    ALPRAZolam    heparin    HYDROcodone-acetaminophen    Morphine    ondansetron ODT    sodium chloride    sodium chloride    sodium chloride    sodium chloride    sodium chloride       Diet  Diet: Regular/House; Fluid Consistency: Thin (IDDSI 0)       Assessment/Plan     Active Hospital Problems    Diagnosis  POA    **Acute kidney injury (ALBER) with acute tubular necrosis (ATN) [N17.0]  Yes    Hyperkalemia [E87.5]  Yes    Anxiety [F41.9]  Yes    Severe protein-calorie malnutrition  [E43]  Yes    Abdominal pain [R10.9]  Yes    Dehydration [E86.0]  Yes    Hypertension [I10]  Yes    Colon cancer metastasized to brain [C18.9, C79.31]  Yes      Resolved Hospital Problems   No resolved problems to display.     Patient is a 64 y.o. female metastatic colon cancer and progressive decline. Nausea and vomiting decreased intake difficulty swallowing. Worsening abdominal pain.    ALBER  Hyperkalemia  Metabolic acidosis  Likely prerenal due to dehydration and decreased p.o. intake (nausea, dysphagia) and diuretics.   Creatinine improved, nephrology stopping IVF    Metastatic colon cancer   Ongoing abdominal pain  Had a CT last month with progression of pulmonary mets, liver mets, lymphadenopathy  Increased fentanyl 1/8/2025. Continue other pain medications and bowel medications, steroids  Oncology following  Palliative care to see    Underweight, malnutrition  Family stated yesterday patient would not want feeding tube  Cortisol not low    Elevated TSH  6.180 3 weeks ago, now 11.30.   Difficult to interpret with illness and symptoms not specifically hypothyroid    Elevated procalcitonin  No fever  Due to malignancy? ALBER?  Chest x-ray with no obvious pneumonia    Possible thrush  Dysphagia  IV diflucan empirically treat esophageal candidiasis  Patient states swallowing is improved today  SLP evaluated and aspiration demonstrated across all consistencies. N.p.o. pending palliative care evaluation    Insomnia   Started on a small dose of trazodone for sleep 1/8/2025    DVT prophylaxis  SCDs    Discharge  TBD depending on goals of care.   Expected Discharge Date: 1/13/2025; Expected Discharge Time:     Discussed with patient and nursing staff    Frank Karimi MD  [facility]  01/09/25 10:49 EST

## 2025-01-09 NOTE — PROGRESS NOTES
[facility] Progress Note      Patient Name: Ely Sims  : 1960  MRN: [id number]  Primary Care Physician:  Provider, No Known  Date of admission: 2025    Subjective     Interval History:   Follow-up acute kidney injury.  All urine output not recorded.  4.7 L in with IV fluid.  Feels thirsty.  Throat feels better.  Thinks she could eat a little something.  Slept okay.  More conversant today.  Review of Systems:   As noted above    Objective     Vitals:   Temp:  [97.7 °F (36.5 °C)-98.2 °F (36.8 °C)] 98.2 °F (36.8 °C)  Heart Rate:  [93-98] 98  Resp:  [16] 16  BP: (119-134)/(84-89) 127/84    Intake/Output Summary (Last 24 hours) at 2025 0810  Last data filed at 2025 0523  Gross per 24 hour   Intake 4776.33 ml   Output 400 ml   Net 4376.33 ml       Physical Exam:    General: Sitting up in bed.  Very frail.  More conversant.  HEENT: Oral mucosa dry.  Neck: Left subclavian port.  Heart: Regular rate and rhythm no S3 or rub, tachycardic  Lungs: Clear to auscultation bilaterally.  Abdomen: Positive bowel sound, fir, nontender.  Extremities: No edema  Skin: No rash.  Scheduled Meds:     buPROPion XL, 150 mg, Oral, QAM  fentaNYL, 1 patch, Transdermal, Q72H   And  Check Fentanyl Patch Placement, 1 each, Not Applicable, Q12H  dexAMETHasone, 4 mg, Oral, BID  fluconazole, 200 mg, Intravenous, Q24H  melatonin, 5 mg, Oral, Nightly  Menthol-Zinc Oxide, 1 Application, Topical, Q12H  polyethylene glycol, 17 g, Oral, Daily  sennosides-docusate, 2 tablet, Oral, Daily  sodium chloride, 10 mL, Intravenous, Q12H  sodium chloride, 10 mL, Intravenous, Q12H  traZODone, 12.5 mg, Oral, Nightly      IV Meds:          Results Reviewed:   I have personally reviewed the results from the time of this admission to 2025 08:10 EST     Results from last 7 days   Lab Units 25  0622 25  0523 25  1803 25  1039   SODIUM mmol/L 138 134* 133* 130*   POTASSIUM mmol/L 4.4 3.9 5.1 6.2*    CHLORIDE mmol/L 94* 95* 96* 95*   CO2 mmol/L 32.0* 27.0 22.6 16.9*   BUN mg/dL 35* 58* 67* 70*   CREATININE mg/dL 1.36* 2.42* 3.11* 3.43*   CALCIUM mg/dL 9.4 9.4 9.5 10.4   BILIRUBIN mg/dL 0.9 0.8  --  0.9   ALK PHOS U/L 1,041* 970*  --  861*   ALT (SGPT) U/L 38* 35*  --  30   AST (SGOT) U/L 60* 50*  --  54*   GLUCOSE mg/dL 123* 101* 122* 118*       Estimated Creatinine Clearance: 35.8 mL/min (A) (by C-G formula based on SCr of 1.36 mg/dL (H)).    Results from last 7 days   Lab Units 01/09/25  0622 01/08/25  0523 01/07/25  1803 01/07/25  1039   MAGNESIUM mg/dL 2.1 2.4  --  2.6*   PHOSPHORUS mg/dL 2.5 3.1 4.4  --        Results from last 7 days   Lab Units 01/08/25  0523   URIC ACID mg/dL 16.7*       Results from last 7 days   Lab Units 01/09/25  0622 01/08/25  0523 01/07/25  1039   WBC 10*3/mm3 9.37 12.36* 15.38*   HEMOGLOBIN g/dL 13.8 14.8 14.5   PLATELETS 10*3/mm3 156 149 184             Assessment / Plan     ASSESSMENT:  Acute kidney injury likely due to volume depletion.  Poor p.o. intake in the setting of Lasix and potassium supplementation.  Complicated by hyperkalemia.  Waste products improved today.  Potassium normal.  Metabolic acidemia resolved.  Stop bicarb drip.  Metastatic colon cancer refractory to therapy.  Progressive disease on last CT scan in December 24.  Discussed with Dr. Saunders yesterday.  Palliative care team consulted.  Hyperuricemia due to volume depletion.  4.  Elevated TSH.  Dr. Karimi addressing.  5.  Elevated transaminases and alk phos.  6.  Oral thrush mucositis being treated with IV Diflucan.    PLAN:  Discontinue IV fluids.    Thank you for involving us in the care of Ely Sims.  Please feel free to call with any questions.    Margaret Pinedo MD  01/09/25  08:10 [facility]    [facility]  [phone]    Please note that portions of this note were completed with a voice recognition program.

## 2025-01-09 NOTE — CONSULTS
Purpose of the visit was to evaluate for: goals of care/advanced care planning, support for patient/family, hospice referral/discussion, pain/symptom management, withdrawal of interventions, transfer to comfort care bed/unit, and comfort care. Spoke with MD, RN, and CCP as well as patient, family, HCS, and POA and discussed palliative care, goals of care, care options, resuscitation status, [facility] scattered bed status, and discharge options.      Assessment:  Patient is palliative care appropriate for inpatient care given acute kidney injury, hyperkalemia, anxiety, severe protein-calorie malnutrition, abdominal pain, dehydration, hypertension, and colon cancer metastasized to her brain.  Upon assessment, the patient was alert and oriented to self and place.  She was agreeable to discussing goals of care with her family present.  Due to confusion and lethargy, the patient's POA and children, Elia and Tiffany were present for the GOC conversation.  The patient's sister, Aziza, and the patient's boyfriend, Ubaldo, also participated.  PPS-40%.  Cultural and spiritual needs have been assessed.    Recommendations/Plan:  Transfer to [facility] for comfort care, symptom management, and continuing support and GOC conversations with the patient and her family.  The family voiced that they would like to take the patient home with hospice if they can manage her symptoms.  Code status changed to Comfort Measures.  The family and patient have requested that she have her videos swallow study, that's scheduled for tomorrow (01/10), completed.  Speech Pathology updated.  Stop all labs, PT/OT, and cardiac monitoring.  Continue current medications and diet orders.  Per Dr Karimi, the patient may eat as tolerated.  Start the palliative care order set for symptom management.  Consult [facility].  MD notified for orders.  RN and CCP updated.  Updated Dr. Pinedo per her nursing communication order.    Other Comments: Spoke with the  "patient and her family (children and POA-Elia and Tiffany, sister-Aziza, and the patient's boyfriend-Ubaldo) regarding goals of care.  The POAs confirmed that the patient's advanced directives on file are accurate and current.  The patient's sister, Aziza, voiced her concern that she wanted her sister to be comfortable.  The patient nodded her head, \"yes,\" that she wanted to be comfortable.  All of the family agreed that they wanted the patient to have the video swallow study to make sure she was eating the best diet to keep her from aspirating.  The sister and boyfriend both voiced that they would take the recommendation from Dr. Karimi regarding her current diet order.  Dr. Karimi ordered a diet as tolerated.  The family and patient took some time privately to discuss their goals of care for Ely.     01/09/25, 1515-The PC Consult Nurse came back in to continue the conversation with the patient and family regarding goals of care.  The patient and family voiced that they wanted to focus treatment on the patient's symptom management and comfort care.  They asked to be transferred to [facility] for comfort care, symptom management, and continuing support and GOC conversations with them.  The family voiced that they would like to take the patient home with hospice if they can manage her symptoms.  Code status changed to Comfort Measures.  The family and patient have requested that she have her videos swallow study, that's scheduled for tomorrow (01/10), completed.  Speech Pathology updated.  Stop all labs, PT/OT, and cardiac monitoring.  Continue current medications and diet orders.  Per Dr Karimi, the patient may eat as tolerated.  Start the palliative care order set for symptom management.  Consult [facility].  MD notified for orders.  RN and CCP updated.  Updated Dr. Pinedo per her nursing communication order.    Kaitlynn, MSN, RN, CHPN    "

## 2025-01-09 NOTE — PLAN OF CARE
Goal Outcome Evaluation:              Outcome Evaluation: Clinical swallow evaluation completed. Overt s/s of aspiration demonstrated across all consistencies with exception of puree trials. Recommend NPO. Ice chips sparingly after oral care. Meds whole or crushed in puree. Will follow with instrumental to further assess swallow pending GOC.    ADDENDUM 1547: Palliative RN messaged SLP regarding family decision to pursue palliative measures. Family would still like VFSS to be completed for further assessment of swallow function. Will plan for VFSS next date.

## 2025-01-09 NOTE — SIGNIFICANT NOTE
01/09/25 1216   OTHER   Discipline physical therapist   Rehab Time/Intention   Session Not Performed other (see comments)  (Per chart/RN Palliative has been consulted. PT will cont to follow for GOC, and perform eval as appropriate w/ GOC.)   Recommendation   PT - Next Appointment 01/10/25

## 2025-01-09 NOTE — PLAN OF CARE
Goal Outcome Evaluation:  Plan of Care Reviewed With: patient, child        Progress: no change  Outcome Evaluation: Patient  slept  early  part  of  the  shift.  Has  bben  awake,  and  wanting  to  go  home. IV  Sodium  Bicarb  infusing . Patient  using  bedside  commode  at  night,  needs   prompting  swith  some  activitiesd.    Remain  on room  air.    SR/ST on the  monitor.  nursing  will  continue to monitor.

## 2025-01-09 NOTE — PROGRESS NOTES
[facility] INPATIENT PROGRESS NOTE    Length of Stay:  2 days    CHIEF COMPLAINT/REASON FOR VISIT:  Metastatic colon cancer     SUBJECTIVE: Afebrile.  Normotensive.  On room air.  She remains drowsy.  She had a swallow study that showed aspiration and speech therapy has advised n.p.o. status.    ROS:  14 systems reviewed with pertinent positives and negatives in the HPI. Reviewed today.    OBJECTIVE:  Vitals:    01/08/25 1914 01/08/25 2320 01/09/25 0523 01/09/25 0730   BP: 119/89 123/88  127/84   BP Location: Right arm Right arm  Right arm   Patient Position: Lying Lying  Lying   Pulse: 95 97  98   Resp: 16 16  16   Temp: 97.7 °F (36.5 °C) 98 °F (36.7 °C)  98.2 °F (36.8 °C)   TempSrc: Oral Oral  Oral   SpO2:    97%   Weight:   54.3 kg (119 lb 12.8 oz)    Height:             PHYSICAL EXAMINATION:   General: Acutely and chronically ill-appearing, drowsy, answer some questions appropriately but does not correctly identify all of the family members in the room  HEENT: White patches in the back of her throat, stable to slightly improved today  Chest/Lungs: Clear to auscultation   Heart: Regular rate and rhythm  Abdomen/GI: Soft and nontender  Extremities: Warm and well-perfused with 1-2+ bilateral foot edema    DIAGNOSTIC DATA:  Results Review:     I reviewed the patient's new clinical results.    Results from last 7 days   Lab Units 01/09/25  0622   WBC 10*3/mm3 9.37   HEMOGLOBIN g/dL 13.8   HEMATOCRIT % 42.6   PLATELETS 10*3/mm3 156     Lab Results   Component Value Date    NEUTROABS 10.45 (H) 01/08/2025     Results from last 7 days   Lab Units 01/09/25  0622   SODIUM mmol/L 138   POTASSIUM mmol/L 4.4   CHLORIDE mmol/L 94*   CO2 mmol/L 32.0*   BUN mg/dL 35*   CREATININE mg/dL 1.36*   GLUCOSE mg/dL 123*   CALCIUM mg/dL 9.4         Results from last 7 days   Lab Units 01/09/25  0622   MAGNESIUM mg/dL 2.1         IMAGING:    None reviewed    ASSESSMENT:  This is a 64 y.o. female with:     *Metastatic colon  cancer  The patient presented on 8/19/2022 initially to urgent care with headache, nausea, vomiting, diarrhea.  She was advised to go to the ED from urgent care.  CT head 8/19/2024 showed some areas of increased attenuation over the right cerebral hemisphere and left temporal lobe concerning for metastatic disease with edema with some mass effect upon the fourth ventricle.  MRI brain 8/20/2024 with a lesion at the right cerebellar hemisphere measuring 2.2 cm, lesion within the left temporal lobe measuring 9 mm, 1.0 cm lesion at the left cerebellar vermis, all with surrounding edema.  There was some mass effect upon the fourth ventricle with mild prominence of the lateral and third ventricles.  8/20/2024: CT chest abdomen and pelvis with no evidence of pulmonary metastatic disease.  There was some asymmetric wall thickening in the distal sigmoid colon with surrounding mesenteric soft tissue nodularity concerning for primary colon cancer.  A sclerotic lesion of the spinous process of T1 was said to be consistent with a bone island or other benign lesion.  8/21/2024: Posterior fossa craniectomy with gross total removal of a right cerebellar hemisphere metastasis Dr. Bull Doty.  Pathology consistent with metastatic poorly differentiated adenocarcinoma favoring colorectal origin.  MSI testing is pending.  [place]us with a BRAF V600E mutation.  TMB 4.7 mutations per megabase.  Microsatellite stable.  8/25/2024: Bone scan with no obvious metastatic disease.  8/25/2024: Colonoscopy by Dr. Kaminski shows an infiltrative completely obstructing large mass found at the proximal rectum.  Pathology with invasive poorly differentiated carcinoma with neuroendocrine features. MSI studies pending.     CEA 2.10.  8/26/2024: MRI lumbar spine with no obvious metastatic disease in the lumbar spine.  However, there is a 3 mm enhancing lesion in the visualized lower thoracic spinal cord that could represent a spinal cord metastasis.   Cervical and thoracic spine MRI requested..  8/27/24: diverting loop sigmoid colostomy and port placement by Dr. Chester  8/28/2024: MRI C and T-spine addendum made with a small 3 mm met to the spinal cord at T12  Completed radiation to the brain and spine on 9/24/24  PET scan 9/12/24 with no metastatic disease. FDG avid mass at the high rectum.  10/22/2024: Proceed with Cycle 2 FOLFOX. Will omit 5 FU bolus due to neutropenia. Future treatment plans adjusted. In regards to lower abdominal cramping which occurs at night, advised patient to discuss with Dr. Chester (surgeon).   11/5/2024: Proceed with Cycle 3 FOLFOX with 5 FU bolus omitted. WBC 4.05 and . Will give a dose of tylenol today while in the infusion area for side/back pain. Instructed her to reach out if the pain/soreness does not subside.   11/19/2024: She presents for cycle #4 of therapy.  Therapy has been well-tolerated.  However, she is having worsening abdominal pain as well as some nausea and vomiting this morning.  See below.  11/26/2024: CT imaging with significant progression of disease  With progression, cetuximab or panitumumab plus encorafenib given the presence of a BRAF V600 E mutation.  Plan panitumumab due to ease of administration every 2 weeks and potential for fewer adverse effects compared to cetuximab. Encorafenib is 300 mg daily.  Panitumumab is 6 mg/kg every 14 days.  Plan treatment until disease progression or unacceptable toxicity.  Treatment is palliative and she understands this.  We will plan to initiate therapy very quickly given significant pression of disease.  C1 D1 panitumumab and encorafenib 12/10/2024  12/13/2024: seen for triage visit concerning severe pain and uncontrolled nausea/vomiting.  She has not been able to keep down any food or drink for the last 3 days.  Has been able to keep down some medications.  Has been utilizing Compazine every 6 hours without any relief.  As far as her pain she is continued MS Contin  15 mg every 8 hours and Norco 10/325 every 6 hours, despite this her pain has continued to worsen.  Her WBC is elevated at 18.44, and she reports developing  chills a few hours ago.  Her bilirubin has also increase dto 2.4.  She will require admission through A.  Subsequent admission.  Pain was better when she left the hospital.  1/7/2025: She presented to the office looking very bad with confusion, significant weakness.  She was admitted to the hospital.     *Mild normocytic anemia  Hemoglobin 14.8        *Abdominal pain  We have started sustained-release morphine 15 mg every 8 hours.  We had intended her for her to take hydrocodone also as needed but she has not been doing this thinking that morphine replaced the hydrocodone.  She was encouraged to take hydrocodone as needed as well.  12/13/2024: Patient is having severe uncontrolled pain.  She has been taking MS Contin 15 mg every 8 hours and Norco 10/325 every 6 hours.  Despite this her pain has continued to worsen.  She will require direct admission through A for further management.  Started Fentanyl patch inpatient.  This along with hydrocodone 10 mg tablets improved her pain.  She just has hydrocodone 5 mg tablets at home.  Unfortunately, her fentanyl patch fell off on Saturday and she has been having more pain and spite of replacement of a new patch yesterday.  1/7/2025: Continues with lower abdominal pain.  Currently utilizing fentanyl 50 mcg patch and Norco 10/325 every 4 hours.  She is extremely somnolent.     *Uncontrolled nausea/vomiting  Compazine was helping at home.  She has not been taking it since leaving the hospital since she apparently was told she was not supposed to take it.  It is unclear why this was stopped.  Records from the hospital do not really discuss her nausea and vomiting and what medication was used for this.  It appears that she was on a scopolamine patch at 1 point but this may have been stopped secondary to altered mental  status.  She was also receiving lorazepam which was also stopped due to sedation.  1/7/2025 now utilizing Zofran 8 mg dissolvable tablets.  Continues with poor oral intake and extreme weight loss of 11 pounds in the last 2 weeks, over 20 pounds in the last month.     *Previous lower extremity edema while hospitalized 12/2024.    He was discharged home on Lasix 40 mg daily  Developed considerable volume depletion     *History of hypokalemia, now with hyperkalemia  Discharged home 12/20/2024 potassium chloride 20 mEq twice daily.  1/7/2025 Now with evidence of hyperkalemia with potassium of 6.2, improved at 3.9, from 5.1     *Acute kidney injury likely secondary to volume depletion  Creatinine 1.36, from .42, from 3.11, from 3.43, from 1.02     *Hyponatremia  Sodium 138, from 134, from 133, from 130     *Oral thrush  Receiving IV Diflucan    *Aspiration with recommendations from speech therapy to be n.p.o.    RECOMMENDATIONS/PLAN:   DNR/DNI  Unfortunately, she had significant progression of disease on first-line palliative chemotherapy.  She now has not tolerated second line therapy with panitumumab and encorafenib.  She has very poor performance status.    I think right now it is reasonable to try to reverse what may be reversible with treating the thrush/mucositis with IV fluconazole.  Kidney function has improved significantly with IV fluid hydration.  This has been discontinued.  I do not believe that she will improve enough to tolerate any more therapy and she does not seem to be inclined to proceed with any further cancer directed therapy at this point.  She and her children have expressed a desire for palliative care.  This is absolutely appropriate.  The palliative team consult has already been placed.  Her mother understands this.  Her boyfriend wants her to improve to the point where she may be able to take further therapy.  I advised them at the initial consultation on 1/8 that we should plan for the worst  and hope for the best and if she does improve to the point where more treatment is reasonable and this can certainly be considered although I doubt this will be possible.  Continue wound care  Daily labs  IV Diflucan  Swallow study planned.  N.p.o. for now.  We would need CT imaging with contrast to see if she has had any improvement in metastatic disease in the liver with the therapy that she has been receiving.  I would avoid IV contrast at this point with recent acute kidney injury.  Discussed with the patient, her boyfriend, her mother, her children at the bedside today      Elieser Saunders MD

## 2025-01-09 NOTE — THERAPY EVALUATION
Acute Care - Speech Language Pathology   Swallow Initial Evaluation [facility]     Patient Name: Ely Sims  : 1960  MRN: [id number]  Today's Date: 2025               Admit Date: 2025    Visit Dx:     ICD-10-CM ICD-9-CM   1. Volume depletion  E86.9 276.50   2. Acute kidney injury (ALBER) with acute tubular necrosis (ATN)  N17.0 584.5   3. Hyperkalemia  E87.5 276.7   4. Metastatic malignant neoplasm, unspecified site  C79.9 199.1   5. Delirium  R41.0 780.09   6. Decreased activities of daily living (ADL)  Z78.9 V49.89     Patient Active Problem List   Diagnosis    Colon cancer metastasized to brain    Colonic mass    Anemia    Hyperglycemia    Hypertension    Rectal adenocarcinoma    Encounter for adjustment or management of vascular access device    Dehydration    Abdominal pain    Hypokalemia    Abnormal LFTs    Severe protein-calorie malnutrition    Anxiety    Acute kidney injury (ALBER) with acute tubular necrosis (ATN)    Hyperkalemia     Past Medical History:   Diagnosis Date    Metastasis to brain     Rectal carcinoma     Seasonal allergies      Past Surgical History:   Procedure Laterality Date    COLONOSCOPY N/A 2024    Procedure: COLONOSCOPY to 20cm with cold biopsies and needle tattoo;  Surgeon: Shadi Kaminski MD;  Location: [facility] ENDOSCOPY;  Service: Gastroenterology;  Laterality: N/A;  pre: abnormal imaging  post: poor prep, obstructing colon mass at 20cm, hemorrhoids    COLOSTOMY N/A 2024    Procedure: COLOSTOMY LAPAROSCOPIC;  Surgeon: Saturnino Chester MD;  Location: [facility] MAIN OR;  Service: General;  Laterality: N/A;    CRANIOTOMY N/A 2024    Procedure: Posterior fossa craniotomy for tumor using stereotactic guidance;  Surgeon: Bull Doty MD;  Location: [facility] MAIN OR;  Service: Neurosurgery;  Laterality: N/A;    ENDOMETRIAL ABLATION      TUBAL ABDOMINAL LIGATION      VENOUS ACCESS DEVICE (PORT) INSERTION Right 2024    Procedure: INSERTION  VENOUS ACCESS DEVICE;  Surgeon: Saturnino Chester MD;  Location: [facility] MAIN OR;  Service: General;  Laterality: Right;       SLP Recommendation and Plan  SLP Swallowing Diagnosis: suspected pharyngeal dysphagia, oral dysphagia (01/09/25 1000)  SLP Diet Recommendation: NPO, ice chips between meals after oral care, with supervision (01/09/25 1000)     SLP Rec. for Method of Medication Administration: meds whole, meds crushed, with puree, as tolerated (01/09/25 1000)     Monitor for Signs of Aspiration: yes, notify SLP if any concerns (01/09/25 1000)  Recommended Diagnostics: reassess via clinical swallow evaluation, reassess via VFSS (MBS), other (see comments) (pending GOC) (01/09/25 1000)  Swallow Criteria for Skilled Therapeutic Interventions Met: demonstrates skilled criteria (01/09/25 1000)     Rehab Potential/Prognosis, Swallowing: good, to achieve stated therapy goals (01/09/25 1000)  Therapy Frequency (Swallow): PRN (01/09/25 1000)  Predicted Duration Therapy Intervention (Days): until discharge (01/09/25 1000)  Oral Care Recommendations: Oral Care BID/PRN, Before ice/water (01/09/25 1000)                                        Outcome Evaluation: Clinical swallow evaluation completed. Overt s/s of aspiration demonstrated across all consistencies with exception of puree trials. Recommend NPO. Ice chips sparingly after oral care. Meds whole or crushed in puree. Will follow with instrumental to further assess swallow pending GOC.      SWALLOW EVALUATION (Last 72 Hours)       SLP Adult Swallow Evaluation       Row Name 01/09/25 1000                   Rehab Evaluation    Document Type evaluation  -CR        Subjective Information no complaints  -CR        Patient Observations alert;cooperative  -CR        Patient Effort good  -CR        Symptoms Noted During/After Treatment none  -CR           General Information    Patient Profile Reviewed yes  -CR        Pertinent History Of Current Problem 63 y/o  female admitted with nausea/vomiting, difficulties swallowing. Hyperkalemia, ALBER with acute tubular necrosis. Metastatic colon cancer with mets to the brain receiving chemotherapy. New orders received due to coughing with PO per RN.  -CR        Current Method of Nutrition regular textures;thin liquids  -CR        Precautions/Limitations, Vision WFL;for purposes of eval  -CR        Precautions/Limitations, Hearing WFL;for purposes of eval  -CR        Prior Level of Function-Swallowing no diet consistency restrictions  -CR        Plans/Goals Discussed with patient  -CR        Barriers to Rehab medically complex  -CR           Pain    Pretreatment Pain Rating 0/10 - no pain  -CR        Posttreatment Pain Rating 0/10 - no pain  -CR           Oral Motor Structure and Function    Dentition Assessment natural, present and adequate  -CR        Secretion Management WNL/WFL  -CR        Mucosal Quality dry  -CR           Oral Musculature and Cranial Nerve Assessment    Mandibular Impairment Detail, Cranial Nerve V (Trigeminal) reduced mandibular ROM  -CR        Oral Labial or Buccal Impairment, Detail, Cranial Nerve VII (Facial): reduced strength bilaterally  -CR        Lingual Impairment, Detail. Cranial Nerves IX, XII (Glossopharyngeal and Hypoglossal) reduced lingual ROM  -CR        Vocal Impairment, Detail. Cranial Nerve X (Vagus) --  weak intensity  -CR           Clinical Swallow Eval    Clinical Swallow Evaluation Summary Clinical swallow evaluation completed. Son asleep at bedside. Pt pleasantly confused; perseverative speech at times. Oral mech exam revealed reduced labial, lingual, and mandibular ROM/strength and weak vocal intensity. Oral holding with thickened liquids, swallows appear effortful. Suspect reduced laryngeal elevation upon palpation. Pt demonstrated subtle wet vocal quality with thins via spoon, nectar via spoon/cup, honey via cup/straw, and soft/mixed solid trials. No overt s/s of aspiration with ice  chips or puree. Recommend NPO. Ice chips sparingly after oral care. Meds whole or crushed in puree. Will follow with instrumental to further assess swallow pending GOC. Palliative following this date.  -CR           SLP Evaluation Clinical Impression    SLP Swallowing Diagnosis suspected pharyngeal dysphagia;oral dysphagia  -CR        Functional Impact risk of aspiration/pneumonia  -CR        Rehab Potential/Prognosis, Swallowing good, to achieve stated therapy goals  -CR        Swallow Criteria for Skilled Therapeutic Interventions Met demonstrates skilled criteria  -CR           Recommendations    Therapy Frequency (Swallow) PRN  -CR        Predicted Duration Therapy Intervention (Days) until discharge  -CR        SLP Diet Recommendation NPO;ice chips between meals after oral care, with supervision  -CR        Recommended Diagnostics reassess via clinical swallow evaluation;reassess via VFSS (MBS);other (see comments)  pending GOC  -CR        Oral Care Recommendations Oral Care BID/PRN;Before ice/water  -CR        SLP Rec. for Method of Medication Administration meds whole;meds crushed;with puree;as tolerated  -CR        Monitor for Signs of Aspiration yes;notify SLP if any concerns  -CR                  User Key  (r) = Recorded By, (t) = Taken By, (c) = Cosigned By      Initials Name Effective Dates    CR Sri Thomas SLP 12/03/24 -                     EDUCATION  The patient has been educated in the following areas:   Dysphagia (Swallowing Impairment).                Time Calculation:    Time Calculation- SLP       Row Name 01/09/25 1113             Time Calculation- SLP    SLP Start Time 0730  -CR      SLP Received On 01/09/25  -CR         Untimed Charges    [id number] Eval Oral Pharyng Swallow Minutes 60  -CR         Total Minutes    Untimed Charges Total Minutes 60  -CR       Total Minutes 60  -CR                User Key  (r) = Recorded By, (t) = Taken By, (c) = Cosigned By      Initials Name Provider Type     CR Rinella, Sri, SLP Speech and Language Pathologist                    Therapy Charges for Today       Code Description Service Date Service Provider Modifiers Qty    [id number] HC ST EVAL ORAL PHARYNG SWALLOW 4 1/9/2025 Sri Thomas SLP GN 1                 HARRIETT Sharp  1/9/2025

## 2025-01-10 ENCOUNTER — APPOINTMENT (OUTPATIENT)
Dept: GENERAL RADIOLOGY | Facility: HOSPITAL | Age: 65
DRG: 682 | End: 2025-01-10
Payer: COMMERCIAL

## 2025-01-10 PROCEDURE — 63710000001 ONDANSETRON ODT 4 MG TABLET DISPERSIBLE: Performed by: INTERNAL MEDICINE

## 2025-01-10 PROCEDURE — 74230 X-RAY XM SWLNG FUNCJ C+: CPT

## 2025-01-10 PROCEDURE — 25010000002 MORPHINE PER 10 MG: Performed by: HOSPITALIST

## 2025-01-10 PROCEDURE — 92611 MOTION FLUOROSCOPY/SWALLOW: CPT

## 2025-01-10 PROCEDURE — 63710000001 DEXAMETHASONE PER 0.25 MG: Performed by: HOSPITALIST

## 2025-01-10 PROCEDURE — 25010000002 FLUCONAZOLE PER 200 MG: Performed by: HOSPITALIST

## 2025-01-10 RX ADMIN — Medication 10 ML: at 20:43

## 2025-01-10 RX ADMIN — MORPHINE SULFATE 2 MG: 2 INJECTION, SOLUTION INTRAMUSCULAR; INTRAVENOUS at 18:57

## 2025-01-10 RX ADMIN — BARIUM SULFATE 50 ML: 400 SUSPENSION ORAL at 08:45

## 2025-01-10 RX ADMIN — Medication 10 ML: at 10:13

## 2025-01-10 RX ADMIN — BARIUM SULFATE 55 ML: 0.81 POWDER, FOR SUSPENSION ORAL at 08:44

## 2025-01-10 RX ADMIN — SENNOSIDES AND DOCUSATE SODIUM 2 TABLET: 50; 8.6 TABLET ORAL at 10:10

## 2025-01-10 RX ADMIN — BUPROPION HYDROCHLORIDE 150 MG: 150 TABLET, EXTENDED RELEASE ORAL at 10:10

## 2025-01-10 RX ADMIN — DEXAMETHASONE 4 MG: 4 TABLET ORAL at 10:10

## 2025-01-10 RX ADMIN — BARIUM SULFATE 4 ML: 980 POWDER, FOR SUSPENSION ORAL at 08:44

## 2025-01-10 RX ADMIN — ANORECTAL OINTMENT 1 APPLICATION: 15.7; .44; 24; 20.6 OINTMENT TOPICAL at 10:14

## 2025-01-10 RX ADMIN — ONDANSETRON 8 MG: 4 TABLET, ORALLY DISINTEGRATING ORAL at 02:10

## 2025-01-10 RX ADMIN — FLUCONAZOLE IN SODIUM CHLORIDE 200 MG: 2 INJECTION, SOLUTION INTRAVENOUS at 11:39

## 2025-01-10 NOTE — PAYOR COMM NOTE
"Ely Sims (64 y.o. Female)          U/D FOR [id number]      F# [phone]         Date of Birth   1960    Social Security Number       Address   [address] [place] [address]    Home Phone   [phone]    MRN   [id number]       Jewish   Mandaen    Marital Status   Single                            Admission Date   25    Admission Type   Emergency    Admitting Provider   Estefania Jacobs MD    Attending Provider   Frank Karimi MD    Department, Room/Bed   [address], [facility]/       Discharge Date       Discharge Disposition       Discharge Destination                                 Attending Provider: Frank Karimi MD    Allergies: No Known Allergies    Isolation: None   Infection: None   Code Status: No CPR    Ht: 170.2 cm (67.01\")   Wt: 54.3 kg (119 lb 12.8 oz)    Admission Cmt: None   Principal Problem: Acute kidney injury (ALBER) with acute tubular necrosis (ATN) [N17.0]                   Active Insurance as of 2025       Primary Coverage       Payor Plan Insurance Group Employer/Plan Group    [facility] [id number]       Payor Plan Address Payor Plan Phone Number Payor Plan Fax Number Effective Dates    [address]   2024 - None Entered    [facility] [phone]         Subscriber Name Subscriber Birth Date Member ID       ELY SIMS 1960 [id number]                     Emergency Contacts        (Rel.) Home Phone Work Phone Mobile Phone    Ubaldo Case (Partner) -- -- [phone]    Elia Cadena POA -- -- [phone]    DON OBRIEN (Sister) -- -- [phone]    DOMINIQUECARL (Daughter) -- -- [phone]                 Physician Progress Notes (last 24 hours)        Frank Karmii MD at 01/10/25 1410            Name: Ely Sims ADMIT: 2025   : 1960  PCP: Provider, No Known    MRN: [id number] LOS: 3 days   AGE/SEX: 64 y.o. female  ROOM: [facility]     Subjective   Subjective   Lying in " bed. Sister at bedside. She says her throat feels better    Objective   Objective   Vital Signs  Temp:  [96.7 °F (35.9 °C)-98.1 °F (36.7 °C)] 96.7 °F (35.9 °C)  Heart Rate:  [] 93  Resp:  [16-20] 20  BP: (119-173)/() 173/102  SpO2:  [96 %-99 %] 99 %  on   ;   Device (Oxygen Therapy): room air  Body mass index is 18.76 kg/m².    Physical Exam  Constitutional:       Appearance: She is underweight. She is ill-appearing (frail).   HENT:      Head: Normocephalic.   Cardiovascular:      Rate and Rhythm: Normal rate and regular rhythm.   Pulmonary:      Effort: Pulmonary effort is normal. No respiratory distress.      Breath sounds: No wheezing or rhonchi.   Abdominal:      Palpations: Abdomen is soft.      Tenderness: There is no abdominal tenderness.   Musculoskeletal:         General: No swelling.   Skin:     General: Skin is warm and dry.     Results Review  I reviewed the patient's new clinical results.  Results from last 7 days   Lab Units 01/09/25  0622 01/08/25  0523 01/07/25  1039   WBC 10*3/mm3 9.37 12.36* 15.38*   HEMOGLOBIN g/dL 13.8 14.8 14.5   PLATELETS 10*3/mm3 156 149 184     Results from last 7 days   Lab Units 01/09/25  0622 01/08/25  0523 01/07/25  1803 01/07/25  1039   SODIUM mmol/L 138 134* 133* 130*   POTASSIUM mmol/L 4.4 3.9 5.1 6.2*   CHLORIDE mmol/L 94* 95* 96* 95*   CO2 mmol/L 32.0* 27.0 22.6 16.9*   BUN mg/dL 35* 58* 67* 70*   CREATININE mg/dL 1.36* 2.42* 3.11* 3.43*   GLUCOSE mg/dL 123* 101* 122* 118*     Lab Results   Component Value Date    ANIONGAP 12.0 01/09/2025     Estimated Creatinine Clearance: 35.8 mL/min (A) (by C-G formula based on SCr of 1.36 mg/dL (H)).   Lab Results   Component Value Date    EGFR 43.6 (L) 01/09/2025     Results from last 7 days   Lab Units 01/09/25  0622 01/08/25  0523 01/07/25  1803 01/07/25  1039   ALBUMIN g/dL 2.9* 3.5 3.5 3.7   BILIRUBIN mg/dL 0.9 0.8  --  0.9   ALK PHOS U/L 1,041* 970*  --  861*   AST (SGOT) U/L 60* 50*  --  54*   ALT (SGPT) U/L  "38* 35*  --  30     Results from last 7 days   Lab Units 01/09/25  0622 01/08/25  0523 01/07/25  1803 01/07/25  1039   CALCIUM mg/dL 9.4 9.4 9.5 10.4   ALBUMIN g/dL 2.9* 3.5 3.5 3.7   MAGNESIUM mg/dL 2.1 2.4  --  2.6*   PHOSPHORUS mg/dL 2.5 3.1 4.4  --      Results from last 7 days   Lab Units 01/07/25  1258   PROCALCITONIN ng/mL 1.00*   LACTATE mmol/L 1.8     No results found for: \"HGBA1C\", \"POCGLU\"      No radiology results for the last day    Scheduled Meds  buPROPion XL, 150 mg, Oral, QAM  fentaNYL, 1 patch, Transdermal, Q72H   And  Check Fentanyl Patch Placement, 1 each, Not Applicable, Q12H  dexAMETHasone, 4 mg, Oral, BID  fluconazole, 200 mg, Intravenous, Q24H  melatonin, 5 mg, Oral, Nightly  Menthol-Zinc Oxide, 1 Application, Topical, Q12H  polyethylene glycol, 17 g, Oral, Daily  sennosides-docusate, 2 tablet, Oral, Daily  sodium chloride, 10 mL, Intravenous, Q12H  sodium chloride, 10 mL, Intravenous, Q12H  traZODone, 12.5 mg, Oral, Nightly    Continuous Infusions     PRN Meds    acetaminophen **OR** acetaminophen **OR** acetaminophen    acetaminophen **OR** acetaminophen **OR** acetaminophen    diphenhydrAMINE **OR** diphenhydrAMINE    diphenoxylate-atropine    glycopyrrolate **OR** glycopyrrolate **OR** glycopyrrolate **OR** glycopyrrolate    heparin    HYDROcodone-acetaminophen    Morphine **OR** morphine **OR** HYDROmorphone    Morphine **OR** morphine **OR** HYDROmorphone    Morphine **OR** morphine **OR** HYDROmorphone    LORazepam **OR** LORazepam **OR** LORazepam    LORazepam **OR** LORazepam **OR** LORazepam    LORazepam **OR** LORazepam **OR** LORazepam    Morphine    ondansetron ODT    Polyvinyl Alcohol-Povidone PF    sodium chloride    sodium chloride    sodium chloride    sodium chloride    sodium chloride    sodium chloride       Diet  NPO Diet NPO Type: Sips with Meds      Assessment/Plan     Active Hospital Problems    Diagnosis  POA    **Acute kidney injury (ALBER) with acute tubular necrosis " (ATN) [N17.0]  Yes    Hyperkalemia [E87.5]  Yes    Anxiety [F41.9]  Yes    Severe protein-calorie malnutrition [E43]  Yes    Abdominal pain [R10.9]  Yes    Dehydration [E86.0]  Yes    Hypertension [I10]  Yes    Colon cancer metastasized to brain [C18.9, C79.31]  Yes      Resolved Hospital Problems   No resolved problems to display.     Patient is a 64 y.o. female metastatic colon cancer and progressive decline. Nausea and vomiting decreased intake difficulty swallowing. Worsening abdominal pain.    Goals of care  Patient and family have transition to palliative care   Hospice consulted    ALBER, Hyperkalemia, Metabolic acidosis Likely prerenal due to dehydration and decreased p.o. intake (nausea, dysphagia) and diuretics.   Metastatic colon cancer, Ongoing abdominal pain Fentanyl increased in pain seems improved. Had a CT last month with progression of pulmonary mets, liver mets, lymphadenopathy  Underweight, malnutrition no feeding tubes  Elevated TSH  Elevated procalcitonin  Possible thrush, his dysphagia continue Diflucan since pain seems improved  Insomnia  Started on a small dose of trazodone for sleep 2025    Discharge  TBD   Expected Discharge Date: 2025; Expected Discharge Time:     Discussed with patient, family, and nursing staff    Frank Karimi MD  [facility]  01/10/25 14:10 EST    Electronically signed by Frank Karimi MD at 01/10/25 1413       Madeline Lagos MD at 01/10/25 1353              [facility] Progress Note      Patient Name: Ely Sims  : 1960  MRN: [id number]  Primary Care Physician:  Provider, No Known  Date of admission: 2025    Subjective     Interval History:   Follow-up acute kidney injury.  Family decided to proceed with comfort measures    Review of Systems:   As noted above    Objective     Vitals:   Temp:  [96.7 °F (35.9 °C)-98.1 °F (36.7 °C)] 96.7 °F (35.9 °C)  Heart Rate:  [] 93  Resp:  [16-20]  20  BP: (119-173)/() 173/102    Intake/Output Summary (Last 24 hours) at 1/10/2025 1353  Last data filed at 1/10/2025 1001  Gross per 24 hour   Intake 210 ml   Output 450 ml   Net -240 ml       Physical Exam:    General: Sitting up in bed.  Very frail.  More conversant.  HEENT: Oral mucosa dry.  Neck: Left subclavian port.  Heart: Regular rate and rhythm no S3 or rub, tachycardic  Lungs: Clear to auscultation bilaterally.  Abdomen: Positive bowel sound, fir, nontender.  Extremities: No edema  Skin: No rash.  Scheduled Meds:     buPROPion XL, 150 mg, Oral, QAM  fentaNYL, 1 patch, Transdermal, Q72H   And  Check Fentanyl Patch Placement, 1 each, Not Applicable, Q12H  dexAMETHasone, 4 mg, Oral, BID  fluconazole, 200 mg, Intravenous, Q24H  melatonin, 5 mg, Oral, Nightly  Menthol-Zinc Oxide, 1 Application, Topical, Q12H  polyethylene glycol, 17 g, Oral, Daily  sennosides-docusate, 2 tablet, Oral, Daily  sodium chloride, 10 mL, Intravenous, Q12H  sodium chloride, 10 mL, Intravenous, Q12H  traZODone, 12.5 mg, Oral, Nightly      IV Meds:          Results Reviewed:   I have personally reviewed the results from the time of this admission to 1/10/2025 13:53 EST     Results from last 7 days   Lab Units 01/09/25  0622 01/08/25  0523 01/07/25  1803 01/07/25  1039   SODIUM mmol/L 138 134* 133* 130*   POTASSIUM mmol/L 4.4 3.9 5.1 6.2*   CHLORIDE mmol/L 94* 95* 96* 95*   CO2 mmol/L 32.0* 27.0 22.6 16.9*   BUN mg/dL 35* 58* 67* 70*   CREATININE mg/dL 1.36* 2.42* 3.11* 3.43*   CALCIUM mg/dL 9.4 9.4 9.5 10.4   BILIRUBIN mg/dL 0.9 0.8  --  0.9   ALK PHOS U/L 1,041* 970*  --  861*   ALT (SGPT) U/L 38* 35*  --  30   AST (SGOT) U/L 60* 50*  --  54*   GLUCOSE mg/dL 123* 101* 122* 118*       Estimated Creatinine Clearance: 35.8 mL/min (A) (by C-G formula based on SCr of 1.36 mg/dL (H)).    Results from last 7 days   Lab Units 01/09/25  0622 01/08/25  0523 01/07/25  1803 01/07/25  1039   MAGNESIUM mg/dL 2.1 2.4  --  2.6*   PHOSPHORUS  mg/dL 2.5 3.1 4.4  --        Results from last 7 days   Lab Units 01/08/25  0523   URIC ACID mg/dL 16.7*       Results from last 7 days   Lab Units 01/09/25  0622 01/08/25  0523 01/07/25  1039   WBC 10*3/mm3 9.37 12.36* 15.38*   HEMOGLOBIN g/dL 13.8 14.8 14.5   PLATELETS 10*3/mm3 156 149 184             Assessment / Plan     ASSESSMENT:  Acute kidney injury likely due to volume depletion.  Poor p.o. intake in the setting of Lasix and potassium supplementation.  Complicated by hyperkalemia.  Waste products improved today.  Potassium normal.  Metabolic acidemia resolved.  Stop bicarb drip.  Metastatic colon cancer refractory to therapy.  Progressive disease on last CT scan in December 24.  Discussed with Dr. Saunders yesterday.  Palliative care team consulted.  Hyperuricemia due to volume depletion.  4.  Elevated TSH.  Dr. Karimi addressing.  5.  Elevated transaminases and alk phos.  6.  Oral thrush mucositis being treated with IV Diflucan.    PLAN:  Family decided to proceed with comfort measures.  Nephrology team will sign off    Thank you for involving us in the care of Ely Sims.  Please feel free to call with any questions.    Madeline Lagos MD  01/10/25  13:53 [facility]    [facility]  [phone]    Please note that portions of this note were completed with a voice recognition program.    Electronically signed by Madeline Lagos MD at 01/10/25 6368       Consult Notes (last 24 hours)  Notes from 01/09/25 1528 through 01/10/25 1528   No notes of this type exist for this encounter.

## 2025-01-10 NOTE — CONSULTS
Met with pt, SO and son at bedside and provided explanation of services. Pt is eligible for home with hospice. If continues to decline and need symptom management [facility] can reevaluate for [facility]. If any questions please let us know.    Thank you for the referral.    Ofelia Burnett RN  [facility]  [phone]

## 2025-01-10 NOTE — PROGRESS NOTES
Name: Ely Sims ADMIT: 2025   : 1960  PCP: Provider, No Known    MRN: [id number] LOS: 3 days   AGE/SEX: 64 y.o. female  ROOM: [facility]     Subjective   Subjective   Lying in bed. Sister at bedside. She says her throat feels better     Objective   Objective   Vital Signs  Temp:  [96.7 °F (35.9 °C)-98.1 °F (36.7 °C)] 96.7 °F (35.9 °C)  Heart Rate:  [] 93  Resp:  [16-20] 20  BP: (119-173)/() 173/102  SpO2:  [96 %-99 %] 99 %  on   ;   Device (Oxygen Therapy): room air  Body mass index is 18.76 kg/m².    Physical Exam  Constitutional:       Appearance: She is underweight. She is ill-appearing (frail).   HENT:      Head: Normocephalic.   Cardiovascular:      Rate and Rhythm: Normal rate and regular rhythm.   Pulmonary:      Effort: Pulmonary effort is normal. No respiratory distress.      Breath sounds: No wheezing or rhonchi.   Abdominal:      Palpations: Abdomen is soft.      Tenderness: There is no abdominal tenderness.   Musculoskeletal:         General: No swelling.   Skin:     General: Skin is warm and dry.     Results Review  I reviewed the patient's new clinical results.  Results from last 7 days   Lab Units 25  0622 25  0523 25  1039   WBC 10*3/mm3 9.37 12.36* 15.38*   HEMOGLOBIN g/dL 13.8 14.8 14.5   PLATELETS 10*3/mm3 156 149 184     Results from last 7 days   Lab Units 25  0622 25  0523 25  1803 25  1039   SODIUM mmol/L 138 134* 133* 130*   POTASSIUM mmol/L 4.4 3.9 5.1 6.2*   CHLORIDE mmol/L 94* 95* 96* 95*   CO2 mmol/L 32.0* 27.0 22.6 16.9*   BUN mg/dL 35* 58* 67* 70*   CREATININE mg/dL 1.36* 2.42* 3.11* 3.43*   GLUCOSE mg/dL 123* 101* 122* 118*     Lab Results   Component Value Date    ANIONGAP 12.0 2025     Estimated Creatinine Clearance: 35.8 mL/min (A) (by C-G formula based on SCr of 1.36 mg/dL (H)).   Lab Results   Component Value Date    EGFR 43.6 (L) 2025     Results from last 7 days   Lab Units 25  0622 25  0584  "01/07/25  1803 01/07/25  1039   ALBUMIN g/dL 2.9* 3.5 3.5 3.7   BILIRUBIN mg/dL 0.9 0.8  --  0.9   ALK PHOS U/L 1,041* 970*  --  861*   AST (SGOT) U/L 60* 50*  --  54*   ALT (SGPT) U/L 38* 35*  --  30     Results from last 7 days   Lab Units 01/09/25  0622 01/08/25  0523 01/07/25  1803 01/07/25  1039   CALCIUM mg/dL 9.4 9.4 9.5 10.4   ALBUMIN g/dL 2.9* 3.5 3.5 3.7   MAGNESIUM mg/dL 2.1 2.4  --  2.6*   PHOSPHORUS mg/dL 2.5 3.1 4.4  --      Results from last 7 days   Lab Units 01/07/25  1258   PROCALCITONIN ng/mL 1.00*   LACTATE mmol/L 1.8     No results found for: \"HGBA1C\", \"POCGLU\"      No radiology results for the last day    Scheduled Meds  buPROPion XL, 150 mg, Oral, QAM  fentaNYL, 1 patch, Transdermal, Q72H   And  Check Fentanyl Patch Placement, 1 each, Not Applicable, Q12H  dexAMETHasone, 4 mg, Oral, BID  fluconazole, 200 mg, Intravenous, Q24H  melatonin, 5 mg, Oral, Nightly  Menthol-Zinc Oxide, 1 Application, Topical, Q12H  polyethylene glycol, 17 g, Oral, Daily  sennosides-docusate, 2 tablet, Oral, Daily  sodium chloride, 10 mL, Intravenous, Q12H  sodium chloride, 10 mL, Intravenous, Q12H  traZODone, 12.5 mg, Oral, Nightly    Continuous Infusions     PRN Meds    acetaminophen **OR** acetaminophen **OR** acetaminophen    acetaminophen **OR** acetaminophen **OR** acetaminophen    diphenhydrAMINE **OR** diphenhydrAMINE    diphenoxylate-atropine    glycopyrrolate **OR** glycopyrrolate **OR** glycopyrrolate **OR** glycopyrrolate    heparin    HYDROcodone-acetaminophen    Morphine **OR** morphine **OR** HYDROmorphone    Morphine **OR** morphine **OR** HYDROmorphone    Morphine **OR** morphine **OR** HYDROmorphone    LORazepam **OR** LORazepam **OR** LORazepam    LORazepam **OR** LORazepam **OR** LORazepam    LORazepam **OR** LORazepam **OR** LORazepam    Morphine    ondansetron ODT    Polyvinyl Alcohol-Povidone PF    sodium chloride    sodium chloride    sodium chloride    sodium chloride    sodium chloride    " sodium chloride       Diet  NPO Diet NPO Type: Sips with Meds       Assessment/Plan     Active Hospital Problems    Diagnosis  POA    **Acute kidney injury (ALBER) with acute tubular necrosis (ATN) [N17.0]  Yes    Hyperkalemia [E87.5]  Yes    Anxiety [F41.9]  Yes    Severe protein-calorie malnutrition [E43]  Yes    Abdominal pain [R10.9]  Yes    Dehydration [E86.0]  Yes    Hypertension [I10]  Yes    Colon cancer metastasized to brain [C18.9, C79.31]  Yes      Resolved Hospital Problems   No resolved problems to display.     Patient is a 64 y.o. female metastatic colon cancer and progressive decline. Nausea and vomiting decreased intake difficulty swallowing. Worsening abdominal pain.    Goals of care  Patient and family have transition to palliative care   Hospice consulted    ALBER, Hyperkalemia, Metabolic acidosis Likely prerenal due to dehydration and decreased p.o. intake (nausea, dysphagia) and diuretics.   Metastatic colon cancer, Ongoing abdominal pain Fentanyl increased in pain seems improved. Had a CT last month with progression of pulmonary mets, liver mets, lymphadenopathy  Underweight, malnutrition no feeding tubes  Elevated TSH  Elevated procalcitonin  Possible thrush, his dysphagia continue Diflucan since pain seems improved  Insomnia  Started on a small dose of trazodone for sleep 1/8/2025    Discharge  TBD   Expected Discharge Date: 1/13/2025; Expected Discharge Time:     Discussed with patient, family, and nursing staff    Frank Karimi MD  [facility]  01/10/25 14:10 EST

## 2025-01-10 NOTE — MBS/VFSS/FEES
Acute Care - Speech Language Pathology   Swallow Initial Evaluation [facility]     Patient Name: Ely Sims  : 1960  MRN: [id number]  Today's Date: 1/10/2025               Admit Date: 2025    Visit Dx:     ICD-10-CM ICD-9-CM   1. Volume depletion  E86.9 276.50   2. Acute kidney injury (ALBER) with acute tubular necrosis (ATN)  N17.0 584.5   3. Hyperkalemia  E87.5 276.7   4. Metastatic malignant neoplasm, unspecified site  C79.9 199.1   5. Delirium  R41.0 780.09   6. Decreased activities of daily living (ADL)  Z78.9 V49.89     Patient Active Problem List   Diagnosis    Colon cancer metastasized to brain    Colonic mass    Anemia    Hyperglycemia    Hypertension    Rectal adenocarcinoma    Encounter for adjustment or management of vascular access device    Dehydration    Abdominal pain    Hypokalemia    Abnormal LFTs    Severe protein-calorie malnutrition    Anxiety    Acute kidney injury (ALBER) with acute tubular necrosis (ATN)    Hyperkalemia     Past Medical History:   Diagnosis Date    Metastasis to brain     Rectal carcinoma     Seasonal allergies      Past Surgical History:   Procedure Laterality Date    COLONOSCOPY N/A 2024    Procedure: COLONOSCOPY to 20cm with cold biopsies and needle tattoo;  Surgeon: Shadi Kaminski MD;  Location: [facility] ENDOSCOPY;  Service: Gastroenterology;  Laterality: N/A;  pre: abnormal imaging  post: poor prep, obstructing colon mass at 20cm, hemorrhoids    COLOSTOMY N/A 2024    Procedure: COLOSTOMY LAPAROSCOPIC;  Surgeon: Saturnino Chester MD;  Location: [facility] MAIN OR;  Service: General;  Laterality: N/A;    CRANIOTOMY N/A 2024    Procedure: Posterior fossa craniotomy for tumor using stereotactic guidance;  Surgeon: Bull Doty MD;  Location: [facility] MAIN OR;  Service: Neurosurgery;  Laterality: N/A;    ENDOMETRIAL ABLATION      TUBAL ABDOMINAL LIGATION      VENOUS ACCESS DEVICE (PORT) INSERTION Right 2024    Procedure: INSERTION  VENOUS ACCESS DEVICE;  Surgeon: Saturnino Chester MD;  Location: [facility] MAIN OR;  Service: General;  Laterality: Right;       SLP Recommendation and Plan  SLP Swallowing Diagnosis: moderate, oral dysphagia, pharyngeal dysphagia (01/10/25 0900)  SLP Diet Recommendation: NPO (Safest PO diet is soft/chopped solids with nectar or honey thick liquids with known aspiration risk) (01/10/25 0900)  Recommended Precautions and Strategies: upright posture during/after eating, small bites of food and sips of liquid, 1:1 supervision, multiple swallows per bite of food, multiple swallows per sip of liquid (01/10/25 0900)  SLP Rec. for Method of Medication Administration: meds crushed, with puree, as tolerated (01/10/25 0900)     Monitor for Signs of Aspiration: yes, notify SLP if any concerns (01/10/25 0900)  Recommended Diagnostics: No further SLP services recommended (01/10/25 0900)  Swallow Criteria for Skilled Therapeutic Interventions Met: patient/family declined skilled intervention at this time, other (see comments) (pursuing comfort measures) (01/10/25 0900)     Rehab Potential/Prognosis, Swallowing: good, to achieve stated therapy goals (01/09/25 1000)  Therapy Frequency (Swallow): PRN (01/10/25 0900)  Predicted Duration Therapy Intervention (Days): until discharge (01/10/25 0900)  Oral Care Recommendations: Oral Care BID/PRN (01/10/25 0900)                                        Outcome Evaluation: VFSS complete. Radiology APRN, Eliza Mcintyre, present during the study. Patient presents with moderate oropharyngeal dysphagia. Safest recommendation is NPO. Patient and family have decided to pursue comfort measures at this time. Safest PO diet for QOL purposes is soft/chopped solids with nectar or honey thick liquids (equal risk) with known aspiration risk. Meds crushed in puree. Sitting upright, slow rate, small bites/sips, supervision during meals, double swallow.      SWALLOW EVALUATION (Last 72 Hours)       SLP  "Adult Swallow Evaluation       Row Name 01/10/25 0900 01/09/25 1000                Rehab Evaluation    Document Type evaluation  -CR evaluation  -CR       Subjective Information no complaints  -CR no complaints  -CR       Patient Observations alert;cooperative  -CR alert;cooperative  -CR       Patient Effort good  -CR good  -CR       Symptoms Noted During/After Treatment none  -CR none  -CR          General Information    Patient Profile Reviewed yes  -CR yes  -CR       Pertinent History Of Current Problem \"65 y/o female admitted with nausea/vomiting, difficulties swallowing. Hyperkalemia, ALBER with acute tubular necrosis. Metastatic colon cancer with mets to the brain receiving chemotherapy. New orders received due to coughing with PO per RN.\" Family has decided to pursue comfort measures but would still like VFSS completed to determine safest PO diet.  -CR 65 y/o female admitted with nausea/vomiting, difficulties swallowing. Hyperkalemia, ALBER with acute tubular necrosis. Metastatic colon cancer with mets to the brain receiving chemotherapy. New orders received due to coughing with PO per RN.  -CR       Current Method of Nutrition NPO  -CR regular textures;thin liquids  -CR       Precautions/Limitations, Vision WFL;for purposes of eval  -CR WFL;for purposes of eval  -CR       Precautions/Limitations, Hearing WFL;for purposes of eval  -CR WFL;for purposes of eval  -CR       Prior Level of Function-Swallowing no diet consistency restrictions  -CR no diet consistency restrictions  -CR       Plans/Goals Discussed with patient  -CR patient  -CR       Barriers to Rehab medically complex  -CR medically complex  -CR          Pain    Pretreatment Pain Rating 0/10 - no pain  -CR 0/10 - no pain  -CR       Posttreatment Pain Rating 0/10 - no pain  -CR 0/10 - no pain  -CR          Oral Motor Structure and Function    Dentition Assessment natural, present and adequate  -CR natural, present and adequate  -CR       Secretion " Management WNL/WFL  -CR WNL/WFL  -CR       Mucosal Quality -- dry  -CR          Oral Musculature and Cranial Nerve Assessment    Mandibular Impairment Detail, Cranial Nerve V (Trigeminal) reduced mandibular ROM  -CR reduced mandibular ROM  -CR       Oral Labial or Buccal Impairment, Detail, Cranial Nerve VII (Facial): reduced strength bilaterally  -CR reduced strength bilaterally  -CR       Lingual Impairment, Detail. Cranial Nerves IX, XII (Glossopharyngeal and Hypoglossal) reduced lingual ROM  -CR reduced lingual ROM  -CR       Vocal Impairment, Detail. Cranial Nerve X (Vagus) -- --  weak intensity  -CR          Clinical Swallow Eval    Clinical Swallow Evaluation Summary -- Clinical swallow evaluation completed. Son asleep at bedside. Pt pleasantly confused; perseverative speech at times. Oral mech exam revealed reduced labial, lingual, and mandibular ROM/strength and weak vocal intensity. Oral holding with thickened liquids, swallows appear effortful. Suspect reduced laryngeal elevation upon palpation. Pt demonstrated subtle wet vocal quality with thins via spoon, nectar via spoon/cup, honey via cup/straw, and soft/mixed solid trials. No overt s/s of aspiration with ice chips or puree. Recommend NPO. Ice chips sparingly after oral care. Meds whole or crushed in puree. Will follow with instrumental to further assess swallow pending GOC. Palliative following this date.  -CR          MBS/VFSS Interpretation    VFSS Summary VFSS complete. Radiology Eliza GODINEZ, present during the study. Patient presents with moderate oropharyngeal dysphagia characterized by oral holding, swallow mistiming, reduced hyolaryngeal elevation, and incomplete epiglottic deflection. Suspect osteophyte development. Spill past the valleculae with nectar via spoon with deep, silent non transient penetration occuring during the swallow, possible trace aspiration. Swallow triggered at the valleculae with honey thick liquid via spoon with  non transient penetration occuring during the second swallow. Oral holding and spill to the pyriforms with thin liquid via spoon resulting in posterior alyson aspiration; pt sensed with a spontaneous cough, unsuccessful in expectorating aspirated material. Swallow elicited at the valleculae with puree and soft/chopped solids with no penetration/aspiration visualized. Double swallow successful in reducing moderate pharyngeal residue. Honey thick wash by spoon resulted in non transient penetration. Safest recommendation is NPO. Patient and family have decided to pursue comfort measures at this time. Safest PO diet is soft/chopped solids with nectar or honey thick liquids with known aspiration risk. Meds crushed in puree. Sitting upright, slow rate, small bites/sips, supervision during meals, double swallow.  -CR --          SLP Communication to Radiology    Summary Statement VFSS complete. Radiology APRNEliza, present during the study. Patient presents with moderate oropharyngeal dysphagia. Penetration with nectar thick liquids and honey thick liquids. Alyson aspiration of thin liquids. No penetration/aspiration with puree or soft/chopped solids. Double swallow successful in reducing moderate pharyngeal residue.  -CR --          SLP Evaluation Clinical Impression    SLP Swallowing Diagnosis moderate;oral dysphagia;pharyngeal dysphagia  -CR suspected pharyngeal dysphagia;oral dysphagia  -CR       Functional Impact risk of aspiration/pneumonia  -CR risk of aspiration/pneumonia  -CR       Rehab Potential/Prognosis, Swallowing -- good, to achieve stated therapy goals  -CR       Swallow Criteria for Skilled Therapeutic Interventions Met patient/family declined skilled intervention at this time;other (see comments)  pursuing comfort measures  -CR demonstrates skilled criteria  -CR          Recommendations    Therapy Frequency (Swallow) PRN  -CR PRN  -CR       Predicted Duration Therapy Intervention (Days) until  discharge  -CR until discharge  -CR       SLP Diet Recommendation NPO  Safest PO diet is soft/chopped solids with nectar or honey thick liquids with known aspiration risk  -CR NPO;ice chips between meals after oral care, with supervision  -CR       Recommended Diagnostics No further SLP services recommended  -CR reassess via clinical swallow evaluation;reassess via VFSS (MBS);other (see comments)  pending GOC  -CR       Recommended Precautions and Strategies upright posture during/after eating;small bites of food and sips of liquid;1:1 supervision;multiple swallows per bite of food;multiple swallows per sip of liquid  -CR --       Oral Care Recommendations Oral Care BID/PRN  -CR Oral Care BID/PRN;Before ice/water  -CR       SLP Rec. for Method of Medication Administration meds crushed;with puree;as tolerated  -CR meds whole;meds crushed;with puree;as tolerated  -CR       Monitor for Signs of Aspiration yes;notify SLP if any concerns  -CR yes;notify SLP if any concerns  -CR                 User Key  (r) = Recorded By, (t) = Taken By, (c) = Cosigned By      Initials Name Effective Dates    Sri Burnett SLP 12/03/24 -                     EDUCATION  The patient has been educated in the following areas:   Dysphagia (Swallowing Impairment).                Time Calculation:    Time Calculation- SLP       Row Name 01/10/25 0944             Time Calculation- SLP    SLP Start Time 0735  -CR      SLP Received On 01/10/25  -CR         Untimed Charges    57698-VU Motion Fluoro Eval Swallow Minutes 75  -CR         Total Minutes    Untimed Charges Total Minutes 75  -CR       Total Minutes 75  -CR                User Key  (r) = Recorded By, (t) = Taken By, (c) = Cosigned By      Initials Name Provider Type    Sri Burnett SLP Speech and Language Pathologist                    Therapy Charges for Today       Code Description Service Date Service Provider Modifiers Qty    48146076513 HC ST EVAL ORAL PHARYNG SWALLOW 4  1/9/2025 Sri Thomas, SLP GN 1    [id number] HC ST MOTION FLUORO EVAL SWALLOW 5 1/10/2025 Sri Thomas SLP GN 1                 HARRIETT Sharp  1/10/2025

## 2025-01-10 NOTE — NURSING NOTE
"CWOCN- changed patient's colostomy appliance. Stool was pancaking under the wafer some. I replaced and cleansed the skin. No skin breakdown noted. I placed a 2 3/4\" soft convex 2 piece and used a barrier ring from 3-9 to help fill the dip below her stoma. Her mother watched and has also looked at videos on YouTube, she said, so it was familiar to what she had watched.   I organized her supplies and brought over additional soft convex and drainable pouches since we don't have the closed end in 2 3/4\".   Assessed buttocks, which is improved from the photo.   Will see patient next week for pouching assistance.        01/10/25 1001   Wound Left gluteal   No placement date or time found.   Side: Left  Location: gluteal  Present on Original Admission: Yes   Dressing Appearance open to air   Base scab   Periwound pink   Care, Wound barrier applied   Periwound Care barrier ointment applied   Colostomy LLQ   No Placement date: If unknown, DO NOT use \"Add Comment\" note or Placement time: If unknown, DO NOT use \"Add Comment\" note found.   Location: LLQ   Stomal Appliance 2 piece;Changed   Stoma Appearance irregular;moist;red;protruding above skin level   Peristomal Assessment Intact   Accessories/Skin Care convex wafer;cleansed with water;skin barrier ring  (1/2 ring from 3-9oclock)   Stoma Function stool;flatus   Stool Color brown   Stool Consistency formed   Treatment Bag change   Output (mL) 150 mL       " Patient notified of INR result, coumadin dosing instructions, and next recommended INR lab draw. Patient verbalizes understanding.

## 2025-01-10 NOTE — PROGRESS NOTES
SW met with patient and her significant other, Ubaldo, at bedside this afternoon. Explained role and purpose of visit for psychosocial support. Patient recently arrived to the unit and is settling in. She voices no needs or concerns at this time. Patient/family aware of SW availability for support as needed moving forward.

## 2025-01-10 NOTE — PLAN OF CARE
Goal Outcome Evaluation:              Outcome Evaluation: VFSS complete. Radiology APRN, Eliza Mcintyre, present during the study. Patient presents with moderate oropharyngeal dysphagia. Safest recommendation is NPO. Patient and family have decided to pursue comfort measures at this time. Safest PO diet for QOL purposes is soft/chopped solids with nectar or honey thick liquids (equal risk) with known aspiration risk. Meds crushed in puree. Sitting upright, slow rate, small bites/sips, supervision during meals, double swallow.       Speech to s/o as pt and family have decided to pursue comfort measures.         SLP Swallowing Diagnosis: moderate, oral dysphagia, pharyngeal dysphagia (01/10/25 0900)

## 2025-01-10 NOTE — PROGRESS NOTES
Nutrition Services    Patient Name:  Ely Sims  YOB: 1960  MRN: [id number]  Admit Date:  1/7/2025      PROGRESS NOTE      Encounter Information: Follow up note       PO Diet: NPO Diet NPO Type: Sips with Meds   PO Supplements: N/A   PO Intake:  NPO       Nutrition support orders: --   Nutrition support review: --       Labs (reviewed below): See below       GI Function:  1/9 last BM       Nutrition Intervention: Pt NPO now per speech eval due to aspiration risk. Palliative Care spoke with family and pt is transfering to [facility] for comfort care.  Will remain available as needed.       Results from last 7 days   Lab Units 01/09/25  0622 01/08/25  0523 01/07/25 1803 01/07/25  1039   SODIUM mmol/L 138 134* 133* 130*   POTASSIUM mmol/L 4.4 3.9 5.1 6.2*   CHLORIDE mmol/L 94* 95* 96* 95*   CO2 mmol/L 32.0* 27.0 22.6 16.9*   BUN mg/dL 35* 58* 67* 70*   CREATININE mg/dL 1.36* 2.42* 3.11* 3.43*   CALCIUM mg/dL 9.4 9.4 9.5 10.4   BILIRUBIN mg/dL 0.9 0.8  --  0.9   ALK PHOS U/L 1,041* 970*  --  861*   ALT (SGPT) U/L 38* 35*  --  30   AST (SGOT) U/L 60* 50*  --  54*   GLUCOSE mg/dL 123* 101* 122* 118*     Results from last 7 days   Lab Units 01/09/25  0622 01/08/25  0523 01/07/25 1803 01/07/25  1039   MAGNESIUM mg/dL 2.1 2.4  --  2.6*   PHOSPHORUS mg/dL 2.5 3.1   < >  --    HEMOGLOBIN g/dL 13.8 14.8  --  14.5   HEMATOCRIT % 42.6 44.4  --  43.7    < > = values in this interval not displayed.     COVID19   Date Value Ref Range Status   01/07/2025 Not Detected Not Detected - Ref. Range Final     Lab Results   Component Value Date    HGBA1C 5.50 08/24/2024       RD to follow up per protocol.    Electronically signed by:  Milla Brady RD  01/10/25 07:40 EST

## 2025-01-10 NOTE — PROGRESS NOTES
[facility] Progress Note      Patient Name: Ely Sims  : 1960  MRN: [id number]  Primary Care Physician:  Provider, No Known  Date of admission: 2025    Subjective     Interval History:   Follow-up acute kidney injury.  Family decided to proceed with comfort measures    Review of Systems:   As noted above    Objective     Vitals:   Temp:  [96.7 °F (35.9 °C)-98.1 °F (36.7 °C)] 96.7 °F (35.9 °C)  Heart Rate:  [] 93  Resp:  [16-20] 20  BP: (119-173)/() 173/102    Intake/Output Summary (Last 24 hours) at 1/10/2025 1353  Last data filed at 1/10/2025 1001  Gross per 24 hour   Intake 210 ml   Output 450 ml   Net -240 ml       Physical Exam:    General: Sitting up in bed.  Very frail.  More conversant.  HEENT: Oral mucosa dry.  Neck: Left subclavian port.  Heart: Regular rate and rhythm no S3 or rub, tachycardic  Lungs: Clear to auscultation bilaterally.  Abdomen: Positive bowel sound, fir, nontender.  Extremities: No edema  Skin: No rash.  Scheduled Meds:     buPROPion XL, 150 mg, Oral, QAM  fentaNYL, 1 patch, Transdermal, Q72H   And  Check Fentanyl Patch Placement, 1 each, Not Applicable, Q12H  dexAMETHasone, 4 mg, Oral, BID  fluconazole, 200 mg, Intravenous, Q24H  melatonin, 5 mg, Oral, Nightly  Menthol-Zinc Oxide, 1 Application, Topical, Q12H  polyethylene glycol, 17 g, Oral, Daily  sennosides-docusate, 2 tablet, Oral, Daily  sodium chloride, 10 mL, Intravenous, Q12H  sodium chloride, 10 mL, Intravenous, Q12H  traZODone, 12.5 mg, Oral, Nightly      IV Meds:          Results Reviewed:   I have personally reviewed the results from the time of this admission to 1/10/2025 13:53 EST     Results from last 7 days   Lab Units 25  0622 25  0523 25  1803 25  1039   SODIUM mmol/L 138 134* 133* 130*   POTASSIUM mmol/L 4.4 3.9 5.1 6.2*   CHLORIDE mmol/L 94* 95* 96* 95*   CO2 mmol/L 32.0* 27.0 22.6 16.9*   BUN mg/dL 35* 58* 67* 70*   CREATININE mg/dL 1.36*  2.42* 3.11* 3.43*   CALCIUM mg/dL 9.4 9.4 9.5 10.4   BILIRUBIN mg/dL 0.9 0.8  --  0.9   ALK PHOS U/L 1,041* 970*  --  861*   ALT (SGPT) U/L 38* 35*  --  30   AST (SGOT) U/L 60* 50*  --  54*   GLUCOSE mg/dL 123* 101* 122* 118*       Estimated Creatinine Clearance: 35.8 mL/min (A) (by C-G formula based on SCr of 1.36 mg/dL (H)).    Results from last 7 days   Lab Units 01/09/25  0622 01/08/25  0523 01/07/25  1803 01/07/25  1039   MAGNESIUM mg/dL 2.1 2.4  --  2.6*   PHOSPHORUS mg/dL 2.5 3.1 4.4  --        Results from last 7 days   Lab Units 01/08/25  0523   URIC ACID mg/dL 16.7*       Results from last 7 days   Lab Units 01/09/25  0622 01/08/25  0523 01/07/25  1039   WBC 10*3/mm3 9.37 12.36* 15.38*   HEMOGLOBIN g/dL 13.8 14.8 14.5   PLATELETS 10*3/mm3 156 149 184             Assessment / Plan     ASSESSMENT:  Acute kidney injury likely due to volume depletion.  Poor p.o. intake in the setting of Lasix and potassium supplementation.  Complicated by hyperkalemia.  Waste products improved today.  Potassium normal.  Metabolic acidemia resolved.  Stop bicarb drip.  Metastatic colon cancer refractory to therapy.  Progressive disease on last CT scan in December 24.  Discussed with Dr. Saunders yesterday.  Palliative care team consulted.  Hyperuricemia due to volume depletion.  4.  Elevated TSH.  Dr. Karimi addressing.  5.  Elevated transaminases and alk phos.  6.  Oral thrush mucositis being treated with IV Diflucan.    PLAN:  Family decided to proceed with comfort measures.  Nephrology team will sign off    Thank you for involving us in the care of Ely Sims.  Please feel free to call with any questions.    Madeline Lagos MD  01/10/25  13:53 EST    [facility]  [phone]    Please note that portions of this note were completed with a voice recognition program.

## 2025-01-10 NOTE — PROGRESS NOTES
Discharge Planning Assessment  [facility]     Patient Name: Ely Sims  MRN: [id number]  Today's Date: 1/10/2025    Admit Date: 1/7/2025    Plan: PALLIATIVE/COMFORT CARE. CARLOS LOPEZ   Discharge Needs Assessment    No documentation.                  Discharge Plan       Row Name 01/10/25 0932       Plan    Plan PALLIATIVE/COMFORT CARE. CARLOS LOPEZ    Plan Comments The patient transferred to [facility] from [facility] on 1/10/25 for palliative care. [facility] to evaluate. CCP will continue to follow for any needs. CARLOS Lopez RN, CCP                  Continued Care and Services - Admitted Since 1/7/2025       Home Medical Care       Service Provider Request Status Services Address Phone Fax Patient Preferred     [facility] Accepted -- [address] [address] [phone] --                  Selected Continued Care - Episodes Includes continued care and service providers with selected services from the active episodes listed below      Oncology- External Fill Episode start date: 12/5/2024   There are no active outsourced providers for this episode.                 Selected Continued Care - Prior Encounters Includes continued care and service providers with selected services from prior encounters from 10/9/2024 to 1/10/2025      Discharged on 12/20/2024 Admission date: 12/13/2024 - Discharge disposition: Home-Health Care c      Home Medical Care       Service Provider Services Address Phone Fax Patient Preferred     [facility] Home Nursing, Home Rehabilitation [address] [address] [phone] --                          Expected Discharge Date and Time       Expected Discharge Date Expected Discharge Time    Jan 13, 2025            Demographic Summary    No documentation.                  Functional Status    No documentation.                  Psychosocial    No documentation.                   Abuse/Neglect    No documentation.                  Legal    No documentation.                  Substance Abuse    No documentation.                  Patient Forms    No documentation.                     Marsha Lopez RN

## 2025-01-10 NOTE — CASE MANAGEMENT/SOCIAL WORK
Continued Stay Note  [facility]     Patient Name: Ely Sims  MRN: [id number]  Today's Date: 1/10/2025    Admit Date: 1/7/2025    Plan: Plan Palliative Care.  CARLOS Otoole RN   Discharge Plan       Row Name 01/10/25 1059       Plan    Plan Plan Palliative Care.  CARLOS Otoole RN    Patient/Family in Agreement with Plan yes    Plan Comments Pt is transferring to Palliative Care today.   CCP will follow for needs.  Plan Palliative Care.  CARLOS Otoole RN      Row Name 01/10/25 0932       Plan    Plan PALLIATIVE/COMFORT CARE. CARLOS SAINZ    Plan Comments The patient transferred to [facility] from [facility] on 1/10/25 for palliative care. [facility] to evaluate. CCP will continue to follow for any needs. CARLOS Sainz RN, CCP                   Discharge Codes    No documentation.                 Expected Discharge Date and Time       Expected Discharge Date Expected Discharge Time    Jan 13, 2025               Fidelina Otoole RN

## 2025-01-11 PROCEDURE — 25010000002 FLUCONAZOLE PER 200 MG: Performed by: HOSPITALIST

## 2025-01-11 PROCEDURE — 25010000002 MORPHINE PER 10 MG: Performed by: HOSPITALIST

## 2025-01-11 PROCEDURE — 63710000001 DEXAMETHASONE PER 0.25 MG: Performed by: HOSPITALIST

## 2025-01-11 PROCEDURE — 99232 SBSQ HOSP IP/OBS MODERATE 35: CPT | Performed by: INTERNAL MEDICINE

## 2025-01-11 RX ADMIN — MORPHINE SULFATE 2 MG: 2 INJECTION, SOLUTION INTRAMUSCULAR; INTRAVENOUS at 07:18

## 2025-01-11 RX ADMIN — FENTANYL TRANSDERMAL 1 PATCH: 75 PATCH, EXTENDED RELEASE TRANSDERMAL at 11:15

## 2025-01-11 RX ADMIN — ANORECTAL OINTMENT 1 APPLICATION: 15.7; .44; 24; 20.6 OINTMENT TOPICAL at 22:00

## 2025-01-11 RX ADMIN — Medication 10 ML: at 08:27

## 2025-01-11 RX ADMIN — Medication 5 MG: at 21:55

## 2025-01-11 RX ADMIN — DEXAMETHASONE 4 MG: 4 TABLET ORAL at 21:55

## 2025-01-11 RX ADMIN — BUPROPION HYDROCHLORIDE 150 MG: 150 TABLET, EXTENDED RELEASE ORAL at 11:20

## 2025-01-11 RX ADMIN — ANORECTAL OINTMENT 1 APPLICATION: 15.7; .44; 24; 20.6 OINTMENT TOPICAL at 08:40

## 2025-01-11 RX ADMIN — TRAZODONE HYDROCHLORIDE 12.5 MG: 50 TABLET ORAL at 21:55

## 2025-01-11 RX ADMIN — Medication 10 ML: at 22:00

## 2025-01-11 RX ADMIN — SENNOSIDES AND DOCUSATE SODIUM 2 TABLET: 50; 8.6 TABLET ORAL at 08:29

## 2025-01-11 RX ADMIN — FLUCONAZOLE IN SODIUM CHLORIDE 200 MG: 2 INJECTION, SOLUTION INTRAVENOUS at 11:14

## 2025-01-11 RX ADMIN — DEXAMETHASONE 4 MG: 4 TABLET ORAL at 08:29

## 2025-01-11 NOTE — PLAN OF CARE
Goal Outcome Evaluation:  Plan of Care Reviewed With: patient, significant other        Progress: no change  Outcome Evaluation: Pt confused at times. Transferred to [facility] for PC. No symtoms this shift until at shift change. Pain in abdomen tx'ed w/2mg IV morphine. SLP diet recommendations for comfort ordered. Pt coughing w/oral intake. Advised pt and sig other to take small sips/bites.

## 2025-01-11 NOTE — PROGRESS NOTES
[facility] INPATIENT PROGRESS NOTE    Length of Stay:  4 days    CHIEF COMPLAINT/REASON FOR VISIT:  Metastatic colon cancer     SUBJECTIVE: She continues to aspirate. Denies much pain. Denies sore throat but states her mouth is dry.     ROS:  14 systems reviewed with pertinent positives and negatives in the HPI. Reviewed today.    OBJECTIVE:  Vitals:    01/10/25 1315 01/10/25 2015 01/11/25 0241 01/11/25 0755   BP: (!) 173/102  (!) 165/111 160/97   BP Location: Right arm  Left arm Right arm   Patient Position: Lying  Sitting Sitting   Pulse: 93 90 92 90   Resp: 20 16 18 18   Temp: 96.7 °F (35.9 °C)  96.7 °F (35.9 °C) 96.4 °F (35.8 °C)   TempSrc: Oral  Oral Oral   SpO2: 99% 98% 98% 98%   Weight:       Height:             PHYSICAL EXAMINATION:   General: Chronically ill-appearing, in chair  HEENT: Improved white patches in the back of her throat today  Extremities: Warm and well-perfused with 1-2+ bilateral foot edema    DIAGNOSTIC DATA:  Results Review:     I reviewed the patient's new clinical results.    Results from last 7 days   Lab Units 01/09/25  0622   WBC 10*3/mm3 9.37   HEMOGLOBIN g/dL 13.8   HEMATOCRIT % 42.6   PLATELETS 10*3/mm3 156     Lab Results   Component Value Date    NEUTROABS 10.45 (H) 01/08/2025     Results from last 7 days   Lab Units 01/09/25  0622   SODIUM mmol/L 138   POTASSIUM mmol/L 4.4   CHLORIDE mmol/L 94*   CO2 mmol/L 32.0*   BUN mg/dL 35*   CREATININE mg/dL 1.36*   GLUCOSE mg/dL 123*   CALCIUM mg/dL 9.4         Results from last 7 days   Lab Units 01/09/25  0622   MAGNESIUM mg/dL 2.1         IMAGING:    None reviewed    ASSESSMENT:  This is a 64 y.o. female with:     *Metastatic colon cancer  The patient presented on 8/19/2022 initially to urgent care with headache, nausea, vomiting, diarrhea.  She was advised to go to the ED from urgent care.  CT head 8/19/2024 showed some areas of increased attenuation over the right cerebral hemisphere and left temporal lobe concerning  for metastatic disease with edema with some mass effect upon the fourth ventricle.  MRI brain 8/20/2024 with a lesion at the right cerebellar hemisphere measuring 2.2 cm, lesion within the left temporal lobe measuring 9 mm, 1.0 cm lesion at the left cerebellar vermis, all with surrounding edema.  There was some mass effect upon the fourth ventricle with mild prominence of the lateral and third ventricles.  8/20/2024: CT chest abdomen and pelvis with no evidence of pulmonary metastatic disease.  There was some asymmetric wall thickening in the distal sigmoid colon with surrounding mesenteric soft tissue nodularity concerning for primary colon cancer.  A sclerotic lesion of the spinous process of T1 was said to be consistent with a bone island or other benign lesion.  8/21/2024: Posterior fossa craniectomy with gross total removal of a right cerebellar hemisphere metastasis Dr. Bull Doty.  Pathology consistent with metastatic poorly differentiated adenocarcinoma favoring colorectal origin.  MSI testing is pending.  [place]us with a BRAF V600E mutation.  TMB 4.7 mutations per megabase.  Microsatellite stable.  8/25/2024: Bone scan with no obvious metastatic disease.  8/25/2024: Colonoscopy by Dr. Kaminski shows an infiltrative completely obstructing large mass found at the proximal rectum.  Pathology with invasive poorly differentiated carcinoma with neuroendocrine features. MSI studies pending.     CEA 2.10.  8/26/2024: MRI lumbar spine with no obvious metastatic disease in the lumbar spine.  However, there is a 3 mm enhancing lesion in the visualized lower thoracic spinal cord that could represent a spinal cord metastasis.  Cervical and thoracic spine MRI requested..  8/27/24: diverting loop sigmoid colostomy and port placement by Dr. Chester  8/28/2024: MRI C and T-spine addendum made with a small 3 mm met to the spinal cord at T12  Completed radiation to the brain and spine on 9/24/24  PET scan 9/12/24 with no  metastatic disease. FDG avid mass at the high rectum.  10/22/2024: Proceed with Cycle 2 FOLFOX. Will omit 5 FU bolus due to neutropenia. Future treatment plans adjusted. In regards to lower abdominal cramping which occurs at night, advised patient to discuss with Dr. Chester (surgeon).   11/5/2024: Proceed with Cycle 3 FOLFOX with 5 FU bolus omitted. WBC 4.05 and . Will give a dose of tylenol today while in the infusion area for side/back pain. Instructed her to reach out if the pain/soreness does not subside.   11/19/2024: She presents for cycle #4 of therapy.  Therapy has been well-tolerated.  However, she is having worsening abdominal pain as well as some nausea and vomiting this morning.  See below.  11/26/2024: CT imaging with significant progression of disease  With progression, cetuximab or panitumumab plus encorafenib given the presence of a BRAF V600 E mutation.  Plan panitumumab due to ease of administration every 2 weeks and potential for fewer adverse effects compared to cetuximab. Encorafenib is 300 mg daily.  Panitumumab is 6 mg/kg every 14 days.  Plan treatment until disease progression or unacceptable toxicity.  Treatment is palliative and she understands this.  We will plan to initiate therapy very quickly given significant pression of disease.  C1 D1 panitumumab and encorafenib 12/10/2024  12/13/2024: seen for triage visit concerning severe pain and uncontrolled nausea/vomiting.  She has not been able to keep down any food or drink for the last 3 days.  Has been able to keep down some medications.  Has been utilizing Compazine every 6 hours without any relief.  As far as her pain she is continued MS Contin 15 mg every 8 hours and Norco 10/325 every 6 hours, despite this her pain has continued to worsen.  Her WBC is elevated at 18.44, and she reports developing  chills a few hours ago.  Her bilirubin has also increase dto 2.4.  She will require admission through [facility].  Subsequent admission.   Pain was better when she left the hospital.  1/7/2025: She presented to the office looking very bad with confusion, significant weakness.  She was admitted to the hospital.  Plans for comfort measures and hospice     *Abdominal pain  We have started sustained-release morphine 15 mg every 8 hours.  We had intended her for her to take hydrocodone also as needed but she has not been doing this thinking that morphine replaced the hydrocodone.  She was encouraged to take hydrocodone as needed as well.  12/13/2024: Patient is having severe uncontrolled pain.  She has been taking MS Contin 15 mg every 8 hours and Norco 10/325 every 6 hours.  Despite this her pain has continued to worsen.  She will require direct admission through [facility] for further management.  Started Fentanyl patch inpatient.  This along with hydrocodone 10 mg tablets improved her pain.  She just has hydrocodone 5 mg tablets at home.  Unfortunately, her fentanyl patch fell off on Saturday and she has been having more pain and spite of replacement of a new patch yesterday.  Currently utilizing fentanyl 75 mcg patch (increased) and Norco 10/325 every 4 hours.      *Uncontrolled nausea/vomiting  Compazine was helping at home.  She has not been taking it since leaving the hospital since she apparently was told she was not supposed to take it.  It is unclear why this was stopped.  Records from the hospital do not really discuss her nausea and vomiting and what medication was used for this.  It appears that she was on a scopolamine patch at 1 point but this may have been stopped secondary to altered mental status.  She was also receiving lorazepam which was also stopped due to sedation.  1/7/2025 now utilizing Zofran 8 mg dissolvable tablets.  Continues with poor oral intake and extreme weight loss of 11 pounds in the last 2 weeks, over 20 pounds in the last month.     *Previous lower extremity edema while hospitalized 12/2024.    He was discharged home on Lasix  40 mg daily  Developed considerable volume depletion     *Acute kidney injury likely secondary to volume depletion  Creatinine 1.36, from .42, from 3.11, from 3.43, from 1.02     *Hyponatremia  Sodium normalized     *Oral thrush  Receiving IV Diflucan  Unable to gargle for oral care    *Aspiration with recommendations from speech therapy to be n.p.o. Regular soft diet with nectar thick fluids for comfort.     RECOMMENDATIONS/PLAN:   DNR/DNI  Home with hospice. If she declines, HSB here   Continue wound care  IV Diflucan. Throat appearing better  On dex 4 mg bid  Continue pain medication. Fentanyl 75 mcg patch and PRNs  I'll see her tomorrow. Discussed with the patient and her mother.       Elieser Saunders MD

## 2025-01-11 NOTE — PROGRESS NOTES
Name: Ely Sims ADMIT: 2025   : 1960  PCP: Provider, No Known    MRN: [id number] LOS: 4 days   AGE/SEX: 64 y.o. female  ROOM: [facility]/     Subjective   Subjective   Sitting in chair. Eating some bites. Family at bedside thinks she might be having some choking while eating.     Objective   Objective   Vital Signs  Temp:  [96.4 °F (35.8 °C)-96.7 °F (35.9 °C)] 96.4 °F (35.8 °C)  Heart Rate:  [90-92] 90  Resp:  [16-18] 18  BP: (160-165)/() 160/97  SpO2:  [98 %] 98 %  on   ;   Device (Oxygen Therapy): room air  Body mass index is 18.76 kg/m².    Physical Exam  Constitutional:       Appearance: She is underweight. She is ill-appearing (frail).   HENT:      Head: Normocephalic.   Cardiovascular:      Rate and Rhythm: Normal rate and regular rhythm.   Pulmonary:      Effort: Pulmonary effort is normal. No respiratory distress.   Abdominal:      Palpations: Abdomen is soft.      Tenderness: There is no abdominal tenderness.   Musculoskeletal:         General: No swelling.   Skin:     General: Skin is warm and dry.     Results Review  I reviewed the patient's new clinical results.  Results from last 7 days   Lab Units 25  0622 25  0523 25  1039   WBC 10*3/mm3 9.37 12.36* 15.38*   HEMOGLOBIN g/dL 13.8 14.8 14.5   PLATELETS 10*3/mm3 156 149 184     Results from last 7 days   Lab Units 25  0622 25  0523 25  1803 25  1039   SODIUM mmol/L 138 134* 133* 130*   POTASSIUM mmol/L 4.4 3.9 5.1 6.2*   CHLORIDE mmol/L 94* 95* 96* 95*   CO2 mmol/L 32.0* 27.0 22.6 16.9*   BUN mg/dL 35* 58* 67* 70*   CREATININE mg/dL 1.36* 2.42* 3.11* 3.43*   GLUCOSE mg/dL 123* 101* 122* 118*     Lab Results   Component Value Date    ANIONGAP 12.0 2025     Estimated Creatinine Clearance: 35.8 mL/min (A) (by C-G formula based on SCr of 1.36 mg/dL (H)).   Lab Results   Component Value Date    EGFR 43.6 (L) 2025     Results from last 7 days   Lab Units 25  0622 25  0541  "01/07/25  1803 01/07/25  1039   ALBUMIN g/dL 2.9* 3.5 3.5 3.7   BILIRUBIN mg/dL 0.9 0.8  --  0.9   ALK PHOS U/L 1,041* 970*  --  861*   AST (SGOT) U/L 60* 50*  --  54*   ALT (SGPT) U/L 38* 35*  --  30     Results from last 7 days   Lab Units 01/09/25  0622 01/08/25  0523 01/07/25  1803 01/07/25  1039   CALCIUM mg/dL 9.4 9.4 9.5 10.4   ALBUMIN g/dL 2.9* 3.5 3.5 3.7   MAGNESIUM mg/dL 2.1 2.4  --  2.6*   PHOSPHORUS mg/dL 2.5 3.1 4.4  --      Results from last 7 days   Lab Units 01/07/25  1258   PROCALCITONIN ng/mL 1.00*   LACTATE mmol/L 1.8     No results found for: \"HGBA1C\", \"POCGLU\"      No radiology results for the last day    Scheduled Meds  buPROPion XL, 150 mg, Oral, QAM  fentaNYL, 1 patch, Transdermal, Q72H   And  Check Fentanyl Patch Placement, 1 each, Not Applicable, Q12H  dexAMETHasone, 4 mg, Oral, BID  fluconazole, 200 mg, Intravenous, Q24H  melatonin, 5 mg, Oral, Nightly  Menthol-Zinc Oxide, 1 Application, Topical, Q12H  polyethylene glycol, 17 g, Oral, Daily  sennosides-docusate, 2 tablet, Oral, Daily  sodium chloride, 10 mL, Intravenous, Q12H  sodium chloride, 10 mL, Intravenous, Q12H  traZODone, 12.5 mg, Oral, Nightly    Continuous Infusions     PRN Meds    acetaminophen **OR** acetaminophen **OR** acetaminophen    acetaminophen **OR** acetaminophen **OR** acetaminophen    diphenhydrAMINE **OR** diphenhydrAMINE    diphenoxylate-atropine    glycopyrrolate **OR** glycopyrrolate **OR** glycopyrrolate **OR** glycopyrrolate    heparin    HYDROcodone-acetaminophen    Morphine **OR** morphine **OR** HYDROmorphone    Morphine **OR** morphine **OR** HYDROmorphone    Morphine **OR** morphine **OR** HYDROmorphone    LORazepam **OR** LORazepam **OR** LORazepam    LORazepam **OR** LORazepam **OR** LORazepam    LORazepam **OR** LORazepam **OR** LORazepam    Morphine    ondansetron ODT    Polyvinyl Alcohol-Povidone PF    sodium chloride    sodium chloride    sodium chloride    sodium chloride    sodium chloride    " sodium chloride       Diet  Diet: Regular/House; Texture: Soft to Chew (NDD 3); Soft to Chew: Chopped Meat; Fluid Consistency: Nectar Thick       Assessment/Plan     Active Hospital Problems    Diagnosis  POA    **Acute kidney injury (ALBER) with acute tubular necrosis (ATN) [N17.0]  Yes    Hyperkalemia [E87.5]  Yes    Anxiety [F41.9]  Yes    Severe protein-calorie malnutrition [E43]  Yes    Abdominal pain [R10.9]  Yes    Dehydration [E86.0]  Yes    Hypertension [I10]  Yes    Colon cancer metastasized to brain [C18.9, C79.31]  Yes      Resolved Hospital Problems   No resolved problems to display.     Patient is a 64 y.o. female metastatic colon cancer and progressive decline. Nausea and vomiting decreased intake difficulty swallowing. Worsening abdominal pain.    Goals of care  Continue comfort care  Received 1 dose of morphine yesterday and 1 dose today  Currently home with hospice but if declines HSB    ALBER, Hyperkalemia, Metabolic acidosis Likely prerenal due to dehydration and decreased p.o. intake (nausea, dysphagia) and diuretics.   Metastatic colon cancer, Ongoing abdominal pain Fentanyl increased in pain seems improved. Had a CT last month with progression of pulmonary mets, liver mets, lymphadenopathy  Underweight, malnutrition no feeding tubes  Elevated TSH  Elevated procalcitonin  Possible thrush, his dysphagia continue Diflucan since pain seems improved  Insomnia  Started on a small dose of trazodone for sleep 1/8/2025    Discharge  TBD   Expected Discharge Date: 1/13/2025; Expected Discharge Time:     Discussed with patient, family, and nursing staff    Frank Karimi MD  [facility]  01/11/25 13:21 EST

## 2025-01-11 NOTE — PLAN OF CARE
Problem: Adult Inpatient Plan of Care  Goal: Optimal Comfort and Wellbeing  Outcome: Progressing   Goal Outcome Evaluation:Rested comfortably overnight with 2 mg morphine due to pain at 1900

## 2025-01-11 NOTE — PLAN OF CARE
Goal Outcome Evaluation:  Plan of Care Reviewed With: patient           Outcome Evaluation: pt oriented to self, gets confused alot. pt has a comfort diet, nectar thick and soft, up with assist X1 to BR. will cont to monitor                             Problem: Adult Inpatient Plan of Care  Goal: Plan of Care Review  Outcome: Progressing  Flowsheets (Taken 1/11/2025 1336)  Outcome Evaluation: pt oriented to self, gets confused alot. pt has a comfort diet, nectar thick and soft, up with assist X1 to BR. will cont to monitor  Plan of Care Reviewed With: patient  Goal: Patient-Specific Goal (Individualized)  Outcome: Progressing  Goal: Absence of Hospital-Acquired Illness or Injury  Outcome: Progressing  Intervention: Identify and Manage Fall Risk  Recent Flowsheet Documentation  Taken 1/11/2025 1200 by Dhara Ramos RN  Safety Promotion/Fall Prevention:   activity supervised   assistive device/personal items within reach   clutter free environment maintained   fall prevention program maintained   nonskid shoes/slippers when out of bed   room organization consistent   safety round/check completed  Taken 1/11/2025 1000 by Dhara Ramos RN  Safety Promotion/Fall Prevention:   assistive device/personal items within reach   clutter free environment maintained   activity supervised   fall prevention program maintained   nonskid shoes/slippers when out of bed   room organization consistent   safety round/check completed  Taken 1/11/2025 0800 by Dhara Ramos RN  Safety Promotion/Fall Prevention:   activity supervised   assistive device/personal items within reach   clutter free environment maintained   fall prevention program maintained   nonskid shoes/slippers when out of bed   room organization consistent   safety round/check completed  Intervention: Prevent and Manage VTE (Venous Thromboembolism) Risk  Recent Flowsheet Documentation  Taken 1/11/2025 0800 by Dhara Ramos, RN  VTE Prevention/Management:    bilateral   SCDs (sequential compression devices) off  Intervention: Prevent Infection  Recent Flowsheet Documentation  Taken 1/11/2025 1200 by Dhara Ramos RN  Infection Prevention: single patient room provided  Taken 1/11/2025 1000 by Dhara Ramos RN  Infection Prevention: single patient room provided  Taken 1/11/2025 0800 by Dhara Ramos RN  Infection Prevention: single patient room provided  Goal: Optimal Comfort and Wellbeing  Outcome: Progressing  Intervention: Provide Person-Centered Care  Recent Flowsheet Documentation  Taken 1/11/2025 0800 by Dhara Ramos RN  Trust Relationship/Rapport:   care explained   thoughts/feelings acknowledged  Goal: Readiness for Transition of Care  Outcome: Progressing

## 2025-01-12 PROCEDURE — 25010000002 FLUCONAZOLE PER 200 MG: Performed by: HOSPITALIST

## 2025-01-12 PROCEDURE — 63710000001 DEXAMETHASONE PER 0.25 MG: Performed by: HOSPITALIST

## 2025-01-12 PROCEDURE — 99231 SBSQ HOSP IP/OBS SF/LOW 25: CPT | Performed by: INTERNAL MEDICINE

## 2025-01-12 RX ADMIN — ANORECTAL OINTMENT 1 APPLICATION: 15.7; .44; 24; 20.6 OINTMENT TOPICAL at 22:49

## 2025-01-12 RX ADMIN — Medication 10 ML: at 09:55

## 2025-01-12 RX ADMIN — LORAZEPAM 0.5 MG: 2 LIQUID ORAL at 09:58

## 2025-01-12 RX ADMIN — DEXAMETHASONE 4 MG: 4 TABLET ORAL at 22:48

## 2025-01-12 RX ADMIN — FLUCONAZOLE IN SODIUM CHLORIDE 200 MG: 2 INJECTION, SOLUTION INTRAVENOUS at 12:54

## 2025-01-12 RX ADMIN — Medication 5 MG: at 22:48

## 2025-01-12 RX ADMIN — SENNOSIDES AND DOCUSATE SODIUM 2 TABLET: 50; 8.6 TABLET ORAL at 09:55

## 2025-01-12 RX ADMIN — BUPROPION HYDROCHLORIDE 150 MG: 150 TABLET, EXTENDED RELEASE ORAL at 09:55

## 2025-01-12 RX ADMIN — DEXAMETHASONE 4 MG: 4 TABLET ORAL at 09:55

## 2025-01-12 RX ADMIN — Medication 10 ML: at 22:49

## 2025-01-12 RX ADMIN — TRAZODONE HYDROCHLORIDE 12.5 MG: 50 TABLET ORAL at 22:48

## 2025-01-12 NOTE — PLAN OF CARE
Problem: Adult Inpatient Plan of Care  Goal: Optimal Comfort and Wellbeing  Outcome: Progressing   Goal Outcome Evaluation: Pt rested comfortably throughout the night

## 2025-01-12 NOTE — PLAN OF CARE
Goal Outcome Evaluation:  Plan of Care Reviewed With: child, significant other        Progress: no change  Outcome Evaluation: Pt alert to self. Pt appears frustrated/agitated this AM. SL 0.5mg Ativan given with good effect. Pt sleeping intermittently throughout day. Family at bedside. Pt appears comfortable and in no distress at this time.

## 2025-01-12 NOTE — PROGRESS NOTES
Name: Ely Sims ADMIT: 2025   : 1960  PCP: Provider, No Known    MRN: [id number] LOS: 5 days   AGE/SEX: 64 y.o. female  ROOM: \[facility]\""/     Subjective   Subjective   Sitting on the edge of the bed. States her throat pain is improved. No new complaints.     Objective   Objective   Vital Signs  Temp:  [96.8 °F (36 °C)-96.9 °F (36.1 °C)] 96.9 °F (36.1 °C)  Heart Rate:  [98-99] 99  Resp:  [16-18] 18  BP: (166-174)/(106-109) 174/109  SpO2:  [95 %-96 %] 96 %  on   ;   Device (Oxygen Therapy): room air  Body mass index is 18.76 kg/m².    Physical Exam  Constitutional:       Appearance: She is underweight. She is ill-appearing (frail).   HENT:      Head: Normocephalic.   Cardiovascular:      Rate and Rhythm: Normal rate and regular rhythm.   Pulmonary:      Effort: Pulmonary effort is normal. No respiratory distress.   Abdominal:      Palpations: Abdomen is soft.      Tenderness: There is no abdominal tenderness.   Musculoskeletal:         General: No swelling.   Skin:     General: Skin is warm and dry.     Results Review  I reviewed the patient's new clinical results.  Results from last 7 days   Lab Units 25  0622 25  0523 25  1039   WBC 10*3/mm3 9.37 12.36* 15.38*   HEMOGLOBIN g/dL 13.8 14.8 14.5   PLATELETS 10*3/mm3 156 149 184     Results from last 7 days   Lab Units 25  0622 25  0523 25  1803 25  1039   SODIUM mmol/L 138 134* 133* 130*   POTASSIUM mmol/L 4.4 3.9 5.1 6.2*   CHLORIDE mmol/L 94* 95* 96* 95*   CO2 mmol/L 32.0* 27.0 22.6 16.9*   BUN mg/dL 35* 58* 67* 70*   CREATININE mg/dL 1.36* 2.42* 3.11* 3.43*   GLUCOSE mg/dL 123* 101* 122* 118*     Lab Results   Component Value Date    ANIONGAP 12.0 2025     Estimated Creatinine Clearance: 35.8 mL/min (A) (by C-G formula based on SCr of 1.36 mg/dL (H)).   Lab Results   Component Value Date    EGFR 43.6 (L) 2025     Results from last 7 days   Lab Units 25  0622 25  0523 25  1809  "01/07/25  1039   ALBUMIN g/dL 2.9* 3.5 3.5 3.7   BILIRUBIN mg/dL 0.9 0.8  --  0.9   ALK PHOS U/L 1,041* 970*  --  861*   AST (SGOT) U/L 60* 50*  --  54*   ALT (SGPT) U/L 38* 35*  --  30     Results from last 7 days   Lab Units 01/09/25  0622 01/08/25  0523 01/07/25  1803 01/07/25  1039   CALCIUM mg/dL 9.4 9.4 9.5 10.4   ALBUMIN g/dL 2.9* 3.5 3.5 3.7   MAGNESIUM mg/dL 2.1 2.4  --  2.6*   PHOSPHORUS mg/dL 2.5 3.1 4.4  --      Results from last 7 days   Lab Units 01/07/25  1258   PROCALCITONIN ng/mL 1.00*   LACTATE mmol/L 1.8     No results found for: \"HGBA1C\", \"POCGLU\"      No radiology results for the last day    Scheduled Meds  buPROPion XL, 150 mg, Oral, QAM  fentaNYL, 1 patch, Transdermal, Q72H   And  Check Fentanyl Patch Placement, 1 each, Not Applicable, Q12H  dexAMETHasone, 4 mg, Oral, BID  fluconazole, 200 mg, Intravenous, Q24H  melatonin, 5 mg, Oral, Nightly  Menthol-Zinc Oxide, 1 Application, Topical, Q12H  polyethylene glycol, 17 g, Oral, Daily  sennosides-docusate, 2 tablet, Oral, Daily  sodium chloride, 10 mL, Intravenous, Q12H  sodium chloride, 10 mL, Intravenous, Q12H  traZODone, 12.5 mg, Oral, Nightly    Continuous Infusions     PRN Meds    acetaminophen **OR** acetaminophen **OR** acetaminophen    acetaminophen **OR** acetaminophen **OR** acetaminophen    diphenhydrAMINE **OR** diphenhydrAMINE    diphenoxylate-atropine    glycopyrrolate **OR** glycopyrrolate **OR** glycopyrrolate **OR** glycopyrrolate    heparin    HYDROcodone-acetaminophen    Morphine **OR** morphine **OR** HYDROmorphone    Morphine **OR** morphine **OR** HYDROmorphone    Morphine **OR** morphine **OR** HYDROmorphone    LORazepam **OR** LORazepam **OR** LORazepam    LORazepam **OR** LORazepam **OR** LORazepam    LORazepam **OR** LORazepam **OR** LORazepam    Morphine    ondansetron ODT    Polyvinyl Alcohol-Povidone PF    sodium chloride    sodium chloride    sodium chloride    sodium chloride    sodium chloride    sodium chloride     "   Diet  Diet: Regular/House; Texture: Soft to Chew (NDD 3); Soft to Chew: Chopped Meat; Fluid Consistency: Nectar Thick       Assessment/Plan     Active Hospital Problems    Diagnosis  POA    **Acute kidney injury (ALBER) with acute tubular necrosis (ATN) [N17.0]  Yes    Hyperkalemia [E87.5]  Yes    Anxiety [F41.9]  Yes    Severe protein-calorie malnutrition [E43]  Yes    Abdominal pain [R10.9]  Yes    Dehydration [E86.0]  Yes    Hypertension [I10]  Yes    Colon cancer metastasized to brain [C18.9, C79.31]  Yes      Resolved Hospital Problems   No resolved problems to display.     Patient is a 64 y.o. female metastatic colon cancer and progressive decline. Nausea and vomiting decreased intake difficulty swallowing. Worsening abdominal pain.    Goals of care  Continue comfort care  Received 1 dose of morphine yesterday morning  Currently planning for home with hospice    ALBER, Hyperkalemia, Metabolic acidosis Likely prerenal due to dehydration and decreased p.o. intake (nausea, dysphagia) and diuretics.   Metastatic colon cancer, Ongoing abdominal pain Fentanyl increased in pain seems improved. Had a CT last month with progression of pulmonary mets, liver mets, lymphadenopathy  Underweight, malnutrition no feeding tubes  Elevated TSH  Elevated procalcitonin  Possible thrush, his dysphagia continue Diflucan since pain seems improved  Insomnia  Started on a small dose of trazodone for sleep 1/8/2025    Discharge  Probably home with hospice when arrangements made  Expected Discharge Date: 1/13/2025; Expected Discharge Time:     Discussed with patient, family, and nursing staff    Frank Karimi MD  [facility]  01/12/25 08:37 EST

## 2025-01-12 NOTE — PROGRESS NOTES
[facility]IENT PROGRESS NOTE    Length of Stay:  5 days    CHIEF COMPLAINT/REASON FOR VISIT:  Metastatic colon cancer     SUBJECTIVE: Sleeping comfortably and per notes did so throughout the night. No family present.       OBJECTIVE:  Vitals:    01/11/25 0241 01/11/25 0755 01/11/25 1633 01/12/25 0327   BP: (!) 165/111 160/97 (!) 166/106 (!) 174/109   BP Location: Left arm Right arm Left arm Right arm   Patient Position: Sitting Sitting Sitting Sitting   Pulse: 92 90 98 99   Resp: 18 18 16 18   Temp: 96.7 °F (35.9 °C) 96.4 °F (35.8 °C) 96.8 °F (36 °C) 96.9 °F (36.1 °C)   TempSrc: Oral Oral Oral Oral   SpO2: 98% 98% 95% 96%   Weight:       Height:             PHYSICAL EXAMINATION:   Sleeping comfortably    DIAGNOSTIC DATA:  Results Review:     I reviewed the patient's new clinical results.    Results from last 7 days   Lab Units 01/09/25  0622   WBC 10*3/mm3 9.37   HEMOGLOBIN g/dL 13.8   HEMATOCRIT % 42.6   PLATELETS 10*3/mm3 156     Lab Results   Component Value Date    NEUTROABS 10.45 (H) 01/08/2025     Results from last 7 days   Lab Units 01/09/25  0622   SODIUM mmol/L 138   POTASSIUM mmol/L 4.4   CHLORIDE mmol/L 94*   CO2 mmol/L 32.0*   BUN mg/dL 35*   CREATININE mg/dL 1.36*   GLUCOSE mg/dL 123*   CALCIUM mg/dL 9.4         Results from last 7 days   Lab Units 01/09/25  0622   MAGNESIUM mg/dL 2.1         IMAGING:    None reviewed    ASSESSMENT:  This is a 64 y.o. female with:     *Metastatic colon cancer  The patient presented on 8/19/2022 initially to urgent care with headache, nausea, vomiting, diarrhea.  She was advised to go to the ED from urgent care.  CT head 8/19/2024 showed some areas of increased attenuation over the right cerebral hemisphere and left temporal lobe concerning for metastatic disease with edema with some mass effect upon the fourth ventricle.  MRI brain 8/20/2024 with a lesion at the right cerebellar hemisphere measuring 2.2 cm, lesion within the left temporal lobe  measuring 9 mm, 1.0 cm lesion at the left cerebellar vermis, all with surrounding edema.  There was some mass effect upon the fourth ventricle with mild prominence of the lateral and third ventricles.  8/20/2024: CT chest abdomen and pelvis with no evidence of pulmonary metastatic disease.  There was some asymmetric wall thickening in the distal sigmoid colon with surrounding mesenteric soft tissue nodularity concerning for primary colon cancer.  A sclerotic lesion of the spinous process of T1 was said to be consistent with a bone island or other benign lesion.  8/21/2024: Posterior fossa craniectomy with gross total removal of a right cerebellar hemisphere metastasis Dr. Bull Doty.  Pathology consistent with metastatic poorly differentiated adenocarcinoma favoring colorectal origin.  MSI testing is pending.  [place] with a BRAF V600E mutation.  TMB 4.7 mutations per megabase.  Microsatellite stable.  8/25/2024: Bone scan with no obvious metastatic disease.  8/25/2024: Colonoscopy by Dr. Kaminski shows an infiltrative completely obstructing large mass found at the proximal rectum.  Pathology with invasive poorly differentiated carcinoma with neuroendocrine features. MSI studies pending.     CEA 2.10.  8/26/2024: MRI lumbar spine with no obvious metastatic disease in the lumbar spine.  However, there is a 3 mm enhancing lesion in the visualized lower thoracic spinal cord that could represent a spinal cord metastasis.  Cervical and thoracic spine MRI requested..  8/27/24: diverting loop sigmoid colostomy and port placement by Dr. Chester  8/28/2024: MRI C and T-spine addendum made with a small 3 mm met to the spinal cord at T12  Completed radiation to the brain and spine on 9/24/24  PET scan 9/12/24 with no metastatic disease. FDG avid mass at the high rectum.  10/22/2024: Proceed with Cycle 2 FOLFOX. Will omit 5 FU bolus due to neutropenia. Future treatment plans adjusted. In regards to lower abdominal cramping which  occurs at night, advised patient to discuss with Dr. Chester (surgeon).   11/5/2024: Proceed with Cycle 3 FOLFOX with 5 FU bolus omitted. WBC 4.05 and . Will give a dose of tylenol today while in the infusion area for side/back pain. Instructed her to reach out if the pain/soreness does not subside.   11/19/2024: She presents for cycle #4 of therapy.  Therapy has been well-tolerated.  However, she is having worsening abdominal pain as well as some nausea and vomiting this morning.  See below.  11/26/2024: CT imaging with significant progression of disease  With progression, cetuximab or panitumumab plus encorafenib given the presence of a BRAF V600 E mutation.  Plan panitumumab due to ease of administration every 2 weeks and potential for fewer adverse effects compared to cetuximab. Encorafenib is 300 mg daily.  Panitumumab is 6 mg/kg every 14 days.  Plan treatment until disease progression or unacceptable toxicity.  Treatment is palliative and she understands this.  We will plan to initiate therapy very quickly given significant pression of disease.  C1 D1 panitumumab and encorafenib 12/10/2024  12/13/2024: seen for triage visit concerning severe pain and uncontrolled nausea/vomiting.  She has not been able to keep down any food or drink for the last 3 days.  Has been able to keep down some medications.  Has been utilizing Compazine every 6 hours without any relief.  As far as her pain she is continued MS Contin 15 mg every 8 hours and Norco 10/325 every 6 hours, despite this her pain has continued to worsen.  Her WBC is elevated at 18.44, and she reports developing  chills a few hours ago.  Her bilirubin has also increase dto 2.4.  She will require admission through [facility].  Subsequent admission.  Pain was better when she left the hospital.  1/7/2025: She presented to the office looking very bad with confusion, significant weakness.  She was admitted to the hospital.  Plans for comfort measures and hospice      *Abdominal pain  We have started sustained-release morphine 15 mg every 8 hours.  We had intended her for her to take hydrocodone also as needed but she has not been doing this thinking that morphine replaced the hydrocodone.  She was encouraged to take hydrocodone as needed as well.  12/13/2024: Patient is having severe uncontrolled pain.  She has been taking MS Contin 15 mg every 8 hours and Norco 10/325 every 6 hours.  Despite this her pain has continued to worsen.  She will require direct admission through [facility] for further management.  Started Fentanyl patch inpatient.  This along with hydrocodone 10 mg tablets improved her pain.  She just has hydrocodone 5 mg tablets at home.  Unfortunately, her fentanyl patch fell off on Saturday and she has been having more pain and spite of replacement of a new patch yesterday.  Currently utilizing fentanyl 75 mcg patch (increased) , PRN morphine and Norco 10/325 but she has not used much prn medication while here.      *Uncontrolled nausea/vomiting  Compazine was helping at home.  She has not been taking it since leaving the hospital since she apparently was told she was not supposed to take it.  It is unclear why this was stopped.  Records from the hospital do not really discuss her nausea and vomiting and what medication was used for this.  It appears that she was on a scopolamine patch at 1 point but this may have been stopped secondary to altered mental status.  She was also receiving lorazepam which was also stopped due to sedation.  1/7/2025 now utilizing Zofran 8 mg dissolvable tablets.  Continues with poor oral intake and extreme weight loss of 11 pounds in the last 2 weeks, over 20 pounds in the last month.     *Previous lower extremity edema while hospitalized 12/2024.    He was discharged home on Lasix 40 mg daily  Developed considerable volume depletion     *Acute kidney injury likely secondary to volume depletion  Creatinine 1.36, from .42, from 3.11, from  3.43, from 1.02     *Hyponatremia  Sodium normalized     *Oral thrush  Receiving IV Diflucan  Unable to gargle for oral care    *Aspiration with recommendations from speech therapy to be n.p.o. Regular soft diet with nectar thick fluids for comfort.     RECOMMENDATIONS/PLAN:   DNR/DNI  Plan home with hospice.  Continue wound care  IV Diflucan. Throat appeared better yesterday  On dex 4 mg bid  Continue pain medication. Fentanyl 75 mcg patch and PRNs  We will sign off. Please call back if needed.       Elieser Saunders MD

## 2025-01-13 PROCEDURE — 25010000002 FLUCONAZOLE PER 200 MG: Performed by: HOSPITALIST

## 2025-01-13 PROCEDURE — 63710000001 DEXAMETHASONE PER 0.25 MG: Performed by: HOSPITALIST

## 2025-01-13 PROCEDURE — 25010000002 LORAZEPAM PER 2 MG: Performed by: HOSPITALIST

## 2025-01-13 RX ADMIN — LORAZEPAM 0.5 MG: 2 INJECTION INTRAMUSCULAR; INTRAVENOUS at 00:10

## 2025-01-13 RX ADMIN — Medication 10 ML: at 09:00

## 2025-01-13 RX ADMIN — FLUCONAZOLE IN SODIUM CHLORIDE 200 MG: 2 INJECTION, SOLUTION INTRAVENOUS at 12:27

## 2025-01-13 RX ADMIN — Medication 10 ML: at 21:36

## 2025-01-13 RX ADMIN — ANORECTAL OINTMENT 1 APPLICATION: 15.7; .44; 24; 20.6 OINTMENT TOPICAL at 09:00

## 2025-01-13 RX ADMIN — Medication 5 MG: at 21:23

## 2025-01-13 RX ADMIN — DEXAMETHASONE 4 MG: 4 TABLET ORAL at 21:23

## 2025-01-13 RX ADMIN — ANORECTAL OINTMENT 1 APPLICATION: 15.7; .44; 24; 20.6 OINTMENT TOPICAL at 21:36

## 2025-01-13 RX ADMIN — MORPHINE SULFATE 5 MG: 100 SOLUTION ORAL at 12:21

## 2025-01-13 RX ADMIN — LORAZEPAM 0.5 MG: 2 LIQUID ORAL at 11:57

## 2025-01-13 NOTE — PROGRESS NOTES
Discharge Planning Assessment  [facility]     Patient Name: Ely Sims  MRN: [id number]  Today's Date: 1/13/2025    Admit Date: 1/7/2025    Plan: Plan Palliative Care.  CARLOS Otoole RN   Discharge Needs Assessment    No documentation.                  Discharge Plan       Row Name 01/13/25 6235       Plan    Plan Comments The patient transferred to [facility] on 1/10/25 @ 13:06 for palliative care. The discharge goal is home with [facility] when appropriate. CCP will continue to follow. CARLOS Lopez RN, CCP.                  Continued Care and Services - Admitted Since 1/7/2025       Home Medical Care       Service Provider Request Status Services Address Phone Fax Patient Preferred     [facility] Accepted -- [address] [address] [phone] --                  Selected Continued Care - Episodes Includes continued care and service providers with selected services from the active episodes listed below      Oncology- External Fill Episode start date: 12/5/2024   There are no active outsourced providers for this episode.                 Selected Continued Care - Prior Encounters Includes continued care and service providers with selected services from prior encounters from 10/9/2024 to 1/13/2025      Discharged on 12/20/2024 Admission date: 12/13/2024 - Discharge disposition: Home-Health Care c      Home Medical Care       Service Provider Services Address Phone Fax Patient Preferred     [facility], Home Nursing, Home Rehabilitation [address] [address] [phone] --                          Expected Discharge Date and Time       Expected Discharge Date Expected Discharge Time    Jan 16, 2025            Demographic Summary    No documentation.                  Functional Status    No documentation.                  Psychosocial    No documentation.                  Abuse/Neglect     No documentation.                  Legal    No documentation.                  Substance Abuse    No documentation.                  Patient Forms    No documentation.                     Marsha Lopez RN

## 2025-01-13 NOTE — PLAN OF CARE
Palliative care patient pps 40%. Morphine and ativan sublingual x1. Up to bedside commode with assist. Pt is significantly more confused over the past few days per family. Patient occasionally startles and tries to get out of bed, settles back down after some reassurance. Fonseca catheter was offered, daughter is going to talk to her brother and discuss.

## 2025-01-13 NOTE — PLAN OF CARE
Pt became restless midway through shift. Required one dose of 0.5 mg Ativan. Has slept comfortably since

## 2025-01-14 ENCOUNTER — SPECIALTY PHARMACY (OUTPATIENT)
Dept: PHARMACY | Facility: HOSPITAL | Age: 65
End: 2025-01-14
Payer: COMMERCIAL

## 2025-01-14 PROCEDURE — 63710000001 DEXAMETHASONE PER 0.25 MG: Performed by: HOSPITALIST

## 2025-01-14 RX ADMIN — MORPHINE SULFATE 10 MG: 20 SOLUTION ORAL at 02:17

## 2025-01-14 RX ADMIN — ANORECTAL OINTMENT 1 APPLICATION: 15.7; .44; 24; 20.6 OINTMENT TOPICAL at 11:31

## 2025-01-14 RX ADMIN — ANORECTAL OINTMENT 1 APPLICATION: 15.7; .44; 24; 20.6 OINTMENT TOPICAL at 21:30

## 2025-01-14 RX ADMIN — DEXAMETHASONE 4 MG: 4 TABLET ORAL at 21:56

## 2025-01-14 RX ADMIN — Medication 10 ML: at 21:30

## 2025-01-14 RX ADMIN — Medication 5 MG: at 21:56

## 2025-01-14 RX ADMIN — MORPHINE SULFATE 5 MG: 100 SOLUTION ORAL at 00:20

## 2025-01-14 RX ADMIN — Medication 10 ML: at 11:27

## 2025-01-14 RX ADMIN — Medication 10 ML: at 11:28

## 2025-01-14 NOTE — PROGRESS NOTES
Name: Ely Sims ADMIT: 2025   : 1960  PCP: Provider, No Known    MRN: [id number] LOS: 7 days   AGE/SEX: 64 y.o. female  ROOM: [facility]     Subjective   Subjective     No events overnight. The patient's daughter and a friend are at bedside. The patient does appear more awake than yesterday, but she's very slow to answer questions and I'm not sure that she understands everything that I ask her. It sounds like there hasn't been a big change since the benzos have been stopped, but the patient does deny to me that anxiety is an issue.        Objective   Objective   Vital Signs  Temp:  [96.8 °F (36 °C)-97 °F (36.1 °C)] 97 °F (36.1 °C)  Heart Rate:  [114-119] 119  Resp:  [14-16] 16  BP: (148-157)/(103-107) 157/107  SpO2:  [95 %-96 %] 96 %  on   ;   Device (Oxygen Therapy): room air  Body mass index is 18.76 kg/m².  Physical Exam  Constitutional:       General: She is not in acute distress.     Appearance: She is ill-appearing. She is not toxic-appearing.   Cardiovascular:      Rate and Rhythm: Normal rate and regular rhythm.      Heart sounds: Normal heart sounds.   Pulmonary:      Effort: Pulmonary effort is normal.      Breath sounds: Normal breath sounds.   Abdominal:      General: Bowel sounds are normal.      Palpations: Abdomen is soft.   Musculoskeletal:      Right lower leg: No edema.      Left lower leg: No edema.   Neurological:      General: No focal deficit present.      Mental Status: She is alert. She is disoriented and confused.         Results Review     I reviewed the patient's new clinical results.  Results from last 7 days   Lab Units 25  0622 25  0523   WBC 10*3/mm3 9.37 12.36*   HEMOGLOBIN g/dL 13.8 14.8   PLATELETS 10*3/mm3 156 149     Results from last 7 days   Lab Units 25  0622 25  0523 25  1803   SODIUM mmol/L 138 134* 133*   POTASSIUM mmol/L 4.4 3.9 5.1   CHLORIDE mmol/L 94* 95* 96*   CO2 mmol/L 32.0* 27.0 22.6   BUN mg/dL 35* 58* 67*   CREATININE  "mg/dL 1.36* 2.42* 3.11*   GLUCOSE mg/dL 123* 101* 122*   Estimated Creatinine Clearance: 35.8 mL/min (A) (by C-G formula based on SCr of 1.36 mg/dL (H)).  Results from last 7 days   Lab Units 01/09/25 0622 01/08/25 0523 01/07/25  1803   ALBUMIN g/dL 2.9* 3.5 3.5   BILIRUBIN mg/dL 0.9 0.8  --    ALK PHOS U/L 1,041* 970*  --    AST (SGOT) U/L 60* 50*  --    ALT (SGPT) U/L 38* 35*  --      Results from last 7 days   Lab Units 01/09/25 0622 01/08/25 0523 01/07/25  1803   CALCIUM mg/dL 9.4 9.4 9.5   ALBUMIN g/dL 2.9* 3.5 3.5   MAGNESIUM mg/dL 2.1 2.4  --    PHOSPHORUS mg/dL 2.5 3.1 4.4           COVID19   Date Value Ref Range Status   01/07/2025 Not Detected Not Detected - Ref. Range Final   08/19/2024 Not Detected Not Detected - Ref. Range Final     No results found for: \"HGBA1C\", \"POCGLU\"    FL Video Swallow Single Contrast  VIDEO SWALLOWING EXAMINATION BY SPEECH PATHOLOGY     Clinical: Dysphasia     Reference Air Kerma: 3.2 mGy     Video swallowing examination performed under the direction of speech  pathology. Imaging reviewed by radiologist who concurs with the  findings.     Speech pathology summary:     VFSS complete. Radiology APRN, Eliza Mcintyre, present during the study.  Patient presents with moderate oropharyngeal dysphagia. Penetration with  nectar thick liquids and honey thick liquids. Dez aspiration of thin  liquids. No penetration/aspiration with puree or soft/chopped solids.  Double swallow successful in reducing moderate pharyngeal residue.           This report was finalized on 1/10/2025 2:27 PM by Dr. Veto Escobar M.D on Workstation: [id number]       Scheduled Medications  buPROPion XL, 150 mg, Oral, QAM  Check Fentanyl Patch Placement, 1 each, Not Applicable, Q12H  dexAMETHasone, 4 mg, Oral, BID  fluconazole, 200 mg, Intravenous, Q24H  melatonin, 5 mg, Oral, Nightly  Menthol-Zinc Oxide, 1 Application, Topical, Q12H  polyethylene glycol, 17 g, Oral, Daily  sennosides-docusate, 2 tablet, Oral, " Daily  sodium chloride, 10 mL, Intravenous, Q12H  sodium chloride, 10 mL, Intravenous, Q12H  [Held by provider] traZODone, 12.5 mg, Oral, Nightly    Infusions   Diet  Diet: Regular/House; Texture: Soft to Chew (NDD 3); Soft to Chew: Chopped Meat; Fluid Consistency: Nectar Thick       Assessment/Plan     Active Hospital Problems    Diagnosis  POA    **Acute kidney injury (ALBER) with acute tubular necrosis (ATN) [N17.0]  Yes    Hyperkalemia [E87.5]  Yes    Anxiety [F41.9]  Yes    Severe protein-calorie malnutrition [E43]  Yes    Abdominal pain [R10.9]  Yes    Dehydration [E86.0]  Yes    Hypertension [I10]  Yes    Colon cancer metastasized to brain [C18.9, C79.31]  Yes      Resolved Hospital Problems   No resolved problems to display.       64 y.o. female admitted with Acute kidney injury (ALBER) with acute tubular necrosis (ATN).    64 year old woman with metastatic colon cancer who presented with uncontrolled abdominal pain, nausea, vomiting, encephalopathy and was found to have an ALBER with hyperkalemia and an associated metabolic acidosis. She was treated with a bicarb drip with improvement in her acid base status, kidney function, and hyperkalemia, but ultimately decided to pursue comfort measures. Some medications have been continued for comfort, but she is no longer undergoing substantial disease modifying therapy and we are trying to find a regimen that will provide comfort while not over sedating her if possible.    ALBER causing hyperkalemia and a metabolic acidosis-felt to be prerenal in nature due to poor oral intake. Resolved with a bicarb drip  Thrush-on iv diflucan with plans for 14 days of therapy  Metabolic encephalopath-difficult to know whether there is a toxic component to this given the timing of her worsening and association with ativan. She also has brain mets. Ativan is held at the family's request. Doesn't appear to be having much anxiety, but does have some intermittent agitation, which so far  appears tolerable.   Elevated TSH-she had a normal free t4 last month, unlikely to be significantly contributing to her confusion and lab draws are no longer consistent with goals of care  Metastatic colon cancer refractory to therapy-progression of disease on CT despite treatment.   Cancer related pain-dexamethasone, fentanyl patch, prn opiates  Malnutrition/dysphagia-on a modified diet if so desired by the patient and her family. I did discuss with them that if they wish to liberalize it and accept the risk of aspiration, that would be an entirely reasonable decision that I would support.   Insomnia-due to increased confusion/lethargy, holding trazodone  DVT ppx: not consistent with goals of care  Code: comfort measures   Discussed with patient and family.  Anticipate discharge  TBD  timing yet to be determined.      Roque Barlow MD  [facility]  01/14/25  16:53 EST

## 2025-01-14 NOTE — PROGRESS NOTES
Name: Ely Sims ADMIT: 2025   : 1960  PCP: Provider, No Known    MRN: [id number] LOS: 6 days   AGE/SEX: 64 y.o. female  ROOM: [facility]     Subjective   Subjective   No events overnight. The patient's daughter and son in law are at bedside. The patient's daughter expresses that her mom has been more confused since ativan was started. A couple of days ago, she was able to carry on a conversation, but now she is sleeping most of the day and having trouble opening her eyes and just appears confused during the entire day. She previously had had some confusion, but it was mostly in the evening. Per the patient's RN, she has had frequent periods of restlessness and impulsiveness to get out of bed and this has been the indication for ativan up until now. The patient is sedate, but does awake. She endorses that she has some minor pain and following some commands, but isn't meaningfully able to participate in an interview.       Objective   Objective   Vital Signs  Temp:  [97 °F (36.1 °C)-97.9 °F (36.6 °C)] 97.9 °F (36.6 °C)  Heart Rate:  [] 139  Resp:  [16-17] 17  BP: (156)/(100) 156/100  SpO2:  [97 %-99 %] 99 %  on   ;   Device (Oxygen Therapy): room air  Body mass index is 18.76 kg/m².  Physical Exam  Constitutional:       General: She is not in acute distress.     Appearance: She is ill-appearing. She is not toxic-appearing.   Cardiovascular:      Rate and Rhythm: Normal rate and regular rhythm.      Heart sounds: Normal heart sounds.   Pulmonary:      Effort: Pulmonary effort is normal.      Breath sounds: Normal breath sounds.   Abdominal:      General: Bowel sounds are normal.      Palpations: Abdomen is soft.   Musculoskeletal:      Right lower leg: No edema.      Left lower leg: No edema.   Neurological:      General: No focal deficit present.      Mental Status: She is alert. She is lethargic, disoriented and confused.         Results Review     I reviewed the patient's new clinical  "results.  Results from last 7 days   Lab Units 01/09/25 0622 01/08/25  0523 01/07/25  1039   WBC 10*3/mm3 9.37 12.36* 15.38*   HEMOGLOBIN g/dL 13.8 14.8 14.5   PLATELETS 10*3/mm3 156 149 184     Results from last 7 days   Lab Units 01/09/25 0622 01/08/25 0523 01/07/25 1803 01/07/25  1039   SODIUM mmol/L 138 134* 133* 130*   POTASSIUM mmol/L 4.4 3.9 5.1 6.2*   CHLORIDE mmol/L 94* 95* 96* 95*   CO2 mmol/L 32.0* 27.0 22.6 16.9*   BUN mg/dL 35* 58* 67* 70*   CREATININE mg/dL 1.36* 2.42* 3.11* 3.43*   GLUCOSE mg/dL 123* 101* 122* 118*   Estimated Creatinine Clearance: 35.8 mL/min (A) (by C-G formula based on SCr of 1.36 mg/dL (H)).  Results from last 7 days   Lab Units 01/09/25 0622 01/08/25 0523 01/07/25 1803 01/07/25  1039   ALBUMIN g/dL 2.9* 3.5 3.5 3.7   BILIRUBIN mg/dL 0.9 0.8  --  0.9   ALK PHOS U/L 1,041* 970*  --  861*   AST (SGOT) U/L 60* 50*  --  54*   ALT (SGPT) U/L 38* 35*  --  30     Results from last 7 days   Lab Units 01/09/25 0622 01/08/25 0523 01/07/25 1803 01/07/25  1039   CALCIUM mg/dL 9.4 9.4 9.5 10.4   ALBUMIN g/dL 2.9* 3.5 3.5 3.7   MAGNESIUM mg/dL 2.1 2.4  --  2.6*   PHOSPHORUS mg/dL 2.5 3.1 4.4  --      Results from last 7 days   Lab Units 01/07/25  1258   PROCALCITONIN ng/mL 1.00*   LACTATE mmol/L 1.8     COVID19   Date Value Ref Range Status   01/07/2025 Not Detected Not Detected - Ref. Range Final   08/19/2024 Not Detected Not Detected - Ref. Range Final     No results found for: \"HGBA1C\", \"POCGLU\"    FL Video Swallow Single Contrast  VIDEO SWALLOWING EXAMINATION BY SPEECH PATHOLOGY     Clinical: Dysphasia     Reference Air Kerma: 3.2 mGy     Video swallowing examination performed under the direction of speech  pathology. Imaging reviewed by radiologist who concurs with the  findings.     Speech pathology summary:     VFSS complete. Radiology APRN, Eliza Mcintyre, present during the study.  Patient presents with moderate oropharyngeal dysphagia. Penetration with  nectar thick liquids and " honey thick liquids. Dez aspiration of thin  liquids. No penetration/aspiration with puree or soft/chopped solids.  Double swallow successful in reducing moderate pharyngeal residue.           This report was finalized on 1/10/2025 2:27 PM by Dr. Veto Escobar M.D on Workstation: [id number]       Scheduled Medications  buPROPion XL, 150 mg, Oral, QAM  fentaNYL, 1 patch, Transdermal, Q72H   And  Check Fentanyl Patch Placement, 1 each, Not Applicable, Q12H  dexAMETHasone, 4 mg, Oral, BID  fluconazole, 200 mg, Intravenous, Q24H  melatonin, 5 mg, Oral, Nightly  Menthol-Zinc Oxide, 1 Application, Topical, Q12H  polyethylene glycol, 17 g, Oral, Daily  sennosides-docusate, 2 tablet, Oral, Daily  sodium chloride, 10 mL, Intravenous, Q12H  sodium chloride, 10 mL, Intravenous, Q12H  traZODone, 12.5 mg, Oral, Nightly    Infusions   Diet  Diet: Regular/House; Texture: Soft to Chew (NDD 3); Soft to Chew: Chopped Meat; Fluid Consistency: Nectar Thick       Assessment/Plan     Active Hospital Problems    Diagnosis  POA    **Acute kidney injury (ALBER) with acute tubular necrosis (ATN) [N17.0]  Yes    Hyperkalemia [E87.5]  Yes    Anxiety [F41.9]  Yes    Severe protein-calorie malnutrition [E43]  Yes    Abdominal pain [R10.9]  Yes    Dehydration [E86.0]  Yes    Hypertension [I10]  Yes    Colon cancer metastasized to brain [C18.9, C79.31]  Yes      Resolved Hospital Problems   No resolved problems to display.       64 y.o. female admitted with Acute kidney injury (ALBER) with acute tubular necrosis (ATN).    64 year old woman with metastatic colon cancer who presented with uncontrolled abdominal pain, nausea, vomiting, encephalopathy and was found to have an ALBER with hyperkalemia and an associated metabolic acidosis. She was treated with a bicarb drip with improvement in her acid base status, kidney function, and hyperkalemia, but ultimately decided to pursue comfort measures. Some medications have been continued for comfort, but  she is no longer undergoing substantial disease modifying therapy and we are trying to find a regimen that will provide comfort while not over sedating her if possible.    ALBER causing hyperkalemia and a metabolic acidosis-felt to be prerenal in nature due to poor oral intake. Resolved with a bicarb drip  Thrush-on iv diflucan with plans for 14 days of therapy  Metabolic encephalopath-difficult to know whether there is a toxic component to this given the timing of her worsening and association with ativan. She also has brain mets. Regardless, the patient's family has requested that we hold this acutely and see how she does.  Elevated TSH-she had a normal free t4 last month, unlikely to be significantly contributing to her confusion and lab draws are no longer consistent with goals of care  Metastatic colon cancer refractory to therapy-progression of disease on CT despite treatment.   Cancer related pain-dexamethasone, fentanyl patch, prn opiates  Malnutrition/dysphagia-on a modified diet.   Insomnia-due to increased confusion/lethargy, will hold trazodone  DVT ppx: not consistent with goals of care  Code: comfort measures   Discussed with patient, family, and nursing staff.  Anticipate discharge  TBD  timing yet to be determined.      Roque Barlow MD  [facility]  01/13/25  19:13 EST

## 2025-01-14 NOTE — NURSING NOTE
CWOCN- checked patient's ostomy appliance. Her mother is at bedside. Eyl was resting but frequently wants to sit up- easy to calm her to lay back down.   There is no stool in the pouch. It is intact. There are supplies in the room.   Will see patient weekly/PRN. Staff can change if needed. She is not really producing stool at this time.

## 2025-01-14 NOTE — PLAN OF CARE
"Goal Outcome Evaluation:           Progress: no change  Outcome Evaluation: PPS 40%. Family at bedside helping communicate. Ativan held per family's request. Boyfriend states that ativan does the \"opposite of what it is supposed to\". She got SL morphine 5mg x1, and 10mg x1 for pain and restlessness. She stayed up in bed for most the shift.                             "

## 2025-01-14 NOTE — PLAN OF CARE
Goal Outcome Evaluation:      Patient has a PPS 20%.  Sitting up in bed with wide eyes with overall tense body language the majority of the day.  Family declines anxiety medication at this time.  Per visual observation, no s/s of pain, 0 out of 10.  Supportive friends and family have been visiting throughout the day.

## 2025-01-14 NOTE — CONSULTS
Visit with patient and her family at bedside. Patient sitting up in bed. Prayer offered for comfort and peace of patient and those who love her. Patient's sister and daughter at bedside at time of visit.

## 2025-01-15 PROCEDURE — 25010000002 FLUCONAZOLE PER 200 MG: Performed by: HOSPITALIST

## 2025-01-15 PROCEDURE — 63710000001 DEXAMETHASONE PER 0.25 MG: Performed by: HOSPITALIST

## 2025-01-15 RX ORDER — FENTANYL 50 UG/1
1 PATCH TRANSDERMAL
Status: DISCONTINUED | OUTPATIENT
Start: 2025-01-15 | End: 2025-01-17 | Stop reason: HOSPADM

## 2025-01-15 RX ADMIN — ANORECTAL OINTMENT 1 APPLICATION: 15.7; .44; 24; 20.6 OINTMENT TOPICAL at 22:37

## 2025-01-15 RX ADMIN — SENNOSIDES AND DOCUSATE SODIUM 2 TABLET: 50; 8.6 TABLET ORAL at 10:35

## 2025-01-15 RX ADMIN — DEXAMETHASONE 4 MG: 4 TABLET ORAL at 10:35

## 2025-01-15 RX ADMIN — FENTANYL 1 PATCH: 50 PATCH TRANSDERMAL at 17:42

## 2025-01-15 RX ADMIN — Medication 10 ML: at 10:36

## 2025-01-15 RX ADMIN — Medication 10 ML: at 22:37

## 2025-01-15 RX ADMIN — FLUCONAZOLE IN SODIUM CHLORIDE 200 MG: 2 INJECTION, SOLUTION INTRAVENOUS at 16:04

## 2025-01-15 RX ADMIN — BUPROPION HYDROCHLORIDE 150 MG: 150 TABLET, EXTENDED RELEASE ORAL at 16:09

## 2025-01-15 RX ADMIN — ANORECTAL OINTMENT 1 APPLICATION: 15.7; .44; 24; 20.6 OINTMENT TOPICAL at 10:36

## 2025-01-15 RX ADMIN — DEXAMETHASONE 4 MG: 4 TABLET ORAL at 22:36

## 2025-01-15 RX ADMIN — Medication 5 MG: at 22:36

## 2025-01-15 NOTE — PLAN OF CARE
Goal Outcome Evaluation:           Progress: no change  Outcome Evaluation: PPS 30%. Daughter at bedside this evening. The pt slept through some of the night and was able to drink a boost and eat some ice cream. She took her evening medications tonight. No distress noted and she does not complain of pain.

## 2025-01-15 NOTE — PROGRESS NOTES
Name: Ely Sims ADMIT: 2025   : 1960  PCP: Provider, No Known    MRN: [id number] LOS: 8 days   AGE/SEX: 64 y.o. female  ROOM: [facility]     Subjective   Subjective     More awake today. Her friend is at bedside. The patient answers my questions more clearly and makes eye contact. She speaks in a whisper.       Objective   Objective   Vital Signs  Temp:  [97 °F (36.1 °C)-97.6 °F (36.4 °C)] 97.6 °F (36.4 °C)  Heart Rate:  [112-119] 112  Resp:  [16-18] 18  BP: (150-157)/() 150/96  SpO2:  [93 %-96 %] 93 %  on   ;   Device (Oxygen Therapy): room air  Body mass index is 18.76 kg/m².  Physical Exam  Constitutional:       General: She is not in acute distress.     Appearance: She is ill-appearing. She is not toxic-appearing.   Cardiovascular:      Rate and Rhythm: Normal rate and regular rhythm.      Heart sounds: Normal heart sounds.   Pulmonary:      Effort: Pulmonary effort is normal.      Breath sounds: Normal breath sounds.   Abdominal:      General: Bowel sounds are normal.      Palpations: Abdomen is soft.   Musculoskeletal:      Right lower leg: No edema.      Left lower leg: No edema.   Neurological:      General: No focal deficit present.      Mental Status: She is alert and oriented to person, place, and time. She is confused.         Results Review     I reviewed the patient's new clinical results.  Results from last 7 days   Lab Units 25  0622   WBC 10*3/mm3 9.37   HEMOGLOBIN g/dL 13.8   PLATELETS 10*3/mm3 156     Results from last 7 days   Lab Units 25  0622   SODIUM mmol/L 138   POTASSIUM mmol/L 4.4   CHLORIDE mmol/L 94*   CO2 mmol/L 32.0*   BUN mg/dL 35*   CREATININE mg/dL 1.36*   GLUCOSE mg/dL 123*   Estimated Creatinine Clearance: 35.8 mL/min (A) (by C-G formula based on SCr of 1.36 mg/dL (H)).  Results from last 7 days   Lab Units 25  0622   ALBUMIN g/dL 2.9*   BILIRUBIN mg/dL 0.9   ALK PHOS U/L 1,041*   AST (SGOT) U/L 60*   ALT (SGPT) U/L 38*     Results from last 7  "days   Lab Units 01/09/25  0622   CALCIUM mg/dL 9.4   ALBUMIN g/dL 2.9*   MAGNESIUM mg/dL 2.1   PHOSPHORUS mg/dL 2.5           COVID19   Date Value Ref Range Status   01/07/2025 Not Detected Not Detected - Ref. Range Final   08/19/2024 Not Detected Not Detected - Ref. Range Final     No results found for: \"HGBA1C\", \"POCGLU\"    FL Video Swallow Single Contrast  VIDEO SWALLOWING EXAMINATION BY SPEECH PATHOLOGY     Clinical: Dysphasia     Reference Air Kerma: 3.2 mGy     Video swallowing examination performed under the direction of speech  pathology. Imaging reviewed by radiologist who concurs with the  findings.     Speech pathology summary:     VFSS complete. Radiology APRN, Eliza Mcintyre, present during the study.  Patient presents with moderate oropharyngeal dysphagia. Penetration with  nectar thick liquids and honey thick liquids. Dez aspiration of thin  liquids. No penetration/aspiration with puree or soft/chopped solids.  Double swallow successful in reducing moderate pharyngeal residue.           This report was finalized on 1/10/2025 2:27 PM by Dr. Veto Escobar M.D on Workstation: [id number]       Scheduled Medications  buPROPion XL, 150 mg, Oral, QAM  Check Fentanyl Patch Placement, 1 each, Not Applicable, Q12H  dexAMETHasone, 4 mg, Oral, BID  fluconazole, 200 mg, Intravenous, Q24H  melatonin, 5 mg, Oral, Nightly  Menthol-Zinc Oxide, 1 Application, Topical, Q12H  polyethylene glycol, 17 g, Oral, Daily  sennosides-docusate, 2 tablet, Oral, Daily  sodium chloride, 10 mL, Intravenous, Q12H  sodium chloride, 10 mL, Intravenous, Q12H  [Held by provider] traZODone, 12.5 mg, Oral, Nightly    Infusions   Diet  Diet: Regular/House; Texture: Soft to Chew (NDD 3); Soft to Chew: Chopped Meat; Fluid Consistency: Nectar Thick       Assessment/Plan     Active Hospital Problems    Diagnosis  POA    **Acute kidney injury (ALBER) with acute tubular necrosis (ATN) [N17.0]  Yes    Hyperkalemia [E87.5]  Yes    Anxiety [F41.9]  " Yes    Severe protein-calorie malnutrition [E43]  Yes    Abdominal pain [R10.9]  Yes    Dehydration [E86.0]  Yes    Hypertension [I10]  Yes    Colon cancer metastasized to brain [C18.9, C79.31]  Yes      Resolved Hospital Problems   No resolved problems to display.       64 y.o. female admitted with Acute kidney injury (ALBER) with acute tubular necrosis (ATN).    64 year old woman with metastatic colon cancer who presented with uncontrolled abdominal pain, nausea, vomiting, encephalopathy and was found to have an ALBER with hyperkalemia and an associated metabolic acidosis. She was treated with a bicarb drip with improvement in her acid base status, kidney function, and hyperkalemia, but ultimately decided to pursue comfort measures. Some medications have been continued for comfort, but she is no longer undergoing substantial disease modifying therapy and we are trying to find a regimen that will provide comfort while not over sedating her if possible.    ALBER causing hyperkalemia and a metabolic acidosis-felt to be prerenal in nature due to poor oral intake. Resolved with a bicarb drip  Thrush/esophageal candidiasis-on iv diflucan with plans for 14 days of therapy. She didn't receive the dose yesterday or today. This medication is being administered to relieve mouth pain and odynophagia. Discussed with her RN to please administer it.  Toxic/Metabolic encephalopath-partially related to cancer and partially related to ativan and possibly trazodone. Those medications are held and her mentation is improving.   Elevated TSH-she had a normal free t4 last month, unlikely to be significantly contributing to her confusion and lab draws are no longer consistent with goals of care  Metastatic colon cancer refractory to therapy-progression of disease on CT despite treatment.   Cancer related pain-dexamethasone, fentanyl patch, prn opiates  Malnutrition/dysphagia-on a modified diet if so desired by the patient and her family. I did  discuss with them that if they wish to liberalize it and accept the risk of aspiration, that would be an entirely reasonable decision that I would support.   Insomnia-due to increased confusion/lethargy, holding trazodone  DVT ppx: not consistent with goals of care  Code: comfort measures   Discussed with patient and family.  Anticipate discharge  home with hospice  in 1-2 days. Her mentation is improving and she's not requiring frequent PRNs. Discussed with her and her friend that she should be ready for discharge home with hospice in the next day or two if her thinking continues to improve and pain remains controlled.      Roque Barlow MD  [facility]  01/15/25  15:35 EST

## 2025-01-16 PROCEDURE — 25010000002 DIPHENHYDRAMINE PER 50 MG: Performed by: HOSPITALIST

## 2025-01-16 PROCEDURE — 63710000001 DEXAMETHASONE PER 0.25 MG: Performed by: HOSPITALIST

## 2025-01-16 PROCEDURE — 25010000002 FLUCONAZOLE PER 200 MG: Performed by: HOSPITALIST

## 2025-01-16 PROCEDURE — 25010000002 MORPHINE PER 10 MG: Performed by: HOSPITALIST

## 2025-01-16 RX ADMIN — DEXAMETHASONE 4 MG: 4 TABLET ORAL at 23:35

## 2025-01-16 RX ADMIN — ANORECTAL OINTMENT 1 APPLICATION: 15.7; .44; 24; 20.6 OINTMENT TOPICAL at 15:00

## 2025-01-16 RX ADMIN — SENNOSIDES AND DOCUSATE SODIUM 2 TABLET: 50; 8.6 TABLET ORAL at 08:45

## 2025-01-16 RX ADMIN — FLUCONAZOLE IN SODIUM CHLORIDE 200 MG: 2 INJECTION, SOLUTION INTRAVENOUS at 12:04

## 2025-01-16 RX ADMIN — DEXAMETHASONE 4 MG: 4 TABLET ORAL at 08:44

## 2025-01-16 RX ADMIN — BUPROPION HYDROCHLORIDE 150 MG: 150 TABLET, EXTENDED RELEASE ORAL at 05:12

## 2025-01-16 RX ADMIN — MORPHINE SULFATE 2 MG: 2 INJECTION, SOLUTION INTRAMUSCULAR; INTRAVENOUS at 05:11

## 2025-01-16 RX ADMIN — Medication 10 ML: at 21:00

## 2025-01-16 RX ADMIN — Medication 5 MG: at 23:35

## 2025-01-16 RX ADMIN — DIPHENHYDRAMINE HYDROCHLORIDE 25 MG: 50 INJECTION, SOLUTION INTRAMUSCULAR; INTRAVENOUS at 05:11

## 2025-01-16 RX ADMIN — Medication 10 ML: at 09:00

## 2025-01-16 RX ADMIN — Medication 10 ML: at 12:05

## 2025-01-16 RX ADMIN — MORPHINE SULFATE 2 MG: 2 INJECTION, SOLUTION INTRAMUSCULAR; INTRAVENOUS at 12:04

## 2025-01-16 RX ADMIN — ANORECTAL OINTMENT 1 APPLICATION: 15.7; .44; 24; 20.6 OINTMENT TOPICAL at 21:00

## 2025-01-16 NOTE — PAYOR COMM NOTE
"Ely Sims (64 y.o. Female)          U/D FOR [id number]      F# [phone]         Date of Birth   1960    Social Security Number       Address   [address] [place] [address]    Home Phone   [phone]    MRN   [id number]       Anabaptist   Ca[employer]ism    Marital Status   Single                            Admission Date   25    Admission Type   Emergency    Admitting Provider   Estefania Jacobs MD    Attending Provider   José Mejia MD    Department, Room/Bed   [address], [facility]/       Discharge Date       Discharge Disposition       Discharge Destination                                 Attending Provider: José Mejia MD    Allergies: No Known Allergies    Isolation: None   Infection: None   Code Status: No CPR    Ht: 170.2 cm (67.01\")   Wt: 54.3 kg (119 lb 12.8 oz)    Admission Cmt: None   Principal Problem: Acute kidney injury (ALBER) with acute tubular necrosis (ATN) [N17.0]                   Active Insurance as of 2025       Primary Coverage       Payor Plan Insurance Group Employer/Plan Group    [facility] [id number]       Payor Plan Address Payor Plan Phone Number Payor Plan Fax Number Effective Dates    [address]   2024 - None Entered    [facility] [address]         Subscriber Name Subscriber Birth Date Member ID       ELY SIMS 1960 [id number]                     Emergency Contacts        (Rel.) Home Phone Work Phone Mobile Phone    Ubaldo Case (Partner) -- -- [phone]    Elia Cadena POA -- -- [phone]    DON OBRIEN (Sister) -- -- [phone]    DOMINIQUECARL (Daughter) -- -- [phone]                 Physician Progress Notes (last 7 days)        Roque Barlow MD at 01/15/25 3052              Name: Ely Sims ADMIT: 2025   : 1960  PCP: Provider, No Known    MRN: [id number] LOS: 8 days   AGE/SEX: 64 y.o. female  ROOM: [facility]     Subjective   Subjective     More awake " today. Her friend is at bedside. The patient answers my questions more clearly and makes eye contact. She speaks in a whisper.      Objective   Objective   Vital Signs  Temp:  [97 °F (36.1 °C)-97.6 °F (36.4 °C)] 97.6 °F (36.4 °C)  Heart Rate:  [112-119] 112  Resp:  [16-18] 18  BP: (150-157)/() 150/96  SpO2:  [93 %-96 %] 93 %  on   ;   Device (Oxygen Therapy): room air  Body mass index is 18.76 kg/m².  Physical Exam  Constitutional:       General: She is not in acute distress.     Appearance: She is ill-appearing. She is not toxic-appearing.   Cardiovascular:      Rate and Rhythm: Normal rate and regular rhythm.      Heart sounds: Normal heart sounds.   Pulmonary:      Effort: Pulmonary effort is normal.      Breath sounds: Normal breath sounds.   Abdominal:      General: Bowel sounds are normal.      Palpations: Abdomen is soft.   Musculoskeletal:      Right lower leg: No edema.      Left lower leg: No edema.   Neurological:      General: No focal deficit present.      Mental Status: She is alert and oriented to person, place, and time. She is confused.         Results Review     I reviewed the patient's new clinical results.  Results from last 7 days   Lab Units 01/09/25  0622   WBC 10*3/mm3 9.37   HEMOGLOBIN g/dL 13.8   PLATELETS 10*3/mm3 156     Results from last 7 days   Lab Units 01/09/25  0622   SODIUM mmol/L 138   POTASSIUM mmol/L 4.4   CHLORIDE mmol/L 94*   CO2 mmol/L 32.0*   BUN mg/dL 35*   CREATININE mg/dL 1.36*   GLUCOSE mg/dL 123*   Estimated Creatinine Clearance: 35.8 mL/min (A) (by C-G formula based on SCr of 1.36 mg/dL (H)).  Results from last 7 days   Lab Units 01/09/25  0622   ALBUMIN g/dL 2.9*   BILIRUBIN mg/dL 0.9   ALK PHOS U/L 1,041*   AST (SGOT) U/L 60*   ALT (SGPT) U/L 38*     Results from last 7 days   Lab Units 01/09/25  0622   CALCIUM mg/dL 9.4   ALBUMIN g/dL 2.9*   MAGNESIUM mg/dL 2.1   PHOSPHORUS mg/dL 2.5           COVID19   Date Value Ref Range Status   01/07/2025 Not Detected Not  "Detected - Ref. Range Final   08/19/2024 Not Detected Not Detected - Ref. Range Final     No results found for: \"HGBA1C\", \"POCGLU\"    FL Video Swallow Single Contrast  VIDEO SWALLOWING EXAMINATION BY SPEECH PATHOLOGY     Clinical: Dysphasia     Reference Air Kerma: 3.2 mGy     Video swallowing examination performed under the direction of speech  pathology. Imaging reviewed by radiologist who concurs with the  findings.     Speech pathology summary:     VFSS complete. Radiology APRN, Eliza Mcintyre, present during the study.  Patient presents with moderate oropharyngeal dysphagia. Penetration with  nectar thick liquids and honey thick liquids. Dez aspiration of thin  liquids. No penetration/aspiration with puree or soft/chopped solids.  Double swallow successful in reducing moderate pharyngeal residue.           This report was finalized on 1/10/2025 2:27 PM by Dr. Veto Escobar M.D on Workstation: [id number]       Scheduled Medications  buPROPion XL, 150 mg, Oral, QAM  Check Fentanyl Patch Placement, 1 each, Not Applicable, Q12H  dexAMETHasone, 4 mg, Oral, BID  fluconazole, 200 mg, Intravenous, Q24H  melatonin, 5 mg, Oral, Nightly  Menthol-Zinc Oxide, 1 Application, Topical, Q12H  polyethylene glycol, 17 g, Oral, Daily  sennosides-docusate, 2 tablet, Oral, Daily  sodium chloride, 10 mL, Intravenous, Q12H  sodium chloride, 10 mL, Intravenous, Q12H  [Held by provider] traZODone, 12.5 mg, Oral, Nightly    Infusions   Diet  Diet: Regular/House; Texture: Soft to Chew (NDD 3); Soft to Chew: Chopped Meat; Fluid Consistency: Nectar Thick      Assessment/Plan     Active Hospital Problems    Diagnosis  POA    **Acute kidney injury (ALBER) with acute tubular necrosis (ATN) [N17.0]  Yes    Hyperkalemia [E87.5]  Yes    Anxiety [F41.9]  Yes    Severe protein-calorie malnutrition [E43]  Yes    Abdominal pain [R10.9]  Yes    Dehydration [E86.0]  Yes    Hypertension [I10]  Yes    Colon cancer metastasized to brain [C18.9, C79.31]  " Yes      Resolved Hospital Problems   No resolved problems to display.       64 y.o. female admitted with Acute kidney injury (ALBER) with acute tubular necrosis (ATN).    64 year old woman with metastatic colon cancer who presented with uncontrolled abdominal pain, nausea, vomiting, encephalopathy and was found to have an ALBER with hyperkalemia and an associated metabolic acidosis. She was treated with a bicarb drip with improvement in her acid base status, kidney function, and hyperkalemia, but ultimately decided to pursue comfort measures. Some medications have been continued for comfort, but she is no longer undergoing substantial disease modifying therapy and we are trying to find a regimen that will provide comfort while not over sedating her if possible.    ALBER causing hyperkalemia and a metabolic acidosis-felt to be prerenal in nature due to poor oral intake. Resolved with a bicarb drip  Thrush/esophageal candidiasis-on iv diflucan with plans for 14 days of therapy. She didn't receive the dose yesterday or today. This medication is being administered to relieve mouth pain and odynophagia. Discussed with her RN to please administer it.  Toxic/Metabolic encephalopath-partially related to cancer and partially related to ativan and possibly trazodone. Those medications are held and her mentation is improving.   Elevated TSH-she had a normal free t4 last month, unlikely to be significantly contributing to her confusion and lab draws are no longer consistent with goals of care  Metastatic colon cancer refractory to therapy-progression of disease on CT despite treatment.   Cancer related pain-dexamethasone, fentanyl patch, prn opiates  Malnutrition/dysphagia-on a modified diet if so desired by the patient and her family. I did discuss with them that if they wish to liberalize it and accept the risk of aspiration, that would be an entirely reasonable decision that I would support.   Insomnia-due to increased  confusion/lethargy, holding trazodone  DVT ppx: not consistent with goals of care  Code: comfort measures   Discussed with patient and family.  Anticipate discharge  home with hospice  in 1-2 days. Her mentation is improving and she's not requiring frequent PRNs. Discussed with her and her friend that she should be ready for discharge home with hospice in the next day or two if her thinking continues to improve and pain remains controlled.      Roque Barlow MD  [facility]  01/15/25  15:35 EST             Electronically signed by Roque Barlow MD at 01/15/25 1547       Roque Barlow MD at 25 1653              Name: Ely Sims ADMIT: 2025   : 1960  PCP: Provider, No Known    MRN: [id number] LOS: 7 days   AGE/SEX: 64 y.o. female  ROOM: [facility]     Subjective   Subjective     No events overnight. The patient's daughter and a friend are at bedside. The patient does appear more awake than yesterday, but she's very slow to answer questions and I'm not sure that she understands everything that I ask her. It sounds like there hasn't been a big change since the benzos have been stopped, but the patient does deny to me that anxiety is an issue.       Objective   Objective   Vital Signs  Temp:  [96.8 °F (36 °C)-97 °F (36.1 °C)] 97 °F (36.1 °C)  Heart Rate:  [114-119] 119  Resp:  [14-16] 16  BP: (148-157)/(103-107) 157/107  SpO2:  [95 %-96 %] 96 %  on   ;   Device (Oxygen Therapy): room air  Body mass index is 18.76 kg/m².  Physical Exam  Constitutional:       General: She is not in acute distress.     Appearance: She is ill-appearing. She is not toxic-appearing.   Cardiovascular:      Rate and Rhythm: Normal rate and regular rhythm.      Heart sounds: Normal heart sounds.   Pulmonary:      Effort: Pulmonary effort is normal.      Breath sounds: Normal breath sounds.   Abdominal:      General: Bowel sounds are normal.      Palpations: Abdomen is soft.   Musculoskeletal:      Right  "lower leg: No edema.      Left lower leg: No edema.   Neurological:      General: No focal deficit present.      Mental Status: She is alert. She is disoriented and confused.         Results Review     I reviewed the patient's new clinical results.  Results from last 7 days   Lab Units 01/09/25 0622 01/08/25 0523   WBC 10*3/mm3 9.37 12.36*   HEMOGLOBIN g/dL 13.8 14.8   PLATELETS 10*3/mm3 156 149     Results from last 7 days   Lab Units 01/09/25  0622 01/08/25  0523 01/07/25  1803   SODIUM mmol/L 138 134* 133*   POTASSIUM mmol/L 4.4 3.9 5.1   CHLORIDE mmol/L 94* 95* 96*   CO2 mmol/L 32.0* 27.0 22.6   BUN mg/dL 35* 58* 67*   CREATININE mg/dL 1.36* 2.42* 3.11*   GLUCOSE mg/dL 123* 101* 122*   Estimated Creatinine Clearance: 35.8 mL/min (A) (by C-G formula based on SCr of 1.36 mg/dL (H)).  Results from last 7 days   Lab Units 01/09/25  0622 01/08/25  0523 01/07/25  1803   ALBUMIN g/dL 2.9* 3.5 3.5   BILIRUBIN mg/dL 0.9 0.8  --    ALK PHOS U/L 1,041* 970*  --    AST (SGOT) U/L 60* 50*  --    ALT (SGPT) U/L 38* 35*  --      Results from last 7 days   Lab Units 01/09/25 0622 01/08/25  0523 01/07/25  1803   CALCIUM mg/dL 9.4 9.4 9.5   ALBUMIN g/dL 2.9* 3.5 3.5   MAGNESIUM mg/dL 2.1 2.4  --    PHOSPHORUS mg/dL 2.5 3.1 4.4           COVID19   Date Value Ref Range Status   01/07/2025 Not Detected Not Detected - Ref. Range Final   08/19/2024 Not Detected Not Detected - Ref. Range Final     No results found for: \"HGBA1C\", \"POCGLU\"    FL Video Swallow Single Contrast  VIDEO SWALLOWING EXAMINATION BY SPEECH PATHOLOGY     Clinical: Dysphasia     Reference Air Kerma: 3.2 mGy     Video swallowing examination performed under the direction of speech  pathology. Imaging reviewed by radiologist who concurs with the  findings.     Speech pathology summary:     VFSS complete. Radiology APRN, Eliza Mcintyre, present during the study.  Patient presents with moderate oropharyngeal dysphagia. Penetration with  nectar thick liquids and honey " thick liquids. Dez aspiration of thin  liquids. No penetration/aspiration with puree or soft/chopped solids.  Double swallow successful in reducing moderate pharyngeal residue.           This report was finalized on 1/10/2025 2:27 PM by Dr. Veto Escobar M.D on Workstation: [id number]       Scheduled Medications  buPROPion XL, 150 mg, Oral, QAM  Check Fentanyl Patch Placement, 1 each, Not Applicable, Q12H  dexAMETHasone, 4 mg, Oral, BID  fluconazole, 200 mg, Intravenous, Q24H  melatonin, 5 mg, Oral, Nightly  Menthol-Zinc Oxide, 1 Application, Topical, Q12H  polyethylene glycol, 17 g, Oral, Daily  sennosides-docusate, 2 tablet, Oral, Daily  sodium chloride, 10 mL, Intravenous, Q12H  sodium chloride, 10 mL, Intravenous, Q12H  [Held by provider] traZODone, 12.5 mg, Oral, Nightly    Infusions   Diet  Diet: Regular/House; Texture: Soft to Chew (NDD 3); Soft to Chew: Chopped Meat; Fluid Consistency: Nectar Thick      Assessment/Plan     Active Hospital Problems    Diagnosis  POA    **Acute kidney injury (ALBER) with acute tubular necrosis (ATN) [N17.0]  Yes    Hyperkalemia [E87.5]  Yes    Anxiety [F41.9]  Yes    Severe protein-calorie malnutrition [E43]  Yes    Abdominal pain [R10.9]  Yes    Dehydration [E86.0]  Yes    Hypertension [I10]  Yes    Colon cancer metastasized to brain [C18.9, C79.31]  Yes      Resolved Hospital Problems   No resolved problems to display.       64 y.o. female admitted with Acute kidney injury (ALBER) with acute tubular necrosis (ATN).    64 year old woman with metastatic colon cancer who presented with uncontrolled abdominal pain, nausea, vomiting, encephalopathy and was found to have an ALBER with hyperkalemia and an associated metabolic acidosis. She was treated with a bicarb drip with improvement in her acid base status, kidney function, and hyperkalemia, but ultimately decided to pursue comfort measures. Some medications have been continued for comfort, but she is no longer undergoing  substantial disease modifying therapy and we are trying to find a regimen that will provide comfort while not over sedating her if possible.    ALBER causing hyperkalemia and a metabolic acidosis-felt to be prerenal in nature due to poor oral intake. Resolved with a bicarb drip  Thrush-on iv diflucan with plans for 14 days of therapy  Metabolic encephalopath-difficult to know whether there is a toxic component to this given the timing of her worsening and association with ativan. She also has brain mets. Ativan is held at the family's request. Doesn't appear to be having much anxiety, but does have some intermittent agitation, which so far appears tolerable.   Elevated TSH-she had a normal free t4 last month, unlikely to be significantly contributing to her confusion and lab draws are no longer consistent with goals of care  Metastatic colon cancer refractory to therapy-progression of disease on CT despite treatment.   Cancer related pain-dexamethasone, fentanyl patch, prn opiates  Malnutrition/dysphagia-on a modified diet if so desired by the patient and her family. I did discuss with them that if they wish to liberalize it and accept the risk of aspiration, that would be an entirely reasonable decision that I would support.   Insomnia-due to increased confusion/lethargy, holding trazodone  DVT ppx: not consistent with goals of care  Code: comfort measures   Discussed with patient and family.  Anticipate discharge  TBD  timing yet to be determined.      Roque Barlow MD  [facility]  25  16:53 EST             Electronically signed by Roque Barlow MD at 25 1657       Roque Barlow MD at 25 1913              Name: Ely Sims ADMIT: 2025   : 1960  PCP: Provider, No Known    MRN: [id number] LOS: 6 days   AGE/SEX: 64 y.o. female  ROOM: [facility]/     Subjective   Subjective   No events overnight. The patient's daughter and son in law are at bedside. The patient's  daughter expresses that her mom has been more confused since ativan was started. A couple of days ago, she was able to carry on a conversation, but now she is sleeping most of the day and having trouble opening her eyes and just appears confused during the entire day. She previously had had some confusion, but it was mostly in the evening. Per the patient's RN, she has had frequent periods of restlessness and impulsiveness to get out of bed and this has been the indication for ativan up until now. The patient is sedate, but does awake. She endorses that she has some minor pain and following some commands, but isn't meaningfully able to participate in an interview.      Objective   Objective   Vital Signs  Temp:  [97 °F (36.1 °C)-97.9 °F (36.6 °C)] 97.9 °F (36.6 °C)  Heart Rate:  [] 139  Resp:  [16-17] 17  BP: (156)/(100) 156/100  SpO2:  [97 %-99 %] 99 %  on   ;   Device (Oxygen Therapy): room air  Body mass index is 18.76 kg/m².  Physical Exam  Constitutional:       General: She is not in acute distress.     Appearance: She is ill-appearing. She is not toxic-appearing.   Cardiovascular:      Rate and Rhythm: Normal rate and regular rhythm.      Heart sounds: Normal heart sounds.   Pulmonary:      Effort: Pulmonary effort is normal.      Breath sounds: Normal breath sounds.   Abdominal:      General: Bowel sounds are normal.      Palpations: Abdomen is soft.   Musculoskeletal:      Right lower leg: No edema.      Left lower leg: No edema.   Neurological:      General: No focal deficit present.      Mental Status: She is alert. She is lethargic, disoriented and confused.         Results Review     I reviewed the patient's new clinical results.  Results from last 7 days   Lab Units 01/09/25  0622 01/08/25  0523 01/07/25  1039   WBC 10*3/mm3 9.37 12.36* 15.38*   HEMOGLOBIN g/dL 13.8 14.8 14.5   PLATELETS 10*3/mm3 156 149 184     Results from last 7 days   Lab Units 01/09/25  0622 01/08/25  0523 01/07/25  1803  "01/07/25  1039   SODIUM mmol/L 138 134* 133* 130*   POTASSIUM mmol/L 4.4 3.9 5.1 6.2*   CHLORIDE mmol/L 94* 95* 96* 95*   CO2 mmol/L 32.0* 27.0 22.6 16.9*   BUN mg/dL 35* 58* 67* 70*   CREATININE mg/dL 1.36* 2.42* 3.11* 3.43*   GLUCOSE mg/dL 123* 101* 122* 118*   Estimated Creatinine Clearance: 35.8 mL/min (A) (by C-G formula based on SCr of 1.36 mg/dL (H)).  Results from last 7 days   Lab Units 01/09/25  0622 01/08/25 0523 01/07/25 1803 01/07/25  1039   ALBUMIN g/dL 2.9* 3.5 3.5 3.7   BILIRUBIN mg/dL 0.9 0.8  --  0.9   ALK PHOS U/L 1,041* 970*  --  861*   AST (SGOT) U/L 60* 50*  --  54*   ALT (SGPT) U/L 38* 35*  --  30     Results from last 7 days   Lab Units 01/09/25  0622 01/08/25 0523 01/07/25 1803 01/07/25  1039   CALCIUM mg/dL 9.4 9.4 9.5 10.4   ALBUMIN g/dL 2.9* 3.5 3.5 3.7   MAGNESIUM mg/dL 2.1 2.4  --  2.6*   PHOSPHORUS mg/dL 2.5 3.1 4.4  --      Results from last 7 days   Lab Units 01/07/25  1258   PROCALCITONIN ng/mL 1.00*   LACTATE mmol/L 1.8     COVID19   Date Value Ref Range Status   01/07/2025 Not Detected Not Detected - Ref. Range Final   08/19/2024 Not Detected Not Detected - Ref. Range Final     No results found for: \"HGBA1C\", \"POCGLU\"    FL Video Swallow Single Contrast  VIDEO SWALLOWING EXAMINATION BY SPEECH PATHOLOGY     Clinical: Dysphasia     Reference Air Kerma: 3.2 mGy     Video swallowing examination performed under the direction of speech  pathology. Imaging reviewed by radiologist who concurs with the  findings.     Speech pathology summary:     VFSS complete. Radiology APRN, Eliza Mcintyre, present during the study.  Patient presents with moderate oropharyngeal dysphagia. Penetration with  nectar thick liquids and honey thick liquids. Dez aspiration of thin  liquids. No penetration/aspiration with puree or soft/chopped solids.  Double swallow successful in reducing moderate pharyngeal residue.           This report was finalized on 1/10/2025 2:27 PM by Dr. Veto Escobar M.D on " Workstation: [id number]       Scheduled Medications  buPROPion XL, 150 mg, Oral, QAM  fentaNYL, 1 patch, Transdermal, Q72H   And  Check Fentanyl Patch Placement, 1 each, Not Applicable, Q12H  dexAMETHasone, 4 mg, Oral, BID  fluconazole, 200 mg, Intravenous, Q24H  melatonin, 5 mg, Oral, Nightly  Menthol-Zinc Oxide, 1 Application, Topical, Q12H  polyethylene glycol, 17 g, Oral, Daily  sennosides-docusate, 2 tablet, Oral, Daily  sodium chloride, 10 mL, Intravenous, Q12H  sodium chloride, 10 mL, Intravenous, Q12H  traZODone, 12.5 mg, Oral, Nightly    Infusions   Diet  Diet: Regular/House; Texture: Soft to Chew (NDD 3); Soft to Chew: Chopped Meat; Fluid Consistency: Nectar Thick      Assessment/Plan     Active Hospital Problems    Diagnosis  POA    **Acute kidney injury (ALBER) with acute tubular necrosis (ATN) [N17.0]  Yes    Hyperkalemia [E87.5]  Yes    Anxiety [F41.9]  Yes    Severe protein-calorie malnutrition [E43]  Yes    Abdominal pain [R10.9]  Yes    Dehydration [E86.0]  Yes    Hypertension [I10]  Yes    Colon cancer metastasized to brain [C18.9, C79.31]  Yes      Resolved Hospital Problems   No resolved problems to display.       64 y.o. female admitted with Acute kidney injury (ALBER) with acute tubular necrosis (ATN).    64 year old woman with metastatic colon cancer who presented with uncontrolled abdominal pain, nausea, vomiting, encephalopathy and was found to have an ALBER with hyperkalemia and an associated metabolic acidosis. She was treated with a bicarb drip with improvement in her acid base status, kidney function, and hyperkalemia, but ultimately decided to pursue comfort measures. Some medications have been continued for comfort, but she is no longer undergoing substantial disease modifying therapy and we are trying to find a regimen that will provide comfort while not over sedating her if possible.    ALBER causing hyperkalemia and a metabolic acidosis-felt to be prerenal in nature due to poor oral intake.  Resolved with a bicarb drip  Thrush-on iv diflucan with plans for 14 days of therapy  Metabolic encephalopath-difficult to know whether there is a toxic component to this given the timing of her worsening and association with ativan. She also has brain mets. Regardless, the patient's family has requested that we hold this acutely and see how she does.  Elevated TSH-she had a normal free t4 last month, unlikely to be significantly contributing to her confusion and lab draws are no longer consistent with goals of care  Metastatic colon cancer refractory to therapy-progression of disease on CT despite treatment.   Cancer related pain-dexamethasone, fentanyl patch, prn opiates  Malnutrition/dysphagia-on a modified diet.   Insomnia-due to increased confusion/lethargy, will hold trazodone  DVT ppx: not consistent with goals of care  Code: comfort measures   Discussed with patient, family, and nursing staff.  Anticipate discharge  TBD  timing yet to be determined.      Roque Barlow MD  [facility]  01/13/25  19:13 EST             Electronically signed by Roque Barlow MD at 01/13/25 2044       Elieser Saunders MD at 01/12/25 1033          [facility] INPATIENT PROGRESS NOTE    Length of Stay:  5 days    CHIEF COMPLAINT/REASON FOR VISIT:  Metastatic colon cancer     SUBJECTIVE: Sleeping comfortably and per notes did so throughout the night. No family present.       OBJECTIVE:  Vitals:    01/11/25 0241 01/11/25 0755 01/11/25 1633 01/12/25 0327   BP: (!) 165/111 160/97 (!) 166/106 (!) 174/109   BP Location: Left arm Right arm Left arm Right arm   Patient Position: Sitting Sitting Sitting Sitting   Pulse: 92 90 98 99   Resp: 18 18 16 18   Temp: 96.7 °F (35.9 °C) 96.4 °F (35.8 °C) 96.8 °F (36 °C) 96.9 °F (36.1 °C)   TempSrc: Oral Oral Oral Oral   SpO2: 98% 98% 95% 96%   Weight:       Height:             PHYSICAL EXAMINATION:   Sleeping comfortably    DIAGNOSTIC DATA:  Results Review:     I  reviewed the patient's new clinical results.    Results from last 7 days   Lab Units 01/09/25  0622   WBC 10*3/mm3 9.37   HEMOGLOBIN g/dL 13.8   HEMATOCRIT % 42.6   PLATELETS 10*3/mm3 156     Lab Results   Component Value Date    NEUTROABS 10.45 (H) 01/08/2025     Results from last 7 days   Lab Units 01/09/25  0622   SODIUM mmol/L 138   POTASSIUM mmol/L 4.4   CHLORIDE mmol/L 94*   CO2 mmol/L 32.0*   BUN mg/dL 35*   CREATININE mg/dL 1.36*   GLUCOSE mg/dL 123*   CALCIUM mg/dL 9.4         Results from last 7 days   Lab Units 01/09/25  0622   MAGNESIUM mg/dL 2.1         IMAGING:    None reviewed    ASSESSMENT:  This is a 64 y.o. female with:     *Metastatic colon cancer  The patient presented on 8/19/2022 initially to urgent care with headache, nausea, vomiting, diarrhea.  She was advised to go to the ED from urgent care.  CT head 8/19/2024 showed some areas of increased attenuation over the right cerebral hemisphere and left temporal lobe concerning for metastatic disease with edema with some mass effect upon the fourth ventricle.  MRI brain 8/20/2024 with a lesion at the right cerebellar hemisphere measuring 2.2 cm, lesion within the left temporal lobe measuring 9 mm, 1.0 cm lesion at the left cerebellar vermis, all with surrounding edema.  There was some mass effect upon the fourth ventricle with mild prominence of the lateral and third ventricles.  8/20/2024: CT chest abdomen and pelvis with no evidence of pulmonary metastatic disease.  There was some asymmetric wall thickening in the distal sigmoid colon with surrounding mesenteric soft tissue nodularity concerning for primary colon cancer.  A sclerotic lesion of the spinous process of T1 was said to be consistent with a bone island or other benign lesion.  8/21/2024: Posterior fossa craniectomy with gross total removal of a right cerebellar hemisphere metastasis Dr. Bull Doty.  Pathology consistent with metastatic poorly differentiated adenocarcinoma favoring  colorectal origin.  MSI testing is pending.  [place] with a BRAF V600E mutation.  TMB 4.7 mutations per megabase.  Microsatellite stable.  8/25/2024: Bone scan with no obvious metastatic disease.  8/25/2024: Colonoscopy by Dr. Kaminski shows an infiltrative completely obstructing large mass found at the proximal rectum.  Pathology with invasive poorly differentiated carcinoma with neuroendocrine features. MSI studies pending.     CEA 2.10.  8/26/2024: MRI lumbar spine with no obvious metastatic disease in the lumbar spine.  However, there is a 3 mm enhancing lesion in the visualized lower thoracic spinal cord that could represent a spinal cord metastasis.  Cervical and thoracic spine MRI requested..  8/27/24: diverting loop sigmoid colostomy and port placement by Dr. Chester  8/28/2024: MRI C and T-spine addendum made with a small 3 mm met to the spinal cord at T12  Completed radiation to the brain and spine on 9/24/24  PET scan 9/12/24 with no metastatic disease. FDG avid mass at the high rectum.  10/22/2024: Proceed with Cycle 2 FOLFOX. Will omit 5 FU bolus due to neutropenia. Future treatment plans adjusted. In regards to lower abdominal cramping which occurs at night, advised patient to discuss with Dr. Chester (surgeon).   11/5/2024: Proceed with Cycle 3 FOLFOX with 5 FU bolus omitted. WBC 4.05 and . Will give a dose of tylenol today while in the infusion area for side/back pain. Instructed her to reach out if the pain/soreness does not subside.   11/19/2024: She presents for cycle #4 of therapy.  Therapy has been well-tolerated.  However, she is having worsening abdominal pain as well as some nausea and vomiting this morning.  See below.  11/26/2024: CT imaging with significant progression of disease  With progression, cetuximab or panitumumab plus encorafenib given the presence of a BRAF V600 E mutation.  Plan panitumumab due to ease of administration every 2 weeks and potential for fewer adverse effects  compared to cetuximab. Encorafenib is 300 mg daily.  Panitumumab is 6 mg/kg every 14 days.  Plan treatment until disease progression or unacceptable toxicity.  Treatment is palliative and she understands this.  We will plan to initiate therapy very quickly given significant pression of disease.  C1 D1 panitumumab and encorafenib 12/10/2024  12/13/2024: seen for triage visit concerning severe pain and uncontrolled nausea/vomiting.  She has not been able to keep down any food or drink for the last 3 days.  Has been able to keep down some medications.  Has been utilizing Compazine every 6 hours without any relief.  As far as her pain she is continued MS Contin 15 mg every 8 hours and Norco 10/325 every 6 hours, despite this her pain has continued to worsen.  Her WBC is elevated at 18.44, and she reports developing  chills a few hours ago.  Her bilirubin has also increase dto 2.4.  She will require admission through A.  Subsequent admission.  Pain was better when she left the hospital.  1/7/2025: She presented to the office looking very bad with confusion, significant weakness.  She was admitted to the hospital.  Plans for comfort measures and hospice     *Abdominal pain  We have started sustained-release morphine 15 mg every 8 hours.  We had intended her for her to take hydrocodone also as needed but she has not been doing this thinking that morphine replaced the hydrocodone.  She was encouraged to take hydrocodone as needed as well.  12/13/2024: Patient is having severe uncontrolled pain.  She has been taking MS Contin 15 mg every 8 hours and Norco 10/325 every 6 hours.  Despite this her pain has continued to worsen.  She will require direct admission through A for further management.  Started Fentanyl patch inpatient.  This along with hydrocodone 10 mg tablets improved her pain.  She just has hydrocodone 5 mg tablets at home.  Unfortunately, her fentanyl patch fell off on Saturday and she has been having more  pain and spite of replacement of a new patch yesterday.  Currently utilizing fentanyl 75 mcg patch (increased) , PRN morphine and Norco 10/325 but she has not used much prn medication while here.      *Uncontrolled nausea/vomiting  Compazine was helping at home.  She has not been taking it since leaving the hospital since she apparently was told she was not supposed to take it.  It is unclear why this was stopped.  Records from the hospital do not really discuss her nausea and vomiting and what medication was used for this.  It appears that she was on a scopolamine patch at 1 point but this may have been stopped secondary to altered mental status.  She was also receiving lorazepam which was also stopped due to sedation.  2025 now utilizing Zofran 8 mg dissolvable tablets.  Continues with poor oral intake and extreme weight loss of 11 pounds in the last 2 weeks, over 20 pounds in the last month.     *Previous lower extremity edema while hospitalized 2024.    He was discharged home on Lasix 40 mg daily  Developed considerable volume depletion     *Acute kidney injury likely secondary to volume depletion  Creatinine 1.36, from .42, from 3.11, from 3.43, from 1.02     *Hyponatremia  Sodium normalized     *Oral thrush  Receiving IV Diflucan  Unable to gargle for oral care    *Aspiration with recommendations from speech therapy to be n.p.o. Regular soft diet with nectar thick fluids for comfort.     RECOMMENDATIONS/PLAN:   DNR/DNI  Plan home with hospice.  Continue wound care  IV Diflucan. Throat appeared better yesterday  On dex 4 mg bid  Continue pain medication. Fentanyl 75 mcg patch and PRNs  We will sign off. Please call back if needed.       Elieser Saunders MD     Electronically signed by Elieser Saunders MD at 25 1043       Frank Karimi MD at 25 0837            Name: Ely Sims ADMIT: 2025   : 1960  PCP: Provider, No Known    MRN: [id number] LOS: 5 days   AGE/SEX: 64 y.o. female   ROOM: [facility]/     Subjective   Subjective   Sitting on the edge of the bed. States her throat pain is improved. No new complaints.    Objective   Objective   Vital Signs  Temp:  [96.8 °F (36 °C)-96.9 °F (36.1 °C)] 96.9 °F (36.1 °C)  Heart Rate:  [98-99] 99  Resp:  [16-18] 18  BP: (166-174)/(106-109) 174/109  SpO2:  [95 %-96 %] 96 %  on   ;   Device (Oxygen Therapy): room air  Body mass index is 18.76 kg/m².    Physical Exam  Constitutional:       Appearance: She is underweight. She is ill-appearing (frail).   HENT:      Head: Normocephalic.   Cardiovascular:      Rate and Rhythm: Normal rate and regular rhythm.   Pulmonary:      Effort: Pulmonary effort is normal. No respiratory distress.   Abdominal:      Palpations: Abdomen is soft.      Tenderness: There is no abdominal tenderness.   Musculoskeletal:         General: No swelling.   Skin:     General: Skin is warm and dry.     Results Review  I reviewed the patient's new clinical results.  Results from last 7 days   Lab Units 01/09/25  0622 01/08/25  0523 01/07/25  1039   WBC 10*3/mm3 9.37 12.36* 15.38*   HEMOGLOBIN g/dL 13.8 14.8 14.5   PLATELETS 10*3/mm3 156 149 184     Results from last 7 days   Lab Units 01/09/25  0622 01/08/25  0523 01/07/25  1803 01/07/25  1039   SODIUM mmol/L 138 134* 133* 130*   POTASSIUM mmol/L 4.4 3.9 5.1 6.2*   CHLORIDE mmol/L 94* 95* 96* 95*   CO2 mmol/L 32.0* 27.0 22.6 16.9*   BUN mg/dL 35* 58* 67* 70*   CREATININE mg/dL 1.36* 2.42* 3.11* 3.43*   GLUCOSE mg/dL 123* 101* 122* 118*     Lab Results   Component Value Date    ANIONGAP 12.0 01/09/2025     Estimated Creatinine Clearance: 35.8 mL/min (A) (by C-G formula based on SCr of 1.36 mg/dL (H)).   Lab Results   Component Value Date    EGFR 43.6 (L) 01/09/2025     Results from last 7 days   Lab Units 01/09/25  0622 01/08/25  0523 01/07/25  1803 01/07/25  1039   ALBUMIN g/dL 2.9* 3.5 3.5 3.7   BILIRUBIN mg/dL 0.9 0.8  --  0.9   ALK PHOS U/L 1,041* 970*  --  861*   AST (SGOT) U/L 60* 50*   "--  54*   ALT (SGPT) U/L 38* 35*  --  30     Results from last 7 days   Lab Units 01/09/25  0622 01/08/25  0523 01/07/25  1803 01/07/25  1039   CALCIUM mg/dL 9.4 9.4 9.5 10.4   ALBUMIN g/dL 2.9* 3.5 3.5 3.7   MAGNESIUM mg/dL 2.1 2.4  --  2.6*   PHOSPHORUS mg/dL 2.5 3.1 4.4  --      Results from last 7 days   Lab Units 01/07/25  1258   PROCALCITONIN ng/mL 1.00*   LACTATE mmol/L 1.8     No results found for: \"HGBA1C\", \"POCGLU\"      No radiology results for the last day    Scheduled Meds  buPROPion XL, 150 mg, Oral, QAM  fentaNYL, 1 patch, Transdermal, Q72H   And  Check Fentanyl Patch Placement, 1 each, Not Applicable, Q12H  dexAMETHasone, 4 mg, Oral, BID  fluconazole, 200 mg, Intravenous, Q24H  melatonin, 5 mg, Oral, Nightly  Menthol-Zinc Oxide, 1 Application, Topical, Q12H  polyethylene glycol, 17 g, Oral, Daily  sennosides-docusate, 2 tablet, Oral, Daily  sodium chloride, 10 mL, Intravenous, Q12H  sodium chloride, 10 mL, Intravenous, Q12H  traZODone, 12.5 mg, Oral, Nightly    Continuous Infusions     PRN Meds    acetaminophen **OR** acetaminophen **OR** acetaminophen    acetaminophen **OR** acetaminophen **OR** acetaminophen    diphenhydrAMINE **OR** diphenhydrAMINE    diphenoxylate-atropine    glycopyrrolate **OR** glycopyrrolate **OR** glycopyrrolate **OR** glycopyrrolate    heparin    HYDROcodone-acetaminophen    Morphine **OR** morphine **OR** HYDROmorphone    Morphine **OR** morphine **OR** HYDROmorphone    Morphine **OR** morphine **OR** HYDROmorphone    LORazepam **OR** LORazepam **OR** LORazepam    LORazepam **OR** LORazepam **OR** LORazepam    LORazepam **OR** LORazepam **OR** LORazepam    Morphine    ondansetron ODT    Polyvinyl Alcohol-Povidone PF    sodium chloride    sodium chloride    sodium chloride    sodium chloride    sodium chloride    sodium chloride       Diet  Diet: Regular/House; Texture: Soft to Chew (NDD 3); Soft to Chew: Chopped Meat; Fluid Consistency: Nectar Thick      Assessment/Plan "     Active Hospital Problems    Diagnosis  POA    **Acute kidney injury (ALBER) with acute tubular necrosis (ATN) [N17.0]  Yes    Hyperkalemia [E87.5]  Yes    Anxiety [F41.9]  Yes    Severe protein-calorie malnutrition [E43]  Yes    Abdominal pain [R10.9]  Yes    Dehydration [E86.0]  Yes    Hypertension [I10]  Yes    Colon cancer metastasized to brain [C18.9, C79.31]  Yes      Resolved Hospital Problems   No resolved problems to display.     Patient is a 64 y.o. female metastatic colon cancer and progressive decline. Nausea and vomiting decreased intake difficulty swallowing. Worsening abdominal pain.    Goals of care  Continue comfort care  Received 1 dose of morphine yesterday morning  Currently planning for home with hospice    ALBER, Hyperkalemia, Metabolic acidosis Likely prerenal due to dehydration and decreased p.o. intake (nausea, dysphagia) and diuretics.   Metastatic colon cancer, Ongoing abdominal pain Fentanyl increased in pain seems improved. Had a CT last month with progression of pulmonary mets, liver mets, lymphadenopathy  Underweight, malnutrition no feeding tubes  Elevated TSH  Elevated procalcitonin  Possible thrush, his dysphagia continue Diflucan since pain seems improved  Insomnia  Started on a small dose of trazodone for sleep 2025    Discharge  Probably home with hospice when arrangements made  Expected Discharge Date: 2025; Expected Discharge Time:     Discussed with patient, family, and nursing staff    Frank Karimi MD  [facility]  25 08:37 EST    Electronically signed by Frank Karimi MD at 25 0839       Frank Karimi MD at 25 1321            Name: Ely Sims ADMIT: 2025   : 1960  PCP: Provider, No Known    MRN: [id number] LOS: 4 days   AGE/SEX: 64 y.o. female  ROOM: [facility]/     Subjective   Subjective   Sitting in chair. Eating some bites. Family at bedside thinks she might be having some choking while  eating.    Objective   Objective   Vital Signs  Temp:  [96.4 °F (35.8 °C)-96.7 °F (35.9 °C)] 96.4 °F (35.8 °C)  Heart Rate:  [90-92] 90  Resp:  [16-18] 18  BP: (160-165)/() 160/97  SpO2:  [98 %] 98 %  on   ;   Device (Oxygen Therapy): room air  Body mass index is 18.76 kg/m².    Physical Exam  Constitutional:       Appearance: She is underweight. She is ill-appearing (frail).   HENT:      Head: Normocephalic.   Cardiovascular:      Rate and Rhythm: Normal rate and regular rhythm.   Pulmonary:      Effort: Pulmonary effort is normal. No respiratory distress.   Abdominal:      Palpations: Abdomen is soft.      Tenderness: There is no abdominal tenderness.   Musculoskeletal:         General: No swelling.   Skin:     General: Skin is warm and dry.     Results Review  I reviewed the patient's new clinical results.  Results from last 7 days   Lab Units 01/09/25 0622 01/08/25 0523 01/07/25  1039   WBC 10*3/mm3 9.37 12.36* 15.38*   HEMOGLOBIN g/dL 13.8 14.8 14.5   PLATELETS 10*3/mm3 156 149 184     Results from last 7 days   Lab Units 01/09/25 0622 01/08/25 0523 01/07/25 1803 01/07/25  1039   SODIUM mmol/L 138 134* 133* 130*   POTASSIUM mmol/L 4.4 3.9 5.1 6.2*   CHLORIDE mmol/L 94* 95* 96* 95*   CO2 mmol/L 32.0* 27.0 22.6 16.9*   BUN mg/dL 35* 58* 67* 70*   CREATININE mg/dL 1.36* 2.42* 3.11* 3.43*   GLUCOSE mg/dL 123* 101* 122* 118*     Lab Results   Component Value Date    ANIONGAP 12.0 01/09/2025     Estimated Creatinine Clearance: 35.8 mL/min (A) (by C-G formula based on SCr of 1.36 mg/dL (H)).   Lab Results   Component Value Date    EGFR 43.6 (L) 01/09/2025     Results from last 7 days   Lab Units 01/09/25 0622 01/08/25 0523 01/07/25 1803 01/07/25  1039   ALBUMIN g/dL 2.9* 3.5 3.5 3.7   BILIRUBIN mg/dL 0.9 0.8  --  0.9   ALK PHOS U/L 1,041* 970*  --  861*   AST (SGOT) U/L 60* 50*  --  54*   ALT (SGPT) U/L 38* 35*  --  30     Results from last 7 days   Lab Units 01/09/25  0622 01/08/25  0523 01/07/25  1803  "01/07/25  1039   CALCIUM mg/dL 9.4 9.4 9.5 10.4   ALBUMIN g/dL 2.9* 3.5 3.5 3.7   MAGNESIUM mg/dL 2.1 2.4  --  2.6*   PHOSPHORUS mg/dL 2.5 3.1 4.4  --      Results from last 7 days   Lab Units 01/07/25  1258   PROCALCITONIN ng/mL 1.00*   LACTATE mmol/L 1.8     No results found for: \"HGBA1C\", \"POCGLU\"      No radiology results for the last day    Scheduled Meds  buPROPion XL, 150 mg, Oral, QAM  fentaNYL, 1 patch, Transdermal, Q72H   And  Check Fentanyl Patch Placement, 1 each, Not Applicable, Q12H  dexAMETHasone, 4 mg, Oral, BID  fluconazole, 200 mg, Intravenous, Q24H  melatonin, 5 mg, Oral, Nightly  Menthol-Zinc Oxide, 1 Application, Topical, Q12H  polyethylene glycol, 17 g, Oral, Daily  sennosides-docusate, 2 tablet, Oral, Daily  sodium chloride, 10 mL, Intravenous, Q12H  sodium chloride, 10 mL, Intravenous, Q12H  traZODone, 12.5 mg, Oral, Nightly    Continuous Infusions     PRN Meds    acetaminophen **OR** acetaminophen **OR** acetaminophen    acetaminophen **OR** acetaminophen **OR** acetaminophen    diphenhydrAMINE **OR** diphenhydrAMINE    diphenoxylate-atropine    glycopyrrolate **OR** glycopyrrolate **OR** glycopyrrolate **OR** glycopyrrolate    heparin    HYDROcodone-acetaminophen    Morphine **OR** morphine **OR** HYDROmorphone    Morphine **OR** morphine **OR** HYDROmorphone    Morphine **OR** morphine **OR** HYDROmorphone    LORazepam **OR** LORazepam **OR** LORazepam    LORazepam **OR** LORazepam **OR** LORazepam    LORazepam **OR** LORazepam **OR** LORazepam    Morphine    ondansetron ODT    Polyvinyl Alcohol-Povidone PF    sodium chloride    sodium chloride    sodium chloride    sodium chloride    sodium chloride    sodium chloride       Diet  Diet: Regular/House; Texture: Soft to Chew (NDD 3); Soft to Chew: Chopped Meat; Fluid Consistency: Nectar Thick      Assessment/Plan     Active Hospital Problems    Diagnosis  POA    **Acute kidney injury (ALBER) with acute tubular necrosis (ATN) [N17.0]  Yes    " Hyperkalemia [E87.5]  Yes    Anxiety [F41.9]  Yes    Severe protein-calorie malnutrition [E43]  Yes    Abdominal pain [R10.9]  Yes    Dehydration [E86.0]  Yes    Hypertension [I10]  Yes    Colon cancer metastasized to brain [C18.9, C79.31]  Yes      Resolved Hospital Problems   No resolved problems to display.     Patient is a 64 y.o. female metastatic colon cancer and progressive decline. Nausea and vomiting decreased intake difficulty swallowing. Worsening abdominal pain.    Goals of care  Continue comfort care  Received 1 dose of morphine yesterday and 1 dose today  Currently home with hospice but if declines HSB    ALBER, Hyperkalemia, Metabolic acidosis Likely prerenal due to dehydration and decreased p.o. intake (nausea, dysphagia) and diuretics.   Metastatic colon cancer, Ongoing abdominal pain Fentanyl increased in pain seems improved. Had a CT last month with progression of pulmonary mets, liver mets, lymphadenopathy  Underweight, malnutrition no feeding tubes  Elevated TSH  Elevated procalcitonin  Possible thrush, his dysphagia continue Diflucan since pain seems improved  Insomnia  Started on a small dose of trazodone for sleep 1/8/2025    Discharge  TBD   Expected Discharge Date: 1/13/2025; Expected Discharge Time:     Discussed with patient, family, and nursing staff    Frank Karimi MD  [facility]  01/11/25 13:21 EST    Electronically signed by Frank Karimi MD at 01/11/25 1322       Elieser Saunders MD at 01/11/25 1124          [facility] GROUP INPATIENT PROGRESS NOTE    Length of Stay:  4 days    CHIEF COMPLAINT/REASON FOR VISIT:  Metastatic colon cancer     SUBJECTIVE: She continues to aspirate. Denies much pain. Denies sore throat but states her mouth is dry.     ROS:  14 systems reviewed with pertinent positives and negatives in the HPI. Reviewed today.    OBJECTIVE:  Vitals:    01/10/25 1315 01/10/25 2015 01/11/25 0241 01/11/25 0755   BP: (!) 173/102  (!) 165/111  160/97   BP Location: Right arm  Left arm Right arm   Patient Position: Lying  Sitting Sitting   Pulse: 93 90 92 90   Resp: 20 16 18 18   Temp: 96.7 °F (35.9 °C)  96.7 °F (35.9 °C) 96.4 °F (35.8 °C)   TempSrc: Oral  Oral Oral   SpO2: 99% 98% 98% 98%   Weight:       Height:             PHYSICAL EXAMINATION:   General: Chronically ill-appearing, in chair  HEENT: Improved white patches in the back of her throat today  Extremities: Warm and well-perfused with 1-2+ bilateral foot edema    DIAGNOSTIC DATA:  Results Review:     I reviewed the patient's new clinical results.    Results from last 7 days   Lab Units 01/09/25  0622   WBC 10*3/mm3 9.37   HEMOGLOBIN g/dL 13.8   HEMATOCRIT % 42.6   PLATELETS 10*3/mm3 156     Lab Results   Component Value Date    NEUTROABS 10.45 (H) 01/08/2025     Results from last 7 days   Lab Units 01/09/25  0622   SODIUM mmol/L 138   POTASSIUM mmol/L 4.4   CHLORIDE mmol/L 94*   CO2 mmol/L 32.0*   BUN mg/dL 35*   CREATININE mg/dL 1.36*   GLUCOSE mg/dL 123*   CALCIUM mg/dL 9.4         Results from last 7 days   Lab Units 01/09/25  0622   MAGNESIUM mg/dL 2.1         IMAGING:    None reviewed    ASSESSMENT:  This is a 64 y.o. female with:     *Metastatic colon cancer  The patient presented on 8/19/2022 initially to urgent care with headache, nausea, vomiting, diarrhea.  She was advised to go to the ED from urgent care.  CT head 8/19/2024 showed some areas of increased attenuation over the right cerebral hemisphere and left temporal lobe concerning for metastatic disease with edema with some mass effect upon the fourth ventricle.  MRI brain 8/20/2024 with a lesion at the right cerebellar hemisphere measuring 2.2 cm, lesion within the left temporal lobe measuring 9 mm, 1.0 cm lesion at the left cerebellar vermis, all with surrounding edema.  There was some mass effect upon the fourth ventricle with mild prominence of the lateral and third ventricles.  8/20/2024: CT chest abdomen and pelvis with no  evidence of pulmonary metastatic disease.  There was some asymmetric wall thickening in the distal sigmoid colon with surrounding mesenteric soft tissue nodularity concerning for primary colon cancer.  A sclerotic lesion of the spinous process of T1 was said to be consistent with a bone island or other benign lesion.  8/21/2024: Posterior fossa craniectomy with gross total removal of a right cerebellar hemisphere metastasis Dr. Bull Doty.  Pathology consistent with metastatic poorly differentiated adenocarcinoma favoring colorectal origin.  MSI testing is pending.  [place] with a BRAF V600E mutation.  TMB 4.7 mutations per megabase.  Microsatellite stable.  8/25/2024: Bone scan with no obvious metastatic disease.  8/25/2024: Colonoscopy by Dr. Kaminski shows an infiltrative completely obstructing large mass found at the proximal rectum.  Pathology with invasive poorly differentiated carcinoma with neuroendocrine features. MSI studies pending.     CEA 2.10.  8/26/2024: MRI lumbar spine with no obvious metastatic disease in the lumbar spine.  However, there is a 3 mm enhancing lesion in the visualized lower thoracic spinal cord that could represent a spinal cord metastasis.  Cervical and thoracic spine MRI requested..  8/27/24: diverting loop sigmoid colostomy and port placement by Dr. Chester  8/28/2024: MRI C and T-spine addendum made with a small 3 mm met to the spinal cord at T12  Completed radiation to the brain and spine on 9/24/24  PET scan 9/12/24 with no metastatic disease. FDG avid mass at the high rectum.  10/22/2024: Proceed with Cycle 2 FOLFOX. Will omit 5 FU bolus due to neutropenia. Future treatment plans adjusted. In regards to lower abdominal cramping which occurs at night, advised patient to discuss with Dr. Chester (surgeon).   11/5/2024: Proceed with Cycle 3 FOLFOX with 5 FU bolus omitted. WBC 4.05 and . Will give a dose of tylenol today while in the infusion area for side/back pain. Instructed  her to reach out if the pain/soreness does not subside.   11/19/2024: She presents for cycle #4 of therapy.  Therapy has been well-tolerated.  However, she is having worsening abdominal pain as well as some nausea and vomiting this morning.  See below.  11/26/2024: CT imaging with significant progression of disease  With progression, cetuximab or panitumumab plus encorafenib given the presence of a BRAF V600 E mutation.  Plan panitumumab due to ease of administration every 2 weeks and potential for fewer adverse effects compared to cetuximab. Encorafenib is 300 mg daily.  Panitumumab is 6 mg/kg every 14 days.  Plan treatment until disease progression or unacceptable toxicity.  Treatment is palliative and she understands this.  We will plan to initiate therapy very quickly given significant pression of disease.  C1 D1 panitumumab and encorafenib 12/10/2024  12/13/2024: seen for triage visit concerning severe pain and uncontrolled nausea/vomiting.  She has not been able to keep down any food or drink for the last 3 days.  Has been able to keep down some medications.  Has been utilizing Compazine every 6 hours without any relief.  As far as her pain she is continued MS Contin 15 mg every 8 hours and Norco 10/325 every 6 hours, despite this her pain has continued to worsen.  Her WBC is elevated at 18.44, and she reports developing  chills a few hours ago.  Her bilirubin has also increase dto 2.4.  She will require admission through [facility].  Subsequent admission.  Pain was better when she left the hospital.  1/7/2025: She presented to the office looking very bad with confusion, significant weakness.  She was admitted to the hospital.  Plans for comfort measures and hospice     *Abdominal pain  We have started sustained-release morphine 15 mg every 8 hours.  We had intended her for her to take hydrocodone also as needed but she has not been doing this thinking that morphine replaced the hydrocodone.  She was encouraged to  take hydrocodone as needed as well.  12/13/2024: Patient is having severe uncontrolled pain.  She has been taking MS Contin 15 mg every 8 hours and Norco 10/325 every 6 hours.  Despite this her pain has continued to worsen.  She will require direct admission through [facility] for further management.  Started Fentanyl patch inpatient.  This along with hydrocodone 10 mg tablets improved her pain.  She just has hydrocodone 5 mg tablets at home.  Unfortunately, her fentanyl patch fell off on Saturday and she has been having more pain and spite of replacement of a new patch yesterday.  Currently utilizing fentanyl 75 mcg patch (increased) and Norco 10/325 every 4 hours.      *Uncontrolled nausea/vomiting  Compazine was helping at home.  She has not been taking it since leaving the hospital since she apparently was told she was not supposed to take it.  It is unclear why this was stopped.  Records from the hospital do not really discuss her nausea and vomiting and what medication was used for this.  It appears that she was on a scopolamine patch at 1 point but this may have been stopped secondary to altered mental status.  She was also receiving lorazepam which was also stopped due to sedation.  1/7/2025 now utilizing Zofran 8 mg dissolvable tablets.  Continues with poor oral intake and extreme weight loss of 11 pounds in the last 2 weeks, over 20 pounds in the last month.     *Previous lower extremity edema while hospitalized 12/2024.    He was discharged home on Lasix 40 mg daily  Developed considerable volume depletion     *Acute kidney injury likely secondary to volume depletion  Creatinine 1.36, from .42, from 3.11, from 3.43, from 1.02     *Hyponatremia  Sodium normalized     *Oral thrush  Receiving IV Diflucan  Unable to gargle for oral care    *Aspiration with recommendations from speech therapy to be n.p.o. Regular soft diet with nectar thick fluids for comfort.     RECOMMENDATIONS/PLAN:   DNR/DNI  Home with hospice.  If she declines, HSB here   Continue wound care  IV Diflucan. Throat appearing better  On dex 4 mg bid  Continue pain medication. Fentanyl 75 mcg patch and PRNs  I'll see her tomorrow. Discussed with the patient and her mother.       Elieser Saunders MD     Electronically signed by Elieser Saunders MD at [phone]       Frank Karimi MD at 01/10/25 1410            Name: Ely Sims ADMIT: 2025   : 1960  PCP: Provider, No Known    MRN: [id number] LOS: 3 days   AGE/SEX: 64 y.o. female  ROOM: [facility]     Subjective   Subjective   Lying in bed. Sister at bedside. She says her throat feels better    Objective   Objective   Vital Signs  Temp:  [96.7 °F (35.9 °C)-98.1 °F (36.7 °C)] 96.7 °F (35.9 °C)  Heart Rate:  [] 93  Resp:  [16-20] 20  BP: (119-173)/() 173/102  SpO2:  [96 %-99 %] 99 %  on   ;   Device (Oxygen Therapy): room air  Body mass index is 18.76 kg/m².    Physical Exam  Constitutional:       Appearance: She is underweight. She is ill-appearing (frail).   HENT:      Head: Normocephalic.   Cardiovascular:      Rate and Rhythm: Normal rate and regular rhythm.   Pulmonary:      Effort: Pulmonary effort is normal. No respiratory distress.      Breath sounds: No wheezing or rhonchi.   Abdominal:      Palpations: Abdomen is soft.      Tenderness: There is no abdominal tenderness.   Musculoskeletal:         General: No swelling.   Skin:     General: Skin is warm and dry.     Results Review  I reviewed the patient's new clinical results.  Results from last 7 days   Lab Units 25  0622 25  0523 25  1039   WBC 10*3/mm3 9.37 12.36* 15.38*   HEMOGLOBIN g/dL 13.8 14.8 14.5   PLATELETS 10*3/mm3 156 149 184     Results from last 7 days   Lab Units 25  0622 25  0523 25  1803 25  1039   SODIUM mmol/L 138 134* 133* 130*   POTASSIUM mmol/L 4.4 3.9 5.1 6.2*   CHLORIDE mmol/L 94* 95* 96* 95*   CO2 mmol/L 32.0* 27.0 22.6 16.9*   BUN mg/dL 35* 58* 67* 70*   CREATININE  "mg/dL 1.36* 2.42* 3.11* 3.43*   GLUCOSE mg/dL 123* 101* 122* 118*     Lab Results   Component Value Date    ANIONGAP 12.0 01/09/2025     Estimated Creatinine Clearance: 35.8 mL/min (A) (by C-G formula based on SCr of 1.36 mg/dL (H)).   Lab Results   Component Value Date    EGFR 43.6 (L) 01/09/2025     Results from last 7 days   Lab Units 01/09/25  0622 01/08/25  0523 01/07/25  1803 01/07/25  1039   ALBUMIN g/dL 2.9* 3.5 3.5 3.7   BILIRUBIN mg/dL 0.9 0.8  --  0.9   ALK PHOS U/L 1,041* 970*  --  861*   AST (SGOT) U/L 60* 50*  --  54*   ALT (SGPT) U/L 38* 35*  --  30     Results from last 7 days   Lab Units 01/09/25  0622 01/08/25  0523 01/07/25  1803 01/07/25  1039   CALCIUM mg/dL 9.4 9.4 9.5 10.4   ALBUMIN g/dL 2.9* 3.5 3.5 3.7   MAGNESIUM mg/dL 2.1 2.4  --  2.6*   PHOSPHORUS mg/dL 2.5 3.1 4.4  --      Results from last 7 days   Lab Units 01/07/25  1258   PROCALCITONIN ng/mL 1.00*   LACTATE mmol/L 1.8     No results found for: \"HGBA1C\", \"POCGLU\"      No radiology results for the last day    Scheduled Meds  buPROPion XL, 150 mg, Oral, QAM  fentaNYL, 1 patch, Transdermal, Q72H   And  Check Fentanyl Patch Placement, 1 each, Not Applicable, Q12H  dexAMETHasone, 4 mg, Oral, BID  fluconazole, 200 mg, Intravenous, Q24H  melatonin, 5 mg, Oral, Nightly  Menthol-Zinc Oxide, 1 Application, Topical, Q12H  polyethylene glycol, 17 g, Oral, Daily  sennosides-docusate, 2 tablet, Oral, Daily  sodium chloride, 10 mL, Intravenous, Q12H  sodium chloride, 10 mL, Intravenous, Q12H  traZODone, 12.5 mg, Oral, Nightly    Continuous Infusions     PRN Meds    acetaminophen **OR** acetaminophen **OR** acetaminophen    acetaminophen **OR** acetaminophen **OR** acetaminophen    diphenhydrAMINE **OR** diphenhydrAMINE    diphenoxylate-atropine    glycopyrrolate **OR** glycopyrrolate **OR** glycopyrrolate **OR** glycopyrrolate    heparin    HYDROcodone-acetaminophen    Morphine **OR** morphine **OR** HYDROmorphone    Morphine **OR** morphine **OR** " HYDROmorphone    Morphine **OR** morphine **OR** HYDROmorphone    LORazepam **OR** LORazepam **OR** LORazepam    LORazepam **OR** LORazepam **OR** LORazepam    LORazepam **OR** LORazepam **OR** LORazepam    Morphine    ondansetron ODT    Polyvinyl Alcohol-Povidone PF    sodium chloride    sodium chloride    sodium chloride    sodium chloride    sodium chloride    sodium chloride       Diet  NPO Diet NPO Type: Sips with Meds      Assessment/Plan     Active Hospital Problems    Diagnosis  POA    **Acute kidney injury (ALBER) with acute tubular necrosis (ATN) [N17.0]  Yes    Hyperkalemia [E87.5]  Yes    Anxiety [F41.9]  Yes    Severe protein-calorie malnutrition [E43]  Yes    Abdominal pain [R10.9]  Yes    Dehydration [E86.0]  Yes    Hypertension [I10]  Yes    Colon cancer metastasized to brain [C18.9, C79.31]  Yes      Resolved Hospital Problems   No resolved problems to display.     Patient is a 64 y.o. female metastatic colon cancer and progressive decline. Nausea and vomiting decreased intake difficulty swallowing. Worsening abdominal pain.    Goals of care  Patient and family have transition to palliative care   Hospice consulted    ALBER, Hyperkalemia, Metabolic acidosis Likely prerenal due to dehydration and decreased p.o. intake (nausea, dysphagia) and diuretics.   Metastatic colon cancer, Ongoing abdominal pain Fentanyl increased in pain seems improved. Had a CT last month with progression of pulmonary mets, liver mets, lymphadenopathy  Underweight, malnutrition no feeding tubes  Elevated TSH  Elevated procalcitonin  Possible thrush, his dysphagia continue Diflucan since pain seems improved  Insomnia  Started on a small dose of trazodone for sleep 1/8/2025    Discharge  TBD   Expected Discharge Date: 1/13/2025; Expected Discharge Time:     Discussed with patient, family, and nursing staff    Frank Karimi MD  [facility]  01/10/25 14:10 EST    Electronically signed by Frank Karimi MD  at 01/10/25 1413       Madeline Lagos MD at 01/10/25 1353              [facility] Progress Note      Patient Name: Ely Sims  : 1960  MRN: [id number]  Primary Care Physician:  Provider, No Known  Date of admission: 2025    Subjective     Interval History:   Follow-up acute kidney injury.  Family decided to proceed with comfort measures    Review of Systems:   As noted above    Objective     Vitals:   Temp:  [96.7 °F (35.9 °C)-98.1 °F (36.7 °C)] 96.7 °F (35.9 °C)  Heart Rate:  [] 93  Resp:  [16-20] 20  BP: (119-173)/() 173/102    Intake/Output Summary (Last 24 hours) at 1/10/2025 1353  Last data filed at 1/10/2025 1001  Gross per 24 hour   Intake 210 ml   Output 450 ml   Net -240 ml       Physical Exam:    General: Sitting up in bed.  Very frail.  More conversant.  HEENT: Oral mucosa dry.  Neck: Left subclavian port.  Heart: Regular rate and rhythm no S3 or rub, tachycardic  Lungs: Clear to auscultation bilaterally.  Abdomen: Positive bowel sound, fir, nontender.  Extremities: No edema  Skin: No rash.  Scheduled Meds:     buPROPion XL, 150 mg, Oral, QAM  fentaNYL, 1 patch, Transdermal, Q72H   And  Check Fentanyl Patch Placement, 1 each, Not Applicable, Q12H  dexAMETHasone, 4 mg, Oral, BID  fluconazole, 200 mg, Intravenous, Q24H  melatonin, 5 mg, Oral, Nightly  Menthol-Zinc Oxide, 1 Application, Topical, Q12H  polyethylene glycol, 17 g, Oral, Daily  sennosides-docusate, 2 tablet, Oral, Daily  sodium chloride, 10 mL, Intravenous, Q12H  sodium chloride, 10 mL, Intravenous, Q12H  traZODone, 12.5 mg, Oral, Nightly      IV Meds:          Results Reviewed:   I have personally reviewed the results from the time of this admission to 1/10/2025 13:53 EST     Results from last 7 days   Lab Units 25  0622 25  0523 25  1803 25  1039   SODIUM mmol/L 138 134* 133* 130*   POTASSIUM mmol/L 4.4 3.9 5.1 6.2*   CHLORIDE mmol/L 94* 95* 96* 95*   CO2 mmol/L  32.0* 27.0 22.6 16.9*   BUN mg/dL 35* 58* 67* 70*   CREATININE mg/dL 1.36* 2.42* 3.11* 3.43*   CALCIUM mg/dL 9.4 9.4 9.5 10.4   BILIRUBIN mg/dL 0.9 0.8  --  0.9   ALK PHOS U/L 1,041* 970*  --  861*   ALT (SGPT) U/L 38* 35*  --  30   AST (SGOT) U/L 60* 50*  --  54*   GLUCOSE mg/dL 123* 101* 122* 118*       Estimated Creatinine Clearance: 35.8 mL/min (A) (by C-G formula based on SCr of 1.36 mg/dL (H)).    Results from last 7 days   Lab Units 01/09/25  0622 01/08/25  0523 01/07/25  1803 01/07/25  1039   MAGNESIUM mg/dL 2.1 2.4  --  2.6*   PHOSPHORUS mg/dL 2.5 3.1 4.4  --        Results from last 7 days   Lab Units 01/08/25  0523   URIC ACID mg/dL 16.7*       Results from last 7 days   Lab Units 01/09/25  0622 01/08/25  0523 01/07/25  1039   WBC 10*3/mm3 9.37 12.36* 15.38*   HEMOGLOBIN g/dL 13.8 14.8 14.5   PLATELETS 10*3/mm3 156 149 184             Assessment / Plan     ASSESSMENT:  Acute kidney injury likely due to volume depletion.  Poor p.o. intake in the setting of Lasix and potassium supplementation.  Complicated by hyperkalemia.  Waste products improved today.  Potassium normal.  Metabolic acidemia resolved.  Stop bicarb drip.  Metastatic colon cancer refractory to therapy.  Progressive disease on last CT scan in December 24.  Discussed with Dr. Saunders yesterday.  Palliative care team consulted.  Hyperuricemia due to volume depletion.  4.  Elevated TSH.  Dr. Karimi addressing.  5.  Elevated transaminases and alk phos.  6.  Oral thrush mucositis being treated with IV Diflucan.    PLAN:  Family decided to proceed with comfort measures.  Nephrology team will sign off    Thank you for involving us in the care of Ely Sims.  Please feel free to call with any questions.    Madeline Lagos MD  01/10/25  13:53 EST    [facility]  [phone]    Please note that portions of this note were completed with a voice recognition program.    Electronically signed by Madeline Lagos MD at  01/10/25 1353       Elieser Saunders MD at 01/09/25 1439          [facility] INPATIENT PROGRESS NOTE    Length of Stay:  2 days    CHIEF COMPLAINT/REASON FOR VISIT:  Metastatic colon cancer     SUBJECTIVE: Afebrile.  Normotensive.  On room air.  She remains drowsy.  She had a swallow study that showed aspiration and speech therapy has advised n.p.o. status.    ROS:  14 systems reviewed with pertinent positives and negatives in the HPI. Reviewed today.    OBJECTIVE:  Vitals:    01/08/25 1914 01/08/25 2320 01/09/25 0523 01/09/25 0730   BP: 119/89 123/88  127/84   BP Location: Right arm Right arm  Right arm   Patient Position: Lying Lying  Lying   Pulse: 95 97  98   Resp: 16 16  16   Temp: 97.7 °F (36.5 °C) 98 °F (36.7 °C)  98.2 °F (36.8 °C)   TempSrc: Oral Oral  Oral   SpO2:    97%   Weight:   54.3 kg (119 lb 12.8 oz)    Height:             PHYSICAL EXAMINATION:   General: Acutely and chronically ill-appearing, drowsy, answer some questions appropriately but does not correctly identify all of the family members in the room  HEENT: White patches in the back of her throat, stable to slightly improved today  Chest/Lungs: Clear to auscultation   Heart: Regular rate and rhythm  Abdomen/GI: Soft and nontender  Extremities: Warm and well-perfused with 1-2+ bilateral foot edema    DIAGNOSTIC DATA:  Results Review:     I reviewed the patient's new clinical results.    Results from last 7 days   Lab Units 01/09/25  0622   WBC 10*3/mm3 9.37   HEMOGLOBIN g/dL 13.8   HEMATOCRIT % 42.6   PLATELETS 10*3/mm3 156     Lab Results   Component Value Date    NEUTROABS 10.45 (H) 01/08/2025     Results from last 7 days   Lab Units 01/09/25  0622   SODIUM mmol/L 138   POTASSIUM mmol/L 4.4   CHLORIDE mmol/L 94*   CO2 mmol/L 32.0*   BUN mg/dL 35*   CREATININE mg/dL 1.36*   GLUCOSE mg/dL 123*   CALCIUM mg/dL 9.4         Results from last 7 days   Lab Units 01/09/25  0622   MAGNESIUM mg/dL 2.1         IMAGING:    None  reviewed    ASSESSMENT:  This is a 64 y.o. female with:     *Metastatic colon cancer  The patient presented on 8/19/2022 initially to urgent care with headache, nausea, vomiting, diarrhea.  She was advised to go to the ED from urgent care.  CT head 8/19/2024 showed some areas of increased attenuation over the right cerebral hemisphere and left temporal lobe concerning for metastatic disease with edema with some mass effect upon the fourth ventricle.  MRI brain 8/20/2024 with a lesion at the right cerebellar hemisphere measuring 2.2 cm, lesion within the left temporal lobe measuring 9 mm, 1.0 cm lesion at the left cerebellar vermis, all with surrounding edema.  There was some mass effect upon the fourth ventricle with mild prominence of the lateral and third ventricles.  8/20/2024: CT chest abdomen and pelvis with no evidence of pulmonary metastatic disease.  There was some asymmetric wall thickening in the distal sigmoid colon with surrounding mesenteric soft tissue nodularity concerning for primary colon cancer.  A sclerotic lesion of the spinous process of T1 was said to be consistent with a bone island or other benign lesion.  8/21/2024: Posterior fossa craniectomy with gross total removal of a right cerebellar hemisphere metastasis Dr. Bull Doty.  Pathology consistent with metastatic poorly differentiated adenocarcinoma favoring colorectal origin.  MSI testing is pending.  Tempus with a BRAF V600E mutation.  TMB 4.7 mutations per megabase.  Microsatellite stable.  8/25/2024: Bone scan with no obvious metastatic disease.  8/25/2024: Colonoscopy by Dr. Kaminski shows an infiltrative completely obstructing large mass found at the proximal rectum.  Pathology with invasive poorly differentiated carcinoma with neuroendocrine features. MSI studies pending.     CEA 2.10.  8/26/2024: MRI lumbar spine with no obvious metastatic disease in the lumbar spine.  However, there is a 3 mm enhancing lesion in the visualized  lower thoracic spinal cord that could represent a spinal cord metastasis.  Cervical and thoracic spine MRI requested..  8/27/24: diverting loop sigmoid colostomy and port placement by Dr. Chester  8/28/2024: MRI C and T-spine addendum made with a small 3 mm met to the spinal cord at T12  Completed radiation to the brain and spine on 9/24/24  PET scan 9/12/24 with no metastatic disease. FDG avid mass at the high rectum.  10/22/2024: Proceed with Cycle 2 FOLFOX. Will omit 5 FU bolus due to neutropenia. Future treatment plans adjusted. In regards to lower abdominal cramping which occurs at night, advised patient to discuss with Dr. Chester (surgeon).   11/5/2024: Proceed with Cycle 3 FOLFOX with 5 FU bolus omitted. WBC 4.05 and . Will give a dose of tylenol today while in the infusion area for side/back pain. Instructed her to reach out if the pain/soreness does not subside.   11/19/2024: She presents for cycle #4 of therapy.  Therapy has been well-tolerated.  However, she is having worsening abdominal pain as well as some nausea and vomiting this morning.  See below.  11/26/2024: CT imaging with significant progression of disease  With progression, cetuximab or panitumumab plus encorafenib given the presence of a BRAF V600 E mutation.  Plan panitumumab due to ease of administration every 2 weeks and potential for fewer adverse effects compared to cetuximab. Encorafenib is 300 mg daily.  Panitumumab is 6 mg/kg every 14 days.  Plan treatment until disease progression or unacceptable toxicity.  Treatment is palliative and she understands this.  We will plan to initiate therapy very quickly given significant pression of disease.  C1 D1 panitumumab and encorafenib 12/10/2024  12/13/2024: seen for triage visit concerning severe pain and uncontrolled nausea/vomiting.  She has not been able to keep down any food or drink for the last 3 days.  Has been able to keep down some medications.  Has been utilizing Compazine every  6 hours without any relief.  As far as her pain she is continued MS Contin 15 mg every 8 hours and Norco 10/325 every 6 hours, despite this her pain has continued to worsen.  Her WBC is elevated at 18.44, and she reports developing  chills a few hours ago.  Her bilirubin has also increase dto 2.4.  She will require admission through [facility].  Subsequent admission.  Pain was better when she left the hospital.  1/7/2025: She presented to the office looking very bad with confusion, significant weakness.  She was admitted to the hospital.     *Mild normocytic anemia  Hemoglobin 14.8        *Abdominal pain  We have started sustained-release morphine 15 mg every 8 hours.  We had intended her for her to take hydrocodone also as needed but she has not been doing this thinking that morphine replaced the hydrocodone.  She was encouraged to take hydrocodone as needed as well.  12/13/2024: Patient is having severe uncontrolled pain.  She has been taking MS Contin 15 mg every 8 hours and Norco 10/325 every 6 hours.  Despite this her pain has continued to worsen.  She will require direct admission through [facility] for further management.  Started Fentanyl patch inpatient.  This along with hydrocodone 10 mg tablets improved her pain.  She just has hydrocodone 5 mg tablets at home.  Unfortunately, her fentanyl patch fell off on Saturday and she has been having more pain and spite of replacement of a new patch yesterday.  1/7/2025: Continues with lower abdominal pain.  Currently utilizing fentanyl 50 mcg patch and Norco 10/325 every 4 hours.  She is extremely somnolent.     *Uncontrolled nausea/vomiting  Compazine was helping at home.  She has not been taking it since leaving the hospital since she apparently was told she was not supposed to take it.  It is unclear why this was stopped.  Records from the hospital do not really discuss her nausea and vomiting and what medication was used for this.  It appears that she was on a scopolamine  patch at 1 point but this may have been stopped secondary to altered mental status.  She was also receiving lorazepam which was also stopped due to sedation.  1/7/2025 now utilizing Zofran 8 mg dissolvable tablets.  Continues with poor oral intake and extreme weight loss of 11 pounds in the last 2 weeks, over 20 pounds in the last month.     *Previous lower extremity edema while hospitalized 12/2024.    He was discharged home on Lasix 40 mg daily  Developed considerable volume depletion     *History of hypokalemia, now with hyperkalemia  Discharged home 12/20/2024 potassium chloride 20 mEq twice daily.  1/7/2025 Now with evidence of hyperkalemia with potassium of 6.2, improved at 3.9, from 5.1     *Acute kidney injury likely secondary to volume depletion  Creatinine 1.36, from .42, from 3.11, from 3.43, from 1.02     *Hyponatremia  Sodium 138, from 134, from 133, from 130     *Oral thrush  Receiving IV Diflucan    *Aspiration with recommendations from speech therapy to be n.p.o.    RECOMMENDATIONS/PLAN:   DNR/DNI  Unfortunately, she had significant progression of disease on first-line palliative chemotherapy.  She now has not tolerated second line therapy with panitumumab and encorafenib.  She has very poor performance status.    I think right now it is reasonable to try to reverse what may be reversible with treating the thrush/mucositis with IV fluconazole.  Kidney function has improved significantly with IV fluid hydration.  This has been discontinued.  I do not believe that she will improve enough to tolerate any more therapy and she does not seem to be inclined to proceed with any further cancer directed therapy at this point.  She and her children have expressed a desire for palliative care.  This is absolutely appropriate.  The palliative team consult has already been placed.  Her mother understands this.  Her boyfriend wants her to improve to the point where she may be able to take further therapy.  I  advised them at the initial consultation on 1/8 that we should plan for the worst and hope for the best and if she does improve to the point where more treatment is reasonable and this can certainly be considered although I doubt this will be possible.  Continue wound care  Daily labs  IV Diflucan  Swallow study planned.  N.p.o. for now.  We would need CT imaging with contrast to see if she has had any improvement in metastatic disease in the liver with the therapy that she has been receiving.  I would avoid IV contrast at this point with recent acute kidney injury.  Discussed with the patient, her boyfriend, her mother, her children at the bedside today      Elieser Saunders MD     Electronically signed by Elieser Saunders MD at 01/09/25 2009          Consult Notes (last 7 days)        Ofelia Matthews at 01/14/25 1604          Visit with patient and her family at bedside. Patient sitting up in bed. Prayer offered for comfort and peace of patient and those who love her. Patient's sister and daughter at bedside at time of visit.     Electronically signed by Ofelia Matthews at 01/14/25 [id number]

## 2025-01-16 NOTE — PROGRESS NOTES
"DAILY PROGRESS NOTE  [facility]    Patient Identification:  Name: Ely Sims  Age: 64 y.o.  Sex: female  :  1960  MRN: [id number]         Primary Care Physician: Provider, No Known    Subjective:  Interval History: She is more alert and drinking and eating a little bit.    Objective:    Scheduled Meds:buPROPion XL, 150 mg, Oral, QAM  Check Fentanyl Patch Placement, 1 each, Not Applicable, Q12H  fentaNYL, 1 patch, Transdermal, Q72H   And  Check Fentanyl Patch Placement, 1 each, Not Applicable, Q12H  dexAMETHasone, 4 mg, Oral, BID  fluconazole, 200 mg, Intravenous, Q24H  melatonin, 5 mg, Oral, Nightly  Menthol-Zinc Oxide, 1 Application, Topical, Q12H  polyethylene glycol, 17 g, Oral, Daily  sennosides-docusate, 2 tablet, Oral, Daily  sodium chloride, 10 mL, Intravenous, Q12H  sodium chloride, 10 mL, Intravenous, Q12H      Continuous Infusions:     Vital signs in last 24 hours:  Temp:  [96.9 °F (36.1 °C)-98 °F (36.7 °C)] 98 °F (36.7 °C)  Heart Rate:  [103-125] 110  Resp:  [16-20] 16  BP: (165-174)/(104-119) 165/107    Intake/Output:    Intake/Output Summary (Last 24 hours) at 2025 1647  Last data filed at 2025 1355  Gross per 24 hour   Intake 360 ml   Output 1 ml   Net 359 ml       Exam:  BP (!) 165/107 (BP Location: Left arm, Patient Position: Lying)   Pulse 110   Temp 98 °F (36.7 °C) (Oral)   Resp 16   Ht 170.2 cm (67.01\")   Wt 54.3 kg (119 lb 12.8 oz)   SpO2 96%   BMI 18.76 kg/m²     General Appearance:    Alert, cooperative, no distress   Head:    Normocephalic, without obvious abnormality, atraumatic   Eyes:       Throat:   Lips, tongue, gums normal   Neck:   Supple, symmetrical, trachea midline, no JVD   Lungs:     Clear to auscultation bilaterally, respirations unlabored   Chest Wall:    No tenderness or deformity    Heart:    Regular rate and rhythm, S1 and S2 normal, no murmur,no  rub or gallop   Abdomen:     Soft, nontender, bowel sounds active, no masses, no " "organomegaly    Extremities:   Extremities normal, atraumatic, no cyanosis or edema   Pulses:      Skin:   Skin is warm and dry,  no rashes or palpable lesions   Neurologic:   no focal deficits noted      Lab Results (last 72 hours)       ** No results found for the last 72 hours. **          Data Review:                  Lab Results   Lab Value Date/Time    TROPONINT 9 12/17/2024 0147    TROPONINT <6 08/19/2024 1545               Invalid input(s): \"PROT\", \"LABALBU\"          No results found for: \"POCGLU\"        Past Medical History:   Diagnosis Date    Metastasis to brain     Rectal carcinoma 2024    Seasonal allergies        Assessment:  Active Hospital Problems    Diagnosis  POA    **Acute kidney injury (ALBER) with acute tubular necrosis (ATN) [N17.0]  Yes    Hyperkalemia [E87.5]  Yes    Anxiety [F41.9]  Yes    Severe protein-calorie malnutrition [E43]  Yes    Abdominal pain [R10.9]  Yes    Dehydration [E86.0]  Yes    Hypertension [I10]  Yes    Colon cancer metastasized to brain [C18.9, C79.31]  Yes      Resolved Hospital Problems   No resolved problems to display.       Plan:  Continue with comfort measures.  DC planning.  Family looking at possibly going home with hospice.  Discussed with case management.    José Mejia MD  1/16/2025  16:47 EST    "

## 2025-01-16 NOTE — PLAN OF CARE
Goal Outcome Evaluation:  Plan of Care Reviewed With: patient, child        Progress: no change  Outcome Evaluation: PPS 30%. Pt daughter at bedside most of the day. Pt had a lot of restlessness. Morphine given X 1. pt family stated that helped to calm pt. Pt family wanting to avoid using ativan at this time. Pt was alert, but not oriented. Pt appeared drowsy. Will continue with comfort care.

## 2025-01-17 VITALS
BODY MASS INDEX: 18.8 KG/M2 | RESPIRATION RATE: 16 BRPM | TEMPERATURE: 97 F | SYSTOLIC BLOOD PRESSURE: 177 MMHG | HEART RATE: 111 BPM | OXYGEN SATURATION: 98 % | DIASTOLIC BLOOD PRESSURE: 103 MMHG | HEIGHT: 67 IN | WEIGHT: 119.8 LBS

## 2025-01-17 PROCEDURE — 25010000002 HEPARIN LOCK FLUSH PER 10 UNITS: Performed by: HOSPITALIST

## 2025-01-17 PROCEDURE — 25010000002 FLUCONAZOLE PER 200 MG: Performed by: HOSPITALIST

## 2025-01-17 PROCEDURE — 63710000001 DEXAMETHASONE PER 0.25 MG: Performed by: HOSPITALIST

## 2025-01-17 RX ORDER — MORPHINE SULFATE 20 MG/ML
5 SOLUTION ORAL
Qty: 15 ML | Refills: 0 | Status: SHIPPED | OUTPATIENT
Start: 2025-01-17 | End: 2025-01-20

## 2025-01-17 RX ORDER — FLUCONAZOLE 200 MG/1
200 TABLET ORAL DAILY
Qty: 5 TABLET | Refills: 0 | Status: SHIPPED | OUTPATIENT
Start: 2025-01-17 | End: 2025-01-22

## 2025-01-17 RX ADMIN — HEPARIN 500 UNITS: 100 SYRINGE at 16:45

## 2025-01-17 RX ADMIN — Medication 10 ML: at 16:46

## 2025-01-17 RX ADMIN — BUPROPION HYDROCHLORIDE 150 MG: 150 TABLET, EXTENDED RELEASE ORAL at 09:27

## 2025-01-17 RX ADMIN — ANORECTAL OINTMENT 1 APPLICATION: 15.7; .44; 24; 20.6 OINTMENT TOPICAL at 09:27

## 2025-01-17 RX ADMIN — DEXAMETHASONE 4 MG: 4 TABLET ORAL at 09:27

## 2025-01-17 RX ADMIN — FLUCONAZOLE IN SODIUM CHLORIDE 200 MG: 2 INJECTION, SOLUTION INTRAVENOUS at 11:25

## 2025-01-17 RX ADMIN — Medication 10 ML: at 09:27

## 2025-01-17 RX ADMIN — MORPHINE SULFATE 10 MG: 20 SOLUTION ORAL at 10:23

## 2025-01-17 RX ADMIN — SENNOSIDES AND DOCUSATE SODIUM 2 TABLET: 50; 8.6 TABLET ORAL at 09:27

## 2025-01-17 NOTE — PROGRESS NOTES
Case Management Discharge Note      Final Note: The patient is being discharged to home with [facility] and transportation by [facility] on 1/17/25. CARLOS Lopez RN, CCP.         Selected Continued Care - Admitted Since 1/7/2025       Destination    No services have been selected for the patient.                Durable Medical Equipment    No services have been selected for the patient.                Dialysis/Infusion    No services have been selected for the patient.                Home Medical Care Coordination complete.      Service Provider Services Address Phone Fax Patient Preferred    [facility] [address], [facility] [address] [phone] --              Therapy    No services have been selected for the patient.                Community Resources    No services have been selected for the patient.                Community & DME    No services have been selected for the patient.                    Selected Continued Care - Prior Encounters Includes continued care and service providers with selected services from prior encounters from 10/9/2024 to 1/17/2025      Discharged on 12/20/2024 Admission date: 12/13/2024 - Discharge disposition: Home-Health Care Svc      Home Medical Care       Service Provider Services Address Phone Fax Patient Preferred    [facility], Home Nursing, Home Rehabilitation [address] [place] [address] [address] [phone] --                          Transportation Services  Ambulance: [facility]    Final Discharge Disposition Code: 50 - home with hospice

## 2025-01-17 NOTE — PROGRESS NOTES
"Physicians Statement of Medical Necessity for  Ambulance Transportation    GENERAL INFORMATION     Name: Ely Sims  YOB: 1960  Medicare #:   [employer]    Subscriber Subscriber #   Ely Sims [id number]   Address Phone   [id number]  [place] [address]      Transport Date: 1/17/25 (Valid for round trips this date, or for scheduled repetitive trips for 60 days from the date signed below.)  Origin: [address] ([address])     Destination: [address] [place] [address]    AMBULANCE DNR ORIGINAL ATTACHED PLEASE LEAVE WITH THE FAMILY ONCE THE PATIENT HAS BEEN TRANSPORTED.     Is the Patient's stay covered under Medicare Part A (PPS/DRG?)Yes  Closest appropriate facility? Yes  If this a hosp-hosp transfer? No  Is this a hospice patient? No [facility] PRE-ADMIT AND WILL FINISH ONCE THE PATIENT IS AT HOME THIS EVENING.     MEDICAL NECESSITY QUESTIONAIRE    Ambulance Transportation is medically necessary only if other means of transportation are contraindicated or would be potentially harmful to the patient.  To meet this requirement, the patient must be either \"bed confined\" or suffer from a condition such that transport by means other than an ambulance is contraindicated by the patient's condition.  The following questions must be answered by the healthcare professional signing below for this form to be valid:     1) Describe the MEDICAL CONDITION (physical and/or mental) of this patient AT THE TIME OF AMBULANCE TRANSPORT that requires the patient to be transported in an ambulance, and why transport by other means is contraindicated by the patient's condition:  NOT STABLE WEAKNESS  Past Medical History:   Diagnosis Date    Metastasis to brain     Rectal carcinoma 2024    Seasonal allergies       Past Surgical History:   Procedure Laterality Date    COLONOSCOPY N/A 08/25/2024    Procedure: COLONOSCOPY to 20cm with cold biopsies and " "needle tattoo;  Surgeon: Shadi Kaminski MD;  Location: [facility] ENDOSCOPY;  Service: Gastroenterology;  Laterality: N/A;  pre: abnormal imaging  post: poor prep, obstructing colon mass at 20cm, hemorrhoids    COLOSTOMY N/A 08/27/2024    Procedure: COLOSTOMY LAPAROSCOPIC;  Surgeon: Saturnino Chester MD;  Location: [facility] MAIN OR;  Service: General;  Laterality: N/A;    CRANIOTOMY N/A 08/21/2024    Procedure: Posterior fossa craniotomy for tumor using stereotactic guidance;  Surgeon: Bull Doty MD;  Location: [facility] MAIN OR;  Service: Neurosurgery;  Laterality: N/A;    ENDOMETRIAL ABLATION      TUBAL ABDOMINAL LIGATION      VENOUS ACCESS DEVICE (PORT) INSERTION Right 08/27/2024    Procedure: INSERTION VENOUS ACCESS DEVICE;  Surgeon: Saturnino Chester MD;  Location: [facility] MAIN OR;  Service: General;  Laterality: Right;      2) Is this patient \"bed confined\" as defined below?No    To be \"bed confined\" the patient must satisfy all three of the following criteria:  (1) unable to get up from bed without assistance; AND (2) unable to ambulate;  AND (3) unable to sit in a chair or wheelchair.  3) Can this patient safely be transported by car or wheelchair van (I.e., may safely sit during transport, without an attendant or monitoring?)No   4. In addition to completing questions 1-3 above, please check any of the following conditions that apply*:          *Note: supporting documentation for any boxes checked must be maintained in the patient's medical records Patient is confused      SIGNATURE OF PHYSICIAN OR OTHER AUTHORIZED HEALTHCARE PROFESSIONAL    I certify that the above information is true and correct based on my evaluation of this patient, and represent that the patient requires transport by ambulance and that other forms of transport are contraindicated.  I understand that this information will be used by the Centers for Medicare and Medicaid Services (CMS) to support the determiniation of " medical necessity for ambulance services, and I represent that I have personal knowledge of the patient's condition at the time of transport.        If this box is checked, I also certify that the patient is physically or mentally incapable of signing the ambulance service's claim form and that the institution with which I am affiliated has furnished care, services or assistance to the patient.  My signature below is made on behalf of the patient pursuant to 42 .36(b)(4). In accordance with 42 .37, the specific reason(s) that the patient is physically or mentally incapable of signing the claim for is as follows:      Signature of Physician or Healthcare Professional  Date/Time:   1/17/25 @ 17:00     (For Scheduled repetitive transport, this form is not valid for transports performed more than 60 days after this date).    LISA SANTACRUZ, RN, CDP, [facility]                                                                                                                                            ------------------------------------------------  Printed Name and Credentials of Physician or Authorized Healthcare Professional     *Form must be signed by patient's attending physician for scheduled, repetitive transports,.  For non-repetitive ambulance transports, if unable to obtain the signature of the attending physician, any of the following may sign (please select below):     Physician  Clinical Nurse Specialist  X Registered Nurse     Physician Assistant  X Discharge Planner  Licensed Practical Nurse     Nurse Practitioner  X

## 2025-01-17 NOTE — PLAN OF CARE
Goal Outcome Evaluation:  Plan of Care Reviewed With: family        Progress: no change  Outcome Evaluation: alert, confused, cooperative.  SL Morphine x1.  family at bedside all shift.  plan home with hospice this afternoon.  meds to bed delivered.  ambulance transport  [facility] for 1700.  mediport flushed and deaccessed without difficulty.

## 2025-01-17 NOTE — DISCHARGE SUMMARY
PHYSICIAN DISCHARGE SUMMARY                                                                        [facility]    Patient Identification:  Name: Ely Sims  Age: 64 y.o.  Sex: female  :  1960  MRN: [id number]  Primary Care Physician: Provider, No Known    Admit date: 2025  Discharge date and time:2025  Discharged Condition: good    Discharge Diagnoses:  Active Hospital Problems    Diagnosis  POA    **Acute kidney injury (ALBER) with acute tubular necrosis (ATN) [N17.0]  Yes    Hyperkalemia [E87.5]  Yes    Anxiety [F41.9]  Yes    Severe protein-calorie malnutrition [E43]  Yes    Abdominal pain [R10.9]  Yes    Dehydration [E86.0]  Yes    Hypertension [I10]  Yes    Colon cancer metastasized to brain [C18.9, C79.31]  Yes      Resolved Hospital Problems   No resolved problems to display.          PMHX:   Past Medical History:   Diagnosis Date    Metastasis to brain     Rectal carcinoma     Seasonal allergies      PSHX:   Past Surgical History:   Procedure Laterality Date    COLONOSCOPY N/A 2024    Procedure: COLONOSCOPY to 20cm with cold biopsies and needle tattoo;  Surgeon: Shadi Kaminski MD;  Location: [facility] ENDOSCOPY;  Service: Gastroenterology;  Laterality: N/A;  pre: abnormal imaging  post: poor prep, obstructing colon mass at 20cm, hemorrhoids    COLOSTOMY N/A 2024    Procedure: COLOSTOMY LAPAROSCOPIC;  Surgeon: Saturnino Chester MD;  Location: [facility] OR;  Service: General;  Laterality: N/A;    CRANIOTOMY N/A 2024    Procedure: Posterior fossa craniotomy for tumor using stereotactic guidance;  Surgeon: Bull Doty MD;  Location: [facility] MAIN OR;  Service: Neurosurgery;  Laterality: N/A;    ENDOMETRIAL ABLATION      TUBAL ABDOMINAL LIGATION      VENOUS ACCESS DEVICE (PORT) INSERTION Right 2024    Procedure: INSERTION VENOUS ACCESS DEVICE;  Surgeon: Saturnino Chester,  MD;  Location: [facility] OR;  Service: General;  Laterality: Right;       Hospital Course: Ely Sims   is a 64-year-old female with metastatic colon cancer with mets to her brain since August 2022 currently receiving treatment under the direction of her oncologist and was hospitalized for 7 days with similar symptoms of severe pain uncontrolled nausea vomiting and dehydration.  Since her discharge from the hospital patient has progressively declined again and today she presented to CBC office for her third cycle of immunotherapy therapy but was noted to be doing very poorly with ongoing nausea vomiting decreased intake and difficulty swallowing.  Despite taking her medications including Zofran and Magic mouthwash her symptoms are getting worse.  She was also having worsening abdominal pain despite fentanyl patch and increasing the dose of Norco.  Lab work performed earlier today shows white blood cell count of 15,000 sodium 130 potassium of 6.2 creatinine of 3.43 and alkaline phosphatase of 861.  Because of these parameters patient was sent to the emergency room where she had an emergent hyperkalemia treatment and also IV fluids administered.  Patient is being admitted for further care.  According to family members at bedside after fluid resuscitation and treatment in the emergency room though she is still feels poorly but looks significantly better.  The patient was admitted to the hospital and seen by oncology and nephrology.  Patient was treated with Diflucan for thrush and patient was offered for comfort care and hospice and plan is to go home with hospice care at home with family being caregivers.  She will follow-up with hospice.    Consults:     Consults       Date and Time Order Name Status Description    1/7/2025  4:34 PM Inpatient Hematology & Oncology Consult Completed     1/7/2025  1:36 PM Nephrology (on -call MD unless specified) Completed     1/7/2025 12:46 PM [facility] (on-call MD unless specified)  "Details      12/18/2024  4:38 PM Inpatient Psychiatrist Consult Completed     12/16/2024  3:32 PM Inpatient Cardiology Consult Completed     12/16/2024 12:37 PM Inpatient Neurology Consult General Completed     12/14/2024  3:45 PM Inpatient Infectious Diseases Consult Completed     12/14/2024  3:44 PM Inpatient Gastroenterology Consult Completed     12/13/2024  6:42 PM Inpatient Hematology & Oncology Consult Completed                   Significant Diagnostic Studies: No results found for: \"WBC\", \"HGB\", \"HCT\", \"PLT\"  No results found for: \"NA\", \"K\", \"CL\", \"CO2\", \"BUN\", \"CREATININE\", \"GLUCOSE\"  No results found for: \"CALCIUM\", \"MG\", \"PHOS\"  No results found for: \"AST\", \"ALT\", \"ALKPHOS\"  No results found for: \"APTT\", \"INR\"  No results found for: \"COLORU\", \"CLARITYU\", \"SPECGRAV\", \"PHUR\", \"PROTEINUR\", \"GLUCOSEU\", \"KETONESU\", \"BLOODU\", \"NITRITE\", \"LEUKOCYTESUR\", \"BILIRUBINUR\", \"UROBILINOGEN\", \"RBCUA\", \"WBCUA\", \"BACTERIA\", \"UACOMMENT\"  No results found for: \"TROPONINT\", \"TROPONINI\", \"BNP\"  No components found for: \"HGBA1C;2\"  No components found for: \"TSH;2\"  Imaging Results (All)       Procedure Component Value Units Date/Time    FL Video Swallow Single Contrast [[id number]] Collected: 01/10/25 1017     Updated: 01/10/25 1430    Narrative:      VIDEO SWALLOWING EXAMINATION BY SPEECH PATHOLOGY     Clinical: Dysphasia     Reference Air Kerma: 3.2 mGy     Video swallowing examination performed under the direction of speech  pathology. Imaging reviewed by radiologist who concurs with the  findings.     Speech pathology summary:     VFSS complete. Radiology APRN, Eliza Mcintyre, present during the study.  Patient presents with moderate oropharyngeal dysphagia. Penetration with  nectar thick liquids and honey thick liquids. Dez aspiration of thin  liquids. No penetration/aspiration with puree or soft/chopped solids.  Double swallow successful in reducing moderate pharyngeal residue.           This report was finalized on 1/10/2025 " "2:27 PM by Dr. Veto Escobar M.D on Workstation: [id number]       XR Chest 1 View [[id number]] Collected: 01/08/25 1340     Updated: 01/08/25 1347    Narrative:      XR CHEST 1 VW-     Clinical: Elevated procal     COMPARISON examination 8/19/2024     FINDINGS: There is a Mediport catheter in position as before. Cardiac  size within normal limits. No mediastinal or hilar abnormality has  developed. No pleural effusion or vascular congestion seen. There is a  curvilinear opacity extending from the right infrahilar region to the  lung base, suspect an area of atelectasis or consolidation. Innumerable  nodular densities demonstrated on the previous 12/17/2024 CT examination  are smaller or not as well demonstrated on the current chest radiograph.  No right hilar abnormality seen. The remainder is unremarkable     This report was finalized on 1/8/2025 1:43 PM by Dr. Taiwo No M.D  on Workstation: [id number]             Lab Results (last 7 days)       ** No results found for the last 168 hours. **          BP (!) 177/103 (BP Location: Right arm, Patient Position: Sitting)   Pulse 111   Temp 97 °F (36.1 °C) (Oral)   Resp 16   Ht 170.2 cm (67.01\")   Wt 54.3 kg (119 lb 12.8 oz)   SpO2 98%   BMI 18.76 kg/m²     Discharge Exam:  General Appearance:    Alert, cooperative, no distress                          Head:    Normocephalic, without obvious abnormality, atraumatic                          Eyes:                            Throat:   Lips, tongue, gums normal                          Neck:   Supple, symmetrical, trachea midline, no JVD                        Lungs:     Clear to auscultation bilaterally, respirations unlabored                Chest Wall:    No tenderness or deformity                        Heart:    Regular rate and rhythm, S1 and S2 normal, no murmur,no  Rub  or gallop                  Abdomen:     Soft, non-tender, bowel sounds active, no masses, no organomegaly                  " Extremities:   Extremities normal, atraumatic, no cyanosis or edema                             Skin:   Skin is warm and dry,  no rashes or palpable lesions                  Neurologic:   no focal deficits noted     Disposition:  Home with hospice    Activity as tolerated    Diet as tolerated  Diet Order   Procedures    Diet: Regular/House; Texture: Soft to Chew (NDD 3); Soft to Chew: Chopped Meat; Fluid Consistency: Nectar Thick       Patient Instructions:      Discharge Medications        New Medications        Instructions Start Date   diazePAM 1 MG/ML solution  Commonly known as: VALIUM   2 mg, Oral, Every 8 Hours PRN      fluconazole 200 MG tablet  Commonly known as: Diflucan   200 mg, Oral, Daily      morphine 20 MG/ML concentrated solution 20mg/ml   5 mg, Oral, Every 1 Hour PRN             Continue These Medications        Instructions Start Date   buPROPion  MG 24 hr tablet  Commonly known as: WELLBUTRIN XL   2 tablets, Every Morning      dexAMETHasone 4 MG tablet  Commonly known as: DECADRON   4 mg, Oral, 2 Times Daily      fentaNYL 50 MCG/HR patch  Commonly known as: DURAGESIC   1 patch, Transdermal, Every 72 Hours      HYDROcodone-acetaminophen  MG per tablet  Commonly known as: NORCO   1 tablet, Oral, Every 4 Hours PRN      melatonin 5 MG tablet tablet   5 mg, Oral, Nightly      ondansetron ODT 8 MG disintegrating tablet  Commonly known as: ZOFRAN-ODT   8 mg, Oral, Every 8 Hours PRN      polyethylene glycol 17 g packet  Commonly known as: MIRALAX   17 g, Oral, Daily      sennosides-docusate 8.6-50 MG per tablet  Commonly known as: PERICOLACE   2 tablets, Oral, Daily             Stop These Medications      ALPRAZolam 0.25 MG tablet  Commonly known as: Xanax     amLODIPine 10 MG tablet  Commonly known as: NORVASC     Diphenhydramine-Aluminum-Magnesium-Simethicone-Lidocaine-Nystatin     encorafenib 75 MG capsule  Commonly known as: BRAFTOVI     furosemide 40 MG tablet  Commonly known as: LASIX      losartan 50 MG tablet  Commonly known as: COZAAR     potassium chloride 10 MEQ CR tablet            Future Appointments   Date Time Provider Department Center   1/24/2025 12:45 PM [facility] 1 (3T)  [facility]   1/24/2025  1:45 PM [facility] 1 (3T)  [facility]   1/30/2025  8:30 AM Bull Doty MD [facility]   2/3/2025 10:00 AM Terrie Crenshaw MD [facility] None   3/4/2025  1:00 PM Saturnino Chester MD [facility]      Follow-up Information       Provider, No Known Follow up in 1 week(s).    Why: follow up with hospice  Contact information:  [facility] [address]  [phone]                           Discharge Order (From admission, onward)       Start     Ordered    01/17/25 1105  Discharge patient  Once        Expected Discharge Date: 01/17/25   Discharge Disposition: Hospice/Home   Physician of Record for Attribution - Please select from Treatment Team: AMARIS MEJIA [[id number]]   Review needed by CMO to determine Physician of Record: No      Question Answer Comment   Physician of Record for Attribution - Please select from Treatment Team AMARIS MEJIA    Review needed by CMO to determine Physician of Record No        01/17/25 1115                    Total time spent discharging patient including evaluation,post hospitalization follow up,  medication and post hospitalization instructions and education total time exceeds 30 minutes.    Signed:  Amaris Mejia MD  1/17/2025  11:15 EST

## 2025-01-17 NOTE — PROGRESS NOTES
Discharge Planning Assessment  [facility]     Patient Name: Ely Sims  MRN: [id number]  Today's Date: 1/17/2025    Admit Date: 1/7/2025    Plan: home with [facility] and transportation by [facility] on 1/17/25. CARLOS Lopez, RN, CCP.   Discharge Needs Assessment    No documentation.                  Discharge Plan       Row Name 01/17/25 1429       Plan    Plan home with [facility] and transportation by [facility] on 1/17/25. CARLOS Lopez, RN, CCP.    Plan Comments The patient is being discharged to home with [facility] and transportation by [facility] on 1/17/25. CARLOS Lopez RN, CCP.    Final Discharge Disposition Code 50 - home with hospice    Final Note The patient is being discharged to home with [facility] and transportation by [facility] on 1/17/25. CARLOS Lopez RN, CCP.                  Continued Care and Services - Admitted Since 1/7/2025       Home Medical Care Coordination complete.      Service Provider Request Status Services Address Phone Fax Patient Preferred    [facility]  Selected Home H[facility]ice [address], [place] [address] [phone] --     [facility] Accepted -- [address] [address] [phone] --                  Selected Continued Care - Prior Encounters Includes continued care and service providers with selected services from prior encounters from 10/9/2024 to 1/17/2025      Discharged on 12/20/2024 Admission date: 12/13/2024 - Discharge disposition: Home-Health Care [facility]      Home Medical Care       Service Provider Services Address Phone Fax Patient Preferred     [facility], Home Nursing, Home Rehabilitation [address] [address] [phone] --                          Expected Discharge Date and Time       Expected Discharge Date Expected Discharge Time    Jan 17, 2025            Demographic Summary    No documentation.                  Functional  Status    No documentation.                  Psychosocial    No documentation.                  Abuse/Neglect    No documentation.                  Legal    No documentation.                  Substance Abuse    No documentation.                  Patient Forms    No documentation.                     Marsha Lopez RN

## 2025-01-17 NOTE — PLAN OF CARE
Goal Outcome Evaluation:           Progress: no change  Outcome Evaluation: PPS 30%. Pt was sleeping at the beginning of the shift. She took all her evening medications tonight crushed in apple sauce. More verbal tonight and drank half of one boost. No PRNs given.     Colostomy bag had output tonight, bag changed. Up to BSC once with assist x2 stand and pivot.

## 2025-01-20 NOTE — PAYOR COMM NOTE
"Ely Sims (64 y.o. Female)          DC SUMMARY FOR [id number]      FAX#  [phone]         Date of Birth   1960    Social Security Number       Address   [address] [place] [address]    Home Phone   [phone]    MRN   [id number]       Voodoo   Shinto    Marital Status   Single                            Admission Date   1/7/25    Admission Type   Emergency    Admitting Provider   Estefania Jacobs MD    Attending Provider       Department, Room/Bed   [address], 86/1       Discharge Date   1/17/2025    Discharge Disposition   Hospice/Home    Discharge Destination                                 Attending Provider: (none)   Allergies: No Known Allergies    Isolation: None   Infection: None   Code Status: Prior    Ht: 170.2 cm (67.01\")   Wt: 54.3 kg (119 lb 12.8 oz)    Admission Cmt: None   Principal Problem: Acute kidney injury (ALBER) with acute tubular necrosis (ATN) [N17.0]                   Active Insurance as of 1/7/2025       Primary Coverage       Payor Plan Insurance Group Employer/Plan Group    [facility] [id number]       Payor Plan Address Payor Plan Phone Number Payor Plan Fax Number Effective Dates    [address]   1/1/2024 - None Entered    [facility] [address]         Subscriber Name Subscriber Birth Date Member ID       ELY SIMS 1960 [id number]                     Emergency Contacts        (Rel.) Home Phone Work Phone Mobile Phone    Ubaldo Case (Partner) -- -- [phone]    Elia CadenaA -- -- [phone]    DON OBRIEN (Sister) -- -- [phone]    CARL CADENA (Daughter) -- -- [phone]                 Discharge Summary        José Mejia MD at 01/17/25 1115                                                                             PHYSICIAN DISCHARGE SUMMARY                                                                        [facility]    Patient " Identification:  Name: Ely Sims  Age: 64 y.o.  Sex: female  :  1960  MRN: [id number]  Primary Care Physician: Provider, No Known    Admit date: 2025  Discharge date and time:2025  Discharged Condition: good    Discharge Diagnoses:  Active Hospital Problems    Diagnosis  POA    **Acute kidney injury (ALBER) with acute tubular necrosis (ATN) [N17.0]  Yes    Hyperkalemia [E87.5]  Yes    Anxiety [F41.9]  Yes    Severe protein-calorie malnutrition [E43]  Yes    Abdominal pain [R10.9]  Yes    Dehydration [E86.0]  Yes    Hypertension [I10]  Yes    Colon cancer metastasized to brain [C18.9, C79.31]  Yes      Resolved Hospital Problems   No resolved problems to display.          PMHX:   Past Medical History:   Diagnosis Date    Metastasis to brain     Rectal carcinoma     Seasonal allergies      PSHX:   Past Surgical History:   Procedure Laterality Date    COLONOSCOPY N/A 2024    Procedure: COLONOSCOPY to 20cm with cold biopsies and needle tattoo;  Surgeon: Shadi Kaminski MD;  Location: [facility] ENDOSCOPY;  Service: Gastroenterology;  Laterality: N/A;  pre: abnormal imaging  post: poor prep, obstructing colon mass at 20cm, hemorrhoids    COLOSTOMY N/A 2024    Procedure: COLOSTOMY LAPAROSCOPIC;  Surgeon: Saturnino Chester MD;  Location: [facility] OR;  Service: General;  Laterality: N/A;    CRANIOTOMY N/A 2024    Procedure: Posterior fossa craniotomy for tumor using stereotactic guidance;  Surgeon: Bull Doty MD;  Location: [facility] OR;  Service: Neurosurgery;  Laterality: N/A;    ENDOMETRIAL ABLATION      TUBAL ABDOMINAL LIGATION      VENOUS ACCESS DEVICE (PORT) INSERTION Right 2024    Procedure: INSERTION VENOUS ACCESS DEVICE;  Surgeon: Saturnino Chester MD;  Location: [facility] MAIN OR;  Service: General;  Laterality: Right;       Hospital Course: Ely Sims   is a 64-year-old female with metastatic colon cancer with mets to her brain since 2022  currently receiving treatment under the direction of her oncologist and was hospitalized for 7 days with similar symptoms of severe pain uncontrolled nausea vomiting and dehydration.  Since her discharge from the hospital patient has progressively declined again and today she presented to CBC office for her third cycle of immunotherapy therapy but was noted to be doing very poorly with ongoing nausea vomiting decreased intake and difficulty swallowing.  Despite taking her medications including Zofran and Magic mouthwash her symptoms are getting worse.  She was also having worsening abdominal pain despite fentanyl patch and increasing the dose of Norco.  Lab work performed earlier today shows white blood cell count of 15,000 sodium 130 potassium of 6.2 creatinine of 3.43 and alkaline phosphatase of 861.  Because of these parameters patient was sent to the emergency room where she had an emergent hyperkalemia treatment and also IV fluids administered.  Patient is being admitted for further care.  According to family members at bedside after fluid resuscitation and treatment in the emergency room though she is still feels poorly but looks significantly better.  The patient was admitted to the hospital and seen by oncology and nephrology.  Patient was treated with Diflucan for thrush and patient was offered for comfort care and hospice and plan is to go home with hospice care at home with family being caregivers.  She will follow-up with hospice.    Consults:     Consults       Date and Time Order Name Status Description    1/7/2025  4:34 PM Inpatient Hematology & Oncology Consult Completed     1/7/2025  1:36 PM Nephrology (on -call MD unless specified) Completed     1/7/2025 12:46 PM [facility] (on-call MD unless specified) Details      12/18/2024  4:38 PM Inpatient Psychiatrist Consult Completed     12/16/2024  3:32 PM Inpatient Cardiology Consult Completed     12/16/2024 12:37 PM Inpatient Neurology Consult General  "Completed     12/14/2024  3:45 PM Inpatient Infectious Diseases Consult Completed     12/14/2024  3:44 PM Inpatient Gastroenterology Consult Completed     12/13/2024  6:42 PM Inpatient Hematology & Oncology Consult Completed                   Significant Diagnostic Studies: No results found for: \"WBC\", \"HGB\", \"HCT\", \"PLT\"  No results found for: \"NA\", \"K\", \"CL\", \"CO2\", \"BUN\", \"CREATININE\", \"GLUCOSE\"  No results found for: \"CALCIUM\", \"MG\", \"PHOS\"  No results found for: \"AST\", \"ALT\", \"ALKPHOS\"  No results found for: \"APTT\", \"INR\"  No results found for: \"COLORU\", \"CLARITYU\", \"SPECGRAV\", \"PHUR\", \"PROTEINUR\", \"GLUCOSEU\", \"KETONESU\", \"BLOODU\", \"NITRITE\", \"LEUKOCYTESUR\", \"BILIRUBINUR\", \"UROBILINOGEN\", \"RBCUA\", \"WBCUA\", \"BACTERIA\", \"UACOMMENT\"  No results found for: \"TROPONINT\", \"TROPONINI\", \"BNP\"  No components found for: \"HGBA1C;2\"  No components found for: \"TSH;2\"  Imaging Results (All)       Procedure Component Value Units Date/Time    FL Video Swallow Single Contrast [[id number]] Collected: 01/10/25 1017     Updated: 01/10/25 1430    Narrative:      VIDEO SWALLOWING EXAMINATION BY SPEECH PATHOLOGY     Clinical: Dysphasia     Reference Air Kerma: 3.2 mGy     Video swallowing examination performed under the direction of speech  pathology. Imaging reviewed by radiologist who concurs with the  findings.     Speech pathology summary:     VFSS complete. Radiology APRN, Eliza Mcintyre, present during the study.  Patient presents with moderate oropharyngeal dysphagia. Penetration with  nectar thick liquids and honey thick liquids. Dez aspiration of thin  liquids. No penetration/aspiration with puree or soft/chopped solids.  Double swallow successful in reducing moderate pharyngeal residue.           This report was finalized on 1/10/2025 2:27 PM by Dr. Veto Escobar M.D on Workstation: [id number]       XR Chest 1 View [[id number]] Collected: 01/08/25 1340     Updated: 01/08/25 1347    Narrative:      XR CHEST 1 VW-   " "  Clinical: Elevated procal     COMPARISON examination 8/19/2024     FINDINGS: There is a Mediport catheter in position as before. Cardiac  size within normal limits. No mediastinal or hilar abnormality has  developed. No pleural effusion or vascular congestion seen. There is a  curvilinear opacity extending from the right infrahilar region to the  lung base, suspect an area of atelectasis or consolidation. Innumerable  nodular densities demonstrated on the previous 12/17/2024 CT examination  are smaller or not as well demonstrated on the current chest radiograph.  No right hilar abnormality seen. The remainder is unremarkable     This report was finalized on 1/8/2025 1:43 PM by Dr. Taiwo No M.D  on Workstation: [id number]             Lab Results (last 7 days)       ** No results found for the last 168 hours. **          BP (!) 177/103 (BP Location: Right arm, Patient Position: Sitting)   Pulse 111   Temp 97 °F (36.1 °C) (Oral)   Resp 16   Ht 170.2 cm (67.01\")   Wt 54.3 kg (119 lb 12.8 oz)   SpO2 98%   BMI 18.76 kg/m²     Discharge Exam:  General Appearance:    Alert, cooperative, no distress                          Head:    Normocephalic, without obvious abnormality, atraumatic                          Eyes:                            Throat:   Lips, tongue, gums normal                          Neck:   Supple, symmetrical, trachea midline, no JVD                        Lungs:     Clear to auscultation bilaterally, respirations unlabored                Chest Wall:    No tenderness or deformity                        Heart:    Regular rate and rhythm, S1 and S2 normal, no murmur,no  Rub  or gallop                  Abdomen:     Soft, non-tender, bowel sounds active, no masses, no organomegaly                  Extremities:   Extremities normal, atraumatic, no cyanosis or edema                             Skin:   Skin is warm and dry,  no rashes or palpable lesions                  Neurologic:   no focal " deficits noted     Disposition:  Home with hospice    Activity as tolerated    Diet as tolerated  Diet Order   Procedures    Diet: Regular/House; Texture: Soft to Chew (NDD 3); Soft to Chew: Chopped Meat; Fluid Consistency: Nectar Thick       Patient Instructions:      Discharge Medications        New Medications        Instructions Start Date   diazePAM 1 MG/ML solution  Commonly known as: VALIUM   2 mg, Oral, Every 8 Hours PRN      fluconazole 200 MG tablet  Commonly known as: Diflucan   200 mg, Oral, Daily      morphine 20 MG/ML concentrated solution 20mg/ml   5 mg, Oral, Every 1 Hour PRN             Continue These Medications        Instructions Start Date   buPROPion  MG 24 hr tablet  Commonly known as: WELLBUTRIN XL   2 tablets, Every Morning      dexAMETHasone 4 MG tablet  Commonly known as: DECADRON   4 mg, Oral, 2 Times Daily      fentaNYL 50 MCG/HR patch  Commonly known as: DURAGESIC   1 patch, Transdermal, Every 72 Hours      HYDROcodone-acetaminophen  MG per tablet  Commonly known as: NORCO   1 tablet, Oral, Every 4 Hours PRN      melatonin 5 MG tablet tablet   5 mg, Oral, Nightly      ondansetron ODT 8 MG disintegrating tablet  Commonly known as: ZOFRAN-ODT   8 mg, Oral, Every 8 Hours PRN      polyethylene glycol 17 g packet  Commonly known as: MIRALAX   17 g, Oral, Daily      sennosides-docusate 8.6-50 MG per tablet  Commonly known as: PERICOLACE   2 tablets, Oral, Daily             Stop These Medications      ALPRAZolam 0.25 MG tablet  Commonly known as: Xanax     amLODIPine 10 MG tablet  Commonly known as: NORVASC     Diphenhydramine-Aluminum-Magnesium-Simethicone-Lidocaine-Nystatin     encorafenib 75 MG capsule  Commonly known as: BRAFTOVI     furosemide 40 MG tablet  Commonly known as: LASIX     losartan 50 MG tablet  Commonly known as: COZAAR     potassium chloride 10 MEQ CR tablet            Future Appointments   Date Time Provider Department Center   1/24/2025 12:45 PM [facility] 1 (3T)   [facility]   1/24/2025  1:45 PM [facility] 1 (3T)  [facility]   1/30/2025  8:30 AM Bull Doty MD [facility]   2/3/2025 10:00 AM Terrie Crenshaw MD [facility] None   3/4/2025  1:00 PM Saturnino Chester MD [facility]      Follow-up Information       Provider, No Known Follow up in 1 week(s).    Why: follow up with hospice  Contact information:  [facility] [address]  [phone]                           Discharge Order (From admission, onward)       Start     Ordered    01/17/25 1105  Discharge patient  Once        Expected Discharge Date: 01/17/25   Discharge Disposition: Hospice/Home   Physician of Record for Attribution - Please select from Treatment Team: AMARIS MEJIA [[id number]]   Review needed by CMO to determine Physician of Record: No      Question Answer Comment   Physician of Record for Attribution - Please select from Treatment Team AMARIS MEJIA    Review needed by CMO to determine Physician of Record No        01/17/25 1115                    Total time spent discharging patient including evaluation,post hospitalization follow up,  medication and post hospitalization instructions and education total time exceeds 30 minutes.    Signed:  Amaris Mejia MD  1/17/2025  11:15 EST     Electronically signed by Amaris Mejia MD at 01/17/25 5270

## 2025-01-22 ENCOUNTER — PATIENT OUTREACH (OUTPATIENT)
Dept: OTHER | Facility: HOSPITAL | Age: 65
End: 2025-01-22
Payer: COMMERCIAL

## 2025-02-04 NOTE — PLAN OF CARE
Patient is A&Ox4, follows command. Patient for surgery this AM. On NPO post midnight. No acute neuro changes this shift. Still off of Cardene.      See H&P in chart

## (undated) DEVICE — ANTIBACTERIAL UNDYED BRAIDED (POLYGLACTIN 910), SYNTHETIC ABSORBABLE SUTURE: Brand: COATED VICRYL

## (undated) DEVICE — DISPOSABLE IRRIGATION BIPOLAR CORD, M1000 TYPE: Brand: KIRWAN

## (undated) DEVICE — SUT PROLN 2/0 SH 36IN 8523H

## (undated) DEVICE — LAPAROSCOPIC SMOKE ELIMINATION DEVICE: Brand: PNEUVIEW XE

## (undated) DEVICE — COLOSTOMY/ILEOSTOMY KIT, FLEXWEAR: Brand: NEW IMAGE

## (undated) DEVICE — GLV SURG BIOGEL LTX PF 7 1/2

## (undated) DEVICE — SMOKE EVACUATION TUBING WITH 7/8 IN TO 1/4 IN REDUCER: Brand: BUFFALO FILTER

## (undated) DEVICE — SUT NUROLON 4/0 TF18 CR8 I8IN C584D

## (undated) DEVICE — DRSNG SURESITE123 6X8IN

## (undated) DEVICE — NDL HYPO PRECISIONGLIDE REG 25G 1 1/2

## (undated) DEVICE — GLV SURG SENSICARE W/ALOE PF LF 7.5 STRL

## (undated) DEVICE — ENDOPATH PNEUMONEEDLE INSUFFLATION NEEDLES WITH LUER LOCK CONNECTORS 120MM: Brand: ENDOPATH

## (undated) DEVICE — STPLR SKIN VISISTAT WD 35CT

## (undated) DEVICE — TUBING, SUCTION, 1/4" X 10', STRAIGHT: Brand: MEDLINE

## (undated) DEVICE — ADAPT CLN BIOGUARD AIR/H2O DISP

## (undated) DEVICE — TOOL MR8-15BA50T MR8 15CM BAL SYMTRI 5MM: Brand: MIDAS REX MR8

## (undated) DEVICE — MAYFIELD® DISPOSABLE ADULT SKULL PIN (PLASTIC BASE): Brand: MAYFIELD®

## (undated) DEVICE — LOU LAP SIGMOID COLON: Brand: MEDLINE INDUSTRIES, INC.

## (undated) DEVICE — DRSNG SURESITE WNDW 4X4.5

## (undated) DEVICE — PATIENT RETURN ELECTRODE, SINGLE-USE, CONTACT QUALITY MONITORING, ADULT, WITH 9FT CORD, FOR PATIENTS WEIGING OVER 33LBS. (15KG): Brand: MEGADYNE

## (undated) DEVICE — VIOLET BRAIDED (POLYGLACTIN 910), SYNTHETIC ABSORBABLE SUTURE: Brand: COATED VICRYL

## (undated) DEVICE — PK CRANI 40

## (undated) DEVICE — THE CARR-LOCKE INJECTION NEEDLE IS A SINGLE USE, DISPOSABLE, FLEXIBLE SHEATH INJECTION NEEDLE USED FOR THE INJECTION OF VARIOUS TYPES OF MEDIA THROUGH FLEXIBLE ENDOSCOPES.

## (undated) DEVICE — 3M™ STERI-STRIP™ ANTIMICROBIAL SKIN CLOSURES 1/2 INCH X 4 INCHES 50/CARTON 4 CARTONS/CASE A1847: Brand: 3M™ STERI-STRIP™

## (undated) DEVICE — APPL CHLORAPREP HI/LITE 26ML ORNG

## (undated) DEVICE — LN SMPL CO2 SHTRM SD STREAM W/M LUER

## (undated) DEVICE — LABEL SHEET CUSTOM 2X2 YELLOW: Brand: MEDLINE INDUSTRIES, INC.

## (undated) DEVICE — STRIP,CLOSURE,WOUND,MEDI-STRIP,1/2X4: Brand: MEDLINE

## (undated) DEVICE — TOOL MR8-F2/7TA23 MR8 F2/7CM TAPER 2.3MM: Brand: MIDAS REX MR8

## (undated) DEVICE — LOU MINOR PROCEDURE: Brand: MEDLINE INDUSTRIES, INC.

## (undated) DEVICE — Device

## (undated) DEVICE — GLV SURG BIOGEL LTX PF 7

## (undated) DEVICE — DRSNG WND BORDR/ADHS NONADHR/GZ LF 2X2IN STRL

## (undated) DEVICE — COVER,C-ARM,41X74: Brand: MEDLINE

## (undated) DEVICE — DISPOSABLE GRASPER CARTRIDGE: Brand: DIRECT DRIVE REPOSABLE GRASPERS

## (undated) DEVICE — SUT MNCRYL PLS ANTIB UD 4/0 PS2 18IN

## (undated) DEVICE — ELECTRD BLD EZ CLN MOD XLNG 2.75IN

## (undated) DEVICE — DRAPE,TOWEL,LARGE,INVISISHIELD: Brand: MEDLINE

## (undated) DEVICE — NDL HYPO PRECISIONGLIDE REG 20G 1 1/2

## (undated) DEVICE — SUT SILK 2/0 SH CR8 18IN CR8 C012D

## (undated) DEVICE — Device: Brand: BLACK EYE

## (undated) DEVICE — SENSR O2 OXIMAX FNGR A/ 18IN NONSTR

## (undated) DEVICE — KT ORCA ORCAPOD DISP STRL

## (undated) DEVICE — DURAHOOK 1/4HOOK PK/6

## (undated) DEVICE — ENDOPATH XCEL BLADELESS TROCARS WITH STABILITY SLEEVES: Brand: ENDOPATH XCEL

## (undated) DEVICE — SYR LL TP 10ML STRL

## (undated) DEVICE — CANN O2 ETCO2 FITS ALL CONN CO2 SMPL A/ 7IN DISP LF

## (undated) DEVICE — ENDOPATH XCEL UNIVERSAL TROCAR STABLILITY SLEEVES: Brand: ENDOPATH XCEL

## (undated) DEVICE — CUSA® CLARITY 36KHZ CURVED EXTENDED STANDARD TIP: Brand: CUSA® CLARITY

## (undated) DEVICE — CONN TBG Y 5 IN 1 LF STRL

## (undated) DEVICE — PAD,NON-ADHERENT,3X8,STERILE,LF,1/PK: Brand: MEDLINE

## (undated) DEVICE — ADHS LIQ MASTISOL 2/3ML

## (undated) DEVICE — PENCL SMOKE/EVAC MEGADYNE TELESCP 10FT

## (undated) DEVICE — SHEET, T, LAPAROTOMY, STERILE: Brand: MEDLINE

## (undated) DEVICE — DRP MICROSCP LEICA 137X381CM

## (undated) DEVICE — ENDOCUT SCISSOR TIP, DISPOSABLE: Brand: RENEW

## (undated) DEVICE — TRAP FLD MINIVAC MEGADYNE 100ML

## (undated) DEVICE — SUT VIC 0 TN 27IN DYED JTN0G